# Patient Record
Sex: FEMALE | Race: WHITE | Employment: OTHER | URBAN - METROPOLITAN AREA
[De-identification: names, ages, dates, MRNs, and addresses within clinical notes are randomized per-mention and may not be internally consistent; named-entity substitution may affect disease eponyms.]

---

## 2017-05-23 PROBLEM — R06.02 SOB (SHORTNESS OF BREATH): Status: ACTIVE | Noted: 2017-05-23

## 2017-05-23 PROBLEM — J30.9 RHINITIS, ALLERGIC: Status: ACTIVE | Noted: 2017-05-23

## 2017-05-23 PROBLEM — J32.9 SINUSITIS: Status: ACTIVE | Noted: 2017-05-23

## 2017-05-23 PROBLEM — R05.9 COUGH: Status: ACTIVE | Noted: 2017-05-23

## 2017-05-23 PROBLEM — J40 BRONCHITIS: Status: ACTIVE | Noted: 2017-05-23

## 2017-05-23 PROBLEM — K21.9 GERD (GASTROESOPHAGEAL REFLUX DISEASE): Status: ACTIVE | Noted: 2017-05-23

## 2018-03-13 DIAGNOSIS — J45.909 UNCOMPLICATED ASTHMA, UNSPECIFIED ASTHMA SEVERITY, UNSPECIFIED WHETHER PERSISTENT: Primary | ICD-10-CM

## 2018-03-13 RX ORDER — FLUTICASONE FUROATE AND VILANTEROL 100; 25 UG/1; UG/1
1 POWDER RESPIRATORY (INHALATION) DAILY
Qty: 3 EACH | Refills: 5 | Status: SHIPPED | OUTPATIENT
Start: 2018-03-13 | End: 2019-01-28 | Stop reason: SDUPTHER

## 2018-03-20 ENCOUNTER — HOSPITAL ENCOUNTER (OUTPATIENT)
Age: 83
Discharge: HOME | End: 2018-03-22
Payer: MEDICARE

## 2018-03-20 DIAGNOSIS — I10 ESSENTIAL HYPERTENSION: ICD-10-CM

## 2018-03-20 DIAGNOSIS — E87.5 HYPERPOTASSEMIA: ICD-10-CM

## 2018-03-20 DIAGNOSIS — E11.9 TYPE 2 DIABETES MELLITUS WITHOUT COMPLICATION, WITHOUT LONG-TERM CURRENT USE OF INSULIN (HCC): ICD-10-CM

## 2018-03-20 LAB
ANION GAP SERPL CALCULATED.3IONS-SCNC: 12 MMOL/L (ref 7–16)
BASOPHILS ABSOLUTE: 0.03 E9/L (ref 0–0.2)
BASOPHILS RELATIVE PERCENT: 0.3 % (ref 0–2)
BILIRUBIN URINE: NEGATIVE
BLOOD, URINE: NEGATIVE
BUN BLDV-MCNC: 11 MG/DL (ref 8–23)
CALCIUM SERPL-MCNC: 9.5 MG/DL (ref 8.6–10.2)
CHLORIDE BLD-SCNC: 103 MMOL/L (ref 98–107)
CLARITY: CLEAR
CO2: 23 MMOL/L (ref 22–29)
COLOR: YELLOW
CREAT SERPL-MCNC: 0.7 MG/DL (ref 0.5–1)
EOSINOPHILS ABSOLUTE: 0.21 E9/L (ref 0.05–0.5)
EOSINOPHILS RELATIVE PERCENT: 2.4 % (ref 0–6)
GFR AFRICAN AMERICAN: >60
GFR NON-AFRICAN AMERICAN: >60 ML/MIN/1.73
GLUCOSE BLD-MCNC: 149 MG/DL (ref 74–109)
GLUCOSE URINE: NEGATIVE MG/DL
HCT VFR BLD CALC: 37.7 % (ref 34–48)
HEMOGLOBIN: 11.8 G/DL (ref 11.5–15.5)
IMMATURE GRANULOCYTES #: 0.02 E9/L
IMMATURE GRANULOCYTES %: 0.2 % (ref 0–5)
KETONES, URINE: NEGATIVE MG/DL
LEUKOCYTE ESTERASE, URINE: NEGATIVE
LYMPHOCYTES ABSOLUTE: 1.69 E9/L (ref 1.5–4)
LYMPHOCYTES RELATIVE PERCENT: 19.2 % (ref 20–42)
MCH RBC QN AUTO: 31.6 PG (ref 26–35)
MCHC RBC AUTO-ENTMCNC: 31.3 % (ref 32–34.5)
MCV RBC AUTO: 101.1 FL (ref 80–99.9)
MONOCYTES ABSOLUTE: 0.47 E9/L (ref 0.1–0.95)
MONOCYTES RELATIVE PERCENT: 5.3 % (ref 2–12)
NEUTROPHILS ABSOLUTE: 6.4 E9/L (ref 1.8–7.3)
NEUTROPHILS RELATIVE PERCENT: 72.6 % (ref 43–80)
NITRITE, URINE: NEGATIVE
PDW BLD-RTO: 13.4 FL (ref 11.5–15)
PH UA: 7 (ref 5–9)
PLATELET # BLD: 288 E9/L (ref 130–450)
PMV BLD AUTO: 10.7 FL (ref 7–12)
POTASSIUM SERPL-SCNC: 5.1 MMOL/L (ref 3.5–5)
PROTEIN UA: NEGATIVE MG/DL
RBC # BLD: 3.73 E12/L (ref 3.5–5.5)
SODIUM BLD-SCNC: 138 MMOL/L (ref 132–146)
SPECIFIC GRAVITY UA: 1.01 (ref 1–1.03)
UROBILINOGEN, URINE: 0.2 E.U./DL
WBC # BLD: 8.8 E9/L (ref 4.5–11.5)

## 2018-03-20 PROCEDURE — 80048 BASIC METABOLIC PNL TOTAL CA: CPT

## 2018-03-20 PROCEDURE — 81003 URINALYSIS AUTO W/O SCOPE: CPT

## 2018-03-20 PROCEDURE — 85025 COMPLETE CBC W/AUTO DIFF WBC: CPT

## 2018-07-19 ENCOUNTER — HOSPITAL ENCOUNTER (OUTPATIENT)
Age: 83
Discharge: HOME | End: 2018-07-21
Payer: MEDICARE

## 2018-07-19 DIAGNOSIS — Z13.220 SCREENING FOR CHOLESTEROL LEVEL: ICD-10-CM

## 2018-07-19 DIAGNOSIS — E11.9 TYPE 2 DIABETES MELLITUS WITHOUT COMPLICATION, WITHOUT LONG-TERM CURRENT USE OF INSULIN (HCC): ICD-10-CM

## 2018-07-19 DIAGNOSIS — E53.8 VITAMIN B12 DEFICIENCY: ICD-10-CM

## 2018-07-19 DIAGNOSIS — I10 ESSENTIAL HYPERTENSION: ICD-10-CM

## 2018-07-19 DIAGNOSIS — E55.9 VITAMIN D DEFICIENCY: ICD-10-CM

## 2018-07-19 LAB
ALBUMIN SERPL-MCNC: 3.5 G/DL (ref 3.5–5.2)
ALP BLD-CCNC: 81 U/L (ref 35–104)
ALT SERPL-CCNC: 18 U/L (ref 0–32)
ANION GAP SERPL CALCULATED.3IONS-SCNC: 12 MMOL/L (ref 7–16)
AST SERPL-CCNC: 20 U/L (ref 0–31)
BASOPHILS ABSOLUTE: 0.04 E9/L (ref 0–0.2)
BASOPHILS RELATIVE PERCENT: 0.4 % (ref 0–2)
BILIRUB SERPL-MCNC: 0.5 MG/DL (ref 0–1.2)
BUN BLDV-MCNC: 14 MG/DL (ref 8–23)
CALCIUM SERPL-MCNC: 9.3 MG/DL (ref 8.6–10.2)
CHLORIDE BLD-SCNC: 95 MMOL/L (ref 98–107)
CHOLESTEROL, TOTAL: 133 MG/DL (ref 0–199)
CO2: 23 MMOL/L (ref 22–29)
CREAT SERPL-MCNC: 0.6 MG/DL (ref 0.5–1)
EOSINOPHILS ABSOLUTE: 0.09 E9/L (ref 0.05–0.5)
EOSINOPHILS RELATIVE PERCENT: 0.8 % (ref 0–6)
GFR AFRICAN AMERICAN: >60
GFR NON-AFRICAN AMERICAN: >60 ML/MIN/1.73
GLUCOSE BLD-MCNC: 134 MG/DL (ref 74–109)
HCT VFR BLD CALC: 32.7 % (ref 34–48)
HDLC SERPL-MCNC: 46 MG/DL
HEMOGLOBIN: 10.1 G/DL (ref 11.5–15.5)
IMMATURE GRANULOCYTES #: 0.05 E9/L
IMMATURE GRANULOCYTES %: 0.4 % (ref 0–5)
LDL CHOLESTEROL CALCULATED: 77 MG/DL (ref 0–99)
LYMPHOCYTES ABSOLUTE: 1.25 E9/L (ref 1.5–4)
LYMPHOCYTES RELATIVE PERCENT: 11.1 % (ref 20–42)
MCH RBC QN AUTO: 29.8 PG (ref 26–35)
MCHC RBC AUTO-ENTMCNC: 30.9 % (ref 32–34.5)
MCV RBC AUTO: 96.5 FL (ref 80–99.9)
MONOCYTES ABSOLUTE: 0.63 E9/L (ref 0.1–0.95)
MONOCYTES RELATIVE PERCENT: 5.6 % (ref 2–12)
NEUTROPHILS ABSOLUTE: 9.23 E9/L (ref 1.8–7.3)
NEUTROPHILS RELATIVE PERCENT: 81.7 % (ref 43–80)
PDW BLD-RTO: 13.2 FL (ref 11.5–15)
PLATELET # BLD: 408 E9/L (ref 130–450)
PMV BLD AUTO: 10.7 FL (ref 7–12)
POTASSIUM SERPL-SCNC: 5 MMOL/L (ref 3.5–5)
RBC # BLD: 3.39 E12/L (ref 3.5–5.5)
SODIUM BLD-SCNC: 130 MMOL/L (ref 132–146)
TOTAL PROTEIN: 7.2 G/DL (ref 6.4–8.3)
TRIGL SERPL-MCNC: 48 MG/DL (ref 0–149)
VITAMIN B-12: >2000 PG/ML (ref 211–946)
VITAMIN D 25-HYDROXY: 47 NG/ML (ref 30–100)
VLDLC SERPL CALC-MCNC: 10 MG/DL
WBC # BLD: 11.3 E9/L (ref 4.5–11.5)

## 2018-07-19 PROCEDURE — 80053 COMPREHEN METABOLIC PANEL: CPT

## 2018-07-19 PROCEDURE — 82607 VITAMIN B-12: CPT

## 2018-07-19 PROCEDURE — 85025 COMPLETE CBC W/AUTO DIFF WBC: CPT

## 2018-07-19 PROCEDURE — 82306 VITAMIN D 25 HYDROXY: CPT

## 2018-07-19 PROCEDURE — 83036 HEMOGLOBIN GLYCOSYLATED A1C: CPT

## 2018-07-19 PROCEDURE — 80061 LIPID PANEL: CPT

## 2018-07-20 LAB — HBA1C MFR BLD: 6.8 % (ref 4–5.6)

## 2018-09-11 ENCOUNTER — TELEPHONE (OUTPATIENT)
Dept: PULMONOLOGY | Age: 83
End: 2018-09-11

## 2018-09-26 PROBLEM — R05.9 COUGH: Status: RESOLVED | Noted: 2017-05-23 | Resolved: 2018-09-26

## 2018-09-26 PROBLEM — J32.9 SINUSITIS: Status: RESOLVED | Noted: 2017-05-23 | Resolved: 2018-09-26

## 2018-10-22 ENCOUNTER — HOSPITAL ENCOUNTER (OUTPATIENT)
Age: 83
Discharge: HOME | End: 2018-10-24
Payer: MEDICARE

## 2018-10-22 DIAGNOSIS — E78.2 MIXED HYPERLIPIDEMIA: ICD-10-CM

## 2018-10-22 DIAGNOSIS — E11.9 TYPE 2 DIABETES MELLITUS WITHOUT COMPLICATION, WITHOUT LONG-TERM CURRENT USE OF INSULIN (HCC): ICD-10-CM

## 2018-10-22 LAB
ALBUMIN SERPL-MCNC: 4.3 G/DL (ref 3.5–5.2)
ALP BLD-CCNC: 83 U/L (ref 35–104)
ALT SERPL-CCNC: 19 U/L (ref 0–32)
ANION GAP SERPL CALCULATED.3IONS-SCNC: 14 MMOL/L (ref 7–16)
AST SERPL-CCNC: 22 U/L (ref 0–31)
BILIRUB SERPL-MCNC: 0.4 MG/DL (ref 0–1.2)
BUN BLDV-MCNC: 17 MG/DL (ref 8–23)
CALCIUM SERPL-MCNC: 9.6 MG/DL (ref 8.6–10.2)
CHLORIDE BLD-SCNC: 101 MMOL/L (ref 98–107)
CHOLESTEROL, TOTAL: 166 MG/DL (ref 0–199)
CO2: 22 MMOL/L (ref 22–29)
CREAT SERPL-MCNC: 0.7 MG/DL (ref 0.5–1)
GFR AFRICAN AMERICAN: >60
GFR NON-AFRICAN AMERICAN: >60 ML/MIN/1.73
GLUCOSE BLD-MCNC: 139 MG/DL (ref 74–109)
HBA1C MFR BLD: 6.4 % (ref 4–5.6)
HDLC SERPL-MCNC: 64 MG/DL
LDL CHOLESTEROL CALCULATED: 89 MG/DL (ref 0–99)
POTASSIUM SERPL-SCNC: 4.8 MMOL/L (ref 3.5–5)
SODIUM BLD-SCNC: 137 MMOL/L (ref 132–146)
TOTAL PROTEIN: 7.5 G/DL (ref 6.4–8.3)
TRIGL SERPL-MCNC: 67 MG/DL (ref 0–149)
VLDLC SERPL CALC-MCNC: 13 MG/DL

## 2018-10-22 PROCEDURE — 83036 HEMOGLOBIN GLYCOSYLATED A1C: CPT

## 2018-10-22 PROCEDURE — 80053 COMPREHEN METABOLIC PANEL: CPT

## 2018-10-22 PROCEDURE — 80061 LIPID PANEL: CPT

## 2019-01-08 ENCOUNTER — HOSPITAL ENCOUNTER (OUTPATIENT)
Age: 84
Discharge: HOME | End: 2019-01-08
Payer: MEDICARE

## 2019-01-08 PROCEDURE — 36415 COLL VENOUS BLD VENIPUNCTURE: CPT

## 2019-01-08 PROCEDURE — 86038 ANTINUCLEAR ANTIBODIES: CPT

## 2019-01-08 PROCEDURE — 86235 NUCLEAR ANTIGEN ANTIBODY: CPT

## 2019-01-09 LAB
ANTI-NUCLEAR ANTIBODY (ANA): NEGATIVE
ENA TO SSA (RO) ANTIBODY: NEGATIVE
ENA TO SSB (LA) ANTIBODY: NEGATIVE

## 2019-02-05 ENCOUNTER — HOSPITAL ENCOUNTER (OUTPATIENT)
Age: 84
Discharge: HOME | End: 2019-02-05
Payer: MEDICARE

## 2019-02-05 DIAGNOSIS — E11.9 TYPE 2 DIABETES MELLITUS WITHOUT COMPLICATION, WITHOUT LONG-TERM CURRENT USE OF INSULIN (HCC): ICD-10-CM

## 2019-02-05 LAB
ALBUMIN SERPL-MCNC: 4.2 G/DL (ref 3.5–5.2)
ALP BLD-CCNC: 67 U/L (ref 35–104)
ALT SERPL-CCNC: 21 U/L (ref 0–32)
ANION GAP SERPL CALCULATED.3IONS-SCNC: 10 MMOL/L (ref 7–16)
AST SERPL-CCNC: 21 U/L (ref 0–31)
BILIRUB SERPL-MCNC: 0.5 MG/DL (ref 0–1.2)
BUN BLDV-MCNC: 14 MG/DL (ref 8–23)
CALCIUM SERPL-MCNC: 9.4 MG/DL (ref 8.6–10.2)
CHLORIDE BLD-SCNC: 105 MMOL/L (ref 98–107)
CO2: 24 MMOL/L (ref 22–29)
CREAT SERPL-MCNC: 0.7 MG/DL (ref 0.5–1)
GFR AFRICAN AMERICAN: >60
GFR NON-AFRICAN AMERICAN: >60 ML/MIN/1.73
GLUCOSE BLD-MCNC: 119 MG/DL (ref 74–99)
HBA1C MFR BLD: 5.9 % (ref 4–5.6)
POTASSIUM SERPL-SCNC: 4.1 MMOL/L (ref 3.5–5)
SODIUM BLD-SCNC: 139 MMOL/L (ref 132–146)
TOTAL PROTEIN: 7 G/DL (ref 6.4–8.3)

## 2019-02-05 PROCEDURE — 36415 COLL VENOUS BLD VENIPUNCTURE: CPT

## 2019-02-05 PROCEDURE — 83036 HEMOGLOBIN GLYCOSYLATED A1C: CPT

## 2019-02-05 PROCEDURE — 80053 COMPREHEN METABOLIC PANEL: CPT

## 2019-04-10 ENCOUNTER — OFFICE VISIT (OUTPATIENT)
Dept: VASCULAR SURGERY | Age: 84
End: 2019-04-10
Payer: MEDICARE

## 2019-04-10 VITALS — SYSTOLIC BLOOD PRESSURE: 136 MMHG | DIASTOLIC BLOOD PRESSURE: 76 MMHG | HEART RATE: 72 BPM

## 2019-04-10 DIAGNOSIS — I73.9 PVD (PERIPHERAL VASCULAR DISEASE) WITH CLAUDICATION (HCC): ICD-10-CM

## 2019-04-10 PROCEDURE — 1123F ACP DISCUSS/DSCN MKR DOCD: CPT | Performed by: SURGERY

## 2019-04-10 PROCEDURE — 4040F PNEUMOC VAC/ADMIN/RCVD: CPT | Performed by: SURGERY

## 2019-04-10 PROCEDURE — G8427 DOCREV CUR MEDS BY ELIG CLIN: HCPCS | Performed by: SURGERY

## 2019-04-10 PROCEDURE — G8420 CALC BMI NORM PARAMETERS: HCPCS | Performed by: SURGERY

## 2019-04-10 PROCEDURE — G8598 ASA/ANTIPLAT THER USED: HCPCS | Performed by: SURGERY

## 2019-04-10 PROCEDURE — 99204 OFFICE O/P NEW MOD 45 MIN: CPT | Performed by: SURGERY

## 2019-04-10 PROCEDURE — 1036F TOBACCO NON-USER: CPT | Performed by: SURGERY

## 2019-04-10 PROCEDURE — 1090F PRES/ABSN URINE INCON ASSESS: CPT | Performed by: SURGERY

## 2019-04-10 NOTE — PROGRESS NOTES
Chief Complaint:   Chief Complaint   Patient presents with    Surgical Consult     PVD         HPI:  Patient came to the office, with her daughter, for evaluation of vascular status of the legs, recently underwent podiatry evaluation by her doctor Homar, was found to have diminished pulses, underwent an ankle-brachial index, abnormal, and patient was referred here for further evaluation      Patient denies any focal lateralizing neurological symptoms like loss of speech, vision or loss of function of extremity    Patient can walk one to 2 blocks slowly with the help of a walker for balance problems , and denies any symptoms of rest pain    Allergies   Allergen Reactions    Lisinopril        Current Outpatient Medications   Medication Sig Dispense Refill    Polyethylene Glycol 3350 (MIRALAX PO) Take by mouth      glimepiride (AMARYL) 2 MG tablet TAKE 1 TABLET BY MOUTH  EVERY MORNING BEFORE  BREAKFAST 14 tablet 0    fluticasone-vilanterol (BREO ELLIPTA) 100-25 MCG/INH AEPB inhaler Inhale 1 puff into the lungs daily 3 each 5    pravastatin (PRAVACHOL) 20 MG tablet Take 1 tablet by mouth nightly 90 tablet 1    amLODIPine (NORVASC) 5 MG tablet Take 1 tablet by mouth daily 90 tablet 1    clotrimazole-betamethasone (LOTRISONE) 1-0.05 % cream Apply topically 2 times daily.  45 g 0    Multiple Vitamins-Minerals (OCUVITE ADULT FORMULA PO) Take 1 capsule by mouth daily      Cinnamon 500 MG CAPS Take 1 capsule by mouth 2 times daily      TURMERIC CURCUMIN PO Take 1 tablet by mouth daily      PUMPKIN SEED PO Take 1 applicator by mouth daily      Probiotic Product (PRO-BIOTIC BLEND PO) Take by mouth      magnesium gluconate (MAGONATE) 500 MG tablet Take 1,000 mg by mouth 2 times daily      Cholecalciferol (VITAMIN D3) 2000 units CAPS Take 2,000 Units by mouth daily       Blood Glucose Monitoring Suppl (ONE TOUCH ULTRA SYSTEM KIT) W/DEVICE KIT 1 kit by Does not apply route once for 1 dose Indications: DX: E11.9 1 kit 0    furosemide (LASIX) 20 MG tablet Take 10 mg by mouth daily       ALPHA LIPOIC ACID PO Take 1 capsule by mouth daily.  Cyanocobalamin (VITAMIN B-12 CR) 1000 MCG TBCR Take 2,500 mcg by mouth daily.  Coenzyme Q10 (COQ10) 100 MG CAPS Take 200 mg by mouth daily.  aspirin 81 MG EC tablet Take 81 mg by mouth daily.  albuterol (PROVENTIL) 90 MCG/ACT inhaler Inhale 2 puffs into the lungs 4 times daily.  polyethylene glycol (GLYCOLAX) powder USING MEASURING CAP MIX  17GM IN WATER AND DRINK  DAILY 1530 g 2    hydrocortisone 2.5 % cream Apply topically 2 times daily Apply topically 2 times daily.  Salicylic Acid 3 % SHAM Apply topically      Lancets (BD LANCET ULTRAFINE 11Q) MISC 1 applicator by Does not apply route 2 times daily Indications: DX:E11.9 200 each 3    glucose blood VI test strips (ONE TOUCH TEST STRIPS) strip 1 each by In Vitro route 2 times daily Indications: DX:E11.9 200 each 3    B Complex-C-Folic Acid (HM VITAMIN B COMPLEX/VITAMIN C) TABS Take 1 tablet by mouth daily. No current facility-administered medications for this visit. Past Medical History:   Diagnosis Date    Asthma     Bronchitis 5/23/2017    Cough 5/23/2017    Diabetes mellitus ([address]     GERD (gastroesophageal reflux disease) 5/23/2017    Hypertension     Lung disease     PVD (peripheral vascular disease) with claudication ([facility] [address] 4/10/2019    Restless legs syndrome     Rhinitis, allergic 5/23/2017    Sinusitis 5/23/2017    SOB (shortness of breath) 5/23/2017    Urinary incontinence        Past Surgical History:   Procedure Laterality Date    CHOLECYSTECTOMY      CORONARY ARTERY BYPASS GRAFT      8/28/10    HYSTERECTOMY      frankie and bso 1997    TONSILLECTOMY AND ADENOIDECTOMY         Family History   Problem Relation Age of Onset    Hypertension Father     Heart Disease Brother        Social History     Socioeconomic History    Marital status:       Spouse name: Not on file    Number of children: Not on file    Years of education: Not on file    Highest education level: Not on file   Occupational History    Not on file   Social Needs    Financial resource strain: Not on file    Food insecurity:     Worry: Not on file     Inability: Not on file    Transportation needs:     Medical: Not on file     Non-medical: Not on file   Tobacco Use    Smoking status: Never Smoker    Smokeless tobacco: Never Used   Substance and Sexual Activity    Alcohol use: Yes     Comment: occasional    Drug use: No    Sexual activity: Not Currently     Partners: Male   Lifestyle    Physical activity:     Days per week: Not on file     Minutes per session: Not on file    Stress: Not on file   Relationships    Social connections:     Talks on phone: Not on file     Gets together: Not on file     Attends Restorationist service: Not on file     Active member of club or organization: Not on file     Attends meetings of clubs or organizations: Not on file     Relationship status: Not on file    Intimate partner violence:     Fear of current or ex partner: Not on file     Emotionally abused: Not on file     Physically abused: Not on file     Forced sexual activity: Not on file   Other Topics Concern    Not on file   Social History Narrative    Not on file       Review of Systems:  Skin:  No abnormal pigmentation or rash  Eyes:  No blurring, diplopia or vision loss  Ears/Nose/Throat:  No hearing loss or vertigo  Respiratory:  No cough, pleuritic chest pain, dyspnea, or wheezing. Cardiovascular: No angina, palpitations . Gastrointestinal:  No nausea or vomiting; no abdominal pain or rectal bleeding  Musculoskeletal:  No arthritis or weakness. Neurologic:  No paralysis, paresis, paresthesia, seizures or headaches  Hematologic/Lymphatic/Immunologic:  No anemia, abnormal bleeding/bruising, fever, chills or night sweats. Endocrine:  No heat or cold intolerance.   No polyphagia, polydipsia or extremity. All the questions were answered. Orders Placed This Encounter   Procedures    VL LOWER EXTREMITY ARTERIAL SEGMENTAL PRESSURES W PPG BILATERAL             Indicated follow-up: Return if symptoms worsen or fail to improve.

## 2019-04-24 ENCOUNTER — HOSPITAL ENCOUNTER (OUTPATIENT)
Dept: CARDIOLOGY | Age: 84
Discharge: HOME | End: 2019-04-24
Payer: MEDICARE

## 2019-04-24 DIAGNOSIS — I73.9 PVD (PERIPHERAL VASCULAR DISEASE) WITH CLAUDICATION (HCC): ICD-10-CM

## 2019-04-24 PROCEDURE — 93923 UPR/LXTR ART STDY 3+ LVLS: CPT

## 2019-04-26 ENCOUNTER — TELEPHONE (OUTPATIENT)
Dept: VASCULAR SURGERY | Age: 84
End: 2019-04-26

## 2019-04-28 NOTE — PROCEDURES
[address]                  [address] [facility],  [facility]                                VASCULAR REPORT    PATIENT NAME: Raza Newman                   :        1927  MED REC NO:   [id number]                            ROOM:  ACCOUNT NO:   [de-identified]                           ADMIT DATE: 2019  PROVIDER:     Laura Hernandez MD    DATE OF PROCEDURE:  2019    LOWER EXTREMITY ARTERIAL DOPPLER STUDY    INDICATION:  Pain in both feet, difficulty walking. FINDINGS:  The lower extremity arterial Doppler study revealed that the  patient has falsely elevated ankle-brachial index due to calcification  of the tibial arteries. The patient does, however, have triphasic ankle  Doppler tracings from the right and biphasic ankle Dopplers from the  left with adequate collateral flow to both feet based upon the pulse  volume recordings. Demario Ordonez MD    D: 2019 17:20:07       T: 2019 22:47:48     [id number]  Job#: [id number]     Doc#: [id number]    CC:   MD Frank Vásquez DPM

## 2019-05-02 ENCOUNTER — HOSPITAL ENCOUNTER (OUTPATIENT)
Age: 84
Discharge: HOME | End: 2019-05-02
Payer: MEDICARE

## 2019-05-02 DIAGNOSIS — I10 ESSENTIAL HYPERTENSION: ICD-10-CM

## 2019-05-02 DIAGNOSIS — E11.9 TYPE 2 DIABETES MELLITUS WITHOUT COMPLICATION, WITHOUT LONG-TERM CURRENT USE OF INSULIN (HCC): ICD-10-CM

## 2019-05-02 DIAGNOSIS — E78.2 MIXED HYPERLIPIDEMIA: ICD-10-CM

## 2019-05-02 LAB
BASOPHILS ABSOLUTE: 0.03 E9/L (ref 0–0.2)
BASOPHILS RELATIVE PERCENT: 0.5 % (ref 0–2)
CHOLESTEROL, TOTAL: 159 MG/DL (ref 0–199)
CREATININE URINE: 50 MG/DL (ref 29–226)
EOSINOPHILS ABSOLUTE: 0.14 E9/L (ref 0.05–0.5)
EOSINOPHILS RELATIVE PERCENT: 2.2 % (ref 0–6)
HBA1C MFR BLD: 6.4 % (ref 4–5.6)
HCT VFR BLD CALC: 35.9 % (ref 34–48)
HDLC SERPL-MCNC: 68 MG/DL
HEMOGLOBIN: 12.1 G/DL (ref 11.5–15.5)
IMMATURE GRANULOCYTES #: 0.02 E9/L
IMMATURE GRANULOCYTES %: 0.3 % (ref 0–5)
LDL CHOLESTEROL CALCULATED: 79 MG/DL (ref 0–99)
LYMPHOCYTES ABSOLUTE: 1.45 E9/L (ref 1.5–4)
LYMPHOCYTES RELATIVE PERCENT: 22.9 % (ref 20–42)
MCH RBC QN AUTO: 33 PG (ref 26–35)
MCHC RBC AUTO-ENTMCNC: 33.7 % (ref 32–34.5)
MCV RBC AUTO: 97.8 FL (ref 80–99.9)
MICROALBUMIN UR-MCNC: <12 MG/L
MICROALBUMIN/CREAT UR-RTO: ABNORMAL (ref 0–30)
MONOCYTES ABSOLUTE: 0.4 E9/L (ref 0.1–0.95)
MONOCYTES RELATIVE PERCENT: 6.3 % (ref 2–12)
NEUTROPHILS ABSOLUTE: 4.29 E9/L (ref 1.8–7.3)
NEUTROPHILS RELATIVE PERCENT: 67.8 % (ref 43–80)
PDW BLD-RTO: 13.2 FL (ref 11.5–15)
PLATELET # BLD: 291 E9/L (ref 130–450)
PMV BLD AUTO: 10 FL (ref 7–12)
PROTEIN PROTEIN: 6 MG/DL (ref 0–12)
PROTEIN/CREAT RATIO: 0.1
PROTEIN/CREAT RATIO: 0.1 (ref 0–0.2)
RBC # BLD: 3.67 E12/L (ref 3.5–5.5)
TRIGL SERPL-MCNC: 61 MG/DL (ref 0–149)
VLDLC SERPL CALC-MCNC: 12 MG/DL
WBC # BLD: 6.3 E9/L (ref 4.5–11.5)

## 2019-05-02 PROCEDURE — 82570 ASSAY OF URINE CREATININE: CPT

## 2019-05-02 PROCEDURE — 82044 UR ALBUMIN SEMIQUANTITATIVE: CPT

## 2019-05-02 PROCEDURE — 84156 ASSAY OF PROTEIN URINE: CPT

## 2019-05-02 PROCEDURE — 36415 COLL VENOUS BLD VENIPUNCTURE: CPT

## 2019-05-02 PROCEDURE — 85025 COMPLETE CBC W/AUTO DIFF WBC: CPT

## 2019-05-02 PROCEDURE — 80061 LIPID PANEL: CPT

## 2019-05-02 PROCEDURE — 83036 HEMOGLOBIN GLYCOSYLATED A1C: CPT

## 2019-05-13 ENCOUNTER — HOSPITAL ENCOUNTER (OUTPATIENT)
Age: 84
Discharge: HOME | End: 2019-05-15
Payer: MEDICARE

## 2019-05-13 PROCEDURE — 87075 CULTR BACTERIA EXCEPT BLOOD: CPT

## 2019-05-13 PROCEDURE — 87205 SMEAR GRAM STAIN: CPT

## 2019-05-13 PROCEDURE — 87070 CULTURE OTHR SPECIMN AEROBIC: CPT

## 2019-05-13 PROCEDURE — 87186 SC STD MICRODIL/AGAR DIL: CPT

## 2019-05-14 LAB — GRAM STAIN ORDERABLE: NORMAL

## 2019-05-15 LAB
ANAEROBIC CULTURE: NORMAL
ORGANISM: ABNORMAL
WOUND/ABSCESS: ABNORMAL
WOUND/ABSCESS: ABNORMAL

## 2019-07-05 ENCOUNTER — HOSPITAL ENCOUNTER (EMERGENCY)
Age: 84
Discharge: HOME | End: 2019-07-05
Payer: MEDICARE

## 2019-07-05 ENCOUNTER — APPOINTMENT (OUTPATIENT)
Dept: GENERAL RADIOLOGY | Age: 84
End: 2019-07-05
Payer: MEDICARE

## 2019-07-05 ENCOUNTER — APPOINTMENT (OUTPATIENT)
Dept: CT IMAGING | Age: 84
End: 2019-07-05
Payer: MEDICARE

## 2019-07-05 VITALS
BODY MASS INDEX: 24.8 KG/M2 | TEMPERATURE: 97.9 F | HEART RATE: 62 BPM | DIASTOLIC BLOOD PRESSURE: 90 MMHG | RESPIRATION RATE: 16 BRPM | SYSTOLIC BLOOD PRESSURE: 154 MMHG | WEIGHT: 140 LBS | OXYGEN SATURATION: 100 %

## 2019-07-05 DIAGNOSIS — S01.81XA FACIAL LACERATION, INITIAL ENCOUNTER: ICD-10-CM

## 2019-07-05 DIAGNOSIS — S09.90XA INJURY OF HEAD, INITIAL ENCOUNTER: Primary | ICD-10-CM

## 2019-07-05 DIAGNOSIS — S41.111A SKIN TEAR OF RIGHT UPPER EXTREMITY: ICD-10-CM

## 2019-07-05 DIAGNOSIS — T07.XXXA MULTIPLE CONTUSIONS: ICD-10-CM

## 2019-07-05 PROCEDURE — 71046 X-RAY EXAM CHEST 2 VIEWS: CPT

## 2019-07-05 PROCEDURE — 6360000002 HC RX W HCPCS: Performed by: NURSE PRACTITIONER

## 2019-07-05 PROCEDURE — 90471 IMMUNIZATION ADMIN: CPT | Performed by: NURSE PRACTITIONER

## 2019-07-05 PROCEDURE — 73110 X-RAY EXAM OF WRIST: CPT

## 2019-07-05 PROCEDURE — 90715 TDAP VACCINE 7 YRS/> IM: CPT | Performed by: NURSE PRACTITIONER

## 2019-07-05 PROCEDURE — 73562 X-RAY EXAM OF KNEE 3: CPT

## 2019-07-05 PROCEDURE — 70450 CT HEAD/BRAIN W/O DYE: CPT

## 2019-07-05 PROCEDURE — 6370000000 HC RX 637 (ALT 250 FOR IP): Performed by: NURSE PRACTITIONER

## 2019-07-05 PROCEDURE — 99283 EMERGENCY DEPT VISIT LOW MDM: CPT

## 2019-07-05 PROCEDURE — 72125 CT NECK SPINE W/O DYE: CPT

## 2019-07-05 RX ORDER — DIAPER,BRIEF,INFANT-TODD,DISP
EACH MISCELLANEOUS ONCE
Status: COMPLETED | OUTPATIENT
Start: 2019-07-05 | End: 2019-07-05

## 2019-07-05 RX ORDER — TRAMADOL HYDROCHLORIDE 50 MG/1
50 TABLET ORAL ONCE
Status: COMPLETED | OUTPATIENT
Start: 2019-07-05 | End: 2019-07-05

## 2019-07-05 RX ORDER — ACETAMINOPHEN 500 MG
500 TABLET ORAL 4 TIMES DAILY PRN
Qty: 40 TABLET | Refills: 1 | Status: SHIPPED | OUTPATIENT
Start: 2019-07-05 | End: 2021-05-28

## 2019-07-05 RX ORDER — CYCLOBENZAPRINE HCL 10 MG
10 TABLET ORAL NIGHTLY PRN
Qty: 30 TABLET | Refills: 0 | Status: SHIPPED | OUTPATIENT
Start: 2019-07-05 | End: 2019-07-15

## 2019-07-05 RX ADMIN — BACITRACIN ZINC: 500 OINTMENT TOPICAL at 11:35

## 2019-07-05 RX ADMIN — TRAMADOL HYDROCHLORIDE 50 MG: 50 TABLET, FILM COATED ORAL at 11:22

## 2019-07-05 RX ADMIN — TETANUS TOXOID, REDUCED DIPHTHERIA TOXOID AND ACELLULAR PERTUSSIS VACCINE, ADSORBED 0.5 ML: 5; 2.5; 8; 8; 2.5 SUSPENSION INTRAMUSCULAR at 11:22

## 2019-07-05 ASSESSMENT — PAIN SCALES - GENERAL: PAINLEVEL_OUTOF10: 3

## 2019-07-05 NOTE — ED PROVIDER NOTES
as of 7/5/2019 11:21 AM      START taking these medications    Details   acetaminophen (TYLENOL) 500 MG tablet Take 1 tablet by mouth 4 times daily as needed for Pain, Disp-40 tablet, R-1Print      cyclobenzaprine (FLEXERIL) 10 MG tablet Take 1 tablet by mouth nightly as needed for Muscle spasms, Disp-30 tablet, R-0Print           Electronically signed by VIK Boyle CNP   DD: 7/5/19  **This report was transcribed using voice recognition software. Every effort was made to ensure accuracy; however, inadvertent computerized transcription errors may be present.   Evan OF ED PROVIDER NOTE      VIK Boyle CNP  07/07/19 8060

## 2019-07-17 ENCOUNTER — HOSPITAL ENCOUNTER (OUTPATIENT)
Dept: ULTRASOUND IMAGING | Age: 84
Discharge: HOME | DRG: 093 | End: 2019-07-19
Payer: MEDICARE

## 2019-07-17 DIAGNOSIS — I82.4Y2 DEEP VEIN THROMBOSIS (DVT) OF PROXIMAL VEIN OF LEFT LOWER EXTREMITY, UNSPECIFIED CHRONICITY (HCC): ICD-10-CM

## 2019-07-17 PROCEDURE — 93971 EXTREMITY STUDY: CPT

## 2019-07-18 ENCOUNTER — APPOINTMENT (OUTPATIENT)
Dept: GENERAL RADIOLOGY | Age: 84
DRG: 093 | End: 2019-07-18
Payer: MEDICARE

## 2019-07-18 ENCOUNTER — HOSPITAL ENCOUNTER (INPATIENT)
Age: 84
LOS: 3 days | Discharge: NURSING, REHAB OR LONG-TERM CARE | DRG: 093 | End: 2019-07-22
Attending: EMERGENCY MEDICINE | Admitting: INTERNAL MEDICINE
Payer: MEDICARE

## 2019-07-18 DIAGNOSIS — R07.9 CHEST PAIN, UNSPECIFIED TYPE: Primary | ICD-10-CM

## 2019-07-18 DIAGNOSIS — M25.562 LEFT KNEE PAIN, UNSPECIFIED CHRONICITY: ICD-10-CM

## 2019-07-18 DIAGNOSIS — R53.1 GENERAL WEAKNESS: ICD-10-CM
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
## 2019-07-18 LAB
ANION GAP SERPL CALCULATED.3IONS-SCNC: 12 MMOL/L (ref 7–16)
BASOPHILS ABSOLUTE: 0.02 E9/L (ref 0–0.2)
BASOPHILS RELATIVE PERCENT: 0.2 % (ref 0–2)
BUN BLDV-MCNC: 12 MG/DL (ref 8–23)
CALCIUM SERPL-MCNC: 9.6 MG/DL (ref 8.6–10.2)
CHLORIDE BLD-SCNC: 95 MMOL/L (ref 98–107)
CO2: 23 MMOL/L (ref 22–29)
CREAT SERPL-MCNC: 0.6 MG/DL (ref 0.5–1)
EKG ATRIAL RATE: 72 BPM
EKG P AXIS: 19 DEGREES
EKG P-R INTERVAL: 184 MS
EKG Q-T INTERVAL: 366 MS
EKG QRS DURATION: 94 MS
EKG QTC CALCULATION (BAZETT): 400 MS
EKG R AXIS: -13 DEGREES
EKG T AXIS: 60 DEGREES
EKG VENTRICULAR RATE: 72 BPM
EOSINOPHILS ABSOLUTE: 0.04 E9/L (ref 0.05–0.5)
EOSINOPHILS RELATIVE PERCENT: 0.4 % (ref 0–6)
GFR AFRICAN AMERICAN: >60
GFR NON-AFRICAN AMERICAN: >60 ML/MIN/1.73
GLUCOSE BLD-MCNC: 207 MG/DL (ref 74–99)
HCT VFR BLD CALC: 33.5 % (ref 34–48)
HEMOGLOBIN: 11.6 G/DL (ref 11.5–15.5)
IMMATURE GRANULOCYTES #: 0.03 E9/L
IMMATURE GRANULOCYTES %: 0.3 % (ref 0–5)
LYMPHOCYTES ABSOLUTE: 1.09 E9/L (ref 1.5–4)
LYMPHOCYTES RELATIVE PERCENT: 9.6 % (ref 20–42)
MCH RBC QN AUTO: 33.5 PG (ref 26–35)
MCHC RBC AUTO-ENTMCNC: 34.6 % (ref 32–34.5)
MCV RBC AUTO: 96.8 FL (ref 80–99.9)
METER GLUCOSE: 124 MG/DL (ref 74–99)
METER GLUCOSE: 224 MG/DL (ref 74–99)
MONOCYTES ABSOLUTE: 0.83 E9/L (ref 0.1–0.95)
MONOCYTES RELATIVE PERCENT: 7.3 % (ref 2–12)
NEUTROPHILS ABSOLUTE: 9.34 E9/L (ref 1.8–7.3)
NEUTROPHILS RELATIVE PERCENT: 82.2 % (ref 43–80)
PDW BLD-RTO: 12.7 FL (ref 11.5–15)
PLATELET # BLD: 293 E9/L (ref 130–450)
PMV BLD AUTO: 10.2 FL (ref 7–12)
POTASSIUM SERPL-SCNC: 4.4 MMOL/L (ref 3.5–5)
RBC # BLD: 3.46 E12/L (ref 3.5–5.5)
SODIUM BLD-SCNC: 130 MMOL/L (ref 132–146)
TROPONIN: <0.01 NG/ML (ref 0–0.03)
WBC # BLD: 11.4 E9/L (ref 4.5–11.5)

## 2019-07-18 PROCEDURE — 6360000002 HC RX W HCPCS: Performed by: INTERNAL MEDICINE

## 2019-07-18 PROCEDURE — 80048 BASIC METABOLIC PNL TOTAL CA: CPT

## 2019-07-18 PROCEDURE — 93010 ELECTROCARDIOGRAM REPORT: CPT | Performed by: INTERNAL MEDICINE

## 2019-07-18 PROCEDURE — G0378 HOSPITAL OBSERVATION PER HR: HCPCS

## 2019-07-18 PROCEDURE — 96372 THER/PROPH/DIAG INJ SC/IM: CPT

## 2019-07-18 PROCEDURE — 36415 COLL VENOUS BLD VENIPUNCTURE: CPT

## 2019-07-18 PROCEDURE — 6370000000 HC RX 637 (ALT 250 FOR IP): Performed by: INTERNAL MEDICINE

## 2019-07-18 PROCEDURE — 73560 X-RAY EXAM OF KNEE 1 OR 2: CPT

## 2019-07-18 PROCEDURE — 6370000000 HC RX 637 (ALT 250 FOR IP): Performed by: EMERGENCY MEDICINE

## 2019-07-18 PROCEDURE — 93005 ELECTROCARDIOGRAM TRACING: CPT | Performed by: EMERGENCY MEDICINE

## 2019-07-18 PROCEDURE — 85025 COMPLETE CBC W/AUTO DIFF WBC: CPT

## 2019-07-18 PROCEDURE — 82962 GLUCOSE BLOOD TEST: CPT

## 2019-07-18 PROCEDURE — 73501 X-RAY EXAM HIP UNI 1 VIEW: CPT

## 2019-07-18 PROCEDURE — 99285 EMERGENCY DEPT VISIT HI MDM: CPT

## 2019-07-18 PROCEDURE — 84484 ASSAY OF TROPONIN QUANT: CPT

## 2019-07-18 PROCEDURE — 71046 X-RAY EXAM CHEST 2 VIEWS: CPT

## 2019-07-18 RX ORDER — ACETAMINOPHEN 325 MG/1
650 TABLET ORAL ONCE
Status: COMPLETED | OUTPATIENT
Start: 2019-07-18 | End: 2019-07-18

## 2019-07-18 RX ORDER — POLYETHYLENE GLYCOL 3350 17 G/17G
17 POWDER, FOR SOLUTION ORAL DAILY
Status: DISCONTINUED | OUTPATIENT
Start: 2019-07-18 | End: 2019-07-22 | Stop reason: HOSPADM

## 2019-07-18 RX ORDER — FUROSEMIDE 20 MG/1
10 TABLET ORAL DAILY
Status: DISCONTINUED | OUTPATIENT
Start: 2019-07-19 | End: 2019-07-22 | Stop reason: HOSPADM

## 2019-07-18 RX ORDER — ASPIRIN 81 MG/1
81 TABLET ORAL DAILY
Status: DISCONTINUED | OUTPATIENT
Start: 2019-07-18 | End: 2019-07-22 | Stop reason: HOSPADM

## 2019-07-18 RX ORDER — GLIMEPIRIDE 2 MG/1
2 TABLET ORAL
Status: DISCONTINUED | OUTPATIENT
Start: 2019-07-19 | End: 2019-07-22 | Stop reason: HOSPADM

## 2019-07-18 RX ORDER — PRAVASTATIN SODIUM 20 MG
20 TABLET ORAL NIGHTLY
Status: DISCONTINUED | OUTPATIENT
Start: 2019-07-18 | End: 2019-07-22 | Stop reason: HOSPADM

## 2019-07-18 RX ORDER — ACETAMINOPHEN 325 MG/1
650 TABLET ORAL EVERY 4 HOURS PRN
Status: DISCONTINUED | OUTPATIENT
Start: 2019-07-18 | End: 2019-07-19 | Stop reason: SDUPTHER

## 2019-07-18 RX ORDER — MAGNESIUM GLUCONATE 27 MG(500)
1000 TABLET ORAL 2 TIMES DAILY
Status: DISCONTINUED | OUTPATIENT
Start: 2019-07-18 | End: 2019-07-22 | Stop reason: HOSPADM

## 2019-07-18 RX ORDER — VITS A,C,E/LUTEIN/MINERALS 300MCG-200
1 TABLET ORAL DAILY
Status: DISCONTINUED | OUTPATIENT
Start: 2019-07-18 | End: 2019-07-22 | Stop reason: HOSPADM

## 2019-07-18 RX ORDER — DOBUTAMINE HYDROCHLORIDE 200 MG/100ML
10 INJECTION INTRAVENOUS CONTINUOUS
Status: DISCONTINUED | OUTPATIENT
Start: 2019-07-18 | End: 2019-07-18

## 2019-07-18 RX ORDER — CHOLECALCIFEROL (VITAMIN D3) 50 MCG
2000 TABLET ORAL DAILY
Status: DISCONTINUED | OUTPATIENT
Start: 2019-07-18 | End: 2019-07-22 | Stop reason: HOSPADM

## 2019-07-18 RX ORDER — AMLODIPINE BESYLATE 5 MG/1
5 TABLET ORAL DAILY
Status: DISCONTINUED | OUTPATIENT
Start: 2019-07-19 | End: 2019-07-22 | Stop reason: HOSPADM

## 2019-07-18 RX ORDER — DEXTROSE MONOHYDRATE 25 G/50ML
12.5 INJECTION, SOLUTION INTRAVENOUS PRN
Status: DISCONTINUED | OUTPATIENT
Start: 2019-07-18 | End: 2019-07-22 | Stop reason: HOSPADM

## 2019-07-18 RX ORDER — NICOTINE POLACRILEX 4 MG
15 LOZENGE BUCCAL PRN
Status: DISCONTINUED | OUTPATIENT
Start: 2019-07-18 | End: 2019-07-22 | Stop reason: HOSPADM

## 2019-07-18 RX ORDER — DEXTROSE MONOHYDRATE 50 MG/ML
100 INJECTION, SOLUTION INTRAVENOUS PRN
Status: DISCONTINUED | OUTPATIENT
Start: 2019-07-18 | End: 2019-07-22 | Stop reason: HOSPADM

## 2019-07-18 RX ADMIN — POLYETHYLENE GLYCOL 3350 17 G: 17 POWDER, FOR SOLUTION ORAL at 18:08

## 2019-07-18 RX ADMIN — Medication 2000 UNITS: at 18:09

## 2019-07-18 RX ADMIN — Medication 1000 MG: at 20:38

## 2019-07-18 RX ADMIN — MOMETASONE FUROATE AND FORMOTEROL FUMARATE DIHYDRATE 2 PUFF: 200; 5 AEROSOL RESPIRATORY (INHALATION) at 20:37

## 2019-07-18 RX ADMIN — ASPIRIN 81 MG: 81 TABLET ORAL at 18:09

## 2019-07-18 RX ADMIN — ENOXAPARIN SODIUM 40 MG: 40 INJECTION SUBCUTANEOUS at 18:08

## 2019-07-18 RX ADMIN — ACETAMINOPHEN 650 MG: 325 TABLET, FILM COATED ORAL at 13:57

## 2019-07-18 RX ADMIN — PRAVASTATIN SODIUM 20 MG: 20 TABLET ORAL at 20:38

## 2019-07-18 ASSESSMENT — PAIN SCALES - GENERAL
PAINLEVEL_OUTOF10: 7
PAINLEVEL_OUTOF10: 0
PAINLEVEL_OUTOF10: 7
PAINLEVEL_OUTOF10: 9

## 2019-07-18 ASSESSMENT — ENCOUNTER SYMPTOMS
COLOR CHANGE: 0
EYE PAIN: 0
WHEEZING: 0
SINUS PRESSURE: 0
DIARRHEA: 0
ABDOMINAL PAIN: 0
COUGH: 0
EYE REDNESS: 0
SHORTNESS OF BREATH: 0
NAUSEA: 0
VOMITING: 0
RHINORRHEA: 0
SORE THROAT: 0
STRIDOR: 0

## 2019-07-18 ASSESSMENT — PAIN DESCRIPTION - LOCATION: LOCATION: KNEE

## 2019-07-18 ASSESSMENT — PAIN DESCRIPTION - ORIENTATION: ORIENTATION: LEFT

## 2019-07-18 ASSESSMENT — PAIN DESCRIPTION - PAIN TYPE: TYPE: ACUTE PAIN;CHRONIC PAIN

## 2019-07-18 NOTE — ED NOTES
Central Monitoring@ 2587  [facility] with:  Leanor Aschoff [facility] MAINE Vilma  07/18/19 3163

## 2019-07-18 NOTE — ED PROVIDER NOTES
REVIEWED ---------------------------  Date / Time Roomed:  7/18/2019  1:12 PM  ED Bed Assignment:  17B/17B-17    The nursing notes within the ED encounter and vital signs as below have been reviewed. Patient Vitals for the past 24 hrs:   BP Temp Temp src Pulse Resp SpO2 Weight   07/18/19 1400 (!) 163/89 -- -- 84 (!) 33 99 % --   07/18/19 1224 (!) 154/111 98.8 °F (37.1 °C) Temporal 71 18 100 % 140 lb (63.5 kg)   07/18/19 1153 -- -- -- 69 -- 96 % --       Oxygen Saturation Interpretation: Normal    ------------------------------------------ PROGRESS NOTES ------------------------------------------  Re-evaluation(s):  Time: 6510  Patients symptoms are improving  Repeat physical examination is not changed    Counseling:  I have spoken with the patient and daughters and discussed todays results, in addition to providing specific details for the plan of care and counseling regarding the diagnosis and prognosis. Their questions are answered at this time and they are agreeable with the plan of admission.    --------------------------------- ADDITIONAL PROVIDER NOTES ---------------------------------  Consultations:  Time: 6175. Spoke with Dr. Yoli Paiz, PCP. Discussed case. They will admit the patient. This patient's ED course included: a personal history and physicial examination, re-evaluation prior to disposition, multiple bedside re-evaluations, cardiac monitoring and continuous pulse oximetry    This patient has remained hemodynamically stable during their ED course. Diagnosis:  1. Chest pain, unspecified type    2. Left knee pain, unspecified chronicity    3. General weakness    4. Fall from bed, initial encounter        Disposition:  Patient's disposition: Admit to telemetry  Patient's condition is stable.            Daja Ramesh DO  Resident  07/18/19 3974

## 2019-07-19 ENCOUNTER — APPOINTMENT (OUTPATIENT)
Dept: NUCLEAR MEDICINE | Age: 84
DRG: 093 | End: 2019-07-19
Payer: MEDICARE

## 2019-07-19 ENCOUNTER — APPOINTMENT (OUTPATIENT)
Dept: NON INVASIVE DIAGNOSTICS | Age: 84
DRG: 093 | End: 2019-07-19
Payer: MEDICARE

## 2019-07-19 PROBLEM — R26.81 GAIT INSTABILITY: Status: ACTIVE | Noted: 2019-07-19

## 2019-07-19 LAB
LV EF: 58 %
LVEF MODALITY: NORMAL
METER GLUCOSE: 107 MG/DL (ref 74–99)
METER GLUCOSE: 220 MG/DL (ref 74–99)
METER GLUCOSE: 295 MG/DL (ref 74–99)
TROPONIN: <0.01 NG/ML (ref 0–0.03)

## 2019-07-19 PROCEDURE — 1200000000 HC SEMI PRIVATE

## 2019-07-19 PROCEDURE — 3430000000 HC RX DIAGNOSTIC RADIOPHARMACEUTICAL: Performed by: RADIOLOGY

## 2019-07-19 PROCEDURE — 84484 ASSAY OF TROPONIN QUANT: CPT

## 2019-07-19 PROCEDURE — A9500 TC99M SESTAMIBI: HCPCS | Performed by: RADIOLOGY

## 2019-07-19 PROCEDURE — 97162 PT EVAL MOD COMPLEX 30 MIN: CPT

## 2019-07-19 PROCEDURE — 2580000003 HC RX 258: Performed by: INTERNAL MEDICINE

## 2019-07-19 PROCEDURE — 6370000000 HC RX 637 (ALT 250 FOR IP): Performed by: INTERNAL MEDICINE

## 2019-07-19 PROCEDURE — 78452 HT MUSCLE IMAGE SPECT MULT: CPT

## 2019-07-19 PROCEDURE — 6360000002 HC RX W HCPCS: Performed by: INTERNAL MEDICINE

## 2019-07-19 PROCEDURE — 97535 SELF CARE MNGMENT TRAINING: CPT

## 2019-07-19 PROCEDURE — 97165 OT EVAL LOW COMPLEX 30 MIN: CPT

## 2019-07-19 PROCEDURE — 97530 THERAPEUTIC ACTIVITIES: CPT

## 2019-07-19 PROCEDURE — 93017 CV STRESS TEST TRACING ONLY: CPT

## 2019-07-19 PROCEDURE — 82962 GLUCOSE BLOOD TEST: CPT

## 2019-07-19 PROCEDURE — 36415 COLL VENOUS BLD VENIPUNCTURE: CPT

## 2019-07-19 RX ORDER — ACETAMINOPHEN 325 MG/1
650 TABLET ORAL EVERY 4 HOURS PRN
Status: DISCONTINUED | OUTPATIENT
Start: 2019-07-19 | End: 2019-07-22 | Stop reason: HOSPADM

## 2019-07-19 RX ORDER — CYCLOBENZAPRINE HCL 10 MG
10 TABLET ORAL 3 TIMES DAILY PRN
Status: DISCONTINUED | OUTPATIENT
Start: 2019-07-19 | End: 2019-07-22 | Stop reason: HOSPADM

## 2019-07-19 RX ORDER — SODIUM CHLORIDE 0.9 % (FLUSH) 0.9 %
10 SYRINGE (ML) INJECTION 2 TIMES DAILY
Status: DISCONTINUED | OUTPATIENT
Start: 2019-07-19 | End: 2019-07-22 | Stop reason: HOSPADM

## 2019-07-19 RX ADMIN — ACETAMINOPHEN 650 MG: 325 TABLET, FILM COATED ORAL at 02:17

## 2019-07-19 RX ADMIN — REGADENOSON 0.4 MG: 0.08 INJECTION, SOLUTION INTRAVENOUS at 12:23

## 2019-07-19 RX ADMIN — Medication 12 MILLICURIE: at 10:00

## 2019-07-19 RX ADMIN — FUROSEMIDE 10 MG: 20 TABLET ORAL at 09:10

## 2019-07-19 RX ADMIN — CYCLOBENZAPRINE 10 MG: 10 TABLET, FILM COATED ORAL at 22:31

## 2019-07-19 RX ADMIN — Medication 34 MILLICURIE: at 12:25

## 2019-07-19 RX ADMIN — ACETAMINOPHEN 650 MG: 325 TABLET, FILM COATED ORAL at 16:44

## 2019-07-19 RX ADMIN — AMLODIPINE BESYLATE 5 MG: 5 TABLET ORAL at 09:10

## 2019-07-19 RX ADMIN — MOMETASONE FUROATE AND FORMOTEROL FUMARATE DIHYDRATE 2 PUFF: 200; 5 AEROSOL RESPIRATORY (INHALATION) at 09:10

## 2019-07-19 RX ADMIN — Medication 10 ML: at 22:27

## 2019-07-19 RX ADMIN — ACETAMINOPHEN 650 MG: 325 TABLET, FILM COATED ORAL at 09:10

## 2019-07-19 RX ADMIN — ENOXAPARIN SODIUM 40 MG: 40 INJECTION SUBCUTANEOUS at 09:10

## 2019-07-19 RX ADMIN — Medication 10 ML: at 09:11

## 2019-07-19 ASSESSMENT — PAIN SCALES - GENERAL
PAINLEVEL_OUTOF10: 0
PAINLEVEL_OUTOF10: 9
PAINLEVEL_OUTOF10: 5
PAINLEVEL_OUTOF10: 7
PAINLEVEL_OUTOF10: 0
PAINLEVEL_OUTOF10: 6

## 2019-07-19 ASSESSMENT — PAIN DESCRIPTION - LOCATION
LOCATION: KNEE

## 2019-07-19 ASSESSMENT — PAIN DESCRIPTION - ORIENTATION
ORIENTATION: RIGHT;LEFT
ORIENTATION: LEFT;RIGHT
ORIENTATION: RIGHT;LEFT

## 2019-07-19 ASSESSMENT — PAIN DESCRIPTION - ONSET
ONSET: GRADUAL
ONSET: ON-GOING
ONSET: ON-GOING

## 2019-07-19 ASSESSMENT — PAIN DESCRIPTION - PROGRESSION
CLINICAL_PROGRESSION: NOT CHANGED
CLINICAL_PROGRESSION: GRADUALLY WORSENING
CLINICAL_PROGRESSION: NOT CHANGED

## 2019-07-19 ASSESSMENT — PAIN - FUNCTIONAL ASSESSMENT
PAIN_FUNCTIONAL_ASSESSMENT: PREVENTS OR INTERFERES SOME ACTIVE ACTIVITIES AND ADLS
PAIN_FUNCTIONAL_ASSESSMENT: PREVENTS OR INTERFERES WITH ALL ACTIVE AND SOME PASSIVE ACTIVITIES
PAIN_FUNCTIONAL_ASSESSMENT: PREVENTS OR INTERFERES WITH MANY ACTIVE NOT PASSIVE ACTIVITIES

## 2019-07-19 ASSESSMENT — PAIN DESCRIPTION - DESCRIPTORS
DESCRIPTORS: ACHING;DISCOMFORT;SHOOTING;SORE
DESCRIPTORS: ACHING;CONSTANT;DISCOMFORT
DESCRIPTORS: ACHING;DISCOMFORT;HEAVINESS;SORE

## 2019-07-19 ASSESSMENT — PAIN DESCRIPTION - PAIN TYPE
TYPE: ACUTE PAIN
TYPE: CHRONIC PAIN
TYPE: CHRONIC PAIN

## 2019-07-19 ASSESSMENT — PAIN DESCRIPTION - FREQUENCY
FREQUENCY: CONTINUOUS
FREQUENCY: CONTINUOUS
FREQUENCY: INTERMITTENT

## 2019-07-19 NOTE — CONSULTS
LISINOPRIL. CURRENT MEDICATIONS:  Norvasc, aspirin, Lovenox, Lasix, Amaryl,  magnesium, GlycoLax, Pravachol. PHYSICAL EXAMINATION:  GENERAL:  A well-nourished, well-developed female. She has pain with  movement of her lowers. HEENT:  Head:  Normocephalic, atraumatic. Eyes:  Pupils equal, round,  reactive to light. Pharynx normal.  LUNGS:  Clear to P and A. HEART:  Regular rate and rhythm. S1, S2 normal.  No murmurs or gallops. ABDOMEN:  Bowel sounds normal.  Soft, nontender. No masses. EXTREMITIES:  Without clubbing or cyanosis. There is extensive bruising  in both lowers and swelling of both knees. MENTAL STATUS:  Alert and oriented. Sensation is diminished distally. NEUROMUSCULAR:  Limited strength at both hips and knees due to pain, 4/5  at the ankles. PROBLEM LIST:  1. Peripheral neuropathy. 2.  Soft tissue injury to both knees. 3.  Recent chest pain. 4.  Hypertension. 5.  Type 2 diabetes mellitus. RECOMMENDATIONS:  I was asked about acute rehab at this point, first of  all we do not know what is causing the pain in her lower extremities. I  do not think it is neuropathy. I think it is more joint related and I  do not see an orthopedic consult. She just finished a cardiac workup  and I do not have any results from that. If I need to make a decision  at this point about rehab, I do not think she has got the endurance for  acute rehab, so we are probably looking at skilled nursing facility at  discharge plus she has limited support system, but I think it would be  good to have a clear idea what is causing the pain in her knees before  she is discharged there.         Stephanie Santamaria MD    D: 07/19/2019 14:18:09       T: 07/19/2019 14:28:24     [email or web address]  Job#: [id number]     Doc#: [id number]    CC:

## 2019-07-19 NOTE — PROGRESS NOTES
OCCUPATIONAL THERAPY INITIAL EVALUATION      Date:2019  Patient Name: Evelyn Atwood  MRN: [id number]  : 1927  Room: [address] [place] [address]      Evaluating OT: Elisa Goncalves OTR/L #[id number]    AM-PAC Daily Activity Raw Score:     Recommended Adaptive Equipment: sock aid, reachers, ww To be further assessed      Diagnosis: chest pain /fall       Pertinent Medical History: CAD, DM, neuropathy     Precautions:  Falls,      Home Living: Pt lives alone in a 1 story with 1 step(s) to enter and 1 rail(s); bed/bath on 1st   Bathroom setup: walk in shower with shower chair and raised toilet seat/grab bars  Equipment owned: rollator, shower chair, raised toilet seat  Prior Level of Function: Modified [place]  with ADLs , Modified [place]  with IADLs; using rollator for ambulation. Caregiver 1x/week for assist with homemaking  Driving: yes    Pain Level: 8-9/10 pain in B LE- tops of knees.   nursing is aware  Cognition: A&O: 4/4; Follows 2 step directions   Memory:  good    Sequencing:  fair    Problem solving:  fair    Judgement/safety:  fair      Functional Assessment:   Initial Eval Status  Date: 19 Treatment Status  Date: Short Term Goals  Treatment frequency: PRN    Feeding Independent   Independent    Grooming Stand by Assist   Independent    UB Dressing Stand by Assist   Independent    LB Dressing Minimal Assist   Supervision    Bathing Moderate Assist  Supervision    Toileting Moderate/max Assist   Supervision    Bed Mobility  Supine to sit: Minimal Assist   Sit to supine: Minimal Assist   Supine to sit: Modified [place]   Sit to supine: Modified [place]    Functional Transfers Sit to stand: Maximal Assist   Stand to sit: Moderate Assist   Stand pivot: Maximal Assist   Stand by Assist    Functional Mobility Min A with ww  Slow gait speed, c/o pain BLE with mobility  SBA with rollator/ww  Functional distance with good activity tolerance   Balance Sitting:     Static:  SBA Dynamic:SBA  Standing: min/mod A     Activity Tolerance fair     Visual/  Perceptual Glasses: yes                Hand dominance: R  UE ROM: RUE:  WFL  LUE:  WFL  Strength: RUE: grossly 4/5 LUE: grossly 4/5   Strength: B WFL  Fine Motor Coordination:  B WFL    Hearing: [place]  Sensation: neuropathy  Tone:  WFL  Edema: None noted                            Comments/Treatment: Upon arrival, patient in bed. Daughter present in room. . Therapist educated and facilitated patient on techniques to increase safety and independence with bed mobility, functional transfers. Sitting EOB x 15 minutes to increase dynamic sitting balance and activity tolerance. Pt  education on Adaptive technique and/or equipment to improve independence  and conserve energy during ADLs. Pt improved indep with LE dressing with use of reachers and sock aid. At end of session, patient up in chair with call light and phone within reach, all lines and tubes intact. Pt demonstrating good understanding of education/techniques. Patient would benefit from continued OT  to improve functional transfers, functional independence and quality of life.     Eval Complexity: Low    Assessment of current deficits   Functional mobility [x]  ADLs [x] Strength [x]  Cognition []  Functional transfers  [x] IADLs [] Safety Awareness [x]  Endurance [x]  Fine Motor Coordination [] Balance [x] Vision/perception [] Sensation []   Gross Motor Coordination [] ROM [] Delirium []                  Motor Control []    Plan of Care:   ADL retraining [x]   Equipment needs [x]   Neuromuscular re-education [x] Energy Conservation Techniques [x]  Functional Transfer training [x] Patient and/or Family Education [x]  Functional Mobility training [x]  Environmental Modifications [x]  Cognitive re-training []   Compensatory techniques for ADLs [x]  Splinting Needs []   Positioning to improve overall function [x]   Therapeutic Activity [x]  Therapeutic Exercise  [x]  Visual/Perceptual:

## 2019-07-19 NOTE — H&P
TABLET BY MOUTH  EVERY MORNING BEFORE  BREAKFAST  Polyethylene Glycol 3350 (MIRALAX PO), Take by mouth  hydrocortisone 2.5 % cream, Apply topically 2 times daily Apply topically 2 times daily. Salicylic Acid 3 % SHAM, Apply topically  fluticasone-vilanterol (BREO ELLIPTA) 100-25 MCG/INH AEPB inhaler, Inhale 1 puff into the lungs daily  clotrimazole-betamethasone (LOTRISONE) 1-0.05 % cream, Apply topically 2 times daily. Multiple Vitamins-Minerals (OCUVITE ADULT FORMULA PO), Take 1 capsule by mouth daily  Cinnamon 500 MG CAPS, Take 1 capsule by mouth 2 times daily  TURMERIC CURCUMIN PO, Take 1 tablet by mouth daily  PUMPKIN SEED PO, Take 1 applicator by mouth daily  Probiotic Product (PRO-BIOTIC BLEND PO), Take by mouth  magnesium gluconate (MAGONATE) 500 MG tablet, Take 1,000 mg by mouth 2 times daily  Cholecalciferol (VITAMIN D3) 2000 units CAPS, Take 2,000 Units by mouth daily   Lancets (BD LANCET ULTRAFINE 02K) MISC, 1 applicator by Does not apply route 2 times daily Indications: DX:E11.9  glucose blood VI test strips (ONE TOUCH TEST STRIPS) strip, 1 each by In Vitro route 2 times daily Indications: DX:E11.9  Blood Glucose Monitoring Suppl (ONE TOUCH ULTRA SYSTEM KIT) W/DEVICE KIT, 1 kit by Does not apply route once for 1 dose Indications: DX: E11.9  ALPHA LIPOIC ACID PO, Take 1 capsule by mouth daily. Cyanocobalamin (VITAMIN B-12 CR) 1000 MCG TBCR, Take 2,500 mcg by mouth daily. B Complex-C-Folic Acid (HM VITAMIN B COMPLEX/VITAMIN C) TABS, Take 1 tablet by mouth daily. Coenzyme Q10 (COQ10) 100 MG CAPS, Take 200 mg by mouth daily. aspirin 81 MG EC tablet, Take 81 mg by mouth daily. [DISCONTINUED] glimepiride (AMARYL) 2 MG tablet, TAKE 1 TABLET BY MOUTH  EVERY MORNING BEFORE  BREAKFAST  [DISCONTINUED] albuterol (PROVENTIL) 90 MCG/ACT inhaler, Inhale 2 puffs into the lungs 4 times daily. Allergies:    Lisinopril    Social History:    reports that she has never smoked.  She has never used smokeless tobacco.

## 2019-07-19 NOTE — CARE COORDINATION
Transtion of care at discharge: Due to patient is stress lab CM met with daughters, Terry Guillaume and Bonny Newman, at the bedside. Patient has been living alone in a home. She has had multiple falls recently. Her daughter voices that her mother is not safe at home. She relies on her sons to check on her in the morning and at night. All family live out of town. She has a walker, tripod and a quad cane. She has a raised toilet, rails, and a rollator. She does have a caregiver once a week for household type help. Her pcp is Dr Priti Guerin. Her pharmacy is [facility] Afrer discussion with Jina Dao, son, the family has chosen [facility] at [facility] and a referral to was made to [facility]. PT/OT evals are completed and placed in chart.  Will await acceptance

## 2019-07-20 LAB
APPEARANCE FLUID: NORMAL
CELL COUNT FLUID TYPE: NORMAL
COLOR FLUID: NORMAL
METER GLUCOSE: 146 MG/DL (ref 74–99)
METER GLUCOSE: 170 MG/DL (ref 74–99)
METER GLUCOSE: 382 MG/DL (ref 74–99)
METER GLUCOSE: 99 MG/DL (ref 74–99)
MONOCYTE, FLUID: 42 %
NEUTROPHIL, FLUID: 59 %
NUCLEATED CELLS FLUID: 4090 /UL
RBC FLUID: NORMAL /UL

## 2019-07-20 PROCEDURE — 6360000002 HC RX W HCPCS: Performed by: INTERNAL MEDICINE

## 2019-07-20 PROCEDURE — 2580000003 HC RX 258: Performed by: INTERNAL MEDICINE

## 2019-07-20 PROCEDURE — 0S9D3ZX DRAINAGE OF LEFT KNEE JOINT, PERCUTANEOUS APPROACH, DIAGNOSTIC: ICD-10-PCS | Performed by: INTERNAL MEDICINE

## 2019-07-20 PROCEDURE — 6360000002 HC RX W HCPCS: Performed by: STUDENT IN AN ORGANIZED HEALTH CARE EDUCATION/TRAINING PROGRAM

## 2019-07-20 PROCEDURE — 89051 BODY FLUID CELL COUNT: CPT

## 2019-07-20 PROCEDURE — 1200000000 HC SEMI PRIVATE

## 2019-07-20 PROCEDURE — 89060 EXAM SYNOVIAL FLUID CRYSTALS: CPT

## 2019-07-20 PROCEDURE — 87205 SMEAR GRAM STAIN: CPT

## 2019-07-20 PROCEDURE — 2500000003 HC RX 250 WO HCPCS: Performed by: STUDENT IN AN ORGANIZED HEALTH CARE EDUCATION/TRAINING PROGRAM

## 2019-07-20 PROCEDURE — 6370000000 HC RX 637 (ALT 250 FOR IP): Performed by: INTERNAL MEDICINE

## 2019-07-20 PROCEDURE — 87070 CULTURE OTHR SPECIMN AEROBIC: CPT

## 2019-07-20 PROCEDURE — 0S9F3ZX DRAINAGE OF RIGHT ANKLE JOINT, PERCUTANEOUS APPROACH, DIAGNOSTIC: ICD-10-PCS | Performed by: INTERNAL MEDICINE

## 2019-07-20 PROCEDURE — 82962 GLUCOSE BLOOD TEST: CPT

## 2019-07-20 PROCEDURE — 99222 1ST HOSP IP/OBS MODERATE 55: CPT | Performed by: ORTHOPAEDIC SURGERY

## 2019-07-20 RX ORDER — BUPIVACAINE HYDROCHLORIDE 2.5 MG/ML
10 INJECTION, SOLUTION EPIDURAL; INFILTRATION; INTRACAUDAL ONCE
Status: COMPLETED | OUTPATIENT
Start: 2019-07-20 | End: 2019-07-20

## 2019-07-20 RX ORDER — TRIAMCINOLONE ACETONIDE 40 MG/ML
40 INJECTION, SUSPENSION INTRA-ARTICULAR; INTRAMUSCULAR ONCE
Status: COMPLETED | OUTPATIENT
Start: 2019-07-20 | End: 2019-07-20

## 2019-07-20 RX ORDER — LIDOCAINE HYDROCHLORIDE 10 MG/ML
10 INJECTION, SOLUTION EPIDURAL; INFILTRATION; INTRACAUDAL; PERINEURAL SEE ADMIN INSTRUCTIONS
Status: DISCONTINUED | OUTPATIENT
Start: 2019-07-20 | End: 2019-07-22 | Stop reason: HOSPADM

## 2019-07-20 RX ADMIN — Medication 1000 MG: at 09:41

## 2019-07-20 RX ADMIN — ASPIRIN 81 MG: 81 TABLET ORAL at 09:39

## 2019-07-20 RX ADMIN — TRIAMCINOLONE ACETONIDE 40 MG: 40 INJECTION, SUSPENSION INTRA-ARTICULAR; INTRAMUSCULAR at 15:21

## 2019-07-20 RX ADMIN — PRAVASTATIN SODIUM 20 MG: 20 TABLET ORAL at 21:02

## 2019-07-20 RX ADMIN — BUPIVACAINE HYDROCHLORIDE 25 MG: 2.5 INJECTION, SOLUTION EPIDURAL; INFILTRATION; INTRACAUDAL; PERINEURAL at 15:20

## 2019-07-20 RX ADMIN — INSULIN LISPRO 5 UNITS: 100 INJECTION, SOLUTION INTRAVENOUS; SUBCUTANEOUS at 22:25

## 2019-07-20 RX ADMIN — ACETAMINOPHEN 650 MG: 325 TABLET, FILM COATED ORAL at 12:19

## 2019-07-20 RX ADMIN — GLIMEPIRIDE 2 MG: 2 TABLET ORAL at 08:12

## 2019-07-20 RX ADMIN — CYCLOPENTOLATE HYDROCHLORIDE 1 TABLET: 10 SOLUTION/ DROPS OPHTHALMIC at 09:40

## 2019-07-20 RX ADMIN — POLYETHYLENE GLYCOL 3350 17 G: 17 POWDER, FOR SOLUTION ORAL at 09:39

## 2019-07-20 RX ADMIN — MOMETASONE FUROATE AND FORMOTEROL FUMARATE DIHYDRATE 2 PUFF: 200; 5 AEROSOL RESPIRATORY (INHALATION) at 12:19

## 2019-07-20 RX ADMIN — Medication 2000 UNITS: at 09:39

## 2019-07-20 RX ADMIN — FUROSEMIDE 10 MG: 20 TABLET ORAL at 09:39

## 2019-07-20 RX ADMIN — BUPIVACAINE HYDROCHLORIDE 25 MG: 2.5 INJECTION, SOLUTION EPIDURAL; INFILTRATION; INTRACAUDAL; PERINEURAL at 15:19

## 2019-07-20 RX ADMIN — ENOXAPARIN SODIUM 40 MG: 40 INJECTION SUBCUTANEOUS at 09:39

## 2019-07-20 RX ADMIN — Medication 1000 MG: at 21:02

## 2019-07-20 RX ADMIN — LIDOCAINE HYDROCHLORIDE 10 ML: 10 INJECTION, SOLUTION EPIDURAL; INFILTRATION; INTRACAUDAL; PERINEURAL at 15:20

## 2019-07-20 RX ADMIN — TRIAMCINOLONE ACETONIDE 40 MG: 40 INJECTION, SUSPENSION INTRA-ARTICULAR; INTRAMUSCULAR at 15:19

## 2019-07-20 RX ADMIN — AMLODIPINE BESYLATE 5 MG: 5 TABLET ORAL at 09:39

## 2019-07-20 RX ADMIN — Medication 10 ML: at 09:39

## 2019-07-20 RX ADMIN — MOMETASONE FUROATE AND FORMOTEROL FUMARATE DIHYDRATE 2 PUFF: 200; 5 AEROSOL RESPIRATORY (INHALATION) at 21:01

## 2019-07-20 RX ADMIN — Medication 10 ML: at 20:59

## 2019-07-20 ASSESSMENT — PAIN SCALES - GENERAL
PAINLEVEL_OUTOF10: 2
PAINLEVEL_OUTOF10: 0
PAINLEVEL_OUTOF10: 0

## 2019-07-20 ASSESSMENT — PAIN DESCRIPTION - PAIN TYPE: TYPE: ACUTE PAIN

## 2019-07-20 ASSESSMENT — PAIN DESCRIPTION - LOCATION: LOCATION: LEG

## 2019-07-20 ASSESSMENT — PAIN DESCRIPTION - ORIENTATION: ORIENTATION: RIGHT;LEFT

## 2019-07-20 NOTE — CONSULTS
CARDIOLOGY CONSULTATION    Patient Name:  Scott Bull    :  1927    Reason for Consultation:   Chest discomfort    History of Present Illness:   Scott Bull presents to [address] [place] [address] following a history of sudden onset of retrosternal chest discomfort on the day of admission 2019. She has known coronary artery disease having undergone previous coronary artery bypass surgery on 2010 by Dr. Rivera Inch is fared well since. She did not relate her chest discomfort to exertion but actually occurred at rest.  There is no significant diaphoresis mild shortness of breath. Of note is that she fell approximately 2 weeks ago and injured her knees as well as her chest which seemed to improve after utilizing an albuterol inhaler. Knowing full well that she has coronary artery disease she came to the emergency room for further evaluation. Since admission to sequential troponins have remained normal.  She continues to complain of musculoskeletal pains especially that of her left knee since her fall. Past Medical History:   has a past medical history of Arthritis, Asthma, Bronchitis, CAD (coronary artery disease), Cough, Diabetes mellitus ([address] GERD (gastroesophageal reflux disease), Hyperlipidemia, Hypertension, Lung disease, PVD (peripheral vascular disease) with claudication ([address] Restless legs syndrome, Rhinitis, allergic, Sinusitis, SOB (shortness of breath), and Urinary incontinence. Surgical History:   has a past surgical history that includes Hysterectomy; Cholecystectomy; Tonsillectomy and adenoidectomy; Coronary artery bypass graft; Abdomen surgery; Appendectomy; Colonoscopy; Endoscopy, colon, diagnostic; and Cardiac surgery. Social History:   reports that she has never smoked. She has never used smokeless tobacco. She reports that she drinks alcohol. She reports that she does not use drugs.      Family History:  family history includes Heart Disease in her brother; Hypertension in her father. Medications:  Prior to Admission medications    Medication Sig Start Date End Date Taking? Authorizing Provider   acetaminophen (TYLENOL) 500 MG tablet Take 1 tablet by mouth 4 times daily as needed for Pain 7/5/19 7/18/19 Yes VIK Milligan - CNP   furosemide (LASIX) 20 MG tablet Take 0.5 tablets by mouth daily 5/9/19  Yes Jeannie Stratton MD   pravastatin (PRAVACHOL) 20 MG tablet TAKE 1 TABLET BY MOUTH  NIGHTLY 5/6/19  Yes Jeannie Stratton MD   amLODIPine (NORVASC) 5 MG tablet TAKE 1 TABLET BY MOUTH  DAILY 5/6/19  Yes Jeannie Stratton MD   glimepiride (AMARYL) 2 MG tablet TAKE 1 TABLET BY MOUTH  EVERY MORNING BEFORE  BREAKFAST 5/6/19  Yes Jeannie Stratton MD   Polyethylene Glycol 3350 (MIRALAX PO) Take by mouth   Yes Historical Provider, MD   hydrocortisone 2.5 % cream Apply topically 2 times daily Apply topically 2 times daily. Yes Historical Provider, MD   Salicylic Acid 3 % SHAM Apply topically   Yes Historical Provider, MD   fluticasone-vilanterol (BREO ELLIPTA) 100-25 MCG/INH AEPB inhaler Inhale 1 puff into the lungs daily 1/28/19  Yes Jeannie Stratton MD   clotrimazole-betamethasone (LOTRISONE) 1-0.05 % cream Apply topically 2 times daily.  11/7/18  Yes Jeannie Stratton MD   Multiple Vitamins-Minerals (OCUVITE ADULT FORMULA PO) Take 1 capsule by mouth daily   Yes Historical Provider, MD   Cinnamon 500 MG CAPS Take 1 capsule by mouth 2 times daily   Yes Historical Provider, MD   TURMERIC CURCUMIN PO Take 1 tablet by mouth daily   Yes Historical Provider, MD   PUMPKIN SEED PO Take 1 applicator by mouth daily   Yes Historical Provider, MD   Probiotic Product (PRO-BIOTIC BLEND PO) Take by mouth   Yes Historical Provider, MD   magnesium gluconate (MAGONATE) 500 MG tablet Take 1,000 mg by mouth 2 times daily   Yes Historical Provider, MD   Cholecalciferol (VITAMIN D3) 2000 units CAPS Take 2,000 Units by mouth daily    Yes Historical Provider, MD   Lancets (BD LANCET ULTRAFINE 30G) MISC 1 applicator by Does not apply route 2 times daily Indications: DX:E11.9 5/23/17  Yes Donavon Giordano MD   glucose blood VI test strips (ONE TOUCH TEST STRIPS) strip 1 each by In Vitro route 2 times daily Indications: DX:E11.9 5/23/17  Yes Donavon Giordano MD   Blood Glucose Monitoring Suppl (ONE TOUCH ULTRA SYSTEM KIT) W/DEVICE KIT 1 kit by Does not apply route once for 1 dose Indications: DX: E11.9 5/23/17 1/27/23 Yes Donavon Giordano MD   ALPHA LIPOIC ACID PO Take 1 capsule by mouth daily. Yes Historical Provider, MD   Cyanocobalamin (VITAMIN B-12 CR) 1000 MCG TBCR Take 2,500 mcg by mouth daily. Yes Historical Provider, MD   B Complex-C-Folic Acid (HM VITAMIN B COMPLEX/VITAMIN C) TABS Take 1 tablet by mouth daily. Yes Historical Provider, MD   Coenzyme Q10 (COQ10) 100 MG CAPS Take 200 mg by mouth daily. Yes Historical Provider, MD   aspirin 81 MG EC tablet Take 81 mg by mouth daily. Yes Historical Provider, MD       Allergies:  Lisinopril     Review of Systems:   · Constitutional: there has been no unanticipated weight loss. There's been no significant change in energy level, sleep pattern or activity level. No fever chills or rigors. · Eyes: No visual changes or diplopia. No scleral icterus. · ENT: No Headaches, hearing loss or vertigo. No mouth sores or sore throat. No change in taste or smell. · Cardiovascular: Had chest discomfort, dyspnea on exertion, but denies palpitations, loss of consciousness, no phlebitis, no claudication. · Respiratory: No cough or wheezing, no sputum production. No hemoptysis, pleuritic pain. · Gastrointestinal: No abdominal pain, appetite loss, blood in stools. No change in bowel habits. No hematemesis  · Genitourinary: No dysuria, trouble voiding or hematuria. No nocturia or increased frequency. · Musculoskeletal:  No gait disturbance, + weakness and lower knee joint complaints. · Integumentary: No rash or pruritis.   · Neurological: No headache, diplopia, change chest is recommended if there is clinical concern for sternal fracture. 2. Stable, enlarged cardiac mediastinal silhouette with underlying mild central pulmonary vascular congestion, osteopenia and thoracic aortic vascular calcifications. 3. Nonspecific left basilar airspace opacifications likely reflective of atelectasis, although an early developing infiltrate/pneumonia might have the same appearance. Xr Chest Standard (2 Vw)    Result Date: 2019  Patient MRN: [id number] : 1927 Age:  80 years Gender: Female Order Date: 2019 9:45 AM Exam: XR CHEST (2 VW) Number of Views: 2 Indication:   Fall Comparison: 2018. Findings: There is a stable, large cardiomediastinal silhouette with thoracic aortic vascular calcifications. No evident displaced bony fracture limited fracture evaluation. Osteopenia. No airspace consolidation, pneumothorax or pleural effusion identified. 1. Stable, large cardiomediastinal silhouette with thoracic aortic vascular calcifications. 2. Osteopenia. Xr Wrist Right (min 3 Views)    Result Date: 2019  Patient MRN:  [id number] : 1927 Age: 80 years Gender: Female Order Date:  2019 9:45 AM EXAM: XR WRIST RIGHT (MIN 3 VIEWS) NUMBER OF IMAGES:  3 views INDICATION:  fall COMPARISON: None Findings: 3 views of the right wrist show no fracture or dislocation. There is severe degenerative narrowing of the first carpometacarpal joint space and lesser degenerative narrowing of the first MP joint space. There is diffuse joint space narrowing throughout the carpus. Scattered subchondral cystic changes are seen. There is also calcification within the triangular fibrocartilage. Arterial calcifications are seen. There is soft tissue swelling. No apparent fracture or dislocation involving the right wrist. Degenerative changes and joint space narrowing, as above.      Xr Knee Left (1-2 Views)    Result Date: 2019  Patient MRN: [id number] : 1927 Age:  80 years

## 2019-07-21 LAB
GRAM STAIN ORDERABLE: NORMAL
METER GLUCOSE: 205 MG/DL (ref 74–99)
METER GLUCOSE: 212 MG/DL (ref 74–99)
METER GLUCOSE: 282 MG/DL (ref 74–99)
METER GLUCOSE: 291 MG/DL (ref 74–99)

## 2019-07-21 PROCEDURE — 6360000002 HC RX W HCPCS: Performed by: INTERNAL MEDICINE

## 2019-07-21 PROCEDURE — 2580000003 HC RX 258: Performed by: INTERNAL MEDICINE

## 2019-07-21 PROCEDURE — 82962 GLUCOSE BLOOD TEST: CPT

## 2019-07-21 PROCEDURE — 1200000000 HC SEMI PRIVATE

## 2019-07-21 PROCEDURE — 6370000000 HC RX 637 (ALT 250 FOR IP): Performed by: INTERNAL MEDICINE

## 2019-07-21 RX ADMIN — INSULIN LISPRO 6 UNITS: 100 INJECTION, SOLUTION INTRAVENOUS; SUBCUTANEOUS at 13:09

## 2019-07-21 RX ADMIN — Medication 10 ML: at 21:53

## 2019-07-21 RX ADMIN — Medication 10 ML: at 10:51

## 2019-07-21 RX ADMIN — POLYETHYLENE GLYCOL 3350 17 G: 17 POWDER, FOR SOLUTION ORAL at 10:45

## 2019-07-21 RX ADMIN — ASPIRIN 81 MG: 81 TABLET ORAL at 10:48

## 2019-07-21 RX ADMIN — INSULIN LISPRO 4 UNITS: 100 INJECTION, SOLUTION INTRAVENOUS; SUBCUTANEOUS at 18:58

## 2019-07-21 RX ADMIN — AMLODIPINE BESYLATE 5 MG: 5 TABLET ORAL at 10:48

## 2019-07-21 RX ADMIN — Medication 1000 MG: at 21:54

## 2019-07-21 RX ADMIN — Medication 2000 UNITS: at 10:48

## 2019-07-21 RX ADMIN — ENOXAPARIN SODIUM 40 MG: 40 INJECTION SUBCUTANEOUS at 10:45

## 2019-07-21 RX ADMIN — CYCLOPENTOLATE HYDROCHLORIDE 1 TABLET: 10 SOLUTION/ DROPS OPHTHALMIC at 10:46

## 2019-07-21 RX ADMIN — PRAVASTATIN SODIUM 20 MG: 20 TABLET ORAL at 21:54

## 2019-07-21 RX ADMIN — INSULIN LISPRO 6 UNITS: 100 INJECTION, SOLUTION INTRAVENOUS; SUBCUTANEOUS at 08:47

## 2019-07-21 RX ADMIN — GLIMEPIRIDE 2 MG: 2 TABLET ORAL at 10:47

## 2019-07-21 RX ADMIN — FUROSEMIDE 10 MG: 20 TABLET ORAL at 10:47

## 2019-07-21 RX ADMIN — MOMETASONE FUROATE AND FORMOTEROL FUMARATE DIHYDRATE 2 PUFF: 200; 5 AEROSOL RESPIRATORY (INHALATION) at 10:46

## 2019-07-21 RX ADMIN — INSULIN LISPRO 2 UNITS: 100 INJECTION, SOLUTION INTRAVENOUS; SUBCUTANEOUS at 22:01

## 2019-07-21 RX ADMIN — MOMETASONE FUROATE AND FORMOTEROL FUMARATE DIHYDRATE 2 PUFF: 200; 5 AEROSOL RESPIRATORY (INHALATION) at 21:53

## 2019-07-21 RX ADMIN — Medication 1000 MG: at 10:46

## 2019-07-21 ASSESSMENT — PAIN SCALES - GENERAL
PAINLEVEL_OUTOF10: 0
PAINLEVEL_OUTOF10: 0

## 2019-07-21 NOTE — PROGRESS NOTES
Dr. Paco childs served in regards to patients elevated blood sugar. Awaiting call back or new orders to be placed.

## 2019-07-22 VITALS
WEIGHT: 140 LBS | HEART RATE: 64 BPM | OXYGEN SATURATION: 98 % | RESPIRATION RATE: 18 BRPM | HEIGHT: 63 IN | DIASTOLIC BLOOD PRESSURE: 72 MMHG | TEMPERATURE: 97.1 F | SYSTOLIC BLOOD PRESSURE: 142 MMHG | BODY MASS INDEX: 24.8 KG/M2

## 2019-07-22 LAB
CRYSTALS, FLUID: NORMAL
METER GLUCOSE: 219 MG/DL (ref 74–99)
SOURCE BODY FLUID: NORMAL

## 2019-07-22 PROCEDURE — 6370000000 HC RX 637 (ALT 250 FOR IP): Performed by: INTERNAL MEDICINE

## 2019-07-22 PROCEDURE — 6360000002 HC RX W HCPCS: Performed by: INTERNAL MEDICINE

## 2019-07-22 PROCEDURE — 2580000003 HC RX 258: Performed by: INTERNAL MEDICINE

## 2019-07-22 PROCEDURE — 82962 GLUCOSE BLOOD TEST: CPT

## 2019-07-22 RX ORDER — ALBUTEROL 90 MCG
2 AEROSOL (GRAM) INHALATION 4 TIMES DAILY
Refills: 0 | DISCHARGE
Start: 2019-07-22 | End: 2022-08-28

## 2019-07-22 RX ORDER — GLIMEPIRIDE 2 MG/1
TABLET ORAL
Qty: 14 TABLET | Refills: 0 | DISCHARGE
Start: 2019-07-22 | End: 2021-03-23 | Stop reason: SDUPTHER

## 2019-07-22 RX ADMIN — FUROSEMIDE 10 MG: 20 TABLET ORAL at 09:22

## 2019-07-22 RX ADMIN — INSULIN LISPRO 4 UNITS: 100 INJECTION, SOLUTION INTRAVENOUS; SUBCUTANEOUS at 09:17

## 2019-07-22 RX ADMIN — ASPIRIN 81 MG: 81 TABLET ORAL at 09:22

## 2019-07-22 RX ADMIN — CYCLOPENTOLATE HYDROCHLORIDE 1 TABLET: 10 SOLUTION/ DROPS OPHTHALMIC at 09:21

## 2019-07-22 RX ADMIN — Medication 10 ML: at 09:30

## 2019-07-22 RX ADMIN — POLYETHYLENE GLYCOL 3350 17 G: 17 POWDER, FOR SOLUTION ORAL at 09:30

## 2019-07-22 RX ADMIN — ENOXAPARIN SODIUM 40 MG: 40 INJECTION SUBCUTANEOUS at 09:19

## 2019-07-22 RX ADMIN — Medication 2000 UNITS: at 09:22

## 2019-07-22 RX ADMIN — GLIMEPIRIDE 2 MG: 2 TABLET ORAL at 09:22

## 2019-07-22 RX ADMIN — Medication 1000 MG: at 09:21

## 2019-07-22 RX ADMIN — AMLODIPINE BESYLATE 5 MG: 5 TABLET ORAL at 09:22

## 2019-07-22 RX ADMIN — MOMETASONE FUROATE AND FORMOTEROL FUMARATE DIHYDRATE 2 PUFF: 200; 5 AEROSOL RESPIRATORY (INHALATION) at 09:22

## 2019-07-22 ASSESSMENT — PAIN SCALES - GENERAL: PAINLEVEL_OUTOF10: 0

## 2019-07-22 NOTE — PLAN OF CARE
Problem: Falls - Risk of:  Goal: Will remain free from falls  Description  Will remain free from falls  7/22/2019 0206 by David Conn RN  Outcome: Met This Shift  7/21/2019 1951 by Luke Chong RN  Outcome: Met This Shift

## 2019-07-22 NOTE — DISCHARGE SUMMARY
Physician Discharge Summary     Patient ID:  Aidan Renee  [id number]  25 y.o.  9/20/1927    Admit date: 7/18/2019    Discharge date and time: 7/22/2019 12:00 PM     Admission Diagnoses: Chest pain [R07.9]  Chest pain [R07.9]  Gait instability [R26.81]    Discharge Diagnoses:   Recurrent falls at home  Noncardiac chest pain  Multiple contusions  Hematomas of both knees/aspirated  Severe polyneuropathy of the lower extremities  Comorbidities present and past as listed below  Patient Active Problem List   Diagnosis    Asthma    CAD (coronary artery disease)    Vitamin D deficiency    [facility]) [facility]    HTN (hypertension)    Idiopathic peripheral neuropathy    Rhinitis, allergic    SOB (shortness of breath)    Bronchitis    GERD (gastroesophageal reflux disease)    [facility]    Chest pain    Gait instability       Consults:Orthopedics cardiology    Procedures: Lexiscan negative  Both knees aspirated    Hospital Course:     25-year-old woman with recurrent falls at home  Admitted due to chest pain and inability to ambulate  Seen by cardiology  Jonathan Guzman was negative  Both knees were aspirated due to hematomas  Discharged to subacute rehabilitation in stable condition  Discharge Exam:  See progress note from today    Disposition:   Stable at the time of discharge  Discharge to subacute rehabilitation    Patient Instructions:   Discharge Medication List as of 7/22/2019  9:40 AM      CONTINUE these medications which have CHANGED    Details   albuterol (PROVENTIL) 90 MCG/ACT inhaler Inhale 2 puffs into the lungs 4 times daily, R-0DC to SNF      !! glimepiride (AMARYL) 2 MG tablet TAKE 1 TABLET BY MOUTH  EVERY MORNING BEFORE  BREAKFAST, Disp-14 tablet, R-0DC to SNF       ! ! - Potential duplicate medications found. Please discuss with provider.       CONTINUE these medications which have NOT CHANGED    Details   acetaminophen (TYLENOL) 500 MG tablet

## 2019-07-22 NOTE — DISCHARGE INSTR - COC
disease) I25.10    Vitamin D deficiency E55.9    DM (diabetes mellitus) ([facility] E11.9    HTN (hypertension) I10    Idiopathic peripheral neuropathy G60.9    Rhinitis, allergic J30.9    SOB (shortness of breath) R06.02    Bronchitis J40    GERD (gastroesophageal reflux disease) K21.9    PVD (peripheral vascular disease) with claudication ([facility]) I73.9    Chest pain R07.9    Gait instability R26.81       Isolation/Infection:   Isolation          No Isolation            Nurse Assessment:  Last Vital Signs: BP (!) 142/72   Pulse 64   Temp 97.1 °F (36.2 °C) (Temporal)   Resp 18   Ht 5' 3\" (1.6 m)   Wt 140 lb (63.5 kg)   LMP 12/03/1997   SpO2 98%   BMI 24.80 kg/m²     Last documented pain score (0-10 scale): Pain Level: 0  Last Weight:   Wt Readings from Last 1 Encounters:   07/18/19 140 lb (63.5 kg)     Mental Status:  oriented and alert    IV Access:  - None    Nursing Mobility/ADLs:  Walking   Assisted  Transfer  Assisted  Bathing  Assisted  Dressing  Assisted  Toileting  Assisted  Feeding  Assisted  Med Admin  Assisted  Med Delivery   whole    Wound Care Documentation and Therapy:        Elimination:  Continence:   · Bowel: Yes  · Bladder: Yes  Urinary Catheter: None   Colostomy/Ileostomy/Ileal Conduit: No       Date of Last BM: 7 22 19  No intake or output data in the 24 hours ending 07/22/19 0825  No intake/output data recorded. Safety Concerns:     History of Falls (last 30 days) and At Risk for Falls    Impairments/Disabilities:      Vision and Hearing    Nutrition Therapy:  Current Nutrition Therapy:   - Oral Diet:  Carb Control 4 carbs/meal (1800kcals/day)    Routes of Feeding: Oral  Liquids: Thin Liquids  Daily Fluid Restriction: no  Last Modified Barium Swallow with Video (Video Swallowing Test): not done    Treatments at the Time of Hospital Discharge:   Respiratory Treatments: none  Oxygen Therapy:  is not on home oxygen therapy.   Ventilator:    - No ventilator support    Rehab Therapies:

## 2019-07-22 NOTE — CARE COORDINATION
7/22/2019  Patient to discharge to [facility] via karlene ambulette at 11 am. patient and dtr acacia notified and in agreement

## 2019-07-25 LAB
BODY FLUID CULTURE, STERILE: NORMAL
GRAM STAIN RESULT: NORMAL

## 2019-08-28 ENCOUNTER — HOSPITAL ENCOUNTER (OUTPATIENT)
Dept: CT IMAGING | Age: 84
Discharge: HOME | End: 2019-08-30
Payer: MEDICARE

## 2019-08-28 DIAGNOSIS — R10.9 ABDOMINAL PAIN, UNSPECIFIED ABDOMINAL LOCATION: ICD-10-CM

## 2019-08-28 DIAGNOSIS — K92.1 BLOOD IN STOOL: ICD-10-CM

## 2019-08-28 PROCEDURE — 74176 CT ABD & PELVIS W/O CONTRAST: CPT

## 2021-03-23 ENCOUNTER — APPOINTMENT (OUTPATIENT)
Dept: CT IMAGING | Age: 86
DRG: 176 | End: 2021-03-23
Payer: MEDICARE

## 2021-03-23 ENCOUNTER — HOSPITAL ENCOUNTER (INPATIENT)
Age: 86
LOS: 2 days | Discharge: HOME | DRG: 176 | End: 2021-03-25
Attending: EMERGENCY MEDICINE | Admitting: INTERNAL MEDICINE
Payer: MEDICARE

## 2021-03-23 ENCOUNTER — APPOINTMENT (OUTPATIENT)
Dept: GENERAL RADIOLOGY | Age: 86
DRG: 176 | End: 2021-03-23
Payer: MEDICARE

## 2021-03-23 ENCOUNTER — APPOINTMENT (OUTPATIENT)
Dept: ULTRASOUND IMAGING | Age: 86
DRG: 176 | End: 2021-03-23
Payer: MEDICARE

## 2021-03-23 DIAGNOSIS — I26.93 SINGLE SUBSEGMENTAL PULMONARY EMBOLISM WITHOUT ACUTE COR PULMONALE (HCC): Primary | ICD-10-CM

## 2021-03-23 PROBLEM — I26.99 PULMONARY EMBOLISM ON RIGHT (HCC): Status: ACTIVE | Noted: 2021-03-23

## 2021-03-23 LAB
ALBUMIN SERPL-MCNC: 4.3 G/DL (ref 3.5–5.2)
ALP BLD-CCNC: 84 U/L (ref 35–104)
ALT SERPL-CCNC: 22 U/L (ref 0–32)
ANION GAP SERPL CALCULATED.3IONS-SCNC: 7 MMOL/L (ref 7–16)
AST SERPL-CCNC: 22 U/L (ref 0–31)
BASOPHILS ABSOLUTE: 0.02 E9/L (ref 0–0.2)
BASOPHILS RELATIVE PERCENT: 0.3 % (ref 0–2)
BILIRUB SERPL-MCNC: 0.2 MG/DL (ref 0–1.2)
BUN BLDV-MCNC: 12 MG/DL (ref 8–23)
CALCIUM SERPL-MCNC: 9.6 MG/DL (ref 8.6–10.2)
CHLORIDE BLD-SCNC: 99 MMOL/L (ref 98–107)
CO2: 26 MMOL/L (ref 22–29)
CREAT SERPL-MCNC: 0.7 MG/DL (ref 0.5–1)
EOSINOPHILS ABSOLUTE: 0.2 E9/L (ref 0.05–0.5)
EOSINOPHILS RELATIVE PERCENT: 2.5 % (ref 0–6)
GFR AFRICAN AMERICAN: >60
GFR NON-AFRICAN AMERICAN: >60 ML/MIN/1.73
GLUCOSE BLD-MCNC: 144 MG/DL (ref 74–99)
HCT VFR BLD CALC: 33.4 % (ref 34–48)
HEMOGLOBIN: 11.4 G/DL (ref 11.5–15.5)
IMMATURE GRANULOCYTES #: 0.03 E9/L
IMMATURE GRANULOCYTES %: 0.4 % (ref 0–5)
LYMPHOCYTES ABSOLUTE: 1.49 E9/L (ref 1.5–4)
LYMPHOCYTES RELATIVE PERCENT: 18.8 % (ref 20–42)
MCH RBC QN AUTO: 34.1 PG (ref 26–35)
MCHC RBC AUTO-ENTMCNC: 34.1 % (ref 32–34.5)
MCV RBC AUTO: 100 FL (ref 80–99.9)
METER GLUCOSE: 280 MG/DL (ref 74–99)
MONOCYTES ABSOLUTE: 0.44 E9/L (ref 0.1–0.95)
MONOCYTES RELATIVE PERCENT: 5.6 % (ref 2–12)
NEUTROPHILS ABSOLUTE: 5.74 E9/L (ref 1.8–7.3)
NEUTROPHILS RELATIVE PERCENT: 72.4 % (ref 43–80)
PDW BLD-RTO: 12.9 FL (ref 11.5–15)
PLATELET # BLD: 317 E9/L (ref 130–450)
PMV BLD AUTO: 10.1 FL (ref 7–12)
POTASSIUM SERPL-SCNC: 4.3 MMOL/L (ref 3.5–5)
PRO-BNP: 343 PG/ML (ref 0–450)
RBC # BLD: 3.34 E12/L (ref 3.5–5.5)
SODIUM BLD-SCNC: 132 MMOL/L (ref 132–146)
TOTAL PROTEIN: 7.2 G/DL (ref 6.4–8.3)
TROPONIN: <0.01 NG/ML (ref 0–0.03)
WBC # BLD: 7.9 E9/L (ref 4.5–11.5)

## 2021-03-23 PROCEDURE — 85025 COMPLETE CBC W/AUTO DIFF WBC: CPT

## 2021-03-23 PROCEDURE — 6370000000 HC RX 637 (ALT 250 FOR IP): Performed by: INTERNAL MEDICINE

## 2021-03-23 PROCEDURE — 83880 ASSAY OF NATRIURETIC PEPTIDE: CPT

## 2021-03-23 PROCEDURE — 70450 CT HEAD/BRAIN W/O DYE: CPT

## 2021-03-23 PROCEDURE — 80053 COMPREHEN METABOLIC PANEL: CPT

## 2021-03-23 PROCEDURE — 93005 ELECTROCARDIOGRAM TRACING: CPT | Performed by: EMERGENCY MEDICINE

## 2021-03-23 PROCEDURE — 94640 AIRWAY INHALATION TREATMENT: CPT

## 2021-03-23 PROCEDURE — 6360000002 HC RX W HCPCS: Performed by: INTERNAL MEDICINE

## 2021-03-23 PROCEDURE — 6360000002 HC RX W HCPCS: Performed by: EMERGENCY MEDICINE

## 2021-03-23 PROCEDURE — 2700000000 HC OXYGEN THERAPY PER DAY

## 2021-03-23 PROCEDURE — 2060000000 HC ICU INTERMEDIATE R&B

## 2021-03-23 PROCEDURE — 93971 EXTREMITY STUDY: CPT | Performed by: RADIOLOGY

## 2021-03-23 PROCEDURE — 71275 CT ANGIOGRAPHY CHEST: CPT

## 2021-03-23 PROCEDURE — 84484 ASSAY OF TROPONIN QUANT: CPT

## 2021-03-23 PROCEDURE — 93971 EXTREMITY STUDY: CPT

## 2021-03-23 PROCEDURE — 71045 X-RAY EXAM CHEST 1 VIEW: CPT

## 2021-03-23 PROCEDURE — 82962 GLUCOSE BLOOD TEST: CPT

## 2021-03-23 PROCEDURE — 94664 DEMO&/EVAL PT USE INHALER: CPT

## 2021-03-23 PROCEDURE — 99284 EMERGENCY DEPT VISIT MOD MDM: CPT

## 2021-03-23 PROCEDURE — 6360000004 HC RX CONTRAST MEDICATION: Performed by: RADIOLOGY

## 2021-03-23 RX ORDER — CHOLECALCIFEROL (VITAMIN D3) 50 MCG
2000 TABLET ORAL DAILY
Status: DISCONTINUED | OUTPATIENT
Start: 2021-03-23 | End: 2021-03-25 | Stop reason: HOSPADM

## 2021-03-23 RX ORDER — PREDNISONE 5 MG/1
5 TABLET, DELAYED RELEASE ORAL 2 TIMES DAILY
COMMUNITY
End: 2021-03-23

## 2021-03-23 RX ORDER — SUCRALFATE 1 G/1
1 TABLET ORAL 3 TIMES DAILY
COMMUNITY
End: 2021-06-14

## 2021-03-23 RX ORDER — AMPICILLIN TRIHYDRATE 250 MG
1 CAPSULE ORAL 2 TIMES DAILY
Status: DISCONTINUED | OUTPATIENT
Start: 2021-03-23 | End: 2021-03-23 | Stop reason: CLARIF

## 2021-03-23 RX ORDER — ARFORMOTEROL TARTRATE 15 UG/2ML
15 SOLUTION RESPIRATORY (INHALATION) 2 TIMES DAILY
Status: DISCONTINUED | OUTPATIENT
Start: 2021-03-23 | End: 2021-03-25 | Stop reason: HOSPADM

## 2021-03-23 RX ORDER — PROMETHAZINE HYDROCHLORIDE 12.5 MG/1
12.5 SUPPOSITORY RECTAL EVERY 6 HOURS PRN
COMMUNITY
End: 2021-05-28 | Stop reason: ALTCHOICE

## 2021-03-23 RX ORDER — BUDESONIDE AND FORMOTEROL FUMARATE DIHYDRATE 160; 4.5 UG/1; UG/1
2 AEROSOL RESPIRATORY (INHALATION) 2 TIMES DAILY
Status: DISCONTINUED | OUTPATIENT
Start: 2021-03-23 | End: 2021-03-23 | Stop reason: CLARIF

## 2021-03-23 RX ORDER — UREA 10 %
1 LOTION (ML) TOPICAL NIGHTLY PRN
COMMUNITY
End: 2021-03-23

## 2021-03-23 RX ORDER — FUROSEMIDE 20 MG/1
10 TABLET ORAL DAILY
Status: DISCONTINUED | OUTPATIENT
Start: 2021-03-23 | End: 2021-03-25 | Stop reason: HOSPADM

## 2021-03-23 RX ORDER — ALUMINA, MAGNESIA, AND SIMETHICONE 2400; 2400; 240 MG/30ML; MG/30ML; MG/30ML
30 SUSPENSION ORAL EVERY 6 HOURS PRN
COMMUNITY
End: 2021-05-28 | Stop reason: ALTCHOICE

## 2021-03-23 RX ORDER — LANOLIN ALCOHOL/MO/W.PET/CERES
2500 CREAM (GRAM) TOPICAL DAILY
Status: DISCONTINUED | OUTPATIENT
Start: 2021-03-23 | End: 2021-03-25 | Stop reason: HOSPADM

## 2021-03-23 RX ORDER — WHEAT DEXTRIN 3 G/3.8 G
4 POWDER (GRAM) ORAL DAILY
COMMUNITY
End: 2021-03-23

## 2021-03-23 RX ORDER — GLIMEPIRIDE 1 MG/1
1 TABLET ORAL
Status: DISCONTINUED | OUTPATIENT
Start: 2021-03-24 | End: 2021-03-23 | Stop reason: SDUPTHER

## 2021-03-23 RX ORDER — BISACODYL 10 MG
10 SUPPOSITORY, RECTAL RECTAL DAILY PRN
COMMUNITY

## 2021-03-23 RX ORDER — PRAVASTATIN SODIUM 20 MG
20 TABLET ORAL NIGHTLY
Status: DISCONTINUED | OUTPATIENT
Start: 2021-03-23 | End: 2021-03-25 | Stop reason: HOSPADM

## 2021-03-23 RX ORDER — DEXTROSE MONOHYDRATE 50 MG/ML
100 INJECTION, SOLUTION INTRAVENOUS PRN
Status: DISCONTINUED | OUTPATIENT
Start: 2021-03-23 | End: 2021-03-25 | Stop reason: HOSPADM

## 2021-03-23 RX ORDER — POLYETHYLENE GLYCOL 3350 17 G/17G
17 POWDER, FOR SOLUTION ORAL DAILY
Status: DISCONTINUED | OUTPATIENT
Start: 2021-03-23 | End: 2021-03-25 | Stop reason: HOSPADM

## 2021-03-23 RX ORDER — GLIMEPIRIDE 1 MG/1
1 TABLET ORAL
Status: DISCONTINUED | OUTPATIENT
Start: 2021-03-24 | End: 2021-03-25 | Stop reason: HOSPADM

## 2021-03-23 RX ORDER — PANTOPRAZOLE SODIUM 40 MG/1
40 TABLET, DELAYED RELEASE ORAL DAILY
COMMUNITY
End: 2021-05-28 | Stop reason: ALTCHOICE

## 2021-03-23 RX ORDER — GABAPENTIN 250 MG/5ML
300 SOLUTION ORAL EVERY EVENING
COMMUNITY

## 2021-03-23 RX ORDER — BUDESONIDE 0.5 MG/2ML
0.5 INHALANT ORAL 2 TIMES DAILY
Status: DISCONTINUED | OUTPATIENT
Start: 2021-03-23 | End: 2021-03-25 | Stop reason: HOSPADM

## 2021-03-23 RX ORDER — ASPIRIN 81 MG/1
81 TABLET ORAL DAILY
Status: DISCONTINUED | OUTPATIENT
Start: 2021-03-23 | End: 2021-03-25 | Stop reason: HOSPADM

## 2021-03-23 RX ORDER — TRAMADOL HYDROCHLORIDE 50 MG/1
50 TABLET ORAL EVERY 8 HOURS PRN
Status: DISCONTINUED | OUTPATIENT
Start: 2021-03-23 | End: 2021-03-25 | Stop reason: HOSPADM

## 2021-03-23 RX ORDER — NICOTINE POLACRILEX 4 MG
15 LOZENGE BUCCAL PRN
Status: DISCONTINUED | OUTPATIENT
Start: 2021-03-23 | End: 2021-03-25 | Stop reason: HOSPADM

## 2021-03-23 RX ORDER — ZOLEDRONIC ACID 5 MG/100ML
5 INJECTION, SOLUTION INTRAVENOUS ONCE
COMMUNITY
End: 2021-03-23

## 2021-03-23 RX ORDER — DEXTROSE MONOHYDRATE 25 G/50ML
12.5 INJECTION, SOLUTION INTRAVENOUS PRN
Status: DISCONTINUED | OUTPATIENT
Start: 2021-03-23 | End: 2021-03-25 | Stop reason: HOSPADM

## 2021-03-23 RX ORDER — ALBUTEROL SULFATE 2.5 MG/3ML
2.5 SOLUTION RESPIRATORY (INHALATION) 4 TIMES DAILY
Status: DISCONTINUED | OUTPATIENT
Start: 2021-03-23 | End: 2021-03-25 | Stop reason: HOSPADM

## 2021-03-23 RX ORDER — LIDOCAINE 40 MG/G
CREAM TOPICAL EVERY 8 HOURS PRN
Status: DISCONTINUED | OUTPATIENT
Start: 2021-03-23 | End: 2021-03-25 | Stop reason: HOSPADM

## 2021-03-23 RX ORDER — UBIDECARENONE 100 MG
200 CAPSULE ORAL DAILY
Status: DISCONTINUED | OUTPATIENT
Start: 2021-03-23 | End: 2021-03-23 | Stop reason: CLARIF

## 2021-03-23 RX ORDER — MAGNESIUM GLUCONATE 27 MG(500)
1000 TABLET ORAL 2 TIMES DAILY
Status: DISCONTINUED | OUTPATIENT
Start: 2021-03-23 | End: 2021-03-25 | Stop reason: HOSPADM

## 2021-03-23 RX ORDER — VITAMIN C
1 TAB ORAL DAILY
Status: DISCONTINUED | OUTPATIENT
Start: 2021-03-23 | End: 2021-03-25 | Stop reason: HOSPADM

## 2021-03-23 RX ORDER — GUAIFENESIN 600 MG/1
600 TABLET, EXTENDED RELEASE ORAL 2 TIMES DAILY
COMMUNITY
End: 2021-05-28 | Stop reason: ALTCHOICE

## 2021-03-23 RX ORDER — PHENOL 1.4 %
1 AEROSOL, SPRAY (ML) MUCOUS MEMBRANE 2 TIMES DAILY
COMMUNITY
End: 2021-03-23

## 2021-03-23 RX ORDER — LEFLUNOMIDE 20 MG/1
20 TABLET ORAL DAILY
COMMUNITY
End: 2021-03-23

## 2021-03-23 RX ORDER — DIMENHYDRINATE 50 MG
1000 TABLET ORAL DAILY
COMMUNITY
End: 2021-03-23

## 2021-03-23 RX ORDER — GABAPENTIN 300 MG/1
300 CAPSULE ORAL NIGHTLY
Status: DISCONTINUED | OUTPATIENT
Start: 2021-03-23 | End: 2021-03-25 | Stop reason: HOSPADM

## 2021-03-23 RX ORDER — LIDOCAINE 40 MG/G
CREAM TOPICAL PRN
COMMUNITY
End: 2021-05-28 | Stop reason: ALTCHOICE

## 2021-03-23 RX ORDER — ACETAMINOPHEN 500 MG
500 TABLET ORAL 4 TIMES DAILY PRN
Status: DISCONTINUED | OUTPATIENT
Start: 2021-03-23 | End: 2021-03-25 | Stop reason: HOSPADM

## 2021-03-23 RX ORDER — ALBUTEROL SULFATE 2.5 MG/3ML
2.5 SOLUTION RESPIRATORY (INHALATION) EVERY 6 HOURS PRN
COMMUNITY
End: 2021-03-23

## 2021-03-23 RX ORDER — AMLODIPINE BESYLATE 5 MG/1
5 TABLET ORAL DAILY
Status: DISCONTINUED | OUTPATIENT
Start: 2021-03-23 | End: 2021-03-25 | Stop reason: HOSPADM

## 2021-03-23 RX ADMIN — GABAPENTIN 300 MG: 300 CAPSULE ORAL at 23:44

## 2021-03-23 RX ADMIN — AMLODIPINE BESYLATE 5 MG: 5 TABLET ORAL at 22:11

## 2021-03-23 RX ADMIN — INSULIN LISPRO 3 UNITS: 100 INJECTION, SOLUTION INTRAVENOUS; SUBCUTANEOUS at 22:15

## 2021-03-23 RX ADMIN — ACETAMINOPHEN 500 MG: 500 TABLET ORAL at 22:11

## 2021-03-23 RX ADMIN — BUDESONIDE 500 MCG: 0.5 SUSPENSION RESPIRATORY (INHALATION) at 21:45

## 2021-03-23 RX ADMIN — ALBUTEROL SULFATE 2.5 MG: 2.5 SOLUTION RESPIRATORY (INHALATION) at 21:45

## 2021-03-23 RX ADMIN — ENOXAPARIN SODIUM 60 MG: 60 INJECTION SUBCUTANEOUS at 22:12

## 2021-03-23 RX ADMIN — ARFORMOTEROL TARTRATE 15 MCG: 15 SOLUTION RESPIRATORY (INHALATION) at 21:45

## 2021-03-23 RX ADMIN — PRAVASTATIN SODIUM 20 MG: 20 TABLET ORAL at 23:44

## 2021-03-23 RX ADMIN — IOPAMIDOL 75 ML: 755 INJECTION, SOLUTION INTRAVENOUS at 17:10

## 2021-03-23 NOTE — ED PROVIDER NOTES
Department of Emergency Medicine   ED  Provider Note  Admit Date/RoomTime: 3/23/2021  3:45 PM  ED Room: [address] [place] [address]          History of Present Illness:  3/23/21, Time: 7:02 PM EDT  Chief Complaint   Patient presents with    Hypertension     per EMS NH sent patient in for hypertension, states patient wasn't quite acting like self, patient complains of SOB, denies pain                Marylee Saunders is a 80 y.o. female presenting to the ED for SOB. Patient has been SOB since yesterday, came on gradually, nothing makes it better or worse, no associated pain. She has not had this in the past.  Does have remote history of a DVT. Nursing was concerned that she may have been slightly confused. Family bedside, she is at her baseline mental status per them. Denies fever, chills, cough, sputum, back pain, lethargy, chest pain, or any other symptoms or complaints. Review of Systems:   Pertinent positives and negatives are stated within HPI, all other systems reviewed and are negative.        --------------------------------------------- PAST HISTORY ---------------------------------------------  Past Medical History:  has a past medical history of Arthritis, Asthma, Bronchitis, CAD (coronary artery disease), Cough, Diabetes mellitus ([facility] GERD (gastroesophageal reflux disease), Hyperlipidemia, Hypertension, Lung disease, PVD (peripheral vascular disease) with claudication ([facility] Restless legs syndrome, Rhinitis, allergic, Sinusitis, SOB (shortness of breath), and Urinary incontinence. Past Surgical History:  has a past surgical history that includes Hysterectomy; Cholecystectomy; Tonsillectomy and adenoidectomy; Coronary artery bypass graft; Abdomen surgery; Appendectomy; Colonoscopy; Endoscopy, colon, diagnostic; and Cardiac surgery. Social History:  reports that she has never smoked. She has never used smokeless tobacco. She reports current alcohol use. She reports that she does not use drugs.     Family History: family history includes Heart Disease in her brother; Hypertension in her father. . Unless otherwise noted, family history is non contributory    The patients home medications have been reviewed. Allergies: Lisinopril        ---------------------------------------------------PHYSICAL EXAM--------------------------------------    Constitutional/General: Alert and oriented x3  Head: Normocephalic and atraumatic  Eyes: PERRL, EOMI, sclera non icteric  Mouth: Oropharynx clear, handling secretions, no trismus, no asymmetry of the posterior oropharynx or uvular edema  Neck: Supple, full ROM, no stridor, no meningeal signs  Respiratory: Lungs clear to auscultation bilaterally, no wheezes, rales, or rhonchi. Not in respiratory distress  Cardiovascular:  Regular rate. Regular rhythm. 2+ distal pulses. Equal extremity pulses. Chest: No chest wall tenderness  GI:  Abdomen Soft, Non tender, Non distended. No rebound, guarding, or rigidity. No pulsatile masses. Musculoskeletal: Moves all extremities x 4. Tenderness to palpation to the left calf, no signs of ischemic limb or compartment syndrome  Integument: skin warm and dry. No rashes. Neurologic: GCS 15, no focal deficits, symmetric strength 5/5 in the upper and lower extremities bilaterally  Psychiatric: Normal Affect          -------------------------------------------------- RESULTS -------------------------------------------------  I have personally reviewed all laboratory and imaging results for this patient. Results are listed below.      LABS: (Lab results interpreted by me)  Results for orders placed or performed during the hospital encounter of 03/23/21   CBC Auto Differential   Result Value Ref Range    WBC 7.9 4.5 - 11.5 E9/L    RBC 3.34 (L) 3.50 - 5.50 E12/L    Hemoglobin 11.4 (L) 11.5 - 15.5 g/dL    Hematocrit 33.4 (L) 34.0 - 48.0 %    .0 (H) 80.0 - 99.9 fL    MCH 34.1 26.0 - 35.0 pg    MCHC 34.1 32.0 - 34.5 %    RDW 12.9 11.5 - 15.0 fL Platelets 275 509 - 887 E9/L    MPV 10.1 7.0 - 12.0 fL    Neutrophils % 72.4 43.0 - 80.0 %    Immature Granulocytes % 0.4 0.0 - 5.0 %    Lymphocytes % 18.8 (L) 20.0 - 42.0 %    Monocytes % 5.6 2.0 - 12.0 %    Eosinophils % 2.5 0.0 - 6.0 %    Basophils % 0.3 0.0 - 2.0 %    Neutrophils Absolute 5.74 1.80 - 7.30 E9/L    Immature Granulocytes # 0.03 E9/L    Lymphocytes Absolute 1.49 (L) 1.50 - 4.00 E9/L    Monocytes Absolute 0.44 0.10 - 0.95 E9/L    Eosinophils Absolute 0.20 0.05 - 0.50 E9/L    Basophils Absolute 0.02 0.00 - 0.20 E9/L   Comprehensive Metabolic Panel   Result Value Ref Range    Sodium 132 132 - 146 mmol/L    Potassium 4.3 3.5 - 5.0 mmol/L    Chloride 99 98 - 107 mmol/L    CO2 26 22 - 29 mmol/L    Anion Gap 7 7 - 16 mmol/L    Glucose 144 (H) 74 - 99 mg/dL    BUN 12 8 - 23 mg/dL    CREATININE 0.7 0.5 - 1.0 mg/dL    GFR Non-African American >60 >=60 mL/min/1.73    GFR African American >60     Calcium 9.6 8.6 - 10.2 mg/dL    Total Protein 7.2 6.4 - 8.3 g/dL    Albumin 4.3 3.5 - 5.2 g/dL    Total Bilirubin 0.2 0.0 - 1.2 mg/dL    Alkaline Phosphatase 84 35 - 104 U/L    ALT 22 0 - 32 U/L    AST 22 0 - 31 U/L   Troponin   Result Value Ref Range    Troponin <0.01 0.00 - 0.03 ng/mL   Brain Natriuretic Peptide   Result Value Ref Range    Pro- 0 - 450 pg/mL   ,       RADIOLOGY:  Interpreted by Radiologist unless otherwise specified  XR CHEST PORTABLE   Final Result   No acute pulmonary process. Mild cardiomegaly. US DUP LOWER EXTREMITY LEFT CLINT   Final Result   Within the visualized vessels there is no evidence for deep venous   thrombosis               CT HEAD WO CONTRAST   Final Result   No acute intracranial hemorrhage or edema. CTA PULMONARY W CONTRAST   Final Result   1. Small subsegmental embolus seen in the right lower lobe. 2. No evidence of right heart strain. 3.  No acute cardiopulmonary abnormality.                EKG Interpretation  Interpreted by emergency department recognition software.  Every effort was made to ensure accuracy; however, inadvertent computerized transcription errors may be present       Kallie Seth MD  03/24/21 1541

## 2021-03-24 LAB
BACTERIA: ABNORMAL /HPF
BILIRUBIN URINE: NEGATIVE
BLOOD, URINE: NEGATIVE
CLARITY: CLEAR
COLOR: YELLOW
EKG ATRIAL RATE: 64 BPM
EKG P AXIS: 64 DEGREES
EKG P-R INTERVAL: 222 MS
EKG Q-T INTERVAL: 404 MS
EKG QRS DURATION: 106 MS
EKG QTC CALCULATION (BAZETT): 416 MS
EKG R AXIS: -11 DEGREES
EKG T AXIS: 62 DEGREES
EKG VENTRICULAR RATE: 64 BPM
GLUCOSE URINE: 100 MG/DL
KETONES, URINE: NEGATIVE MG/DL
LEUKOCYTE ESTERASE, URINE: ABNORMAL
METER GLUCOSE: 139 MG/DL (ref 74–99)
METER GLUCOSE: 142 MG/DL (ref 74–99)
METER GLUCOSE: 152 MG/DL (ref 74–99)
METER GLUCOSE: 266 MG/DL (ref 74–99)
NITRITE, URINE: NEGATIVE
PH UA: 8 (ref 5–9)
PROTEIN UA: ABNORMAL MG/DL
RBC UA: ABNORMAL /HPF (ref 0–2)
SPECIFIC GRAVITY UA: 1.01 (ref 1–1.03)
UROBILINOGEN, URINE: 0.2 E.U./DL
WBC UA: ABNORMAL /HPF (ref 0–5)

## 2021-03-24 PROCEDURE — 2700000000 HC OXYGEN THERAPY PER DAY

## 2021-03-24 PROCEDURE — 97166 OT EVAL MOD COMPLEX 45 MIN: CPT

## 2021-03-24 PROCEDURE — 93308 TTE F-UP OR LMTD: CPT

## 2021-03-24 PROCEDURE — 94640 AIRWAY INHALATION TREATMENT: CPT

## 2021-03-24 PROCEDURE — 93010 ELECTROCARDIOGRAM REPORT: CPT | Performed by: INTERNAL MEDICINE

## 2021-03-24 PROCEDURE — 97161 PT EVAL LOW COMPLEX 20 MIN: CPT

## 2021-03-24 PROCEDURE — 2060000000 HC ICU INTERMEDIATE R&B

## 2021-03-24 PROCEDURE — 97535 SELF CARE MNGMENT TRAINING: CPT

## 2021-03-24 PROCEDURE — 99223 1ST HOSP IP/OBS HIGH 75: CPT | Performed by: INTERNAL MEDICINE

## 2021-03-24 PROCEDURE — 97530 THERAPEUTIC ACTIVITIES: CPT

## 2021-03-24 PROCEDURE — 6370000000 HC RX 637 (ALT 250 FOR IP): Performed by: INTERNAL MEDICINE

## 2021-03-24 PROCEDURE — 82962 GLUCOSE BLOOD TEST: CPT

## 2021-03-24 PROCEDURE — 6360000002 HC RX W HCPCS: Performed by: INTERNAL MEDICINE

## 2021-03-24 PROCEDURE — 81001 URINALYSIS AUTO W/SCOPE: CPT

## 2021-03-24 RX ADMIN — ASPIRIN 81 MG: 81 TABLET, COATED ORAL at 09:30

## 2021-03-24 RX ADMIN — TRAMADOL HYDROCHLORIDE 50 MG: 50 TABLET, FILM COATED ORAL at 23:43

## 2021-03-24 RX ADMIN — INSULIN LISPRO 2 UNITS: 100 INJECTION, SOLUTION INTRAVENOUS; SUBCUTANEOUS at 12:29

## 2021-03-24 RX ADMIN — Medication 1000 MG: at 09:30

## 2021-03-24 RX ADMIN — BUDESONIDE 500 MCG: 0.5 SUSPENSION RESPIRATORY (INHALATION) at 19:33

## 2021-03-24 RX ADMIN — ENOXAPARIN SODIUM 60 MG: 60 INJECTION SUBCUTANEOUS at 09:30

## 2021-03-24 RX ADMIN — TRAMADOL HYDROCHLORIDE 50 MG: 50 TABLET, FILM COATED ORAL at 15:48

## 2021-03-24 RX ADMIN — Medication 2000 UNITS: at 09:30

## 2021-03-24 RX ADMIN — ALBUTEROL SULFATE 2.5 MG: 2.5 SOLUTION RESPIRATORY (INHALATION) at 15:55

## 2021-03-24 RX ADMIN — PRAVASTATIN SODIUM 20 MG: 20 TABLET ORAL at 20:08

## 2021-03-24 RX ADMIN — POLYETHYLENE GLYCOL 3350 17 G: 17 POWDER, FOR SOLUTION ORAL at 20:15

## 2021-03-24 RX ADMIN — Medication 1 TABLET: at 09:30

## 2021-03-24 RX ADMIN — Medication 1000 MG: at 20:08

## 2021-03-24 RX ADMIN — ENOXAPARIN SODIUM 60 MG: 60 INJECTION SUBCUTANEOUS at 20:08

## 2021-03-24 RX ADMIN — ARFORMOTEROL TARTRATE 15 MCG: 15 SOLUTION RESPIRATORY (INHALATION) at 19:33

## 2021-03-24 RX ADMIN — ALBUTEROL SULFATE 2.5 MG: 2.5 SOLUTION RESPIRATORY (INHALATION) at 19:33

## 2021-03-24 RX ADMIN — Medication 2500 MCG: at 09:30

## 2021-03-24 RX ADMIN — GABAPENTIN 300 MG: 300 CAPSULE ORAL at 20:08

## 2021-03-24 RX ADMIN — INSULIN LISPRO 3 UNITS: 100 INJECTION, SOLUTION INTRAVENOUS; SUBCUTANEOUS at 20:14

## 2021-03-24 RX ADMIN — AMLODIPINE BESYLATE 5 MG: 5 TABLET ORAL at 10:10

## 2021-03-24 RX ADMIN — GLIMEPIRIDE 1 MG: 1 TABLET ORAL at 09:30

## 2021-03-24 RX ADMIN — FUROSEMIDE 10 MG: 20 TABLET ORAL at 09:30

## 2021-03-24 ASSESSMENT — PAIN - FUNCTIONAL ASSESSMENT: PAIN_FUNCTIONAL_ASSESSMENT: PREVENTS OR INTERFERES SOME ACTIVE ACTIVITIES AND ADLS

## 2021-03-24 ASSESSMENT — PAIN SCALES - GENERAL
PAINLEVEL_OUTOF10: 0
PAINLEVEL_OUTOF10: 6
PAINLEVEL_OUTOF10: 6
PAINLEVEL_OUTOF10: 4

## 2021-03-24 ASSESSMENT — PAIN DESCRIPTION - PROGRESSION: CLINICAL_PROGRESSION: GRADUALLY WORSENING

## 2021-03-24 ASSESSMENT — PAIN DESCRIPTION - ORIENTATION: ORIENTATION: RIGHT;LEFT

## 2021-03-24 ASSESSMENT — PAIN DESCRIPTION - PAIN TYPE: TYPE: CHRONIC PAIN

## 2021-03-24 ASSESSMENT — PAIN DESCRIPTION - LOCATION: LOCATION: LEG

## 2021-03-24 NOTE — CONSULTS
Pulmonary [address] [facility]                             Pulmonary Consult/Progress Note :          Patient: Julia Triplett  MRN: [id number]  : 1927      Date of Admission: .3/23/2021  3:45 PM    Consulting Physician:Dr Olinda Ya         Reason for Consultation:PE   CC : SOB   HPI:     Julia Triplett is a 80y.o. year old who had COVID few months ago and never smoke . Was admitted on 3/24 for generalized weakness shortness of breath with no pleuritic or left   Side  chest pain,She states her Legs hurt but she states is all the time . Ultrasound was negative for DVT  CTA showing pulmonary embolus subsegmental  Lovenox was started  Admitted for further management  She feels fairly comfortable this morning  She has been on 2 L and resting well in bed with no distress    PAST MEDICAL HISTORY:   Past Medical History:   Diagnosis Date    Arthritis     Asthma     Bronchitis 2017    CAD (coronary artery disease)     Cough 2017    Diabetes mellitus (HCC)     GERD (gastroesophageal reflux disease) 2017    Hyperlipidemia     Hypertension     Lung disease     PVD (peripheral vascular disease) with claudication ([address] 4/10/2019    Restless legs syndrome     Rhinitis, allergic 2017    Sinusitis 2017    SOB (shortness of breath) 2017    Urinary incontinence        PAST SURGICAL HISTORY:   Past Surgical History:   Procedure Laterality Date    ABDOMEN SURGERY      APPENDECTOMY      CARDIAC SURGERY      CHOLECYSTECTOMY      COLONOSCOPY      CORONARY ARTERY BYPASS GRAFT      8/28/10    ENDOSCOPY, COLON, DIAGNOSTIC      HYSTERECTOMY      frankie and bso     TONSILLECTOMY AND ADENOIDECTOMY         FAMILY HISTORY:   Family History   Problem Relation Age of Onset    Hypertension Father     Heart Disease Brother        SOCIAL HISTORY:   Social History     Socioeconomic History    Marital status:       Spouse name: Not on file    Number of children: Not on file    Years of education: Not on file    Highest education level: Not on file   Occupational History    Not on file   Social Needs    Financial resource strain: Not on file    Food insecurity     Worry: Not on file     Inability: Not on file    Transportation needs     Medical: Not on file     Non-medical: Not on file   Tobacco Use    Smoking status: Never Smoker    Smokeless tobacco: Never Used   Substance and Sexual Activity    Alcohol use: Yes     Comment: occasional    Drug use: No    Sexual activity: Not Currently     Partners: Male   Lifestyle    Physical activity     Days per week: Not on file     Minutes per session: Not on file    Stress: Not on file   Relationships    Social connections     Talks on phone: Not on file     Gets together: Not on file     Attends Gnosticism service: Not on file     Active member of club or organization: Not on file     Attends meetings of clubs or organizations: Not on file     Relationship status: Not on file    Intimate partner violence     Fear of current or ex partner: Not on file     Emotionally abused: Not on file     Physically abused: Not on file     Forced sexual activity: Not on file   Other Topics Concern    Not on file   Social History Narrative    Not on file     Social History     Tobacco Use   Smoking Status Never Smoker   Smokeless Tobacco Never Used     Social History     Substance and Sexual Activity   Alcohol Use Yes    Comment: occasional     Social History     Substance and Sexual Activity   Drug Use No       OCCUPATIONAL HISTORY:            HOME MEDICATIONS:  Prior to Admission medications    Medication Sig Start Date End Date Taking? Authorizing Provider   lidocaine (LMX) 4 % cream Apply topically as needed for Pain Apply topically as needed.    Yes Historical Provider, MD   sucralfate (CARAFATE) 1 GM tablet Take 1 g by mouth 3 times daily   Yes Historical Provider, MD   bisacodyl (DULCOLAX) 10 MG suppository Place 10 mg rectally daily   Yes Historical Provider, MD   gabapentin (NEURONTIN) 300 MG capsule Take 300 mg by mouth daily. Yes Historical Provider, MD   guaiFENesin (MUCINEX) 600 MG extended release tablet Take 600 mg by mouth 2 times daily   Yes Historical Provider, MD   aluminum & magnesium hydroxide-simethicone (MYLANTA) 400-400-40 MG/5ML SUSP Take 30 mLs by mouth every 6 hours as needed   Yes Historical Provider, MD   pantoprazole (PROTONIX) 40 MG tablet Take 40 mg by mouth daily   Yes Historical Provider, MD   promethazine (PHENERGAN) 12.5 MG suppository Place 12.5 mg rectally every 6 hours as needed for Nausea   Yes Historical Provider, MD   albuterol (PROVENTIL) 90 MCG/ACT inhaler Inhale 2 puffs into the lungs 4 times daily 7/22/19  Yes Cristobal Santoyo MD   acetaminophen (TYLENOL) 500 MG tablet Take 1 tablet by mouth 4 times daily as needed for Pain 7/5/19 3/23/21 Yes VIK Hernandez - CNP   furosemide (LASIX) 20 MG tablet Take 0.5 tablets by mouth daily  Patient taking differently: Take 20 mg by mouth MWF 5/9/19  Yes Cristobal Santoyo MD   pravastatin (PRAVACHOL) 20 MG tablet TAKE 1 TABLET BY MOUTH  NIGHTLY 5/6/19  Yes Cristobal Santoyo MD   amLODIPine (NORVASC) 5 MG tablet TAKE 1 TABLET BY MOUTH  DAILY 5/6/19  Yes Cristobal Santoyo MD   glimepiride (AMARYL) 2 MG tablet TAKE 1 TABLET BY MOUTH  EVERY MORNING BEFORE  BREAKFAST 5/6/19  Yes Cristobal Santoyo MD   Polyethylene Glycol 3350 (MIRALAX PO) Take by mouth   Yes Historical Provider, MD   hydrocortisone 2.5 % cream Apply topically 2 times daily Apply topically 2 times daily. Yes Historical Provider, MD   fluticasone-vilanterol (BREO ELLIPTA) 100-25 MCG/INH AEPB inhaler Inhale 1 puff into the lungs daily 1/28/19  Yes Cristobal Santoyo MD   clotrimazole-betamethasone (LOTRISONE) 1-0.05 % cream Apply topically 2 times daily.  11/7/18  Yes Cristobal Santoyo MD   Multiple Vitamins-Minerals (OCUVITE ADULT FORMULA PO) Take 1 capsule by mouth daily   Yes Historical Provider, MD   Probiotic Product (PRO-BIOTIC BLEND PO) Take by mouth   Yes Historical Provider, MD   magnesium gluconate (MAGONATE) 500 MG tablet Take 1,000 mg by mouth 2 times daily   Yes Historical Provider, MD   Lancets (BD LANCET ULTRAFINE 67N) MISC 1 applicator by Does not apply route 2 times daily Indications: DX:E11.9 5/23/17  Yes Laurel Atwood MD   glucose blood VI test strips (ONE TOUCH TEST STRIPS) strip 1 each by In Vitro route 2 times daily Indications: DX:E11.9 5/23/17  Yes Laurel Atwood MD   Blood Glucose Monitoring Suppl (ONE TOUCH ULTRA SYSTEM KIT) W/DEVICE KIT 1 kit by Does not apply route once for 1 dose Indications: DX: E11.9 5/23/17 1/27/23 Yes Laurel Atwood MD   ALPHA LIPOIC ACID PO Take 1 capsule by mouth daily. Yes Historical Provider, MD   B Complex-C-Folic Acid ( VITAMIN B COMPLEX/VITAMIN C) TABS Take 1 tablet by mouth daily. Yes Historical Provider, MD   Coenzyme Q10 (COQ10) 100 MG CAPS Take 200 mg by mouth daily. Yes Historical Provider, MD   aspirin 81 MG EC tablet Take 81 mg by mouth daily.    Yes Historical Provider, MD       CURRENT MEDICATIONS:  Current Facility-Administered Medications: aspirin EC tablet 81 mg, 81 mg, Oral, Daily  vitamin B-12 (CYANOCOBALAMIN) tablet 2,500 mcg, 2,500 mcg, Oral, Daily  vitamin B and C (TOTAL B-C) 1 tablet, 1 tablet, Oral, Daily  magnesium gluconate (MAGONATE) tablet 1,000 mg, 1,000 mg, Oral, BID  vitamin D (CHOLECALCIFEROL) tablet 2,000 Units, 2,000 Units, Oral, Daily  polyethylene glycol (GLYCOLAX) packet 17 g, 17 g, Oral, Daily  pravastatin (PRAVACHOL) tablet 20 mg, 20 mg, Oral, Nightly  amLODIPine (NORVASC) tablet 5 mg, 5 mg, Oral, Daily  glimepiride (AMARYL) tablet 1 mg, 1 mg, Oral, Daily with breakfast  furosemide (LASIX) tablet 10 mg, 10 mg, Oral, Daily  acetaminophen (TYLENOL) tablet 500 mg, 500 mg, Oral, 4x Daily PRN  albuterol (PROVENTIL) nebulizer solution 2.5 mg, 2.5 mg, Nebulization, 4x Daily  enoxaparin (LOVENOX) injection 60 mg, 60 mg, Subcutaneous, BID  perflutren lipid microspheres (DEFINITY) injection 1.65 mg, 1.5 mL, Intravenous, ONCE PRN  insulin lispro (HUMALOG) injection vial 0-12 Units, 0-12 Units, Subcutaneous, TID WC  insulin lispro (HUMALOG) injection vial 0-6 Units, 0-6 Units, Subcutaneous, Nightly  glucose (GLUTOSE) 40 % oral gel 15 g, 15 g, Oral, PRN  dextrose 50 % IV solution, 12.5 g, Intravenous, PRN  glucagon (rDNA) injection 1 mg, 1 mg, Intramuscular, PRN  dextrose 5 % solution, 100 mL/hr, Intravenous, PRN  budesonide (PULMICORT) nebulizer suspension 500 mcg, 0.5 mg, Nebulization, BID **AND** Arformoterol Tartrate (BROVANA) nebulizer solution 15 mcg, 15 mcg, Nebulization, BID  gabapentin (NEURONTIN) capsule 300 mg, 300 mg, Oral, Nightly  traMADol (ULTRAM) tablet 50 mg, 50 mg, Oral, Q8H PRN  lidocaine (LMX) 4 % cream, , Topical, Q8H PRN    IV MEDICATIONS:   dextrose         ALLERGIES:  Allergies   Allergen Reactions    Lisinopril Other (See Comments)     7/18/19 Pt states that she does not want to take LISINOPRIL       REVIEW OF SYSTEMS:  General ROS:  No weight loss ,+ fatigue     ENT ROS:   No Sore throat ,no lymphoadenopathy,no nasal stuffiness     Hematological and Lymphatic ROS:   No ecchymosis ,no tendency to bleed  Respiratory ROS:   SOB   Cardiovascular ROS:   No CP,No Palpitation   Gastrointestinal ROS:   No Gi bleed,no nausea or vomiting      - Musculoskeletal ROS:      - no joint swelling ,no joint pain   Neurological ROS:     -no weakness or numbness    Dermatological ROS:   No skin rash ,no urticaria     PHYSICAL EXAMINATION:     VITAL SIGNS:  /69   Pulse 60   Temp 97.1 °F (36.2 °C) (Temporal)   Resp 17   Ht 5' 3\" (1.6 m)   Wt 144 lb (65.3 kg)   LMP 12/03/1997   SpO2 99%   BMI 25.51 kg/m²   Wt Readings from Last 3 Encounters:   03/23/21 144 lb (65.3 kg)   07/18/19 140 lb (63.5 kg)   07/05/19 140 lb (63.5 kg)     Temp Readings from Last 3 Encounters:   03/24/21 97.1 °F (36.2 °C) (Temporal)   07/22/19 97.1 °F (36.2 °C) (Temporal)   07/05/19 97.9 °F (36.6 °C)     TMAX:  BP Readings from Last 3 Encounters:   03/24/21 139/69   07/22/19 (!) 142/72   07/05/19 (!) 154/90     Pulse Readings from Last 3 Encounters:   03/24/21 60   07/22/19 64   07/05/19 62           INTAKE/OUTPUTS:  No intake/output data recorded.     Intake/Output Summary (Last 24 hours) at 3/24/2021 1212  Last data filed at 3/24/2021 0930  Gross per 24 hour   Intake 360 ml   Output --   Net 360 ml       General Appearance: alert and oriented to person, place and time, well-developed and   well-nourished, in no acute distress   Eyes: pupils equal, round, and reactive to light, extraocular eye movements intact, conjunctivae normal and sclera anicteric   Neck: neck supple and non tender without mass, no thyromegaly, no thyroid nodules and no cervical adenopathy   Pulmonary/Chestscattred rhonchi   Cardiovascular: normal rate, regular rhythm, normal S1 and S2, no murmurs, rubs, clicks or gallops, distal pulses intact, no carotid bruits, no murmurs, no gallops, no carotid bruits and no JVD   Abdomen: obese, soft, non-tender, non-distended, normal bowel sounds, no masses or organomegaly   Extremities:no edema   Musculoskeletal: normal range of motion, no joint swelling, deformity or tenderness   Neurologic: reflexes normal and symmetric, no cranial nerve deficit noted    LABS/IMAGING:    CBC:  Lab Results   Component Value Date    WBC 7.9 03/23/2021    HGB 11.4 (L) 03/23/2021    HCT 33.4 (L) 03/23/2021    .0 (H) 03/23/2021     03/23/2021    LYMPHOPCT 18.8 (L) 03/23/2021    RBC 3.34 (L) 03/23/2021    MCH 34.1 03/23/2021    MCHC 34.1 03/23/2021    RDW 12.9 03/23/2021    NEUTOPHILPCT 72.4 03/23/2021    MONOPCT 5.6 03/23/2021    BASOPCT 0.3 03/23/2021    NEUTROABS 5.74 03/23/2021    LYMPHSABS 1.49 (L) 03/23/2021    MONOSABS 0.44 03/23/2021    EOSABS 0.20 03/23/2021    BASOSABS 0.02 03/23/2021       Recent Labs     03/23/21  1601   WBC 7.9   HGB 11.4* HCT 33.4*   .0*          BMP:   Recent Labs     03/23/21  1601      K 4.3   CL 99   CO2 26   BUN 12   CREATININE 0.7       MG: No results found for: MG  Ca/Phos:   Lab Results   Component Value Date    CALCIUM 9.6 03/23/2021     Amylase:   Lab Results   Component Value Date    AMYLASE 53 05/29/2015     Lipase:   Lab Results   Component Value Date    LIPASE 63 (H) 10/02/2017     LIVER PROFILE:   Recent Labs     03/23/21  1601   AST 22   ALT 22   BILITOT 0.2   ALKPHOS 84       PT/INR: No results for input(s): PROTIME, INR in the last 72 hours. APTT: No results for input(s): APTT in the last 72 hours.     Cardiac Enzymes:  Lab Results   Component Value Date    KNDKWSD 034 (H) 05/29/2015    CKMB 6.3 (H) 05/29/2015    TROPONINI <0.01 03/23/2021                 PROBLEM LIST:  Patient Active Problem List   Diagnosis    Asthma    CAD (coronary artery disease)    Vitamin D deficiency    DM (diabetes mellitus) ([facility] [address]    HTN (hypertension)    Idiopathic peripheral neuropathy    Rhinitis, allergic    SOB (shortness of breath)    Bronchitis    GERD (gastroesophageal reflux disease)    PVD (peripheral vascular disease) with claudication (HCC)    Chest pain    Gait instability    Pulmonary embolism on right (HCC)               ASSESSMENT:  1.) PE   2.)Asthma   3.)recent COVID   4- Mild Hypoxia       PLAN:  *-PE seems and most likely related to covud     *-start Eliquis 10 mg bid X 5 days giving her age  and then 5 mg bid bid X 3 months only  *-will check o2 beofre discharge to make sure she deos not need it   *-follow as OP   *- can be discharge in am if remain stable   *educated about anticoagulation ,explain risks will try to reach family to also discuss risks and benefits       Thank you very much for allowing me to participate in the care of this pleasant patient , should you have any questions ,please do not hesitate to contact me      Jesús Crowley MD,[facility]  Pulmonary&Critical Care Medicine Director of [address] [place] [address] Director of [address] [place] [address]    Matty Monroy    NOTE: This report was transcribed using voice recognition software. Every effort was made to ensure accuracy; however, inadvertent computerized transcription errors may be present.

## 2021-03-24 NOTE — PROGRESS NOTES
Physical Therapy  Physical Therapy Initial Assessment   Name: Steve Lakhani  : 1927  MRN: [id number]    Referring Provider:  Hector Baig MD    Date of Service: 3/24/2021    Evaluating PT:  Halina Parada, PT, DPT [id number]    Room #:  9804/8688-X  Diagnosis:  PE on right  PMHx/PSHx:  Asthma, DM, HTN, restless legs syndrome, UI, lung disease, GERD, PVD, Arthritis, CAD, HLD  Precautions:  Falls, impulsive  Equipment Needs:  rollator - pt owns    SUBJECTIVE:    Pt lives at [facility] with 0 stairs to enter and 0 rail. Bed is on first floor and bath is on first floor. Pt ambulated with rollator Myles PTA. Pt reports independence for ADLs at facility. OBJECTIVE:   Initial Evaluation  Date: 3/24/2021 Treatment Short Term/ Long Term   Goals   AM-PAC 6 Clicks 44/40     Was pt agreeable to Eval/treatment? yes     Does pt have pain? BLE neuropathy pain (chronic)     Bed Mobility  Rolling: SBA  Supine to sit: SBA  Sit to supine: SBA  Scooting: SBA  Rolling: Independent  Supine to sit: Independent  Sit to supine: Independent  Scooting: Independent   Transfers Sit to stand: SBA  Stand to sit: SBA  Stand pivot: SBA rollator  Sit to stand: Independent  Stand to sit: Independent  Stand pivot: myles rollator   Ambulation    150 feet with rollator SBA/CGA  300 feet with rollator Myles   Stair negotiation: ascended and descended  NT  NA   ROM BUE:  Per OT note  BLE:  WNL     Strength BUE:  Per OT note  BLE:  WNL     Balance Sitting EOB:  Independent  Dynamic Standing:  SBA/CGA rollator  Sitting EOB:  Independent  Dynamic Standing:  Myles rollator     Pt is A & O x 4  Sensation:  Pt denies numbness and tingling to extremities  Edema:  unremarkable    Vitals:  SPo2 monitored throughout session:  2L rest 98%  RA rest 97%  RA ambulation 95%    Patient education  Pt educated on role of PT intervention. Pt educated on safety in room with utilization of call light for assistance with mobility.   Pt educated on importance of maximizing OOB time by transferring to bedside chair for meals and ambulating to bathroom/transferring to bedside commode with assistance from nursing and therapy staff to increase functional activity tolerance and overall functional independence. Patient response to education:   Pt verbalized understanding Pt demonstrated skill Pt requires further education in this area   yes yes yes     ASSESSMENT:    Comments:  RN cleared pt for activity prior to session. Pt received supine in bed and agreeable to PT intervention at this time. Pt performed all functional mobility as noted above. Pt functioning near baseline at this time. Pt ambulated in hallway without difficulty and SPO2 remaining WNL. Pt mildly impulsive and required some cueing for safety. Pt returned to bedside chair at end of session and left with all needs met and call light in reach. Pt requires continued skilled PT intervention for the purposes of maximizing functional mobility and independence by addressing deficits described above. Treatment:  Patient practiced and was instructed in the following treatment:     Therapeutic Activities Completed:  o Functional mobility as noted above:   - Bed mobility: SBA all aspects with bed rail. Min VC and hand over hand guidance to facilitate efficient use of BUE on bed rail to promote more independent completion of task. - Transfer training: STS SBA from EOB and chair. Cues for proper hand placement and sequencing. Pt has tendency to abandon rollator prematurely during basic transfers. Pt educated on proper technique for safety.    - Ambulation: 50 feet in room to/from bathroom with rollator SBA. 150 feet in hallway with rollator SBA/CGA. Pt impulsive and ambulates with rapid sarath at times. Overall no major LOB noted. Concerns for pt's safety awareness. o Skilled repositioning in seated position for comfort.  o Pt education as noted above. Pt's/ family goals   1.  Return to halfway.    Patient and or family understand(s) diagnosis, prognosis, and plan of care. yes    PLAN OF CARE:    Current Treatment Recommendations     [x] Strengthening     [x] ROM   [x] Balance Training   [x] Endurance Training   [x] Transfer Training   [x] Gait Training   [] Stair Training   [x] Positioning   [x] Safety and Education Training   [x] Patient/Caregiver Education   [] HEP  [] Other     PT care will be provided in accordance with the objectives noted above. The above treatment recommendations will be utilized to address deficits described above in order to restore pt's prior level of function and/or achieve modified functional independence with adaptive strategies. Frequency of treatments: 2-5x/week x 1-2 weeks. Time in  0935  Time out  1005    Total Treatment Time  23 minutes     Evaluation Time includes thorough review of current medical information, gathering information on past medical history/social history and prior level of function, completion of standardized testing/informal observation of tasks, assessment of data and education on plan of care and goals.     CPT codes:  [x] Low Complexity PT evaluation 43308  [] Moderate Complexity PT evaluation 12415  [] High Complexity PT evaluation 90271  [] PT Re-evaluation 28659  [] Gait training 39613 0 minutes  [] Manual therapy 56362 0 minutes  [x] Therapeutic activities 95203 23 minutes  [] Therapeutic exercises 75738 0 minutes  [] Neuromuscular reeducation 01213 0 minutes     Jamel Oneill, PT, DPT  [id number]

## 2021-03-24 NOTE — PROGRESS NOTES
OCCUPATIONAL THERAPY INITIAL EVALUATION      Date:3/24/2021  Patient Name: Rowena Del Real  MRN: [id number]  : 1927  Room: [address] [place] [address] [facility] #[id number]    AM-PAC Daily Activity Raw Score:   Recommended Adaptive Equipment: TBD     Diagnosis: Pulmonary embolism on right [facility]) [I26.99]     Referring physician: Susy Greenberg MD    Pertinent Medical History: arthritis, asthma, CAD, DM, HTN, PVD, restless leg syndrome, urinary incontinence    Precautions:  Falls     Home Living: Pt admitted from  [facility] setup: bathroom modifications   Equipment owned: rollator, reacher    Prior Level of Function: independent/mod I with ADLs; ambulated w/ rollator  Driving: no    Pain Level: Pt c/o intermittent B thigh pain this session ; unable to quantify    Cognition: A&O: 4/4; Follows 1-2 step directions  Easily distracted - cues provided to redirect to functional tasks   Memory:  good    Sequencing:  good    Problem solving:  fair    Judgement/safety:  fair (Pt impulsive at times - cues for rollator safety and safety/hand placement w/ functional transfers)     Functional Assessment:   Initial Eval Status  Date: 3/24/21 Treatment Status  Date: Short Term Goals/LTG  Treatment frequency: 1-4x/wk   Feeding Independent   -   Grooming Contact Guard Assist   For standing at sink  Modified [place]    UB Dressing Stand by Assist   Gown  Modified [place]    LB Dressing Moderate Assist   B socks  Supervision    Bathing Minimal Assist  Simulated  Supervision    Toileting Minimal Assist   Including hygiene   Increased time required  Supervision    Bed Mobility  Supine to sit: Stand by Assist   Sit to supine: NT  Rolling: Independent   Supine to sit: Modified [place]   Sit to supine: Modified [place]    Functional Transfers SBA  Various surfaces  Mod I   Functional Mobility CGA w/ rollator  In room and bathrm  Mod I   Balance Sitting: SBA  Standing: SBA w/ rollator (static)  CGA w/ w/w (dynamic)     Activity Tolerance Fair  Good   Visual/  Perceptual Glasses: no                Hand dominance: R   Strength ROM Additional Info:    RUE  4-/5  WFL   good  and wfl FMC/dexterity noted during ADL tasks       LUE 4-/5  WFL   good  and wfl FMC/dexterity noted during ADL tasks       Hearing: WFL  Sensation:  Pt did c/o chronic neuropathy B LE's and hands  Tone: WFL   Edema: none noted                   Comments: Upon arrival patient lying in bed. Pt agreeable to OT session this date. RN clearance. At end of session, patient seated in chair (nursing staff aware) with call light and phone within reach, all lines and tubes intact. Overall patient demonstrated decreased independence and safety during completion of ADL/functional transfer/mobility tasks. Pt would benefit from continued skilled OT to increase safety and independence with completion of ADL/IADL tasks for functional independence and quality of life. Treatment: OT treatment provided this date includes:  Facilitation of bed mobility, unsupported sitting balance (addressing posture, weight shifting and safety to prep for ADL's), functional transfers (various surfaces-EOB, commode w/ education/cues for safety/hand placement), standing tolerance tasks (addressing posture, balance and activity tolerance while incorporating light functional reaching impacting ADLs) and functional ambulation tasks with rollator (to/from bathroom w/ education/cuing on posture, rollator management, attention to task and safety). Therapist facilitated self-care retraining: UB/LB self-care tasks (donned gown, donned/doffed socks), toileting task and standing grooming tasks (at sink - washed face and hands) while educating/cuing pt on modified techniques, posture, attention, safety and energy conservation techniques. Skilled monitoring of HR, O2 sats and pts response to treatment.      mod  Profile and History- med (extensive chart review)  Assessment of Occupational Performance and Identification of Deficits- med  Clinical Decision Making- med    Evaluation Time includes thorough review of current medical information, gathering information on past medical history/social history and prior level of function, completion of standardized testing/informal observation of tasks, assessment of data and education on plan of care and goals. Assessment of current deficits   Functional mobility [x]  ADLs [x] Strength [x]  Cognition []  Functional transfers  [x] IADLs [x] Safety Awareness [x]  Endurance [x]  Fine Motor Coordination [] Balance [x] Vision/perception [] Sensation []   Gross Motor Coordination [] ROM [] Delirium []                  Motor Control []    Plan of Care: 1-3 days/week for 1-2 weeks PRN   Instruction/training on adapted ADL techniques and AE recommendations to increase functional independence within precautions  Training on energy conservation strategies/techniques to improve independence/tolerance for self-care routine  Functional transfer/mobility training/DME recommendations for increased independence, safety, and fall prevention  Patient/Family education to increase follow through with safety techniques and functional independence  Recommendation of environmental modifications for increased safety with functional transfers/mobility and ADLs  Therapeutic exercise to improve motor endurance, ROM, and functional strength for ADLs/functional transfers  Therapeutic activities to facilitate/challenge dynamic balance, stand tolerance, fine motor dexterity/in-hand manipulation for increased independence with ADLs  Positioning to improve functional independence    Rehab Potential: Good for established goals    Patient / Family Goal: Not stated     Patient and/or family were instructed on diagnosis, prognosis/goals and plan of care. Demonstrated fair understanding.     [] Malnutrition indicators have been identified and nursing has been notified to ensure a dietitian consult is ordered.        Mod Evaluation completed +              Time In: 09:26   Time Out: 09:56  Total Treatment Time: 23 minutes    Min Units   OT Eval Low 37879     OT Eval Medium 97166 X 1   OT Eval High 90859     OT Re-Eval [id number]     Therapeutic Ex 18589     Therapeutic Activities 39596 8 1   ADL/Self Care 40984 15 1   Orthotic Management 98196     Neuro Re-Ed [address], OTR/L #[id number]

## 2021-03-24 NOTE — H&P
daily.  B Complex-C-Folic Acid (HM VITAMIN B COMPLEX/VITAMIN C) TABS, Take 1 tablet by mouth daily. Coenzyme Q10 (COQ10) 100 MG CAPS, Take 200 mg by mouth daily. aspirin 81 MG EC tablet, Take 81 mg by mouth daily. Allergies:    Lisinopril    Social History:    reports that she has never smoked. She has never used smokeless tobacco. She reports current alcohol use. She reports that she does not use drugs. Family History:   family history includes Heart Disease in her brother; Hypertension in her father. REVIEW OF SYSTEMS:  As above in the HPI, otherwise negative    PHYSICAL EXAM:    Vitals:  /69   Pulse 60   Temp 97.1 °F (36.2 °C) (Temporal)   Resp 17   Ht 5' 3\" (1.6 m)   Wt 144 lb (65.3 kg)   LMP 12/03/1997   SpO2 99%   BMI 25.51 kg/m²     General:  Awake, alert, oriented X 3. Well developed, well nourished, well groomed. No apparent distress. HEENT:  Normocephalic, atraumatic. Pupils equal, round, reactive to light. No scleral icterus. No conjunctival injection. Edentulous   no nasal discharge. Neck:  Supple  Heart:  RRR, no murmurs, gallops, rubs  Lungs:  CTA bilaterally, bilat symmetrical expansion, no wheeze, rales, or rhonchi  Abdomen:   Bowel sounds present, soft, nontender, no masses, no organomegaly, no peritoneal signs  Extremities:  No clubbing, cyanosis, or edema  Skin:  Warm and dry, no open lesions or rash  Multiple ecchymosis areas noted throughout the lower extremities  Neuro:  Cranial nerves 2-12 intact, no focal deficits  Breast: deferred  Rectal: deferred  Genitalia:  deferred    LABS: Data reviewed in detail      ASSESSMENT:      Active Problems:    Pulmonary embolism on right ([facility] [address]  Recent Covid infection  Patient Active Problem List   Diagnosis    Asthma    CAD (coronary artery disease)    Vitamin D deficiency    DM (diabetes mellitus) ([facility] [address] [facility]    HTN (hypertension)    Idiopathic peripheral neuropathy    Rhinitis, allergic    SOB (shortness of breath)    Bronchitis    GERD (gastroesophageal reflux disease)    PVD (peripheral vascular disease) with claudication (HCC)    Chest pain    Gait instability    Pulmonary embolism on right [facility])           PLAN:  Therapeutic Lovenox dose  Resume home medications  Check echo  Switch to Eliquis likely tomorrow  Pulmonary consult  Reviewed in detail with the patient    Marissa Shimon  1:18 PM  3/24/2021

## 2021-03-24 NOTE — PROGRESS NOTES
[address]- Herbal Suspension    To avoid potential drug interactions with herbal and nutritional supplements, it is the policy of [address]  to suspend all orders for these products at the time of admission.     Per hospital policy the following herbal/nutritional supplements were suspended during the hospital stay:    [employer] [address] [place] [address]      Thank you,    Georgiana Woodard, PharmD 3/23/2021 8:50 PM

## 2021-03-24 NOTE — PROGRESS NOTES
Dr. Rochelle Murillo notified that patient came with a dnr-cca, and that the pt is complaining of bl leg pain stating she takes ultram, gabapentin, and uses lidocaine cream.   New orders placed.

## 2021-03-25 VITALS
HEIGHT: 63 IN | WEIGHT: 144 LBS | BODY MASS INDEX: 25.52 KG/M2 | OXYGEN SATURATION: 96 % | RESPIRATION RATE: 17 BRPM | TEMPERATURE: 97.4 F | SYSTOLIC BLOOD PRESSURE: 145 MMHG | HEART RATE: 60 BPM | DIASTOLIC BLOOD PRESSURE: 66 MMHG

## 2021-03-25 LAB — METER GLUCOSE: 138 MG/DL (ref 74–99)

## 2021-03-25 PROCEDURE — 82962 GLUCOSE BLOOD TEST: CPT

## 2021-03-25 PROCEDURE — 6370000000 HC RX 637 (ALT 250 FOR IP): Performed by: INTERNAL MEDICINE

## 2021-03-25 PROCEDURE — 6360000002 HC RX W HCPCS: Performed by: INTERNAL MEDICINE

## 2021-03-25 PROCEDURE — 94640 AIRWAY INHALATION TREATMENT: CPT

## 2021-03-25 PROCEDURE — 2700000000 HC OXYGEN THERAPY PER DAY

## 2021-03-25 RX ADMIN — GLIMEPIRIDE 1 MG: 1 TABLET ORAL at 08:41

## 2021-03-25 RX ADMIN — AMLODIPINE BESYLATE 5 MG: 5 TABLET ORAL at 08:41

## 2021-03-25 RX ADMIN — BUDESONIDE 500 MCG: 0.5 SUSPENSION RESPIRATORY (INHALATION) at 08:02

## 2021-03-25 RX ADMIN — Medication 1 TABLET: at 08:41

## 2021-03-25 RX ADMIN — Medication 2500 MCG: at 08:40

## 2021-03-25 RX ADMIN — Medication 1000 MG: at 08:41

## 2021-03-25 RX ADMIN — FUROSEMIDE 10 MG: 20 TABLET ORAL at 08:41

## 2021-03-25 RX ADMIN — Medication 2000 UNITS: at 08:41

## 2021-03-25 RX ADMIN — ASPIRIN 81 MG: 81 TABLET, COATED ORAL at 08:41

## 2021-03-25 RX ADMIN — APIXABAN 10 MG: 5 TABLET, FILM COATED ORAL at 08:41

## 2021-03-25 RX ADMIN — ALBUTEROL SULFATE 2.5 MG: 2.5 SOLUTION RESPIRATORY (INHALATION) at 08:02

## 2021-03-25 RX ADMIN — ARFORMOTEROL TARTRATE 15 MCG: 15 SOLUTION RESPIRATORY (INHALATION) at 08:02

## 2021-03-25 ASSESSMENT — PAIN SCALES - GENERAL: PAINLEVEL_OUTOF10: 0

## 2021-03-25 NOTE — DISCHARGE SUMMARY
Physician Discharge Summary     Patient ID:  Brandin Dorsey  [id number]  69 y.o.  9/20/1927    Admit date: 3/23/2021    Discharge date and time: 3/25/2021  2:13 PM     Admission Diagnoses: Pulmonary embolism on right [facility]) [I26.99]    Discharge Diagnoses:   Pulmonary embolism related to recent Covid infection  Patient Active Problem List   Diagnosis    Asthma    CAD (coronary artery disease)    Vitamin D deficiency    DM [facility]diabetes mellitus) ([facility] [address]    HTN (hypertension)    Idiopathic peripheral neuropathy    Rhinitis, allergic    SOB (shortness of breath)    Bronchitis    GERD (gastroesophageal reflux disease)    PVD (peripheral vascular disease) with claudication (HCC)    Chest pain    Gait instability    Pulmonary embolism on right [facility])       Consults: Pulmonary dr Levi Jackson    Procedures: CT angiogram of the chest  Echocardiogram essentially negative    Hospital Course: 51-year-old woman recently suffered from Covid infection  Now admitted through emergency room due to shortness of breath precipitated by a small/sub segmental pulmonary embolism  Therapeutic Lovenox dose administered  Echocardiogram was normal  Oral Eliquis was started  Patient was discharged home in stable condition  Eliquis to be taken 10 mg twice daily  5 days followed by 5 mg twice daily for 3 months    Discharge Exam:  Seen on the floor prior to discharge  Feeling much better overall  Tolerating medications well  Not hypoxic  Vital signs stable  Lungs were clear  Heart regular rate and rhythm  Extremities showed no edema  Data reviewed in detail with the patient    Disposition: Assisted living  Stable at the time of discharge  Patient Instructions:   Discharge Medication List as of 3/25/2021  9:57 AM      START taking these medications    Details   apixaban (ELIQUIS) 5 MG TABS tablet Take 2 tablets by mouth 2 times daily for 5 days Followed by 5 mg twice daily for 3 months, Disp-120 tablet, R-1Normal         CONTINUE these medications which have NOT CHANGED    Details   lidocaine (LMX) 4 % cream Apply topically as needed for Pain Apply topically as needed., Topical, PRN, Historical Med      sucralfate (CARAFATE) 1 GM tablet Take 1 g by mouth 3 times dailyHistorical Med      bisacodyl (DULCOLAX) 10 MG suppository Place 10 mg rectally dailyHistorical Med      gabapentin (NEURONTIN) 300 MG capsule Take 300 mg by mouth daily. Historical Med      guaiFENesin (MUCINEX) 600 MG extended release tablet Take 600 mg by mouth 2 times dailyHistorical Med      aluminum & magnesium hydroxide-simethicone (MYLANTA) 400-400-40 MG/5ML SUSP Take 30 mLs by mouth every 6 hours as neededHistorical Med      pantoprazole (PROTONIX) 40 MG tablet Take 40 mg by mouth dailyHistorical Med      promethazine (PHENERGAN) 12.5 MG suppository Place 12.5 mg rectally every 6 hours as needed for NauseaHistorical Med      albuterol (PROVENTIL) 90 MCG/ACT inhaler Inhale 2 puffs into the lungs 4 times daily, R-0DC to SNF      acetaminophen (TYLENOL) 500 MG tablet Take 1 tablet by mouth 4 times daily as needed for Pain, Disp-40 tablet, R-1Print      furosemide (LASIX) 20 MG tablet Take 0.5 tablets by mouth daily, Disp-45 tablet, R-3Normal      pravastatin (PRAVACHOL) 20 MG tablet TAKE 1 TABLET BY MOUTH  NIGHTLY, Disp-90 tablet, R-1Normal      amLODIPine (NORVASC) 5 MG tablet TAKE 1 TABLET BY MOUTH  DAILY, Disp-90 tablet, R-1Normal      glimepiride (AMARYL) 2 MG tablet TAKE 1 TABLET BY MOUTH  EVERY MORNING BEFORE  BREAKFAST, Disp-90 tablet, R-0Normal      Polyethylene Glycol 3350 (MIRALAX PO) Take by mouthHistorical Med      hydrocortisone 2.5 % cream Apply topically 2 times daily Apply topically 2 times daily. , Topical, 2 TIMES DAILY, Historical Med      fluticasone-vilanterol (BREO ELLIPTA) 100-25 MCG/INH AEPB inhaler Inhale 1 puff into the lungs daily, Disp-3 each, R-5Please dispense 90 day supply for patientNormal      clotrimazole-betamethasone (LOTRISONE) 1-0.05 % cream Apply topically 2 times daily. , Disp-45 g, R-0, Normal      Multiple Vitamins-Minerals (OCUVITE ADULT FORMULA PO) Take 1 capsule by mouth dailyHistorical Med      Probiotic Product (PRO-BIOTIC BLEND PO) Take by mouthHistorical Med      magnesium gluconate (MAGONATE) 500 MG tablet Take 1,000 mg by mouth 2 times dailyHistorical Med      Lancets (BD LANCET ULTRAFINE 30G) MISC 2 TIMES DAILY Starting Tue 5/23/2017, Disp-200 each, R-3, Normal      glucose blood VI test strips (ONE TOUCH TEST STRIPS) strip 2 TIMES DAILY Starting Tue 5/23/2017, Disp-200 each, R-3, Normal      Blood Glucose Monitoring Suppl (ONE TOUCH ULTRA SYSTEM KIT) W/DEVICE KIT ONCE Starting Tue 5/23/2017, Disp-1 kit, R-0, Normal      ALPHA LIPOIC ACID PO Take 1 capsule by mouth daily. Historical Med      B Complex-C-Folic Acid (HM VITAMIN B COMPLEX/VITAMIN C) TABS Take 1 tablet by mouth daily. Historical Med      Coenzyme Q10 (COQ10) 100 MG CAPS Take 200 mg by mouth daily. Historical Med      aspirin 81 MG EC tablet Take 81 mg by mouth daily. Historical Med         STOP taking these medications       albuterol (PROVENTIL) (2.5 MG/3ML) 0.083% nebulizer solution Comments:   Reason for Stopping:         melatonin 1 MG tablet Comments:   Reason for Stopping:         Flaxseed, Linseed, (FLAX SEED OIL) 1000 MG CAPS Comments:   Reason for Stopping:         Wheat Dextrin (BENEFIBER) POWD Comments:   Reason for Stopping:         leflunomide (ARAVA) 20 MG tablet Comments:   Reason for Stopping:         zoledronic acid (RECLAST) 5 MG/100ML SOLN Comments:   Reason for Stopping:         metoprolol tartrate (LOPRESSOR) 25 MG tablet Comments:   Reason for Stopping:         calcium carbonate 600 MG TABS tablet Comments:   Reason for Stopping:         predniSONE (ORIN) 5 MG TBEC Comments:   Reason for Stopping:         PUMPKIN SEED PO Comments:   Reason for Stopping:         Cholecalciferol (VITAMIN D3) 2000 units CAPS Comments:   Reason for Stopping: Cyanocobalamin (VITAMIN B-12 CR) 1000 MCG TBCR Comments:   Reason for Stopping:             Activity: activity as tolerated  Diet: regular diet    Follow-up with pcp     Note that over 40 minutes was spent in preparing discharge papers, discussing discharge with patient, medication review, etc.    Signed:  Randy Cota MD  3/25/2021  5:08 PM

## 2021-03-25 NOTE — PROGRESS NOTES
Patient is 98%on room air while sitting. Patient walked from room around nurses station and oxygen saturation remained 97-98% on room air.

## 2021-03-25 NOTE — PROGRESS NOTES
CLINICAL PHARMACY NOTE: MEDS TO [address] Select Patient?: No  Total # of Prescriptions Filled: 1   The following medications were delivered to the patient:  · eliquis 5  Total # of Interventions Completed: 3  Time Spent (min): 30    Additional Documentation:

## 2021-03-25 NOTE — CARE COORDINATION
Pt discharging back to home-Zeinab CABRERA Ambulatory pulse ox completed RA 97-98% when walking. Attempted to visit pt, pt in restroom independently. Son to transport home. Shauna DRAKEW

## 2021-05-09 ENCOUNTER — APPOINTMENT (OUTPATIENT)
Dept: GENERAL RADIOLOGY | Age: 86
DRG: 603 | End: 2021-05-09
Payer: MEDICARE

## 2021-05-09 ENCOUNTER — HOSPITAL ENCOUNTER (INPATIENT)
Age: 86
LOS: 2 days | Discharge: HOME | DRG: 603 | End: 2021-05-11
Attending: FAMILY MEDICINE | Admitting: INTERNAL MEDICINE
Payer: MEDICARE

## 2021-05-09 DIAGNOSIS — L03.116 CELLULITIS OF LEFT LOWER EXTREMITY: Primary | ICD-10-CM

## 2021-05-09 PROBLEM — L03.90 CELLULITIS: Status: ACTIVE | Noted: 2021-05-09

## 2021-05-09 LAB
ANION GAP SERPL CALCULATED.3IONS-SCNC: 10 MMOL/L (ref 7–16)
BASOPHILS ABSOLUTE: 0.03 E9/L (ref 0–0.2)
BASOPHILS RELATIVE PERCENT: 0.3 % (ref 0–2)
BUN BLDV-MCNC: 16 MG/DL (ref 6–23)
CALCIUM SERPL-MCNC: 9.2 MG/DL (ref 8.6–10.2)
CHLORIDE BLD-SCNC: 96 MMOL/L (ref 98–107)
CO2: 23 MMOL/L (ref 22–29)
CREAT SERPL-MCNC: 0.8 MG/DL (ref 0.5–1)
EOSINOPHILS ABSOLUTE: 0.07 E9/L (ref 0.05–0.5)
EOSINOPHILS RELATIVE PERCENT: 0.7 % (ref 0–6)
GFR AFRICAN AMERICAN: >60
GFR NON-AFRICAN AMERICAN: >60 ML/MIN/1.73
GLUCOSE BLD-MCNC: 148 MG/DL (ref 74–99)
HCT VFR BLD CALC: 30.5 % (ref 34–48)
HEMOGLOBIN: 10.3 G/DL (ref 11.5–15.5)
IMMATURE GRANULOCYTES #: 0.04 E9/L
IMMATURE GRANULOCYTES %: 0.4 % (ref 0–5)
LYMPHOCYTES ABSOLUTE: 1.42 E9/L (ref 1.5–4)
LYMPHOCYTES RELATIVE PERCENT: 14.2 % (ref 20–42)
MCH RBC QN AUTO: 33.3 PG (ref 26–35)
MCHC RBC AUTO-ENTMCNC: 33.8 % (ref 32–34.5)
MCV RBC AUTO: 98.7 FL (ref 80–99.9)
MONOCYTES ABSOLUTE: 0.65 E9/L (ref 0.1–0.95)
MONOCYTES RELATIVE PERCENT: 6.5 % (ref 2–12)
NEUTROPHILS ABSOLUTE: 7.79 E9/L (ref 1.8–7.3)
NEUTROPHILS RELATIVE PERCENT: 77.9 % (ref 43–80)
PDW BLD-RTO: 12.9 FL (ref 11.5–15)
PLATELET # BLD: 302 E9/L (ref 130–450)
PMV BLD AUTO: 9.9 FL (ref 7–12)
POTASSIUM SERPL-SCNC: 4.1 MMOL/L (ref 3.5–5)
RBC # BLD: 3.09 E12/L (ref 3.5–5.5)
SEDIMENTATION RATE, ERYTHROCYTE: 110 MM/HR (ref 0–20)
SODIUM BLD-SCNC: 129 MMOL/L (ref 132–146)
WBC # BLD: 10 E9/L (ref 4.5–11.5)

## 2021-05-09 PROCEDURE — 96374 THER/PROPH/DIAG INJ IV PUSH: CPT

## 2021-05-09 PROCEDURE — 87040 BLOOD CULTURE FOR BACTERIA: CPT

## 2021-05-09 PROCEDURE — 73590 X-RAY EXAM OF LOWER LEG: CPT

## 2021-05-09 PROCEDURE — 1200000000 HC SEMI PRIVATE

## 2021-05-09 PROCEDURE — 2580000003 HC RX 258: Performed by: PHYSICIAN ASSISTANT

## 2021-05-09 PROCEDURE — 73630 X-RAY EXAM OF FOOT: CPT

## 2021-05-09 PROCEDURE — 6360000002 HC RX W HCPCS: Performed by: PHYSICIAN ASSISTANT

## 2021-05-09 PROCEDURE — 85025 COMPLETE CBC W/AUTO DIFF WBC: CPT

## 2021-05-09 PROCEDURE — 85651 RBC SED RATE NONAUTOMATED: CPT

## 2021-05-09 PROCEDURE — 86140 C-REACTIVE PROTEIN: CPT

## 2021-05-09 PROCEDURE — 99284 EMERGENCY DEPT VISIT MOD MDM: CPT

## 2021-05-09 PROCEDURE — 80048 BASIC METABOLIC PNL TOTAL CA: CPT

## 2021-05-09 PROCEDURE — 36415 COLL VENOUS BLD VENIPUNCTURE: CPT

## 2021-05-09 RX ORDER — SODIUM CHLORIDE 0.9 % (FLUSH) 0.9 %
10 SYRINGE (ML) INJECTION PRN
Status: DISCONTINUED | OUTPATIENT
Start: 2021-05-09 | End: 2021-05-11 | Stop reason: HOSPADM

## 2021-05-09 RX ADMIN — CEFAZOLIN SODIUM 1000 MG: 1 INJECTION, POWDER, FOR SOLUTION INTRAMUSCULAR; INTRAVENOUS at 20:11

## 2021-05-09 NOTE — ED PROVIDER NOTES
ED Attending: CC: [id number]  Department of Emergency Medicine   ED  Encounter Note  Admit Date/RoomTime: 2021  6:54 PM  ED Room: [facility] hospital/[facility]10-V    NAME: Edy Chung  : 1927  MRN: [id number]     Chief Complaint:  Leg Pain    History of Present Illness       Edy Chung is a 80 y.o. old female who presents to the emergency department by private vehicle, for gradual onset, worsening red, painful and spreading area on  LLE which initially began in the foot and is now extended proximally just below the knee which began a few day(s) prior to arrival.  Her son brought her to the hospital stating that he saw her yesterday at [address] [place] [address] and only her left foot was swollen and red. She began taking doxycycline for the redness to the foot. She does take Eliquis. Today when his son went to go see her he noticed that her entire leg now is swollen and red and she has been complaining of increased pain. The symptoms were caused by unknown cause. Since onset the symptoms have been rapidly worsening. She has had no prior episodes. Her symptoms are associated with redness, blistering and generalized pain with edema and relieved by nothing. She denies any difficulty breathing, fever, chills or drainage. ROS   Pertinent positives and negatives are stated within HPI, all other systems reviewed and are negative. Past Medical History:  has a past medical history of Arthritis, Asthma, Bronchitis, CAD (coronary artery disease), Cough, Diabetes mellitus ([address] GERD (gastroesophageal reflux disease), Hyperlipidemia, Hypertension, Lung disease, PVD (peripheral vascular disease) with claudication ([address] Restless legs syndrome, Rhinitis, allergic, Sinusitis, SOB (shortness of breath), and Urinary incontinence. Surgical History:  has a past surgical history that includes Hysterectomy; Cholecystectomy; Tonsillectomy and adenoidectomy; Coronary artery bypass graft;  Abdomen surgery; Appendectomy; Colonoscopy; Endoscopy, colon, diagnostic; and Cardiac surgery. Social History:  reports that she has never smoked. She has never used smokeless tobacco. She reports current alcohol use. She reports that she does not use drugs. Family History: family history includes Heart Disease in her brother; Hypertension in her father. Allergies: Lisinopril    Physical Exam   Oxygen Saturation Interpretation: Normal.        ED Triage Vitals [05/09/21 1851]   BP Temp Temp Source Pulse Resp SpO2 Height Weight   (!) 188/72 96.9 °F (36.1 °C) Infrared 62 16 97 % -- --         Constitutional:  Alert, development consistent with age. HEENT:  NC/NT. Airway patent. Eyes:  PERRL, EOMI, no discharge. Ears:  TMs without perforation, injection, or bulging. External canals clear without exudate. Mouth:  Mucous membranes moist without lesions, tongue and gums normal.  Throat:  Pharynx without injection, exudate, or tonsillar hypertrophy. Airway patient. Neck:  Supple. No lymphadenopathy. Respiratory:  Clear to auscultation and breath sounds equal.  CV:  Regular rate and rhythm. GI:  Abdomen Soft, nontender, +BS. Extremity:               . Lymphatics: no lymphadenopathy noted  Neurological:  Orientation age-appropriate unless noted elseware. Motor functions intact.     Lab / Imaging Results   (All laboratory and radiology results have been personally reviewed by myself)  Labs:  Results for orders placed or performed during the hospital encounter of 05/09/21   Culture, Blood 1    Specimen: Blood   Result Value Ref Range    Blood Culture, Routine 24 Hours no growth    Culture, Blood 2    Specimen: Blood   Result Value Ref Range    Culture, Blood 2 24 Hours no growth    CBC Auto Differential   Result Value Ref Range    WBC 10.0 4.5 - 11.5 E9/L    RBC 3.09 (L) 3.50 - 5.50 E12/L    Hemoglobin 10.3 (L) 11.5 - 15.5 g/dL    Hematocrit 30.5 (L) 34.0 - 48.0 %    MCV 98.7 80.0 - 99.9 fL    MCH 33.3 26.0 - 35.0 pg    MCHC 33.8 32.0 - 34.5 %    RDW 12.9 11.5 - 15.0 fL    Platelets 658 464 - 662 E9/L    MPV 9.9 7.0 - 12.0 fL    Neutrophils % 77.9 43.0 - 80.0 %    Immature Granulocytes % 0.4 0.0 - 5.0 %    Lymphocytes % 14.2 (L) 20.0 - 42.0 %    Monocytes % 6.5 2.0 - 12.0 %    Eosinophils % 0.7 0.0 - 6.0 %    Basophils % 0.3 0.0 - 2.0 %    Neutrophils Absolute 7.79 (H) 1.80 - 7.30 E9/L    Immature Granulocytes # 0.04 E9/L    Lymphocytes Absolute 1.42 (L) 1.50 - 4.00 E9/L    Monocytes Absolute 0.65 0.10 - 0.95 E9/L    Eosinophils Absolute 0.07 0.05 - 0.50 E9/L    Basophils Absolute 0.03 0.00 - 0.20 A0/H   Basic Metabolic Panel   Result Value Ref Range    Sodium 129 (L) 132 - 146 mmol/L    Potassium 4.1 3.5 - 5.0 mmol/L    Chloride 96 (L) 98 - 107 mmol/L    CO2 23 22 - 29 mmol/L    Anion Gap 10 7 - 16 mmol/L    Glucose 148 (H) 74 - 99 mg/dL    BUN 16 6 - 23 mg/dL    CREATININE 0.8 0.5 - 1.0 mg/dL    GFR Non-African American >60 >=60 mL/min/1.73    GFR African American >60     Calcium 9.2 8.6 - 10.2 mg/dL   Sedimentation Rate   Result Value Ref Range    Sed Rate 110 (H) 0 - 20 mm/Hr   C-Reactive Protein   Result Value Ref Range    CRP 10.6 (H) 0.0 - 0.4 mg/dL   POCT Glucose   Result Value Ref Range    Meter Glucose 145 (H) 74 - 99 mg/dL   POCT Glucose   Result Value Ref Range    Meter Glucose 245 (H) 74 - 99 mg/dL   POCT Glucose   Result Value Ref Range    Meter Glucose 229 (H) 74 - 99 mg/dL   POCT Glucose   Result Value Ref Range    Meter Glucose 221 (H) 74 - 99 mg/dL   POCT Glucose   Result Value Ref Range    Meter Glucose 281 (H) 74 - 99 mg/dL   POCT Glucose   Result Value Ref Range    Meter Glucose 172 (H) 74 - 99 mg/dL   POCT Glucose   Result Value Ref Range    Meter Glucose 286 (H) 74 - 99 mg/dL       Imaging: All Radiology results interpreted by Radiologist unless otherwise noted. XR TIBIA FIBULA LEFT (2 VIEWS)   Final Result   No fracture or dislocation.          XR FOOT LEFT (MIN 3 VIEWS)   Final Result 1.  Healing, angulated fracture involving distal aspect of 5th metatarsal   bone appears new compared to prior from January 17, 2015. 2.  No findings to suggest acute fracture or dislocation. ED Course / Medical Decision Making     Medications   ceFAZolin (ANCEF) 1,000 mg in sterile water 10 mL IV syringe (1,000 mg Intravenous Given 5/9/21 2011)        Consults:   IP CONSULT TO INTERNAL MEDICINE        Plan of Care/Counseling:  I reviewed today's visit with the patient and son in addition to providing specific details for the plan of care and counseling regarding the diagnosis and prognosis. Questions are answered at this time and are agreeable with the plan. Assessment      1. Cellulitis of left lower extremity      Plan   Inpatient Admission to General Medical Floor. Patient condition is stable    New Medications       Electronically signed by SHASTA Cox   DD: 5/9/21  **This report was transcribed using voice recognition software. Every effort was made to ensure accuracy; however, inadvertent computerized transcription errors may be present.   END OF ED PROVIDER NOTE       Lori Vidal  05/12/21 [id number]

## 2021-05-10 LAB
C-REACTIVE PROTEIN: 10.6 MG/DL (ref 0–0.4)
METER GLUCOSE: 145 MG/DL (ref 74–99)
METER GLUCOSE: 221 MG/DL (ref 74–99)
METER GLUCOSE: 229 MG/DL (ref 74–99)
METER GLUCOSE: 245 MG/DL (ref 74–99)
METER GLUCOSE: 281 MG/DL (ref 74–99)

## 2021-05-10 PROCEDURE — 82962 GLUCOSE BLOOD TEST: CPT

## 2021-05-10 PROCEDURE — 1200000000 HC SEMI PRIVATE

## 2021-05-10 PROCEDURE — 2580000003 HC RX 258: Performed by: INTERNAL MEDICINE

## 2021-05-10 PROCEDURE — 94664 DEMO&/EVAL PT USE INHALER: CPT

## 2021-05-10 PROCEDURE — 6360000002 HC RX W HCPCS: Performed by: INTERNAL MEDICINE

## 2021-05-10 PROCEDURE — 2580000003 HC RX 258: Performed by: PHYSICIAN ASSISTANT

## 2021-05-10 PROCEDURE — 94640 AIRWAY INHALATION TREATMENT: CPT

## 2021-05-10 PROCEDURE — 6370000000 HC RX 637 (ALT 250 FOR IP): Performed by: INTERNAL MEDICINE

## 2021-05-10 RX ORDER — FLUCONAZOLE 100 MG/1
200 TABLET ORAL DAILY
Status: DISCONTINUED | OUTPATIENT
Start: 2021-05-10 | End: 2021-05-11 | Stop reason: HOSPADM

## 2021-05-10 RX ORDER — GABAPENTIN 300 MG/1
300 CAPSULE ORAL DAILY
Status: DISCONTINUED | OUTPATIENT
Start: 2021-05-10 | End: 2021-05-11 | Stop reason: HOSPADM

## 2021-05-10 RX ORDER — PANTOPRAZOLE SODIUM 40 MG/1
40 TABLET, DELAYED RELEASE ORAL DAILY
Status: DISCONTINUED | OUTPATIENT
Start: 2021-05-10 | End: 2021-05-11 | Stop reason: HOSPADM

## 2021-05-10 RX ORDER — ALBUTEROL SULFATE 2.5 MG/3ML
2.5 SOLUTION RESPIRATORY (INHALATION) 4 TIMES DAILY
Status: DISCONTINUED | OUTPATIENT
Start: 2021-05-10 | End: 2021-05-11 | Stop reason: HOSPADM

## 2021-05-10 RX ORDER — AMLODIPINE BESYLATE 5 MG/1
5 TABLET ORAL DAILY
Status: DISCONTINUED | OUTPATIENT
Start: 2021-05-10 | End: 2021-05-11 | Stop reason: HOSPADM

## 2021-05-10 RX ORDER — VIT C/E/ZN/COPPR/LUTEIN/ZEAXAN 60 MG-6 MG
1 CAPSULE ORAL DAILY
Status: DISCONTINUED | OUTPATIENT
Start: 2021-05-10 | End: 2021-05-11 | Stop reason: HOSPADM

## 2021-05-10 RX ORDER — CEFAZOLIN SODIUM 1 G/3ML
1000 INJECTION, POWDER, FOR SOLUTION INTRAMUSCULAR; INTRAVENOUS EVERY 8 HOURS
Status: DISCONTINUED | OUTPATIENT
Start: 2021-05-10 | End: 2021-05-10 | Stop reason: CLARIF

## 2021-05-10 RX ORDER — PRAVASTATIN SODIUM 20 MG
20 TABLET ORAL NIGHTLY
Status: DISCONTINUED | OUTPATIENT
Start: 2021-05-10 | End: 2021-05-11 | Stop reason: HOSPADM

## 2021-05-10 RX ORDER — VITAMIN C
1 TAB ORAL DAILY
Status: DISCONTINUED | OUTPATIENT
Start: 2021-05-10 | End: 2021-05-11 | Stop reason: HOSPADM

## 2021-05-10 RX ORDER — MAGNESIUM GLUCONATE 27 MG(500)
1000 TABLET ORAL 2 TIMES DAILY
Status: DISCONTINUED | OUTPATIENT
Start: 2021-05-10 | End: 2021-05-11 | Stop reason: HOSPADM

## 2021-05-10 RX ORDER — NICOTINE POLACRILEX 4 MG
15 LOZENGE BUCCAL PRN
Status: DISCONTINUED | OUTPATIENT
Start: 2021-05-10 | End: 2021-05-11 | Stop reason: HOSPADM

## 2021-05-10 RX ORDER — ASPIRIN 81 MG/1
81 TABLET ORAL DAILY
Status: DISCONTINUED | OUTPATIENT
Start: 2021-05-10 | End: 2021-05-11 | Stop reason: HOSPADM

## 2021-05-10 RX ORDER — ARFORMOTEROL TARTRATE 15 UG/2ML
15 SOLUTION RESPIRATORY (INHALATION) 2 TIMES DAILY
Status: DISCONTINUED | OUTPATIENT
Start: 2021-05-10 | End: 2021-05-11 | Stop reason: HOSPADM

## 2021-05-10 RX ORDER — BUDESONIDE 0.5 MG/2ML
0.5 INHALANT ORAL 2 TIMES DAILY
Status: DISCONTINUED | OUTPATIENT
Start: 2021-05-10 | End: 2021-05-11 | Stop reason: HOSPADM

## 2021-05-10 RX ORDER — GUAIFENESIN 400 MG/1
400 TABLET ORAL EVERY 8 HOURS
Status: DISCONTINUED | OUTPATIENT
Start: 2021-05-10 | End: 2021-05-11 | Stop reason: HOSPADM

## 2021-05-10 RX ORDER — FUROSEMIDE 20 MG/1
10 TABLET ORAL DAILY
Status: DISCONTINUED | OUTPATIENT
Start: 2021-05-10 | End: 2021-05-11 | Stop reason: HOSPADM

## 2021-05-10 RX ORDER — DEXTROSE MONOHYDRATE 50 MG/ML
100 INJECTION, SOLUTION INTRAVENOUS PRN
Status: DISCONTINUED | OUTPATIENT
Start: 2021-05-10 | End: 2021-05-11 | Stop reason: HOSPADM

## 2021-05-10 RX ORDER — GLIPIZIDE 5 MG/1
5 TABLET ORAL
Status: DISCONTINUED | OUTPATIENT
Start: 2021-05-10 | End: 2021-05-11 | Stop reason: HOSPADM

## 2021-05-10 RX ORDER — BUDESONIDE AND FORMOTEROL FUMARATE DIHYDRATE 160; 4.5 UG/1; UG/1
2 AEROSOL RESPIRATORY (INHALATION) 2 TIMES DAILY
Refills: 5 | Status: DISCONTINUED | OUTPATIENT
Start: 2021-05-10 | End: 2021-05-10 | Stop reason: CLARIF

## 2021-05-10 RX ORDER — ACETAMINOPHEN 500 MG
500 TABLET ORAL 4 TIMES DAILY PRN
Status: DISCONTINUED | OUTPATIENT
Start: 2021-05-10 | End: 2021-05-11 | Stop reason: HOSPADM

## 2021-05-10 RX ORDER — MAGNESIUM HYDROXIDE/ALUMINUM HYDROXICE/SIMETHICONE 120; 1200; 1200 MG/30ML; MG/30ML; MG/30ML
30 SUSPENSION ORAL EVERY 6 HOURS PRN
Status: DISCONTINUED | OUTPATIENT
Start: 2021-05-10 | End: 2021-05-11 | Stop reason: HOSPADM

## 2021-05-10 RX ORDER — DEXTROSE MONOHYDRATE 25 G/50ML
12.5 INJECTION, SOLUTION INTRAVENOUS PRN
Status: DISCONTINUED | OUTPATIENT
Start: 2021-05-10 | End: 2021-05-11 | Stop reason: HOSPADM

## 2021-05-10 RX ORDER — POLYETHYLENE GLYCOL 3350 17 G/17G
17 POWDER, FOR SOLUTION ORAL DAILY
Status: DISCONTINUED | OUTPATIENT
Start: 2021-05-10 | End: 2021-05-11 | Stop reason: HOSPADM

## 2021-05-10 RX ORDER — BISACODYL 10 MG
10 SUPPOSITORY, RECTAL RECTAL DAILY
Status: DISCONTINUED | OUTPATIENT
Start: 2021-05-10 | End: 2021-05-11 | Stop reason: HOSPADM

## 2021-05-10 RX ADMIN — Medication 1000 MG: at 09:25

## 2021-05-10 RX ADMIN — Medication 1000 MG: at 20:03

## 2021-05-10 RX ADMIN — BUDESONIDE 500 MCG: 0.5 SUSPENSION RESPIRATORY (INHALATION) at 08:49

## 2021-05-10 RX ADMIN — SODIUM CHLORIDE, PRESERVATIVE FREE 10 ML: 5 INJECTION INTRAVENOUS at 18:05

## 2021-05-10 RX ADMIN — AMLODIPINE BESYLATE 5 MG: 5 TABLET ORAL at 09:25

## 2021-05-10 RX ADMIN — INSULIN LISPRO 4 UNITS: 100 INJECTION, SOLUTION INTRAVENOUS; SUBCUTANEOUS at 18:00

## 2021-05-10 RX ADMIN — GUAIFENESIN 400 MG: 400 TABLET, FILM COATED ORAL at 09:25

## 2021-05-10 RX ADMIN — GUAIFENESIN 400 MG: 400 TABLET, FILM COATED ORAL at 22:59

## 2021-05-10 RX ADMIN — ASPIRIN 81 MG: 81 TABLET, COATED ORAL at 09:25

## 2021-05-10 RX ADMIN — GUAIFENESIN 400 MG: 400 TABLET, FILM COATED ORAL at 13:20

## 2021-05-10 RX ADMIN — INSULIN LISPRO 3 UNITS: 100 INJECTION, SOLUTION INTRAVENOUS; SUBCUTANEOUS at 20:56

## 2021-05-10 RX ADMIN — CEFAZOLIN 1000 MG: 1 INJECTION, POWDER, FOR SOLUTION INTRAMUSCULAR; INTRAVENOUS at 09:24

## 2021-05-10 RX ADMIN — SODIUM CHLORIDE, PRESERVATIVE FREE 10 ML: 5 INJECTION INTRAVENOUS at 09:24

## 2021-05-10 RX ADMIN — GABAPENTIN 300 MG: 300 CAPSULE ORAL at 09:25

## 2021-05-10 RX ADMIN — CEFAZOLIN 1000 MG: 1 INJECTION, POWDER, FOR SOLUTION INTRAMUSCULAR; INTRAVENOUS at 18:02

## 2021-05-10 RX ADMIN — Medication 1 TABLET: at 09:26

## 2021-05-10 RX ADMIN — ALBUTEROL SULFATE 2.5 MG: 2.5 SOLUTION RESPIRATORY (INHALATION) at 08:49

## 2021-05-10 RX ADMIN — INSULIN LISPRO 4 UNITS: 100 INJECTION, SOLUTION INTRAVENOUS; SUBCUTANEOUS at 10:54

## 2021-05-10 RX ADMIN — INSULIN LISPRO 2 UNITS: 100 INJECTION, SOLUTION INTRAVENOUS; SUBCUTANEOUS at 09:31

## 2021-05-10 RX ADMIN — Medication 1 CAPSULE: at 09:26

## 2021-05-10 RX ADMIN — ARFORMOTEROL TARTRATE 15 MCG: 15 SOLUTION RESPIRATORY (INHALATION) at 08:49

## 2021-05-10 RX ADMIN — ALBUTEROL SULFATE 2.5 MG: 2.5 SOLUTION RESPIRATORY (INHALATION) at 12:30

## 2021-05-10 RX ADMIN — ACETAMINOPHEN 500 MG: 500 TABLET ORAL at 20:04

## 2021-05-10 RX ADMIN — POLYETHYLENE GLYCOL 3350 17 G: 17 POWDER, FOR SOLUTION ORAL at 09:24

## 2021-05-10 RX ADMIN — GLIPIZIDE 5 MG: 5 TABLET ORAL at 09:26

## 2021-05-10 RX ADMIN — PRAVASTATIN SODIUM 20 MG: 20 TABLET ORAL at 20:04

## 2021-05-10 RX ADMIN — FLUCONAZOLE 200 MG: 100 TABLET ORAL at 15:59

## 2021-05-10 RX ADMIN — FUROSEMIDE 10 MG: 20 TABLET ORAL at 09:26

## 2021-05-10 RX ADMIN — ACETAMINOPHEN 500 MG: 500 TABLET ORAL at 13:20

## 2021-05-10 RX ADMIN — PANTOPRAZOLE SODIUM 40 MG: 40 TABLET, DELAYED RELEASE ORAL at 09:27

## 2021-05-10 RX ADMIN — ENOXAPARIN SODIUM 40 MG: 40 INJECTION SUBCUTANEOUS at 09:24

## 2021-05-10 RX ADMIN — SODIUM CHLORIDE, PRESERVATIVE FREE 10 ML: 5 INJECTION INTRAVENOUS at 20:04

## 2021-05-10 ASSESSMENT — PAIN DESCRIPTION - PAIN TYPE
TYPE: ACUTE PAIN

## 2021-05-10 ASSESSMENT — PAIN DESCRIPTION - DESCRIPTORS: DESCRIPTORS: ACHING;DULL

## 2021-05-10 ASSESSMENT — PAIN SCALES - GENERAL
PAINLEVEL_OUTOF10: 5
PAINLEVEL_OUTOF10: 0
PAINLEVEL_OUTOF10: 0

## 2021-05-10 ASSESSMENT — PAIN DESCRIPTION - LOCATION: LOCATION: GENERALIZED

## 2021-05-10 ASSESSMENT — PAIN DESCRIPTION - ORIENTATION
ORIENTATION: LEFT
ORIENTATION: LEFT

## 2021-05-10 ASSESSMENT — PAIN DESCRIPTION - PROGRESSION: CLINICAL_PROGRESSION: GRADUALLY IMPROVING

## 2021-05-10 NOTE — PROGRESS NOTES
Perfect serve message to Dr Ryland Nelson to notify of pt having involuntary spasms of both legs, pt states this does happen, called nurse at [place] [id number] who said same was happening when pt first arrived there. Spasms lasted about 15 minutes with no pain noted. Son Cece Abdi here states he'd not seen this before.

## 2021-05-10 NOTE — ED NOTES
Left leg reddened and edematous from thigh down, small scab noted on underside of great toe. Patient unaware of wound. No drainage noted.  Pulses palp      Song Barker RN  05/09/21 3916

## 2021-05-10 NOTE — H&P
Joe Martinez MD FACP   History and Physical      CHIEF COMPLAINT: Left leg swelling pain and redness      HISTORY OF PRESENT ILLNESS:    80-year-old  woman seen on the floor earlier this morning  Spoke with the ER physician overnight  She is currently resident at a local living facility  And nurse at the facility noticed left leg being swollen and red  Patient reported pain as well over repeated of 3 days  Sent to emergency room  Found to have cellulitis  Admitted for further management  She feels somewhat better this morning  Data reviewed in detail    Past Medical History:    Past Medical History:   Diagnosis Date    Arthritis     Asthma     Bronchitis 5/23/2017    CAD (coronary artery disease)     Cough 5/23/2017    [facility] mellitus ([facility]     GERD (gastroesophageal reflux disease) 5/23/2017    Hyperlipidemia     Hypertension     Lung disease     [facility]    Restless legs syndrome     Rhinitis, allergic 5/23/2017    Sinusitis 5/23/2017    SOB (shortness of breath) 5/23/2017    Urinary incontinence        Past Surgical History:    Past Surgical History:   Procedure Laterality Date    ABDOMEN SURGERY      APPENDECTOMY      CARDIAC SURGERY      CHOLECYSTECTOMY      COLONOSCOPY      CORONARY ARTERY BYPASS GRAFT      8/28/10    ENDOSCOPY, COLON, DIAGNOSTIC      HYSTERECTOMY      frankie and bso 1997    TONSILLECTOMY AND ADENOIDECTOMY         Medications Prior to Admission:    Medications Prior to Admission: aluminum & magnesium hydroxide-simethicone (MYLANTA) 400-400-40 MG/5ML SUSP, Take 30 mLs by mouth every 6 hours as needed  albuterol (PROVENTIL) 90 MCG/ACT inhaler, Inhale 2 puffs into the lungs 4 times daily  fluticasone-vilanterol (BREO ELLIPTA) 100-25 MCG/INH AEPB inhaler, Inhale 1 puff into the lungs daily  aspirin 81 MG EC tablet, Take 81 mg by mouth daily.   lidocaine (LMX) 4 % cream, Apply topically as needed for Pain Apply topically as needed. sucralfate (CARAFATE) 1 GM tablet, Take 1 g by mouth 3 times daily  bisacodyl (DULCOLAX) 10 MG suppository, Place 10 mg rectally daily  gabapentin (NEURONTIN) 300 MG capsule, Take 300 mg by mouth daily. guaiFENesin (MUCINEX) 600 MG extended release tablet, Take 600 mg by mouth 2 times daily  pantoprazole (PROTONIX) 40 MG tablet, Take 40 mg by mouth daily  promethazine (PHENERGAN) 12.5 MG suppository, Place 12.5 mg rectally every 6 hours as needed for Nausea  acetaminophen (TYLENOL) 500 MG tablet, Take 1 tablet by mouth 4 times daily as needed for Pain  furosemide (LASIX) 20 MG tablet, Take 0.5 tablets by mouth daily (Patient taking differently: Take 20 mg by mouth MWF)  pravastatin (PRAVACHOL) 20 MG tablet, TAKE 1 TABLET BY MOUTH  NIGHTLY  amLODIPine (NORVASC) 5 MG tablet, TAKE 1 TABLET BY MOUTH  DAILY  glimepiride (AMARYL) 2 MG tablet, TAKE 1 TABLET BY MOUTH  EVERY MORNING BEFORE  BREAKFAST  Polyethylene Glycol 3350 (MIRALAX PO), Take by mouth  hydrocortisone 2.5 % cream, Apply topically 2 times daily Apply topically 2 times daily. clotrimazole-betamethasone (LOTRISONE) 1-0.05 % cream, Apply topically 2 times daily. Multiple Vitamins-Minerals (OCUVITE ADULT FORMULA PO), Take 1 capsule by mouth daily  Probiotic Product (PRO-BIOTIC BLEND PO), Take by mouth  magnesium gluconate (MAGONATE) 500 MG tablet, Take 1,000 mg by mouth 2 times daily  Lancets (BD LANCET ULTRAFINE 74M) MISC, 1 applicator by Does not apply route 2 times daily Indications: DX:E11.9  glucose blood VI test strips (ONE TOUCH TEST STRIPS) strip, 1 each by In Vitro route 2 times daily Indications: DX:E11.9  Blood Glucose Monitoring Suppl (ONE TOUCH ULTRA SYSTEM KIT) W/DEVICE KIT, 1 kit by Does not apply route once for 1 dose Indications: DX: E11.9  ALPHA LIPOIC ACID PO, Take 1 capsule by mouth daily. B Complex-C-Folic Acid (HM VITAMIN B COMPLEX/VITAMIN C) TABS, Take 1 tablet by mouth daily.   Coenzyme Q10 (COQ10) 100 MG CAPS, Take 200 mg by mouth daily. Allergies:    Lisinopril    Social History:    reports that she has never smoked. She has never used smokeless tobacco. She reports current alcohol use. She reports that she does not use drugs. Family History:   family history includes Heart Disease in her brother; Hypertension in her father. REVIEW OF SYSTEMS:  As above in the HPI, otherwise negative    PHYSICAL EXAM:    Vitals:  BP (!) 161/68   Pulse 72   Temp 99.2 °F (37.3 °C)   Resp 16   Ht 5' 3\" (1.6 m)   Wt 145 lb (65.8 kg)   LMP 12/03/1997   SpO2 93%   BMI 25.69 kg/m²     General:  Awake, alert, oriented X 3. Well developed, well nourished, well groomed. No apparent distress. HEENT:  Normocephalic, atraumatic. Pupils equal, round, reactive to light. No scleral icterus. No conjunctival injection. .  No nasal discharge. Neck:  Supple  Heart:  RRR, no murmurs, gallops, rubs  Lungs:  CTA bilaterally, bilat symmetrical expansion, no wheeze, rales, or rhonchi  Abdomen:   Bowel sounds present, soft, nontender, no masses, no organomegaly, no peritoneal signs  Extremities:  No clubbing, cyanosis, or edema  Left lower extremity below the knee has erythema swelling and tenderness  Bilateral tinea pedis noted  Skin:  Warm and dry, no open lesions   Neuro:  Cranial nerves 2-12 intact, no focal deficits  Breast: deferred  Rectal: deferred  Genitalia:  deferred    LABS:  Data reviewed in detail      ASSESSMENT:      Active Problems:    Cellulitis left lower extremity  Tinea pedis bilateral feet  Comorbidities present and past  Patient Active Problem List   Diagnosis    Asthma    CAD (coronary artery disease)    Vitamin D deficiency    DM [facility] mellitus) ([facility]    HTN (hypertension)    Idiopathic peripheral neuropathy    Rhinitis, allergic    SOB (shortness of breath)    Bronchitis    GERD (gastroesophageal reflux disease)    PVD (peripheral vascular disease) with claudication (HCC)    Chest pain    Gait instability    Pulmonary embolism on right (HCC)    Cellulitis           PLAN:  IV Ancef  Resume home medications  Oral antifungals  DVT prophylaxis with Lovenox  Reviewed in detail with the patient    Nasim Valentine  2:07 PM  5/10/2021

## 2021-05-11 VITALS
RESPIRATION RATE: 18 BRPM | HEIGHT: 63 IN | SYSTOLIC BLOOD PRESSURE: 160 MMHG | DIASTOLIC BLOOD PRESSURE: 96 MMHG | BODY MASS INDEX: 25.69 KG/M2 | OXYGEN SATURATION: 96 % | TEMPERATURE: 98.5 F | WEIGHT: 145 LBS | HEART RATE: 63 BPM

## 2021-05-11 LAB
METER GLUCOSE: 172 MG/DL (ref 74–99)
METER GLUCOSE: 286 MG/DL (ref 74–99)

## 2021-05-11 PROCEDURE — 6360000002 HC RX W HCPCS: Performed by: INTERNAL MEDICINE

## 2021-05-11 PROCEDURE — 6370000000 HC RX 637 (ALT 250 FOR IP): Performed by: INTERNAL MEDICINE

## 2021-05-11 PROCEDURE — 2580000003 HC RX 258: Performed by: PHYSICIAN ASSISTANT

## 2021-05-11 PROCEDURE — 2580000003 HC RX 258: Performed by: INTERNAL MEDICINE

## 2021-05-11 PROCEDURE — 82962 GLUCOSE BLOOD TEST: CPT

## 2021-05-11 PROCEDURE — 94640 AIRWAY INHALATION TREATMENT: CPT

## 2021-05-11 RX ORDER — FLUCONAZOLE 200 MG/1
200 TABLET ORAL DAILY
Qty: 7 TABLET | Refills: 0 | Status: ON HOLD | OUTPATIENT
Start: 2021-05-11 | End: 2021-05-25 | Stop reason: HOSPADM

## 2021-05-11 RX ORDER — CEPHALEXIN 500 MG/1
500 CAPSULE ORAL 2 TIMES DAILY
Qty: 20 CAPSULE | Refills: 0 | Status: ON HOLD | OUTPATIENT
Start: 2021-05-11 | End: 2021-05-25 | Stop reason: HOSPADM

## 2021-05-11 RX ORDER — ROPINIROLE 0.5 MG/1
0.5 TABLET, FILM COATED ORAL NIGHTLY
Qty: 90 TABLET | Refills: 3 | Status: ON HOLD | OUTPATIENT
Start: 2021-05-11 | End: 2021-05-28

## 2021-05-11 RX ADMIN — FLUCONAZOLE 200 MG: 100 TABLET ORAL at 09:12

## 2021-05-11 RX ADMIN — PANTOPRAZOLE SODIUM 40 MG: 40 TABLET, DELAYED RELEASE ORAL at 09:12

## 2021-05-11 RX ADMIN — AMLODIPINE BESYLATE 5 MG: 5 TABLET ORAL at 09:12

## 2021-05-11 RX ADMIN — ALBUTEROL SULFATE 2.5 MG: 2.5 SOLUTION RESPIRATORY (INHALATION) at 09:16

## 2021-05-11 RX ADMIN — Medication 1000 MG: at 09:13

## 2021-05-11 RX ADMIN — GABAPENTIN 300 MG: 300 CAPSULE ORAL at 09:12

## 2021-05-11 RX ADMIN — GUAIFENESIN 400 MG: 400 TABLET, FILM COATED ORAL at 06:45

## 2021-05-11 RX ADMIN — Medication 1 TABLET: at 09:12

## 2021-05-11 RX ADMIN — INSULIN LISPRO 6 UNITS: 100 INJECTION, SOLUTION INTRAVENOUS; SUBCUTANEOUS at 11:36

## 2021-05-11 RX ADMIN — CEFAZOLIN 1000 MG: 1 INJECTION, POWDER, FOR SOLUTION INTRAMUSCULAR; INTRAVENOUS at 01:30

## 2021-05-11 RX ADMIN — ENOXAPARIN SODIUM 40 MG: 40 INJECTION SUBCUTANEOUS at 09:12

## 2021-05-11 RX ADMIN — CEFAZOLIN 1000 MG: 1 INJECTION, POWDER, FOR SOLUTION INTRAMUSCULAR; INTRAVENOUS at 09:13

## 2021-05-11 RX ADMIN — GLIPIZIDE 5 MG: 5 TABLET ORAL at 06:45

## 2021-05-11 RX ADMIN — FUROSEMIDE 10 MG: 20 TABLET ORAL at 09:12

## 2021-05-11 RX ADMIN — Medication 1 CAPSULE: at 09:12

## 2021-05-11 RX ADMIN — BUDESONIDE 500 MCG: 0.5 SUSPENSION RESPIRATORY (INHALATION) at 09:18

## 2021-05-11 RX ADMIN — INSULIN LISPRO 2 UNITS: 100 INJECTION, SOLUTION INTRAVENOUS; SUBCUTANEOUS at 06:39

## 2021-05-11 RX ADMIN — ARFORMOTEROL TARTRATE 15 MCG: 15 SOLUTION RESPIRATORY (INHALATION) at 09:17

## 2021-05-11 RX ADMIN — ASPIRIN 81 MG: 81 TABLET, COATED ORAL at 09:12

## 2021-05-11 RX ADMIN — SODIUM CHLORIDE, PRESERVATIVE FREE 10 ML: 5 INJECTION INTRAVENOUS at 09:13

## 2021-05-11 NOTE — CARE COORDINATION
For medication: hydroxychloroquine (PLAQUENIL) 200 MG tablet    90 Day supply requested? Yes    Comments: MAIL BIN    Pharmacy loaded below: Yes   Pt discharging back to [address], no IV antibiotics needed. Updated Esmer ([facility]) of discharge, [facility] requesting pt to be up making sure she is safe. Spoke with RN, who states that pt was up going to bathroom independently with use of walker, updated Es[facility]. Son to transport back to [place]. [facility] to arrange any hhc if they feel it is needed. Notified Chuy Hernadezs- bioscripts of not IV antibiotics. Rosie CASTRO

## 2021-05-11 NOTE — PROGRESS NOTES
Sonya Ramirez MD FACP   progress notes      CHIEF COMPLAINT: Left leg swelling pain and redness      HISTORY OF PRESENT ILLNESS:    1110/21  80year-old  woman seen on the floor earlier this morning  Spoke with the ER physician overnight  She is currently resident at a local living facility  And nurse at the facility noticed left leg being swollen and red  Patient reported pain as well over repeated of 3 days  Sent to emergency room  Found to have cellulitis  Admitted for further management  She feels somewhat better this morning  Data reviewed in detail  5/11/21  Pt was seen on the floor this am  Son at bed side  Nursing staff at bedside  She is feeling better  Past Medical History:    Past Medical History:   Diagnosis Date    Arthritis     Asthma     Bronchitis 5/23/2017    CAD (coronary artery disease)     Cough 5/23/2017    Diabetes mellitus ([facility]     GERD (gastroesophageal reflux disease) 5/23/2017    Hyperlipidemia     Hypertension     Lung disease     PVD (peripheral vascular disease) with claudication ([facility] 4/10/2019    Restless legs syndrome     Rhinitis, allergic 5/23/2017    Sinusitis 5/23/2017    SOB (shortness of breath) 5/23/2017    Urinary incontinence        Past Surgical History:    Past Surgical History:   Procedure Laterality Date    ABDOMEN SURGERY      APPENDECTOMY      CARDIAC SURGERY      CHOLECYSTECTOMY      COLONOSCOPY      CORONARY ARTERY BYPASS GRAFT      8/28/10    ENDOSCOPY, COLON, DIAGNOSTIC      HYSTERECTOMY      frankie and bso 1997    TONSILLECTOMY AND ADENOIDECTOMY         Medications Prior to Admission:    Medications Prior to Admission: aluminum & magnesium hydroxide-simethicone (MYLANTA) 400-400-40 MG/5ML SUSP, Take 30 mLs by mouth every 6 hours as needed  albuterol (PROVENTIL) 90 MCG/ACT inhaler, Inhale 2 puffs into the lungs 4 times daily  fluticasone-vilanterol (BREO ELLIPTA) 100-25 MCG/INH AEPB inhaler, Inhale 1 puff into the lungs daily  aspirin 81 MG EC tablet, Take 81 mg by mouth daily. lidocaine (LMX) 4 % cream, Apply topically as needed for Pain Apply topically as needed. sucralfate (CARAFATE) 1 GM tablet, Take 1 g by mouth 3 times daily  bisacodyl (DULCOLAX) 10 MG suppository, Place 10 mg rectally daily  gabapentin (NEURONTIN) 300 MG capsule, Take 300 mg by mouth daily. guaiFENesin (MUCINEX) 600 MG extended release tablet, Take 600 mg by mouth 2 times daily  pantoprazole (PROTONIX) 40 MG tablet, Take 40 mg by mouth daily  promethazine (PHENERGAN) 12.5 MG suppository, Place 12.5 mg rectally every 6 hours as needed for Nausea  acetaminophen (TYLENOL) 500 MG tablet, Take 1 tablet by mouth 4 times daily as needed for Pain  furosemide (LASIX) 20 MG tablet, Take 0.5 tablets by mouth daily (Patient taking differently: Take 20 mg by mouth MWF)  pravastatin (PRAVACHOL) 20 MG tablet, TAKE 1 TABLET BY MOUTH  NIGHTLY  amLODIPine (NORVASC) 5 MG tablet, TAKE 1 TABLET BY MOUTH  DAILY  glimepiride (AMARYL) 2 MG tablet, TAKE 1 TABLET BY MOUTH  EVERY MORNING BEFORE  BREAKFAST  Polyethylene Glycol 3350 (MIRALAX PO), Take by mouth  hydrocortisone 2.5 % cream, Apply topically 2 times daily Apply topically 2 times daily. clotrimazole-betamethasone (LOTRISONE) 1-0.05 % cream, Apply topically 2 times daily. Multiple Vitamins-Minerals (OCUVITE ADULT FORMULA PO), Take 1 capsule by mouth daily  Probiotic Product (PRO-BIOTIC BLEND PO), Take by mouth  magnesium gluconate (MAGONATE) 500 MG tablet, Take 1,000 mg by mouth 2 times daily  Lancets (BD LANCET ULTRAFINE 88B) MISC, 1 applicator by Does not apply route 2 times daily Indications: DX:E11.9  glucose blood VI test strips (ONE TOUCH TEST STRIPS) strip, 1 each by In Vitro route 2 times daily Indications: DX:E11.9  Blood Glucose Monitoring Suppl (ONE TOUCH ULTRA SYSTEM KIT) W/DEVICE KIT, 1 kit by Does not apply route once for 1 dose Indications: DX: E11.9  ALPHA LIPOIC ACID PO, Take 1 capsule by mouth daily.   B Complex-C-Folic Acid (HM VITAMIN B COMPLEX/VITAMIN C) TABS, Take 1 tablet by mouth daily. Coenzyme Q10 (COQ10) 100 MG CAPS, Take 200 mg by mouth daily. Allergies:    Lisinopril    Social History:    reports that she has never smoked. She has never used smokeless tobacco. She reports current alcohol use. She reports that she does not use drugs. Family History:   family history includes Heart Disease in her brother; Hypertension in her father. REVIEW OF SYSTEMS:  As above in the HPI, otherwise negative    PHYSICAL EXAM:    Vitals:  BP (!) 160/66   Pulse 58   Temp 98.1 °F (36.7 °C) (Temporal)   Resp 18   Ht 5' 3\" (1.6 m)   Wt 145 lb (65.8 kg)   LMP 12/03/1997   SpO2 95%   BMI 25.69 kg/m²     General:  Awake, alert, oriented X 3. Well developed, well nourished, well groomed. No apparent distress. HEENT:  Normocephalic, atraumatic. Pupils equal, round, reactive to light. No scleral icterus. No conjunctival injection. .  No nasal discharge. Neck:  Supple  Heart:  RRR, no murmurs, gallops, rubs  Lungs:  CTA bilaterally, bilat symmetrical expansion, no wheeze, rales, or rhonchi  Abdomen:   Bowel sounds present, soft, nontender, no masses, no organomegaly, no peritoneal signs  Extremities:  No clubbing, cyanosis, or edema  Left lower extremity below the knee has erythema swelling and tenderness/much improved  Bilateral tinea pedis noted  Skin:  Warm and dry, no open lesions   Neuro:  Cranial nerves 2-12 intact, no focal deficits  Breast: deferred  Rectal: deferred  Genitalia:  deferred    LABS:  Data reviewed in detail      ASSESSMENT:      Active Problems:    Cellulitis left lower extremity  Tinea pedis bilateral feet  RLS  Comorbidities present and past  Patient Active Problem List   Diagnosis    Asthma    CAD (coronary artery disease)    Vitamin D deficiency    DM (di[facility] mellitus) ([facility]    HTN (hypertension)    Idiopathic peripheral neuropathy    Rhinitis, allergic    SOB (shortness of

## 2021-05-11 NOTE — PROGRESS NOTES
Discharge information reviewed with patient at this time, no further questions. All belongings packed up including hearing aides, glasses, dentures and watch.

## 2021-05-11 NOTE — DISCHARGE INSTR - COC
Continuity of Care Form    Patient Name: Olivia Mosqueda   :  1927  MRN:  [id number]    Admit date:  2021  Discharge date:  21    Code Status Order: Prior   Advance Directives:   [facility] Documentation       Date/Time Healthcare Directive Type of Healthcare Directive Copy in [facility] [address] Agent's Name Healthcare Agent's Phone Number    05/10/21 0158  No, patient does not have an advance directive for healthcare treatment -- -- -- -- --            Admitting Physician:  Bo Gallegos MD  PCP: Bo Gallegos MD    Discharging Nurse: Danay LOPEZ Unit/Room#: 8094/9804-K  Discharging Unit Phone Number: [phone]    Emergency Contact:   Extended Emergency Contact Information  Primary Emergency Contact: [facility]  Address: [address] Phone: [phone]  Relation: Child  Secondary Emergency Contact: Arian Melvin  Address: [address] Phone: [phone]  Relation: Child    Past Surgical History:  Past Surgical History:   Procedure Laterality Date    ABDOMEN SURGERY      APPENDECTOMY      CARDIAC SURGERY      CHOLECYSTECTOMY      COLONOSCOPY      CORONARY ARTERY BYPASS GRAFT      8/28/10    ENDOSCOPY, COLON, DIAGNOSTIC      HYSTERECTOMY      frankie and bso 1997    TONSILLECTOMY AND ADENOIDECTOMY         Immunization History:   Immunization History   Administered Date(s) Administered    Influenza, High Dose (Fluzone 65 yrs and older) 2015, 2016, 10/24/2017, 10/03/2018    Pneumococcal Conjugate 13-valent (Hardik Gathers) 2015    Pneumococcal Polysaccharide ([id number]) 2014    Tdap (Boostrix, Adacel) 2019       Active Problems:  Patient Active Problem List   Diagnosis Code    Asthma J45.909    CAD (coronary artery disease) I25.10    Vitamin D deficiency E55.9    [facility] E11.9    HTN (hypertension) I10    Idiopathic peripheral neuropathy G60.9    Rhinitis, allergic J30.9    SOB (shortness of breath) R06.02    Bronchitis J40    GERD (gastroesophageal reflux disease) K21.9    PVD (peripheral vascular disease) with claudication (HCC) I73.9    Chest pain R07.9    Gait instability R26.81    Pulmonary embolism on right (HCC) I26.99    Cellulitis L03.90       Isolation/Infection:   Isolation            No Isolation          Patient Infection Status       None to display            Nurse Assessment:  Last Vital Signs: BP (!) 160/66   Pulse 58   Temp 98.1 °F (36.7 °C) (Temporal)   Resp 18   Ht 5' 3\" (1.6 m)   Wt 145 lb (65.8 kg)   LMP 12/03/1997   SpO2 95%   BMI 25.69 kg/m²     Last documented pain score (0-10 scale): Pain Level: 9  Last Weight:   Wt Readings from Last 1 Encounters:   05/10/21 145 lb (65.8 kg)     Mental Status:  oriented, alert, coherent, logical, thought processes intact and able to concentrate and follow conversation    IV Access:  - None    Nursing Mobility/ADLs:  Walking   Independent  Transfer  Independent  Bathing  Independent  Dressing  Assisted  Toileting  Independent  Feeding  Independent  Med Admin  Assisted  Med Delivery   whole    Wound Care Documentation and Therapy:        Elimination:  Continence:   · Bowel: Yes  · Bladder: Yes and incontinent at times  Urinary Catheter: None   Colostomy/Ileostomy/Ileal Conduit: No       Date of Last BM: 5/11/21    Intake/Output Summary (Last 24 hours) at 5/11/2021 0746  Last data filed at 5/10/2021 1341  Gross per 24 hour   Intake 360 ml   Output --   Net 360 ml     I/O last 3 completed shifts: In: 360 [P.O.:360]  Out: -     Safety Concerns: At Risk for Falls    Impairments/Disabilities:      None    Nutrition Therapy:  Current Nutrition Therapy:   - Oral Diet:  Carb Control 4 carbs/meal (1800kcals/day)    Routes of Feeding: Oral  Liquids:  Thin Liquids  Daily Fluid Restriction: no  Last Modified

## 2021-05-12 NOTE — DISCHARGE SUMMARY
Physician Discharge Summary     Patient ID:  Sabra Lozoya  [id number]  54 y.o.  9/20/1927    Admit date: 5/9/2021    Discharge date and time: 5/11/2021 12:54 PM     Admission Diagnoses: Cellulitis [L03.90]    Discharge Diagnoses:  Cellulitis left lower extremity  Tinea pedis  Patient Active Problem List   Diagnosis    Asthma    CAD (coronary artery disease)    Vitamin D deficiency    [facility]    HTN (hypertension)    Idiopathic peripheral neuropathy    Rhinitis, allergic    SOB (shortness of breath)    Bronchitis    GERD (gastroesophageal reflux disease)    PVD (peripheral vascular disease) with claudication (HCC)    Chest pain    Gait instability    Pulmonary embolism on right (HCC)    Cellulitis       Consults:   Procedures:     Hospital Course:   80-year-old woman currently a resident at a local assisted living facility  Patient with complicated medical history  Admitted through emergency room due to progressive redness and pain of left lower extremity below the knee  Cellulitis was the working diagnosis  Admitted and was given intravenous Ancef and oral Diflucan  Redness and swelling and pain all improved tremendously  Patient is discharged back to assisted living on oral medications    Discharge Exam:  See progress note from today    Disposition: Back to assisted living  Stable at the time of discharge   patient Instructions:   Discharge Medication List as of 5/11/2021 10:43 AM      START taking these medications    Details   fluconazole (DIFLUCAN) 200 MG tablet Take 1 tablet by mouth daily for 7 days, Disp-7 tablet, R-0Normal      cephALEXin (KEFLEX) 500 MG capsule Take 1 capsule by mouth 2 times daily for 10 days, Disp-20 capsule, R-0Normal      rOPINIRole (REQUIP) 0.5 MG tablet Take 1 tablet by mouth nightly, Disp-90 tablet, R-3Normal         CONTINUE these medications which have NOT CHANGED    Details   aluminum & magnesium hydroxide-simethicone ([address]) [phone] MG/5ML SUSP Take 30 mLs by mouth every 6 hours as neededHistorical Med      albuterol (PROVENTIL) 90 MCG/ACT inhaler Inhale 2 puffs into the lungs 4 times daily, R-0DC to SNF      fluticasone-vilanterol (BREO ELLIPTA) 100-25 MCG/INH AEPB inhaler Inhale 1 puff into the lungs daily, Disp-3 each, R-5Please dispense 90 day supply for patientNormal      aspirin 81 MG EC tablet Take 81 mg by mouth daily. Historical Med      lidocaine (LMX) 4 % cream Apply topically as needed for Pain Apply topically as needed., Topical, PRN, Historical Med      sucralfate (CARAFATE) 1 GM tablet Take 1 g by mouth 3 times dailyHistorical Med      bisacodyl (DULCOLAX) 10 MG suppository Place 10 mg rectally dailyHistorical Med      gabapentin (NEURONTIN) 300 MG capsule Take 300 mg by mouth daily. Historical Med      guaiFENesin (MUCINEX) 600 MG extended release tablet Take 600 mg by mouth 2 times dailyHistorical Med      pantoprazole (PROTONIX) 40 MG tablet Take 40 mg by mouth dailyHistorical Med      promethazine (PHENERGAN) 12.5 MG suppository Place 12.5 mg rectally every 6 hours as needed for NauseaHistorical Med      acetaminophen (TYLENOL) 500 MG tablet Take 1 tablet by mouth 4 times daily as needed for Pain, Disp-40 tablet, R-1Print      furosemide (LASIX) 20 MG tablet Take 0.5 tablets by mouth daily, Disp-45 tablet, R-3Normal      pravastatin (PRAVACHOL) 20 MG tablet TAKE 1 TABLET BY MOUTH  NIGHTLY, Disp-90 tablet, R-1Normal      amLODIPine (NORVASC) 5 MG tablet TAKE 1 TABLET BY MOUTH  DAILY, Disp-90 tablet, R-1Normal      glimepiride (AMARYL) 2 MG tablet TAKE 1 TABLET BY MOUTH  EVERY MORNING BEFORE  BREAKFAST, Disp-90 tablet, R-0Normal      Polyethylene Glycol 3350 (MIRALAX PO) Take by mouthHistorical Med      hydrocortisone 2.5 % cream Apply topically 2 times daily Apply topically 2 times daily. , Topical, 2 TIMES DAILY, Historical Med      clotrimazole-betamethasone (LOTRISONE) 1-0.05 % cream Apply topically 2 times daily. , Disp-45 g, R-0, Normal      Multiple Vitamins-Minerals (OCUVITE ADULT FORMULA PO) Take 1 capsule by mouth dailyHistorical Med      Probiotic Product (PRO-BIOTIC BLEND PO) Take by mouthHistorical Med      magnesium gluconate (MAGONATE) 500 MG tablet Take 1,000 mg by mouth 2 times dailyHistorical Med      Lancets (BD LANCET ULTRAFINE 30G) MISC 2 TIMES DAILY Starting Tue 5/23/2017, Disp-200 each, R-3, Normal      glucose blood VI test strips (ONE TOUCH TEST STRIPS) strip 2 TIMES DAILY Starting Tue 5/23/2017, Disp-200 each, R-3, Normal      Blood Glucose Monitoring Suppl (ONE TOUCH ULTRA SYSTEM KIT) W/DEVICE KIT ONCE Starting Tue 5/23/2017, Disp-1 kit, R-0, Normal      ALPHA LIPOIC ACID PO Take 1 capsule by mouth daily. Historical Med      B Complex-C-Folic Acid ( VITAMIN B COMPLEX/VITAMIN C) TABS Take 1 tablet by mouth daily. Historical Med      Coenzyme Q10 (COQ10) 100 MG CAPS Take 200 mg by mouth daily. Historical Med           Activity: As tolerated  Diet: Diabetic  Follow-up with PCP    Note that over 40 minutes was spent in preparing discharge papers, discussing discharge with patient, medication review, etc.    Signed:  Mattie Woodson MD  5/12/2021  11:14 AM

## 2021-05-14 ENCOUNTER — HOSPITAL ENCOUNTER (INPATIENT)
Age: 86
LOS: 11 days | Discharge: NURSING, REHAB OR LONG-TERM CARE | DRG: 579 | End: 2021-05-25
Attending: EMERGENCY MEDICINE | Admitting: INTERNAL MEDICINE
Payer: MEDICARE

## 2021-05-14 ENCOUNTER — APPOINTMENT (OUTPATIENT)
Dept: GENERAL RADIOLOGY | Age: 86
DRG: 579 | End: 2021-05-14
Payer: MEDICARE

## 2021-05-14 DIAGNOSIS — Z16.20 THERAPY FAILURE DUE TO ANTIBIOTIC RESISTANCE: ICD-10-CM

## 2021-05-14 DIAGNOSIS — L02.419 CELLULITIS AND ABSCESS OF LEG: Primary | ICD-10-CM

## 2021-05-14 DIAGNOSIS — L03.119 CELLULITIS AND ABSCESS OF LEG: Primary | ICD-10-CM

## 2021-05-14 PROBLEM — L03.116 CELLULITIS OF LEFT FOOT: Status: ACTIVE | Noted: 2021-05-14

## 2021-05-14 LAB
ALBUMIN SERPL-MCNC: 3.6 G/DL (ref 3.5–5.2)
ALP BLD-CCNC: 101 U/L (ref 35–104)
ALT SERPL-CCNC: 23 U/L (ref 0–32)
ANION GAP SERPL CALCULATED.3IONS-SCNC: 11 MMOL/L (ref 7–16)
AST SERPL-CCNC: 28 U/L (ref 0–31)
BASOPHILS ABSOLUTE: 0.02 E9/L (ref 0–0.2)
BASOPHILS RELATIVE PERCENT: 0.1 % (ref 0–2)
BILIRUB SERPL-MCNC: 0.5 MG/DL (ref 0–1.2)
BUN BLDV-MCNC: 17 MG/DL (ref 6–23)
CALCIUM SERPL-MCNC: 9.2 MG/DL (ref 8.6–10.2)
CHLORIDE BLD-SCNC: 88 MMOL/L (ref 98–107)
CO2: 24 MMOL/L (ref 22–29)
CREAT SERPL-MCNC: 0.8 MG/DL (ref 0.5–1)
EOSINOPHILS ABSOLUTE: 0.03 E9/L (ref 0.05–0.5)
EOSINOPHILS RELATIVE PERCENT: 0.2 % (ref 0–6)
GFR AFRICAN AMERICAN: >60
GFR NON-AFRICAN AMERICAN: >60 ML/MIN/1.73
GLUCOSE BLD-MCNC: 247 MG/DL (ref 74–99)
HCT VFR BLD CALC: 27.7 % (ref 34–48)
HEMOGLOBIN: 9.4 G/DL (ref 11.5–15.5)
IMMATURE GRANULOCYTES #: 0.19 E9/L
IMMATURE GRANULOCYTES %: 1.1 % (ref 0–5)
LACTIC ACID, SEPSIS: 0.9 MMOL/L (ref 0.5–1.9)
LACTIC ACID, SEPSIS: 1.3 MMOL/L (ref 0.5–1.9)
LYMPHOCYTES ABSOLUTE: 0.64 E9/L (ref 1.5–4)
LYMPHOCYTES RELATIVE PERCENT: 3.6 % (ref 20–42)
MCH RBC QN AUTO: 33.2 PG (ref 26–35)
MCHC RBC AUTO-ENTMCNC: 33.9 % (ref 32–34.5)
MCV RBC AUTO: 97.9 FL (ref 80–99.9)
METER GLUCOSE: 242 MG/DL (ref 74–99)
MONOCYTES ABSOLUTE: 1.02 E9/L (ref 0.1–0.95)
MONOCYTES RELATIVE PERCENT: 5.8 % (ref 2–12)
NEUTROPHILS ABSOLUTE: 15.64 E9/L (ref 1.8–7.3)
NEUTROPHILS RELATIVE PERCENT: 89.2 % (ref 43–80)
PDW BLD-RTO: 13 FL (ref 11.5–15)
PLATELET # BLD: 301 E9/L (ref 130–450)
PMV BLD AUTO: 10.3 FL (ref 7–12)
POTASSIUM REFLEX MAGNESIUM: 4.9 MMOL/L (ref 3.5–5)
RBC # BLD: 2.83 E12/L (ref 3.5–5.5)
SODIUM BLD-SCNC: 123 MMOL/L (ref 132–146)
TOTAL PROTEIN: 7 G/DL (ref 6.4–8.3)
WBC # BLD: 17.5 E9/L (ref 4.5–11.5)

## 2021-05-14 PROCEDURE — 85025 COMPLETE CBC W/AUTO DIFF WBC: CPT

## 2021-05-14 PROCEDURE — 83605 ASSAY OF LACTIC ACID: CPT

## 2021-05-14 PROCEDURE — 2580000003 HC RX 258: Performed by: STUDENT IN AN ORGANIZED HEALTH CARE EDUCATION/TRAINING PROGRAM

## 2021-05-14 PROCEDURE — 96365 THER/PROPH/DIAG IV INF INIT: CPT

## 2021-05-14 PROCEDURE — 6370000000 HC RX 637 (ALT 250 FOR IP): Performed by: PHYSICIAN ASSISTANT

## 2021-05-14 PROCEDURE — 82962 GLUCOSE BLOOD TEST: CPT

## 2021-05-14 PROCEDURE — 94640 AIRWAY INHALATION TREATMENT: CPT

## 2021-05-14 PROCEDURE — 6360000002 HC RX W HCPCS: Performed by: PHYSICIAN ASSISTANT

## 2021-05-14 PROCEDURE — 1200000000 HC SEMI PRIVATE

## 2021-05-14 PROCEDURE — 6360000002 HC RX W HCPCS: Performed by: STUDENT IN AN ORGANIZED HEALTH CARE EDUCATION/TRAINING PROGRAM

## 2021-05-14 PROCEDURE — 36415 COLL VENOUS BLD VENIPUNCTURE: CPT

## 2021-05-14 PROCEDURE — 73630 X-RAY EXAM OF FOOT: CPT

## 2021-05-14 PROCEDURE — 99284 EMERGENCY DEPT VISIT MOD MDM: CPT

## 2021-05-14 PROCEDURE — 2580000003 HC RX 258: Performed by: PHYSICIAN ASSISTANT

## 2021-05-14 PROCEDURE — 80053 COMPREHEN METABOLIC PANEL: CPT

## 2021-05-14 PROCEDURE — 87040 BLOOD CULTURE FOR BACTERIA: CPT

## 2021-05-14 PROCEDURE — 96367 TX/PROPH/DG ADDL SEQ IV INF: CPT

## 2021-05-14 RX ORDER — PRAVASTATIN SODIUM 20 MG
20 TABLET ORAL NIGHTLY
Status: DISCONTINUED | OUTPATIENT
Start: 2021-05-14 | End: 2021-05-25 | Stop reason: HOSPADM

## 2021-05-14 RX ORDER — DEXTROSE MONOHYDRATE 50 MG/ML
100 INJECTION, SOLUTION INTRAVENOUS PRN
Status: DISCONTINUED | OUTPATIENT
Start: 2021-05-14 | End: 2021-05-25 | Stop reason: HOSPADM

## 2021-05-14 RX ORDER — ASPIRIN 81 MG/1
81 TABLET ORAL DAILY
Status: DISCONTINUED | OUTPATIENT
Start: 2021-05-14 | End: 2021-05-25 | Stop reason: HOSPADM

## 2021-05-14 RX ORDER — POTASSIUM CHLORIDE 20 MEQ/1
40 TABLET, EXTENDED RELEASE ORAL PRN
Status: DISCONTINUED | OUTPATIENT
Start: 2021-05-14 | End: 2021-05-25 | Stop reason: HOSPADM

## 2021-05-14 RX ORDER — ARFORMOTEROL TARTRATE 15 UG/2ML
15 SOLUTION RESPIRATORY (INHALATION) 2 TIMES DAILY
Status: DISCONTINUED | OUTPATIENT
Start: 2021-05-14 | End: 2021-05-25 | Stop reason: HOSPADM

## 2021-05-14 RX ORDER — GUAIFENESIN 400 MG/1
400 TABLET ORAL 3 TIMES DAILY
Status: DISCONTINUED | OUTPATIENT
Start: 2021-05-14 | End: 2021-05-25 | Stop reason: HOSPADM

## 2021-05-14 RX ORDER — SODIUM CHLORIDE 0.9 % (FLUSH) 0.9 %
10 SYRINGE (ML) INJECTION PRN
Status: DISCONTINUED | OUTPATIENT
Start: 2021-05-14 | End: 2021-05-25 | Stop reason: HOSPADM

## 2021-05-14 RX ORDER — FUROSEMIDE 20 MG/1
20 TABLET ORAL
Status: DISCONTINUED | OUTPATIENT
Start: 2021-05-14 | End: 2021-05-18

## 2021-05-14 RX ORDER — SODIUM CHLORIDE 9 MG/ML
25 INJECTION, SOLUTION INTRAVENOUS PRN
Status: DISCONTINUED | OUTPATIENT
Start: 2021-05-14 | End: 2021-05-25 | Stop reason: HOSPADM

## 2021-05-14 RX ORDER — PANTOPRAZOLE SODIUM 40 MG/1
40 TABLET, DELAYED RELEASE ORAL DAILY
Status: DISCONTINUED | OUTPATIENT
Start: 2021-05-14 | End: 2021-05-25 | Stop reason: HOSPADM

## 2021-05-14 RX ORDER — SODIUM CHLORIDE 9 MG/ML
1000 INJECTION, SOLUTION INTRAVENOUS CONTINUOUS
Status: DISCONTINUED | OUTPATIENT
Start: 2021-05-14 | End: 2021-05-15

## 2021-05-14 RX ORDER — FLUTICASONE FUROATE AND VILANTEROL 100; 25 UG/1; UG/1
1 POWDER RESPIRATORY (INHALATION) DAILY
Status: DISCONTINUED | OUTPATIENT
Start: 2021-05-14 | End: 2021-05-14 | Stop reason: CLARIF

## 2021-05-14 RX ORDER — ACETAMINOPHEN 650 MG/1
650 SUPPOSITORY RECTAL EVERY 6 HOURS PRN
Status: DISCONTINUED | OUTPATIENT
Start: 2021-05-14 | End: 2021-05-25 | Stop reason: HOSPADM

## 2021-05-14 RX ORDER — GABAPENTIN 300 MG/1
300 CAPSULE ORAL DAILY
Status: DISCONTINUED | OUTPATIENT
Start: 2021-05-14 | End: 2021-05-25 | Stop reason: HOSPADM

## 2021-05-14 RX ORDER — BUDESONIDE 0.25 MG/2ML
0.25 INHALANT ORAL 2 TIMES DAILY
Status: DISCONTINUED | OUTPATIENT
Start: 2021-05-14 | End: 2021-05-25 | Stop reason: HOSPADM

## 2021-05-14 RX ORDER — POTASSIUM CHLORIDE 7.45 MG/ML
10 INJECTION INTRAVENOUS PRN
Status: DISCONTINUED | OUTPATIENT
Start: 2021-05-14 | End: 2021-05-25 | Stop reason: HOSPADM

## 2021-05-14 RX ORDER — SODIUM CHLORIDE 0.9 % (FLUSH) 0.9 %
10 SYRINGE (ML) INJECTION EVERY 12 HOURS SCHEDULED
Status: DISCONTINUED | OUTPATIENT
Start: 2021-05-14 | End: 2021-05-25 | Stop reason: HOSPADM

## 2021-05-14 RX ORDER — ALBUTEROL SULFATE 90 UG/1
2 AEROSOL, METERED RESPIRATORY (INHALATION) 4 TIMES DAILY
Status: DISCONTINUED | OUTPATIENT
Start: 2021-05-14 | End: 2021-05-16

## 2021-05-14 RX ORDER — ACETAMINOPHEN 325 MG/1
650 TABLET ORAL EVERY 6 HOURS PRN
Status: DISCONTINUED | OUTPATIENT
Start: 2021-05-14 | End: 2021-05-25 | Stop reason: HOSPADM

## 2021-05-14 RX ORDER — DEXTROSE MONOHYDRATE 25 G/50ML
12.5 INJECTION, SOLUTION INTRAVENOUS PRN
Status: DISCONTINUED | OUTPATIENT
Start: 2021-05-14 | End: 2021-05-25 | Stop reason: HOSPADM

## 2021-05-14 RX ORDER — AMLODIPINE BESYLATE 5 MG/1
5 TABLET ORAL DAILY
Status: DISCONTINUED | OUTPATIENT
Start: 2021-05-14 | End: 2021-05-25 | Stop reason: HOSPADM

## 2021-05-14 RX ORDER — SUCRALFATE 1 G/1
1 TABLET ORAL 3 TIMES DAILY
Status: DISCONTINUED | OUTPATIENT
Start: 2021-05-14 | End: 2021-05-25 | Stop reason: HOSPADM

## 2021-05-14 RX ORDER — NICOTINE POLACRILEX 4 MG
15 LOZENGE BUCCAL PRN
Status: DISCONTINUED | OUTPATIENT
Start: 2021-05-14 | End: 2021-05-25 | Stop reason: HOSPADM

## 2021-05-14 RX ORDER — ROPINIROLE 0.5 MG/1
0.5 TABLET, FILM COATED ORAL NIGHTLY
Status: DISCONTINUED | OUTPATIENT
Start: 2021-05-14 | End: 2021-05-16

## 2021-05-14 RX ADMIN — GABAPENTIN 300 MG: 300 CAPSULE ORAL at 21:58

## 2021-05-14 RX ADMIN — INSULIN LISPRO 1 UNITS: 100 INJECTION, SOLUTION INTRAVENOUS; SUBCUTANEOUS at 21:59

## 2021-05-14 RX ADMIN — SODIUM CHLORIDE 1000 ML: 9 INJECTION, SOLUTION INTRAVENOUS at 22:32

## 2021-05-14 RX ADMIN — GUAIFENESIN 400 MG: 400 TABLET ORAL at 21:55

## 2021-05-14 RX ADMIN — PRAVASTATIN SODIUM 20 MG: 20 TABLET ORAL at 21:55

## 2021-05-14 RX ADMIN — ENOXAPARIN SODIUM 40 MG: 40 INJECTION SUBCUTANEOUS at 22:00

## 2021-05-14 RX ADMIN — Medication 10 ML: at 22:00

## 2021-05-14 RX ADMIN — ROPINIROLE HYDROCHLORIDE 0.5 MG: 0.5 TABLET, FILM COATED ORAL at 21:55

## 2021-05-14 RX ADMIN — ARFORMOTEROL TARTRATE 15 MCG: 15 SOLUTION RESPIRATORY (INHALATION) at 21:23

## 2021-05-14 RX ADMIN — PANTOPRAZOLE SODIUM 40 MG: 40 TABLET, DELAYED RELEASE ORAL at 22:02

## 2021-05-14 RX ADMIN — SODIUM CHLORIDE 1000 ML: 9 INJECTION, SOLUTION INTRAVENOUS at 15:26

## 2021-05-14 RX ADMIN — AMLODIPINE BESYLATE 5 MG: 5 TABLET ORAL at 21:58

## 2021-05-14 RX ADMIN — CEFEPIME 2000 MG: 2 INJECTION, POWDER, FOR SOLUTION INTRAVENOUS at 12:07

## 2021-05-14 RX ADMIN — BUDESONIDE 250 MCG: 0.25 SUSPENSION RESPIRATORY (INHALATION) at 21:23

## 2021-05-14 RX ADMIN — VANCOMYCIN HYDROCHLORIDE 1250 MG: 10 INJECTION, POWDER, LYOPHILIZED, FOR SOLUTION INTRAVENOUS at 12:15

## 2021-05-14 RX ADMIN — ASPIRIN 81 MG: 81 TABLET, COATED ORAL at 21:55

## 2021-05-14 RX ADMIN — SUCRALFATE 1 G: 1 TABLET ORAL at 21:58

## 2021-05-14 ASSESSMENT — PAIN SCALES - GENERAL: PAINLEVEL_OUTOF10: 0

## 2021-05-14 NOTE — LETTER
[place] Department Medicaid  CERTIFICATION OF NECESSITY  FOR NON-EMERGENCY TRANSPORTATION   BY GROUND AMBULANCE      Individual Information   1. Name: Yumiko Flowers 2. [place] Medicaid Billing Number:    3. Address: [facility]     Transportation Provider Information   4. Provider Name: Dana Panfilo    5. [place] Medicaid Provider Number:  National Provider Identifier (NPI):      Certification  7. Criteria:  During transport, this individual requires:  [x] Medical treatment or continuous     supervision by an EMT. [] The administration or regulation of oxygen by another person. [] Supervised protective restraint. 8. Period Beginning Date: 21   9. Length  [x] Not more than 5 day(s)  [] One Year     Additional Information Relevant to Certification   10. Comments or Explanations, If Necessary or Appropriate   CELLULITIS FOOT/WOUND WITH INCISION/DRAINAGE OF ABSCESS      Certifying Practitioner Information   11. Name of Practitioner: DR LUIS ANGEL ALEXANDER   12. [place] Medicaid Provider Number, If Applicable:  [address] Provider Identifier (NPI):      Signature Information   14. Date of Signature: 21 15. Name of Person Signing: Electronically signed by Lazaro Hutson RN on 2021 at 8:56 AM     16. Signature and Professional Designation: DR LUIS ANGEL ALEXANDER/Electronically signed by Lazaro Hutson RN  DISCHARGE PLANNER on 2021 at 8:56 AM       [facility] [address]  Rev. 2015        [address] [place] [address] Admission Date/Time: 21 207 [facility] Account: [de-identified]    MRN: [id number]    Patient:  Yumiko Flowers   Contact Serial #: [id number]            ENCOUNTER          Patient Class: I Private Enc? No Unit RM BD: SEYZ 8S CDU [id number]/[id number]-A   Hospital Service:  INM   ADM DX: Cellulitis of left foot *   ADM Provider: Huma Boland MD   Procedure:     ATT Provider: Huma Boland MD   REF Provider:        PATIENT                 Name: Yumiko Flowers : 1927 (89 yrs) Address: [facility], [facility] [address] Sex: Female   [facility] [address]         Marital Status:    Employer: RETIRED         Christian: Jewish   Primary Care Provider: DO Ashlie Oneil Phone: [phone]   EMERGENCY CONTACT   Contact Name Legal Guardian? Relationship to Patient Home Phone Work Phone   1. Juno Melvin  2. NgoziArian      Child  Child [phone]              GUARANTOR            Guarantor: Odilia Licona     : 1927   Address: [facility] Sex: Female     [facility] [place] [address]     Relation to Patient: Self       Home Phone: [phone]   Guarantor ID: [id number]       Work Phone:     Guarantor Employer: RETIRED         Status: RETIRED      COVERAGE  PRIMARY INSURANCE   Payor: GENERIC MANAGED MED* Plan: GENERIC MANAGED MEDICARE   Payor Address: ,          Group Number:   Insurance Type: INDEMNITY   Subscriber Name: Magda Hernandez : 1927   Subscriber ID: [id number] Nelia Ashley. Rel. to Sub: Self   SECONDARY INSURANCE   Payor:   Plan:     Payor Address:  ,           Group Number:   Insurance Type:     Subscriber Name:   Subscriber :     Subscriber ID:   Pat.  Rel. to Sub:

## 2021-05-14 NOTE — H&P
[address] [facility]  History and Physical      CHIEF COMPLAINT: Leg swelling       Patient of Claude Blazing, DO presents with:  Cellulitis of left foot    History of Present Illness:   Patient is a 70-year-old female with a past medical history of CAD, DM, GERD, HTN, cellulitis, hyperlipidemia, and PVD. Patient presented to the ED with complaints of pain, increased swelling, and increased redness to left lower extremity. Patient was recently discharged with left lower extremity cellulitis. Patient states since she has been discharged the pain the swelling and the redness has increased in her lower extremities very warm to touch. Patient denies any chest pain, shortness of breath, fever, abdominal pain, and headache. States she has experienced chills in the last day. She was discharged on oral antibiotics Keflex 500, and Diflucan for 10-day treatment. Patient's ED work-up consist of WBC 17.5 and elevated glucose. Patient is started on IV antibiotics and IV fluids. REVIEW OF SYSTEMS:  Pertinent negatives are above in HPI. 10 point ROS otherwise negative.       Past Medical History:   Diagnosis Date    Arthritis     Asthma     Bronchitis 5/23/2017    CAD (coronary artery disease)     Cough 5/23/2017    Diabetes mellitus ([address]     GERD (gastroesophageal reflux disease) 5/23/2017    Hyperlipidemia     Hypertension     Lung disease     PVD (peripheral vascular disease) with claudication ([facility] [address] 4/10/2019    Restless legs syndrome     Rhinitis, allergic 5/23/2017    Sinusitis 5/23/2017    SOB (shortness of breath) 5/23/2017    Urinary incontinence          Past Surgical History:   Procedure Laterality Date    ABDOMEN SURGERY      APPENDECTOMY      CARDIAC SURGERY      CHOLECYSTECTOMY      COLONOSCOPY      CORONARY ARTERY BYPASS GRAFT      8/28/10    ENDOSCOPY, COLON, DIAGNOSTIC      HYSTERECTOMY      frankie and bso 1997    TONSILLECTOMY AND ADENOIDECTOMY Medications Prior to Admission:    Not in a hospital admission. Note that the patient's home medications were reviewed and the above list is accurate to the best of my knowledge at the time of the exam.    Allergies:    Lisinopril    Social History:    reports that she has never smoked. She has never used smokeless tobacco. She reports current alcohol use. She reports that she does not use drugs. Family History:   family history includes Heart Disease in her brother; Hypertension in her father. PHYSICAL EXAM:    Vitals:  BP (!) 187/64   Pulse 64   Temp 96.9 °F (36.1 °C) (Temporal)   Resp 18   Ht 5' 3\" (1.6 m)   Wt 145 lb (65.8 kg)   LMP 12/03/1997   SpO2 96%   BMI 25.69 kg/m²       General appearance: NAD, conversant, pleasant  Eyes: Sclerae anicteric, PERRLA  HEENT: AT/NC, MMM  Neck: FROM, supple, no thyromegaly  Lymph: No cervical / supraclavicular lymphadenopathy  Lungs: Clear to auscultation, WOB normal  CV: RRR, no MRGs, no lower extremity edema  Abdomen: Soft, non-tender; no masses or HSM, +BS  Extremities: FROM without synovitis. No clubbing or cyanosis of the hands. Left lower extremity erythema, swelling, tenderness, and warmth. Skin: Wound on left foot  Psych: Calm and cooperative. Normal judgement and insight. Normal mood and affect. Neuro: Alert and interactive, face symmetric, speech fluent. LABS:  All labs reviewed.   Of note:  CBC with Differential:    Lab Results   Component Value Date    WBC 17.5 05/14/2021    RBC 2.83 05/14/2021    HGB 9.4 05/14/2021    HCT 27.7 05/14/2021     05/14/2021    MCV 97.9 05/14/2021    MCH 33.2 05/14/2021    MCHC 33.9 05/14/2021    RDW 13.0 05/14/2021    LYMPHOPCT 3.6 05/14/2021    MONOPCT 5.8 05/14/2021    BASOPCT 0.1 05/14/2021    MONOSABS 1.02 05/14/2021    LYMPHSABS 0.64 05/14/2021    EOSABS 0.03 05/14/2021    BASOSABS 0.02 05/14/2021     CMP:    Lab Results   Component Value Date     05/14/2021    K 4.9 05/14/2021    CL 88 05/14/2021    CO2 24 05/14/2021    BUN 17 05/14/2021    CREATININE 0.8 05/14/2021    GFRAA >60 05/14/2021    LABGLOM >60 05/14/2021    GLUCOSE 247 05/14/2021    PROT 7.0 05/14/2021    LABALBU 3.6 05/14/2021    CALCIUM 9.2 05/14/2021    BILITOT 0.5 05/14/2021    ALKPHOS 101 05/14/2021    AST 28 05/14/2021    ALT 23 05/14/2021       Imaging:  X-ray left foot: No aggressive osseous lesions. Telemetry:  I've personally reviewed the patient's telemetry:  Normal sinus rhythm    ASSESSMENT/PLAN:  Principal Problem:    Cellulitis of left foot  Resolved Problems:    * No resolved hospital problems. *    80-year-old female with a past medical history of HTN, DM, GERD, CAD, and PVD admitted with cellulitis left foot. 1.  Telemetry monitoring/dehydration  2. Monitor lab  3. IV antibiotics  4. ISS glucose control  5. Medication for other comorbidities continue as appropriate dose adjustment as necessary. DVT prophylaxis  PT OT  Discharge planning  Case discussed with attending and agreed upon plan of care. Code status: DNR CCA  Requires inpatient level of care  Ross Kapoor Flory APRN-CNP  3:15 PM  5/14/2021     Admitted for cellulitis of left foot  Continue antibiotics overnight  Transition to p.o. 5/15/2021    I personally reviewed Radiographs, labs and medication list were reviewed by me independently. The case was discussed in detail and plans for care were established. Review of [address] [place] [address], documentation was conducted and revisions were made as appropriate directly by me. I agree with the above documented exam, problem list, and plan of care.      Anibal Rosa MD  5/14/2021

## 2021-05-14 NOTE — H&P (VIEW-ONLY)
[address] [facility]  History and Physical      CHIEF COMPLAINT: Leg swelling       Patient of Teresa De Oliveira DO presents with:  Cellulitis of left foot    History of Present Illness:   Patient is a 27-year-old female with a past medical history of CAD, DM, GERD, HTN, cellulitis, hyperlipidemia, and PVD. Patient presented to the ED with complaints of pain, increased swelling, and increased redness to left lower extremity. Patient was recently discharged with left lower extremity cellulitis. Patient states since she has been discharged the pain the swelling and the redness has increased in her lower extremities very warm to touch. Patient denies any chest pain, shortness of breath, fever, abdominal pain, and headache. States she has experienced chills in the last day. She was discharged on oral antibiotics Keflex 500, and Diflucan for 10-day treatment. Patient's ED work-up consist of WBC 17.5 and elevated glucose. Patient is started on IV antibiotics and IV fluids. REVIEW OF SYSTEMS:  Pertinent negatives are above in HPI. 10 point ROS otherwise negative.       Past Medical History:   Diagnosis Date    Arthritis     Asthma     Bronchitis 5/23/2017    CAD (coronary artery disease)     Cough 5/23/2017    [facility] [address]     GERD (gastroesophageal reflux disease) 5/23/2017    Hyperlipidemia     Hypertension     Lung disease     PVD (peripheral vascular disease) with claudication ([facility] [address] 4/10/2019    Restless legs syndrome     Rhinitis, allergic 5/23/2017    Sinusitis 5/23/2017    SOB (shortness of breath) 5/23/2017    Urinary incontinence          Past Surgical History:   Procedure Laterality Date    ABDOMEN SURGERY      APPENDECTOMY      CARDIAC SURGERY      CHOLECYSTECTOMY      COLONOSCOPY      CORONARY ARTERY BYPASS GRAFT      8/28/10    ENDOSCOPY, COLON, DIAGNOSTIC      HYSTERECTOMY      frankie and bso 1997    TONSILLECTOMY AND ADENOIDECTOMY Medications Prior to Admission:    Not in a hospital admission. Note that the patient's home medications were reviewed and the above list is accurate to the best of my knowledge at the time of the exam.    Allergies:    Lisinopril    Social History:    reports that she has never smoked. She has never used smokeless tobacco. She reports current alcohol use. She reports that she does not use drugs. Family History:   family history includes Heart Disease in her brother; Hypertension in her father. PHYSICAL EXAM:    Vitals:  BP (!) 187/64   Pulse 64   Temp 96.9 °F (36.1 °C) (Temporal)   Resp 18   Ht 5' 3\" (1.6 m)   Wt 145 lb (65.8 kg)   LMP 12/03/1997   SpO2 96%   BMI 25.69 kg/m²       General appearance: NAD, conversant, pleasant  Eyes: Sclerae anicteric, PERRLA  HEENT: AT/NC, MMM  Neck: FROM, supple, no thyromegaly  Lymph: No cervical / supraclavicular lymphadenopathy  Lungs: Clear to auscultation, WOB normal  CV: RRR, no MRGs, no lower extremity edema  Abdomen: Soft, non-tender; no masses or HSM, +BS  Extremities: FROM without synovitis. No clubbing or cyanosis of the hands. Left lower extremity erythema, swelling, tenderness, and warmth. Skin: Wound on left foot  Psych: Calm and cooperative. Normal judgement and insight. Normal mood and affect. Neuro: Alert and interactive, face symmetric, speech fluent. LABS:  All labs reviewed.   Of note:  CBC with Differential:    Lab Results   Component Value Date    WBC 17.5 05/14/2021    RBC 2.83 05/14/2021    HGB 9.4 05/14/2021    HCT 27.7 05/14/2021     05/14/2021    MCV 97.9 05/14/2021    MCH 33.2 05/14/2021    MCHC 33.9 05/14/2021    RDW 13.0 05/14/2021    LYMPHOPCT 3.6 05/14/2021    MONOPCT 5.8 05/14/2021    BASOPCT 0.1 05/14/2021    MONOSABS 1.02 05/14/2021    LYMPHSABS 0.64 05/14/2021    EOSABS 0.03 05/14/2021    BASOSABS 0.02 05/14/2021     CMP:    Lab Results   Component Value Date     05/14/2021    K 4.9 05/14/2021    CL 88 05/14/2021    CO2 24 05/14/2021    BUN 17 05/14/2021    CREATININE 0.8 05/14/2021    GFRAA >60 05/14/2021    LABGLOM >60 05/14/2021    GLUCOSE 247 05/14/2021    PROT 7.0 05/14/2021    LABALBU 3.6 05/14/2021    CALCIUM 9.2 05/14/2021    BILITOT 0.5 05/14/2021    ALKPHOS 101 05/14/2021    AST 28 05/14/2021    ALT 23 05/14/2021       Imaging:  X-ray left foot: No aggressive osseous lesions. Telemetry:  I've personally reviewed the patient's telemetry:  Normal sinus rhythm    ASSESSMENT/PLAN:  Principal Problem:    Cellulitis of left foot  Resolved Problems:    * No resolved hospital problems. *    29-year-old female with a past medical history of HTN, DM, GERD, CAD, and PVD admitted with cellulitis left foot. 1.  Telemetry monitoring/dehydration  2. Monitor lab  3. IV antibiotics  4. ISS glucose control  5. Medication for other comorbidities continue as appropriate dose adjustment as necessary. DVT prophylaxis  PT OT  Discharge planning  Case discussed with attending and agreed upon plan of care. Code status: DNR CCA  Requires inpatient level of care  Mark Ruth APRNCDINA  3:15 PM  5/14/2021     Admitted for cellulitis of left foot  Continue antibiotics overnight  Transition to p.o. 5/15/2021    I personally reviewed Radiographs, labs and medication list were reviewed by me independently. The case was discussed in detail and plans for care were established. Review of [address] [place] [address], documentation was conducted and revisions were made as appropriate directly by me. I agree with the above documented exam, problem list, and plan of care.      Venus Simmons MD  5/14/2021

## 2021-05-14 NOTE — ED PROVIDER NOTES
HPI:  5/14/21,   Time: 11:35 AM EDT       Divine Rowan is a 80 y.o. female presenting to the ED for left foot pain, was recently discharged from the hospital for LLE cellulitis 3 days ago, swelling increasing and pain worsening progressively since. The complaint has been persistent, severe in severity, and worsened by movement of left foot. Pt denies any fevers but has been having chills, no nausea/vomiting, no CP, no increased SOB. States the pain is much worse now and foot looks more red and swollen than when she was in the hospital. Denies any further issues at this time. Per staff patient's mental status is more altered as well. Review of Systems:   A complete review of systems was performed and pertinent positives and negatives are stated within HPI, all other systems reviewed and are negative.          --------------------------------------------- PAST HISTORY ---------------------------------------------  Past Medical History:  has a past medical history of Arthritis, Asthma, Bronchitis, CAD (coronary artery disease), Cough, Diabetes mellitus ([facility] [address] GERD (gastroesophageal reflux disease), Hyperlipidemia, Hypertension, Lung disease, PVD (peripheral vascular disease) with claudication ([facility] [address] Restless legs syndrome, Rhinitis, allergic, Sinusitis, SOB (shortness of breath), and Urinary incontinence. Past Surgical History:  has a past surgical history that includes Hysterectomy; Cholecystectomy; Tonsillectomy and adenoidectomy; Coronary artery bypass graft; Abdomen surgery; Appendectomy; Colonoscopy; Endoscopy, colon, diagnostic; and Cardiac surgery. Social History:  reports that she has never smoked. She has never used smokeless tobacco. She reports current alcohol use. She reports that she does not use drugs. Family History: family history includes Heart Disease in her brother; Hypertension in her father. The patients home medications have been reviewed.     Allergies: 13.0 11.5 - 15.0 fL    Platelets 696 288 - 119 E9/L    MPV 10.3 7.0 - 12.0 fL    Neutrophils % 89.2 (H) 43.0 - 80.0 %    Immature Granulocytes % 1.1 0.0 - 5.0 %    Lymphocytes % 3.6 (L) 20.0 - 42.0 %    Monocytes % 5.8 2.0 - 12.0 %    Eosinophils % 0.2 0.0 - 6.0 %    Basophils % 0.1 0.0 - 2.0 %    Neutrophils Absolute 15.64 (H) 1.80 - 7.30 E9/L    Immature Granulocytes # 0.19 E9/L    Lymphocytes Absolute 0.64 (L) 1.50 - 4.00 E9/L    Monocytes Absolute 1.02 (H) 0.10 - 0.95 E9/L    Eosinophils Absolute 0.03 (L) 0.05 - 0.50 E9/L    Basophils Absolute 0.02 0.00 - 0.20 E9/L   Comprehensive Metabolic Panel w/ Reflex to MG   Result Value Ref Range    Sodium 123 (L) 132 - 146 mmol/L    Potassium reflex Magnesium 4.9 3.5 - 5.0 mmol/L    Chloride 88 (L) 98 - 107 mmol/L    CO2 24 22 - 29 mmol/L    Anion Gap 11 7 - 16 mmol/L    Glucose 247 (H) 74 - 99 mg/dL    BUN 17 6 - 23 mg/dL    CREATININE 0.8 0.5 - 1.0 mg/dL    GFR Non-African American >60 >=60 mL/min/1.73    GFR African American >60     Calcium 9.2 8.6 - 10.2 mg/dL    Total Protein 7.0 6.4 - 8.3 g/dL    Albumin 3.6 3.5 - 5.2 g/dL    Total Bilirubin 0.5 0.0 - 1.2 mg/dL    Alkaline Phosphatase 101 35 - 104 U/L    ALT 23 0 - 32 U/L    AST 28 0 - 31 U/L   Lactate, Sepsis   Result Value Ref Range    Lactic Acid, Sepsis 1.3 0.5 - 1.9 mmol/L       RADIOLOGY:  Interpreted by Radiologist.  XR FOOT LEFT (MIN 3 VIEWS)   Final Result   No aggressive osseous lesions. Please note, plain radiograph is insensitive   for evaluation of osteomyelitis.               ------------------------- NURSING NOTES AND VITALS REVIEWED ---------------------------   The nursing notes within the ED encounter and vital signs as below have been reviewed by myself.   BP (!) 187/64   Pulse 64   Temp 96.9 °F (36.1 °C) (Temporal)   Resp 18   Ht 5' 3\" (1.6 m)   Wt 145 lb (65.8 kg)   LMP 12/03/1997   SpO2 96%   BMI 25.69 kg/m²   Oxygen Saturation Interpretation: Normal    The patients available past medical records and past encounters were reviewed. ------------------------------ ED COURSE/MEDICAL DECISION MAKING----------------------  Medications   0.9 % sodium chloride infusion (has no administration in time range)   cefepime (MAXIPIME) 2000 mg IVPB minibag (0 mg Intravenous Stopped 5/14/21 1318)   vancomycin (VANCOCIN) 1,250 mg in dextrose 5 % 250 mL IVPB (1,250 mg Intravenous New Bag 5/14/21 1215)         ED COURSE:       Medical Decision Making:    Pt is a 79yo F with complex PMHx presenting with LLE pain concerning for LLE cellulitis and left foot abscess. Pt's limb appears very red, warm, tender, has unilateral edema up to mid shin, having chills. Foot now looks to have an abscess. Pt looks hemodynamically stable at this time. Pt was recently discharged from the hospital for the same issue on keflex for which she did not respond to the treatment. Sepsis work up, starting Cefepime and Vancomycin. Pt noted to be hyponatremic at 123, with leukocytosis at 17.5, and Hgb 9.4 (baseline 10-11). Started pt on maintenance IVF. This patient's ED course included: a personal history and physicial examination, re-evaluation prior to disposition, multiple bedside re-evaluations and IV medications    This patient has remained hemodynamically stable during their ED course. Re-Evaluations:             Re-evaluation. Patients symptoms show no change. Continues to have LLE pain. Re-examination  5/14/21   2:30 PM EDT          Vital Signs:   Vitals:    05/14/21 1109   BP: (!) 187/64   Pulse: 64   Resp: 18   Temp: 96.9 °F (36.1 °C)   TempSrc: Temporal   SpO2: 96%   Weight: 145 lb (65.8 kg)   Height: 5' 3\" (1.6 m)     Card/Pulm:  Rhythm: normal rate. Heart Sounds: no murmurs, gallops, or rubs. clear to auscultation, no wheezes or rales and unlabored breathing. Capillary Refill: normal.  Radial Pulse:  present 2+.     Consultations:             Internal Medicine - Dr Jimmy Elmore, Accepted patient and will admit    Counseling: The emergency provider has spoken with the patient and sister and discussed todays results, in addition to providing specific details for the plan of care and counseling regarding the diagnosis and prognosis. Questions are answered at this time and they are agreeable with the plan.       --------------------------------- IMPRESSION AND DISPOSITION ---------------------------------    IMPRESSION  1. Cellulitis and abscess of leg    2. Therapy failure due to antibiotic resistance        DISPOSITION  Disposition: Admit to med/surg floor  Patient condition is fair    NOTE: This report was transcribed using voice recognition software.  Every effort was made to ensure accuracy; however, inadvertent computerized transcription errors may be present      Pato Jarrett MD  Resident  05/14/21 5123

## 2021-05-15 LAB
ANION GAP SERPL CALCULATED.3IONS-SCNC: 8 MMOL/L (ref 7–16)
BASOPHILS ABSOLUTE: 0.02 E9/L (ref 0–0.2)
BASOPHILS RELATIVE PERCENT: 0.2 % (ref 0–2)
BLOOD CULTURE, ROUTINE: NORMAL
BUN BLDV-MCNC: 13 MG/DL (ref 6–23)
C-REACTIVE PROTEIN: 18.7 MG/DL (ref 0–0.4)
CALCIUM SERPL-MCNC: 8.9 MG/DL (ref 8.6–10.2)
CHLORIDE BLD-SCNC: 91 MMOL/L (ref 98–107)
CO2: 24 MMOL/L (ref 22–29)
CREAT SERPL-MCNC: 0.7 MG/DL (ref 0.5–1)
CULTURE, BLOOD 2: NORMAL
EOSINOPHILS ABSOLUTE: 0.03 E9/L (ref 0.05–0.5)
EOSINOPHILS RELATIVE PERCENT: 0.3 % (ref 0–6)
GFR AFRICAN AMERICAN: >60
GFR NON-AFRICAN AMERICAN: >60 ML/MIN/1.73
GLUCOSE BLD-MCNC: 117 MG/DL (ref 74–99)
HCT VFR BLD CALC: 26.2 % (ref 34–48)
HCT VFR BLD CALC: 26.9 % (ref 34–48)
HEMOGLOBIN: 8.8 G/DL (ref 11.5–15.5)
HEMOGLOBIN: 8.9 G/DL (ref 11.5–15.5)
IMMATURE GRANULOCYTES #: 0.06 E9/L
IMMATURE GRANULOCYTES %: 0.5 % (ref 0–5)
LYMPHOCYTES ABSOLUTE: 0.87 E9/L (ref 1.5–4)
LYMPHOCYTES RELATIVE PERCENT: 7.9 % (ref 20–42)
MCH RBC QN AUTO: 33 PG (ref 26–35)
MCHC RBC AUTO-ENTMCNC: 33.6 % (ref 32–34.5)
MCV RBC AUTO: 98.1 FL (ref 80–99.9)
METER GLUCOSE: 132 MG/DL (ref 74–99)
METER GLUCOSE: 199 MG/DL (ref 74–99)
METER GLUCOSE: 209 MG/DL (ref 74–99)
METER GLUCOSE: 225 MG/DL (ref 74–99)
MONOCYTES ABSOLUTE: 0.63 E9/L (ref 0.1–0.95)
MONOCYTES RELATIVE PERCENT: 5.7 % (ref 2–12)
NEUTROPHILS ABSOLUTE: 9.36 E9/L (ref 1.8–7.3)
NEUTROPHILS RELATIVE PERCENT: 85.4 % (ref 43–80)
PDW BLD-RTO: 13.1 FL (ref 11.5–15)
PLATELET # BLD: 305 E9/L (ref 130–450)
PMV BLD AUTO: 10.8 FL (ref 7–12)
POTASSIUM REFLEX MAGNESIUM: 4.2 MMOL/L (ref 3.5–5)
RBC # BLD: 2.67 E12/L (ref 3.5–5.5)
SEDIMENTATION RATE, ERYTHROCYTE: 61 MM/HR (ref 0–20)
SODIUM BLD-SCNC: 123 MMOL/L (ref 132–146)
WBC # BLD: 11 E9/L (ref 4.5–11.5)

## 2021-05-15 PROCEDURE — 36415 COLL VENOUS BLD VENIPUNCTURE: CPT

## 2021-05-15 PROCEDURE — 85014 HEMATOCRIT: CPT

## 2021-05-15 PROCEDURE — 2580000003 HC RX 258: Performed by: INTERNAL MEDICINE

## 2021-05-15 PROCEDURE — 94640 AIRWAY INHALATION TREATMENT: CPT

## 2021-05-15 PROCEDURE — 2580000003 HC RX 258: Performed by: PHYSICIAN ASSISTANT

## 2021-05-15 PROCEDURE — 6360000002 HC RX W HCPCS: Performed by: INTERNAL MEDICINE

## 2021-05-15 PROCEDURE — 6360000002 HC RX W HCPCS: Performed by: PHYSICIAN ASSISTANT

## 2021-05-15 PROCEDURE — 97530 THERAPEUTIC ACTIVITIES: CPT

## 2021-05-15 PROCEDURE — 80048 BASIC METABOLIC PNL TOTAL CA: CPT

## 2021-05-15 PROCEDURE — 97161 PT EVAL LOW COMPLEX 20 MIN: CPT

## 2021-05-15 PROCEDURE — 6370000000 HC RX 637 (ALT 250 FOR IP): Performed by: NURSE PRACTITIONER

## 2021-05-15 PROCEDURE — 97166 OT EVAL MOD COMPLEX 45 MIN: CPT

## 2021-05-15 PROCEDURE — 85018 HEMOGLOBIN: CPT

## 2021-05-15 PROCEDURE — 1200000000 HC SEMI PRIVATE

## 2021-05-15 PROCEDURE — 2580000003 HC RX 258: Performed by: STUDENT IN AN ORGANIZED HEALTH CARE EDUCATION/TRAINING PROGRAM

## 2021-05-15 PROCEDURE — 86140 C-REACTIVE PROTEIN: CPT

## 2021-05-15 PROCEDURE — 85025 COMPLETE CBC W/AUTO DIFF WBC: CPT

## 2021-05-15 PROCEDURE — 6370000000 HC RX 637 (ALT 250 FOR IP): Performed by: PHYSICIAN ASSISTANT

## 2021-05-15 PROCEDURE — 82962 GLUCOSE BLOOD TEST: CPT

## 2021-05-15 PROCEDURE — 85651 RBC SED RATE NONAUTOMATED: CPT

## 2021-05-15 RX ORDER — SODIUM CHLORIDE 9 MG/ML
INJECTION, SOLUTION INTRAVENOUS CONTINUOUS
Status: DISCONTINUED | OUTPATIENT
Start: 2021-05-15 | End: 2021-05-16

## 2021-05-15 RX ORDER — HYDRALAZINE HYDROCHLORIDE 20 MG/ML
10 INJECTION INTRAMUSCULAR; INTRAVENOUS EVERY 6 HOURS PRN
Status: DISCONTINUED | OUTPATIENT
Start: 2021-05-15 | End: 2021-05-25 | Stop reason: HOSPADM

## 2021-05-15 RX ORDER — IPRATROPIUM BROMIDE AND ALBUTEROL SULFATE 2.5; .5 MG/3ML; MG/3ML
1 SOLUTION RESPIRATORY (INHALATION)
Status: DISCONTINUED | OUTPATIENT
Start: 2021-05-16 | End: 2021-05-16

## 2021-05-15 RX ADMIN — CEFEPIME HYDROCHLORIDE 2000 MG: 2 INJECTION, POWDER, FOR SOLUTION INTRAVENOUS at 01:00

## 2021-05-15 RX ADMIN — CEFTAROLINE FOSAMIL 600 MG: 600 POWDER, FOR SOLUTION INTRAVENOUS at 18:41

## 2021-05-15 RX ADMIN — SODIUM CHLORIDE 1000 ML: 9 INJECTION, SOLUTION INTRAVENOUS at 05:51

## 2021-05-15 RX ADMIN — GABAPENTIN 300 MG: 300 CAPSULE ORAL at 09:23

## 2021-05-15 RX ADMIN — BUDESONIDE 250 MCG: 0.25 SUSPENSION RESPIRATORY (INHALATION) at 18:47

## 2021-05-15 RX ADMIN — ARFORMOTEROL TARTRATE 15 MCG: 15 SOLUTION RESPIRATORY (INHALATION) at 18:47

## 2021-05-15 RX ADMIN — ARFORMOTEROL TARTRATE 15 MCG: 15 SOLUTION RESPIRATORY (INHALATION) at 08:08

## 2021-05-15 RX ADMIN — ASPIRIN 81 MG: 81 TABLET, COATED ORAL at 09:23

## 2021-05-15 RX ADMIN — SUCRALFATE 1 G: 1 TABLET ORAL at 09:23

## 2021-05-15 RX ADMIN — BUDESONIDE 250 MCG: 0.25 SUSPENSION RESPIRATORY (INHALATION) at 08:09

## 2021-05-15 RX ADMIN — INSULIN LISPRO 1 UNITS: 100 INJECTION, SOLUTION INTRAVENOUS; SUBCUTANEOUS at 21:34

## 2021-05-15 RX ADMIN — PANTOPRAZOLE SODIUM 40 MG: 40 TABLET, DELAYED RELEASE ORAL at 09:23

## 2021-05-15 RX ADMIN — ENOXAPARIN SODIUM 40 MG: 40 INJECTION SUBCUTANEOUS at 09:23

## 2021-05-15 RX ADMIN — AMLODIPINE BESYLATE 5 MG: 5 TABLET ORAL at 09:28

## 2021-05-15 RX ADMIN — VANCOMYCIN HYDROCHLORIDE 1000 MG: 1 INJECTION, POWDER, LYOPHILIZED, FOR SOLUTION INTRAVENOUS at 12:52

## 2021-05-15 RX ADMIN — Medication 10 ML: at 19:51

## 2021-05-15 RX ADMIN — ROPINIROLE HYDROCHLORIDE 0.5 MG: 0.5 TABLET, FILM COATED ORAL at 19:51

## 2021-05-15 RX ADMIN — GUAIFENESIN 400 MG: 400 TABLET ORAL at 09:23

## 2021-05-15 RX ADMIN — IPRATROPIUM BROMIDE AND ALBUTEROL SULFATE 1 AMPULE: .5; 3 SOLUTION RESPIRATORY (INHALATION) at 23:58

## 2021-05-15 RX ADMIN — SUCRALFATE 1 G: 1 TABLET ORAL at 19:51

## 2021-05-15 RX ADMIN — GUAIFENESIN 400 MG: 400 TABLET ORAL at 16:14

## 2021-05-15 RX ADMIN — SODIUM CHLORIDE: 9 INJECTION, SOLUTION INTRAVENOUS at 12:51

## 2021-05-15 RX ADMIN — ACETAMINOPHEN 650 MG: 325 TABLET ORAL at 09:23

## 2021-05-15 RX ADMIN — GUAIFENESIN 400 MG: 400 TABLET ORAL at 19:51

## 2021-05-15 RX ADMIN — PRAVASTATIN SODIUM 20 MG: 20 TABLET ORAL at 19:53

## 2021-05-15 RX ADMIN — SUCRALFATE 1 G: 1 TABLET ORAL at 16:14

## 2021-05-15 RX ADMIN — ACETAMINOPHEN 650 MG: 325 TABLET ORAL at 16:13

## 2021-05-15 RX ADMIN — INSULIN LISPRO 2 UNITS: 100 INJECTION, SOLUTION INTRAVENOUS; SUBCUTANEOUS at 18:42

## 2021-05-15 ASSESSMENT — PAIN DESCRIPTION - DESCRIPTORS: DESCRIPTORS: ACHING

## 2021-05-15 ASSESSMENT — PAIN DESCRIPTION - ORIENTATION: ORIENTATION: LEFT

## 2021-05-15 ASSESSMENT — PAIN SCALES - GENERAL
PAINLEVEL_OUTOF10: 0
PAINLEVEL_OUTOF10: 2

## 2021-05-15 NOTE — CONSULTS
Department of Internal Medicine  Infectious Diseases   Consult Note      Reason for Consult: Right leg cellulitis , right foot abscess       Requesting Physician:  Dr Dustin High:              This is a 80 yrs old female with hx of DM , chronic nonhealing left foot ulcer ( on the bottom ) presented the ER with left leg / foot pain , swelling, redness for about 1 week . She denied fever and chills .  She reported she followed up with her podiatrist  WBC was 17 K, x ray left foot - no osteomyelitis   Pt was given vancomycin and cefepime       Past Medical History:    Past Medical History:   Diagnosis Date    Arthritis     Asthma     Bronchitis 5/23/2017    CAD (coronary artery disease)     Cough 5/23/2017    Diabetes mellitus ([facility] [address]     GERD (gastroesophageal reflux disease) 5/23/2017    Hyperlipidemia     Hypertension     Lung disease     PVD (peripheral vascular disease) with claudication ([facility] [address] 4/10/2019    Restless legs syndrome     Rhinitis, allergic 5/23/2017    Sinusitis 5/23/2017    SOB (shortness of breath) 5/23/2017    Urinary incontinence          Past Surgical History:      Past Surgical History:   Procedure Laterality Date    ABDOMEN SURGERY      APPENDECTOMY      CARDIAC SURGERY      CHOLECYSTECTOMY      COLONOSCOPY      CORONARY ARTERY BYPASS GRAFT      8/28/10    ENDOSCOPY, COLON, DIAGNOSTIC      HYSTERECTOMY      frankie and bso 1997    TONSILLECTOMY AND ADENOIDECTOMY           Current Medications:      Current Facility-Administered Medications   Medication Dose Route Frequency Provider Last Rate Last Admin    0.9 % sodium chloride infusion   Intravenous Continuous Araceli Rizo MD 75 mL/hr at 05/15/21 1251 New Bag at 05/15/21 1251    hydrALAZINE (APRESOLINE) injection 10 mg  10 mg Intravenous Q6H PRN Araceli Rizo MD        albuterol sulfate  (90 Base) MCG/ACT inhaler 2 puff  2 puff Inhalation 4x Daily SHASTA Nails        amLODIPine (NORVASC) tablet 5 mg  5 mg Oral Daily Ples Patella, PA   5 mg at 05/15/21 5291    aspirin EC tablet 81 mg  81 mg Oral Daily Ples Patella, PA   81 mg at 05/15/21 2901    furosemide (LASIX) tablet 20 mg  20 mg Oral Q MWF Ples Patella, PA        gabapentin (NEURONTIN) capsule 300 mg  300 mg Oral Daily Ples Patella, PA   300 mg at 05/15/21 2217    guaiFENesin tablet 400 mg  400 mg Oral TID Ples Patella, PA   400 mg at 05/15/21 9101    pantoprazole (PROTONIX) tablet 40 mg  40 mg Oral Daily Ples Patella, PA   40 mg at 05/15/21 6430    pravastatin (PRAVACHOL) tablet 20 mg  20 mg Oral Nightly Ples Patella, PA   20 mg at 05/14/21 2155    rOPINIRole (REQUIP) tablet 0.5 mg  0.5 mg Oral Nightly Ples Patella, PA   0.5 mg at 05/14/21 2155    sucralfate (CARAFATE) tablet 1 g  1 g Oral TID Ples Patella, PA   1 g at 05/15/21 0701    cefepime (MAXIPIME) 2000 mg IVPB minibag  2,000 mg Intravenous Q12H Ples Patella, PA   Stopped at 05/15/21 0503    sodium chloride flush 0.9 % injection 10 mL  10 mL Intravenous 2 times per day Ples Patella, PA   10 mL at 05/14/21 2200    sodium chloride flush 0.9 % injection 10 mL  10 mL Intravenous PRN Ples Patella, PA        0.9 % sodium chloride infusion  25 mL Intravenous PRN Ples Patella, PA        potassium chloride (KLOR-CON M) extended release tablet 40 mEq  40 mEq Oral PRN Ples Patella, PA        Or    potassium bicarb-citric acid (EFFER-K) effervescent tablet 40 mEq  40 mEq Oral PRN Ples Patella, PA        Or    potassium chloride 10 mEq/100 mL IVPB (Peripheral Line)  10 mEq Intravenous PRN Ples Patella, PA        enoxaparin (LOVENOX) injection 40 mg  40 mg Subcutaneous Daily Ples Patella, PA   40 mg at 05/15/21 0923    magnesium hydroxide (MILK OF MAGNESIA) 400 MG/5ML suspension 30 mL  30 mL Oral Daily PRN Ples Patella, PA        acetaminophen (TYLENOL) tablet 650 mg  650 mg Oral Q6H PRN Yanira M Patrick, PA   650 mg at

## 2021-05-15 NOTE — PROGRESS NOTES
using [employer] appropriately. Activity limited by LLE pain. Patient would benefit from continued skilled PT to maximize functional mobility independence. Treatment:  Patient practiced and was instructed in the following treatment:     Bed mobility- verbal cues to facilitate independence   Functional transfers-Verbal cues for proper positioning and sequencing to perform transfers safely with maximum independence.  Gait training-Verbal cues for proper positioning and sequencing using assistive device to maximize functional mobility independence. Pt's/ family goals   1. Get better    Patient and or family understand(s) diagnosis, prognosis, and plan of care. yes    PLAN:      PLAN OF CARE:    Current Treatment Recommendations     [x] Strengthening     [x] ROM   [x] Balance Training   [x] Endurance Training   [x] Transfer Training   [x] Gait Training   [x] Stair Training   [x] Positioning   [x] Safety and Education Training   [x] Patient/Caregiver Education   [x] HEP  [] Other       PT care will be provided in accordance with the objectives noted above. Exercises and functional mobility practice will be used as well as appropriate assistive devices or modalities to obtain goals. Patient and family education will also be administered as needed. Frequency of treatments: 2-5x/week x 1-2 weeks. Time in  0950  Time out  1007    Total Treatment Time  8 minutes     Evaluation Time includes thorough review of current medical information, gathering information on past medical history/social history and prior level of function, completion of standardized testing/informal observation of tasks, assessment of data and education on plan of care and goals.     CPT codes:  [x] Low Complexity PT evaluation 21303  [] Moderate Complexity PT evaluation 71221  [] High Complexity PT evaluation 32836  [] PT Re-evaluation 60667  [] Gait training 27900 0 minutes  [] Manual therapy 25465 0 minutes  [x] Therapeutic activities 72385 8 minutes  [] Therapeutic exercises 15304 0 minutes  [] Neuromuscular reeducation 19038 0 minutes       Clarke Roblero PT, DPT   [id number]

## 2021-05-15 NOTE — CONSULTS
Department of Podiatry   Consult Note        Reason for Consult:  Left leg cellulitis and left foot abscess    CHIEF COMPLAINT:  Left leg discoloration and pain    HISTORY OF PRESENT ILLNESS:                Ms. Aidan Islas is a 80 y.o. female who was seen and evaluated this afternoon for left foot pain and erythema. Patient states that she was previously hospitalized about 3-4 weeks ago for a similar problem- cellulitis to the left leg. During which time she was given antibiotics which resolved the issue. She was then sent to a SNF where she was taken care of for about 2 weeks. During that time they did notice small amounts of pus from the left foot which they cleaned. A couple of days after discharge she has been re-admitted for left foot cellulitis and possible abscess to left foot. Patient states that she is having some pressure pain to the left foot despite usually having no sensation. She has a significant past medical history of DM, HTN, SOB. Patient denies any N/V/D/F/C/SOB/CP and has no other pedal complaints at this time. Daughter from [place] is at bedside.      Past Medical History:        Diagnosis Date    Arthritis     Asthma     Bronchitis 5/23/2017    CAD (coronary artery disease)     Cough 5/23/2017    Diabetes mellitus (HCC)     GERD (gastroesophageal reflux disease) 5/23/2017    Hyperlipidemia     Hypertension     Lung disease     PVD (peripheral vascular disease) with claudication ([facility] [address] 4/10/2019    Restless legs syndrome     Rhinitis, allergic 5/23/2017    Sinusitis 5/23/2017    SOB (shortness of breath) 5/23/2017    Urinary incontinence    ·     Past Surgical History:        Procedure Laterality Date    ABDOMEN SURGERY      APPENDECTOMY      CARDIAC SURGERY      CHOLECYSTECTOMY      COLONOSCOPY      CORONARY ARTERY BYPASS GRAFT      8/28/10    ENDOSCOPY, COLON, DIAGNOSTIC      HYSTERECTOMY      frankie and bso 1997    TONSILLECTOMY AND ADENOIDECTOMY     ·     Medications Prior to Admission:    · Medications Prior to Admission: fluconazole (DIFLUCAN) 200 MG tablet, Take 1 tablet by mouth daily for 7 days  · cephALEXin (KEFLEX) 500 MG capsule, Take 1 capsule by mouth 2 times daily for 10 days  · rOPINIRole (REQUIP) 0.5 MG tablet, Take 1 tablet by mouth nightly  · lidocaine (LMX) 4 % cream, Apply topically as needed for Pain Apply topically as needed. · sucralfate (CARAFATE) 1 GM tablet, Take 1 g by mouth 3 times daily  · bisacodyl (DULCOLAX) 10 MG suppository, Place 10 mg rectally daily  · gabapentin (NEURONTIN) 300 MG capsule, Take 300 mg by mouth daily. · guaiFENesin (MUCINEX) 600 MG extended release tablet, Take 600 mg by mouth 2 times daily  · aluminum & magnesium hydroxide-simethicone (MYLANTA) 400-400-40 MG/5ML SUSP, Take 30 mLs by mouth every 6 hours as needed  · pantoprazole (PROTONIX) 40 MG tablet, Take 40 mg by mouth daily  · promethazine (PHENERGAN) 12.5 MG suppository, Place 12.5 mg rectally every 6 hours as needed for Nausea  · albuterol (PROVENTIL) 90 MCG/ACT inhaler, Inhale 2 puffs into the lungs 4 times daily  · acetaminophen (TYLENOL) 500 MG tablet, Take 1 tablet by mouth 4 times daily as needed for Pain  · furosemide (LASIX) 20 MG tablet, Take 0.5 tablets by mouth daily (Patient taking differently: Take 20 mg by mouth MW)  · pravastatin (PRAVACHOL) 20 MG tablet, TAKE 1 TABLET BY MOUTH  NIGHTLY  · amLODIPine (NORVASC) 5 MG tablet, TAKE 1 TABLET BY MOUTH  DAILY  · glimepiride (AMARYL) 2 MG tablet, TAKE 1 TABLET BY MOUTH  EVERY MORNING BEFORE  BREAKFAST  · Polyethylene Glycol 3350 (MIRALAX PO), Take by mouth  · hydrocortisone 2.5 % cream, Apply topically 2 times daily Apply topically 2 times daily. · fluticasone-vilanterol (BREO ELLIPTA) 100-25 MCG/INH AEPB inhaler, Inhale 1 puff into the lungs daily  · clotrimazole-betamethasone (LOTRISONE) 1-0.05 % cream, Apply topically 2 times daily.   · Multiple Vitamins-Minerals (OCUVITE ADULT FORMULA PO), Take 1 capsule by mouth daily  · Probiotic Product (PRO-BIOTIC BLEND PO), Take by mouth  · magnesium gluconate (MAGONATE) 500 MG tablet, Take 1,000 mg by mouth 2 times daily  · Lancets (BD LANCET ULTRAFINE 28Y) MISC, 1 applicator by Does not apply route 2 times daily Indications: DX:E11.9  · glucose blood VI test strips (ONE TOUCH TEST STRIPS) strip, 1 each by In Vitro route 2 times daily Indications: DX:E11.9  · Blood Glucose Monitoring Suppl (ONE TOUCH ULTRA SYSTEM KIT) W/DEVICE KIT, 1 kit by Does not apply route once for 1 dose Indications: DX: E11.9  · ALPHA LIPOIC ACID PO, Take 1 capsule by mouth daily. · B Complex-C-Folic Acid (HM VITAMIN B COMPLEX/VITAMIN C) TABS, Take 1 tablet by mouth daily. · Coenzyme Q10 (COQ10) 100 MG CAPS, Take 200 mg by mouth daily. · aspirin 81 MG EC tablet, Take 81 mg by mouth daily. Allergies:  Lisinopril    Social History:   · TOBACCO:   reports that she has never smoked. She has never used smokeless tobacco.  · ETOH:   reports current alcohol use. DRUGS:   Social History     Substance and Sexual Activity   Drug Use No   ·     Family History:       Problem Relation Age of Onset    Hypertension Father     Heart Disease Brother    ·       REVIEW OF SYSTEMS:    All pertinent positives and negatives as stated in HPI      LOWER EXTREMITY EXAMINATION     VASCULAR:  DP and PT pulses are faint on right foot but nonpalpable on left. PT pulse noted on LLE but no DP pulse noted with doppler. CFT ~ 5 seconds B/L. Warm to warm from the tibial tuberosity to the distal aspect of the digits dorsally on the RLE. LLE warm to abnormal warmth from tibial tuberosity to digits. No hair growth noted to the distal aspects dorsally. NEUROLOGIC:  Protective sensation is diminished b/l    DERM:  Skin is taut with erythema and mild edema noted to the LLE. Ecchymosis noted to the dorsal proximal aspect of the left leg. Diffuse erythema noted to the dorsal aspect of the left foot. No noted open lesions b/l.  Hyperkeratotic lesion noted to sub met head one on the left foot. Does not drain. No noted pus. A pocket of fluctuance was noted about 0.5cm proximal medial to the callus/hyperkeratotic lesion. It is taut and yellow in color as pictured below. No edema, erythema, hyperkeratotic tissue noted to the RLE. Toenails 1-5 b/l are abnormal in thickness and color-Onychomycotic. Webspaces 1-4 b/l are C/D/I. MUSCULOSKELETAL: No pain on palpation to posterior calf b/l. MMT WNL. Mild equinus                 CONSULTS:  IP CONSULT TO INTERNAL MEDICINE  IP CONSULT TO PHARMACY  IP CONSULT TO SOCIAL WORK  IP CONSULT TO INFECTIOUS DISEASES  IP CONSULT TO PODIATRY  IP CONSULT TO VASCULAR SURGERY    MEDICATION:  Scheduled Meds:   ceftaroline fosamil (TEFLARO) IVPB  600 mg Intravenous Q12H    albuterol sulfate HFA  2 puff Inhalation 4x Daily    amLODIPine  5 mg Oral Daily    aspirin  81 mg Oral Daily    furosemide  20 mg Oral Q MWF    gabapentin  300 mg Oral Daily    guaiFENesin  400 mg Oral TID    pantoprazole  40 mg Oral Daily    pravastatin  20 mg Oral Nightly    rOPINIRole  0.5 mg Oral Nightly    sucralfate  1 g Oral TID    sodium chloride flush  10 mL Intravenous 2 times per day    enoxaparin  40 mg Subcutaneous Daily    insulin lispro  0-6 Units Subcutaneous TID WC    insulin lispro  0-3 Units Subcutaneous Nightly    budesonide  0.25 mg Nebulization BID    And    Arformoterol Tartrate  15 mcg Nebulization BID     Continuous Infusions:   sodium chloride 75 mL/hr at 05/15/21 1251    sodium chloride      dextrose       PRN Meds:.hydrALAZINE, sodium chloride flush, sodium chloride, potassium chloride **OR** potassium alternative oral replacement **OR** potassium chloride, magnesium hydroxide, acetaminophen **OR** acetaminophen, glucose, dextrose, glucagon (rDNA), dextrose, trimethobenzamide    RADIOLOGY:  XR FOOT LEFT (MIN 3 VIEWS)   Final Result   No aggressive osseous lesions.   Please note, plain radiograph is insensitive for evaluation of osteomyelitis. MRI FOOT LEFT W WO CONTRAST    (Results Pending)   VL VICTORINA BILATERAL LIMITED 1-2 LEVELS    (Results Pending)       Vitals:    BP (!) 156/88   Pulse 78   Temp 97.9 °F (36.6 °C) (Temporal)   Resp 16   Ht 5' 3\" (1.6 m)   Wt 145 lb (65.8 kg)   LMP 12/03/1997   SpO2 95%   BMI 25.69 kg/m²     LABS:   Recent Labs     05/14/21  1139 05/15/21  0614   WBC 17.5* 11.0   HGB 9.4* 8.8*   HCT 27.7* 26.2*    305     Recent Labs     05/15/21  0614   *   K 4.2   CL 91*   CO2 24   BUN 13   CREATININE 0.7     Recent Labs     05/14/21  1139   PROT 7.0       ASSESSMENTS:   1. LLE cellulitis-POA  2. LLE abscess formation-POA  3. DM with neuropathic changes  4. Equinus    Principal Problem:    Cellulitis of left foot  Active Problems:    DM (diabetes mellitus) ([facility] [address]    HTN (hypertension)         PLAN:    - Patient was examined and evaluated. Reviewed patient's recent lab results and pertinent diagnostic imaging. Reviewed ancillary service notes. - Surgical intervention planned for tomorrow morning with : I&D with removal of all devitalized tissue  - Hyperkeratotic lesion debrided uneventfully using a sharp blade to relieve some of the fluctuance but unfortunately the abscess is more proximal.   - Cultures to be taken in OR  - Vascular: Consulted   - Vascular studies: Pending  - MRI pending  - X-rays: 1. No aggressive osseus lesions. 2.No fractures or dislocations  - Patient was seen and discussed with   - Will continue to follow patient while they are in-house. Thank you for the opportunity to take part in the patient's care. Please do not hesitate to call for any questions or concerns.

## 2021-05-15 NOTE — PROGRESS NOTES
Pharmacy Consultation Note  (Antibiotic Dosing and Monitoring)    Initial consult date: 2021  Consulting physician: Sailaja VEGAS  Drug: Vancomycin  Indication: Cellulitis    Age/  Gender Height Weight IBW Dosing weight  Allergy Information   89 y.o./female 5' 3\" (160 cm) 145 lb (65.8 kg)     Ideal body weight: 52.4 kg (115 lb 8.3 oz)  Adjusted ideal body weight: 57.7 kg (127 lb 5 oz)  65.8 kg  Lisinopril      Temp (24hrs), Av.3 °F (36.3 °C), Min:96.9 °F (36.1 °C), Max:97.6 °F (36.4 °C)          Date  WBC BUN SCr CrCl  (mL/min) Drug/Dose Time   Given Level(s)   (Time) Comments    17.5 17 0.8  40   Vancomycin 1250 mg mg IV  1215                                          No intake or output data in the 24 hours ending 21    Historical Cultures:  Organism   Date Value Ref Range Status   2019 Coagulase Negative Staphylococci (A)  Final     No results for input(s): BC in the last 72 hours. Cultures:  available culture and sensitivity results were reviewed in [facility]    Assessment:  · 80 y.o. female has been initiated Vancomycin.   · Estimated CrCl = 40 mL/min  · Goal trough level = 15-20 mcg/mL    Plan:  · Will initiate vancomycin at a dose of 1000 mg q24h  · Monitor renal function   · Clinical pharmacy to follow      Tona Olson [facility] 2021 8:32 PM

## 2021-05-15 NOTE — PROGRESS NOTES
Pharmacy Consultation Note  (Antibiotic Dosing and Monitoring)    Initial consult date: 2021  Consulting physician/provider: SHASTA Ross  Drug(s): vancomycin   Indication: LLE/foot cellulitis/abscess  Age/Gender IBW DW  Allergy Information   89 y.o./F, 160 cm, 65.8 kg 52.9 kg 58.1 kg  Lisinopril           Date  WBC BUN/CR Drug/Dose Time   Given Level(s)   (Time) Comments    17.5 17/0.8 vancomycin 1250 mg x1 1215     5/15 11 13/0.7 vancomycin 1000 mg q24h;  stopped @ 1520 <1200>       Estimated Creatinine Clearance: 46 mL/min (based on SCr of 0.7 mg/dL). Intake/Output Summary (Last 24 hours) at 5/15/2021 0924  Last data filed at 5/15/2021 0559  Gross per 24 hour   Intake 1814.59 ml   Output --   Net 1814.59 ml       Temp max: Temp (24hrs), Av.5 °F (36.4 °C), Min:96.9 °F (36.1 °C), Max:98.1 °F (36.7 °C)    Assessment:  · Consulted by SHASTA Ross to dose/monitor vancomycin. · Goal trough level:  15-20 mCg/mL; AUC/CHIDI = 400-600 mg/L-hr. · 89 yo/F admitted from [facility] d/t LLE/foot cellulitis and suspected abscess. · Recent admission -2021 for same and treated with fluconazole, cefazolin, cephalexin). · Received vancomycin 1250 mg x1 on  @ 1215 and ordered 1000 mg q24h to start at 1200 on 5/15. Cefepime also started. Plan:  · Continue vancomycin 1000 mg q24h: predicted AUC/CHIDI = 427, Tr = 12.6 mCg/mL. · Will check vancomycin trough level when steady state is attained if vanco continues. · Pharmacist will follow and monitor/adjust dosing as necessary. Rudi Rodríguez PharmD  5/15/2021  9:37 AM    ID stopped vanc and cefepime; started ceftaroline. Clinical Pharmacy sign off.     Ruid Rodríguez PharmD  5/15/2021  7:48 PM

## 2021-05-15 NOTE — PROGRESS NOTES
[place]     UB Dressing Moderate Assist   [place]    LB Dressing Maximal Assist   Donned/doffed briefs  Stand by Assist    Bathing Moderate Assist   Stand by Assist    Toileting Moderate Assist   Stand by Assist    Bed Mobility  Supine to sit: NT   Sit to supine: Min A  Supine to sit: Mod I   Sit to supine: Mod I   Functional Transfers Sit to stand: Min A  Stand to sit: Min A  Toilet Transfer: Min A  Sit to stand: Mod I  Stand to sit: Mod I  Stand pivot: Mod I   Functional Mobility Min A/mod A  Completed in room/bathroom using fww  Mod I using fww  Functional household distance   Balance Sitting:     Static:SBA    Dynamic:CGA  Standing: Min A     Activity Tolerance Fair  Good   Visual/  Perceptual Glasses/corrective lenses: no         Safety       Fair-                  Fair+     Upper Extremities:   Hand Dominance: Right []  Left [] * patient reports she is ambidextrous     ROM and Strength Coordination Short Term Goals=LTG   Right UE Active ROM:  WFL        Strength: 4-/5 Fine/gross Motor Coordination: Impaired, patient with difficulty following commands for testing    Left UE Active ROM:  WFL        Strength: 4-/5 Fine/gross Motor Coordination: Impaired, patient with difficulty following commands for testing        Hearing: WFL  Sensation:  No c/o numbness or tingling  Tone:  WFL  Edema: none observed B UE                            Comments: Upon arrival, patient sitting in chair and agreeable to session with patient's son present. OT evaluation performed with education provided regarding the purpose and benefits of OT session, along with mobility and I/ADL completion. Overall, patient presents with decreased activity tolerance, impaired balance, impaired cognition, and weakness limiting ability to complete ADLs, along with functional mobility/transfers.  Patient would benefit from continued skilled OT to increase safety and functional performance with ADLs/ functional mobility to maximize functional outcomes in order for patient to return to [facility]. At end of session, patient semi-supine in bed with call light and phone within reach, along with all lines and tubes intact. Alarm on. Nursing notified. Treatment: OT treatment provided this date includes:    Bed mobility: Facilitated bed mobility with cues for proper body mechanics and sequencing to prepare for ADL completion. Min A sit>supine with verbal cues for technique.  Functional transfers/mobility: Therapist assessed and modified environment to maximize safety. Instruction/training on safety and improved independence. Min A sit<>stand from various surfaces, including toilet transfer, with verbal/tactile cues for hand placement, positioning, and technique. Min A with occasional mod A for functional mobility from chair<>bathroom and chair>bed using fww. Verbal/tactile cues for attention to task, sequencing, technique, continuation, safety, and positioning. Increased time and effort required.   Skilled monitoring of vitals:  Skilled monitoring of the patient's response throughout treatment. · Eval Complexity: Evaluation Complexity: Mod Complexity  ? History: Expanded review of medical records and additional review of physical, cognitive, or psychosocial history related to current functional performance  ? Exam: 5+ performance deficits  ? Assistance/Modification: mod/max assistance or modifications required to perform tasks. May have comorbidities that affect occupational performance.       Assessment of current deficits   Functional mobility [x]  ADLs [x] Strength [x]  Cognition [x]  Functional transfers  [x] IADLs [x] Safety Awareness [x]  Endurance/Activity tolerance [x]  Fine Motor Coordination [x] Balance [x] Vision/perception [] Sensation []   Gross Motor Coordination [x] ROM [] Delirium []                  Motor Control []    Plan of Care:  OT 1-4x/week for 1-2 weeks PRN   [x] ADL retraining/modified techniques, along with assistive device recommendations to maximize patient safety and performance with self-care while maintaining precautions   [x]Balance retraining/tolerance tasks for facilitation of postural control with dynamic challenges during ADLs and functional activities   [x] Delirium Prevention/treatment to maximize functional outcomes   [x] Cognitive Re-Training/ executive function skills for safe participation in ADLs/IADLs, along with functional activities   [x] Energy conservation techniques/Strategies to improve activity tolerance      [x] Environmental modifications for safe mobility and completion of ADLs    [x] Functional mobility training/DME recommendations for increased performance, safety and fall prevention  while maintaining precautions     [x] Functional transfer/mobility training/DME recommendations for increased performance, safety and fall prevention while maintaining precautions           []Neuromuscular re-education: facilitation of righting/equilibrium reactions, midline orientation, scapular stability/mobility, normalization muscle tone and facilitation active functional movement/Attention   [x] Patient/Family education to increase safety and functional independence   [x] Positioning to improve functional independence, safety, and skin integrity   []Sensory re-education techniques to improve extremity awareness, maintain skin integrity and improve hand function         [x]Splinting/positioning needs to maintain joint/skin integrity and prevent contractures       [x] Therapeutic Activity to improve activity tolerance for functional activities and ADLs    [x]Therapeutic Exercise to improve UE strength and activity tolerance for functional transfers and ADLs   [] Visual/Perceptual retraining to improve body awareness and safety during functional activities and ADLs   []      Rehab Potential:  Good for established goals    Patient / Family Goal: Patient did not verbalize any goals Evaluation Time includes thorough review of current medical information, gathering information on past medical history/social history and prior level of function, completion of standardized testing/informal observation of tasks, assessment of data and education on plan of care and goals. Patient and/or family were instructed on functional diagnosis, prognosis/goals and OT plan of care. Patient demonstrated fair- understanding. Patient's son verbalized good understanding.     Time In: 10:11  Time Out: 10:54  Total Session Time: 43 minutes  Total Treatment Time: 23 minutes    Min Units   OT Eval Low 35166       OT Eval Medium 97166  X 1   OT Eval High [id number]       OT Re-Eval [id number]       Therapeutic Ex 85629       Therapeutic Activities 74409       ADL/Self Care 02402       Orthotic Management 19467       Neuro Re-Ed 88854       Non-Billable Time          Mod OT Evaluation + 23  treatment minutes    PHILIP Alarcon, OTR/L #[id number]

## 2021-05-15 NOTE — PROGRESS NOTES
Informed consent signed by francis Hossein Eduardo with Dr. Michelle Arceo at bedside. Signed consent placed in patient soft chart.

## 2021-05-16 ENCOUNTER — APPOINTMENT (OUTPATIENT)
Dept: CT IMAGING | Age: 86
DRG: 579 | End: 2021-05-16
Payer: MEDICARE

## 2021-05-16 ENCOUNTER — APPOINTMENT (OUTPATIENT)
Dept: MRI IMAGING | Age: 86
DRG: 579 | End: 2021-05-16
Payer: MEDICARE

## 2021-05-16 ENCOUNTER — ANESTHESIA (OUTPATIENT)
Dept: OPERATING ROOM | Age: 86
DRG: 579 | End: 2021-05-16
Payer: MEDICARE

## 2021-05-16 ENCOUNTER — ANESTHESIA EVENT (OUTPATIENT)
Dept: OPERATING ROOM | Age: 86
DRG: 579 | End: 2021-05-16
Payer: MEDICARE

## 2021-05-16 ENCOUNTER — APPOINTMENT (OUTPATIENT)
Dept: GENERAL RADIOLOGY | Age: 86
DRG: 579 | End: 2021-05-16
Payer: MEDICARE

## 2021-05-16 VITALS — OXYGEN SATURATION: 91 % | SYSTOLIC BLOOD PRESSURE: 123 MMHG | DIASTOLIC BLOOD PRESSURE: 57 MMHG

## 2021-05-16 LAB
ALBUMIN SERPL-MCNC: 3.2 G/DL (ref 3.5–5.2)
ALP BLD-CCNC: 85 U/L (ref 35–104)
ALT SERPL-CCNC: 36 U/L (ref 0–32)
ANION GAP SERPL CALCULATED.3IONS-SCNC: 13 MMOL/L (ref 7–16)
ANISOCYTOSIS: ABNORMAL
AST SERPL-CCNC: 39 U/L (ref 0–31)
BASOPHILS ABSOLUTE: 0 E9/L (ref 0–0.2)
BASOPHILS RELATIVE PERCENT: 0.1 % (ref 0–2)
BILIRUB SERPL-MCNC: 0.3 MG/DL (ref 0–1.2)
BUN BLDV-MCNC: 14 MG/DL (ref 6–23)
BURR CELLS: ABNORMAL
CALCIUM SERPL-MCNC: 8.8 MG/DL (ref 8.6–10.2)
CHLORIDE BLD-SCNC: 89 MMOL/L (ref 98–107)
CO2: 23 MMOL/L (ref 22–29)
CREAT SERPL-MCNC: 0.7 MG/DL (ref 0.5–1)
D DIMER: 635 NG/ML DDU
EOSINOPHILS ABSOLUTE: 0 E9/L (ref 0.05–0.5)
EOSINOPHILS RELATIVE PERCENT: 0 % (ref 0–6)
GFR AFRICAN AMERICAN: >60
GFR NON-AFRICAN AMERICAN: >60 ML/MIN/1.73
GLUCOSE BLD-MCNC: 231 MG/DL (ref 74–99)
HCT VFR BLD CALC: 28 % (ref 34–48)
HCT VFR BLD CALC: 28.4 % (ref 34–48)
HCT VFR BLD CALC: 32.1 % (ref 34–48)
HEMOGLOBIN: 10.5 G/DL (ref 11.5–15.5)
HEMOGLOBIN: 9.4 G/DL (ref 11.5–15.5)
HEMOGLOBIN: 9.5 G/DL (ref 11.5–15.5)
LYMPHOCYTES ABSOLUTE: 0.57 E9/L (ref 1.5–4)
LYMPHOCYTES RELATIVE PERCENT: 4.3 % (ref 20–42)
MCH RBC QN AUTO: 32 PG (ref 26–35)
MCHC RBC AUTO-ENTMCNC: 33.6 % (ref 32–34.5)
MCV RBC AUTO: 95.2 FL (ref 80–99.9)
METER GLUCOSE: 238 MG/DL (ref 74–99)
METER GLUCOSE: 240 MG/DL (ref 74–99)
METER GLUCOSE: 302 MG/DL (ref 74–99)
MONOCYTES ABSOLUTE: 0.57 E9/L (ref 0.1–0.95)
MONOCYTES RELATIVE PERCENT: 3.5 % (ref 2–12)
NEUTROPHILS ABSOLUTE: 13.06 E9/L (ref 1.8–7.3)
NEUTROPHILS RELATIVE PERCENT: 92.2 % (ref 43–80)
PDW BLD-RTO: 13.1 FL (ref 11.5–15)
PLATELET # BLD: 386 E9/L (ref 130–450)
PMV BLD AUTO: 10.2 FL (ref 7–12)
POIKILOCYTES: ABNORMAL
POLYCHROMASIA: ABNORMAL
POTASSIUM REFLEX MAGNESIUM: 3.7 MMOL/L (ref 3.5–5)
RBC # BLD: 2.94 E12/L (ref 3.5–5.5)
SODIUM BLD-SCNC: 125 MMOL/L (ref 132–146)
TOTAL PROTEIN: 6.7 G/DL (ref 6.4–8.3)
WBC # BLD: 14.2 E9/L (ref 4.5–11.5)

## 2021-05-16 PROCEDURE — 3700000000 HC ANESTHESIA ATTENDED CARE: Performed by: PODIATRIST

## 2021-05-16 PROCEDURE — 1200000000 HC SEMI PRIVATE

## 2021-05-16 PROCEDURE — 3700000001 HC ADD 15 MINUTES (ANESTHESIA): Performed by: PODIATRIST

## 2021-05-16 PROCEDURE — 2580000003 HC RX 258: Performed by: NURSE ANESTHETIST, CERTIFIED REGISTERED

## 2021-05-16 PROCEDURE — 6360000002 HC RX W HCPCS: Performed by: INTERNAL MEDICINE

## 2021-05-16 PROCEDURE — 2500000003 HC RX 250 WO HCPCS: Performed by: NURSE ANESTHETIST, CERTIFIED REGISTERED

## 2021-05-16 PROCEDURE — 87077 CULTURE AEROBIC IDENTIFY: CPT

## 2021-05-16 PROCEDURE — 6370000000 HC RX 637 (ALT 250 FOR IP): Performed by: NURSE PRACTITIONER

## 2021-05-16 PROCEDURE — 6370000000 HC RX 637 (ALT 250 FOR IP): Performed by: FAMILY MEDICINE

## 2021-05-16 PROCEDURE — 2709999900 HC NON-CHARGEABLE SUPPLY: Performed by: PODIATRIST

## 2021-05-16 PROCEDURE — 85025 COMPLETE CBC W/AUTO DIFF WBC: CPT

## 2021-05-16 PROCEDURE — 82962 GLUCOSE BLOOD TEST: CPT

## 2021-05-16 PROCEDURE — 99223 1ST HOSP IP/OBS HIGH 75: CPT | Performed by: SURGERY

## 2021-05-16 PROCEDURE — 3600000002 HC SURGERY LEVEL 2 BASE: Performed by: PODIATRIST

## 2021-05-16 PROCEDURE — 7100000001 HC PACU RECOVERY - ADDTL 15 MIN: Performed by: PODIATRIST

## 2021-05-16 PROCEDURE — 6360000002 HC RX W HCPCS: Performed by: NURSE PRACTITIONER

## 2021-05-16 PROCEDURE — 36415 COLL VENOUS BLD VENIPUNCTURE: CPT

## 2021-05-16 PROCEDURE — 2500000003 HC RX 250 WO HCPCS: Performed by: PODIATRIST

## 2021-05-16 PROCEDURE — 73630 X-RAY EXAM OF FOOT: CPT

## 2021-05-16 PROCEDURE — 87205 SMEAR GRAM STAIN: CPT

## 2021-05-16 PROCEDURE — 87075 CULTR BACTERIA EXCEPT BLOOD: CPT

## 2021-05-16 PROCEDURE — 87186 SC STD MICRODIL/AGAR DIL: CPT

## 2021-05-16 PROCEDURE — 87070 CULTURE OTHR SPECIMN AEROBIC: CPT

## 2021-05-16 PROCEDURE — 2700000000 HC OXYGEN THERAPY PER DAY

## 2021-05-16 PROCEDURE — 71045 X-RAY EXAM CHEST 1 VIEW: CPT

## 2021-05-16 PROCEDURE — 2580000003 HC RX 258: Performed by: INTERNAL MEDICINE

## 2021-05-16 PROCEDURE — 85014 HEMATOCRIT: CPT

## 2021-05-16 PROCEDURE — 94640 AIRWAY INHALATION TREATMENT: CPT

## 2021-05-16 PROCEDURE — 2580000003 HC RX 258: Performed by: PHYSICIAN ASSISTANT

## 2021-05-16 PROCEDURE — 7100000000 HC PACU RECOVERY - FIRST 15 MIN: Performed by: PODIATRIST

## 2021-05-16 PROCEDURE — 6370000000 HC RX 637 (ALT 250 FOR IP): Performed by: INTERNAL MEDICINE

## 2021-05-16 PROCEDURE — 6370000000 HC RX 637 (ALT 250 FOR IP): Performed by: PHYSICIAN ASSISTANT

## 2021-05-16 PROCEDURE — 6360000002 HC RX W HCPCS: Performed by: NURSE ANESTHETIST, CERTIFIED REGISTERED

## 2021-05-16 PROCEDURE — 80053 COMPREHEN METABOLIC PANEL: CPT

## 2021-05-16 PROCEDURE — 85378 FIBRIN DEGRADE SEMIQUANT: CPT

## 2021-05-16 PROCEDURE — 6360000002 HC RX W HCPCS: Performed by: PHYSICIAN ASSISTANT

## 2021-05-16 PROCEDURE — 85018 HEMOGLOBIN: CPT

## 2021-05-16 PROCEDURE — 70450 CT HEAD/BRAIN W/O DYE: CPT

## 2021-05-16 PROCEDURE — 3600000012 HC SURGERY LEVEL 2 ADDTL 15MIN: Performed by: PODIATRIST

## 2021-05-16 PROCEDURE — 0S9L0ZZ DRAINAGE OF LEFT TARSOMETATARSAL JOINT, OPEN APPROACH: ICD-10-PCS | Performed by: PODIATRIST

## 2021-05-16 RX ORDER — TRAMADOL HYDROCHLORIDE 50 MG/1
50 TABLET ORAL EVERY 6 HOURS PRN
Status: DISCONTINUED | OUTPATIENT
Start: 2021-05-16 | End: 2021-05-25 | Stop reason: HOSPADM

## 2021-05-16 RX ORDER — IPRATROPIUM BROMIDE AND ALBUTEROL SULFATE 2.5; .5 MG/3ML; MG/3ML
1 SOLUTION RESPIRATORY (INHALATION)
Status: DISCONTINUED | OUTPATIENT
Start: 2021-05-16 | End: 2021-05-25 | Stop reason: HOSPADM

## 2021-05-16 RX ORDER — PROPOFOL 10 MG/ML
INJECTION, EMULSION INTRAVENOUS PRN
Status: DISCONTINUED | OUTPATIENT
Start: 2021-05-16 | End: 2021-05-16 | Stop reason: SDUPTHER

## 2021-05-16 RX ORDER — MIDAZOLAM HYDROCHLORIDE 1 MG/ML
INJECTION INTRAMUSCULAR; INTRAVENOUS PRN
Status: DISCONTINUED | OUTPATIENT
Start: 2021-05-16 | End: 2021-05-16 | Stop reason: SDUPTHER

## 2021-05-16 RX ORDER — GUAIFENESIN/DEXTROMETHORPHAN 100-10MG/5
5 SYRUP ORAL EVERY 4 HOURS PRN
Status: DISCONTINUED | OUTPATIENT
Start: 2021-05-16 | End: 2021-05-25 | Stop reason: HOSPADM

## 2021-05-16 RX ORDER — FUROSEMIDE 10 MG/ML
20 INJECTION INTRAMUSCULAR; INTRAVENOUS 2 TIMES DAILY
Status: DISCONTINUED | OUTPATIENT
Start: 2021-05-16 | End: 2021-05-18

## 2021-05-16 RX ORDER — KETAMINE HCL IN NACL, ISO-OSM 100MG/10ML
SYRINGE (ML) INJECTION PRN
Status: DISCONTINUED | OUTPATIENT
Start: 2021-05-16 | End: 2021-05-16 | Stop reason: SDUPTHER

## 2021-05-16 RX ORDER — SODIUM CHLORIDE 9 MG/ML
INJECTION, SOLUTION INTRAVENOUS CONTINUOUS PRN
Status: DISCONTINUED | OUTPATIENT
Start: 2021-05-16 | End: 2021-05-16 | Stop reason: SDUPTHER

## 2021-05-16 RX ORDER — PROPOFOL 10 MG/ML
INJECTION, EMULSION INTRAVENOUS CONTINUOUS PRN
Status: DISCONTINUED | OUTPATIENT
Start: 2021-05-16 | End: 2021-05-16 | Stop reason: SDUPTHER

## 2021-05-16 RX ORDER — FENTANYL CITRATE 50 UG/ML
INJECTION, SOLUTION INTRAMUSCULAR; INTRAVENOUS PRN
Status: DISCONTINUED | OUTPATIENT
Start: 2021-05-16 | End: 2021-05-16 | Stop reason: SDUPTHER

## 2021-05-16 RX ORDER — ONDANSETRON 2 MG/ML
4 INJECTION INTRAMUSCULAR; INTRAVENOUS
Status: DISCONTINUED | OUTPATIENT
Start: 2021-05-16 | End: 2021-05-16 | Stop reason: HOSPADM

## 2021-05-16 RX ORDER — PROMETHAZINE HYDROCHLORIDE 25 MG/ML
6.25 INJECTION, SOLUTION INTRAMUSCULAR; INTRAVENOUS
Status: DISCONTINUED | OUTPATIENT
Start: 2021-05-16 | End: 2021-05-16 | Stop reason: HOSPADM

## 2021-05-16 RX ORDER — PHENYLEPHRINE HCL IN 0.9% NACL 1 MG/10 ML
SYRINGE (ML) INTRAVENOUS PRN
Status: DISCONTINUED | OUTPATIENT
Start: 2021-05-16 | End: 2021-05-16 | Stop reason: SDUPTHER

## 2021-05-16 RX ORDER — FUROSEMIDE 10 MG/ML
40 INJECTION INTRAMUSCULAR; INTRAVENOUS ONCE
Status: COMPLETED | OUTPATIENT
Start: 2021-05-16 | End: 2021-05-16

## 2021-05-16 RX ORDER — ACETAMINOPHEN 650 MG
TABLET, EXTENDED RELEASE ORAL PRN
Status: DISCONTINUED | OUTPATIENT
Start: 2021-05-16 | End: 2021-05-16 | Stop reason: ALTCHOICE

## 2021-05-16 RX ORDER — NALOXONE HYDROCHLORIDE 0.4 MG/ML
INJECTION, SOLUTION INTRAMUSCULAR; INTRAVENOUS; SUBCUTANEOUS PRN
Status: DISCONTINUED | OUTPATIENT
Start: 2021-05-16 | End: 2021-05-16 | Stop reason: SDUPTHER

## 2021-05-16 RX ORDER — BUPIVACAINE HYDROCHLORIDE 5 MG/ML
INJECTION, SOLUTION EPIDURAL; INTRACAUDAL PRN
Status: DISCONTINUED | OUTPATIENT
Start: 2021-05-16 | End: 2021-05-16 | Stop reason: ALTCHOICE

## 2021-05-16 RX ADMIN — FENTANYL CITRATE 50 MCG: 50 INJECTION, SOLUTION INTRAMUSCULAR; INTRAVENOUS at 14:24

## 2021-05-16 RX ADMIN — ARFORMOTEROL TARTRATE 15 MCG: 15 SOLUTION RESPIRATORY (INHALATION) at 08:41

## 2021-05-16 RX ADMIN — NALOXONE HYDROCHLORIDE 0.08 MG: 0.4 INJECTION, SOLUTION INTRAMUSCULAR; INTRAVENOUS; SUBCUTANEOUS at 15:13

## 2021-05-16 RX ADMIN — Medication 10 ML: at 21:04

## 2021-05-16 RX ADMIN — SODIUM CHLORIDE, PRESERVATIVE FREE 10 ML: 5 INJECTION INTRAVENOUS at 18:43

## 2021-05-16 RX ADMIN — FUROSEMIDE 20 MG: 10 INJECTION, SOLUTION INTRAMUSCULAR; INTRAVENOUS at 18:43

## 2021-05-16 RX ADMIN — IPRATROPIUM BROMIDE AND ALBUTEROL SULFATE 1 AMPULE: 2.5; .5 SOLUTION RESPIRATORY (INHALATION) at 12:24

## 2021-05-16 RX ADMIN — SUCRALFATE 1 G: 1 TABLET ORAL at 17:15

## 2021-05-16 RX ADMIN — ALBUTEROL SULFATE 2 PUFF: 108 INHALANT RESPIRATORY (INHALATION) at 09:08

## 2021-05-16 RX ADMIN — PROPOFOL 30 MCG/KG/MIN: 10 INJECTION, EMULSION INTRAVENOUS at 14:30

## 2021-05-16 RX ADMIN — BUDESONIDE 250 MCG: 0.25 SUSPENSION RESPIRATORY (INHALATION) at 08:42

## 2021-05-16 RX ADMIN — Medication 100 MCG: at 15:05

## 2021-05-16 RX ADMIN — GUAIFENESIN 400 MG: 400 TABLET ORAL at 09:10

## 2021-05-16 RX ADMIN — CEFTAROLINE FOSAMIL 600 MG: 600 POWDER, FOR SOLUTION INTRAVENOUS at 03:12

## 2021-05-16 RX ADMIN — GABAPENTIN 300 MG: 300 CAPSULE ORAL at 09:09

## 2021-05-16 RX ADMIN — INSULIN LISPRO 2 UNITS: 100 INJECTION, SOLUTION INTRAVENOUS; SUBCUTANEOUS at 21:04

## 2021-05-16 RX ADMIN — FENTANYL CITRATE 50 MCG: 50 INJECTION, SOLUTION INTRAMUSCULAR; INTRAVENOUS at 14:44

## 2021-05-16 RX ADMIN — INSULIN LISPRO 2 UNITS: 100 INJECTION, SOLUTION INTRAVENOUS; SUBCUTANEOUS at 17:37

## 2021-05-16 RX ADMIN — Medication 10 ML: at 09:08

## 2021-05-16 RX ADMIN — IPRATROPIUM BROMIDE AND ALBUTEROL SULFATE 1 AMPULE: .5; 3 SOLUTION RESPIRATORY (INHALATION) at 08:40

## 2021-05-16 RX ADMIN — ASPIRIN 81 MG: 81 TABLET, COATED ORAL at 09:09

## 2021-05-16 RX ADMIN — FENTANYL CITRATE 50 MCG: 50 INJECTION, SOLUTION INTRAMUSCULAR; INTRAVENOUS at 14:30

## 2021-05-16 RX ADMIN — Medication 20 MG: at 14:35

## 2021-05-16 RX ADMIN — ARFORMOTEROL TARTRATE 15 MCG: 15 SOLUTION RESPIRATORY (INHALATION) at 19:16

## 2021-05-16 RX ADMIN — PRAVASTATIN SODIUM 20 MG: 20 TABLET ORAL at 21:04

## 2021-05-16 RX ADMIN — AMLODIPINE BESYLATE 5 MG: 5 TABLET ORAL at 09:09

## 2021-05-16 RX ADMIN — HYDRALAZINE HYDROCHLORIDE 10 MG: 20 INJECTION, SOLUTION INTRAMUSCULAR; INTRAVENOUS at 09:10

## 2021-05-16 RX ADMIN — GUAIFENESIN 400 MG: 400 TABLET ORAL at 21:04

## 2021-05-16 RX ADMIN — SUCRALFATE 1 G: 1 TABLET ORAL at 21:04

## 2021-05-16 RX ADMIN — SODIUM CHLORIDE: 9 INJECTION, SOLUTION INTRAVENOUS at 14:19

## 2021-05-16 RX ADMIN — IPRATROPIUM BROMIDE AND ALBUTEROL SULFATE 1 AMPULE: 2.5; .5 SOLUTION RESPIRATORY (INHALATION) at 19:16

## 2021-05-16 RX ADMIN — Medication 200 MCG: at 14:52

## 2021-05-16 RX ADMIN — Medication 100 MCG: at 15:00

## 2021-05-16 RX ADMIN — PANTOPRAZOLE SODIUM 40 MG: 40 TABLET, DELAYED RELEASE ORAL at 09:10

## 2021-05-16 RX ADMIN — GUAIFENESIN 400 MG: 400 TABLET ORAL at 17:14

## 2021-05-16 RX ADMIN — BUDESONIDE 250 MCG: 0.25 SUSPENSION RESPIRATORY (INHALATION) at 19:16

## 2021-05-16 RX ADMIN — NALOXONE HYDROCHLORIDE 0.08 MG: 0.4 INJECTION, SOLUTION INTRAMUSCULAR; INTRAVENOUS; SUBCUTANEOUS at 15:05

## 2021-05-16 RX ADMIN — SODIUM CHLORIDE: 9 INJECTION, SOLUTION INTRAVENOUS at 15:11

## 2021-05-16 RX ADMIN — CEFTAROLINE FOSAMIL 600 MG: 600 POWDER, FOR SOLUTION INTRAVENOUS at 17:15

## 2021-05-16 RX ADMIN — GUAIFENESIN SYRUP AND DEXTROMETHORPHAN 5 ML: 100; 10 SYRUP ORAL at 17:15

## 2021-05-16 RX ADMIN — FUROSEMIDE 40 MG: 10 INJECTION, SOLUTION INTRAMUSCULAR; INTRAVENOUS at 01:31

## 2021-05-16 RX ADMIN — MIDAZOLAM 1 MG: 1 INJECTION INTRAMUSCULAR; INTRAVENOUS at 14:35

## 2021-05-16 RX ADMIN — FUROSEMIDE 20 MG: 10 INJECTION, SOLUTION INTRAMUSCULAR; INTRAVENOUS at 13:46

## 2021-05-16 RX ADMIN — PROPOFOL 20 MG: 10 INJECTION, EMULSION INTRAVENOUS at 14:29

## 2021-05-16 RX ADMIN — IPRATROPIUM BROMIDE AND ALBUTEROL SULFATE 1 AMPULE: 2.5; .5 SOLUTION RESPIRATORY (INHALATION) at 16:46

## 2021-05-16 RX ADMIN — ENOXAPARIN SODIUM 40 MG: 40 INJECTION SUBCUTANEOUS at 09:09

## 2021-05-16 ASSESSMENT — PULMONARY FUNCTION TESTS
PIF_VALUE: 1
PIF_VALUE: 0
PIF_VALUE: 1
PIF_VALUE: 0
PIF_VALUE: 1
PIF_VALUE: 0
PIF_VALUE: 0

## 2021-05-16 ASSESSMENT — ENCOUNTER SYMPTOMS: SHORTNESS OF BREATH: 1

## 2021-05-16 NOTE — PROGRESS NOTES
Late entry- At 0530 notified CRNP of increased confusion. Am Labs ordered and patient currently off unit for head CT. Patient verbalized understanding of test and reasoning for it.

## 2021-05-16 NOTE — PROGRESS NOTES
Department of Internal Medicine  Infectious Diseases  Progress  Note    C/C  : Right leg cellulitis , right foot abscess     Pt is back from surgery   Sleeping   Afebrile   Discussed with pt's daughter       Current Facility-Administered Medications   Medication Dose Route Frequency Provider Last Rate Last Admin    furosemide (LASIX) injection 20 mg  20 mg Intravenous BID Irene Vasquez MD   20 mg at 05/16/21 1346    ipratropium-albuterol (DUONEB) nebulizer solution 1 ampule  1 ampule Inhalation Q4H WA Irene Vasquez MD   1 ampule at 05/16/21 1646    guaiFENesin-dextromethorphan (ROBITUSSIN DM) 100-10 MG/5ML syrup 5 mL  5 mL Oral Q4H PRN Carylon Sabmendy Ruth, APRN - CNP        traMADol Cb Suhail) tablet 50 mg  50 mg Oral Q6H PRN Jennifer Valadez MD        hydrALAZINE (APRESOLINE) injection 10 mg  10 mg Intravenous Q6H PRN Irene Vasquez MD   10 mg at 05/16/21 0910    ceftaroline fosamil (TEFLARO) 600 mg in dextrose 5 % 50 mL IVPB  600 mg Intravenous Q12H Jose Bautista MD   Stopped at 05/16/21 0426    amLODIPine (NORVASC) tablet 5 mg  5 mg Oral Daily SHASTA Maharaj   5 mg at 05/16/21 0909    aspirin EC tablet 81 mg  81 mg Oral Daily SHASTA Maharaj   81 mg at 05/16/21 0909    [Held by provider] furosemide (LASIX) tablet 20 mg  20 mg Oral Q MWF SHASTA Bui        gabapentin (NEURONTIN) capsule 300 mg  300 mg Oral Daily SHASTA Maharaj   300 mg at 05/16/21 4798    guaiFENesin tablet 400 mg  400 mg Oral TID SHASTA Maharaj   400 mg at 05/16/21 0910    pantoprazole (PROTONIX) tablet 40 mg  40 mg Oral Daily SHASTA Maharaj   40 mg at 05/16/21 0910    pravastatin (PRAVACHOL) tablet 20 mg  20 mg Oral Nightly SHASTA Maharaj   20 mg at 05/15/21 1953    sucralfate (CARAFATE) tablet 1 g  1 g Oral TID SHASTA Maharaj   1 g at 05/15/21 1951    sodium chloride flush 0.9 % injection 10 mL  10 mL Intravenous 2 times per day SHASTA Maharaj   10 mL at 05/16/21 0908    sodium chloride flush 0.9 % injection 10 mL  10 mL Intravenous PRN Curly Minor, PA        0.9 % sodium chloride infusion  25 mL Intravenous PRN Curly Minor, PA        potassium chloride (KLOR-CON M) extended release tablet 40 mEq  40 mEq Oral PRN Curly Minor, PA        Or    potassium bicarb-citric acid (EFFER-K) effervescent tablet 40 mEq  40 mEq Oral PRN Curly Minor, PA        Or    potassium chloride 10 mEq/100 mL IVPB (Peripheral Line)  10 mEq Intravenous PRN Curly Minor, PA        enoxaparin (LOVENOX) injection 40 mg  40 mg Subcutaneous Daily Curly Minor, PA   40 mg at 05/16/21 0909    magnesium hydroxide (MILK OF MAGNESIA) 400 MG/5ML suspension 30 mL  30 mL Oral Daily PRN Curly Minor, PA        acetaminophen (TYLENOL) tablet 650 mg  650 mg Oral Q6H PRN Curly Minor, PA   650 mg at 05/15/21 1613    Or    acetaminophen (TYLENOL) suppository 650 mg  650 mg Rectal Q6H PRN Curly Minor, PA        glucose (GLUTOSE) 40 % oral gel 15 g  15 g Oral PRN Curly Minor, PA        dextrose 50 % IV solution  12.5 g Intravenous PRN Curly Minor, PA        glucagon (rDNA) injection 1 mg  1 mg Intramuscular PRN Curly Minor, PA        dextrose 5 % solution  100 mL/hr Intravenous PRN Curly Minor, PA        insulin lispro (HUMALOG) injection vial 0-6 Units  0-6 Units Subcutaneous TID WC Curly Minor, PA   2 Units at 05/15/21 1842    insulin lispro (HUMALOG) injection vial 0-3 Units  0-3 Units Subcutaneous Nightly Curly Minor, PA   1 Units at 05/15/21 2134    trimethobenzamide (TIGAN) injection 200 mg  200 mg Intramuscular Q6H PRN Curly Minor, PA        budesonide (PULMICORT) nebulizer suspension 250 mcg  0.25 mg Nebulization BID Curly Minor, PA   250 mcg at 05/16/21 5878    And    Arformoterol Tartrate (BROVANA) nebulizer solution 15 mcg  15 mcg Nebulization BID Curly Minor, PA   15 mcg at 05/16/21 0841           REVIEW OF SYSTEMS:    CONSTITUTIONAL:  Denies fever, chill or rigors  HEENT: denies blurring of vision or double vision, denies hearing problem  RESPIRATORY: denies cough, shortness of breath, sputum expectoration, chest pain. CARDIOVASCULAR:  Denies palpitation  GASTROINTESTINAL:  Denies abdomen pain, diarrhea or constipation. GENITOURINARY:  Denies burning urination or frequency of urination  INTEGUMENT left foot wound   HEMATOLOGIC/LYMPHATIC:  Denies lymph node swelling, gum bleeding or easy bruising. MUSCULOSKELETAL:  Left leg pain, swelling , redness   NEUROLOGICAL:  Denies light headed, dizziness, loss of consciousness    PHYSICAL EXAM:      Vitals:     Vitals:    05/16/21 1630   BP: (!) 126/106   Pulse: 80   Resp: 20   Temp: 97.2 °F (36.2 °C)   SpO2: 96%       General Appearance:    Sleepy . Head:    Normocephalic, atraumatic   Eyes:    No pallor, no icterus,   Ears:    No obvious deformity or drainage.    Nose:   No nasal drainage   Throat:   Mucosa moist, no oral thrush   Neck:   Supple, no lymphadenopathy   Back:     no CVA tenderness   Lungs:     Bilateral wheeze    Heart:    Regular rate and rhythm, no murmur   Abdomen:     Soft, non-tender, bowel sounds present    Extremities:    Left foot / leg -wrapped    Pulses:   Dorsalis pedis palpable    Skin:   No erythema      CBC with Differential:      Lab Results   Component Value Date    WBC 14.2 05/16/2021    RBC 2.94 05/16/2021    HGB 10.5 05/16/2021    HCT 32.1 05/16/2021     05/16/2021    MCV 95.2 05/16/2021    MCH 32.0 05/16/2021    MCHC 33.6 05/16/2021    RDW 13.1 05/16/2021    LYMPHOPCT 4.3 05/16/2021    MONOPCT 3.5 05/16/2021    BASOPCT 0.1 05/16/2021    MONOSABS 0.57 05/16/2021    LYMPHSABS 0.57 05/16/2021    EOSABS 0.00 05/16/2021    BASOSABS 0.00 05/16/2021       CMP     Lab Results   Component Value Date     05/16/2021    K 3.7 05/16/2021    CL 89 05/16/2021    CO2 23 05/16/2021    BUN 14 05/16/2021    CREATININE 0.7 05/16/2021    GFRAA >60 05/16/2021    LABGLOM >60 05/16/2021    GLUCOSE 231 05/16/2021    PROT 6.7 05/16/2021    LABALBU 3.2 05/16/2021    CALCIUM 8.8 05/16/2021    BILITOT 0.3 05/16/2021    ALKPHOS 85 05/16/2021    AST 39 05/16/2021    ALT 36 05/16/2021         Hepatic Function Panel:    Lab Results   Component Value Date    ALKPHOS 85 05/16/2021    ALT 36 05/16/2021    AST 39 05/16/2021    PROT 6.7 05/16/2021    BILITOT 0.3 05/16/2021    LABALBU 3.2 05/16/2021       PT/INR:    Lab Results   Component Value Date    PROTIME 11.1 11/23/2014    INR 1.1 11/23/2014       TSH:    Lab Results   Component Value Date    TSH 0.898 08/30/2017       U/A:    Lab Results   Component Value Date    COLORU Yellow 03/24/2021    PHUR 8.0 03/24/2021    WBCUA 0-1 03/24/2021    RBCUA 0-1 03/24/2021    RBCUA 5-10 09/02/2012    BACTERIA RARE 03/24/2021    CLARITYU Clear 03/24/2021    SPECGRAV 1.015 03/24/2021    LEUKOCYTESUR TRACE 03/24/2021    UROBILINOGEN 0.2 03/24/2021    BILIRUBINUR Negative 03/24/2021    BLOODU Negative 03/24/2021    GLUCOSEU 100 03/24/2021       ABG:  No results found for: UHG9QCV, BEART, R2RPNHGK, PHART, THGBART, RIK1RGE, PO2ART, WAZ9QST    MICROBIOLOGY:    Blood culture - neg to date   Urine Culture -      Radiology :    X ray left foot - No aggressive osseous lesions    IMPRESSION:     1. Diabetic foot infection, left foot / leg cellulitis , abscess s/p I & D ( 5/16)   2. Leukocytosis - improved     RECOMMENDATIONS:      1. Teflaro 600 mg IV q 12 hrs   2.  Await cx

## 2021-05-16 NOTE — CONSULTS
activity: Not Currently     Partners: Male   Other Topics Concern    Not on file   Social History Narrative    Not on file     Social Determinants of Health     Financial Resource Strain:     Difficulty of Paying Living Expenses:    Food Insecurity:     Worried About Running Out of Food in the Last Year:     [address] in the Last Year:    Transportation Needs:     Lack of Transportation (Medical):      Lack of Transportation (Non-Medical):    Physical Activity:     Days of Exercise per Week:     Minutes of Exercise per Session:    Stress:     Feeling of Stress :    Social Connections:     Frequency of Communication with Friends and Family:     Frequency of Social Gatherings with Friends and Family:     Attends [employer] Services:     Active Member of Clubs or Organizations:     Attends Club or Organization Meetings:     Marital Status:    Intimate Partner Violence:     Fear of Current or Ex-Partner:     Emotionally Abused:     Physically Abused:     Sexually Abused:         Family History   Problem Relation Age of Onset    Hypertension Father     Heart Disease Brother        PHYSICAL EXAM:    BP (!) 156/88   Pulse 78   Temp 97.9 °F (36.6 °C) (Temporal)   Resp 16   Ht 5' 3\" (1.6 m)   Wt 145 lb (65.8 kg)   LMP 12/03/1997   SpO2 96%   BMI 25.69 kg/m²   CONSTITUTIONAL:  awake, alert, cooperative, no apparent distress, and appears stated age  NEURO:  Normal  EYES:  lids and lashes normal, sclera clear and conjunctiva normal  ENT:  normocepalic, without obvious abnormality, external ears without lesions  NECK:  supple, symmetrical, trachea midline, no jugular venous distension  LUNGS:  no increased work of breathing  CARDIOVASCULAR:  regular rate and rhythm  ABDOMEN:  soft, non-distended, non-tender  SKIN:  no bruising or bleeding    EXTREMITIES:    R UE Swelling absent Incisions absent       5/5 Strength    L UE Swelling absent Incisions absent       5/5 Strength    R LE Edema absent Incisions absent    Varicose veins absent    Wounds absent    normalcaprefill   5/5 Strength   Neuropathy is absent    L LE Edema present     Incisions absent    Varicose veins absent    Wounds present    normalcaprefill   5/5 Strength   Neuropathy is absent    R brachial 2 L brachial 2   R radial 2 L radial 2   R femoral 1 L femoral 1   R popliteal 1 L popliteal 1   R posterior tibial biphasic L posterior tibial biphasic   R dorsalis pedis biphasic L dorsalis pedis Biphasic        LABS:    Lab Results   Component Value Date    WBC 11.0 05/15/2021    HGB 8.8 (L) 05/15/2021    HCT 26.2 (L) 05/15/2021     05/15/2021    PROTIME 11.1 11/23/2014    INR 1.1 11/23/2014    K 4.2 05/15/2021    BUN 13 05/15/2021    CREATININE 0.7 05/15/2021       RADIOLOGY:  XR FOOT LEFT (MIN 3 VIEWS)    Result Date: 5/14/2021  EXAMINATION: THREE XRAY VIEWS OF THE LEFT FOOT 5/14/2021 1:09 pm COMPARISON: Foot radiograph May 9, 2021 HISTORY: ORDERING SYSTEM PROVIDED HISTORY: Abscess TECHNOLOGIST PROVIDED HISTORY: Reason for exam:->Abscess FINDINGS: There is no evidence of acute fracture. There is normal alignment of the tarsometatarsal joints. No acute joint abnormality. No aggressive osseous lesion. No focal soft tissue abnormality. No aggressive osseous lesions. Please note, plain radiograph is insensitive for evaluation of osteomyelitis.          Assesment/Plan  80 y.o. female with left lower extremity cellulitis and nonhealing diabetic foot ulcer with abscess formation    -Follow-up ABIs  -Do not anticipating further intervention from a vascular standpoint for this patient  -Cleared for podiatry procedure tomorrow    Discussed with Dr. Tayler oMnique MD  5/15/21  8:37 PM EDT

## 2021-05-16 NOTE — PROGRESS NOTES
Call placed to Dr. Cira Perry to relay that patient was unable to stay still for MRI. She continued to move and was unable to be redirected.

## 2021-05-16 NOTE — PROGRESS NOTES
Continues with intermittent confusion, worse when waking. Easily reoriented and remains pleasant with care. Continues on 3 L NC. Respirations easy and unlabored. Purwick in place for diuresing.

## 2021-05-16 NOTE — BRIEF OP NOTE
Brief Postoperative Note      Patient: Adriana Barger  YOB: 1927  MRN: [id number]    Date of Procedure: 5/16/2021    Pre-Op Diagnosis: diabetic foot abscess    Post-Op Diagnosis: Same       Procedure(s):  FOOT DEBRIDEMENT INCISION AND DRAINAGE. WITH PULSE LAVAGE AND PACKING    Surgeon(s):  Baron Chuy DPM    Assistant:  Resident: Genesis Nelson MD    Anesthesia: Monitor Anesthesia Care    Estimated Blood Loss (mL): Minimal    Complications: Other: DECREASED BLEEDING    Specimens:   ID Type Source Tests Collected by Time Destination   1 : Left foot culture superficial Tissue Tissue CULTURE, ANAEROBIC, CULTURE, FUNGUS, GRAM STAIN, CULTURE, SURGICAL, CULTURE WITH SMEAR, ACID FAST BACILLIUS Baron Vera, SWAPNA 5/16/2021 1450    2 : Left foot culture deep Tissue Tissue CULTURE, ANAEROBIC, CULTURE, FUNGUS, GRAM STAIN, CULTURE, SURGICAL, CULTURE WITH SMEAR, ACID FAST JAYDA Vera, SWAPNA 5/16/2021 1453    3 : Post lavage cultures of left foot Tissue Tissue CULTURE, ANAEROBIC, CULTURE, FUNGUS, GRAM STAIN, CULTURE, SURGICAL, CULTURE WITH SMEAR, ACID FAST Bailey Cash, MARTIRM 5/16/2021 1502        Implants:  NONE  Drains:   Urethral Catheter Non-latex (Active)       Findings: PURULENCE AND PUS.  MINIMAL BLEEDING NOTED DURING ENTIRE PROCEDURE    Electronically signed by Genesis Nelson MD on 5/16/2021 at 3:13 PM

## 2021-05-16 NOTE — PROGRESS NOTES
Call placed to Dr. Lino Bolden relaying respiratory assessment and asking if she wanted another dose of lasix ordered. Awaiting orders or callback at this time.

## 2021-05-16 NOTE — ANESTHESIA PRE PROCEDURE
APRN - CNP   furosemide (LASIX) 20 MG tablet Take 0.5 tablets by mouth daily  Patient taking differently: Take 20 mg by mouth MWF 5/9/19   Prasad Warren MD   pravastatin (PRAVACHOL) 20 MG tablet TAKE 1 TABLET BY MOUTH  NIGHTLY 5/6/19   Prasad Warren MD   amLODIPine (NORVASC) 5 MG tablet TAKE 1 TABLET BY MOUTH  DAILY 5/6/19   Prasad Warren MD   glimepiride (AMARYL) 2 MG tablet TAKE 1 TABLET BY MOUTH  EVERY MORNING BEFORE  BREAKFAST 5/6/19   Prasad Warren MD   Polyethylene Glycol 3350 (MIRALAX PO) Take by mouth    Historical Provider, MD   hydrocortisone 2.5 % cream Apply topically 2 times daily Apply topically 2 times daily. Historical Provider, MD   fluticasone-vilanterol (BREO ELLIPTA) 100-25 MCG/INH AEPB inhaler Inhale 1 puff into the lungs daily 1/28/19   Prasad Warren MD   clotrimazole-betamethasone (LOTRISONE) 1-0.05 % cream Apply topically 2 times daily. 11/7/18   Prasad Warren MD   Multiple Vitamins-Minerals (OCUVITE ADULT FORMULA PO) Take 1 capsule by mouth daily    Historical Provider, MD   Probiotic Product (PRO-BIOTIC BLEND PO) Take by mouth    Historical Provider, MD   magnesium gluconate (MAGONATE) 500 MG tablet Take 1,000 mg by mouth 2 times daily    Historical Provider, MD   Lancets (BD LANCET ULTRAFINE 83E) MISC 1 applicator by Does not apply route 2 times daily Indications: DX:E11.9 5/23/17   Prasad Warren MD   glucose blood VI test strips (ONE TOUCH TEST STRIPS) strip 1 each by In Vitro route 2 times daily Indications: DX:E11.9 5/23/17   Prasad Warren MD   Blood Glucose Monitoring Suppl (ONE TOUCH ULTRA SYSTEM KIT) W/DEVICE KIT 1 kit by Does not apply route once for 1 dose Indications: DX: E11.9 5/23/17 1/27/23  Prasad Warren MD   ALPHA LIPOIC ACID PO Take 1 capsule by mouth daily. Historical Provider, MD   B Complex-C-Folic Acid (HM VITAMIN B COMPLEX/VITAMIN C) TABS Take 1 tablet by mouth daily.     Historical Provider, MD   Coenzyme Q10 (COQ10) 100 MG CAPS Take 200 mg by mouth daily.    Historical Provider, MD   aspirin 81 MG EC tablet Take 81 mg by mouth daily.     Historical Provider, MD       Current medications:    Current Facility-Administered Medications   Medication Dose Route Frequency Provider Last Rate Last Admin    furosemide (LASIX) injection 20 mg  20 mg Intravenous BID Jose Grace MD        ipratropium-albuterol (DUONEB) nebulizer solution 1 ampule  1 ampule Inhalation Q4H WA Jose Grace MD        guaiFENesin-dextromethorphan (ROBITUSSIN DM) 100-10 MG/5ML syrup 5 mL  5 mL Oral Q4H PRN Sumabrent Gutierrez Learn, APRN - CNP        hydrALAZINE (APRESOLINE) injection 10 mg  10 mg Intravenous Q6H PRN Jose Grace MD   10 mg at 05/16/21 0910    ceftaroline fosamil (TEFLARO) 600 mg in dextrose 5 % 50 mL IVPB  600 mg Intravenous Q12H Mireya Vee MD   Stopped at 05/16/21 0426    amLODIPine (NORVASC) tablet 5 mg  5 mg Oral Daily José Mater, PA   5 mg at 05/16/21 8954    aspirin EC tablet 81 mg  81 mg Oral Daily José Mater, PA   81 mg at 05/16/21 0909    [Held by provider] furosemide (LASIX) tablet 20 mg  20 mg Oral Q MWF SHASTA Bui        gabapentin (NEURONTIN) capsule 300 mg  300 mg Oral Daily José Mater, PA   300 mg at 05/16/21 7164    guaiFENesin tablet 400 mg  400 mg Oral TID José Mater, PA   400 mg at 05/16/21 0910    pantoprazole (PROTONIX) tablet 40 mg  40 mg Oral Daily José Mater, PA   40 mg at 05/16/21 0910    pravastatin (PRAVACHOL) tablet 20 mg  20 mg Oral Nightly José Mater, PA   20 mg at 05/15/21 1953    rOPINIRole (REQUIP) tablet 0.5 mg  0.5 mg Oral Nightly José Mater, PA   0.5 mg at 05/15/21 1951    sucralfate (CARAFATE) tablet 1 g  1 g Oral TID José Mater, PA   1 g at 05/15/21 1951    sodium chloride flush 0.9 % injection 10 mL  10 mL Intravenous 2 times per day José Mater, PA   10 mL at 05/16/21 0908    sodium chloride flush 0.9 % injection 10 mL  10 mL Intravenous PRN José Mater, PA        0.9 % sodium chloride infusion  25 mL Intravenous PRN SHASTA Molina        potassium chloride (KLOR-CON M) extended release tablet 40 mEq  40 mEq Oral PRN SHASTA Molina        Or    potassium bicarb-citric acid (EFFER-K) effervescent tablet 40 mEq  40 mEq Oral PRN SHASTA Molina        Or    potassium chloride 10 mEq/100 mL IVPB (Peripheral Line)  10 mEq Intravenous PRN SHASTA Molina        enoxaparin (LOVENOX) injection 40 mg  40 mg Subcutaneous Daily SHASTA Molina   40 mg at 05/16/21 0909    magnesium hydroxide (MILK OF MAGNESIA) 400 MG/5ML suspension 30 mL  30 mL Oral Daily PRN SHASTA Molina        acetaminophen (TYLENOL) tablet 650 mg  650 mg Oral Q6H PRN SHASTA Molina   650 mg at 05/15/21 1613    Or    acetaminophen (TYLENOL) suppository 650 mg  650 mg Rectal Q6H PRN SHASTA Molina        glucose (GLUTOSE) 40 % oral gel 15 g  15 g Oral PRN SHASTA Molina        dextrose 50 % IV solution  12.5 g Intravenous PRN SHASTA Molina        glucagon (rDNA) injection 1 mg  1 mg Intramuscular PRN SHASTA Molina        dextrose 5 % solution  100 mL/hr Intravenous PRN SHASTA Molina        insulin lispro (HUMALOG) injection vial 0-6 Units  0-6 Units Subcutaneous TID WC SHASTA Molina   2 Units at 05/15/21 1842    insulin lispro (HUMALOG) injection vial 0-3 Units  0-3 Units Subcutaneous Nightly SHASTA Molina   1 Units at 05/15/21 2134    trimethobenzamide (TIGAN) injection 200 mg  200 mg Intramuscular Q6H PRN SHASTA Molina        budesonide (PULMICORT) nebulizer suspension 250 mcg  0.25 mg Nebulization BID SHASTA Molina   250 mcg at 05/16/21 0376    And    Arformoterol Tartrate (BROVANA) nebulizer solution 15 mcg  15 mcg Nebulization BID SHASTA Molina   15 mcg at 05/16/21 5331       Allergies:     Allergies   Allergen Reactions    Lisinopril Other (See Comments)     7/18/19 Pt states that she does not want to take LISINOPRIL       Problem List:    Patient Active Problem List   Diagnosis Code    Asthma J45.909    CAD (coronary artery disease) I25.10    Vitamin D deficiency E55.9    DM (diabetes mellitus) ([facility] E11.9    HTN (hypertension) I10    Idiopathic peripheral neuropathy G60.9    Rhinitis, allergic J30.9    SOB (shortness of breath) R06.02    Bronchitis J40    GERD (gastroesophageal reflux disease) K21.9    PVD (peripheral vascular disease) with claudication ([facility]) I73.9    Chest pain R07.9    Gait instability R26.81    Pulmonary embolism on right ([facility]) I26.99    Cellulitis L03.90    Cellulitis of left foot L03.116       Past Medical History:        Diagnosis Date    Arthritis     Asthma     Bronchitis 5/23/2017    CAD (coronary artery disease)     Cough 5/23/2017    Diabetes mellitus (HCC)     GERD (gastroesophageal reflux disease) 5/23/2017    Hyperlipidemia     Hypertension     Lung disease     PVD (peripheral vascular disease) with claudication ([facility] 4/10/2019    Restless legs syndrome     Rhinitis, allergic 5/23/2017    Sinusitis 5/23/2017    SOB (shortness of breath) 5/23/2017    Urinary incontinence        Past Surgical History:        Procedure Laterality Date    ABDOMEN SURGERY      APPENDECTOMY      CARDIAC SURGERY      CHOLECYSTECTOMY      COLONOSCOPY      CORONARY ARTERY BYPASS GRAFT      8/28/10    ENDOSCOPY, COLON, DIAGNOSTIC      HYSTERECTOMY      frankie and bso 1997    TONSILLECTOMY AND ADENOIDECTOMY         Social History:    Social History     Tobacco Use    Smoking status: Never Smoker    Smokeless tobacco: Never Used   Substance Use Topics    Alcohol use: Yes     Comment: occasional                                Counseling given: Not Answered      Vital Signs (Current):   Vitals:    05/16/21 0052 05/16/21 0639 05/16/21 0800 05/16/21 0837   BP: (!) 164/56  (!) 171/84    Pulse: 62  79    Resp: 18  16    Temp: 98.1 °F (36.7 °C)  99.4 °F (37.4 °C)    TempSrc: Oral Temporal    SpO2: 92%  96%    Weight:  145 lb (65.8 kg)  159 lb (72.1 kg)   Height:                                                  BP Readings from Last 3 Encounters:   05/16/21 (!) 171/84   05/11/21 (!) 160/96   03/25/21 (!) 145/66       NPO Status: Time of last liquid consumption: 0000                        Time of last solid consumption: 1800                        Date of last liquid consumption: 05/16/21                        Date of last solid food consumption: 05/15/21    BMI:   Wt Readings from Last 3 Encounters:   05/16/21 159 lb (72.1 kg)   05/10/21 145 lb (65.8 kg)   03/23/21 144 lb (65.3 kg)     Body mass index is 28.17 kg/m². CBC:   Lab Results   Component Value Date    WBC 14.2 05/16/2021    RBC 2.94 05/16/2021    HGB 9.4 05/16/2021    HCT 28.0 05/16/2021    MCV 95.2 05/16/2021    RDW 13.1 05/16/2021     05/16/2021       CMP:   Lab Results   Component Value Date     05/16/2021    K 3.7 05/16/2021    CL 89 05/16/2021    CO2 23 05/16/2021    BUN 14 05/16/2021    CREATININE 0.7 05/16/2021    GFRAA >60 05/16/2021    LABGLOM >60 05/16/2021    GLUCOSE 231 05/16/2021    PROT 6.7 05/16/2021    CALCIUM 8.8 05/16/2021    BILITOT 0.3 05/16/2021    ALKPHOS 85 05/16/2021    AST 39 05/16/2021    ALT 36 05/16/2021       POC Tests: No results for input(s): POCGLU, POCNA, POCK, POCCL, POCBUN, POCHEMO, POCHCT in the last 72 hours.     Coags:   Lab Results   Component Value Date    PROTIME 11.1 11/23/2014    INR 1.1 11/23/2014    APTT 27.5 11/23/2014       HCG (If Applicable): No results found for: PREGTESTUR, PREGSERUM, HCG, HCGQUANT     ABGs: No results found for: PHART, PO2ART, YDY0BQB, YNX3SYA, BEART, L1MCZYDT     Type & Screen (If Applicable):  No results found for: LABABO, LABRH    Drug/Infectious Status (If Applicable):  No results found for: HIV, HEPCAB    COVID-19 Screening (If Applicable): No results found for: COVID19        Anesthesia Evaluation    Airway: Mallampati: II        Dental: Pulmonary:   (+) shortness of breath:  asthma:                            Cardiovascular:    (+) hypertension:, CAD:,                ROS comment: Normal EF, normal perfusion scan in 2019     Neuro/Psych:   (+) neuromuscular disease:,             GI/Hepatic/Renal:   (+) GERD:,           Endo/Other:    (+) DiabetesType II DM, poorly controlled, , .                  ROS comment: Foot celulitis Abdominal:           Vascular:   + PVD, aortic or cerebral, PE. Anesthesia Plan      MAC     ASA 3       Induction: intravenous.                           Susannah Deras MD   5/16/2021

## 2021-05-16 NOTE — PROGRESS NOTES
Subjective: The patient is awake and alert. Left foot erythematous pain controlled  Denies any fevers or chills  She is somewhat short of breath this morning    Objective:    BP (!) 171/84   Pulse 79   Temp 99.4 °F (37.4 °C) (Temporal)   Resp 16   Ht 5' 3\" (1.6 m)   Wt 159 lb (72.1 kg)   LMP 12/03/1997   SpO2 96%   BMI 28.17 kg/m²     In: 360 [P.O.:360]  Out: 1200   In: 360   Out: 1200 [Urine:1200]    General appearance: NAD, conversant, pleasant  HEENT: AT/NC, MMM  Neck: FROM, supple  Lungs: expiratory wheezes bilateral upper lobes  CV: RRR, no MRGs  Vasc: Radial pulses 2+  Abdomen: Soft, non-tender; no masses or HSM  Extremities: Left lower extremity with swelling, erythema, plantar aspect of foot with erythema and areas of purulent  Skin: no rash, lesions or ulcers  Psych: Alert and oriented to person, place and time  Neuro: Alert and interactive     Recent Labs     05/14/21  1139 05/15/21  0614 05/15/21  2053 05/16/21  0425 05/16/21  0757   WBC 17.5* 11.0  --   --  14.2*   HGB 9.4* 8.8* 8.9* 9.5* 9.4*   HCT 27.7* 26.2* 26.9* 28.4* 28.0*    305  --   --  386       Recent Labs     05/14/21  1139 05/15/21  0614 05/16/21  0757   * 123* 125*   K 4.9 4.2 3.7   CL 88* 91* 89*   CO2 24 24 23   BUN 17 13 14   CREATININE 0.8 0.7 0.7   CALCIUM 9.2 8.9 8.8       Assessment:    Principal Problem:    Cellulitis of left foot  Active Problems:    DM (diabetes mellitus) (HCC)    HTN (hypertension)  Resolved Problems:    * No resolved hospital problems. *      Plan:    Admitted for  left leg cellulitis/abscess on left foot, patient diagnosed with Covid in January, complicated by pulmonary embolism approximately 1 to 2 months after that.   Continue antibiotics-Teflaro  ID following  Normal saline IV fluids discontinued-IV Lasix ordered twice daily, DuoNebs every 4 scheduled  Monitor daily CBC and BMP  Family indicates decline in hemoglobin-patient with history of \"dark stools\"  Follow H&H, check stool for Hemoccult blood H&H stable since admission  Patient confused last night. Head CT Scan negative, family feels this is from Requip, discontinue  Sodium improved  Podiatry - I&D today  MRI left foot pending: To assess for osteomyelitis       DVT Prophylaxis   PT/OT  Discharge planning           Monty Ruth, VIK - CNP  10:54 AM  5/16/2021     I have made edits to the note above as needed in regards to breathing status, antibiotics, questionable GI bleed    I personally saw, examined and provided care for the patient. Radiographs, labs and medication list were reviewed by me independently. The case was discussed in detail and plans for care were established. Review of [address] [place] [address], documentation was conducted and revisions were made as appropriate directly by me. I agree with the above documented exam, problem list, and plan of care.      Luz Contreras MD  11:20 AM  5/16/2021

## 2021-05-16 NOTE — PROGRESS NOTES
Patient had two family members at bedside. Relayed that visitation policy was one person per patient at a time and that they could rotate out. Family members verbalized understanding.

## 2021-05-16 NOTE — PROGRESS NOTES
Call placed to MRI to inquire about time. Relayed that it was a stat that was ordered yesterday and physician would like it done before surgery. Will continue to monitor.

## 2021-05-16 NOTE — PROGRESS NOTES
Call placed to surgery to check time on procedure at family request. No definite time yet but tentatively later this morning.

## 2021-05-16 NOTE — ANESTHESIA POSTPROCEDURE EVALUATION
Department of Anesthesiology  Postprocedure Note    Patient: Odilia Licona  MRN: [id number]  YOB: 1927  Date of evaluation: 5/16/2021  Time:  3:37 PM     Procedure Summary     Date: 05/16/21 Room / Location: [facility] OR  / [facility]    Anesthesia Start: 1419 Anesthesia Stop: 0715    Procedure: FOOT DEBRIDEMENT INCISION AND DRAINAGE.   (Left Foot) Diagnosis: (diabetic foot)    Surgeons: Rosita Aldrich DPM Responsible Provider: Jesus Strong MD    Anesthesia Type: MAC ASA Status: 3          Anesthesia Type: MAC    Jayjay Phase I: Jayjay Score: 8    Jayjay Phase II:      Last vitals: Reviewed and per EMR flowsheets.        Anesthesia Post Evaluation    Patient location during evaluation: PACU  Patient participation: complete - patient participated  Level of consciousness: awake  Pain score: 0  Airway patency: patent  Nausea & Vomiting: no nausea  Complications: no  Cardiovascular status: hemodynamically stable  Respiratory status: acceptable  Hydration status: stable

## 2021-05-16 NOTE — PROCEDURES
12 pm - Notified RN Rosellen Apgar that we attempted MRI and pt kept \"spasming\" all over and shaking her legs. She is extremely confused and started touching the buttons on the sides of our scanner and was reaching for the red button which turns it off. Images non diagnostic due to patient being too confused to cooperate and hold still. RN to notify podiatry.

## 2021-05-16 NOTE — OP NOTE
Operative Note      Patient: Orly Walton  YOB: 1927  MRN: [id number]    Date of Procedure: 5/16/2021    Pre-Op Diagnosis: diabetic foot    Post-Op Diagnosis: Same       Procedure(s):  FOOT DEBRIDEMENT INCISION AND DRAINAGE. Surgeon(s):  Zoë Beaulieu DPM    Assistant:   Resident: Jennifer Valadez MD    Anesthesia: Monitor Anesthesia Care    Estimated Blood Loss (mL): Minimal    Complications: 39MK purulence drained, minimal bleeding noted throughout case    Specimens:   ID Type Source Tests Collected by Time Destination   1 : Left foot culture superficial Tissue Tissue CULTURE, ANAEROBIC, CULTURE, FUNGUS, GRAM STAIN, CULTURE, SURGICAL, CULTURE WITH SMEAR, ACID FAST BACILLIUS Zoë Beaulieu, SWAPNA 5/16/2021 1450    2 : Left foot culture deep Tissue Tissue CULTURE, ANAEROBIC, CULTURE, FUNGUS, GRAM STAIN, CULTURE, SURGICAL, CULTURE WITH SMEAR, ACID FAST WENDYUS Zoë Beaulieu, MARTIR 5/16/2021 1453    3 : Post lavage cultures of left foot Tissue Tissue CULTURE, ANAEROBIC, CULTURE, FUNGUS, GRAM STAIN, CULTURE, SURGICAL, CULTURE WITH SMEAR, ACID FAST JAYDA Beaulieu, MARTIR 5/16/2021 1502        Implants:  * No implants in log *      Drains:   Urethral Catheter Non-latex (Active)       Findings: 10cc of purlence drainged from foot tracking along flexor hallucis longus tendon. Residual wound appears clean of any residual signs of purulence or tissue breakdown. Minimal bleeding noted. Detailed Description of Procedure:   Patient brought back the operating room procedure was performed on the bed. Patient was induced under MAC anesthesia. Left lower extremity was scrubbed prepped and draped using typical aseptic manner. A local block was performed at the level of the tibial nerve saphenous nerve and a Gaviria block was performed as well using standard technique aseptically.   A superficial blister was deroofed from the plantar first met head mild amounts of purulence noted this was cultured with a swab culture. Incision was made from at the level of the first metatarsal head extending proximally along the course of the flexor hallucis longus tendon extending approximately 6 cm. 10 cc of purulence drained. A second swab culture was performed of this. The abscess was noted to be tracking along the flexor hallucis longus tendon. All residual purulence was milked from the incisional site the remaining tissue appeared healthy and devoid of any residual infection however there was minimal bleeding along the incisional site. Intraoperatively Doppler examination was performed dorsalis pedis pulse was nondopplerable. Vascular is following the case. Incisional site was then flushed with copious amounts normal sterile saline using pulse lavage specifically 3 L. Incision was then swabbed cultured again. Incision dressed with Betadine soaked packing along the course of the flexor hallucis longus tendon dressed with 4 x 4's ABD pads and Kerlix no ACE pad was used no tourniquet used    Patient transferred to PACU with vital signs stable patient will be transferred back to the floor we will monitor the patient closely patient will likely require a second procedure in order to close the incision this will be performed in approximately 48 to 72 hours as long as the residual tissue remains healthy and devoid of any infection. I am concerned that there may be further breakdown of tissue secondary to the patient's vascular status with our incision creating a second insult that the patient may not be able to heal adequately. However we had to proceed to rid the patient of any infection. We will continue to observe the patient closely the patient needs any further amputation will be available to do so however our current plan is to close the wound in 48 to 72 hours granted that there is no residual signs of infection.     Electronically signed by Zoë Beaulieu DPM on 5/16/2021 at 3:13 PM

## 2021-05-16 NOTE — PROGRESS NOTES
Patient's children at the nursing station inquiring about speaking to surgeon. Relayed that Dr. Bridget Freed called while they were in the surgery waiting area and would give them an update via telephone at the number he provided. Also reiterated that patient was allowed one visitor in the room at a time. Son stated that they would be sitting at the end of the hallway to call the physician and that they wished to speak to a hospital  or a charge nurse because \"we are not happy\". Clinical manager called to speak with family.

## 2021-05-17 PROBLEM — G93.41 METABOLIC ENCEPHALOPATHY: Status: ACTIVE | Noted: 2021-05-17

## 2021-05-17 LAB
ANION GAP SERPL CALCULATED.3IONS-SCNC: 14 MMOL/L (ref 7–16)
BUN BLDV-MCNC: 18 MG/DL (ref 6–23)
CALCIUM SERPL-MCNC: 8.5 MG/DL (ref 8.6–10.2)
CHLORIDE BLD-SCNC: 91 MMOL/L (ref 98–107)
CO2: 21 MMOL/L (ref 22–29)
CREAT SERPL-MCNC: 0.8 MG/DL (ref 0.5–1)
GFR AFRICAN AMERICAN: >60
GFR NON-AFRICAN AMERICAN: >60 ML/MIN/1.73
GLUCOSE BLD-MCNC: 237 MG/DL (ref 74–99)
GRAM STAIN ORDERABLE: NORMAL
HCT VFR BLD CALC: 25.1 % (ref 34–48)
HEMOGLOBIN: 8.4 G/DL (ref 11.5–15.5)
MCH RBC QN AUTO: 31.9 PG (ref 26–35)
MCHC RBC AUTO-ENTMCNC: 33.5 % (ref 32–34.5)
MCV RBC AUTO: 95.4 FL (ref 80–99.9)
METER GLUCOSE: 175 MG/DL (ref 74–99)
METER GLUCOSE: 178 MG/DL (ref 74–99)
METER GLUCOSE: 189 MG/DL (ref 74–99)
METER GLUCOSE: 255 MG/DL (ref 74–99)
PDW BLD-RTO: 13.5 FL (ref 11.5–15)
PLATELET # BLD: 397 E9/L (ref 130–450)
PMV BLD AUTO: 10.2 FL (ref 7–12)
POTASSIUM SERPL-SCNC: 3.9 MMOL/L (ref 3.5–5)
RBC # BLD: 2.63 E12/L (ref 3.5–5.5)
SARS-COV-2, NAAT: NOT DETECTED
SODIUM BLD-SCNC: 126 MMOL/L (ref 132–146)
WBC # BLD: 14.1 E9/L (ref 4.5–11.5)

## 2021-05-17 PROCEDURE — 85027 COMPLETE CBC AUTOMATED: CPT

## 2021-05-17 PROCEDURE — 6360000002 HC RX W HCPCS: Performed by: PHYSICIAN ASSISTANT

## 2021-05-17 PROCEDURE — 6360000002 HC RX W HCPCS: Performed by: INTERNAL MEDICINE

## 2021-05-17 PROCEDURE — 82962 GLUCOSE BLOOD TEST: CPT

## 2021-05-17 PROCEDURE — 94640 AIRWAY INHALATION TREATMENT: CPT

## 2021-05-17 PROCEDURE — 6370000000 HC RX 637 (ALT 250 FOR IP): Performed by: PODIATRIST

## 2021-05-17 PROCEDURE — 2580000003 HC RX 258: Performed by: PHYSICIAN ASSISTANT

## 2021-05-17 PROCEDURE — 87635 SARS-COV-2 COVID-19 AMP PRB: CPT

## 2021-05-17 PROCEDURE — 6370000000 HC RX 637 (ALT 250 FOR IP): Performed by: INTERNAL MEDICINE

## 2021-05-17 PROCEDURE — 1200000000 HC SEMI PRIVATE

## 2021-05-17 PROCEDURE — 6370000000 HC RX 637 (ALT 250 FOR IP): Performed by: PHYSICIAN ASSISTANT

## 2021-05-17 PROCEDURE — 36415 COLL VENOUS BLD VENIPUNCTURE: CPT

## 2021-05-17 PROCEDURE — 2700000000 HC OXYGEN THERAPY PER DAY

## 2021-05-17 PROCEDURE — 2580000003 HC RX 258: Performed by: INTERNAL MEDICINE

## 2021-05-17 PROCEDURE — 80048 BASIC METABOLIC PNL TOTAL CA: CPT

## 2021-05-17 RX ORDER — ACETAMINOPHEN 650 MG
TABLET, EXTENDED RELEASE ORAL PRN
Status: DISCONTINUED | OUTPATIENT
Start: 2021-05-17 | End: 2021-05-25 | Stop reason: HOSPADM

## 2021-05-17 RX ORDER — INSULIN GLARGINE 100 [IU]/ML
15 INJECTION, SOLUTION SUBCUTANEOUS NIGHTLY
Status: DISCONTINUED | OUTPATIENT
Start: 2021-05-17 | End: 2021-05-22

## 2021-05-17 RX ADMIN — FUROSEMIDE 20 MG: 10 INJECTION, SOLUTION INTRAMUSCULAR; INTRAVENOUS at 17:01

## 2021-05-17 RX ADMIN — ARFORMOTEROL TARTRATE 15 MCG: 15 SOLUTION RESPIRATORY (INHALATION) at 21:16

## 2021-05-17 RX ADMIN — INSULIN LISPRO 1 UNITS: 100 INJECTION, SOLUTION INTRAVENOUS; SUBCUTANEOUS at 08:31

## 2021-05-17 RX ADMIN — SUCRALFATE 1 G: 1 TABLET ORAL at 08:31

## 2021-05-17 RX ADMIN — IPRATROPIUM BROMIDE AND ALBUTEROL SULFATE 1 AMPULE: 2.5; .5 SOLUTION RESPIRATORY (INHALATION) at 21:15

## 2021-05-17 RX ADMIN — IPRATROPIUM BROMIDE AND ALBUTEROL SULFATE 1 AMPULE: 2.5; .5 SOLUTION RESPIRATORY (INHALATION) at 13:37

## 2021-05-17 RX ADMIN — TRAMADOL HYDROCHLORIDE 50 MG: 50 TABLET, FILM COATED ORAL at 05:24

## 2021-05-17 RX ADMIN — Medication 10 ML: at 21:20

## 2021-05-17 RX ADMIN — FUROSEMIDE 20 MG: 10 INJECTION, SOLUTION INTRAMUSCULAR; INTRAVENOUS at 12:26

## 2021-05-17 RX ADMIN — INSULIN LISPRO 6 UNITS: 100 INJECTION, SOLUTION INTRAVENOUS; SUBCUTANEOUS at 12:26

## 2021-05-17 RX ADMIN — INSULIN LISPRO 1 UNITS: 100 INJECTION, SOLUTION INTRAVENOUS; SUBCUTANEOUS at 21:55

## 2021-05-17 RX ADMIN — INSULIN LISPRO 2 UNITS: 100 INJECTION, SOLUTION INTRAVENOUS; SUBCUTANEOUS at 16:58

## 2021-05-17 RX ADMIN — INSULIN GLARGINE 15 UNITS: 100 INJECTION, SOLUTION SUBCUTANEOUS at 21:55

## 2021-05-17 RX ADMIN — TRAMADOL HYDROCHLORIDE 50 MG: 50 TABLET, FILM COATED ORAL at 18:27

## 2021-05-17 RX ADMIN — ENOXAPARIN SODIUM 40 MG: 40 INJECTION SUBCUTANEOUS at 08:30

## 2021-05-17 RX ADMIN — ACETAMINOPHEN 650 MG: 325 TABLET ORAL at 21:15

## 2021-05-17 RX ADMIN — SUCRALFATE 1 G: 1 TABLET ORAL at 21:15

## 2021-05-17 RX ADMIN — GUAIFENESIN 400 MG: 400 TABLET ORAL at 08:31

## 2021-05-17 RX ADMIN — ARFORMOTEROL TARTRATE 15 MCG: 15 SOLUTION RESPIRATORY (INHALATION) at 09:12

## 2021-05-17 RX ADMIN — BUDESONIDE 250 MCG: 0.25 SUSPENSION RESPIRATORY (INHALATION) at 09:13

## 2021-05-17 RX ADMIN — IPRATROPIUM BROMIDE AND ALBUTEROL SULFATE 1 AMPULE: 2.5; .5 SOLUTION RESPIRATORY (INHALATION) at 17:02

## 2021-05-17 RX ADMIN — CEFTAROLINE FOSAMIL 600 MG: 600 POWDER, FOR SOLUTION INTRAVENOUS at 04:07

## 2021-05-17 RX ADMIN — IPRATROPIUM BROMIDE AND ALBUTEROL SULFATE 1 AMPULE: 2.5; .5 SOLUTION RESPIRATORY (INHALATION) at 09:11

## 2021-05-17 RX ADMIN — PRAVASTATIN SODIUM 20 MG: 20 TABLET ORAL at 21:15

## 2021-05-17 RX ADMIN — GUAIFENESIN 400 MG: 400 TABLET ORAL at 21:15

## 2021-05-17 RX ADMIN — AMLODIPINE BESYLATE 5 MG: 5 TABLET ORAL at 08:30

## 2021-05-17 RX ADMIN — ASPIRIN 81 MG: 81 TABLET, COATED ORAL at 08:30

## 2021-05-17 RX ADMIN — BUDESONIDE 250 MCG: 0.25 SUSPENSION RESPIRATORY (INHALATION) at 21:15

## 2021-05-17 RX ADMIN — GABAPENTIN 300 MG: 300 CAPSULE ORAL at 08:30

## 2021-05-17 RX ADMIN — CEFTAROLINE FOSAMIL 600 MG: 600 POWDER, FOR SOLUTION INTRAVENOUS at 16:58

## 2021-05-17 RX ADMIN — PANTOPRAZOLE SODIUM 40 MG: 40 TABLET, DELAYED RELEASE ORAL at 08:30

## 2021-05-17 ASSESSMENT — PAIN SCALES - GENERAL
PAINLEVEL_OUTOF10: 8
PAINLEVEL_OUTOF10: 3

## 2021-05-17 NOTE — PROGRESS NOTES
Physician Progress Note      Juan Miguel Pereyra  CSN #:                  [id number]  :                       1927  ADMIT DATE:       2021 11:05 AM  DISCH DATE:  RESPONDING  PROVIDER #:        Kelsie Streeter          QUERY TEXT:    Patient admitted with Cellulitis of left foot. Per Op note dated    documentation of I&D. To accurately reflect the procedure performed please further specify the depth   of tissue incised and drained: The medical record reflects the following:  Risk Factors: per documentation cellulitis of the left foot  Clinical Indicators: per Op note Incision was made from at the level of the   first metatarsal head extending proximally along the course of the flexor   hallucis longus tendon extending approximately 6 cm. 10 cc of purulence   drained. A second swab culture was performed of this. The abscess was noted   to be tracking along the flexor hallucis longus tendon      Thank you  Jeremy Holland RN CCDS  [phone]  Options provided:  -- Skin only  -- Subcutaneous tissue  -- Fascia  -- Muscle  -- Joint  -- Bone  -- Other - I will add my own diagnosis  -- Disagree - Not applicable / Not valid  -- Disagree - Clinically unable to determine / Unknown  -- Refer to Clinical Documentation Reviewer    PROVIDER RESPONSE TEXT:    The depth of the drainage was down to and including joint.     Query created by: Blair Cage on 2021 2:05 PM      Electronically signed by:  Kelsie Streeter 2021 2:52 PM

## 2021-05-17 NOTE — PROGRESS NOTES
Podiatry Progress Note  5/17/2021   Bambi Khoury       SUBJECTIVE: Bambi Khoury is a 80 y.o. female s/p left foot minimally invasive incision and drainage, debridement of all nonviable tissue (DOS; 5/16/2021). Patient resting comfortably in bed this evening with daughter at bedside. Has a healthy appetite. Denies any N/V/D/F/SOB. Past Medical History:   Diagnosis Date    Arthritis     Asthma     Bronchitis 5/23/2017    CAD (coronary artery disease)     Cough 5/23/2017    Diabetes mellitus ([address]     GERD (gastroesophageal reflux disease) 5/23/2017    Hyperlipidemia     Hypertension     Lung disease     PVD (peripheral vascular disease) with claudication ([address] 4/10/2019    Restless legs syndrome     Rhinitis, allergic 5/23/2017    Sinusitis 5/23/2017    SOB (shortness of breath) 5/23/2017    Urinary incontinence         Past Surgical History:   Procedure Laterality Date    ABDOMEN SURGERY      APPENDECTOMY      CARDIAC SURGERY      CHOLECYSTECTOMY      COLONOSCOPY      CORONARY ARTERY BYPASS GRAFT      8/28/10    ENDOSCOPY, COLON, DIAGNOSTIC      FOOT DEBRIDEMENT Left 5/16/2021    FOOT DEBRIDEMENT INCISION AND DRAINAGE. performed by Sue Vicente DPM at [facility] 1997    TONSILLECTOMY AND ADENOIDECTOMY           Family History   Problem Relation Age of Onset    Hypertension Father     Heart Disease Brother         Social History     Tobacco Use    Smoking status: Never Smoker    Smokeless tobacco: Never Used   Substance Use Topics    Alcohol use: Yes     Comment: occasional        Prior to Admission medications    Medication Sig Start Date End Date Taking?  Authorizing Provider   fluconazole (DIFLUCAN) 200 MG tablet Take 1 tablet by mouth daily for 7 days 5/11/21 5/18/21  Bailey Jiménez MD   cephALEXin [facility]) 500 MG capsule Take 1 capsule by mouth 2 times daily for 10 days 5/11/21 5/21/21  Bailey Jiménez MD   rOPINIRole (Alfornia Bickers) 0.5 MG tablet Take 1 tablet by mouth nightly 5/11/21   Hannah Knott MD   lidocaine (LMX) 4 % cream Apply topically as needed for Pain Apply topically as needed. Historical Provider, MD   sucralfate (CARAFATE) 1 GM tablet Take 1 g by mouth 3 times daily    Historical Provider, MD   bisacodyl (DULCOLAX) 10 MG suppository Place 10 mg rectally daily    Historical Provider, MD   gabapentin (NEURONTIN) 300 MG capsule Take 300 mg by mouth daily. Historical Provider, MD   guaiFENesin (MUCINEX) 600 MG extended release tablet Take 600 mg by mouth 2 times daily    Historical Provider, MD   aluminum & magnesium hydroxide-simethicone (MYLANTA) 400-400-40 MG/5ML SUSP Take 30 mLs by mouth every 6 hours as needed    Historical Provider, MD   pantoprazole (PROTONIX) 40 MG tablet Take 40 mg by mouth daily    Historical Provider, MD   promethazine (PHENERGAN) 12.5 MG suppository Place 12.5 mg rectally every 6 hours as needed for Nausea    Historical Provider, MD   albuterol (PROVENTIL) 90 MCG/ACT inhaler Inhale 2 puffs into the lungs 4 times daily 7/22/19   Hannah Knott MD   acetaminophen (TYLENOL) 500 MG tablet Take 1 tablet by mouth 4 times daily as needed for Pain 7/5/19 3/23/21  VIK Cheney - CNP   furosemide (LASIX) 20 MG tablet Take 0.5 tablets by mouth daily  Patient taking differently: Take 20 mg by mouth MWF 5/9/19   Hannah Knott MD   pravastatin (PRAVACHOL) 20 MG tablet TAKE 1 TABLET BY MOUTH  NIGHTLY 5/6/19   Hannah Knott MD   amLODIPine (NORVASC) 5 MG tablet TAKE 1 TABLET BY MOUTH  DAILY 5/6/19   Hannah Knott MD   glimepiride (AMARYL) 2 MG tablet TAKE 1 TABLET BY MOUTH  EVERY MORNING BEFORE  BREAKFAST 5/6/19   Hannah Knott MD   Polyethylene Glycol 3350 (MIRALAX PO) Take by mouth    Historical Provider, MD   hydrocortisone 2.5 % cream Apply topically 2 times daily Apply topically 2 times daily.     Historical Provider, MD   fluticasone-vilanterol (BREO ELLIPTA) 100-25 MCG/INH AEPB inhaler Inhale 1 puff into the lungs daily 1/28/19   Fernando Cook MD   clotrimazole-betamethasone (LOTRISONE) 1-0.05 % cream Apply topically 2 times daily. 11/7/18   Fernando Cook MD   Multiple Vitamins-Minerals (OCUVITE ADULT FORMULA PO) Take 1 capsule by mouth daily    Historical Provider, MD   Probiotic Product (PRO-BIOTIC BLEND PO) Take by mouth    Historical Provider, MD   magnesium gluconate (MAGONATE) 500 MG tablet Take 1,000 mg by mouth 2 times daily    Historical Provider, MD   Lancets (BD LANCET ULTRAFINE 01X) MISC 1 applicator by Does not apply route 2 times daily Indications: DX:E11.9 5/23/17   Fernando Cook MD   glucose blood VI test strips (ONE TOUCH TEST STRIPS) strip 1 each by In Vitro route 2 times daily Indications: DX:E11.9 5/23/17   Fernando Cook MD   Blood Glucose Monitoring Suppl (ONE TOUCH ULTRA SYSTEM KIT) W/DEVICE KIT 1 kit by Does not apply route once for 1 dose Indications: DX: E11.9 5/23/17 1/27/23  Fernando Cook MD   ALPHA LIPOIC ACID PO Take 1 capsule by mouth daily. Historical Provider, MD   B Complex-C-Folic Acid (HM VITAMIN B COMPLEX/VITAMIN C) TABS Take 1 tablet by mouth daily. Historical Provider, MD   Coenzyme Q10 (COQ10) 100 MG CAPS Take 200 mg by mouth daily. Historical Provider, MD   aspirin 81 MG EC tablet Take 81 mg by mouth daily. Historical Provider, MD        Lisinopril         OBJECTIVE:        Vitals:    05/17/21 1705   BP: (!) 148/58   Pulse: 64   Resp: 18   Temp: 97.9 °F (36.6 °C)   SpO2:                   EXAM:        Pt is AAOx3, NAD, Betadine soap plain packing to wound left plantar foot. Vascular Exam: Nonpalpable DP and PT pulses bilaterally. CFT sluggish to all digits bilaterally. Skin temperature warm to warm proximal to distal.  Mild to moderate erythema to periwound and dorsal foot. From the proximal metatarsal heads extending proximally to the level of the ankle joint. No increase in warmth to left foot compared to right foot.     Neuro Exam: Protective sensation diminished bilaterally. Gross sensation intact    Dermatologic Exam: Post debridement incision to plantar left first MPJ, fibrogranular wound bed, no appreciable drainage, no malodor, no crepitus, no fluctuance, negative probe to bone, exposed tendon, no undermining, no tunneling. Diffuse dorsal erythema. MSK: Muscle strength 5/5 to all pedal muscle groups bilaterally. Mild tenderness on palpation to left periwound. Mild equinus bilaterally.     Current Facility-Administered Medications   Medication Dose Route Frequency Provider Last Rate Last Admin    insulin lispro (HUMALOG) injection vial 0-12 Units  0-12 Units Subcutaneous TID WC Norma Murrell MD   2 Units at 05/17/21 1658    insulin lispro (HUMALOG) injection vial 0-6 Units  0-6 Units Subcutaneous Nightly Norma Murrell MD        insulin glargine (LANTUS) injection vial 15 Units  15 Units Subcutaneous Nightly Norma Murrell MD        povidone-iodine (BETADINE) 10 % external solution   Topical PRN Arian Arthur DPM        furosemide (LASIX) injection 20 mg  20 mg Intravenous BID Grant Ayala MD   20 mg at 05/17/21 1701    ipratropium-albuterol (DUONEB) nebulizer solution 1 ampule  1 ampule Inhalation Q4H WA Grant Ayala MD   1 ampule at 05/17/21 1702    guaiFENesin-dextromethorphan (ROBITUSSIN DM) 100-10 MG/5ML syrup 5 mL  5 mL Oral Q4H PRN VIK Gonzalez - CNP   5 mL at 05/16/21 1715    traMADol (ULTRAM) tablet 50 mg  50 mg Oral Q6H PRN Mikel Frias MD   50 mg at 05/17/21 1827    hydrALAZINE (APRESOLINE) injection 10 mg  10 mg Intravenous Q6H PRN Grant Ayala MD   10 mg at 05/16/21 0910    ceftaroline fosamil (TEFLARO) 600 mg in dextrose 5 % 50 mL IVPB  600 mg Intravenous Q12H Sujey Gonzalez MD   Stopped at 05/17/21 1809    amLODIPine (NORVASC) tablet 5 mg  5 mg Oral Daily SHASTA Carpenter   5 mg at 05/17/21 0830    aspirin EC tablet 81 mg  81 mg Oral Daily SHASTA Carpenter   81 mg at 05/17/21 0830    [Held by provider] furosemide (LASIX) tablet 20 mg  20 mg Oral Q MWF SHASTA Bui        gabapentin (NEURONTIN) capsule 300 mg  300 mg Oral Daily Ambreen Og, PA   300 mg at 05/17/21 0830    guaiFENesin tablet 400 mg  400 mg Oral TID Ambreen Og, PA   400 mg at 05/17/21 0831    pantoprazole (PROTONIX) tablet 40 mg  40 mg Oral Daily Ambreen Og, PA   40 mg at 05/17/21 0830    pravastatin (PRAVACHOL) tablet 20 mg  20 mg Oral Nightly Ambreen Og, PA   20 mg at 05/16/21 2104    sucralfate (CARAFATE) tablet 1 g  1 g Oral TID Ambreen Song, PA   1 g at 05/17/21 0831    sodium chloride flush 0.9 % injection 10 mL  10 mL Intravenous 2 times per day Ambreen Song, PA   10 mL at 05/16/21 2104    sodium chloride flush 0.9 % injection 10 mL  10 mL Intravenous PRN Ambreen Og, PA   10 mL at 05/16/21 1843    0.9 % sodium chloride infusion  25 mL Intravenous PRN Ambreen SHASTA Foley        potassium chloride (KLOR-CON M) extended release tablet 40 mEq  40 mEq Oral PRN Ambreen SHASTA Foley        Or    potassium bicarb-citric acid (EFFER-K) effervescent tablet 40 mEq  40 mEq Oral PRN Ambreen Og, PA        Or    potassium chloride 10 mEq/100 mL IVPB (Peripheral Line)  10 mEq Intravenous PRN Ambreen Og PA        enoxaparin (LOVENOX) injection 40 mg  40 mg Subcutaneous Daily Ambreen Song, PA   40 mg at 05/17/21 0830    magnesium hydroxide (MILK OF MAGNESIA) 400 MG/5ML suspension 30 mL  30 mL Oral Daily PRN Ambreen SHASTA Foley        acetaminophen (TYLENOL) tablet 650 mg  650 mg Oral Q6H PRN Ambreen Song, PA   650 mg at 05/15/21 1613    Or    acetaminophen (TYLENOL) suppository 650 mg  650 mg Rectal Q6H PRN Mabreen Og, PA        glucose (GLUTOSE) 40 % oral gel 15 g  15 g Oral PRN AmbreenSHASTA Abarca        dextrose 50 % IV solution  12.5 g Intravenous PRN AmbreenSHASTA Abarca        glucagon (rDNA) injection 1 mg  1 mg Intramuscular PRN Ambreen Song, PA  dextrose 5 % solution  100 mL/hr Intravenous PRN SHASTA Anderson        trimethobenzamide Jeneen Code) injection 200 mg  200 mg Intramuscular Q6H PRN SHASTA Anderson        budesonide (PULMICORT) nebulizer suspension 250 mcg  0.25 mg Nebulization BID SHASTA Anderson   250 mcg at 05/17/21 9393    And    Arformoterol Tartrate (BROVANA) nebulizer solution 15 mcg  15 mcg Nebulization BID SHASTA Anderson   15 mcg at 05/17/21 5386        Lab Results   Component Value Date    WBC 14.1 (H) 05/17/2021    HCT 25.1 (L) 05/17/2021    HGB 8.4 (L) 05/17/2021     05/17/2021     (L) 05/17/2021    K 3.9 05/17/2021    CL 91 (L) 05/17/2021    CO2 21 (L) 05/17/2021    BUN 18 05/17/2021    CREATININE 0.8 05/17/2021    GLUCOSE 237 (H) 05/17/2021    CRP 18.7 (H) 05/15/2021         Radiographs:    ASSESEMENT:  -S/p left foot debridement incision and drainage (DOS 5/16/2021)  -Left lower extremity cellulitis, present on admission  -Type 2 diabetes mellitus with left foot ulcer with abscess, present on admission, infected  -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 No plans for arterial intervention. Offering above-the-knee amputation if foot infection does not heal.  -Betadine plain packing removed and changed this evening. No drainage noted to dressings. Mild fibrotic tissue to plantar wound bed. No clinical signs of infection physical exam.  -NPO midnight 5/19/2021  -Plan for repeat debridement and possible delayed primary closure to left foot wound 5/19/2021 at 2 PM or later. Surgeon Dr. Denice FINCHPEDUIN, [facility] anesthesia  -Explained to patient and her daughter at bedside that this is a limb salvage procedure.   Considering her poor vascularity and as

## 2021-05-17 NOTE — PROGRESS NOTES
Subjective:  Feeling better   No CP or SOB  No fever or chills   No uncontrolled pain  No vomiting or diarrhea     Objective:    BP (!) 159/54   Pulse 72   Temp 97.9 °F (36.6 °C) (Temporal)   Resp 16   Ht 5' 3\" (1.6 m)   Wt 158 lb 6.4 oz (71.8 kg)   LMP 12/03/1997   SpO2 92%   BMI 28.06 kg/m²     24HR INTAKE/OUTPUT:      Intake/Output Summary (Last 24 hours) at 5/17/2021 1051  Last data filed at 5/17/2021 0804  Gross per 24 hour   Intake 490 ml   Output 1125 ml   Net -635 ml       General appearance: NAD, conversant  Neck: FROM, supple   Lungs: Clear bilaterally no wheezes, no rhonchi, no crackles  CV: RRR, no MRGs; normal carotid upstroke and amplitude without Bruits  Abdomen: Soft, non-tender; no masses or HSM  Extremities: No edema, no cyanosis, no clubbing  Skin: Wound dressed CDI  Psych: Alert and oriented normal affect  Neuro: Nonfocal  Most Recent Labs  Lab Results   Component Value Date    WBC 14.2 (H) 05/16/2021    HGB 10.5 (L) 05/16/2021    HCT 32.1 (L) 05/16/2021     05/16/2021     (L) 05/16/2021    K 3.7 05/16/2021    CL 89 (L) 05/16/2021    CREATININE 0.7 05/16/2021    BUN 14 05/16/2021    CO2 23 05/16/2021    GLUCOSE 231 (H) 05/16/2021    ALT 36 (H) 05/16/2021    AST 39 (H) 05/16/2021    INR 1.1 11/23/2014    TSH 0.898 08/30/2017    LABA1C 6.4 (H) 05/02/2019    LABMICR <12.0 05/02/2019     No results for input(s): MG in the last 72 hours. Lab Results   Component Value Date    CALCIUM 8.8 05/16/2021        XR FOOT LEFT (MIN 3 VIEWS)   Final Result   Postprocedural changes to the soft tissues of the plantar mid and forefoot. No acute osseous findings. CT HEAD WO CONTRAST   Final Result   No acute intracranial abnormality. Moderate cerebral atrophy and large amount of chronic ischemic changes both   appropriate for age. No significant change from the prior study.          XR CHEST PORTABLE   Final Result   Mild cardiomegaly with vascular congestion and widespread interstitial   airspace disease most consistent with pulmonary edema. Small bilateral   pleural effusions. XR FOOT LEFT (MIN 3 VIEWS)   Final Result   No aggressive osseous lesions. Please note, plain radiograph is insensitive   for evaluation of osteomyelitis. MRI FOOT LEFT W WO CONTRAST    (Results Pending)       Assessment    Principal Problem:    Cellulitis of left foot  Active Problems:    Diabetic infection of left foot (HCC)    HTN (hypertension)    Metabolic encephalopathy    Hyponatremia  Resolved Problems:    * No resolved hospital problems.  *      Plan:  55-year-old female history of hypertension admitted for  left leg cellulitis/abscess on left foot, and AMS    Continue antibiotics-Teflaro  ID Consult appreciated   Normal saline IV fluids discontinued-IV Lasix ordered twice daily, DuoNebs every 4 scheduled  Monitor daily CBC and BMP  Family indicates decline in hemoglobin-patient with history of \"dark stools\"  Follow H&H, check stool for Hemoccult blood H&H stable since admission  Patient intermittently confused CT negative--consistent with multifactorial metabolic encephalopathy  Sodium improved  Podiatry - I&D 5/16  MRI left foot pending:  Incomplete due to patient's movement     DVT Prophylaxis   PT/OT  Discharge planning        Electronically signed by Lester Bond MD on 5/17/2021 at 10:51 AM

## 2021-05-17 NOTE — PROGRESS NOTES
Department of Internal Medicine  Infectious Diseases  Progress  Note    C/C  : Right leg cellulitis , right foot abscess     Discussed with the pt's daughter  Denies fever and chills  Denies foot pain   Afebrile        Current Facility-Administered Medications   Medication Dose Route Frequency Provider Last Rate Last Admin    insulin lispro (HUMALOG) injection vial 0-12 Units  0-12 Units Subcutaneous TID KIRK Su MD   6 Units at 05/17/21 1226    insulin lispro (HUMALOG) injection vial 0-6 Units  0-6 Units Subcutaneous Nightly Roseline Su MD        insulin glargine (LANTUS) injection vial 15 Units  15 Units Subcutaneous Nightly Roseline Su MD        furosemide (LASIX) injection 20 mg  20 mg Intravenous BID Blank Hernandez MD   20 mg at 05/17/21 1226    ipratropium-albuterol (DUONEB) nebulizer solution 1 ampule  1 ampule Inhalation Q4H WA Blank Hernandez MD   1 ampule at 05/17/21 1337    guaiFENesin-dextromethorphan (ROBITUSSIN DM) 100-10 MG/5ML syrup 5 mL  5 mL Oral Q4H PRN VIK Robert - CNP   5 mL at 05/16/21 1715    traMADol (ULTRAM) tablet 50 mg  50 mg Oral Q6H PRN Yeny Parikh MD   50 mg at 05/17/21 0524    hydrALAZINE (APRESOLINE) injection 10 mg  10 mg Intravenous Q6H PRN Blank Hernandez MD   10 mg at 05/16/21 0910    ceftaroline fosamil (TEFLARO) 600 mg in dextrose 5 % 50 mL IVPB  600 mg Intravenous Q12H Latoya Tejada MD   Stopped at 05/17/21 0527    amLODIPine (NORVASC) tablet 5 mg  5 mg Oral Daily SHASTA Wei   5 mg at 05/17/21 0830    aspirin EC tablet 81 mg  81 mg Oral Daily SHASTA Wei   81 mg at 05/17/21 0830    [Held by provider] furosemide (LASIX) tablet 20 mg  20 mg Oral Q MWF SHASTA Bui        gabapentin (NEURONTIN) capsule 300 mg  300 mg Oral Daily SHASTA Wei   300 mg at 05/17/21 0830    guaiFENesin tablet 400 mg  400 mg Oral TID SHASTA Wei   400 mg at 05/17/21 0831    pantoprazole (PROTONIX) tablet 40 mg  40 mg Oral Daily Aden Amis, PA   40 mg at 05/17/21 0830    pravastatin (PRAVACHOL) tablet 20 mg  20 mg Oral Nightly Aden Amis, PA   20 mg at 05/16/21 2104    sucralfate (CARAFATE) tablet 1 g  1 g Oral TID Aden Amis, PA   1 g at 05/17/21 0831    sodium chloride flush 0.9 % injection 10 mL  10 mL Intravenous 2 times per day Aden Amis, PA   10 mL at 05/16/21 2104    sodium chloride flush 0.9 % injection 10 mL  10 mL Intravenous PRN Aden Amis, PA   10 mL at 05/16/21 1843    0.9 % sodium chloride infusion  25 mL Intravenous PRN Aden Amis, PA        potassium chloride (KLOR-CON M) extended release tablet 40 mEq  40 mEq Oral PRN Aden Amis, PA        Or    potassium bicarb-citric acid (EFFER-K) effervescent tablet 40 mEq  40 mEq Oral PRN Aden Amis, PA        Or    potassium chloride 10 mEq/100 mL IVPB (Peripheral Line)  10 mEq Intravenous PRN Aden Amis, PA        enoxaparin (LOVENOX) injection 40 mg  40 mg Subcutaneous Daily Aden Amis, PA   40 mg at 05/17/21 0830    magnesium hydroxide (MILK OF MAGNESIA) 400 MG/5ML suspension 30 mL  30 mL Oral Daily PRN Aden Amis, PA        acetaminophen (TYLENOL) tablet 650 mg  650 mg Oral Q6H PRN Aden Amis, PA   650 mg at 05/15/21 1613    Or    acetaminophen (TYLENOL) suppository 650 mg  650 mg Rectal Q6H PRN Aden Amis, PA        glucose (GLUTOSE) 40 % oral gel 15 g  15 g Oral PRN Aden Amis, PA        dextrose 50 % IV solution  12.5 g Intravenous PRN Aden Amis, PA        glucagon (rDNA) injection 1 mg  1 mg Intramuscular PRN Aden Amis, PA        dextrose 5 % solution  100 mL/hr Intravenous PRN Aden Amis, PA        trimethobenzamide Destinee Devlin) injection 200 mg  200 mg Intramuscular Q6H PRN Aden Amis, PA        budesonide (PULMICORT) nebulizer suspension 250 mcg  0.25 mg Nebulization BID Aden Amis, PA   250 mcg at 05/17/21 0913    And    Arformoterol Tartrate (BROVANA) nebulizer solution 15 mcg  15 mcg Nebulization BID SHASTA Watson   15 mcg at 05/17/21 1711           REVIEW OF SYSTEMS:    CONSTITUTIONAL:  Denies fever, chill or rigors  HEENT: denies blurring of vision or double vision, denies hearing problem  RESPIRATORY: denies cough, shortness of breath, sputum expectoration, chest pain. CARDIOVASCULAR:  Denies palpitation  GASTROINTESTINAL:  Denies abdomen pain, diarrhea or constipation. GENITOURINARY:  Denies burning urination or frequency of urination  INTEGUMENT left foot wound   HEMATOLOGIC/LYMPHATIC:  Denies lymph node swelling, gum bleeding or easy bruising. MUSCULOSKELETAL: Denies foot pain   NEUROLOGICAL:  Denies light headed, dizziness, loss of consciousness    PHYSICAL EXAM:      Vitals:     Vitals:    05/17/21 0804   BP: (!) 159/54   Pulse: 72   Resp: 16   Temp: 97.9 °F (36.6 °C)   SpO2: 92%       General Appearance:    Sleepy . Head:    Normocephalic, atraumatic   Eyes:    No pallor, no icterus,   Ears:    No obvious deformity or drainage.    Nose:   No nasal drainage   Throat:   Mucosa moist, no oral thrush   Neck:   Supple, no lymphadenopathy   Back:     no CVA tenderness   Lungs:     Bilateral wheeze    Heart:    Regular rate and rhythm, no murmur   Abdomen:     Soft, non-tender, bowel sounds present    Extremities:    Left foot / leg -wrapped    Pulses:   Dorsalis pedis palpable    Skin:   No erythema      CBC with Differential:      Lab Results   Component Value Date    WBC 14.1 05/17/2021    RBC 2.63 05/17/2021    HGB 8.4 05/17/2021    HCT 25.1 05/17/2021     05/17/2021    MCV 95.4 05/17/2021    MCH 31.9 05/17/2021    MCHC 33.5 05/17/2021    RDW 13.5 05/17/2021    LYMPHOPCT 4.3 05/16/2021    MONOPCT 3.5 05/16/2021    BASOPCT 0.1 05/16/2021    MONOSABS 0.57 05/16/2021    LYMPHSABS 0.57 05/16/2021    EOSABS 0.00 05/16/2021    BASOSABS 0.00 05/16/2021       CMP     Lab Results   Component Value Date     05/17/2021    K 3.9 05/17/2021    K 3.7 05/16/2021    CL 91 05/17/2021    CO2 21 05/17/2021    BUN 18 05/17/2021    CREATININE 0.8 05/17/2021    GFRAA >60 05/17/2021    LABGLOM >60 05/17/2021    GLUCOSE 237 05/17/2021    PROT 6.7 05/16/2021    LABALBU 3.2 05/16/2021    CALCIUM 8.5 05/17/2021    BILITOT 0.3 05/16/2021    ALKPHOS 85 05/16/2021    AST 39 05/16/2021    ALT 36 05/16/2021         Hepatic Function Panel:    Lab Results   Component Value Date    ALKPHOS 85 05/16/2021    ALT 36 05/16/2021    AST 39 05/16/2021    PROT 6.7 05/16/2021    BILITOT 0.3 05/16/2021    LABALBU 3.2 05/16/2021       PT/INR:    Lab Results   Component Value Date    PROTIME 11.1 11/23/2014    INR 1.1 11/23/2014       TSH:    Lab Results   Component Value Date    TSH 0.898 08/30/2017       U/A:    Lab Results   Component Value Date    COLORU Yellow 03/24/2021    PHUR 8.0 03/24/2021    WBCUA 0-1 03/24/2021    RBCUA 0-1 03/24/2021    RBCUA 5-10 09/02/2012    BACTERIA RARE 03/24/2021    CLARITYU Clear 03/24/2021    SPECGRAV 1.015 03/24/2021    LEUKOCYTESUR TRACE 03/24/2021    UROBILINOGEN 0.2 03/24/2021    BILIRUBINUR Negative 03/24/2021    BLOODU Negative 03/24/2021    GLUCOSEU 100 03/24/2021       ABG:  No results found for: LAT9VEO, BEART, P7SHUDNQ, PHART, THGBART, QBA4FLV, PO2ART, WNC3CMU    MICROBIOLOGY:    Wound cx -  Culture, Surgical [[id number]] (Abnormal) Collected: 05/16/21 1502   Order Status: Completed Specimen: Tissue Updated: 05/17/21 1238    Organism Gram negative rodAbnormal     Culture Surgical --    Rare growth   Identification and sensitivity to follow    Narrative:     Source: TISSU       Site: Tissue&Tissue             Culture, Surgical [[id number]] (Abnormal) Collected: 05/16/21 5963   Order Status: Completed Specimen: Tissue Updated: 05/17/21 1237    Organism Gram negative rodAbnormal     Culture Surgical --    Moderate growth   Identification and sensitivity to follow    Narrative:             Radiology :    X ray left foot - No aggressive osseous lesions    IMPRESSION:     1. Diabetic foot infection, left foot / leg cellulitis , abscess s/p I & D ( 5/16)   2. Leukocytosis - improved     RECOMMENDATIONS:      1. Teflaro 600 mg IV q 12 hrs   2.  Await cx / wound care

## 2021-05-17 NOTE — CARE COORDINATION
5/17/21 Transition of Care: patient is inpatient for cellulitis/wound of foot. She is POD 1 of I/D of foot. She is currently on iv Teflaro bid. She is continued on iv lasix bid. Met with her and her daughter, Marcelle Fuller, at the bedside. The plan is for patient to go to [facility] at the [place] before returning to assisted living at the [facility] where she is active with [employer]. Contact made with Esmer to followup on plan. PT/OT will need updated. Patient does need a covid and does not need a precert to go to [facility] at the [place]. Per Porfirio Carrillo she is accepted and they will take her when she is ready. Her pcp is Dr Jonathon Jin and her pharmacy is [facility]. Per her daughter the final plan is to return to [facility] after a stay at [facility] for therapy.  Rico Stauffer RN CM

## 2021-05-18 ENCOUNTER — APPOINTMENT (OUTPATIENT)
Dept: INTERVENTIONAL RADIOLOGY/VASCULAR | Age: 86
DRG: 579 | End: 2021-05-18
Payer: MEDICARE

## 2021-05-18 PROBLEM — E87.1 HYPONATREMIA: Status: ACTIVE | Noted: 2021-05-18

## 2021-05-18 PROBLEM — L97.529 TYPE 1 DIABETES MELLITUS WITH LEFT DIABETIC FOOT ULCER (HCC): Status: ACTIVE | Noted: 2021-05-18

## 2021-05-18 PROBLEM — E11.9 DM (DIABETES MELLITUS), TYPE 2 (HCC): Status: ACTIVE | Noted: 2021-05-18

## 2021-05-18 PROBLEM — I70.244 ATHEROSCLEROSIS OF NATIVE ARTERY OF LEFT LOWER EXTREMITY WITH ULCERATION OF MIDFOOT (HCC): Status: ACTIVE | Noted: 2021-05-18

## 2021-05-18 PROBLEM — E10.621 TYPE 1 DIABETES MELLITUS WITH LEFT DIABETIC FOOT ULCER (HCC): Status: ACTIVE | Noted: 2021-05-18

## 2021-05-18 LAB
ANION GAP SERPL CALCULATED.3IONS-SCNC: 10 MMOL/L (ref 7–16)
BUN BLDV-MCNC: 13 MG/DL (ref 6–23)
CALCIUM SERPL-MCNC: 8.6 MG/DL (ref 8.6–10.2)
CHLORIDE BLD-SCNC: 92 MMOL/L (ref 98–107)
CO2: 24 MMOL/L (ref 22–29)
CREAT SERPL-MCNC: 0.7 MG/DL (ref 0.5–1)
GFR AFRICAN AMERICAN: >60
GFR NON-AFRICAN AMERICAN: >60 ML/MIN/1.73
GLUCOSE BLD-MCNC: 129 MG/DL (ref 74–99)
HCT VFR BLD CALC: 24.4 % (ref 34–48)
HEMOGLOBIN: 8.2 G/DL (ref 11.5–15.5)
MCH RBC QN AUTO: 32.2 PG (ref 26–35)
MCHC RBC AUTO-ENTMCNC: 33.6 % (ref 32–34.5)
MCV RBC AUTO: 95.7 FL (ref 80–99.9)
METER GLUCOSE: 140 MG/DL (ref 74–99)
METER GLUCOSE: 154 MG/DL (ref 74–99)
METER GLUCOSE: 222 MG/DL (ref 74–99)
METER GLUCOSE: 228 MG/DL (ref 74–99)
OSMOLALITY URINE: 306 MOSM/KG (ref 300–900)
OSMOLALITY: 278 MOSM/KG (ref 285–310)
PDW BLD-RTO: 13.3 FL (ref 11.5–15)
PLATELET # BLD: 395 E9/L (ref 130–450)
PMV BLD AUTO: 9.9 FL (ref 7–12)
POTASSIUM SERPL-SCNC: 3.5 MMOL/L (ref 3.5–5)
RBC # BLD: 2.55 E12/L (ref 3.5–5.5)
SODIUM BLD-SCNC: 126 MMOL/L (ref 132–146)
SODIUM URINE: 39 MMOL/L
WBC # BLD: 8.9 E9/L (ref 4.5–11.5)

## 2021-05-18 PROCEDURE — 6360000002 HC RX W HCPCS: Performed by: PHYSICIAN ASSISTANT

## 2021-05-18 PROCEDURE — 6370000000 HC RX 637 (ALT 250 FOR IP): Performed by: PHYSICIAN ASSISTANT

## 2021-05-18 PROCEDURE — 99233 SBSQ HOSP IP/OBS HIGH 50: CPT | Performed by: SURGERY

## 2021-05-18 PROCEDURE — 2700000000 HC OXYGEN THERAPY PER DAY

## 2021-05-18 PROCEDURE — 82962 GLUCOSE BLOOD TEST: CPT

## 2021-05-18 PROCEDURE — 1200000000 HC SEMI PRIVATE

## 2021-05-18 PROCEDURE — 6360000002 HC RX W HCPCS: Performed by: INTERNAL MEDICINE

## 2021-05-18 PROCEDURE — 6370000000 HC RX 637 (ALT 250 FOR IP): Performed by: INTERNAL MEDICINE

## 2021-05-18 PROCEDURE — 2580000003 HC RX 258: Performed by: INTERNAL MEDICINE

## 2021-05-18 PROCEDURE — 83935 ASSAY OF URINE OSMOLALITY: CPT

## 2021-05-18 PROCEDURE — 85027 COMPLETE CBC AUTOMATED: CPT

## 2021-05-18 PROCEDURE — 83930 ASSAY OF BLOOD OSMOLALITY: CPT

## 2021-05-18 PROCEDURE — 6370000000 HC RX 637 (ALT 250 FOR IP): Performed by: PODIATRIST

## 2021-05-18 PROCEDURE — 2580000003 HC RX 258: Performed by: PHYSICIAN ASSISTANT

## 2021-05-18 PROCEDURE — 6360000002 HC RX W HCPCS

## 2021-05-18 PROCEDURE — 80048 BASIC METABOLIC PNL TOTAL CA: CPT

## 2021-05-18 PROCEDURE — 36415 COLL VENOUS BLD VENIPUNCTURE: CPT

## 2021-05-18 PROCEDURE — 84300 ASSAY OF URINE SODIUM: CPT

## 2021-05-18 PROCEDURE — 93922 UPR/L XTREMITY ART 2 LEVELS: CPT

## 2021-05-18 PROCEDURE — 94640 AIRWAY INHALATION TREATMENT: CPT

## 2021-05-18 RX ORDER — FUROSEMIDE 10 MG/ML
INJECTION INTRAMUSCULAR; INTRAVENOUS
Status: COMPLETED
Start: 2021-05-18 | End: 2021-05-18

## 2021-05-18 RX ORDER — FUROSEMIDE 20 MG/1
20 TABLET ORAL DAILY
Status: DISCONTINUED | OUTPATIENT
Start: 2021-05-18 | End: 2021-05-23

## 2021-05-18 RX ADMIN — ASPIRIN 81 MG: 81 TABLET, COATED ORAL at 08:39

## 2021-05-18 RX ADMIN — PANTOPRAZOLE SODIUM 40 MG: 40 TABLET, DELAYED RELEASE ORAL at 08:40

## 2021-05-18 RX ADMIN — PRAVASTATIN SODIUM 20 MG: 20 TABLET ORAL at 19:43

## 2021-05-18 RX ADMIN — FUROSEMIDE 20 MG: 10 INJECTION, SOLUTION INTRAMUSCULAR; INTRAVENOUS at 08:41

## 2021-05-18 RX ADMIN — CEFTAROLINE FOSAMIL 600 MG: 600 POWDER, FOR SOLUTION INTRAVENOUS at 03:34

## 2021-05-18 RX ADMIN — GABAPENTIN 300 MG: 300 CAPSULE ORAL at 08:39

## 2021-05-18 RX ADMIN — GUAIFENESIN 400 MG: 400 TABLET ORAL at 08:39

## 2021-05-18 RX ADMIN — AMLODIPINE BESYLATE 5 MG: 5 TABLET ORAL at 08:40

## 2021-05-18 RX ADMIN — INSULIN GLARGINE 15 UNITS: 100 INJECTION, SOLUTION SUBCUTANEOUS at 21:43

## 2021-05-18 RX ADMIN — IPRATROPIUM BROMIDE AND ALBUTEROL SULFATE 1 AMPULE: 2.5; .5 SOLUTION RESPIRATORY (INHALATION) at 18:46

## 2021-05-18 RX ADMIN — ACETAMINOPHEN 650 MG: 325 TABLET ORAL at 03:34

## 2021-05-18 RX ADMIN — FUROSEMIDE 20 MG: 10 INJECTION, SOLUTION INTRAMUSCULAR; INTRAVENOUS at 17:59

## 2021-05-18 RX ADMIN — GUAIFENESIN 400 MG: 400 TABLET ORAL at 19:44

## 2021-05-18 RX ADMIN — Medication 10 ML: at 19:41

## 2021-05-18 RX ADMIN — ARFORMOTEROL TARTRATE 15 MCG: 15 SOLUTION RESPIRATORY (INHALATION) at 07:47

## 2021-05-18 RX ADMIN — INSULIN LISPRO 4 UNITS: 100 INJECTION, SOLUTION INTRAVENOUS; SUBCUTANEOUS at 11:25

## 2021-05-18 RX ADMIN — INSULIN LISPRO 2 UNITS: 100 INJECTION, SOLUTION INTRAVENOUS; SUBCUTANEOUS at 21:43

## 2021-05-18 RX ADMIN — ARFORMOTEROL TARTRATE 15 MCG: 15 SOLUTION RESPIRATORY (INHALATION) at 18:46

## 2021-05-18 RX ADMIN — ACETAMINOPHEN 650 MG: 325 TABLET ORAL at 19:39

## 2021-05-18 RX ADMIN — CEFTAROLINE FOSAMIL 600 MG: 600 POWDER, FOR SOLUTION INTRAVENOUS at 17:54

## 2021-05-18 RX ADMIN — TRAMADOL HYDROCHLORIDE 50 MG: 50 TABLET, FILM COATED ORAL at 13:09

## 2021-05-18 RX ADMIN — TRAMADOL HYDROCHLORIDE 50 MG: 50 TABLET, FILM COATED ORAL at 00:25

## 2021-05-18 RX ADMIN — ENOXAPARIN SODIUM 40 MG: 40 INJECTION SUBCUTANEOUS at 08:40

## 2021-05-18 RX ADMIN — SUCRALFATE 1 G: 1 TABLET ORAL at 14:16

## 2021-05-18 RX ADMIN — TRAMADOL HYDROCHLORIDE 50 MG: 50 TABLET, FILM COATED ORAL at 06:08

## 2021-05-18 RX ADMIN — SUCRALFATE 1 G: 1 TABLET ORAL at 19:44

## 2021-05-18 RX ADMIN — SUCRALFATE 1 G: 1 TABLET ORAL at 08:39

## 2021-05-18 RX ADMIN — BUDESONIDE 250 MCG: 0.25 SUSPENSION RESPIRATORY (INHALATION) at 18:46

## 2021-05-18 RX ADMIN — IPRATROPIUM BROMIDE AND ALBUTEROL SULFATE 1 AMPULE: 2.5; .5 SOLUTION RESPIRATORY (INHALATION) at 07:47

## 2021-05-18 RX ADMIN — FUROSEMIDE 20 MG: 20 TABLET ORAL at 19:44

## 2021-05-18 RX ADMIN — GUAIFENESIN 400 MG: 400 TABLET ORAL at 14:16

## 2021-05-18 RX ADMIN — IPRATROPIUM BROMIDE AND ALBUTEROL SULFATE 1 AMPULE: 2.5; .5 SOLUTION RESPIRATORY (INHALATION) at 12:10

## 2021-05-18 RX ADMIN — INSULIN LISPRO 2 UNITS: 100 INJECTION, SOLUTION INTRAVENOUS; SUBCUTANEOUS at 17:54

## 2021-05-18 RX ADMIN — ACETAMINOPHEN 650 MG: 325 TABLET ORAL at 09:55

## 2021-05-18 RX ADMIN — BUDESONIDE 250 MCG: 0.25 SUSPENSION RESPIRATORY (INHALATION) at 07:47

## 2021-05-18 ASSESSMENT — PAIN SCALES - GENERAL
PAINLEVEL_OUTOF10: 9
PAINLEVEL_OUTOF10: 8
PAINLEVEL_OUTOF10: 2
PAINLEVEL_OUTOF10: 6

## 2021-05-18 NOTE — PROGRESS NOTES
Department of Internal Medicine  Infectious Diseases  Progress  Note    C/C  : Right leg cellulitis , right foot abscess     Discussed with the pt's family   Denies fever and chills  Denies foot pain   Afebrile        Current Facility-Administered Medications   Medication Dose Route Frequency Provider Last Rate Last Admin    insulin lispro (HUMALOG) injection vial 0-12 Units  0-12 Units Subcutaneous TID WC Mary Alice Urrutia MD   4 Units at 05/18/21 1125    insulin lispro (HUMALOG) injection vial 0-6 Units  0-6 Units Subcutaneous Nightly Mary Alice Urrutia MD   1 Units at 05/17/21 2155    insulin glargine (LANTUS) injection vial 15 Units  15 Units Subcutaneous Nightly Mary Alice Urrutia MD   15 Units at 05/17/21 2155    povidone-iodine (BETADINE) 10 % external solution   Topical PRN Doug Gonzalez DPM        furosemide (LASIX) injection 20 mg  20 mg Intravenous BID Johnnie Reese MD   20 mg at 05/18/21 0841    ipratropium-albuterol (DUONEB) nebulizer solution 1 ampule  1 ampule Inhalation Q4H WA Johnnie Reese MD   1 ampule at 05/18/21 1210    guaiFENesin-dextromethorphan (ROBITUSSIN DM) 100-10 MG/5ML syrup 5 mL  5 mL Oral Q4H PRN VIK Borrero - CNP   5 mL at 05/16/21 1715    traMADol (ULTRAM) tablet 50 mg  50 mg Oral Q6H PRN Jemal Duval MD   50 mg at 05/18/21 1309    hydrALAZINE (APRESOLINE) injection 10 mg  10 mg Intravenous Q6H PRN Johnnie Reese MD   10 mg at 05/16/21 0910    ceftaroline fosamil (TEFLARO) 600 mg in dextrose 5 % 50 mL IVPB  600 mg Intravenous Q12H Rhett Tejada MD   Stopped at 05/18/21 0430    amLODIPine (NORVASC) tablet 5 mg  5 mg Oral Daily SHASTA Ruth   5 mg at 05/18/21 0840    aspirin EC tablet 81 mg  81 mg Oral Daily SHASTA Ruth   81 mg at 05/18/21 0839    [Held by provider] furosemide (LASIX) tablet 20 mg  20 mg Oral Q MWF SHASTA Ruth        gabapentin (NEURONTIN) capsule 300 mg  300 mg Oral Daily SHASTA Ruth   300 mg at 05/18/21 0839    nebulizer suspension 250 mcg  0.25 mg Nebulization BID José Mater, PA   250 mcg at 05/18/21 8093    And    Arformoterol Tartrate (BROVANA) nebulizer solution 15 mcg  15 mcg Nebulization BID José Mater, PA   15 mcg at 05/18/21 0747           REVIEW OF SYSTEMS:    CONSTITUTIONAL:  Denies fever, chill or rigors  HEENT: denies blurring of vision or double vision, denies hearing problem  RESPIRATORY: denies cough, shortness of breath, sputum expectoration, chest pain. CARDIOVASCULAR:  Denies palpitation  GASTROINTESTINAL:  Denies abdomen pain, diarrhea or constipation. GENITOURINARY:  Denies burning urination or frequency of urination  INTEGUMENT left foot wound   HEMATOLOGIC/LYMPHATIC:  Denies lymph node swelling, gum bleeding or easy bruising. MUSCULOSKELETAL: Denies foot pain   NEUROLOGICAL:  Denies light headed, dizziness, loss of consciousness    PHYSICAL EXAM:      Vitals:     Vitals:    05/18/21 0753   BP: (!) 168/62   Pulse: 62   Resp: 20   Temp: 97.8 °F (36.6 °C)   SpO2: 99%       General Appearance:    Sleepy . Head:    Normocephalic, atraumatic   Eyes:    No pallor, no icterus,   Ears:    No obvious deformity or drainage.    Nose:   No nasal drainage   Throat:   Mucosa moist, no oral thrush   Neck:   Supple, no lymphadenopathy   Back:     no CVA tenderness   Lungs:     Bilateral wheeze    Heart:    Regular rate and rhythm, no murmur   Abdomen:     Soft, non-tender, bowel sounds present    Extremities:    Left foot / leg -wrapped    Pulses:   Dorsalis pedis palpable    Skin:   No erythema      CBC with Differential:      Lab Results   Component Value Date    WBC 8.9 05/18/2021    RBC 2.55 05/18/2021    HGB 8.2 05/18/2021    HCT 24.4 05/18/2021     05/18/2021    MCV 95.7 05/18/2021    MCH 32.2 05/18/2021    MCHC 33.6 05/18/2021    RDW 13.3 05/18/2021    LYMPHOPCT 4.3 05/16/2021    MONOPCT 3.5 05/16/2021    BASOPCT 0.1 05/16/2021    MONOSABS 0.57 05/16/2021    LYMPHSABS 0.57 05/16/2021 EOSABS 0.00 05/16/2021    BASOSABS 0.00 05/16/2021       CMP     Lab Results   Component Value Date     05/18/2021    K 3.5 05/18/2021    K 3.7 05/16/2021    CL 92 05/18/2021    CO2 24 05/18/2021    BUN 13 05/18/2021    CREATININE 0.7 05/18/2021    GFRAA >60 05/18/2021    LABGLOM >60 05/18/2021    GLUCOSE 129 05/18/2021    PROT 6.7 05/16/2021    LABALBU 3.2 05/16/2021    CALCIUM 8.6 05/18/2021    BILITOT 0.3 05/16/2021    ALKPHOS 85 05/16/2021    AST 39 05/16/2021    ALT 36 05/16/2021         Hepatic Function Panel:    Lab Results   Component Value Date    ALKPHOS 85 05/16/2021    ALT 36 05/16/2021    AST 39 05/16/2021    PROT 6.7 05/16/2021    BILITOT 0.3 05/16/2021    LABALBU 3.2 05/16/2021       PT/INR:    Lab Results   Component Value Date    PROTIME 11.1 11/23/2014    INR 1.1 11/23/2014       TSH:    Lab Results   Component Value Date    TSH 0.898 08/30/2017       U/A:    Lab Results   Component Value Date    COLORU Yellow 03/24/2021    PHUR 8.0 03/24/2021    WBCUA 0-1 03/24/2021    RBCUA 0-1 03/24/2021    RBCUA 5-10 09/02/2012    BACTERIA RARE 03/24/2021    CLARITYU Clear 03/24/2021    SPECGRAV 1.015 03/24/2021    LEUKOCYTESUR TRACE 03/24/2021    UROBILINOGEN 0.2 03/24/2021    BILIRUBINUR Negative 03/24/2021    BLOODU Negative 03/24/2021    GLUCOSEU 100 03/24/2021       ABG:  No results found for: CAJ2YAN, BEART, E2YWDUBC, PHART, THGBART, CVO7DKI, PO2ART, SKT4JJU    MICROBIOLOGY:    Wound cx -  Susceptibility    Citrobacter koseri (1)    Antibiotic Interpretation CHIDI Status    ceFAZolin Sensitive <=^4 mcg/mL     cefepime Sensitive <=^0.12 mcg/mL     cefTRIAXone Sensitive <=^0.25 mcg/mL     ertapenem Sensitive <=^0.12 mcg/mL     gentamicin Sensitive <=^1 mcg/mL     levofloxacin Sensitive <=^0.12 mcg/mL     piperacillin-tazobactam Sensitive <=^4 mcg/mL     trimethoprim-sulfamethoxazole Sensitive <=^20 mcg/mL     Lab and Collection    Culture, Surgical - 5/16/2021    Radiology :    X ray left foot - No aggressive osseous lesions    IMPRESSION:     1. Diabetic foot infection, left foot / leg cellulitis , abscess s/p I & D ( 5/16)   2. Leukocytosis - improved     RECOMMENDATIONS:      1. Teflaro 600 mg IV q 12 hrs   2.  Await final cx

## 2021-05-18 NOTE — CONSULTS
Chief Complaint: Patient seen for evaluation of vascular status of the left leg      HPI: This patient, was recently seen by vascular service a few days ago, today however, patient and family including the daughter, Prisca Thomas and son Migdalia Bowie in the room wanted to have a repeat evaluation, from a vascular point, to see if anything can be done to solve with the left foot short of an amputation, that was mentioned recently, if the local wound care and podiatry care with IV antibiotics does not help salvage the foot    [facility], patient lives in the [facility] area at the [facility], does walk with a roller walker and would like to stay active and walking with possible to see when they can be done    The also inform me, the wound has been going on and off for the last 1 year at least, did undergo incision and drainage recently on 16 May and patient is scheduled for a repeat for surgery tomorrow for additional debridements    Patient does have underlying diabetes mellitus, underlying coronary artery disease, post cardiac bypass surgery, follows up with Dr. Anay Gray    Patient denies any focal lateralizing neurological symptoms like loss of speech, vision or loss of function of extremity    Patient can walk a few blocks, and denies any symptoms of rest pain    Allergies   Allergen Reactions    Lisinopril Other (See Comments)     7/18/19 Pt states that she does not want to take LISINOPRIL       Current Facility-Administered Medications   Medication Dose Route Frequency Provider Last Rate Last Admin    insulin lispro (HUMALOG) injection vial 0-12 Units  0-12 Units Subcutaneous TID WC Huma Boland MD   4 Units at 05/18/21 1125    insulin lispro (HUMALOG) injection vial 0-6 Units  0-6 Units Subcutaneous Nightly Huma Boland MD   1 Units at 05/17/21 2155    insulin glargine (LANTUS) injection vial 15 Units  15 Units Subcutaneous Nightly Huma Boland MD   15 Units at 05/17/21 2155    povidone-iodine (BETADINE) 10 % external solution   Topical PRN Pranav Jimenez, DPM        furosemide (LASIX) injection 20 mg  20 mg Intravenous BID Chang Banks MD   20 mg at 05/18/21 0841    ipratropium-albuterol (DUONEB) nebulizer solution 1 ampule  1 ampule Inhalation Q4H WA Chang Banks MD   1 ampule at 05/18/21 1210    guaiFENesin-dextromethorphan (ROBITUSSIN DM) 100-10 MG/5ML syrup 5 mL  5 mL Oral Q4H PRN VIK Weiss - CNP   5 mL at 05/16/21 1715    traMADol (ULTRAM) tablet 50 mg  50 mg Oral Q6H PRN Merwyn Baumgarten, MD   50 mg at 05/18/21 1309    hydrALAZINE (APRESOLINE) injection 10 mg  10 mg Intravenous Q6H PRN Chang Banks MD   10 mg at 05/16/21 0910    ceftaroline fosamil (TEFLARO) 600 mg in dextrose 5 % 50 mL IVPB  600 mg Intravenous Q12H Rhett Tejada MD   Stopped at 05/18/21 0430    amLODIPine (NORVASC) tablet 5 mg  5 mg Oral Daily SHASTA Esposito   5 mg at 05/18/21 0840    aspirin EC tablet 81 mg  81 mg Oral Daily SHASTA Esopsito   81 mg at 05/18/21 0839    [Held by provider] furosemide (LASIX) tablet 20 mg  20 mg Oral Q MWF SHASTA Esposito        gabapentin (NEURONTIN) capsule 300 mg  300 mg Oral Daily SHASTA Esposito   300 mg at 05/18/21 8234    guaiFENesin tablet 400 mg  400 mg Oral TID SHASTA Esposito   400 mg at 05/18/21 0839    pantoprazole (PROTONIX) tablet 40 mg  40 mg Oral Daily SHASTA Esposito   40 mg at 05/18/21 0840    pravastatin (PRAVACHOL) tablet 20 mg  20 mg Oral Nightly SHASTA Esposito   20 mg at 05/17/21 2115    sucralfate (CARAFATE) tablet 1 g  1 g Oral TID SHASTA Esposito   1 g at 05/18/21 0839    sodium chloride flush 0.9 % injection 10 mL  10 mL Intravenous 2 times per day SHASTA Esposito   10 mL at 05/17/21 2120    sodium chloride flush 0.9 % injection 10 mL  10 mL Intravenous PRN SHASTA Esposito   10 mL at 05/16/21 1843    0.9 % sodium chloride infusion  25 mL Intravenous PRN SHASTA Esposito        potassium chloride (KLOR-CON M) extended release tablet 40 mEq  40 mEq Oral PRN SHASTA Bustos        Or    potassium bicarb-citric acid (EFFER-K) effervescent tablet 40 mEq  40 mEq Oral PRN Rosalba [facility], [facility]        Or    potassium chloride 10 mEq/100 mL IVPB (Peripheral Line)  10 mEq Intravenous PRN Rosalba Montano, PA        enoxaparin (LOVENOX) injection 40 mg  40 mg Subcutaneous Daily Rosalba [facility]   40 mg at 05/18/21 0840    magnesium hydroxide (MILK OF MAGNESIA) 400 MG/5ML suspension 30 mL  30 mL Oral Daily PRN Rosalba Montano, PA        acetaminophen (TYLENOL) tablet 650 mg  650 mg Oral Q6H PRN Rosalba [facility]   650 mg at 05/18/21 0955    Or    acetaminophen (TYLENOL) suppository 650 mg  650 mg Rectal Q6H PRN Rosalba Montano, PA        glucose (GLUTOSE) 40 % oral gel 15 g  15 g Oral PRN [facility]al[facility]        dextrose 50 % IV solution  12.5 g Intravenous PRN [facility]        glucagon (rDNA) injection 1 mg  1 mg Intramuscular PRN [facility]al[facility]        dextrose 5 % solution  100 mL/hr Intravenous PRN [facility]al[facility]        trimethobenzamide Hodan Falling) injection 200 mg  200 mg Intramuscular Q6H PRN Rosalba [facility]        budesonide (PULMICORT) nebulizer suspension 250 mcg  0.25 mg Nebulization [facility]ID [facility]   250 mcg at 05/18/21 7223    And    Arformoterol Tartrate (BROVANA) nebulizer solution 15 mcg  15 mcg Nebulization BID [facility]   15 mcg at 05/18/21 4078       Past Medical History:   Diagnosis Date    Arthritis     Asthma     Bronchitis 5/23/2017    CAD (coronary artery disease)     Cough 5/23/2017    Diabetes mellitus ([address]     GERD (gastroesophageal reflux disease) 5/23/2017    Hyperlipidemia     Hypertension     Lung disease     PVD (peripheral vascular disease) with claudication ([address] 4/10/2019    Restless legs syndrome     Rhinitis, allergic 5/23/2017    Sinusitis 5/23/2017    SOB (shortness of breath) 5/23/2017    Urinary incontinence        Past Surgical History:   Procedure Laterality Date    ABDOMEN SURGERY      APPENDECTOMY      CARDIAC SURGERY      CHOLECYSTECTOMY      COLONOSCOPY      CORONARY ARTERY BYPASS GRAFT      8/28/10    ENDOSCOPY, COLON, DIAGNOSTIC      FOOT DEBRIDEMENT Left 5/16/2021    FOOT DEBRIDEMENT INCISION AND DRAINAGE. performed by Sy Mccann DPM at [address] [facility] [place] and o 1997    TONSILLECTOMY AND ADENOIDECTOMY         Family History   Problem Relation Age of Onset    Hypertension Father     Heart Disease Brother        Social History     Socioeconomic History    Marital status:      Spouse name: Not on file    Number of children: Not on file    Years of education: Not on file    Highest education level: Not on file   Occupational History    Not on file   Tobacco Use    Smoking status: Never Smoker    Smokeless tobacco: Never Used   Vaping Use    Vaping Use: Never used   Substance and Sexual Activity    Alcohol use: Yes     Comment: occasional    Drug use: No    Sexual activity: Not Currently     Partners: Male   Other Topics Concern    Not on file   Social History Narrative    Not on file     Social Determinants of Health     Financial Resource Strain:     Difficulty of Paying Living Expenses:    Food Insecurity:     Worried About Running Out of Food in the Last Year:     [address] in the Last Year:    Transportation Needs:     Lack of Transportation (Medical):      Lack of Transportation (Non-Medical):    Physical Activity:     Days of Exercise per Week:     Minutes of Exercise per Session:    Stress:     Feeling of Stress :    Social Connections:     Frequency of Communication with Friends and Family:     Frequency of Social Gatherings with Friends and Family:     Attends Yaz[employer]ism Services:     Active Member of Clubs or Organizations:     Attends Club or Organization Meetings:     Marital Status:    Intimate Partner Violence:     Both feet are warm to touch. The color of both feet is normal.    Patient does have open wound, over the plantar surface of left foot near the base of the first metatarsophalangeal joint, has some nonviable tissue    Over the dorsal aspect, patient does have discoloration of the skin, with ischemic changes, partly from bruising                    Pulses Right  Left    Brachial 3 3    Radial    3=normal   Femoral 2 2  2=diminished   Popliteal    1=barely palpable   Dorsalis pedis 1 0  0=absent   Posterior tibial    4=aneurysmal           Other pertinent information:1. The past medical records were reviewed. 2.    Lab Results   Component Value Date    WBC 8.9 05/18/2021    HGB 8.2 (L) 05/18/2021    HCT 24.4 (L) 05/18/2021    MCV 95.7 05/18/2021     05/18/2021      Lab Results   Component Value Date     (L) 05/18/2021    K 3.5 05/18/2021    CL 92 (L) 05/18/2021    CO2 24 05/18/2021    BUN 13 05/18/2021    CREATININE 0.7 05/18/2021    GLUCOSE 129 (H) 05/18/2021    CALCIUM 8.6 05/18/2021    PROT 6.7 05/16/2021    LABALBU 3.2 (L) 05/16/2021    BILITOT 0.3 05/16/2021    ALKPHOS 85 05/16/2021    AST 39 (H) 05/16/2021    ALT 36 (H) 05/16/2021    LABGLOM >60 05/18/2021    GFRAA >60 05/18/2021     Lab Results   Component Value Date    APTT 27.5 11/23/2014      Lab Results   Component Value Date    INR 1.1 11/23/2014    PROTIME 11.1 11/23/2014        3. XR FOOT LEFT (MIN 3 VIEWS)   Final Result   Postprocedural changes to the soft tissues of the plantar mid and forefoot. No acute osseous findings. CT HEAD WO CONTRAST   Final Result   No acute intracranial abnormality. Moderate cerebral atrophy and large amount of chronic ischemic changes both   appropriate for age. No significant change from the prior study. XR CHEST PORTABLE   Final Result   Mild cardiomegaly with vascular congestion and widespread interstitial   airspace disease most consistent with pulmonary edema.   Small bilateral   pleural effusions. XR FOOT LEFT (MIN 3 VIEWS)   Final Result   No aggressive osseous lesions. Please note, plain radiograph is insensitive   for evaluation of osteomyelitis. MRI FOOT LEFT W WO CONTRAST    (Results Pending)   VL VICTORINA BILATERAL LIMITED 1-2 LEVELS    (Results Pending)     4. History physical, vascular consultation note from recently done under supervision of Dr. Ela Boo, podiatry consultation also reviewed and also the podiatry outpatient notes was reviewed    5. 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 Diabetes mellitus with diabetic neuropathy    3. Coronary artery disease post cardiac bypass surgery, hypertension, hyperlipidemia    Plan:               Discussed in detail with the patient and her family including her daughter Terry Martin and son Elizabeth Tee, telephone #[phone], options, risks benefits and alternatives were explained    Patient, for age, is very alert and oriented, was ambulatory prior to coming to the hospital with the roller walker, the patient and the family wished to pursue all options available if possible to salvage the left foot including possible angiogram angioplasty etc.    Recommend them an ankle-brachial index for documentation of tissue perfusion, based upon that, I will discuss with the patient and family and then with Dr. Ela Boo to see what treatment options are best for the patient, considering her condition      All the questions were answered.       Thank you for letting us participate in the care of your patient    Discussed with ultrasound department today, will obtain ankle-brachial index and then make recommendations    Electronically signed by Candis Araujo MD on 5/18/2021 at 3:42 PM       7:15 PM 5/18/2021    The the ankle-brachial index study was personally reviewed by me    Narrative   The ankle brachial index is falsely elevated due to calcification of   the tibial arteries       Bilaterally, patient does have Doppler evidence of a moderate femoral   popliteal arterial occlusive disease, with the biphasic ankle Doppler   tracings with adequate flow to the feet based upon the pulse volume   recordings over the metatarsals       Good collateral flow noted the toes of the right foot based upon the   pulse volume recordings       On the left side the pulse volume recordings diminished over the toes,   and almost flat over the left third and fourth toes         Message left for patient's son Giovanni Reed, telephone #[phone], regarding the results of the ankle-brachial index, and that I will notify Dr. Abigail Craft to review the study, see the patient again tomorrow and then make additional recommendations    Angela Long MD

## 2021-05-18 NOTE — PROGRESS NOTES
Podiatry Progress Note  5/18/2021   Katelyn Bishop       SUBJECTIVE: Katelyn Bishop is a 80 y.o. female s/p left foot minimally invasive incision and drainage, debridement of all nonviable tissue (DOS; 5/16/2021). Patient is resting comfortably at the time of visit with her L foot elevated on a pillow; daughter at bedside. She admits to some pain after her dressing change yesterday, but denies any pain at this time. Discussed plan for OR tomorrow but no exact time is available yet. No acute complaints at this time. Past Medical History:   Diagnosis Date    Arthritis     Asthma     Bronchitis 5/23/2017    CAD (coronary artery disease)     Cough 5/23/2017    Diabetes mellitus ([facility]     GERD (gastroesophageal reflux disease) 5/23/2017    Hyperlipidemia     Hypertension     Lung disease     PVD (peripheral vascular disease) with claudication ([facility] 4/10/2019    Restless legs syndrome     Rhinitis, allergic 5/23/2017    Sinusitis 5/23/2017    SOB (shortness of breath) 5/23/2017    Urinary incontinence         Past Surgical History:   Procedure Laterality Date    ABDOMEN SURGERY      APPENDECTOMY      CARDIAC SURGERY      CHOLECYSTECTOMY      COLONOSCOPY      CORONARY ARTERY BYPASS GRAFT      8/28/10    ENDOSCOPY, COLON, DIAGNOSTIC      FOOT DEBRIDEMENT Left 5/16/2021    FOOT DEBRIDEMENT INCISION AND DRAINAGE. performed by Denice Galicia DPM at [facility] and [place] 1997    TONSILLECTOMY AND ADENOIDECTOMY           Family History   Problem Relation Age of Onset    Hypertension Father     Heart Disease Brother         Social History     Tobacco Use    Smoking status: Never Smoker    Smokeless tobacco: Never Used   Substance Use Topics    Alcohol use: Yes     Comment: occasional        Prior to Admission medications    Medication Sig Start Date End Date Taking?  Authorizing Provider   fluconazole (DIFLUCAN) 200 MG tablet Take 1 tablet by mouth daily for 7 days 5/11/21 5/18/21  Carolina Hurst MD   cephALEXin (KEFLEX) 500 MG capsule Take 1 capsule by mouth 2 times daily for 10 days 5/11/21 5/21/21  Carolina Hurst MD   rOPINIRole (REQUIP) 0.5 MG tablet Take 1 tablet by mouth nightly 5/11/21   Carolina Hurst MD   lidocaine (LMX) 4 % cream Apply topically as needed for Pain Apply topically as needed. Historical Provider, MD   sucralfate (CARAFATE) 1 GM tablet Take 1 g by mouth 3 times daily    Historical Provider, MD   bisacodyl (DULCOLAX) 10 MG suppository Place 10 mg rectally daily    Historical Provider, MD   gabapentin (NEURONTIN) 300 MG capsule Take 300 mg by mouth daily.     Historical Provider, MD   guaiFENesin (MUCINEX) 600 MG extended release tablet Take 600 mg by mouth 2 times daily    Historical Provider, MD   aluminum & magnesium hydroxide-simethicone (MYLANTA) 400-400-40 MG/5ML SUSP Take 30 mLs by mouth every 6 hours as needed    Historical Provider, MD   pantoprazole (PROTONIX) 40 MG tablet Take 40 mg by mouth daily    Historical Provider, MD   promethazine (PHENERGAN) 12.5 MG suppository Place 12.5 mg rectally every 6 hours as needed for Nausea    Historical Provider, MD   albuterol (PROVENTIL) 90 MCG/ACT inhaler Inhale 2 puffs into the lungs 4 times daily 7/22/19   Carolina Hurst MD   acetaminophen (TYLENOL) 500 MG tablet Take 1 tablet by mouth 4 times daily as needed for Pain 7/5/19 3/23/21  VIK Bonilla - CNP   furosemide (LASIX) 20 MG tablet Take 0.5 tablets by mouth daily  Patient taking differently: Take 20 mg by mouth MWF 5/9/19   Carolina Hurst MD   pravastatin (PRAVACHOL) 20 MG tablet TAKE 1 TABLET BY MOUTH  NIGHTLY 5/6/19   Carolina Hurst MD   amLODIPine (NORVASC) 5 MG tablet TAKE 1 TABLET BY MOUTH  DAILY 5/6/19   Carolina Hurst MD   glimepiride (AMARYL) 2 MG tablet TAKE 1 TABLET BY MOUTH  EVERY MORNING BEFORE  BREAKFAST 5/6/19   Carolina Hurst MD   Polyethylene Glycol 3350 (MIRALAX PO) Take by mouth    Historical Provider, MD hydrocortisone 2.5 % cream Apply topically 2 times daily Apply topically 2 times daily. Historical Provider, MD   fluticasone-vilanterol (BREO ELLIPTA) 100-25 MCG/INH AEPB inhaler Inhale 1 puff into the lungs daily 1/28/19   Tati Calhoun MD   clotrimazole-betamethasone (LOTRISONE) 1-0.05 % cream Apply topically 2 times daily. 11/7/18   Tati Calhoun MD   Multiple Vitamins-Minerals (OCUVITE ADULT FORMULA PO) Take 1 capsule by mouth daily    Historical Provider, MD   Probiotic Product (PRO-BIOTIC BLEND PO) Take by mouth    Historical Provider, MD   magnesium gluconate (MAGONATE) 500 MG tablet Take 1,000 mg by mouth 2 times daily    Historical Provider, MD   Lancets (BD LANCET ULTRAFINE 59K) MISC 1 applicator by Does not apply route 2 times daily Indications: DX:E11.9 5/23/17   Tati Calhoun MD   glucose blood VI test strips (ONE TOUCH TEST STRIPS) strip 1 each by In Vitro route 2 times daily Indications: DX:E11.9 5/23/17   Tati Calhoun MD   Blood Glucose Monitoring Suppl (ONE TOUCH ULTRA SYSTEM KIT) W/DEVICE KIT 1 kit by Does not apply route once for 1 dose Indications: DX: E11.9 5/23/17 1/27/23  Tati Calhoun MD   ALPHA LIPOIC ACID PO Take 1 capsule by mouth daily. Historical Provider, MD   B Complex-C-Folic Acid (HM VITAMIN B COMPLEX/VITAMIN C) TABS Take 1 tablet by mouth daily. Historical Provider, MD   Coenzyme Q10 (COQ10) 100 MG CAPS Take 200 mg by mouth daily. Historical Provider, MD   aspirin 81 MG EC tablet Take 81 mg by mouth daily. Historical Provider, MD        Lisinopril         OBJECTIVE:        Vitals:    05/18/21 0753   BP: (!) 168/62   Pulse: 62   Resp: 20   Temp: 97.8 °F (36.6 °C)   SpO2: 99%       EXAM:        Pt is AAOx3, NAD    LLE focused exam:  Vascular Exam: Nonpalpable DP and PT pulses. CFT sluggish to all digits. Skin temperature warm to cool proximal to distal.  Mild erythema to periwound and dorsal foot, improved from previous clinical photos.    No increase in warmth to periwound area. No edema noted. Neuro Exam: Protective sensation diminished bilaterally. Gross sensation intact    Dermatologic Exam: Post debridement incision to plantar left first MPJ, fibrogranular wound bed, no appreciable drainage, no malodor, no crepitus, no fluctuance, negative probe to bone, exposed tendon, no undermining, no tunneling. Diffuse dorsal erythema. MSK: Muscle strength 5/5 to all pedal muscle groups. Mild tenderness on palpation to left periwound. Mild equinus.     Current Facility-Administered Medications   Medication Dose Route Frequency Provider Last Rate Last Admin    insulin lispro (HUMALOG) injection vial 0-12 Units  0-12 Units Subcutaneous TID WC Sanjay Blackwood MD   4 Units at 05/18/21 1125    insulin lispro (HUMALOG) injection vial 0-6 Units  0-6 Units Subcutaneous Nightly Sanjay Blackwood MD   1 Units at 05/17/21 2155    insulin glargine (LANTUS) injection vial 15 Units  15 Units Subcutaneous Nightly Sanjay Blackwood MD   15 Units at 05/17/21 2155    povidone-iodine (BETADINE) 10 % external solution   Topical PRN Susana Liao, DPM        furosemide (LASIX) injection 20 mg  20 mg Intravenous BID Angely Hernandez MD   20 mg at 05/18/21 0841    ipratropium-albuterol (DUONEB) nebulizer solution 1 ampule  1 ampule Inhalation Q4H WA Angely Hernandez MD   1 ampule at 05/18/21 1210    guaiFENesin-dextromethorphan (ROBITUSSIN DM) 100-10 MG/5ML syrup 5 mL  5 mL Oral Q4H PRN Jalyn Ruth, APRN - CNP   5 mL at 05/16/21 1715    traMADol (ULTRAM) tablet 50 mg  50 mg Oral Q6H PRN Gertrudis Mccall MD   50 mg at 05/18/21 1309    hydrALAZINE (APRESOLINE) injection 10 mg  10 mg Intravenous Q6H PRN Angely Hernandez MD   10 mg at 05/16/21 0910    ceftaroline fosamil (TEFLARO) 600 mg in dextrose 5 % 50 mL IVPB  600 mg Intravenous Q12H Rhett Tejada MD   Stopped at 05/18/21 0430    amLODIPine (NORVASC) tablet 5 mg  5 mg Oral Daily SHASTA Ross   5 mg at 05/18/21 0887     glucagon (rDNA) injection 1 mg  1 mg Intramuscular PRN SHASTA Camejo        dextrose 5 % solution  100 mL/hr Intravenous PRN SHASTA Camejo        trimethobenzamide Cherylyn Copping) injection 200 mg  200 mg Intramuscular Q6H PRN SHASTA Camejo        budesonide (PULMICORT) nebulizer suspension 250 mcg  0.25 mg Nebulization BID SHASTA Camejo   250 mcg at 05/18/21 8059    And    Arformoterol Tartrate (BROVANA) nebulizer solution 15 mcg  15 mcg Nebulization BID SHASTA Camejo   15 mcg at 05/18/21 0747        Lab Results   Component Value Date    WBC 8.9 05/18/2021    HCT 24.4 (L) 05/18/2021    HGB 8.2 (L) 05/18/2021     05/18/2021     (L) 05/18/2021    K 3.5 05/18/2021    CL 92 (L) 05/18/2021    CO2 24 05/18/2021    BUN 13 05/18/2021    CREATININE 0.7 05/18/2021    GLUCOSE 129 (H) 05/18/2021    CRP 18.7 (H) 05/15/2021         Radiographs: No new imaging available    ASSESEMENT:  -S/p left foot debridement incision and drainage (DOS 5/16/2021)  -Left lower extremity cellulitis, present on admission  -Type 2 diabetes mellitus with left foot ulcer with abscess, present on admission, infected  -Type 2 insulin-dependent diabetes mellitus with peripheral neuropathy    PLAN:  - Examined and evaluated  - All labs, imaging, and charts reviewed   - WBC 8.9, trending down.  -Surgical cultures growing Citrobacter  - Antibiotics per ID; ceftaroline ID following  - Vascular studies canceled by vascular service. No plans for arterial intervention. Offering above-the-knee amputation if foot infection does not heal.  - Betadine gauze packing used in L foot wound. Foot dressed with DSD, ACE without compression  -NPO midnight 5/19/2021  -Plan for repeat debridement and possible delayed primary closure to left foot wound 5/19/2021 at 2 PM or later. Surgeon Dr. Angelica FINCHP.M., [facility] anesthesia.   Discussed this plan with patient and daughter, all of their questions were answered and they expressed understanding in all things discussed. They are agreeable with moving forward with repeat debridement.   -We will continue to follow while inpatient. D/W; Angelica Bullard DPM  Foot and Ankle Surgeon   Please contact for any questions: [phone]     Thank you for involving podiatry in this patients care. Please do not hesitate to call with any questions or concerns.      Srinivasan Valentine DPM  2:44 PM

## 2021-05-18 NOTE — PROGRESS NOTES
Subjective:  Feeling better   No CP or SOB  No fever or chills   No uncontrolled pain  No vomiting or diarrhea     Objective:    BP (!) 151/52   Pulse 73   Temp 97.6 °F (36.4 °C) (Oral)   Resp 18   Ht 5' 3\" (1.6 m)   Wt 159 lb (72.1 kg)   LMP 12/03/1997   SpO2 95%   BMI 28.17 kg/m²     24HR INTAKE/OUTPUT:      Intake/Output Summary (Last 24 hours) at 5/18/2021 0744  Last data filed at 5/18/2021 0522  Gross per 24 hour   Intake 600 ml   Output 1320 ml   Net -720 ml       General appearance: NAD, conversant  Neck: FROM, supple   Lungs: Clear bilaterally no wheezes, no rhonchi, no crackles  CV: RRR, no MRGs; normal carotid upstroke and amplitude without Bruits  Abdomen: Soft, non-tender; no masses or HSM  Extremities: No edema, no cyanosis, no clubbing  Skin: Wound dressed CDI  Psych: Alert and oriented normal affect  Neuro: Nonfocal  Most Recent Labs  Lab Results   Component Value Date    WBC 8.9 05/18/2021    HGB 8.2 (L) 05/18/2021    HCT 24.4 (L) 05/18/2021     05/18/2021     (L) 05/17/2021    K 3.9 05/17/2021    CL 91 (L) 05/17/2021    CREATININE 0.8 05/17/2021    BUN 18 05/17/2021    CO2 21 (L) 05/17/2021    GLUCOSE 237 (H) 05/17/2021    ALT 36 (H) 05/16/2021    AST 39 (H) 05/16/2021    INR 1.1 11/23/2014    TSH 0.898 08/30/2017    LABA1C 6.4 (H) 05/02/2019    LABMICR <12.0 05/02/2019     No results for input(s): MG in the last 72 hours. Lab Results   Component Value Date    CALCIUM 8.5 (L) 05/17/2021        XR FOOT LEFT (MIN 3 VIEWS)   Final Result   Postprocedural changes to the soft tissues of the plantar mid and forefoot. No acute osseous findings. CT HEAD WO CONTRAST   Final Result   No acute intracranial abnormality. Moderate cerebral atrophy and large amount of chronic ischemic changes both   appropriate for age. No significant change from the prior study.          XR CHEST PORTABLE   Final Result   Mild cardiomegaly with vascular congestion and widespread interstitial   airspace disease most consistent with pulmonary edema. Small bilateral   pleural effusions. XR FOOT LEFT (MIN 3 VIEWS)   Final Result   No aggressive osseous lesions. Please note, plain radiograph is insensitive   for evaluation of osteomyelitis. MRI FOOT LEFT W WO CONTRAST    (Results Pending)       Assessment    Principal Problem:    Cellulitis of left foot  Active Problems:    Diabetic infection of left foot (HCC)    HTN (hypertension)    Metabolic encephalopathy    Hyponatremia    DM (diabetes mellitus), type 2 (HCC)  Resolved Problems:    * No resolved hospital problems.  *      Plan:  80-year-old female history of hypertension admitted for  left leg cellulitis/abscess on left foot, and AMS    Continue antibiotics-Teflaro  ID Consult appreciated   Normal saline IV fluids discontinued  -IV Lasix ordered twice daily--completed  Resume oral Lasix   DuoNebs every 4 scheduled  Monitor daily CBC and BMP  Family indicates decline in hemoglobin-patient with history of \"dark stools\"  Follow H&H, check stool for Hemoccult blood H&H stable since admission  Patient intermittently confused CT negative--consistent with multifactorial metabolic encephalopathy  Hyponatremia-check urine and plasma osmolality, urine sodium start fluid restriction  Podiatry - I&D 5/16  MRI left foot pending:  Incomplete due to patient's movement     DVT Prophylaxis   PT/OT  Discharge planning        Electronically signed by Shira Quintero MD on 5/18/2021 at 7:44 AM

## 2021-05-18 NOTE — CARE COORDINATION
5/18/21 Update CM Note; Call placed to son, Marco A Muller, and update given. Will continue to keep family updated.  King Tai Milton CM

## 2021-05-18 NOTE — CARE COORDINATION
5/18/21 Update CM Note; Spoke with Nany Mace in MRI and will attempt to complete today.  Rico Stauffer RN CM

## 2021-05-18 NOTE — CARE COORDINATION
5/18/21 Update CM Note: Call placed to patients son, Gino Aguilera, per request. Jaz Deems with him regarding his concern to maximize the treatment plan for his mothers foot wound. He is in agreement with a redo of the vascular consult for additional options to promote further healing of the wound and infection prevention. He was given an update along with further plan documented in the chart by Podiatry. Call has been placed and message left for Dr Blanca Angulo with request for reconsult of vascular. Will await response from Dr Mila Schulte. Also placed a call to the wound clinic and the physicians in the wound clinic are the Vascular Team and Dr Barb Tyler( Podiatry). Will relay this information to the family.  Cristian Allison RN CM

## 2021-05-19 ENCOUNTER — ANESTHESIA (OUTPATIENT)
Dept: OPERATING ROOM | Age: 86
End: 2021-05-19

## 2021-05-19 ENCOUNTER — ANESTHESIA EVENT (OUTPATIENT)
Dept: OPERATING ROOM | Age: 86
End: 2021-05-19

## 2021-05-19 PROBLEM — J96.01 ACUTE HYPOXEMIC RESPIRATORY FAILURE (HCC): Status: ACTIVE | Noted: 2021-05-19

## 2021-05-19 LAB
ABO/RH: NORMAL
ANAEROBIC CULTURE: ABNORMAL
ANION GAP SERPL CALCULATED.3IONS-SCNC: 11 MMOL/L (ref 7–16)
ANION GAP SERPL CALCULATED.3IONS-SCNC: 14 MMOL/L (ref 7–16)
ANTIBODY SCREEN: NORMAL
BUN BLDV-MCNC: 11 MG/DL (ref 6–23)
BUN BLDV-MCNC: 12 MG/DL (ref 6–23)
CALCIUM SERPL-MCNC: 8.5 MG/DL (ref 8.6–10.2)
CALCIUM SERPL-MCNC: 8.8 MG/DL (ref 8.6–10.2)
CHLORIDE BLD-SCNC: 89 MMOL/L (ref 98–107)
CHLORIDE BLD-SCNC: 94 MMOL/L (ref 98–107)
CO2: 22 MMOL/L (ref 22–29)
CO2: 26 MMOL/L (ref 22–29)
CORTISOL TOTAL: 17.18 MCG/DL (ref 2.68–18.4)
CREAT SERPL-MCNC: 0.7 MG/DL (ref 0.5–1)
CREAT SERPL-MCNC: 0.7 MG/DL (ref 0.5–1)
CULTURE SURGICAL: ABNORMAL
CULTURE SURGICAL: ABNORMAL
CULTURE SURGICAL: NORMAL
CULTURE, BLOOD 2: NORMAL
FERRITIN: 171 NG/ML
GFR AFRICAN AMERICAN: >60
GFR AFRICAN AMERICAN: >60
GFR NON-AFRICAN AMERICAN: >60 ML/MIN/1.73
GFR NON-AFRICAN AMERICAN: >60 ML/MIN/1.73
GLUCOSE BLD-MCNC: 121 MG/DL (ref 74–99)
GLUCOSE BLD-MCNC: 130 MG/DL (ref 74–99)
IRON SATURATION: 20 % (ref 15–50)
IRON: 42 MCG/DL (ref 37–145)
METER GLUCOSE: 119 MG/DL (ref 74–99)
METER GLUCOSE: 139 MG/DL (ref 74–99)
METER GLUCOSE: 197 MG/DL (ref 74–99)
ORGANISM: ABNORMAL
POTASSIUM SERPL-SCNC: 3.7 MMOL/L (ref 3.5–5)
POTASSIUM SERPL-SCNC: 4 MMOL/L (ref 3.5–5)
PRO-BNP: 1556 PG/ML (ref 0–450)
REASON FOR REJECTION: NORMAL
REJECTED TEST: NORMAL
SODIUM BLD-SCNC: 125 MMOL/L (ref 132–146)
SODIUM BLD-SCNC: 131 MMOL/L (ref 132–146)
TOTAL IRON BINDING CAPACITY: 208 MCG/DL (ref 250–450)
TSH SERPL DL<=0.05 MIU/L-ACNC: 1.06 UIU/ML (ref 0.27–4.2)

## 2021-05-19 PROCEDURE — 75774 ARTERY X-RAY EACH VESSEL: CPT | Performed by: SURGERY

## 2021-05-19 PROCEDURE — 83550 IRON BINDING TEST: CPT

## 2021-05-19 PROCEDURE — 82728 ASSAY OF FERRITIN: CPT

## 2021-05-19 PROCEDURE — C1760 CLOSURE DEV, VASC: HCPCS

## 2021-05-19 PROCEDURE — 6360000002 HC RX W HCPCS: Performed by: INTERNAL MEDICINE

## 2021-05-19 PROCEDURE — 86850 RBC ANTIBODY SCREEN: CPT

## 2021-05-19 PROCEDURE — 2709999900 HC NON-CHARGEABLE SUPPLY

## 2021-05-19 PROCEDURE — 1200000000 HC SEMI PRIVATE

## 2021-05-19 PROCEDURE — 80048 BASIC METABOLIC PNL TOTAL CA: CPT

## 2021-05-19 PROCEDURE — 6370000000 HC RX 637 (ALT 250 FOR IP): Performed by: PODIATRIST

## 2021-05-19 PROCEDURE — 6370000000 HC RX 637 (ALT 250 FOR IP): Performed by: INTERNAL MEDICINE

## 2021-05-19 PROCEDURE — 86901 BLOOD TYPING SEROLOGIC RH(D): CPT

## 2021-05-19 PROCEDURE — 82962 GLUCOSE BLOOD TEST: CPT

## 2021-05-19 PROCEDURE — 2700000000 HC OXYGEN THERAPY PER DAY

## 2021-05-19 PROCEDURE — 75716 ARTERY X-RAYS ARMS/LEGS: CPT | Performed by: SURGERY

## 2021-05-19 PROCEDURE — 6360000002 HC RX W HCPCS: Performed by: PHYSICIAN ASSISTANT

## 2021-05-19 PROCEDURE — C1894 INTRO/SHEATH, NON-LASER: HCPCS

## 2021-05-19 PROCEDURE — 047N3ZZ DILATION OF LEFT POPLITEAL ARTERY, PERCUTANEOUS APPROACH: ICD-10-PCS | Performed by: SURGERY

## 2021-05-19 PROCEDURE — 37228 HC TIB PER TERRITORY PLASTY: CPT | Performed by: SURGERY

## 2021-05-19 PROCEDURE — 6360000002 HC RX W HCPCS

## 2021-05-19 PROCEDURE — 37228 PR REVSC OPN/PRQ TIB/PERO W/ANGIOPLASTY UNI: CPT | Performed by: SURGERY

## 2021-05-19 PROCEDURE — 86900 BLOOD TYPING SEROLOGIC ABO: CPT

## 2021-05-19 PROCEDURE — C1769 GUIDE WIRE: HCPCS

## 2021-05-19 PROCEDURE — 6370000000 HC RX 637 (ALT 250 FOR IP): Performed by: PHYSICIAN ASSISTANT

## 2021-05-19 PROCEDURE — 37224 HC FEM POP TERRITORY PLASTY: CPT | Performed by: SURGERY

## 2021-05-19 PROCEDURE — 37224 PR REVSC OPN/PRG FEM/POP W/ANGIOPLASTY UNI: CPT | Performed by: SURGERY

## 2021-05-19 PROCEDURE — 2580000003 HC RX 258: Performed by: PODIATRIST

## 2021-05-19 PROCEDURE — 2580000003 HC RX 258: Performed by: INTERNAL MEDICINE

## 2021-05-19 PROCEDURE — 83540 ASSAY OF IRON: CPT

## 2021-05-19 PROCEDURE — 99233 SBSQ HOSP IP/OBS HIGH 50: CPT | Performed by: SURGERY

## 2021-05-19 PROCEDURE — C1725 CATH, TRANSLUMIN NON-LASER: HCPCS

## 2021-05-19 PROCEDURE — 36415 COLL VENOUS BLD VENIPUNCTURE: CPT

## 2021-05-19 PROCEDURE — C1887 CATHETER, GUIDING: HCPCS

## 2021-05-19 PROCEDURE — 047Q3ZZ DILATION OF LEFT ANTERIOR TIBIAL ARTERY, PERCUTANEOUS APPROACH: ICD-10-PCS | Performed by: SURGERY

## 2021-05-19 PROCEDURE — B41G1ZZ FLUOROSCOPY OF LEFT LOWER EXTREMITY ARTERIES USING LOW OSMOLAR CONTRAST: ICD-10-PCS | Performed by: SURGERY

## 2021-05-19 PROCEDURE — 94640 AIRWAY INHALATION TREATMENT: CPT

## 2021-05-19 PROCEDURE — 2500000003 HC RX 250 WO HCPCS

## 2021-05-19 PROCEDURE — 047L3ZZ DILATION OF LEFT FEMORAL ARTERY, PERCUTANEOUS APPROACH: ICD-10-PCS | Performed by: SURGERY

## 2021-05-19 RX ORDER — LIDOCAINE HYDROCHLORIDE 10 MG/ML
10 INJECTION, SOLUTION INFILTRATION; PERINEURAL ONCE
Status: DISCONTINUED | OUTPATIENT
Start: 2021-05-20 | End: 2021-05-25 | Stop reason: HOSPADM

## 2021-05-19 RX ADMIN — Medication 10 ML: at 21:04

## 2021-05-19 RX ADMIN — AMLODIPINE BESYLATE 5 MG: 5 TABLET ORAL at 08:47

## 2021-05-19 RX ADMIN — GUAIFENESIN 400 MG: 400 TABLET ORAL at 21:04

## 2021-05-19 RX ADMIN — IPRATROPIUM BROMIDE AND ALBUTEROL SULFATE 1 AMPULE: 2.5; .5 SOLUTION RESPIRATORY (INHALATION) at 08:38

## 2021-05-19 RX ADMIN — SUCRALFATE 1 G: 1 TABLET ORAL at 08:47

## 2021-05-19 RX ADMIN — SUCRALFATE 1 G: 1 TABLET ORAL at 21:04

## 2021-05-19 RX ADMIN — ERTAPENEM SODIUM 1000 MG: 1 INJECTION, POWDER, LYOPHILIZED, FOR SOLUTION INTRAMUSCULAR; INTRAVENOUS at 17:45

## 2021-05-19 RX ADMIN — ARFORMOTEROL TARTRATE 15 MCG: 15 SOLUTION RESPIRATORY (INHALATION) at 08:39

## 2021-05-19 RX ADMIN — CEFTAROLINE FOSAMIL 600 MG: 600 POWDER, FOR SOLUTION INTRAVENOUS at 03:47

## 2021-05-19 RX ADMIN — GABAPENTIN 300 MG: 300 CAPSULE ORAL at 08:47

## 2021-05-19 RX ADMIN — FUROSEMIDE 20 MG: 20 TABLET ORAL at 08:47

## 2021-05-19 RX ADMIN — PANTOPRAZOLE SODIUM 40 MG: 40 TABLET, DELAYED RELEASE ORAL at 08:46

## 2021-05-19 RX ADMIN — TRAMADOL HYDROCHLORIDE 50 MG: 50 TABLET, FILM COATED ORAL at 03:47

## 2021-05-19 RX ADMIN — GUAIFENESIN 400 MG: 400 TABLET ORAL at 08:47

## 2021-05-19 RX ADMIN — INSULIN GLARGINE 15 UNITS: 100 INJECTION, SOLUTION SUBCUTANEOUS at 21:08

## 2021-05-19 RX ADMIN — TRAMADOL HYDROCHLORIDE 50 MG: 50 TABLET, FILM COATED ORAL at 21:04

## 2021-05-19 RX ADMIN — INSULIN LISPRO 2 UNITS: 100 INJECTION, SOLUTION INTRAVENOUS; SUBCUTANEOUS at 21:09

## 2021-05-19 RX ADMIN — ACETAMINOPHEN 650 MG: 325 TABLET ORAL at 08:47

## 2021-05-19 RX ADMIN — ASPIRIN 81 MG: 81 TABLET, COATED ORAL at 08:47

## 2021-05-19 RX ADMIN — PRAVASTATIN SODIUM 20 MG: 20 TABLET ORAL at 21:06

## 2021-05-19 RX ADMIN — BUDESONIDE 250 MCG: 0.25 SUSPENSION RESPIRATORY (INHALATION) at 08:40

## 2021-05-19 ASSESSMENT — LIFESTYLE VARIABLES: SMOKING_STATUS: 0

## 2021-05-19 ASSESSMENT — ENCOUNTER SYMPTOMS: SHORTNESS OF BREATH: 1

## 2021-05-19 ASSESSMENT — PAIN SCALES - GENERAL: PAINLEVEL_OUTOF10: 3

## 2021-05-19 NOTE — CARE COORDINATION
5/19/21 Update CM Note; Notified Dr Delilah Antunez of family's request to talk with Dr Sam Mendez before signing permits for todays procedures ordered to be done at 10:30am in the cath lab.  Derik Box RN CM

## 2021-05-19 NOTE — PROGRESS NOTES
Patient currently in cath lab for vascular intervention. Attending physician met with patient's daughter, Cha Braxton, and son. To avoid anesthesia twice in one day, will cancel OR repeat I&D scheduled for this afternoon. Due to lack of purulence since initial debridement, will plan for bedside delayed primary closure at bedside tomorrow morning. Family is agreeable with change in treatment plan. All of their questions were answered at this time and they expressed understanding in all things discussed. Patient may resume diet upon arrival to floor if NPO is not needed by another service. Discussed with Dr. Yoana Hollins.     Amrik Marr DPM  12:53 PM

## 2021-05-19 NOTE — PROGRESS NOTES
Met with patient's daughter regarding request for 2nd opinion by Dr. Lexi Hudson. States that she is okay with delaying tomorrow's procedure of bedside delayed primary closure of left foot wound. Dr. Lexi Hudson will examine patient tomorrow to provide 2nd opinion regarding L foot wound.      Ramón Taylor DPM

## 2021-05-19 NOTE — ANESTHESIA PRE PROCEDURE
5/9/19   Fernando Cook MD   pravastatin (PRAVACHOL) 20 MG tablet TAKE 1 TABLET BY MOUTH  NIGHTLY 5/6/19   Fernando Cook MD   amLODIPine (NORVASC) 5 MG tablet TAKE 1 TABLET BY MOUTH  DAILY 5/6/19   Fernando Cook MD   glimepiride (AMARYL) 2 MG tablet TAKE 1 TABLET BY MOUTH  EVERY MORNING BEFORE  BREAKFAST 5/6/19   Fernando Cook MD   Polyethylene Glycol 3350 (MIRALAX PO) Take by mouth    Historical Provider, MD   hydrocortisone 2.5 % cream Apply topically 2 times daily Apply topically 2 times daily. Historical Provider, MD   fluticasone-vilanterol (BREO ELLIPTA) 100-25 MCG/INH AEPB inhaler Inhale 1 puff into the lungs daily 1/28/19   Fernando Cook MD   clotrimazole-betamethasone (LOTRISONE) 1-0.05 % cream Apply topically 2 times daily. 11/7/18   Fernando Cook MD   Multiple Vitamins-Minerals (OCUVITE ADULT FORMULA PO) Take 1 capsule by mouth daily    Historical Provider, MD   Probiotic Product (PRO-BIOTIC BLEND PO) Take by mouth    Historical Provider, MD   magnesium gluconate (MAGONATE) 500 MG tablet Take 1,000 mg by mouth 2 times daily    Historical Provider, MD   Lancets (BD LANCET ULTRAFINE 37H) MISC 1 applicator by Does not apply route 2 times daily Indications: DX:E11.9 5/23/17   Fernando Cook MD   glucose blood VI test strips (ONE TOUCH TEST STRIPS) strip 1 each by In Vitro route 2 times daily Indications: DX:E11.9 5/23/17   Fernando Cook MD   Blood Glucose Monitoring Suppl (ONE TOUCH ULTRA SYSTEM KIT) W/DEVICE KIT 1 kit by Does not apply route once for 1 dose Indications: DX: E11.9 5/23/17 1/27/23  Fernando Cook MD   ALPHA LIPOIC ACID PO Take 1 capsule by mouth daily. Historical Provider, MD   B Complex-C-Folic Acid (HM VITAMIN B COMPLEX/VITAMIN C) TABS Take 1 tablet by mouth daily. Historical Provider, MD   Coenzyme Q10 (COQ10) 100 MG CAPS Take 200 mg by mouth daily. Historical Provider, MD   aspirin 81 MG EC tablet Take 81 mg by mouth daily.     Historical Provider, MD       Current medications:    Current Facility-Administered Medications   Medication Dose Route Frequency Provider Last Rate Last Admin    furosemide (LASIX) tablet 20 mg  20 mg Oral Daily Sallie Cates MD   20 mg at 05/18/21 1944    insulin lispro (HUMALOG) injection vial 0-12 Units  0-12 Units Subcutaneous TID WC Sallie Cates MD   2 Units at 05/18/21 1754    insulin lispro (HUMALOG) injection vial 0-6 Units  0-6 Units Subcutaneous Nightly Sallie Cates MD   2 Units at 05/18/21 2143    insulin glargine (LANTUS) injection vial 15 Units  15 Units Subcutaneous Nightly Sallie Cates MD   15 Units at 05/18/21 2143    povidone-iodine (BETADINE) 10 % external solution   Topical PRN Srinivasan Valentine DPM        ipratropium-albuterol (DUONEB) nebulizer solution 1 ampule  1 ampule Inhalation Q4H JOAN Casper MD   1 ampule at 05/18/21 1846    guaiFENesin-dextromethorphan (ROBITUSSIN DM) 100-10 MG/5ML syrup 5 mL  5 mL Oral Q4H PRN VIK Goodwin - CNP   5 mL at 05/16/21 1715    traMADol (ULTRAM) tablet 50 mg  50 mg Oral Q6H PRN Amado Peterson MD   50 mg at 05/19/21 0347    hydrALAZINE (APRESOLINE) injection 10 mg  10 mg Intravenous Q6H PRN Arcenio Casper MD   10 mg at 05/16/21 0910    ceftaroline fosamil (TEFLARO) 600 mg in dextrose 5 % 50 mL IVPB  600 mg Intravenous Q12H Elida Pettit MD   Stopped at 05/19/21 0440    amLODIPine (NORVASC) tablet 5 mg  5 mg Oral Daily SHASTA Camejo   5 mg at 05/18/21 0840    aspirin EC tablet 81 mg  81 mg Oral Daily SHASTA Camejo   81 mg at 05/18/21 0839    gabapentin (NEURONTIN) capsule 300 mg  300 mg Oral Daily SHASTA Camejo   300 mg at 05/18/21 9663    guaiFENesin tablet 400 mg  400 mg Oral TID SHASTA Camejo   400 mg at 05/18/21 1944    pantoprazole (PROTONIX) tablet 40 mg  40 mg Oral Daily SHASTA Camejo   40 mg at 05/18/21 0840    pravastatin (PRAVACHOL) tablet 20 mg  20 mg Oral Nightly SHASTA Camejo   20 mg at 05/18/21 1943  sucralfate (CARAFATE) tablet 1 g  1 g Oral TID SHASTA Lenz   1 g at 05/18/21 1944    sodium chloride flush 0.9 % injection 10 mL  10 mL Intravenous 2 times per day SHASTA Lenz   10 mL at 05/18/21 1941    sodium chloride flush 0.9 % injection 10 mL  10 mL Intravenous PRN SHASTA Lenz   10 mL at 05/16/21 1843    0.9 % sodium chloride infusion  25 mL Intravenous PRN SHASTA Lenz        potassium chloride (KLOR-CON M) extended release tablet 40 mEq  40 mEq Oral PRN SHASTA Lenz        Or    potassium bicarb-citric acid (EFFER-K) effervescent tablet 40 mEq  40 mEq Oral PRN SHASTA Lenz        Or    potassium chloride 10 mEq/100 mL IVPB (Peripheral Line)  10 mEq Intravenous PRN SHASTA Lenz        enoxaparin (LOVENOX) injection 40 mg  40 mg Subcutaneous Daily SHASTA Lenz   40 mg at 05/18/21 0840    magnesium hydroxide (MILK OF MAGNESIA) 400 MG/5ML suspension 30 mL  30 mL Oral Daily PRN SHASTA Lenz        acetaminophen (TYLENOL) tablet 650 mg  650 mg Oral Q6H PRN SHASTA Lenz   650 mg at 05/18/21 1939    Or    acetaminophen (TYLENOL) suppository 650 mg  650 mg Rectal Q6H PRN SHASTA Lenz        glucose (GLUTOSE) 40 % oral gel 15 g  15 g Oral PRN SHASTA Lenz        dextrose 50 % IV solution  12.5 g Intravenous PRN SHASTA Lenz        glucagon (rDNA) injection 1 mg  1 mg Intramuscular PRN SHASTA Lenz        dextrose 5 % solution  100 mL/hr Intravenous PRN SHASTA Lenz        trimethobenzamide Stormy Gavel) injection 200 mg  200 mg Intramuscular Q6H PRN SHASTA Lenz        budesonide (PULMICORT) nebulizer suspension 250 mcg  0.25 mg Nebulization BID SHASTA Lenz   250 mcg at 05/18/21 1846    And    Arformoterol Tartrate (BROVANA) nebulizer solution 15 mcg  15 mcg Nebulization BID SHASTA Lenz   15 mcg at 05/18/21 1846       Allergies:     Allergies   Allergen Reactions    Lisinopril Other (See Comments)     7/18/19 Pt states that she does not want to take LISINOPRIL       Problem List:    Patient Active Problem List   Diagnosis Code    Asthma J45.909    CAD (coronary artery disease) I25.10    Vitamin D deficiency E55.9    Diabetic infection of left foot ([address] E11.628, L08.9    HTN (hypertension) I10    Idiopathic peripheral neuropathy G60.9    Rhinitis, allergic J30.9    SOB (shortness of breath) R06.02    Bronchitis J40    GERD (gastroesophageal reflux disease) K21.9    PVD (peripheral vascular disease) with claudication ([facility]) I73.9    Chest pain R07.9    Gait instability R26.81    Pulmonary embolism on right ([facility]) I26.99    Cellulitis L03.90    Cellulitis of left foot L03. 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 ([address] 5/18/2021    Bronchitis 5/23/2017    CAD (coronary artery disease)     Cough 5/23/2017    Diabetes mellitus ([address]     GERD (gastroesophageal reflux disease) 5/23/2017    Hyperlipidemia     Hypertension     Lung disease     PVD (peripheral vascular disease) with claudication ([address] 4/10/2019    Restless legs syndrome     Rhinitis, allergic 5/23/2017    Sinusitis 5/23/2017    SOB (shortness of breath) 5/23/2017    Type 1 diabetes mellitus with left diabetic foot ulcer ([address] 5/18/2021    Urinary incontinence        Past Surgical History:        Procedure Laterality Date    ABDOMEN SURGERY      APPENDECTOMY      CARDIAC SURGERY      CHOLECYSTECTOMY      COLONOSCOPY      05/16/2021    ALKPHOS 85 05/16/2021    AST 39 05/16/2021    ALT 36 05/16/2021       POC Tests: No results for input(s): POCGLU, POCNA, POCK, POCCL, POCBUN, POCHEMO, POCHCT in the last 72 hours. Coags:   Lab Results   Component Value Date    PROTIME 11.1 11/23/2014    INR 1.1 11/23/2014    APTT 27.5 11/23/2014       HCG (If Applicable): No results found for: PREGTESTUR, PREGSERUM, HCG, HCGQUANT     ABGs: No results found for: PHART, PO2ART, UCA4LXJ, FDZ1MVP, BEART, H7LQRVFU     Type & Screen (If Applicable):  No results found for: LABABO, LABRH    Drug/Infectious Status (If Applicable):  No results found for: HIV, HEPCAB    COVID-19 Screening (If Applicable):   Lab Results   Component Value Date    COVID19 Not Detected 05/17/2021     ECG 3/23/21  Narrative & Impression    Sinus rhythm with 1st degree AV block  Possible Anterior infarct (cited on or before 27-JAN-2018)  Abnormal ECG  When compared with ECG of 18-JUL-2019 12:46,  Significant changes have occurred  Confirmed by Susan Bursn ([id number]) on 3/24/2021 10:11:24 AM     ECHO 3/24/21  Narrative & Impression  Transthoracic Echocardiography Report (TTE)      Demographics      Patient Name       [facility]      Gender              Female                      [address] [facility] Record     [id number]        Room Number         8131   Number      Account #          [de-identified]       Procedure Date      03/24/2021      Corporate ID                       Ordering Physician  Lauren Pierce      Accession Number   [id number]      Referring Physician      Date of Birth      09/20/1927      Sonographer         [facility]      Age                80 year(s)      Interpreting        Beryle Noss DO                                      Physician                                         Any Other     Procedure     Type of Study      TTE procedure:Echo Limited Study.      Procedure Date  Date: 03/24/2021 Start: 11:07 AM     Study Location: Portable  Technical Quality: wall motion is normal.   Physiologic and/or trace tricuspid regurgitation. RVSP is 33 mmHg. No evidence to suggest pulmonary hypertension. Technically good quality study. Compared to prior echo, no significant changes noted. Suggest clinical correlation. Chest xray 5/16/21  FINDINGS:   Cardiac silhouette is mildly enlarged.       Increasing central vascular congestion.       Widespread interstitial opacities in the lungs bilaterally most consistent   with pulmonary edema.       Small bilateral pleural effusions.       Status post median sternotomy. Anesthesia Evaluation  Patient summary reviewed and Nursing notes reviewed no history of anesthetic complications:   Airway: Mallampati: II  TM distance: >3 FB   Neck ROM: full  Mouth opening: > = 3 FB Dental:    (+) edentulous      Pulmonary:   (+) shortness of breath:  decreased breath sounds,      (-) not a current smoker                          PE comment: Right side diminished, left clear  Cardiovascular:  Exercise tolerance: good (>4 METS),   (+) hypertension:, CAD:, CABG/stent:,       ECG reviewed      Echocardiogram reviewed         Beta Blocker:  Not on Beta Blocker         Neuro/Psych:   (+) neuromuscular disease:,             GI/Hepatic/Renal:   (+) GERD: well controlled,           Endo/Other:    (+) DiabetesType II DM, using insulin, . ROS comment: DEBRIDEMENT WOUND LEFT FOOT, POSSIBLE DELAYED PRIMARY CLOSURE (Left ) Abdominal:           Vascular:   + PVD, aortic or cerebral, . Anesthesia Plan      MAC     ASA 3     (Right AC #20)  Induction: intravenous. Anesthetic plan and risks discussed with patient and child/children. Use of blood products discussed with patient whom consented to blood products. Plan discussed with CRNA and attending.                   Evelyn Cordova RN   5/19/2021

## 2021-05-19 NOTE — PROCEDURES
Louis Ramirez  9/20/1927    Cardiovascular Lab    DATE OF PROCEDURE: 5/19/2021     PHYSICIAN: Elvi Carr M.D.     DESCRIPTION OF PROCEDURE: The patient was identified and the procedure was confirmed. The groins were prepped and draped in the usual sterile fashion. Lidocaine 1% for local anesthesia. The right common femoral artery was percutaneously entered and a 4-Citizen of [place] sheath was inserted. A pigtail catheter was inserted into the abdominal aorta at the distal aorta. Serial images were obtained. The images identified a patent aortic bifurcation as well as common internal and external iliac arteries bilaterally. The right and left common, deep and proximal superficial femoral arteries were patent without stenosis. The catheter was used to access the left common iliac artery and a wire was advanced into the superficial femoral artery. A glide catheter was advanced over the wire into this position. Additional images were made for better visualization. The images identified a high-grade stenosis in 2 areas in the distal superficial femoral as well as the popliteal artery. There were large collaterals that reconstituted the tibioperoneal trunk. There was some flow through the popliteal artery. The anterior tibial artery reconstituted and was the main runoff to the foot. The posterior tibial artery was not visualized. The peroneal artery was diseased but continued to the ankle. I felt that if I could improve flow through the anterior tibial artery that it was maximize her chance of healing. The patient was given heparin and the sheath was exchanged for a 6 Citizen of [place] sheath that was advanced into the left superficial femoral artery. A Glidewire advantage and trailblazer catheter were used to successfully traverse the high-grade stenoses in the distal superficial femoral through the popliteal lesions, and into the stenosis at the origin of the anterior tibial artery.   The catheter was used to perform an injection confirming successful placement of the catheter in the proximal anterior tibial artery past the lesions. A 2.5 mm balloon was then inflated in the anterior tibial artery. A 4 mm balloon was inflated in the distal superficial femoral extending to the popliteal artery. A completion arteriogram showed significant improved flow through the vessels without flow limiting dissection or extravasation. Overall the flow was improved and the runoff was intact. The sheath was removed and an Angio-Seal device was used to close the arteriotomy. A sterile pressure dressing was applied to the puncture site. The patient tolerated the procedure and was transferred to the recovery area in satisfactory condition.

## 2021-05-19 NOTE — PROGRESS NOTES
Subjective:  Feeling better   No CP or SOB  No fever or chills   No uncontrolled pain  No vomiting or diarrhea   Family at bedside all questions answered   Objective:    BP (!) 153/54   Pulse 74   Temp 97.5 °F (36.4 °C) (Oral)   Resp 18   Ht 5' 3\" (1.6 m)   Wt 159 lb 3.2 oz (72.2 kg)   LMP 12/03/1997   SpO2 95%   BMI 28.20 kg/m²     24HR INTAKE/OUTPUT:      Intake/Output Summary (Last 24 hours) at 5/19/2021 0746  Last data filed at 5/19/2021 0513  Gross per 24 hour   Intake 840 ml   Output 2700 ml   Net -1860 ml       General appearance: NAD, conversant  Neck: FROM, supple   Lungs: Clear bilaterally no wheezes, no rhonchi, no crackles  CV: RRR, no MRGs; normal carotid upstroke and amplitude without Bruits  Abdomen: Soft, non-tender; no masses or HSM  Extremities: No edema, no cyanosis, no clubbing  Skin: Wound dressed CDI  Psych: Alert and oriented normal affect  Neuro: Nonfocal  Most Recent Labs  Lab Results   Component Value Date    WBC 8.9 05/18/2021    HGB 8.2 (L) 05/18/2021    HCT 24.4 (L) 05/18/2021     05/18/2021     (L) 05/19/2021    K 4.0 05/19/2021    CL 89 (L) 05/19/2021    CREATININE 0.7 05/19/2021    BUN 12 05/19/2021    CO2 22 05/19/2021    GLUCOSE 121 (H) 05/19/2021    ALT 36 (H) 05/16/2021    AST 39 (H) 05/16/2021    INR 1.1 11/23/2014    TSH 0.898 08/30/2017    LABA1C 6.4 (H) 05/02/2019    LABMICR <12.0 05/02/2019     No results for input(s): MG in the last 72 hours. Lab Results   Component Value Date    CALCIUM 8.5 (L) 05/19/2021        VL VICTORINA BILATERAL LIMITED 1-2 LEVELS   Final Result      XR FOOT LEFT (MIN 3 VIEWS)   Final Result   Postprocedural changes to the soft tissues of the plantar mid and forefoot. No acute osseous findings. CT HEAD WO CONTRAST   Final Result   No acute intracranial abnormality. Moderate cerebral atrophy and large amount of chronic ischemic changes both   appropriate for age. No significant change from the prior study.          XR CHEST PORTABLE   Final Result   Mild cardiomegaly with vascular congestion and widespread interstitial   airspace disease most consistent with pulmonary edema. Small bilateral   pleural effusions. XR FOOT LEFT (MIN 3 VIEWS)   Final Result   No aggressive osseous lesions. Please note, plain radiograph is insensitive   for evaluation of osteomyelitis. 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  *      Plan:  80-year-old female history of hypertension admitted for  left leg cellulitis/abscess on left foot, and AMS SP I&D in OR 5/16 sp angiopasty left superficial femoral artery and left anterior tibial artery 5/19      Diabetic foot infection   continue antibiotics-Teflaro--> ertapenem  ID Consult appreciated   Podiatry consult appreciated status post I&D, plan for bedside I&D tomorrow    Acute hypoxic respiratory failure  88% on room air  Continue supplemental O2 wean as tolerated  Normal saline IV fluids discontinued  -IV Lasix ordered twice daily--completed  Resume oral Lasix   DuoNebs every 4 scheduled  Monitor daily CBC and BMP    Anemia-  Check iron studies B12 folate  Monitor H&H, check stool for Hemoccult blood H&H stable since admission    Metabolic encephalopathy  Patient intermittently confused CT negative--consistent with multifactorial metabolic encephalopathy    Hyponatremia-check urine and plasma osmolality, urine sodium start fluid restriction  Podiatry - I&D 5/16  MRI left foot pending:  Incomplete due to patient's movement     DVT Prophylaxis   PT/OT  Discharge planning        Electronically signed by Milana Parikh MD on 5/19/2021 at 7:46 AM

## 2021-05-19 NOTE — CARE COORDINATION
5/19/21 Update CM Note: Met with patient and son at the bedside this am. She is npo for aortogram this am. It is unclear if I/d will take place today also. Patient is comfortable this am. She remains on Teflaro iv 12. The discharge plan remains to [facility] the [facility] when she is stable.  Jovany Loja RN CM

## 2021-05-19 NOTE — CARE COORDINATION
5/19/21 Update CM Note; Met with patients son and daughter regarding their wish for a second opinion with Dr Gisselle Hilliard for the foot wound on their mothers left foot. Dr Ebony Grimes notified and consult ordered.  Prashant Palma RN CM

## 2021-05-19 NOTE — PROGRESS NOTES
Spoke with RN again about getting order discontinued. Order has been in since the 15th. Tried PS several attempts to podiatry will no success of locating ordering physician. RN will speak to podiatry when them come to floor. Patient has already had surgery on foot.

## 2021-05-19 NOTE — PROGRESS NOTES
Department of Internal Medicine  Infectious Diseases  Progress  Note    C/C  : Right leg cellulitis , right foot abscess     Discussed with the pt's family   Denies fever and chills  Denies foot pain   Afebrile        Current Facility-Administered Medications   Medication Dose Route Frequency Provider Last Rate Last Admin    [START ON 5/20/2021] lidocaine 1 % injection 10 mL  10 mL Intradermal Once [place], DPM        furosemide (LASIX) tablet 20 mg  20 mg Oral Daily Rosa Elena Carrizales MD   20 mg at 05/19/21 0847    insulin lispro (HUMALOG) injection vial 0-12 Units  0-12 Units Subcutaneous TID WC Rosa Elena Carrizales MD   2 Units at 05/18/21 1754    insulin lispro (HUMALOG) injection vial 0-6 Units  0-6 Units Subcutaneous Nightly Rosa Elena Carrizales MD   2 Units at 05/18/21 2143    insulin glargine (LANTUS) injection vial 15 Units  15 Units Subcutaneous Nightly Rosa Elena Carrizales MD   15 Units at 05/18/21 2143    povidone-iodine (BETADINE) 10 % external solution   Topical PRN [place], DPM        ipratropium-albuterol (DUONEB) nebulizer solution 1 ampule  1 ampule Inhalation Q4H WA Demario Stafford MD   1 ampule at 05/19/21 0838    guaiFENesin-dextromethorphan (ROBITUSSIN DM) 100-10 MG/5ML syrup 5 mL  5 mL Oral Q4H PRN Demario Stafford MD   5 mL at 05/16/21 1715    traMADol (ULTRAM) tablet 50 mg  50 mg Oral Q6H PRN Demario Stafford MD   50 mg at 05/19/21 0347    hydrALAZINE (APRESOLINE) injection 10 mg  10 mg Intravenous Q6H PRN Demario Stafford MD   10 mg at 05/16/21 0910    ceftaroline fosamil (TEFLARO) 600 mg in dextrose 5 % 50 mL IVPB  600 mg Intravenous Q12H Demario Stafford MD   Stopped at 05/19/21 0440    amLODIPine (NORVASC) tablet 5 mg  5 mg Oral Daily Demario Stafford MD   5 mg at 05/19/21 0847    aspirin EC tablet 81 mg  81 mg Oral Daily Demario Stafford MD   81 mg at 05/19/21 0847    gabapentin (NEURONTIN) capsule 300 mg  300 mg Oral Daily Demario Stafford MD   300 mg at 05/19/21 0847    guaiFENesin tablet 400 mg 400 mg Oral TID Fam Alberto MD   400 mg at 05/19/21 0847    pantoprazole (PROTONIX) tablet 40 mg  40 mg Oral Daily Fam Alberto MD   40 mg at 05/19/21 0846    pravastatin (PRAVACHOL) tablet 20 mg  20 mg Oral Nightly Fam Alberto MD   20 mg at 05/18/21 1943    sucralfate (CARAFATE) tablet 1 g  1 g Oral TID Fam Alberto MD   1 g at 05/19/21 0847    sodium chloride flush 0.9 % injection 10 mL  10 mL Intravenous 2 times per day Fam Alberto MD   10 mL at 05/18/21 1941    sodium chloride flush 0.9 % injection 10 mL  10 mL Intravenous PRN Fam Alberto MD   10 mL at 05/16/21 1843    0.9 % sodium chloride infusion  25 mL Intravenous PRN Fam Alberto MD        potassium chloride (KLOR-CON M) extended release tablet 40 mEq  40 mEq Oral PRN Fam Alberto MD        Or    potassium bicarb-citric acid (EFFER-K) effervescent tablet 40 mEq  40 mEq Oral PRN Fam Alberto MD        Or    potassium chloride 10 mEq/100 mL IVPB (Peripheral Line)  10 mEq Intravenous PRN Fam Alberto MD        enoxaparin (LOVENOX) injection 40 mg  40 mg Subcutaneous Daily Fam Alberto MD   40 mg at 05/18/21 0840    magnesium hydroxide (MILK OF MAGNESIA) 400 MG/5ML suspension 30 mL  30 mL Oral Daily MARILUN Fam Alberto MD        acetaminophen (TYLENOL) tablet 650 mg  650 mg Oral Q6H PRN Fam Alberto MD   650 mg at 05/19/21 0847    Or    acetaminophen (TYLENOL) suppository 650 mg  650 mg Rectal Q6H PRN Fam Alberto MD        glucose (GLUTOSE) 40 % oral gel 15 g  15 g Oral PRN Fam Alberto MD        dextrose 50 % IV solution  12.5 g Intravenous PRN Fam Alberto MD        glucagon (rDNA) injection 1 mg  1 mg Intramuscular MARILUN Fam Alberto MD        dextrose 5 % solution  100 mL/hr Intravenous MARILUN Fam Alberto MD        trimethobenzamide Jeneen Code) injection 200 mg  200 mg Intramuscular Q6H PRN Fam Alberto MD        budesonide (PULMICORT) nebulizer suspension 250 mcg  0.25 mg Nebulization BID Fam Alberto MD   250 mcg at 05/19/21 0840    And    Arformoterol Tartrate [facility]) nebulizer solution 15 mcg  15 mcg Nebulization BID Jaime Jc MD   15 mcg at 05/19/21 0966           REVIEW OF SYSTEMS:    CONSTITUTIONAL:  Denies fever, chill or rigors  HEENT: denies blurring of vision or double vision, denies hearing problem  RESPIRATORY: denies cough, shortness of breath, sputum expectoration, chest pain. CARDIOVASCULAR:  Denies palpitation  GASTROINTESTINAL:  Denies abdomen pain, diarrhea or constipation. GENITOURINARY:  Denies burning urination or frequency of urination  INTEGUMENT left foot wound   HEMATOLOGIC/LYMPHATIC:  Denies lymph node swelling, gum bleeding or easy bruising. MUSCULOSKELETAL: Denies foot pain   NEUROLOGICAL:  Denies light headed, dizziness, loss of consciousness    PHYSICAL EXAM:      Vitals:     Vitals:    05/19/21 0845   BP: (!) 158/91   Pulse: 74   Resp: 18   Temp: 98.1 °F (36.7 °C)   SpO2: 98%       General Appearance:    Awake and alert    Head:    Normocephalic, atraumatic   Eyes:    No pallor, no icterus,   Ears:    No obvious deformity or drainage.    Nose:   No nasal drainage   Throat:   Mucosa moist, no oral thrush   Neck:   Supple, no lymphadenopathy   Back:     no CVA tenderness   Lungs:     Bilateral wheeze    Heart:    Regular rate and rhythm, no murmur   Abdomen:     Soft, non-tender, bowel sounds present    Extremities:    Left foot plantar wound - clean,non tender   Dorsum - mild erythema, and some areas of black discoloration     Pulses:   Dorsalis pedis palpable    Skin:   Mild  erythema      CBC with Differential:      Lab Results   Component Value Date    WBC 8.9 05/18/2021    RBC 2.55 05/18/2021    HGB 8.2 05/18/2021    HCT 24.4 05/18/2021     05/18/2021    MCV 95.7 05/18/2021    MCH 32.2 05/18/2021    MCHC 33.6 05/18/2021    RDW 13.3 05/18/2021    LYMPHOPCT 4.3 05/16/2021    MONOPCT 3.5 05/16/2021    BASOPCT 0.1 05/16/2021    MONOSABS 0.57 05/16/2021    LYMPHSABS 0.57 05/16/2021    EOSABS 0.00 05/16/2021    BASOSABS 0.00 05/16/2021       CMP     Lab Results   Component Value Date     05/19/2021    K 4.0 05/19/2021    K 3.7 05/16/2021    CL 89 05/19/2021    CO2 22 05/19/2021    BUN 12 05/19/2021    CREATININE 0.7 05/19/2021    GFRAA >60 05/19/2021    LABGLOM >60 05/19/2021    GLUCOSE 121 05/19/2021    PROT 6.7 05/16/2021    LABALBU 3.2 05/16/2021    CALCIUM 8.5 05/19/2021    BILITOT 0.3 05/16/2021    ALKPHOS 85 05/16/2021    AST 39 05/16/2021    ALT 36 05/16/2021         Hepatic Function Panel:    Lab Results   Component Value Date    ALKPHOS 85 05/16/2021    ALT 36 05/16/2021    AST 39 05/16/2021    PROT 6.7 05/16/2021    BILITOT 0.3 05/16/2021    LABALBU 3.2 05/16/2021       PT/INR:    Lab Results   Component Value Date    PROTIME 11.1 11/23/2014    INR 1.1 11/23/2014       TSH:    Lab Results   Component Value Date    TSH 1.060 05/19/2021       U/A:    Lab Results   Component Value Date    COLORU Yellow 03/24/2021    PHUR 8.0 03/24/2021    WBCUA 0-1 03/24/2021    RBCUA 0-1 03/24/2021    RBCUA 5-10 09/02/2012    BACTERIA RARE 03/24/2021    CLARITYU Clear 03/24/2021    SPECGRAV 1.015 03/24/2021    LEUKOCYTESUR TRACE 03/24/2021    UROBILINOGEN 0.2 03/24/2021    BILIRUBINUR Negative 03/24/2021    BLOODU Negative 03/24/2021    GLUCOSEU 100 03/24/2021       ABG:  No results found for: NRI9PMM, BEART, M9XUNAFN, PHART, THGBART, JNC5XYL, PO2ART, WBI9JFI    MICROBIOLOGY:    Wound cx -  Susceptibility    Citrobacter koseri (1)    Antibiotic Interpretation CHIDI Status    ceFAZolin Sensitive <=^4 mcg/mL     cefepime Sensitive <=^0.12 mcg/mL     cefTRIAXone Sensitive <=^0.25 mcg/mL     ertapenem Sensitive <=^0.12 mcg/mL     gentamicin Sensitive <=^1 mcg/mL     levofloxacin Sensitive <=^0.12 mcg/mL     piperacillin-tazobactam Sensitive <=^4 mcg/mL     trimethoprim-sulfamethoxazole Sensitive <=^20 mcg/mL     Lab and Collection    Swab collections are low-yield and rarely indicated.    Generally, the specimen volume when collected by swab is small, reducing the probability of isolating organisms: many   organisms adhere to the fibers of the swab, which reduces the   opportunity of recovering organisms. Abnormal       Organism Anaerobic gram positive cocciAbnormal     Anaerobic Culture --    Light growth          Radiology :    X ray left foot - No aggressive osseous lesions    IMPRESSION:     1. Diabetic foot infection, left foot / leg cellulitis , abscess s/p I & D ( 5/16)   2. Leukocytosis - improved     RECOMMENDATIONS:      1. Stop teflaro   2. Invanz 1 gram IV q 24 hrs   3.  Local wound care

## 2021-05-19 NOTE — PROGRESS NOTES
Vascular Surgery Progress Note    CC: Follow-up peripheral vascular disease, left foot ulceration. HISTORY:  The patient is awake, alert, and oriented. Denies new pain. Son present at bedside. IMPRESSION: Diabetic ulcer of left foot with infection. Peripheral vascular disease. I long discussion with the patient and her son. I reviewed the results of the arterial studies with them. I discussed the indications for the catheter directed arteriogram with the hopes of improving her flow if possible. I reviewed the risks, benefits, and alternatives of the procedure. He understands and consents for the procedure. All questions were answered. Patient Active Problem List   Diagnosis Code    Asthma J45.909    CAD (coronary artery disease) I25.10    Vitamin D deficiency E55.9    Diabetic infection of left foot ([facility] [address] E11.628, L08.9    HTN (hypertension) I10    Idiopathic peripheral neuropathy G60.9    Rhinitis, allergic J30.9    SOB (shortness of breath) R06.02    Bronchitis J40    GERD (gastroesophageal reflux disease) K21.9    PVD (peripheral vascular disease) with claudication ([facility]) I73.9    Chest pain R07.9    Gait instability R26.81    Pulmonary embolism on right ([facility]) I26.99    Cellulitis L03.90    Cellulitis of left foot L03. 307    Metabolic encephalopathy I14.84    Hyponatremia E87.1    DM (diabetes mellitus), type 2 (HCC) E11.9    Type 1 diabetes mellitus with left diabetic foot ulcer (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) I70.244    Cellulitis and abscess of leg L03.119, L02.419       Current Medications:    sodium chloride      dextrose        povidone-iodine, guaiFENesin-dextromethorphan, traMADol, hydrALAZINE, sodium chloride flush, sodium chloride, potassium chloride **OR** potassium alternative oral replacement **OR** potassium chloride, magnesium hydroxide, acetaminophen **OR** acetaminophen, glucose, dextrose, glucagon (rDNA), dextrose, trimethobenzamide    furosemide  20 mg Oral Daily    insulin lispro  0-12 Units Subcutaneous TID WC    insulin lispro  0-6 Units Subcutaneous Nightly    insulin glargine  15 Units Subcutaneous Nightly    ipratropium-albuterol  1 ampule Inhalation Q4H WA    ceftaroline fosamil (TEFLARO) IVPB  600 mg Intravenous Q12H    amLODIPine  5 mg Oral Daily    aspirin  81 mg Oral Daily    gabapentin  300 mg Oral Daily    guaiFENesin  400 mg Oral TID    pantoprazole  40 mg Oral Daily    pravastatin  20 mg Oral Nightly    sucralfate  1 g Oral TID    sodium chloride flush  10 mL Intravenous 2 times per day    enoxaparin  40 mg Subcutaneous Daily    budesonide  0.25 mg Nebulization BID    And    Arformoterol Tartrate  15 mcg Nebulization BID          PHYSICAL EXAM:    Vitals:    05/19/21 0845   BP: (!) 158/91   Pulse: 74   Resp: 18   Temp: 98.1 °F (36.7 °C)   SpO2: 98%     CONSTITUTIONAL:  awake, alert, cooperative, no apparent distress  LUNGS:  no increased work of breathing, good air exchange and   CARDIOVASCULAR:  regular rate and rhythm and   ABDOMEN:  soft, non-distended and non-tender      LABS:    Lab Results   Component Value Date    WBC 8.9 05/18/2021    HGB 8.2 (L) 05/18/2021    HCT 24.4 (L) 05/18/2021     05/18/2021    PROTIME 11.1 11/23/2014    INR 1.1 11/23/2014    APTT 27.5 11/23/2014    K 4.0 05/19/2021    BUN 12 05/19/2021    CREATININE 0.7 05/19/2021       RADIOLOGY:

## 2021-05-19 NOTE — PROCEDURES
5/18 8:24 pm Spoke with floor and RN confirmed pt is still not able to follow instructions to hold still for MRI. Pt was extremely confused when we attempted her last time and was moving all over. Unable to scan pt in this state. RN to see if can be discontinued.

## 2021-05-20 ENCOUNTER — ANESTHESIA EVENT (OUTPATIENT)
Dept: OPERATING ROOM | Age: 86
DRG: 579 | End: 2021-05-20
Payer: MEDICARE

## 2021-05-20 ENCOUNTER — APPOINTMENT (OUTPATIENT)
Dept: GENERAL RADIOLOGY | Age: 86
DRG: 579 | End: 2021-05-20
Payer: MEDICARE

## 2021-05-20 LAB
ANAEROBIC CULTURE: NORMAL
ANION GAP SERPL CALCULATED.3IONS-SCNC: 12 MMOL/L (ref 7–16)
BUN BLDV-MCNC: 12 MG/DL (ref 6–23)
C-REACTIVE PROTEIN: 7.5 MG/DL (ref 0–0.4)
CALCIUM SERPL-MCNC: 8.5 MG/DL (ref 8.6–10.2)
CHLORIDE BLD-SCNC: 96 MMOL/L (ref 98–107)
CO2: 25 MMOL/L (ref 22–29)
CREAT SERPL-MCNC: 0.7 MG/DL (ref 0.5–1)
FOLATE: >20 NG/ML (ref 4.8–24.2)
GFR AFRICAN AMERICAN: >60
GFR NON-AFRICAN AMERICAN: >60 ML/MIN/1.73
GLUCOSE BLD-MCNC: 85 MG/DL (ref 74–99)
HCT VFR BLD CALC: 27.4 % (ref 34–48)
HEMOGLOBIN: 9 G/DL (ref 11.5–15.5)
MCH RBC QN AUTO: 31.8 PG (ref 26–35)
MCHC RBC AUTO-ENTMCNC: 32.8 % (ref 32–34.5)
MCV RBC AUTO: 96.8 FL (ref 80–99.9)
METER GLUCOSE: 169 MG/DL (ref 74–99)
METER GLUCOSE: 174 MG/DL (ref 74–99)
METER GLUCOSE: 197 MG/DL (ref 74–99)
METER GLUCOSE: 226 MG/DL (ref 74–99)
PDW BLD-RTO: 13.2 FL (ref 11.5–15)
PLATELET # BLD: 512 E9/L (ref 130–450)
PMV BLD AUTO: 9.8 FL (ref 7–12)
POTASSIUM SERPL-SCNC: 3.9 MMOL/L (ref 3.5–5)
PRO-BNP: 923 PG/ML (ref 0–450)
RBC # BLD: 2.83 E12/L (ref 3.5–5.5)
SEDIMENTATION RATE, ERYTHROCYTE: 127 MM/HR (ref 0–20)
SODIUM BLD-SCNC: 133 MMOL/L (ref 132–146)
VITAMIN B-12: 826 PG/ML (ref 211–946)
WBC # BLD: 9.2 E9/L (ref 4.5–11.5)

## 2021-05-20 PROCEDURE — 2580000003 HC RX 258: Performed by: PODIATRIST

## 2021-05-20 PROCEDURE — 2700000000 HC OXYGEN THERAPY PER DAY

## 2021-05-20 PROCEDURE — 6360000002 HC RX W HCPCS: Performed by: INTERNAL MEDICINE

## 2021-05-20 PROCEDURE — 85651 RBC SED RATE NONAUTOMATED: CPT

## 2021-05-20 PROCEDURE — 80048 BASIC METABOLIC PNL TOTAL CA: CPT

## 2021-05-20 PROCEDURE — 85027 COMPLETE CBC AUTOMATED: CPT

## 2021-05-20 PROCEDURE — 36415 COLL VENOUS BLD VENIPUNCTURE: CPT

## 2021-05-20 PROCEDURE — 86140 C-REACTIVE PROTEIN: CPT

## 2021-05-20 PROCEDURE — 1200000000 HC SEMI PRIVATE

## 2021-05-20 PROCEDURE — 6370000000 HC RX 637 (ALT 250 FOR IP): Performed by: PODIATRIST

## 2021-05-20 PROCEDURE — 82607 VITAMIN B-12: CPT

## 2021-05-20 PROCEDURE — 2580000003 HC RX 258: Performed by: INTERNAL MEDICINE

## 2021-05-20 PROCEDURE — 82962 GLUCOSE BLOOD TEST: CPT

## 2021-05-20 PROCEDURE — 94640 AIRWAY INHALATION TREATMENT: CPT

## 2021-05-20 PROCEDURE — 82746 ASSAY OF FOLIC ACID SERUM: CPT

## 2021-05-20 PROCEDURE — 83880 ASSAY OF NATRIURETIC PEPTIDE: CPT

## 2021-05-20 PROCEDURE — 6370000000 HC RX 637 (ALT 250 FOR IP): Performed by: INTERNAL MEDICINE

## 2021-05-20 PROCEDURE — 71045 X-RAY EXAM CHEST 1 VIEW: CPT

## 2021-05-20 PROCEDURE — 6360000002 HC RX W HCPCS: Performed by: PODIATRIST

## 2021-05-20 RX ORDER — FUROSEMIDE 10 MG/ML
20 INJECTION INTRAMUSCULAR; INTRAVENOUS ONCE
Status: COMPLETED | OUTPATIENT
Start: 2021-05-20 | End: 2021-05-20

## 2021-05-20 RX ADMIN — ASPIRIN 81 MG: 81 TABLET, COATED ORAL at 17:42

## 2021-05-20 RX ADMIN — BUDESONIDE 250 MCG: 0.25 SUSPENSION RESPIRATORY (INHALATION) at 08:59

## 2021-05-20 RX ADMIN — IPRATROPIUM BROMIDE AND ALBUTEROL SULFATE 1 AMPULE: 2.5; .5 SOLUTION RESPIRATORY (INHALATION) at 20:31

## 2021-05-20 RX ADMIN — Medication 10 ML: at 21:52

## 2021-05-20 RX ADMIN — ARFORMOTEROL TARTRATE 15 MCG: 15 SOLUTION RESPIRATORY (INHALATION) at 08:58

## 2021-05-20 RX ADMIN — INSULIN LISPRO 2 UNITS: 100 INJECTION, SOLUTION INTRAVENOUS; SUBCUTANEOUS at 21:51

## 2021-05-20 RX ADMIN — ACETAMINOPHEN 650 MG: 325 TABLET ORAL at 11:06

## 2021-05-20 RX ADMIN — GUAIFENESIN 400 MG: 400 TABLET ORAL at 15:11

## 2021-05-20 RX ADMIN — PANTOPRAZOLE SODIUM 40 MG: 40 TABLET, DELAYED RELEASE ORAL at 10:57

## 2021-05-20 RX ADMIN — FUROSEMIDE 20 MG: 10 INJECTION, SOLUTION INTRAMUSCULAR; INTRAVENOUS at 14:48

## 2021-05-20 RX ADMIN — GABAPENTIN 300 MG: 300 CAPSULE ORAL at 10:57

## 2021-05-20 RX ADMIN — ENOXAPARIN SODIUM 60 MG: 60 INJECTION SUBCUTANEOUS at 14:50

## 2021-05-20 RX ADMIN — IPRATROPIUM BROMIDE AND ALBUTEROL SULFATE 1 AMPULE: 2.5; .5 SOLUTION RESPIRATORY (INHALATION) at 12:13

## 2021-05-20 RX ADMIN — TRAMADOL HYDROCHLORIDE 50 MG: 50 TABLET, FILM COATED ORAL at 02:45

## 2021-05-20 RX ADMIN — ARFORMOTEROL TARTRATE 15 MCG: 15 SOLUTION RESPIRATORY (INHALATION) at 20:31

## 2021-05-20 RX ADMIN — SUCRALFATE 1 G: 1 TABLET ORAL at 15:08

## 2021-05-20 RX ADMIN — GUAIFENESIN 400 MG: 400 TABLET ORAL at 21:52

## 2021-05-20 RX ADMIN — PRAVASTATIN SODIUM 20 MG: 20 TABLET ORAL at 21:52

## 2021-05-20 RX ADMIN — Medication 10 ML: at 11:01

## 2021-05-20 RX ADMIN — TRAMADOL HYDROCHLORIDE 50 MG: 50 TABLET, FILM COATED ORAL at 09:15

## 2021-05-20 RX ADMIN — FUROSEMIDE 20 MG: 20 TABLET ORAL at 17:42

## 2021-05-20 RX ADMIN — INSULIN LISPRO 2 UNITS: 100 INJECTION, SOLUTION INTRAVENOUS; SUBCUTANEOUS at 17:56

## 2021-05-20 RX ADMIN — AMLODIPINE BESYLATE 5 MG: 5 TABLET ORAL at 10:57

## 2021-05-20 RX ADMIN — IPRATROPIUM BROMIDE AND ALBUTEROL SULFATE 1 AMPULE: 2.5; .5 SOLUTION RESPIRATORY (INHALATION) at 08:57

## 2021-05-20 RX ADMIN — INSULIN GLARGINE 15 UNITS: 100 INJECTION, SOLUTION SUBCUTANEOUS at 21:52

## 2021-05-20 RX ADMIN — GUAIFENESIN SYRUP AND DEXTROMETHORPHAN 5 ML: 100; 10 SYRUP ORAL at 10:59

## 2021-05-20 RX ADMIN — IPRATROPIUM BROMIDE AND ALBUTEROL SULFATE 1 AMPULE: 2.5; .5 SOLUTION RESPIRATORY (INHALATION) at 16:36

## 2021-05-20 RX ADMIN — MAGNESIUM HYDROXIDE 30 ML: 2400 SUSPENSION ORAL at 10:56

## 2021-05-20 RX ADMIN — GUAIFENESIN 400 MG: 400 TABLET ORAL at 10:57

## 2021-05-20 RX ADMIN — INSULIN LISPRO 2 UNITS: 100 INJECTION, SOLUTION INTRAVENOUS; SUBCUTANEOUS at 14:50

## 2021-05-20 RX ADMIN — SUCRALFATE 1 G: 1 TABLET ORAL at 21:52

## 2021-05-20 RX ADMIN — ERTAPENEM SODIUM 1000 MG: 1 INJECTION, POWDER, LYOPHILIZED, FOR SOLUTION INTRAMUSCULAR; INTRAVENOUS at 18:59

## 2021-05-20 RX ADMIN — INSULIN LISPRO 2 UNITS: 100 INJECTION, SOLUTION INTRAVENOUS; SUBCUTANEOUS at 11:02

## 2021-05-20 RX ADMIN — BUDESONIDE 250 MCG: 0.25 SUSPENSION RESPIRATORY (INHALATION) at 20:31

## 2021-05-20 RX ADMIN — SUCRALFATE 1 G: 1 TABLET ORAL at 10:56

## 2021-05-20 RX ADMIN — GABAPENTIN 300 MG: 300 CAPSULE ORAL at 15:08

## 2021-05-20 ASSESSMENT — PAIN DESCRIPTION - DESCRIPTORS: DESCRIPTORS: DISCOMFORT;THROBBING;SORE

## 2021-05-20 ASSESSMENT — LIFESTYLE VARIABLES: SMOKING_STATUS: 0

## 2021-05-20 ASSESSMENT — ENCOUNTER SYMPTOMS: SHORTNESS OF BREATH: 1

## 2021-05-20 ASSESSMENT — PAIN SCALES - GENERAL
PAINLEVEL_OUTOF10: 0
PAINLEVEL_OUTOF10: 0

## 2021-05-20 ASSESSMENT — PAIN DESCRIPTION - PAIN TYPE: TYPE: ACUTE PAIN

## 2021-05-20 NOTE — ANESTHESIA PRE PROCEDURE
Department of Anesthesiology  Preprocedure Note       Name:  Betsy Mitchell   Age:  80 y.o.  :  1927                                          MRN:  [id number]         Date:  2021      Surgeon: Gertrude Hahn):  Hakan Sharif DPM    Procedure: Procedure(s):  LEFT FOOT DEBRIDEMENT  WITH DELAYED PRIMARY CLOSURE    Medications prior to admission:   Prior to Admission medications    Medication Sig Start Date End Date Taking? Authorizing Provider   cephALEXin (KEFLEX) 500 MG capsule Take 1 capsule by mouth 2 times daily for 10 days 21  Sean Simmons MD   rOPINIRole (REQUIP) 0.5 MG tablet Take 1 tablet by mouth nightly 21   Sean Simmons MD   lidocaine (LMX) 4 % cream Apply topically as needed for Pain Apply topically as needed. Historical Provider, MD   sucralfate (CARAFATE) 1 GM tablet Take 1 g by mouth 3 times daily    Historical Provider, MD   bisacodyl (DULCOLAX) 10 MG suppository Place 10 mg rectally daily    Historical Provider, MD   gabapentin (NEURONTIN) 300 MG capsule Take 300 mg by mouth daily.     Historical Provider, MD   guaiFENesin (MUCINEX) 600 MG extended release tablet Take 600 mg by mouth 2 times daily    Historical Provider, MD   aluminum & magnesium hydroxide-simethicone (MYLANTA) 400-400-40 MG/5ML SUSP Take 30 mLs by mouth every 6 hours as needed    Historical Provider, MD   pantoprazole (PROTONIX) 40 MG tablet Take 40 mg by mouth daily    Historical Provider, MD   promethazine (PHENERGAN) 12.5 MG suppository Place 12.5 mg rectally every 6 hours as needed for Nausea    Historical Provider, MD   albuterol (PROVENTIL) 90 MCG/ACT inhaler Inhale 2 puffs into the lungs 4 times daily 19   Sean Simmons MD   acetaminophen (TYLENOL) 500 MG tablet Take 1 tablet by mouth 4 times daily as needed for Pain 7/5/19 3/23/21  VIK Gamez - CNP   furosemide (LASIX) 20 MG tablet Take 0.5 tablets by mouth daily  Patient taking differently: Take 20 mg by mouth MWF 19 Fernando Cook MD   pravastatin (PRAVACHOL) 20 MG tablet TAKE 1 TABLET BY MOUTH  NIGHTLY 5/6/19   Fernando Cook MD   amLODIPine (NORVASC) 5 MG tablet TAKE 1 TABLET BY MOUTH  DAILY 5/6/19   Fernando Cook MD   glimepiride (AMARYL) 2 MG tablet TAKE 1 TABLET BY MOUTH  EVERY MORNING BEFORE  BREAKFAST 5/6/19   Fernando Cook MD   Polyethylene Glycol 3350 (MIRALAX PO) Take by mouth    Historical Provider, MD   hydrocortisone 2.5 % cream Apply topically 2 times daily Apply topically 2 times daily. Historical Provider, MD   fluticasone-vilanterol (BREO ELLIPTA) 100-25 MCG/INH AEPB inhaler Inhale 1 puff into the lungs daily 1/28/19   Fernando Cook MD   clotrimazole-betamethasone (LOTRISONE) 1-0.05 % cream Apply topically 2 times daily. 11/7/18   Fernando Cook MD   Multiple Vitamins-Minerals (OCUVITE ADULT FORMULA PO) Take 1 capsule by mouth daily    Historical Provider, MD   Probiotic Product (PRO-BIOTIC BLEND PO) Take by mouth    Historical Provider, MD   magnesium gluconate (MAGONATE) 500 MG tablet Take 1,000 mg by mouth 2 times daily    Historical Provider, MD   Lancets (BD LANCET ULTRAFINE 08A) MISC 1 applicator by Does not apply route 2 times daily Indications: DX:E11.9 5/23/17   Fernando Cook MD   glucose blood VI test strips (ONE TOUCH TEST STRIPS) strip 1 each by In Vitro route 2 times daily Indications: DX:E11.9 5/23/17   Fernando Cook MD   Blood Glucose Monitoring Suppl (ONE TOUCH ULTRA SYSTEM KIT) W/DEVICE KIT 1 kit by Does not apply route once for 1 dose Indications: DX: E11.9 5/23/17 1/27/23  Fernando Cook MD   ALPHA LIPOIC ACID PO Take 1 capsule by mouth daily. Historical Provider, MD   B Complex-C-Folic Acid (HM VITAMIN B COMPLEX/VITAMIN C) TABS Take 1 tablet by mouth daily. Historical Provider, MD   Coenzyme Q10 (COQ10) 100 MG CAPS Take 200 mg by mouth daily. Historical Provider, MD   aspirin 81 MG EC tablet Take 81 mg by mouth daily.     Historical Provider, MD       Current medications: No current facility-administered medications for this visit. No current outpatient medications on file.      Facility-Administered Medications Ordered in Other Visits   Medication Dose Route Frequency Provider Last Rate Last Admin    HYDROmorphone (DILAUDID) injection 0.2 mg  0.2 mg Intravenous Q4H PRN Stephen Alonso MD        enoxaparin (LOVENOX) injection 60 mg  1 mg/kg Subcutaneous BID Stephen Alonso MD        furosemide (LASIX) injection 20 mg  20 mg Intravenous Once Stephen Alonso MD        lidocaine 1 % injection 10 mL  10 mL Intradermal Once Nicole, DP        ertapenem Dang [place]) 1000 mg IVPB minibag  1,000 mg Intravenous Q24H Shazia Mead MD   Stopped at 05/19/21 1817    furosemide (LASIX) tablet 20 mg  20 mg Oral Daily Ollie Rae MD   20 mg at 05/19/21 0847    insulin lispro (HUMALOG) injection vial 0-12 Units  0-12 Units Subcutaneous TID WC Ollie Rae MD   2 Units at 05/20/21 1102    insulin lispro (HUMALOG) injection vial 0-6 Units  0-6 Units Subcutaneous Nightly Ollie Rae MD   2 Units at 05/19/21 2109    insulin glargine (LANTUS) injection vial 15 Units  15 Units Subcutaneous Nightly Ollie Rae MD   15 Units at 05/19/21 2108    povidone-iodine (BETADINE) 10 % external solution   Topical PRN Nicole, DP        ipratropium-albuterol (DUONEB) nebulizer solution 1 ampule  1 ampule Inhalation Q4H WA Genesis Nelson MD   1 ampule at 05/20/21 1213    guaiFENesin-dextromethorphan (ROBITUSSIN DM) 100-10 MG/5ML syrup 5 mL  5 mL Oral Q4H PRN Genesis Nelson MD   5 mL at 05/20/21 1059    traMADol (ULTRAM) tablet 50 mg  50 mg Oral Q6H PRN Genesis Nelson MD   50 mg at 05/20/21 0915    hydrALAZINE (APRESOLINE) injection 10 mg  10 mg Intravenous Q6H PRN Genesis Nelson MD   10 mg at 05/16/21 0910    amLODIPine (NORVASC) tablet 5 mg  5 mg Oral Daily Genesis Nelson MD   5 mg at 05/20/21 1057    aspirin EC tablet 81 mg  81 mg Oral Daily Genesis Nelson MD   81 mg at 05/19/21 0847    gabapentin (NEURONTIN) capsule 300 mg  300 mg Oral Daily Randee Davis MD   300 mg at 05/20/21 1057    guaiFENesin tablet 400 mg  400 mg Oral TID Randee Davis MD   400 mg at 05/20/21 1057    pantoprazole (PROTONIX) tablet 40 mg  40 mg Oral Daily Randee Davis MD   40 mg at 05/20/21 1057    pravastatin (PRAVACHOL) tablet 20 mg  20 mg Oral Nightly Randee Davis MD   20 mg at 05/19/21 2106    sucralfate (CARAFATE) tablet 1 g  1 g Oral TID Randee Davis MD   1 g at 05/20/21 1056    sodium chloride flush 0.9 % injection 10 mL  10 mL Intravenous 2 times per day Randee Davis MD   10 mL at 05/20/21 1101    sodium chloride flush 0.9 % injection 10 mL  10 mL Intravenous PRN Randee Davis MD   10 mL at 05/16/21 1843    0.9 % sodium chloride infusion  25 mL Intravenous PRN Randee Davis MD        potassium chloride (KLOR-CON M) extended release tablet 40 mEq  40 mEq Oral PRN Randee Davis MD        Or    potassium bicarb-citric acid (EFFER-K) effervescent tablet 40 mEq  40 mEq Oral PRN Randee Davis MD        Or    potassium chloride 10 mEq/100 mL IVPB (Peripheral Line)  10 mEq Intravenous PRN Randee Davis MD        magnesium hydroxide (MILK OF MAGNESIA) 400 MG/5ML suspension 30 mL  30 mL Oral Daily PRN Randee Davis MD   30 mL at 05/20/21 1056    acetaminophen (TYLENOL) tablet 650 mg  650 mg Oral Q6H PRN Randee Davis MD   650 mg at 05/20/21 1106    Or    acetaminophen (TYLENOL) suppository 650 mg  650 mg Rectal Q6H PRN Randee Davis MD        glucose (GLUTOSE) 40 % oral gel 15 g  15 g Oral PRN Randee Davis MD        dextrose 50 % IV solution  12.5 g Intravenous PRN Randee Davis MD        glucagon (rDNA) injection 1 mg  1 mg Intramuscular PRN Randee Davis MD        dextrose 5 % solution  100 mL/hr Intravenous PRN Randee Davis MD        trimethobenzamide Destinee Devlin) injection 200 mg  200 mg Intramuscular Q6H PRN Randee Davis MD        budesonide (PULMICORT) nebulizer suspension 250 mcg  0.25 mg Nebulization BID Randee Davis MD   250 mcg at 05/20/21 0859    And    Arformoterol Tartrate (BROVANA) nebulizer solution 15 mcg  15 mcg Nebulization BID Eduard Doherty MD   15 mcg at 05/20/21 3025       Allergies: Allergies   Allergen Reactions    Lisinopril Other (See Comments)     7/18/19 Pt states that she does not want to take LISINOPRIL       Problem List:    Patient Active Problem List   Diagnosis Code    Asthma J45.909    CAD (coronary artery disease) I25.10    Vitamin D deficiency E55.9    Diabetic infection of left foot ([facility] [address] E11.628, L08.9    HTN (hypertension) I10    Idiopathic peripheral neuropathy G60.9    Rhinitis, allergic J30.9    SOB (shortness of breath) R06.02    Bronchitis J40    GERD (gastroesophageal reflux disease) K21.9    PVD (peripheral vascular disease) with claudication (HCC) I73.9    Chest pain R07.9    Gait instability R26.81    Pulmonary embolism (HCC) I26.99    Cellulitis L03.90    Cellulitis of left foot L03. 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) I70.244    Cellulitis and abscess of leg L03.119, L02.419    Acute hypoxemic respiratory failure (HCC) J96.01       Past Medical History:        Diagnosis Date    Arthritis     Asthma     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 ([facility] [address] 5/18/2021    Bronchitis 5/23/2017    CAD (coronary artery disease)     Cough 5/23/2017    Diabetes mellitus ([address]     GERD (gastroesophageal reflux disease) 5/23/2017    Hyperlipidemia     Hypertension     Lung disease     PVD (peripheral vascular disease) with claudication ([address] 4/10/2019    Restless legs syndrome     Rhinitis, allergic 5/23/2017    Sinusitis 5/23/2017    SOB (shortness of breath) 5/23/2017    Type 1 diabetes mellitus with left diabetic foot ulcer ([facility] [address] 5/18/2021    Urinary incontinence        Past Surgical History:        Procedure Laterality Date    ABDOMEN SURGERY      APPENDECTOMY      CARDIAC SURGERY      CHOLECYSTECTOMY      COLONOSCOPY      CORONARY ARTERY BYPASS GRAFT      8/28/10    ENDOSCOPY, COLON, DIAGNOSTIC      FOOT DEBRIDEMENT Left 5/16/2021    FOOT DEBRIDEMENT INCISION AND DRAINAGE. performed by Manpreet Rose DPM at [facility] and bso 1997    TONSILLECTOMY AND ADENOIDECTOMY         Social History:    Social History     Tobacco Use    Smoking status: Never Smoker    Smokeless tobacco: Never Used   Substance Use Topics    Alcohol use: Yes     Comment: occasional                                Counseling given: Not Answered      Vital Signs (Current): There were no vitals filed for this visit.                                            BP Readings from Last 3 Encounters:   05/20/21 (!) 155/78   05/16/21 (!) 123/57   05/11/21 (!) 160/96       NPO Status:                                                                                 BMI:   Wt Readings from Last 3 Encounters:   05/20/21 143 lb (64.9 kg)   05/10/21 145 lb (65.8 kg)   03/23/21 144 lb (65.3 kg)     There is no height or weight on file to calculate BMI.    CBC:   Lab Results   Component Value Date    WBC 9.2 05/20/2021    RBC 2.83 05/20/2021    HGB 9.0 05/20/2021    HCT 27.4 05/20/2021    MCV 96.8 05/20/2021    RDW 13.2 05/20/2021     05/20/2021       CMP:   Lab Results   Component Value Date     05/20/2021    K 3.9 05/20/2021    K 3.7 05/16/2021    CL 96 05/20/2021    CO2 25 05/20/2021    BUN 12 05/20/2021    CREATININE 0.7 05/20/2021    GFRAA >60 05/20/2021    LABGLOM >60 05/20/2021    GLUCOSE 85 05/20/2021    PROT 6.7 05/16/2021    CALCIUM 8.5 05/20/2021    BILITOT 0.3 05/16/2021    ALKPHOS 85 05/16/2021    AST 39 05/16/2021    ALT 36 05/16/2021       POC Tests: No results for input(s): POCGLU, POCNA, POCK, POCCL, POCBUN, POCHEMO, POCHCT in the last 72 hours. Coags:   Lab Results   Component Value Date    PROTIME 11.1 11/23/2014    INR 1.1 11/23/2014    APTT 27.5 11/23/2014       HCG (If Applicable): No results found for: PREGTESTUR, PREGSERUM, HCG, HCGQUANT     ABGs: No results found for: PHART, PO2ART, TIX0KIK, PLU6AVD, BEART, R5BFDJZH     Type & Screen (If Applicable):  No results found for: LABABO, LABRH    Drug/Infectious Status (If Applicable):  No results found for: HIV, HEPCAB    COVID-19 Screening (If Applicable):   Lab Results   Component Value Date    COVID19 Not Detected 05/17/2021     ECG 3/23/21  Narrative & Impression    Sinus rhythm with 1st degree AV block  Possible Anterior infarct (cited on or before 27-JAN-2018)  Abnormal ECG  When compared with ECG of 18-JUL-2019 12:46,  Significant changes have occurred  Confirmed by Slade Juárez ([id number]) on 3/24/2021 10:11:24 AM     ECHO 3/24/21  Narrative & Impression  Transthoracic Echocardiography Report (TTE)      Demographics      Patient Name       [facility]      Gender              Female                      [facility] Record     [id number]        Room Number         4751   Number      Account #          [de-identified]       Procedure Date      03/24/2021      Corporate ID                       Ordering Physician  Isabel Minre      Accession Number   [id number]      Referring Physician      Date of Birth      09/20/1927      Sonographer         Lesli Preston [facility]      Age                80 year(s)      Interpreting        Carmella Vance DO                                      Physician                                         Any Other     Procedure     Type of Study      TTE procedure:Echo Limited Study.      Procedure Date  Date: 03/24/2021 Start: 11:07 AM     Study Location: Portable  Technical Quality: Adequate visualization     Indications:Pulmonary embolus.     Patient Status: Routine     Height: 63 inches to suggest pulmonary hypertension. Technically good quality study. Compared to prior echo, no significant changes noted. Suggest clinical correlation. Chest xray 5/16/21  FINDINGS:   Cardiac silhouette is mildly enlarged.       Increasing central vascular congestion.       Widespread interstitial opacities in the lungs bilaterally most consistent   with pulmonary edema.       Small bilateral pleural effusions.       Status post median sternotomy. Anesthesia Evaluation  Patient summary reviewed and Nursing notes reviewed no history of anesthetic complications:   Airway: Mallampati: II  TM distance: >3 FB   Neck ROM: full  Mouth opening: > = 3 FB Dental:    (+) edentulous      Pulmonary:   (+) shortness of breath:  decreased breath sounds,      (-) not a current smoker                          PE comment: Right side diminished, left clear  Cardiovascular:  Exercise tolerance: good (>4 METS),   (+) hypertension:, CAD:, CABG/stent:,       ECG reviewed      Echocardiogram reviewed         Beta Blocker:  Not on Beta Blocker         Neuro/Psych:   (+) neuromuscular disease:,             GI/Hepatic/Renal:   (+) GERD: well controlled,           Endo/Other:    (+) DiabetesType II DM, using insulin, . ROS comment: DEBRIDEMENT WOUND LEFT FOOT, POSSIBLE DELAYED PRIMARY CLOSURE (Left ) Abdominal:           Vascular:   + PVD, aortic or cerebral, . Anesthesia Plan      MAC     ASA 3       Induction: intravenous. Anesthetic plan and risks discussed with patient and child/children. Use of blood products discussed with patient whom consented to blood products. Plan discussed with CRNA and attending. Michaela Winkler RN   5/20/2021      Pt seen, examined, chart reviewed, plan discussed.   Concepcion Merritt MD  5/21/2021  3:01 PM

## 2021-05-20 NOTE — CONSULTS
Podiatry  Attending Consult Note      Reason for Consult: Second opinion  Requesting Physician: Dr. Maria Esther Camara: Left foot wound    HISTORY OF PRESENT ILLNESS:      The patient is a 80 y.o. female with significant past medical history of PVD, diabetes who is seen today for evaluation of an open wound plantar aspect. Patient was recently admitted to the hospital due to infection underwent incision and drainage as well as angiogram with angioplasty of the superficial femoral, popliteal as well as anterior tibial artery. Was discussed with family about delayed primary closure of the wound of the left foot. Family requesting second opinion. Family was wondering about keeping the wound open for a while then eventually down the road closing the wound. 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 ([address] 5/18/2021    Bronchitis 5/23/2017    CAD (coronary artery disease)     Cough 5/23/2017    Diabetes mellitus (HCC)     GERD (gastroesophageal reflux disease) 5/23/2017    Hyperlipidemia     Hypertension     Lung disease     PVD (peripheral vascular disease) with claudication ([address] 4/10/2019    Restless legs syndrome     Rhinitis, allergic 5/23/2017    Sinusitis 5/23/2017    SOB (shortness of breath) 5/23/2017    Type 1 diabetes mellitus with left diabetic foot ulcer ([address] 5/18/2021    Urinary incontinence      Past Surgical History:    Past Surgical History:   Procedure Laterality Date    ABDOMEN SURGERY      APPENDECTOMY      CARDIAC SURGERY      CHOLECYSTECTOMY      COLONOSCOPY      CORONARY ARTERY BYPASS GRAFT      8/28/10    ENDOSCOPY, COLON, DIAGNOSTIC      FOOT DEBRIDEMENT Left 5/16/2021    FOOT DEBRIDEMENT INCISION AND DRAINAGE.    performed by Anali Salazar DPM at [address] [place], [place] [id number] david and dl 1997    TONSILLECTOMY AND ADENOIDECTOMY       Current Medications: Running Out of Food in the Last Year:     Ran Out of Food in the Last Year:    Transportation Needs:     Lack of Transportation (Medical):     Lack of Transportation (Non-Medical):    Physical Activity:     Days of Exercise per Week:     Minutes of Exercise per Session:    Stress:     Feeling of Stress :    Social Connections:     Frequency of Communication with Friends and Family:     Frequency of Social Gatherings with Friends and Family:     Attends Gnostic[employer] Services: Active Member of Clubs or Organizations:     Attends Club or Organization Meetings:     Marital Status:    Intimate Partner Violence:     Fear of Current or Ex-Partner:     Emotionally Abused:     Physically Abused:     Sexually Abused:      Family History:       Problem Relation Age of Onset    Hypertension Father     Heart Disease Brother      PHYSICAL EXAM:      Vitals:    BP (!) 155/78   Pulse 76   Temp 97.8 °F (36.6 °C) (Temporal)   Resp 16   Ht 5' 3\" (1.6 m)   Wt 143 lb (64.9 kg)   LMP 12/03/1997   SpO2 96%   BMI 25.33 kg/m²     DERM: Wound plantar aspect of the left foot clean, there is exposed fascia and tendon. There is no purulence or odor. No surrounding erythema. No pain. Dorsal wound with some sloughing of the skin dark discoloration. No active drainage. No surrounding erythema, fluctuance or crepitus can be appreciated.   NEUROLOGIC: Loss of protective sensation  VASCULAR: DP/PT audible with hand-held Doppler  MUSCULOSKELETAL: No left foot pain    Wound Care Documentation:       Labs:  Cultures :   Organism   Date Value Ref Range Status   05/16/2021 Anaerobic gram positive cocci (A)  Final   05/16/2021 Citrobacter koseri (A)  Final     WOUND/ABSCESS   Date Value Ref Range Status   05/13/2019 Heavy growth  Final     Lab Results   Component Value Date    WBC 9.2 05/20/2021    HGB 9.0 (L) 05/20/2021    HCT 27.4 (L) 05/20/2021    MCV 96.8 05/20/2021     (H) 05/20/2021        IMPRESSION/RECOMMENDATIONS:    Left foot

## 2021-05-20 NOTE — PROGRESS NOTES
Tiana Rose MD   81 mg at 05/19/21 0847    gabapentin (NEURONTIN) capsule 300 mg  300 mg Oral Daily Sathya Lind MD   300 mg at 05/20/21 1508    guaiFENesin tablet 400 mg  400 mg Oral TID Sathya Lind MD   400 mg at 05/20/21 1511    pantoprazole (PROTONIX) tablet 40 mg  40 mg Oral Daily Sathya Lind MD   40 mg at 05/20/21 1057    pravastatin (PRAVACHOL) tablet 20 mg  20 mg Oral Nightly Sathya Lind MD   20 mg at 05/19/21 2106    sucralfate (CARAFATE) tablet 1 g  1 g Oral TID Sathya Lind MD   1 g at 05/20/21 1508    sodium chloride flush 0.9 % injection 10 mL  10 mL Intravenous 2 times per day Sathya Lind MD   10 mL at 05/20/21 1101    sodium chloride flush 0.9 % injection 10 mL  10 mL Intravenous PRN Sathya Lind MD   10 mL at 05/16/21 1843    0.9 % sodium chloride infusion  25 mL Intravenous PRN Sathya Lind MD        potassium chloride (KLOR-CON M) extended release tablet 40 mEq  40 mEq Oral PRN Sathya Lind MD        Or    potassium bicarb-citric acid (EFFER-K) effervescent tablet 40 mEq  40 mEq Oral PRN Sathya Lind MD        Or    potassium chloride 10 mEq/100 mL IVPB (Peripheral Line)  10 mEq Intravenous MARILUN Sathya Lind MD        magnesium hydroxide (MILK OF MAGNESIA) 400 MG/5ML suspension 30 mL  30 mL Oral Daily MARILUN Sathya Lind MD   30 mL at 05/20/21 1056    acetaminophen (TYLENOL) tablet 650 mg  650 mg Oral Q6H MARILUN Sathya Lind MD   650 mg at 05/20/21 1106    Or    acetaminophen (TYLENOL) suppository 650 mg  650 mg Rectal Q6H DIANA Lind MD        glucose (GLUTOSE) 40 % oral gel 15 g  15 g Oral MARILUN Sathya Lind MD        dextrose 50 % IV solution  12.5 g Intravenous PRN Sathya Lind MD        glucagon (rDNA) injection 1 mg  1 mg Intramuscular PRJOSELYN Lind MD        dextrose 5 % solution  100 mL/hr Intravenous PRJOSELYN Lind MD        trimethobenzamide Sandoval Radha) injection 200 mg  200 mg Intramuscular Q6H PRN Sathya Lind MD        budesonide (PULMICORT) nebulizer suspension 250 mcg  0.25 mg Nebulization BID Cielo Thayer MD   250 mcg at 05/20/21 0950    And    Arformoterol Tartrate (BROVANA) nebulizer solution 15 mcg  15 mcg Nebulization BID Cielo Thayer MD   15 mcg at 05/20/21 8621           REVIEW OF SYSTEMS:    CONSTITUTIONAL:  Denies fever, chill or rigors  HEENT: denies blurring of vision or double vision, denies hearing problem  RESPIRATORY: denies cough, shortness of breath, sputum expectoration, chest pain. CARDIOVASCULAR:  Denies palpitation  GASTROINTESTINAL:  Denies abdomen pain, diarrhea or constipation. GENITOURINARY:  Denies burning urination or frequency of urination  INTEGUMENT left foot wound   HEMATOLOGIC/LYMPHATIC:  Denies lymph node swelling, gum bleeding or easy bruising. MUSCULOSKELETAL: Denies foot pain   NEUROLOGICAL:  Denies light headed, dizziness, loss of consciousness    PHYSICAL EXAM:      Vitals:     Vitals:    05/20/21 0930   BP: (!) 155/78   Pulse: 76   Resp: 16   Temp: 97.8 °F (36.6 °C)   SpO2: 96%       General Appearance:    Awake and alert    Head:    Normocephalic, atraumatic   Eyes:    No pallor, no icterus,   Ears:    No obvious deformity or drainage.    Nose:   No nasal drainage   Throat:   Mucosa moist, no oral thrush   Neck:   Supple, no lymphadenopathy   Back:     no CVA tenderness   Lungs:     Bilateral wheeze    Heart:    Regular rate and rhythm, no murmur   Abdomen:     Soft, non-tender, bowel sounds present    Extremities:    Left foot plantar wound - clean,non tender   Dorsum - mild erythema, and some areas of black discoloration     Pulses:   Dorsalis pedis palpable    Skin:   Mild  erythema      CBC with Differential:      Lab Results   Component Value Date    WBC 9.2 05/20/2021    RBC 2.83 05/20/2021    HGB 9.0 05/20/2021    HCT 27.4 05/20/2021     05/20/2021    MCV 96.8 05/20/2021    MCH 31.8 05/20/2021    MCHC 32.8 05/20/2021    RDW 13.2 05/20/2021    LYMPHOPCT 4.3 05/16/2021    MONOPCT 3.5 05/16/2021    BASOPCT 0.1 05/16/2021    MONOSABS 0.57 05/16/2021    LYMPHSABS 0.57 05/16/2021    EOSABS 0.00 05/16/2021    BASOSABS 0.00 05/16/2021       CMP     Lab Results   Component Value Date     05/20/2021    K 3.9 05/20/2021    K 3.7 05/16/2021    CL 96 05/20/2021    CO2 25 05/20/2021    BUN 12 05/20/2021    CREATININE 0.7 05/20/2021    GFRAA >60 05/20/2021    LABGLOM >60 05/20/2021    GLUCOSE 85 05/20/2021    PROT 6.7 05/16/2021    LABALBU 3.2 05/16/2021    CALCIUM 8.5 05/20/2021    BILITOT 0.3 05/16/2021    ALKPHOS 85 05/16/2021    AST 39 05/16/2021    ALT 36 05/16/2021         Hepatic Function Panel:    Lab Results   Component Value Date    ALKPHOS 85 05/16/2021    ALT 36 05/16/2021    AST 39 05/16/2021    PROT 6.7 05/16/2021    BILITOT 0.3 05/16/2021    LABALBU 3.2 05/16/2021       PT/INR:    Lab Results   Component Value Date    PROTIME 11.1 11/23/2014    INR 1.1 11/23/2014       TSH:    Lab Results   Component Value Date    TSH 1.060 05/19/2021       U/A:    Lab Results   Component Value Date    COLORU Yellow 03/24/2021    PHUR 8.0 03/24/2021    WBCUA 0-1 03/24/2021    RBCUA 0-1 03/24/2021    RBCUA 5-10 09/02/2012    BACTERIA RARE 03/24/2021    CLARITYU Clear 03/24/2021    SPECGRAV 1.015 03/24/2021    LEUKOCYTESUR TRACE 03/24/2021    UROBILINOGEN 0.2 03/24/2021    BILIRUBINUR Negative 03/24/2021    BLOODU Negative 03/24/2021    GLUCOSEU 100 03/24/2021       ABG:  No results found for: DUG3IMI, BEART, C0PTCGAJ, PHART, THGBART, CBP8STN, PO2ART, YJC9EZD    MICROBIOLOGY:    Wound cx -  Susceptibility    Citrobacter koseri (1)    Antibiotic Interpretation CHIDI Status    ceFAZolin Sensitive <=^4 mcg/mL     cefepime Sensitive <=^0.12 mcg/mL     cefTRIAXone Sensitive <=^0.25 mcg/mL     ertapenem Sensitive <=^0.12 mcg/mL     gentamicin Sensitive <=^1 mcg/mL     levofloxacin Sensitive <=^0.12 mcg/mL     piperacillin-tazobactam Sensitive <=^4 mcg/mL     trimethoprim-sulfamethoxazole Sensitive <=^20 mcg/mL     Lab and Collection    Swab collections are low-yield and rarely indicated. Generally, the specimen volume when collected by swab is   small, reducing the probability of isolating organisms: many   organisms adhere to the fibers of the swab, which reduces the   opportunity of recovering organisms. Abnormal       Organism Anaerobic gram positive cocciAbnormal     Anaerobic Culture --    Light growth          Radiology :    X ray left foot - No aggressive osseous lesions    IMPRESSION:     1. Diabetic foot infection, left foot / leg cellulitis , abscess s/p I & D ( 5/16)   2. Leukocytosis - improved     RECOMMENDATIONS:      1. Invanz 1 gram IV q 24 hrs   2. Midline insertion   3.  Local wound care / to OR today

## 2021-05-20 NOTE — PROGRESS NOTES
Chief Complaint:  Chief Complaint   Patient presents with    Leg Swelling     diagnosed w/ Cellulitis on LLE, oral antibiotics, assisted living states pt now has abscess on bottom of L foot and pt is \"not acting right\"     Cellulitis of left foot     Subjective:    Her pain is reasonably well controlled, except during dressing changes. Tramadol seems to be helping. No fevers or chills. She is still requiring 3 L of oxygen, saturation around 92%. She denies significant shortness of breath though. Objective:    BP (!) 155/78   Pulse 76   Temp 97.8 °F (36.6 °C) (Temporal)   Resp 16   Ht 5' 3\" (1.6 m)   Wt 143 lb (64.9 kg)   LMP 12/03/1997   SpO2 96%   BMI 25.33 kg/m²     Current medications that patient is taking have been reviewed.     General appearance: NAD, conversant  HEENT: AT/NC, MMM  Neck: FROM, supple  Lungs: Mild bilateral rales, WOB normal  CV: RRR, no MRGs  Abdomen: Soft, non-tender; no masses or HSM, +BS  Extremities: No peripheral edema or digital cyanosis  Skin: no visible rash, lesions or ulcers, though I did not take down her dressing for the left foot  Psych: Calm and cooperative  Neuro: Alert and interactive, face symmetric, moving all extremities, speech fluent    Labs:  CBC with Differential:    Lab Results   Component Value Date    WBC 9.2 05/20/2021    RBC 2.83 05/20/2021    HGB 9.0 05/20/2021    HCT 27.4 05/20/2021     05/20/2021    MCV 96.8 05/20/2021    MCH 31.8 05/20/2021    MCHC 32.8 05/20/2021    RDW 13.2 05/20/2021    LYMPHOPCT 4.3 05/16/2021    MONOPCT 3.5 05/16/2021    BASOPCT 0.1 05/16/2021    MONOSABS 0.57 05/16/2021    LYMPHSABS 0.57 05/16/2021    EOSABS 0.00 05/16/2021    BASOSABS 0.00 05/16/2021     CMP:    Lab Results   Component Value Date     05/20/2021    K 3.9 05/20/2021    K 3.7 05/16/2021    CL 96 05/20/2021    CO2 25 05/20/2021    BUN 12 05/20/2021    CREATININE 0.7 05/20/2021    GFRAA >60 05/20/2021    LABGLOM >60 05/20/2021    GLUCOSE 85 05/20/2021    PROT 6.7 05/16/2021    LABALBU 3.2 05/16/2021    CALCIUM 8.5 05/20/2021    BILITOT 0.3 05/16/2021    ALKPHOS 85 05/16/2021    AST 39 05/16/2021    ALT 36 05/16/2021        Imaging:  I've personally reviewed the patient's CXR  Significantly improved pulmonary edema. 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 *       Continue 20 mg p.o. Lasix daily, but will give 1 more dose 20 mg IV Lasix today due to persistent hypoxemia and CHF    She has history of PE in March 2021. Was on Eliquis previously. Needs to be covered with Lovenox between procedures, as she is at increased risk for repeat VTE    Continue ertapenem. All images of the wound and leg have been reviewed.   She clearly has had a dramatic reduction in the erythema and swelling of the foot and leg since admission    Blood pressure well controlled    Blood glucose well controlled    Requires continued inpatient level of care     Yomaira Nieto MD    12:42 PM  5/20/2021

## 2021-05-21 ENCOUNTER — ANESTHESIA (OUTPATIENT)
Dept: OPERATING ROOM | Age: 86
DRG: 579 | End: 2021-05-21
Payer: MEDICARE

## 2021-05-21 VITALS — OXYGEN SATURATION: 97 % | SYSTOLIC BLOOD PRESSURE: 117 MMHG | DIASTOLIC BLOOD PRESSURE: 54 MMHG

## 2021-05-21 LAB
METER GLUCOSE: 119 MG/DL (ref 74–99)
METER GLUCOSE: 153 MG/DL (ref 74–99)
METER GLUCOSE: 228 MG/DL (ref 74–99)

## 2021-05-21 PROCEDURE — 2500000003 HC RX 250 WO HCPCS: Performed by: PODIATRIST

## 2021-05-21 PROCEDURE — 3700000000 HC ANESTHESIA ATTENDED CARE: Performed by: PODIATRIST

## 2021-05-21 PROCEDURE — 3600000002 HC SURGERY LEVEL 2 BASE: Performed by: PODIATRIST

## 2021-05-21 PROCEDURE — 2709999900 HC NON-CHARGEABLE SUPPLY: Performed by: PODIATRIST

## 2021-05-21 PROCEDURE — 6370000000 HC RX 637 (ALT 250 FOR IP): Performed by: INTERNAL MEDICINE

## 2021-05-21 PROCEDURE — 3600000012 HC SURGERY LEVEL 2 ADDTL 15MIN: Performed by: PODIATRIST

## 2021-05-21 PROCEDURE — 7100000000 HC PACU RECOVERY - FIRST 15 MIN: Performed by: PODIATRIST

## 2021-05-21 PROCEDURE — 6370000000 HC RX 637 (ALT 250 FOR IP): Performed by: PODIATRIST

## 2021-05-21 PROCEDURE — 7100000001 HC PACU RECOVERY - ADDTL 15 MIN: Performed by: PODIATRIST

## 2021-05-21 PROCEDURE — 1200000000 HC SEMI PRIVATE

## 2021-05-21 PROCEDURE — 6360000002 HC RX W HCPCS: Performed by: PODIATRIST

## 2021-05-21 PROCEDURE — 2580000003 HC RX 258

## 2021-05-21 PROCEDURE — 2580000003 HC RX 258: Performed by: PODIATRIST

## 2021-05-21 PROCEDURE — 94640 AIRWAY INHALATION TREATMENT: CPT

## 2021-05-21 PROCEDURE — 82962 GLUCOSE BLOOD TEST: CPT

## 2021-05-21 PROCEDURE — 6360000002 HC RX W HCPCS: Performed by: INTERNAL MEDICINE

## 2021-05-21 PROCEDURE — 6360000002 HC RX W HCPCS

## 2021-05-21 PROCEDURE — 3700000001 HC ADD 15 MINUTES (ANESTHESIA): Performed by: PODIATRIST

## 2021-05-21 PROCEDURE — 99231 SBSQ HOSP IP/OBS SF/LOW 25: CPT | Performed by: PHYSICIAN ASSISTANT

## 2021-05-21 PROCEDURE — 2700000000 HC OXYGEN THERAPY PER DAY

## 2021-05-21 RX ORDER — MORPHINE SULFATE 2 MG/ML
2 INJECTION, SOLUTION INTRAMUSCULAR; INTRAVENOUS EVERY 5 MIN PRN
Status: DISCONTINUED | OUTPATIENT
Start: 2021-05-21 | End: 2021-05-21 | Stop reason: HOSPADM

## 2021-05-21 RX ORDER — HYDROCODONE BITARTRATE AND ACETAMINOPHEN 5; 325 MG/1; MG/1
2 TABLET ORAL PRN
Status: DISCONTINUED | OUTPATIENT
Start: 2021-05-21 | End: 2021-05-21 | Stop reason: HOSPADM

## 2021-05-21 RX ORDER — PROPOFOL 10 MG/ML
INJECTION, EMULSION INTRAVENOUS CONTINUOUS PRN
Status: DISCONTINUED | OUTPATIENT
Start: 2021-05-21 | End: 2021-05-21 | Stop reason: SDUPTHER

## 2021-05-21 RX ORDER — PROPOFOL 10 MG/ML
INJECTION, EMULSION INTRAVENOUS PRN
Status: DISCONTINUED | OUTPATIENT
Start: 2021-05-21 | End: 2021-05-21 | Stop reason: SDUPTHER

## 2021-05-21 RX ORDER — HYDROCODONE BITARTRATE AND ACETAMINOPHEN 5; 325 MG/1; MG/1
1 TABLET ORAL PRN
Status: DISCONTINUED | OUTPATIENT
Start: 2021-05-21 | End: 2021-05-21 | Stop reason: HOSPADM

## 2021-05-21 RX ORDER — MEPERIDINE HYDROCHLORIDE 25 MG/ML
12.5 INJECTION INTRAMUSCULAR; INTRAVENOUS; SUBCUTANEOUS EVERY 5 MIN PRN
Status: DISCONTINUED | OUTPATIENT
Start: 2021-05-21 | End: 2021-05-21 | Stop reason: HOSPADM

## 2021-05-21 RX ORDER — MORPHINE SULFATE 2 MG/ML
1 INJECTION, SOLUTION INTRAMUSCULAR; INTRAVENOUS EVERY 5 MIN PRN
Status: DISCONTINUED | OUTPATIENT
Start: 2021-05-21 | End: 2021-05-21 | Stop reason: HOSPADM

## 2021-05-21 RX ORDER — SODIUM CHLORIDE, SODIUM LACTATE, POTASSIUM CHLORIDE, CALCIUM CHLORIDE 600; 310; 30; 20 MG/100ML; MG/100ML; MG/100ML; MG/100ML
INJECTION, SOLUTION INTRAVENOUS CONTINUOUS PRN
Status: DISCONTINUED | OUTPATIENT
Start: 2021-05-21 | End: 2021-05-21 | Stop reason: SDUPTHER

## 2021-05-21 RX ORDER — PROMETHAZINE HYDROCHLORIDE 25 MG/ML
6.25 INJECTION, SOLUTION INTRAMUSCULAR; INTRAVENOUS EVERY 10 MIN PRN
Status: DISCONTINUED | OUTPATIENT
Start: 2021-05-21 | End: 2021-05-21 | Stop reason: HOSPADM

## 2021-05-21 RX ORDER — BUPIVACAINE HYDROCHLORIDE 5 MG/ML
INJECTION, SOLUTION EPIDURAL; INTRACAUDAL PRN
Status: DISCONTINUED | OUTPATIENT
Start: 2021-05-21 | End: 2021-05-21 | Stop reason: ALTCHOICE

## 2021-05-21 RX ADMIN — PROPOFOL 20 MG: 10 INJECTION, EMULSION INTRAVENOUS at 17:21

## 2021-05-21 RX ADMIN — ERTAPENEM SODIUM 1000 MG: 1 INJECTION, POWDER, LYOPHILIZED, FOR SOLUTION INTRAMUSCULAR; INTRAVENOUS at 17:05

## 2021-05-21 RX ADMIN — FUROSEMIDE 20 MG: 20 TABLET ORAL at 09:26

## 2021-05-21 RX ADMIN — PROPOFOL 30 MG: 10 INJECTION, EMULSION INTRAVENOUS at 17:02

## 2021-05-21 RX ADMIN — IPRATROPIUM BROMIDE AND ALBUTEROL SULFATE 1 AMPULE: 2.5; .5 SOLUTION RESPIRATORY (INHALATION) at 09:21

## 2021-05-21 RX ADMIN — GUAIFENESIN 400 MG: 400 TABLET ORAL at 09:26

## 2021-05-21 RX ADMIN — TRAMADOL HYDROCHLORIDE 50 MG: 50 TABLET, FILM COATED ORAL at 21:38

## 2021-05-21 RX ADMIN — BUDESONIDE 250 MCG: 0.25 SUSPENSION RESPIRATORY (INHALATION) at 09:21

## 2021-05-21 RX ADMIN — GUAIFENESIN 400 MG: 400 TABLET ORAL at 21:37

## 2021-05-21 RX ADMIN — GUAIFENESIN 400 MG: 400 TABLET ORAL at 13:46

## 2021-05-21 RX ADMIN — SODIUM CHLORIDE, POTASSIUM CHLORIDE, SODIUM LACTATE AND CALCIUM CHLORIDE: 600; 310; 30; 20 INJECTION, SOLUTION INTRAVENOUS at 16:51

## 2021-05-21 RX ADMIN — AMLODIPINE BESYLATE 5 MG: 5 TABLET ORAL at 09:27

## 2021-05-21 RX ADMIN — ASPIRIN 81 MG: 81 TABLET, COATED ORAL at 09:26

## 2021-05-21 RX ADMIN — SUCRALFATE 1 G: 1 TABLET ORAL at 13:46

## 2021-05-21 RX ADMIN — ENOXAPARIN SODIUM 60 MG: 60 INJECTION SUBCUTANEOUS at 21:43

## 2021-05-21 RX ADMIN — GABAPENTIN 300 MG: 300 CAPSULE ORAL at 21:37

## 2021-05-21 RX ADMIN — Medication 10 ML: at 09:28

## 2021-05-21 RX ADMIN — SUCRALFATE 1 G: 1 TABLET ORAL at 21:37

## 2021-05-21 RX ADMIN — IPRATROPIUM BROMIDE AND ALBUTEROL SULFATE 1 AMPULE: 2.5; .5 SOLUTION RESPIRATORY (INHALATION) at 13:36

## 2021-05-21 RX ADMIN — Medication 10 ML: at 23:30

## 2021-05-21 RX ADMIN — SUCRALFATE 1 G: 1 TABLET ORAL at 09:26

## 2021-05-21 RX ADMIN — PANTOPRAZOLE SODIUM 40 MG: 40 TABLET, DELAYED RELEASE ORAL at 09:26

## 2021-05-21 RX ADMIN — PROPOFOL 25 MCG/KG/MIN: 10 INJECTION, EMULSION INTRAVENOUS at 17:02

## 2021-05-21 RX ADMIN — PRAVASTATIN SODIUM 20 MG: 20 TABLET ORAL at 21:37

## 2021-05-21 RX ADMIN — TRAMADOL HYDROCHLORIDE 50 MG: 50 TABLET, FILM COATED ORAL at 09:26

## 2021-05-21 RX ADMIN — INSULIN GLARGINE 15 UNITS: 100 INJECTION, SOLUTION SUBCUTANEOUS at 21:55

## 2021-05-21 RX ADMIN — INSULIN LISPRO 2 UNITS: 100 INJECTION, SOLUTION INTRAVENOUS; SUBCUTANEOUS at 21:54

## 2021-05-21 RX ADMIN — ARFORMOTEROL TARTRATE 15 MCG: 15 SOLUTION RESPIRATORY (INHALATION) at 09:21

## 2021-05-21 RX ADMIN — GABAPENTIN 300 MG: 300 CAPSULE ORAL at 09:26

## 2021-05-21 ASSESSMENT — PAIN DESCRIPTION - PAIN TYPE: TYPE: ACUTE PAIN

## 2021-05-21 ASSESSMENT — PULMONARY FUNCTION TESTS
PIF_VALUE: 0

## 2021-05-21 ASSESSMENT — PAIN SCALES - GENERAL
PAINLEVEL_OUTOF10: 9
PAINLEVEL_OUTOF10: 5
PAINLEVEL_OUTOF10: 0

## 2021-05-21 ASSESSMENT — PAIN DESCRIPTION - DESCRIPTORS: DESCRIPTORS: DISCOMFORT;ACHING

## 2021-05-21 ASSESSMENT — PAIN DESCRIPTION - ORIENTATION: ORIENTATION: LEFT

## 2021-05-21 NOTE — PROGRESS NOTES
Department of Internal Medicine  Infectious Diseases  Progress  Note    C/C  : Right leg cellulitis , right foot abscess     Discussed with the pt's family   Denies fever and chills  Reports foot pain   Hallucinating     Afebrile        Current Facility-Administered Medications   Medication Dose Route Frequency Provider Last Rate Last Admin    meperidine (DEMEROL) injection 12.5 mg  12.5 mg Intravenous Q5 Min PRN Luly Snow MD        morphine (PF) injection 1 mg  1 mg Intravenous Q5 Min PRN Luly Snow MD        morphine (PF) injection 2 mg  2 mg Intravenous Q5 Min PRN Luly Snow MD        HYDROmorphone (DILAUDID) injection 0.25 mg  0.25 mg Intravenous Q5 Min PRN Luly Snow MD        HYDROmorphone (DILAUDID) injection 0.5 mg  0.5 mg Intravenous Q5 Min PRN Luly Snow MD        HYDROcodone-acetaminophen (NORCO) 5-325 MG per tablet 1 tablet  1 tablet Oral PRN Luly Snow MD        Or    HYDROcodone-acetaminophen (NORCO) 5-325 MG per tablet 2 tablet  2 tablet Oral PRN Luly Snow MD        promethazine (PHENERGAN) injection 6.25 mg  6.25 mg Intravenous Q10 Min PRN Luly Snow MD        HYDROmorphone (DILAUDID) injection 0.2 mg  0.2 mg Intravenous Q4H PRN Anne Jordan MD        enoxaparin (LOVENOX) injection 60 mg  1 mg/kg Subcutaneous BID Anne Jordan MD   60 mg at 05/20/21 1450    lidocaine 1 % injection 10 mL  10 mL Intradermal Once [place], DPM        ertapenem Alisha Greer) 1000 mg IVPB minibag  1,000 mg Intravenous Q24H Hui Stern MD   Stopped at 05/20/21 1932    furosemide (LASIX) tablet 20 mg  20 mg Oral Daily Alley Hicks MD   20 mg at 05/21/21 0926    insulin lispro (HUMALOG) injection vial 0-12 Units  0-12 Units Subcutaneous TID WC Alley Hicks MD   2 Units at 05/20/21 9586    insulin lispro (HUMALOG) injection vial 0-6 Units  0-6 Units Subcutaneous Nightly Alley Hicks MD   2 Units at 05/20/21 8208  insulin glargine (LANTUS) injection vial 15 Units  15 Units Subcutaneous Nightly Yobani Cueto MD   15 Units at 05/20/21 2152    povidone-iodine (BETADINE) 10 % external solution   Topical PRN MARTIR BatistaM        ipratropium-albuterol (DUONEB) nebulizer solution 1 ampule  1 ampule Inhalation Q4H WA Randee Davis MD   1 ampule at 05/21/21 1336    guaiFENesin-dextromethorphan (ROBITUSSIN DM) 100-10 MG/5ML syrup 5 mL  5 mL Oral Q4H PRN Randee Davis MD   5 mL at 05/20/21 1059    traMADol (ULTRAM) tablet 50 mg  50 mg Oral Q6H PRN Randee Davis MD   50 mg at 05/21/21 0926    hydrALAZINE (APRESOLINE) injection 10 mg  10 mg Intravenous Q6H PRN Randee Davis MD   10 mg at 05/16/21 0910    amLODIPine (NORVASC) tablet 5 mg  5 mg Oral Daily Randee Davis MD   5 mg at 05/21/21 5238    aspirin EC tablet 81 mg  81 mg Oral Daily Randee Davis MD   81 mg at 05/21/21 7935    gabapentin (NEURONTIN) capsule 300 mg  300 mg Oral Daily Randee Davis MD   300 mg at 05/21/21 5705    guaiFENesin tablet 400 mg  400 mg Oral TID Randee Davis MD   400 mg at 05/21/21 1346    pantoprazole (PROTONIX) tablet 40 mg  40 mg Oral Daily Randee Davis MD   40 mg at 05/21/21 1264    pravastatin (PRAVACHOL) tablet 20 mg  20 mg Oral Nightly Randee Davis MD   20 mg at 05/20/21 2152    sucralfate (CARAFATE) tablet 1 g  1 g Oral TID Randee Davis MD   1 g at 05/21/21 1346    sodium chloride flush 0.9 % injection 10 mL  10 mL Intravenous 2 times per day Randee Davis MD   10 mL at 05/21/21 0928    sodium chloride flush 0.9 % injection 10 mL  10 mL Intravenous PRN Randee Davis MD   10 mL at 05/16/21 1843    0.9 % sodium chloride infusion  25 mL Intravenous PRN Randee Davis MD        potassium chloride (KLOR-CON M) extended release tablet 40 mEq  40 mEq Oral PRN Randee Davis MD        Or    potassium bicarb-citric acid (EFFER-K) effervescent tablet 40 mEq  40 mEq Oral PRN Randee Davis MD        Or    potassium chloride 10 mEq/100 mL IVPB (Peripheral Line)  10 mEq Intravenous PRN Sathya Lind MD        magnesium hydroxide (MILK OF MAGNESIA) 400 MG/5ML suspension 30 mL  30 mL Oral Daily PRN Sathya Lind MD   30 mL at 05/20/21 1056    acetaminophen (TYLENOL) tablet 650 mg  650 mg Oral Q6H PRN Sathya Lind MD   650 mg at 05/20/21 1106    Or    acetaminophen (TYLENOL) suppository 650 mg  650 mg Rectal Q6H PRN Sathya Lind MD        glucose (GLUTOSE) 40 % oral gel 15 g  15 g Oral PRN Sathya Lind MD        dextrose 50 % IV solution  12.5 g Intravenous PRN Sathya Lind MD        glucagon (rDNA) injection 1 mg  1 mg Intramuscular PRN Sathya Lind MD        dextrose 5 % solution  100 mL/hr Intravenous PRN Sathya Lind MD        trimethobenzamide Sandoval Radha) injection 200 mg  200 mg Intramuscular Q6H PRN Sathya Lind MD        budesonide (PULMICORT) nebulizer suspension 250 mcg  0.25 mg Nebulization BID Sathya Lind MD   250 mcg at 05/21/21 3968    And    Arformoterol Tartrate (BROVANA) nebulizer solution 15 mcg  15 mcg Nebulization BID Sathya Lind MD   15 mcg at 05/21/21 2187           REVIEW OF SYSTEMS:    CONSTITUTIONAL:  Denies fever, chill or rigors  HEENT: denies blurring of vision or double vision, denies hearing problem  RESPIRATORY: denies cough, shortness of breath, sputum expectoration, chest pain. CARDIOVASCULAR:  Denies palpitation  GASTROINTESTINAL:  Denies abdomen pain, diarrhea or constipation. GENITOURINARY:  Denies burning urination or frequency of urination  INTEGUMENT left foot wound   HEMATOLOGIC/LYMPHATIC:  Denies lymph node swelling, gum bleeding or easy bruising.   MUSCULOSKELETAL: Denies foot pain   NEUROLOGICAL:  Hallucination     PHYSICAL EXAM:      Vitals:     BP (!) 131/59   Pulse 67   Temp 98.2 °F (36.8 °C) (Temporal)   Resp 18   Ht 5' 3\" (1.6 m)   Wt 153 lb 6.4 oz (69.6 kg)   LMP 12/03/1997   SpO2 90%   BMI 27.17 kg/m²     General Appearance:    Awake and alert    Head:    Normocephalic, atraumatic   Eyes:    No pallor, no icterus, BACTERIA RARE 03/24/2021    CLARITYU Clear 03/24/2021    SPECGRAV 1.015 03/24/2021    LEUKOCYTESUR TRACE 03/24/2021    UROBILINOGEN 0.2 03/24/2021    BILIRUBINUR Negative 03/24/2021    BLOODU Negative 03/24/2021    GLUCOSEU 100 03/24/2021       ABG:  No results found for: HOR5YOP, BEART, B2NLFEWY, PHART, THGBART, AQT8EZM, PO2ART, KZG6PAL    MICROBIOLOGY:    Wound cx -  Susceptibility    Citrobacter koseri (1)    Antibiotic Interpretation CHIDI Status    ceFAZolin Sensitive <=^4 mcg/mL     cefepime Sensitive <=^0.12 mcg/mL     cefTRIAXone Sensitive <=^0.25 mcg/mL     ertapenem Sensitive <=^0.12 mcg/mL     gentamicin Sensitive <=^1 mcg/mL     levofloxacin Sensitive <=^0.12 mcg/mL     piperacillin-tazobactam Sensitive <=^4 mcg/mL     trimethoprim-sulfamethoxazole Sensitive <=^20 mcg/mL     Lab and Collection    Swab collections are low-yield and rarely indicated. Generally, the specimen volume when collected by swab is   small, reducing the probability of isolating organisms: many   organisms adhere to the fibers of the swab, which reduces the   opportunity of recovering organisms. Abnormal       Organism Anaerobic gram positive cocciAbnormal     Anaerobic Culture --    Light growth          Radiology :    X ray left foot - No aggressive osseous lesions    IMPRESSION:     1. Diabetic foot infection, left foot / leg cellulitis , abscess s/p I & D ( 5/16)   2. Leukocytosis - improved     RECOMMENDATIONS:      1. Invanz 1 gram IV q 24 hrs - change abx if hallucination does not improve   2. Midline insertion   3.  For wound closure surgery today

## 2021-05-21 NOTE — PROGRESS NOTES
Physical Therapy    Facility/Department: [facility] MED SURG ONC  Initial Assessment    NAME: Marylen Jews  : 1927  MRN: [id number]    Date of Service: 2021    Physical therapy orders received/treatment attempted and chart review completed. Patient off the unit in the AM and having a surgical procedure in the PM and not able to be seen. Will attempt at a later time/date as able. Thank you for the opportunity to assist in the care of this patient.     Hossein Martins, PT, DPT  License [id number]

## 2021-05-21 NOTE — PROGRESS NOTES
Vascular Surgery Progress Note    Pt being seen in f/u regarding PVD    Subjective  Pt s/e. Denies sob or cp. Denies right groin pain. For OR with podiatry today.     Current Medications:    sodium chloride      dextrose        HYDROmorphone, povidone-iodine, guaiFENesin-dextromethorphan, traMADol, hydrALAZINE, sodium chloride flush, sodium chloride, potassium chloride **OR** potassium alternative oral replacement **OR** potassium chloride, magnesium hydroxide, acetaminophen **OR** acetaminophen, glucose, dextrose, glucagon (rDNA), dextrose, trimethobenzamide    enoxaparin  1 mg/kg Subcutaneous BID    lidocaine  10 mL Intradermal Once    ertapenem (INVanz) IVPB  1,000 mg Intravenous Q24H    furosemide  20 mg Oral Daily    insulin lispro  0-12 Units Subcutaneous TID WC    insulin lispro  0-6 Units Subcutaneous Nightly    insulin glargine  15 Units Subcutaneous Nightly    ipratropium-albuterol  1 ampule Inhalation Q4H WA    amLODIPine  5 mg Oral Daily    aspirin  81 mg Oral Daily    gabapentin  300 mg Oral Daily    guaiFENesin  400 mg Oral TID    pantoprazole  40 mg Oral Daily    pravastatin  20 mg Oral Nightly    sucralfate  1 g Oral TID    sodium chloride flush  10 mL Intravenous 2 times per day    budesonide  0.25 mg Nebulization BID    And    Arformoterol Tartrate  15 mcg Nebulization BID        PHYSICAL EXAM:    BP (!) 131/59   Pulse 67   Temp 98.2 °F (36.8 °C) (Temporal)   Resp 18   Ht 5' 3\" (1.6 m)   Wt 153 lb 6.4 oz (69.6 kg)   LMP 12/03/1997   SpO2 98%   BMI 27.17 kg/m²     Intake/Output Summary (Last 24 hours) at 5/21/2021 1009  Last data filed at 5/21/2021 0544  Gross per 24 hour   Intake 200 ml   Output 1150 ml   Net -950 ml        Gen Awake and Alert   CVS RRR  Resp Good resp exursion   Abd soft nt nd   Right LE   Groin w small hematoma medial thigh and ecchymosis   Appropriate TTP   DP monophasic  PT biphasic   Left LE   DP biphasic  PT biphasic    LABS:    Lab Results Component Value Date    WBC 9.2 05/20/2021    HGB 9.0 (L) 05/20/2021    HCT 27.4 (L) 05/20/2021     (H) 05/20/2021    PROTIME 11.1 11/23/2014    INR 1.1 11/23/2014    APTT 27.5 11/23/2014    K 3.9 05/20/2021    BUN 12 05/20/2021    CREATININE 0.7 05/20/2021     A/P POD # 2 s/p aortogram with balloon angioplasty of the L distal SFA/pop and AT artery  · Right Groin  has very small hematoma - monitor for worsening pain or swelling  · vascular exam is better  · ok for discharge from vascular pov  · Bun/CR stable  · Hgb stable  · FU as needed for stagnant or worsening wounds    Princess Jay PA-C

## 2021-05-21 NOTE — OP NOTE
appreciated. Curette was utilized to freshen up wound edges as well as undermine the medial lateral aspect in preparation for closure. Skin and subcutaneous tissue was reapproximated utilizing 3-0 and 2-0 nylon in both single interrupted as well as horizontal type fashion. Good closure achieved with no tension noted on the wound edges. Dressing consisting of Xeroform to the plantar aspect as well as the dorsal wound followed by loose dry dressing. No complications encountered. Blood loss noted to be minimal.  Patient tolerated anesthesia and procedure well. Discussed postoperative findings with patient as well as family. Continue to monitor. Concern definitely would be with minimal blood flow healing may be an issue.     Electronically signed by Augusto Schulte DPM on 5/21/2021 at 5:49 PM

## 2021-05-21 NOTE — PROGRESS NOTES
Comprehensive Nutrition Assessment    Type and Reason for Visit:  Initial    Nutrition Recommendations/Plan: Restart diet when medically appropriate    Nutrition Assessment:  Pt adm w/ cellulitis, now s/p angio, debridement pending delayed closure. Noted hx DM, PVD. Will monitor for nutrition progression    Malnutrition Assessment:  Malnutrition Status:  No malnutrition    Context:  Acute Illness     Findings of the 6 clinical characteristics of malnutrition:  Energy Intake:  No significant decrease in energy intake  Weight Loss:  No significant weight loss     Body Fat Loss:  No significant body fat loss     Muscle Mass Loss:  No significant muscle mass loss    Fluid Accumulation:  No significant fluid accumulation     Strength:  Not Performed    Estimated Daily Nutrient Needs:  Energy (kcal):   (MSJ 1068 x 1.2 SF); Weight Used for Energy Requirements:  Current     Protein (g):  80-90 (1.5-1.8 gm/kg IBW); Weight Used for Protein Requirements:  Ideal        Fluid (ml/day):  ; Method Used for Fluid Requirements:  1 ml/kcal      Nutrition Related Findings:  pt alert, abd WDL, +2 edema, -I/Os      Wounds:  Surgical Incision       Current Nutrition Therapies:    Diet NPO, After Midnight Exceptions are: Sips of Water with Meds    Anthropometric Measures:  · Height: 5' 3\" (160 cm)  · Current Body Weight: 153 lb (69.4 kg) (bed scale 5/21)   · Admission Body Weight: 159 lb (72.1 kg) (first measured 5/16)    · Usual Body Weight: 145 lb (65.8 kg) (stated per EMR)     · Ideal Body Weight: 115 lbs; % Ideal Body Weight 133 %   · BMI: 27.1  · BMI Categories: Overweight (BMI 25.0-29. 9)       Nutrition Diagnosis:   · Increased nutrient needs related to increase demand for energy/nutrients as evidenced by wounds      Nutrition Interventions:   Food and/or Nutrient Delivery:  Continue NPO (ADAT)  Nutrition Education/Counseling:  Education not indicated   Coordination of Nutrition Care:  Continue to monitor while inpatient    Goals:  Consume >75% meals/ONS w/ diet progression       Nutrition Monitoring and Evaluation:   Food/Nutrient Intake Outcomes:  Diet Advancement/Tolerance, Food and Nutrient Intake, Supplement Intake  Physical Signs/Symptoms Outcomes:  Biochemical Data, GI Status, Fluid Status or Edema, Nutrition Focused Physical Findings, Skin, Weight     Discharge Planning:     Too soon to determine     Electronically signed by Nancy Palumbo MS, RD, LD on 5/21/21 at 3:56 PM EDT    Contact: [phone]

## 2021-05-21 NOTE — PROGRESS NOTES
Chief Complaint:  Chief Complaint   Patient presents with    Leg Swelling     diagnosed w/ Cellulitis on LLE, oral antibiotics, assisted living states pt now has abscess on bottom of L foot and pt is \"not acting right\"     Cellulitis of left foot     Subjective:    Her pain is reasonably well controlled. Dyspnea improving. Objective:    BP (!) 131/59   Pulse 67   Temp 98.2 °F (36.8 °C) (Temporal)   Resp 18   Ht 5' 3\" (1.6 m)   Wt 153 lb 6.4 oz (69.6 kg)   LMP 12/03/1997   SpO2 90%   BMI 27.17 kg/m²     Current medications that patient is taking have been reviewed. General appearance: NAD, conversant  HEENT: AT/NC, MMM  Neck: FROM, supple  Lungs: CTAB, WOB normal  CV: RRR, no MRGs  Abdomen: Soft, non-tender; no masses or HSM, +BS  Extremities: No peripheral edema or digital cyanosis  Skin: Some denudation of skin over dorsum of foot. Plantar aspect covered with dressing.   Significantly improved erythema/swelling  Psych: Calm and cooperative  Neuro: Alert and interactive, face symmetric, moving all extremities, speech fluent    Labs:  CBC with Differential:    Lab Results   Component Value Date    WBC 9.2 05/20/2021    RBC 2.83 05/20/2021    HGB 9.0 05/20/2021    HCT 27.4 05/20/2021     05/20/2021    MCV 96.8 05/20/2021    MCH 31.8 05/20/2021    MCHC 32.8 05/20/2021    RDW 13.2 05/20/2021    LYMPHOPCT 4.3 05/16/2021    MONOPCT 3.5 05/16/2021    BASOPCT 0.1 05/16/2021    MONOSABS 0.57 05/16/2021    LYMPHSABS 0.57 05/16/2021    EOSABS 0.00 05/16/2021    BASOSABS 0.00 05/16/2021     CMP:    Lab Results   Component Value Date     05/20/2021    K 3.9 05/20/2021    K 3.7 05/16/2021    CL 96 05/20/2021    CO2 25 05/20/2021    BUN 12 05/20/2021    CREATININE 0.7 05/20/2021    GFRAA >60 05/20/2021    LABGLOM >60 05/20/2021    GLUCOSE 85 05/20/2021    PROT 6.7 05/16/2021    LABALBU 3.2 05/16/2021    CALCIUM 8.5 05/20/2021    BILITOT 0.3 05/16/2021    ALKPHOS 85 05/16/2021    AST 39 05/16/2021 ALT 36 05/16/2021          Assessment/Plan:  Principal Problem:    Cellulitis of left foot  Active Problems:    Diabetic infection of left foot (HCC)    HTN (hypertension)    Pulmonary embolism (HCC)    Metabolic encephalopathy    Hyponatremia    DM (diabetes mellitus), type 2 (HCC)    Type 1 diabetes mellitus with left diabetic foot ulcer (HCC)    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 *       Continue Lasix 20 PO daily    Continue Lovenox, unless need to hold for procedures. Podiatry note pending - possible procedure today? Continue ertapenem.     Blood pressure well controlled    Blood glucose well controlled    Requires continued inpatient level of care     Yan Saavedra MD    2:41 PM  5/21/2021

## 2021-05-21 NOTE — BRIEF OP NOTE
Brief Postoperative Note      Patient: Bambi Khoury  YOB: 1927  MRN: [id number]    Date of Procedure: 5/21/2021    Pre-Op Diagnosis: Left foot wound     Post-Op Diagnosis: Same       Procedure(s):  LEFT FOOT DEBRIDEMENT  WITH DELAYED PRIMARY CLOSURE    Surgeon(s):  Kaylan Eddy DPM    Assistant:  Resident: Dennis Guerrero DPM    Anesthesia: Monitor Anesthesia Care    Estimated Blood Loss (mL): Minimal    Complications: None    Specimens:   * No specimens in log *    Implants:  * No implants in log *      Drains:   Urethral Catheter Non-latex (Active)   Catheter Indications Perioperative use for selected surgical procedures 05/19/21 1946   Urine Color Yellow 05/20/21 0351   Urine Appearance Clear 05/20/21 0351   Output (mL) 400 mL 05/21/21 0544       Findings: No additional purulence expressed. Poor bleeding.     Electronically signed by Dennis Guerrero DPM on 5/21/2021 at 5:39 PM

## 2021-05-21 NOTE — ANESTHESIA POSTPROCEDURE EVALUATION
Department of Anesthesiology  Postprocedure Note    Patient: Sabra Lozoya  MRN: [id number]  YOB: 1927  Date of evaluation: 5/21/2021  Time:  6:04 PM     Procedure Summary     Date: 05/21/21 Room / Location: [facility] OR  / [facility]    Anesthesia Start:  Anesthesia Stop:     Procedure: LEFT FOOT DEBRIDEMENT  WITH DELAYED PRIMARY CLOSURE (Left ) Diagnosis: (/)    Surgeons: Diego Abdullahi DPM Responsible Provider:     Anesthesia Type: MAC ASA Status: 3          Anesthesia Type: MAC    Jayjay Phase I: Jayjay Score: 9    Jayjay Phase II:      Last vitals: Reviewed and per EMR flowsheets.        Anesthesia Post Evaluation    Patient location during evaluation: PACU  Patient participation: complete - patient participated  Level of consciousness: awake  Pain score: 3  Airway patency: patent  Nausea & Vomiting: no nausea and no vomiting  Complications: no  Cardiovascular status: blood pressure returned to baseline  Respiratory status: acceptable  Hydration status: euvolemic

## 2021-05-21 NOTE — CARE COORDINATION
Plan is CHS at the [facility] when medically ready, no precert required. Patient is POD#2 aortogram with balloon angioplasty of the L distal SFA/pop and AT artery and she is scheduled for LEFT FOOT DEBRIDEMENT WITH DELAYED PRIMARY CLOSURE today at 4:30 pm with podiatry. Ambulance form and Hens in envelope in soft chart.   Mikel Tolentino RN CM

## 2021-05-22 ENCOUNTER — APPOINTMENT (OUTPATIENT)
Dept: GENERAL RADIOLOGY | Age: 86
DRG: 579 | End: 2021-05-22
Payer: MEDICARE

## 2021-05-22 LAB
ANION GAP SERPL CALCULATED.3IONS-SCNC: 6 MMOL/L (ref 7–16)
BUN BLDV-MCNC: 15 MG/DL (ref 6–23)
CALCIUM SERPL-MCNC: 8.8 MG/DL (ref 8.6–10.2)
CHLORIDE BLD-SCNC: 97 MMOL/L (ref 98–107)
CO2: 28 MMOL/L (ref 22–29)
CREAT SERPL-MCNC: 0.8 MG/DL (ref 0.5–1)
GFR AFRICAN AMERICAN: >60
GFR NON-AFRICAN AMERICAN: >60 ML/MIN/1.73
GLUCOSE BLD-MCNC: 51 MG/DL (ref 74–99)
HCT VFR BLD CALC: 24 % (ref 34–48)
HEMOGLOBIN: 7.9 G/DL (ref 11.5–15.5)
MCH RBC QN AUTO: 32.1 PG (ref 26–35)
MCHC RBC AUTO-ENTMCNC: 32.9 % (ref 32–34.5)
MCV RBC AUTO: 97.6 FL (ref 80–99.9)
METER GLUCOSE: 173 MG/DL (ref 74–99)
METER GLUCOSE: 174 MG/DL (ref 74–99)
METER GLUCOSE: 251 MG/DL (ref 74–99)
METER GLUCOSE: 67 MG/DL (ref 74–99)
METER GLUCOSE: 68 MG/DL (ref 74–99)
METER GLUCOSE: 78 MG/DL (ref 74–99)
PDW BLD-RTO: 13.5 FL (ref 11.5–15)
PLATELET # BLD: 527 E9/L (ref 130–450)
PMV BLD AUTO: 9.8 FL (ref 7–12)
POTASSIUM SERPL-SCNC: 4.6 MMOL/L (ref 3.5–5)
RBC # BLD: 2.46 E12/L (ref 3.5–5.5)
SODIUM BLD-SCNC: 131 MMOL/L (ref 132–146)
WBC # BLD: 12.3 E9/L (ref 4.5–11.5)

## 2021-05-22 PROCEDURE — 2580000003 HC RX 258: Performed by: PODIATRIST

## 2021-05-22 PROCEDURE — 6360000002 HC RX W HCPCS: Performed by: INTERNAL MEDICINE

## 2021-05-22 PROCEDURE — 2580000003 HC RX 258: Performed by: INTERNAL MEDICINE

## 2021-05-22 PROCEDURE — 6370000000 HC RX 637 (ALT 250 FOR IP): Performed by: PODIATRIST

## 2021-05-22 PROCEDURE — 6370000000 HC RX 637 (ALT 250 FOR IP): Performed by: INTERNAL MEDICINE

## 2021-05-22 PROCEDURE — 1200000000 HC SEMI PRIVATE

## 2021-05-22 PROCEDURE — 73564 X-RAY EXAM KNEE 4 OR MORE: CPT

## 2021-05-22 PROCEDURE — 6360000002 HC RX W HCPCS: Performed by: PODIATRIST

## 2021-05-22 PROCEDURE — 80048 BASIC METABOLIC PNL TOTAL CA: CPT

## 2021-05-22 PROCEDURE — 82962 GLUCOSE BLOOD TEST: CPT

## 2021-05-22 PROCEDURE — 36415 COLL VENOUS BLD VENIPUNCTURE: CPT

## 2021-05-22 PROCEDURE — 85027 COMPLETE CBC AUTOMATED: CPT

## 2021-05-22 PROCEDURE — 94640 AIRWAY INHALATION TREATMENT: CPT

## 2021-05-22 RX ADMIN — ENOXAPARIN SODIUM 60 MG: 60 INJECTION SUBCUTANEOUS at 11:59

## 2021-05-22 RX ADMIN — GUAIFENESIN 400 MG: 400 TABLET ORAL at 12:01

## 2021-05-22 RX ADMIN — INSULIN LISPRO 1 UNITS: 100 INJECTION, SOLUTION INTRAVENOUS; SUBCUTANEOUS at 17:22

## 2021-05-22 RX ADMIN — PRAVASTATIN SODIUM 20 MG: 20 TABLET ORAL at 21:14

## 2021-05-22 RX ADMIN — IPRATROPIUM BROMIDE AND ALBUTEROL SULFATE 1 AMPULE: 2.5; .5 SOLUTION RESPIRATORY (INHALATION) at 21:19

## 2021-05-22 RX ADMIN — TRAMADOL HYDROCHLORIDE 50 MG: 50 TABLET, FILM COATED ORAL at 21:14

## 2021-05-22 RX ADMIN — INSULIN LISPRO 1 UNITS: 100 INJECTION, SOLUTION INTRAVENOUS; SUBCUTANEOUS at 13:30

## 2021-05-22 RX ADMIN — Medication 10 ML: at 21:17

## 2021-05-22 RX ADMIN — PANTOPRAZOLE SODIUM 40 MG: 40 TABLET, DELAYED RELEASE ORAL at 12:00

## 2021-05-22 RX ADMIN — SUCRALFATE 1 G: 1 TABLET ORAL at 21:14

## 2021-05-22 RX ADMIN — BUDESONIDE 250 MCG: 0.25 SUSPENSION RESPIRATORY (INHALATION) at 21:19

## 2021-05-22 RX ADMIN — ERTAPENEM SODIUM 1000 MG: 1 INJECTION, POWDER, LYOPHILIZED, FOR SOLUTION INTRAMUSCULAR; INTRAVENOUS at 17:21

## 2021-05-22 RX ADMIN — IPRATROPIUM BROMIDE AND ALBUTEROL SULFATE 1 AMPULE: 2.5; .5 SOLUTION RESPIRATORY (INHALATION) at 12:51

## 2021-05-22 RX ADMIN — ACETAMINOPHEN 650 MG: 325 TABLET ORAL at 12:00

## 2021-05-22 RX ADMIN — ASPIRIN 81 MG: 81 TABLET, COATED ORAL at 12:00

## 2021-05-22 RX ADMIN — INSULIN LISPRO 1 UNITS: 100 INJECTION, SOLUTION INTRAVENOUS; SUBCUTANEOUS at 21:14

## 2021-05-22 RX ADMIN — AMLODIPINE BESYLATE 5 MG: 5 TABLET ORAL at 12:01

## 2021-05-22 RX ADMIN — BUDESONIDE 250 MCG: 0.25 SUSPENSION RESPIRATORY (INHALATION) at 09:05

## 2021-05-22 RX ADMIN — IPRATROPIUM BROMIDE AND ALBUTEROL SULFATE 1 AMPULE: 2.5; .5 SOLUTION RESPIRATORY (INHALATION) at 17:04

## 2021-05-22 RX ADMIN — ENOXAPARIN SODIUM 60 MG: 60 INJECTION SUBCUTANEOUS at 21:13

## 2021-05-22 RX ADMIN — Medication 10 ML: at 12:02

## 2021-05-22 RX ADMIN — TRAMADOL HYDROCHLORIDE 50 MG: 50 TABLET, FILM COATED ORAL at 09:21

## 2021-05-22 RX ADMIN — FUROSEMIDE 20 MG: 20 TABLET ORAL at 12:01

## 2021-05-22 RX ADMIN — GABAPENTIN 300 MG: 300 CAPSULE ORAL at 11:59

## 2021-05-22 RX ADMIN — GUAIFENESIN 400 MG: 400 TABLET ORAL at 21:14

## 2021-05-22 RX ADMIN — IPRATROPIUM BROMIDE AND ALBUTEROL SULFATE 1 AMPULE: 2.5; .5 SOLUTION RESPIRATORY (INHALATION) at 09:05

## 2021-05-22 RX ADMIN — ARFORMOTEROL TARTRATE 15 MCG: 15 SOLUTION RESPIRATORY (INHALATION) at 09:05

## 2021-05-22 RX ADMIN — SUCRALFATE 1 G: 1 TABLET ORAL at 11:59

## 2021-05-22 RX ADMIN — ARFORMOTEROL TARTRATE 15 MCG: 15 SOLUTION RESPIRATORY (INHALATION) at 21:19

## 2021-05-22 ASSESSMENT — PAIN DESCRIPTION - PAIN TYPE: TYPE: ACUTE PAIN

## 2021-05-22 ASSESSMENT — PAIN DESCRIPTION - PROGRESSION: CLINICAL_PROGRESSION: NOT CHANGED

## 2021-05-22 ASSESSMENT — PAIN DESCRIPTION - ORIENTATION: ORIENTATION: LEFT

## 2021-05-22 ASSESSMENT — PAIN DESCRIPTION - FREQUENCY
FREQUENCY: INTERMITTENT
FREQUENCY: INTERMITTENT

## 2021-05-22 ASSESSMENT — PAIN SCALES - GENERAL: PAINLEVEL_OUTOF10: 3

## 2021-05-22 ASSESSMENT — PAIN DESCRIPTION - ONSET: ONSET: ON-GOING

## 2021-05-22 NOTE — PROGRESS NOTES
Daily Progress Note      SUBJECTIVE: Patient seen in follow-up status post delayed primary closure left foot wound. Patient at present awake, alert, feels well. No pain. Tolerating diet. Daughter at bedside    OBJECTIVE: Dressing intact, with removal surgical site plantar left foot sutures intact, skin edges approximated, there is no erythema, edema or pain. No active drainage. Dorsal wound stable with some areas of dark tissue.   No fluctuance or crepitus    Medications    Current Facility-Administered Medications: insulin lispro (HUMALOG) injection vial 0-6 Units, 0-6 Units, Subcutaneous, TID WC  insulin lispro (HUMALOG) injection vial 0-3 Units, 0-3 Units, Subcutaneous, Nightly  HYDROmorphone (DILAUDID) injection 0.2 mg, 0.2 mg, Intravenous, Q4H PRN  enoxaparin (LOVENOX) injection 60 mg, 1 mg/kg, Subcutaneous, BID  lidocaine 1 % injection 10 mL, 10 mL, Intradermal, Once  ertapenem (INVanz) 1000 mg IVPB minibag, 1,000 mg, Intravenous, Q24H  furosemide (LASIX) tablet 20 mg, 20 mg, Oral, Daily  povidone-iodine (BETADINE) 10 % external solution, , Topical, PRN  ipratropium-albuterol (DUONEB) nebulizer solution 1 ampule, 1 ampule, Inhalation, Q4H WA  guaiFENesin-dextromethorphan (ROBITUSSIN DM) 100-10 MG/5ML syrup 5 mL, 5 mL, Oral, Q4H PRN  traMADol (ULTRAM) tablet 50 mg, 50 mg, Oral, Q6H PRN  hydrALAZINE (APRESOLINE) injection 10 mg, 10 mg, Intravenous, Q6H PRN  amLODIPine (NORVASC) tablet 5 mg, 5 mg, Oral, Daily  aspirin EC tablet 81 mg, 81 mg, Oral, Daily  gabapentin (NEURONTIN) capsule 300 mg, 300 mg, Oral, Daily  guaiFENesin tablet 400 mg, 400 mg, Oral, TID  pantoprazole (PROTONIX) tablet 40 mg, 40 mg, Oral, Daily  pravastatin (PRAVACHOL) tablet 20 mg, 20 mg, Oral, Nightly  sucralfate (CARAFATE) tablet 1 g, 1 g, Oral, TID  sodium chloride flush 0.9 % injection 10 mL, 10 mL, Intravenous, 2 times per day  sodium chloride flush 0.9 % injection 10 mL, 10 mL, Intravenous, PRN  0.9 % sodium chloride infusion, 25 mL, Intravenous, PRN  potassium chloride (KLOR-CON M) extended release tablet 40 mEq, 40 mEq, Oral, PRN **OR** potassium bicarb-citric acid (EFFER-K) effervescent tablet 40 mEq, 40 mEq, Oral, PRN **OR** potassium chloride 10 mEq/100 mL IVPB (Peripheral Line), 10 mEq, Intravenous, PRN  magnesium hydroxide (MILK OF MAGNESIA) 400 MG/5ML suspension 30 mL, 30 mL, Oral, Daily PRN  acetaminophen (TYLENOL) tablet 650 mg, 650 mg, Oral, Q6H PRN **OR** acetaminophen (TYLENOL) suppository 650 mg, 650 mg, Rectal, Q6H PRN  glucose (GLUTOSE) 40 % oral gel 15 g, 15 g, Oral, PRN  dextrose 50 % IV solution, 12.5 g, Intravenous, PRN  glucagon (rDNA) injection 1 mg, 1 mg, Intramuscular, PRN  dextrose 5 % solution, 100 mL/hr, Intravenous, PRN  trimethobenzamide (TIGAN) injection 200 mg, 200 mg, Intramuscular, Q6H PRN  budesonide (PULMICORT) nebulizer suspension 250 mcg, 0.25 mg, Nebulization, BID **AND** Arformoterol Tartrate (BROVANA) nebulizer solution 15 mcg, 15 mcg, Nebulization, BID  Physical    VITALS:  BP (!) 164/56   Pulse 69   Temp 97.3 °F (36.3 °C)   Resp 18   Ht 5' 3\" (1.6 m)   Wt 154 lb 11.2 oz (70.2 kg)   LMP 1997   SpO2 90%   BMI 27.40 kg/m²   TEMPERATURE:  Current - Temp: 97.3 °F (36.3 °C);  Max - Temp  Av.5 °F (36.4 °C)  Min: 97 °F (36.1 °C)  Max: 98.1 °F (36.7 °C)  RESPIRATIONS RANGE: Resp  Av.2  Min: 0  Max: 27  PULSE RANGE: Pulse  Av.4  Min: 54  Max: 71  BLOOD PRESSURE RANGE:  Systolic (78AGE), BTU:995 , Min:112 , PPS:530   ; Diastolic (66FKD), OEB:79, Min:49, Max:69    PULSE OXIMETRY RANGE: SpO2  Av %  Min: 90 %  Max: 100 %  24HR INTAKE/OUTPUT:      Intake/Output Summary (Last 24 hours) at 2021 1326  Last data filed at 2021 0956  Gross per 24 hour   Intake 850 ml   Output 1255 ml   Net -405 ml           Data    CBC with Differential:    Lab Results   Component Value Date    WBC 12.3 2021    RBC 2.46 2021    HGB 7.9 2021    HCT 24.0 2021

## 2021-05-22 NOTE — PROGRESS NOTES
Pt complaining of or right knee pain on either side of her knee cap that she did not have before surgery.  Left message with  via perfect serve

## 2021-05-22 NOTE — PROGRESS NOTES
Department of Internal Medicine  Infectious Diseases  Progress  Note    C/C  : Right leg cellulitis , right foot abscess     Awake and alert   Denies fever and chills  Reports foot pain   Afebrile        Current Facility-Administered Medications   Medication Dose Route Frequency Provider Last Rate Last Admin    insulin lispro (HUMALOG) injection vial 0-6 Units  0-6 Units Subcutaneous TID WC Stephen Alonso MD   1 Units at 05/22/21 1330    insulin lispro (HUMALOG) injection vial 0-3 Units  0-3 Units Subcutaneous Nightly Stephen Alonso MD        HYDROmorphone (DILAUDID) injection 0.2 mg  0.2 mg Intravenous Q4H PRN Stephen Alonso MD        enoxaparin (LOVENOX) injection 60 mg  1 mg/kg Subcutaneous BID Stephen Alonso MD   60 mg at 05/22/21 1159    lidocaine 1 % injection 10 mL  10 mL Intradermal Once [place], DPM        ertapenem Dang Jase) 1000 mg IVPB minibag  1,000 mg Intravenous Q24H Rhett Tejada MD 0 mL/hr at 05/20/21 1932 1,000 mg at 05/21/21 1705    furosemide (LASIX) tablet 20 mg  20 mg Oral Daily Ollie Rae MD   20 mg at 05/22/21 1201    povidone-iodine (BETADINE) 10 % external solution   Topical PRN [place], DPM        ipratropium-albuterol (DUONEB) nebulizer solution 1 ampule  1 ampule Inhalation Q4H WA Genesis Nelson MD   1 ampule at 05/22/21 1251    guaiFENesin-dextromethorphan (ROBITUSSIN DM) 100-10 MG/5ML syrup 5 mL  5 mL Oral Q4H PRN Genesis Nelson MD   5 mL at 05/20/21 1059    traMADol (ULTRAM) tablet 50 mg  50 mg Oral Q6H PRN Genesis Nelson MD   50 mg at 05/22/21 6476    hydrALAZINE (APRESOLINE) injection 10 mg  10 mg Intravenous Q6H PRN Genesis Nelson MD   10 mg at 05/16/21 0910    amLODIPine (NORVASC) tablet 5 mg  5 mg Oral Daily Genesis Nelson MD   5 mg at 05/22/21 1201    aspirin EC tablet 81 mg  81 mg Oral Daily Genesis Nelson MD   81 mg at 05/22/21 1200    gabapentin (NEURONTIN) capsule 300 mg  300 mg Oral Daily Genesis Nelson MD   300 mg at 05/22/21 1159    guaiFENesin tablet 400 mg 400 mg Oral TID Eduard Doherty MD   400 mg at 05/22/21 1201    pantoprazole (PROTONIX) tablet 40 mg  40 mg Oral Daily Eduard Doherty MD   40 mg at 05/22/21 1200    pravastatin (PRAVACHOL) tablet 20 mg  20 mg Oral Nightly Eduard Doherty MD   20 mg at 05/21/21 2137    sucralfate (CARAFATE) tablet 1 g  1 g Oral TID Eduard Doherty MD   1 g at 05/22/21 1159    sodium chloride flush 0.9 % injection 10 mL  10 mL Intravenous 2 times per day Eduard Doherty MD   10 mL at 05/22/21 1202    sodium chloride flush 0.9 % injection 10 mL  10 mL Intravenous PRN Eduard Doherty MD   10 mL at 05/16/21 1843    0.9 % sodium chloride infusion  25 mL Intravenous PRN Eduard Doherty MD        potassium chloride (KLOR-CON M) extended release tablet 40 mEq  40 mEq Oral PRN Eduard Doherty MD        Or    potassium bicarb-citric acid (EFFER-K) effervescent tablet 40 mEq  40 mEq Oral PRN Eduard Doherty MD        Or    potassium chloride 10 mEq/100 mL IVPB (Peripheral Line)  10 mEq Intravenous PRN Eduard Doherty MD        magnesium hydroxide (MILK OF MAGNESIA) 400 MG/5ML suspension 30 mL  30 mL Oral Daily PRN Eduard Doherty MD   30 mL at 05/20/21 1056    acetaminophen (TYLENOL) tablet 650 mg  650 mg Oral Q6H PRN Eduard Doherty MD   650 mg at 05/22/21 1200    Or    acetaminophen (TYLENOL) suppository 650 mg  650 mg Rectal Q6H PRN Eduard Doherty MD        glucose (GLUTOSE) 40 % oral gel 15 g  15 g Oral PRN Eduard Doherty MD        dextrose 50 % IV solution  12.5 g Intravenous PRN Eduard Doherty MD        glucagon (rDNA) injection 1 mg  1 mg Intramuscular PRN Eduard Doherty MD        dextrose 5 % solution  100 mL/hr Intravenous PRN Eduard Doherty MD        trimethobenzamide Mliss Meuse) injection 200 mg  200 mg Intramuscular Q6H PRN Eduard Doherty MD        budesonide (PULMICORT) nebulizer suspension 250 mcg  0.25 mg Nebulization BID Eduard Doherty MD   250 mcg at 05/22/21 4236    And    Arformoterol Tartrate (BROVANA) nebulizer solution 15 mcg  15 mcg Nebulization BID Eduard Doherty MD   15 mcg at 05/22/21 0905           REVIEW OF SYSTEMS:    CONSTITUTIONAL:  Denies fever, chill or rigors  HEENT: denies blurring of vision or double vision, denies hearing problem  RESPIRATORY: denies cough, shortness of breath, sputum expectoration, chest pain. CARDIOVASCULAR:  Denies palpitation  GASTROINTESTINAL:  Denies abdomen pain, diarrhea or constipation. GENITOURINARY:  Denies burning urination or frequency of urination  INTEGUMENT left foot wound   HEMATOLOGIC/LYMPHATIC:  Denies lymph node swelling, gum bleeding or easy bruising. MUSCULOSKELETAL: Denies foot pain   NEUROLOGICAL:  Hallucination     PHYSICAL EXAM:      Vitals:     BP (!) 164/56   Pulse 69   Temp 97.3 °F (36.3 °C)   Resp 18   Ht 5' 3\" (1.6 m)   Wt 154 lb 11.2 oz (70.2 kg)   LMP 12/03/1997   SpO2 90%   BMI 27.40 kg/m²     General Appearance:    Awake and alert    Head:    Normocephalic, atraumatic   Eyes:    No pallor, no icterus,   Ears:    No obvious deformity or drainage.    Nose:   No nasal drainage   Throat:   Mucosa moist, no oral thrush   Neck:   Supple, no lymphadenopathy   Back:     no CVA tenderness   Lungs:     Bilateral wheeze    Heart:    Regular rate and rhythm, no murmur   Abdomen:     Soft, non-tender, bowel sounds present    Extremities:    Left foot plantar wound - clean,non tender   Dorsum - mild erythema, skin peeling    Pulses:   Dorsalis pedis palpable    Skin:   Mild  erythema      CBC with Differential:      Lab Results   Component Value Date    WBC 12.3 05/22/2021    RBC 2.46 05/22/2021    HGB 7.9 05/22/2021    HCT 24.0 05/22/2021     05/22/2021    MCV 97.6 05/22/2021    MCH 32.1 05/22/2021    MCHC 32.9 05/22/2021    RDW 13.5 05/22/2021    LYMPHOPCT 4.3 05/16/2021    MONOPCT 3.5 05/16/2021    BASOPCT 0.1 05/16/2021    MONOSABS 0.57 05/16/2021    LYMPHSABS 0.57 05/16/2021    EOSABS 0.00 05/16/2021    BASOSABS 0.00 05/16/2021       CMP     Lab Results   Component Value Date     05/22/2021    K 4.6 05/22/2021    K 3.7 05/16/2021    CL 97 05/22/2021    CO2 28 05/22/2021    BUN 15 05/22/2021    CREATININE 0.8 05/22/2021    GFRAA >60 05/22/2021    LABGLOM >60 05/22/2021    GLUCOSE 51 05/22/2021    PROT 6.7 05/16/2021    LABALBU 3.2 05/16/2021    CALCIUM 8.8 05/22/2021    BILITOT 0.3 05/16/2021    ALKPHOS 85 05/16/2021    AST 39 05/16/2021    ALT 36 05/16/2021         Hepatic Function Panel:    Lab Results   Component Value Date    ALKPHOS 85 05/16/2021    ALT 36 05/16/2021    AST 39 05/16/2021    PROT 6.7 05/16/2021    BILITOT 0.3 05/16/2021    LABALBU 3.2 05/16/2021       PT/INR:    Lab Results   Component Value Date    PROTIME 11.1 11/23/2014    INR 1.1 11/23/2014       TSH:    Lab Results   Component Value Date    TSH 1.060 05/19/2021       U/A:    Lab Results   Component Value Date    COLORU Yellow 03/24/2021    PHUR 8.0 03/24/2021    WBCUA 0-1 03/24/2021    RBCUA 0-1 03/24/2021    RBCUA 5-10 09/02/2012    BACTERIA RARE 03/24/2021    CLARITYU Clear 03/24/2021    SPECGRAV 1.015 03/24/2021    LEUKOCYTESUR TRACE 03/24/2021    UROBILINOGEN 0.2 03/24/2021    BILIRUBINUR Negative 03/24/2021    BLOODU Negative 03/24/2021    GLUCOSEU 100 03/24/2021       ABG:  No results found for: NFW6RBC, BEART, L0YDAWZX, PHART, THGBART, XDD5JPM, PO2ART, YNC4WXB    MICROBIOLOGY:    Wound cx -  Susceptibility    Citrobacter koseri (1)    Antibiotic Interpretation CHIDI Status    ceFAZolin Sensitive <=^4 mcg/mL     cefepime Sensitive <=^0.12 mcg/mL     cefTRIAXone Sensitive <=^0.25 mcg/mL     ertapenem Sensitive <=^0.12 mcg/mL     gentamicin Sensitive <=^1 mcg/mL     levofloxacin Sensitive <=^0.12 mcg/mL     piperacillin-tazobactam Sensitive <=^4 mcg/mL     trimethoprim-sulfamethoxazole Sensitive <=^20 mcg/mL     Lab and Collection    Swab collections are low-yield and rarely indicated.    Generally, the specimen volume when collected by swab is   small, reducing the probability of isolating organisms: many   organisms adhere to the fibers of the swab, which reduces the   opportunity of recovering organisms. Abnormal       Organism Anaerobic gram positive cocciAbnormal     Anaerobic Culture --    Light growth          Radiology :    X ray left foot - No aggressive osseous lesions    IMPRESSION:     1. Diabetic foot infection, left foot / leg cellulitis , abscess s/p I & D ( 5/16), wound closure 05/21  2. Leukocytosis - improved     RECOMMENDATIONS:      1. Invanz 1 gram IV q 24 hrs   2.  Midline insertion

## 2021-05-22 NOTE — PROGRESS NOTES
Chief Complaint:  Chief Complaint   Patient presents with    Leg Swelling     diagnosed w/ Cellulitis on LLE, oral antibiotics, assisted living states pt now has abscess on bottom of L foot and pt is \"not acting right\"     Cellulitis of left foot     Subjective:    Her pain is reasonably well controlled. Denies dyspnea. Good appetite. No fevers    Objective:    BP (!) 164/56   Pulse 69   Temp 97.3 °F (36.3 °C)   Resp 18   Ht 5' 3\" (1.6 m)   Wt 154 lb 11.2 oz (70.2 kg)   LMP 12/03/1997   SpO2 90%   BMI 27.40 kg/m²     Current medications that patient is taking have been reviewed.     General appearance: NAD, conversant  HEENT: AT/NC, MMM  Neck: FROM, supple  Lungs: CTAB, WOB normal  CV: RRR, no MRGs  Abdomen: Soft, non-tender; no masses or HSM, +BS  Extremities: No peripheral edema or digital cyanosis  Skin: Left foot wrapped up with dressing which I did not take down today  Psych: Calm and cooperative  Neuro: Alert and interactive, face symmetric, moving all extremities, speech fluent    Labs:  CBC with Differential:    Lab Results   Component Value Date    WBC 12.3 05/22/2021    RBC 2.46 05/22/2021    HGB 7.9 05/22/2021    HCT 24.0 05/22/2021     05/22/2021    MCV 97.6 05/22/2021    MCH 32.1 05/22/2021    MCHC 32.9 05/22/2021    RDW 13.5 05/22/2021    LYMPHOPCT 4.3 05/16/2021    MONOPCT 3.5 05/16/2021    BASOPCT 0.1 05/16/2021    MONOSABS 0.57 05/16/2021    LYMPHSABS 0.57 05/16/2021    EOSABS 0.00 05/16/2021    BASOSABS 0.00 05/16/2021     CMP:    Lab Results   Component Value Date     05/22/2021    K 4.6 05/22/2021    K 3.7 05/16/2021    CL 97 05/22/2021    CO2 28 05/22/2021    BUN 15 05/22/2021    CREATININE 0.8 05/22/2021    GFRAA >60 05/22/2021    LABGLOM >60 05/22/2021    GLUCOSE 51 05/22/2021    PROT 6.7 05/16/2021    LABALBU 3.2 05/16/2021    CALCIUM 8.8 05/22/2021    BILITOT 0.3 05/16/2021    ALKPHOS 85 05/16/2021    AST 39 05/16/2021    ALT 36 05/16/2021

## 2021-05-23 LAB
ANION GAP SERPL CALCULATED.3IONS-SCNC: 9 MMOL/L (ref 7–16)
BUN BLDV-MCNC: 15 MG/DL (ref 6–23)
CALCIUM SERPL-MCNC: 8.7 MG/DL (ref 8.6–10.2)
CHLORIDE BLD-SCNC: 95 MMOL/L (ref 98–107)
CO2: 23 MMOL/L (ref 22–29)
CREAT SERPL-MCNC: 0.7 MG/DL (ref 0.5–1)
GFR AFRICAN AMERICAN: >60
GFR NON-AFRICAN AMERICAN: >60 ML/MIN/1.73
GLUCOSE BLD-MCNC: 155 MG/DL (ref 74–99)
HCT VFR BLD CALC: 23 % (ref 34–48)
HEMOGLOBIN: 7.5 G/DL (ref 11.5–15.5)
MCH RBC QN AUTO: 31.6 PG (ref 26–35)
MCHC RBC AUTO-ENTMCNC: 32.6 % (ref 32–34.5)
MCV RBC AUTO: 97 FL (ref 80–99.9)
METER GLUCOSE: 176 MG/DL (ref 74–99)
METER GLUCOSE: 199 MG/DL (ref 74–99)
METER GLUCOSE: 205 MG/DL (ref 74–99)
METER GLUCOSE: 234 MG/DL (ref 74–99)
PDW BLD-RTO: 13.6 FL (ref 11.5–15)
PLATELET # BLD: 499 E9/L (ref 130–450)
PMV BLD AUTO: 9.9 FL (ref 7–12)
POTASSIUM SERPL-SCNC: 4.2 MMOL/L (ref 3.5–5)
PRO-BNP: 402 PG/ML (ref 0–450)
RBC # BLD: 2.37 E12/L (ref 3.5–5.5)
SODIUM BLD-SCNC: 127 MMOL/L (ref 132–146)
WBC # BLD: 9.6 E9/L (ref 4.5–11.5)

## 2021-05-23 PROCEDURE — 2580000003 HC RX 258: Performed by: PODIATRIST

## 2021-05-23 PROCEDURE — 1200000000 HC SEMI PRIVATE

## 2021-05-23 PROCEDURE — 6370000000 HC RX 637 (ALT 250 FOR IP): Performed by: PODIATRIST

## 2021-05-23 PROCEDURE — 2580000003 HC RX 258: Performed by: INTERNAL MEDICINE

## 2021-05-23 PROCEDURE — 85027 COMPLETE CBC AUTOMATED: CPT

## 2021-05-23 PROCEDURE — 6370000000 HC RX 637 (ALT 250 FOR IP): Performed by: INTERNAL MEDICINE

## 2021-05-23 PROCEDURE — 94640 AIRWAY INHALATION TREATMENT: CPT

## 2021-05-23 PROCEDURE — 36415 COLL VENOUS BLD VENIPUNCTURE: CPT

## 2021-05-23 PROCEDURE — 80048 BASIC METABOLIC PNL TOTAL CA: CPT

## 2021-05-23 PROCEDURE — 6360000002 HC RX W HCPCS: Performed by: PODIATRIST

## 2021-05-23 PROCEDURE — 6360000002 HC RX W HCPCS: Performed by: INTERNAL MEDICINE

## 2021-05-23 PROCEDURE — 83880 ASSAY OF NATRIURETIC PEPTIDE: CPT

## 2021-05-23 PROCEDURE — 82962 GLUCOSE BLOOD TEST: CPT

## 2021-05-23 RX ADMIN — ARFORMOTEROL TARTRATE 15 MCG: 15 SOLUTION RESPIRATORY (INHALATION) at 20:42

## 2021-05-23 RX ADMIN — ERTAPENEM SODIUM 1000 MG: 1 INJECTION, POWDER, LYOPHILIZED, FOR SOLUTION INTRAMUSCULAR; INTRAVENOUS at 17:47

## 2021-05-23 RX ADMIN — PANTOPRAZOLE SODIUM 40 MG: 40 TABLET, DELAYED RELEASE ORAL at 08:32

## 2021-05-23 RX ADMIN — APIXABAN 5 MG: 5 TABLET, FILM COATED ORAL at 21:11

## 2021-05-23 RX ADMIN — GABAPENTIN 300 MG: 300 CAPSULE ORAL at 08:33

## 2021-05-23 RX ADMIN — INSULIN LISPRO 1 UNITS: 100 INJECTION, SOLUTION INTRAVENOUS; SUBCUTANEOUS at 21:12

## 2021-05-23 RX ADMIN — IPRATROPIUM BROMIDE AND ALBUTEROL SULFATE 1 AMPULE: 2.5; .5 SOLUTION RESPIRATORY (INHALATION) at 17:38

## 2021-05-23 RX ADMIN — ASPIRIN 81 MG: 81 TABLET, COATED ORAL at 08:31

## 2021-05-23 RX ADMIN — PRAVASTATIN SODIUM 20 MG: 20 TABLET ORAL at 21:12

## 2021-05-23 RX ADMIN — TRAMADOL HYDROCHLORIDE 50 MG: 50 TABLET, FILM COATED ORAL at 08:31

## 2021-05-23 RX ADMIN — FUROSEMIDE 20 MG: 20 TABLET ORAL at 08:31

## 2021-05-23 RX ADMIN — INSULIN LISPRO 2 UNITS: 100 INJECTION, SOLUTION INTRAVENOUS; SUBCUTANEOUS at 11:50

## 2021-05-23 RX ADMIN — Medication 10 ML: at 21:12

## 2021-05-23 RX ADMIN — SUCRALFATE 1 G: 1 TABLET ORAL at 13:44

## 2021-05-23 RX ADMIN — SUCRALFATE 1 G: 1 TABLET ORAL at 21:12

## 2021-05-23 RX ADMIN — AMLODIPINE BESYLATE 5 MG: 5 TABLET ORAL at 08:33

## 2021-05-23 RX ADMIN — IPRATROPIUM BROMIDE AND ALBUTEROL SULFATE 1 AMPULE: 2.5; .5 SOLUTION RESPIRATORY (INHALATION) at 11:55

## 2021-05-23 RX ADMIN — GUAIFENESIN 400 MG: 400 TABLET ORAL at 13:44

## 2021-05-23 RX ADMIN — MAGNESIUM HYDROXIDE 30 ML: 2400 SUSPENSION ORAL at 11:40

## 2021-05-23 RX ADMIN — IPRATROPIUM BROMIDE AND ALBUTEROL SULFATE 1 AMPULE: 2.5; .5 SOLUTION RESPIRATORY (INHALATION) at 20:42

## 2021-05-23 RX ADMIN — GUAIFENESIN 400 MG: 400 TABLET ORAL at 08:42

## 2021-05-23 RX ADMIN — INSULIN LISPRO 1 UNITS: 100 INJECTION, SOLUTION INTRAVENOUS; SUBCUTANEOUS at 17:47

## 2021-05-23 RX ADMIN — TRAMADOL HYDROCHLORIDE 50 MG: 50 TABLET, FILM COATED ORAL at 21:11

## 2021-05-23 RX ADMIN — GUAIFENESIN 400 MG: 400 TABLET ORAL at 21:12

## 2021-05-23 RX ADMIN — INSULIN LISPRO 1 UNITS: 100 INJECTION, SOLUTION INTRAVENOUS; SUBCUTANEOUS at 08:33

## 2021-05-23 RX ADMIN — BUDESONIDE 250 MCG: 0.25 SUSPENSION RESPIRATORY (INHALATION) at 20:42

## 2021-05-23 RX ADMIN — SUCRALFATE 1 G: 1 TABLET ORAL at 08:32

## 2021-05-23 RX ADMIN — ENOXAPARIN SODIUM 60 MG: 60 INJECTION SUBCUTANEOUS at 08:33

## 2021-05-23 RX ADMIN — ACETAMINOPHEN 650 MG: 325 TABLET ORAL at 07:16

## 2021-05-23 ASSESSMENT — PAIN SCALES - GENERAL
PAINLEVEL_OUTOF10: 10
PAINLEVEL_OUTOF10: 5
PAINLEVEL_OUTOF10: 3
PAINLEVEL_OUTOF10: 9

## 2021-05-23 ASSESSMENT — PAIN DESCRIPTION - PROGRESSION: CLINICAL_PROGRESSION: NOT CHANGED

## 2021-05-23 NOTE — PROGRESS NOTES
Department of Internal Medicine  Infectious Diseases  Progress  Note    C/C  : Right leg cellulitis , right foot abscess     Awake and alert   Denies fever and chills  Reports feeling ok, no abdominal pain, no diarrhea   Afebrile        Current Facility-Administered Medications   Medication Dose Route Frequency Provider Last Rate Last Admin    insulin lispro (HUMALOG) injection vial 0-6 Units  0-6 Units Subcutaneous TID WC Sathya Lawrence MD   1 Units at 05/23/21 6953    insulin lispro (HUMALOG) injection vial 0-3 Units  0-3 Units Subcutaneous Nightly Sathya Lawrence MD   1 Units at 05/22/21 2114    HYDROmorphone (DILAUDID) injection 0.2 mg  0.2 mg Intravenous Q4H PRN Sathya Lawrence MD        enoxaparin (LOVENOX) injection 60 mg  1 mg/kg Subcutaneous BID Sathya Lawrence MD   60 mg at 05/23/21 5984    lidocaine 1 % injection 10 mL  10 mL Intradermal Once [place], DPM        ertapenem Mallory Laurie) 1000 mg IVPB minibag  1,000 mg Intravenous Q24H Cortney Couch MD   Stopped at 05/22/21 2326    furosemide (LASIX) tablet 20 mg  20 mg Oral Daily Ja Peñaloza MD   20 mg at 05/23/21 0831    povidone-iodine (BETADINE) 10 % external solution   Topical PRN [place], DPM        ipratropium-albuterol (DUONEB) nebulizer solution 1 ampule  1 ampule Inhalation Q4H WA Merlene Puri MD   1 ampule at 05/22/21 2119    guaiFENesin-dextromethorphan (ROBITUSSIN DM) 100-10 MG/5ML syrup 5 mL  5 mL Oral Q4H PRN Merlene Puri MD   5 mL at 05/20/21 1059    traMADol (ULTRAM) tablet 50 mg  50 mg Oral Q6H PRN Merlene Puri MD   50 mg at 05/23/21 0831    hydrALAZINE (APRESOLINE) injection 10 mg  10 mg Intravenous Q6H PRN Merlene Puri MD   10 mg at 05/16/21 0910    amLODIPine (NORVASC) tablet 5 mg  5 mg Oral Daily Merlene Puri MD   5 mg at 05/23/21 5720    aspirin EC tablet 81 mg  81 mg Oral Daily Merlene Puri MD   81 mg at 05/23/21 0831    gabapentin (NEURONTIN) capsule 300 mg  300 mg Oral Daily Merlene Puri MD   300 mg at 05/23/21 7956    guaiFENesin tablet 400 mg  400 mg Oral TID Gertrudis Mccall MD   400 mg at 05/23/21 6680    pantoprazole (PROTONIX) tablet 40 mg  40 mg Oral Daily Gertrudis Mccall MD   40 mg at 05/23/21 3904    pravastatin (PRAVACHOL) tablet 20 mg  20 mg Oral Nightly Gertrudis Mccall MD   20 mg at 05/22/21 2114    sucralfate (CARAFATE) tablet 1 g  1 g Oral TID Gertrudis Mccall MD   1 g at 05/23/21 9805    sodium chloride flush 0.9 % injection 10 mL  10 mL Intravenous 2 times per day Gertrudis Mccall MD   10 mL at 05/22/21 2117    sodium chloride flush 0.9 % injection 10 mL  10 mL Intravenous PRN Gertrudis Mccall MD   10 mL at 05/16/21 1843    0.9 % sodium chloride infusion  25 mL Intravenous PRN Gertrudis Mccall MD        potassium chloride (KLOR-CON M) extended release tablet 40 mEq  40 mEq Oral PRN Gertrudis Mccall MD        Or    potassium bicarb-citric acid (EFFER-K) effervescent tablet 40 mEq  40 mEq Oral PRN Gertrudis Mccall MD        Or    potassium chloride 10 mEq/100 mL IVPB (Peripheral Line)  10 mEq Intravenous PRN Gertrudis Mccall MD        magnesium hydroxide (MILK OF MAGNESIA) 400 MG/5ML suspension 30 mL  30 mL Oral Daily PRN Gertrudis Mccall MD   30 mL at 05/20/21 1056    acetaminophen (TYLENOL) tablet 650 mg  650 mg Oral Q6H PRN Gertrudis Mccall MD   650 mg at 05/23/21 6733    Or    acetaminophen (TYLENOL) suppository 650 mg  650 mg Rectal Q6H PRN Gertrudis Mccall MD        glucose (GLUTOSE) 40 % oral gel 15 g  15 g Oral PRN Gertrudis Mccall MD        dextrose 50 % IV solution  12.5 g Intravenous PRN Gertrudis Mccall MD        glucagon (rDNA) injection 1 mg  1 mg Intramuscular PRN Gertrudis Mccall MD        dextrose 5 % solution  100 mL/hr Intravenous PRN Gertrudis Mccall MD        trimethobenzamide Linward Drain) injection 200 mg  200 mg Intramuscular Q6H PRN Gertrudis Mccall MD        budesonide (PULMICORT) nebulizer suspension 250 mcg  0.25 mg Nebulization BID Gertrudis Mccall MD   250 mcg at 05/22/21 2119    And    Arformoterol Tartrate (BROVANA) nebulizer solution 15 mcg  15 mcg Nebulization BID Merlene Puri MD   15 mcg at 05/22/21 2119           REVIEW OF SYSTEMS:    CONSTITUTIONAL:  Denies fever, chill or rigors  HEENT: denies blurring of vision or double vision, denies hearing problem  RESPIRATORY: denies cough, shortness of breath, sputum expectoration, chest pain. CARDIOVASCULAR:  Denies palpitation  GASTROINTESTINAL:  Denies abdomen pain, diarrhea or constipation. GENITOURINARY:  Denies burning urination or frequency of urination  INTEGUMENT left foot wound   HEMATOLOGIC/LYMPHATIC:  Denies lymph node swelling, gum bleeding or easy bruising. MUSCULOSKELETAL: Denies foot pain   NEUROLOGICAL:  Hallucination     PHYSICAL EXAM:      Vitals:     BP (!) 144/51   Pulse 67   Temp 97.3 °F (36.3 °C)   Resp 18   Ht 5' 3\" (1.6 m)   Wt 158 lb (71.7 kg)   LMP 12/03/1997   SpO2 93%   BMI 27.99 kg/m²     General Appearance:    Awake and alert    Head:    Normocephalic, atraumatic   Eyes:    No pallor, no icterus,   Ears:    No obvious deformity or drainage.    Nose:   No nasal drainage   Throat:   Mucosa moist, no oral thrush   Neck:   Supple, no lymphadenopathy   Back:     no CVA tenderness   Lungs:     Bilateral wheeze    Heart:    Regular rate and rhythm, no murmur   Abdomen:     Soft, non-tender, bowel sounds present    Extremities:    Left foot plantar wound - clean,non tender   Dorsum - mild erythema, skin peeling    Pulses:   Dorsalis pedis palpable    Skin:   Mild  erythema      CBC with Differential:      Lab Results   Component Value Date    WBC 9.6 05/23/2021    RBC 2.37 05/23/2021    HGB 7.5 05/23/2021    HCT 23.0 05/23/2021     05/23/2021    MCV 97.0 05/23/2021    MCH 31.6 05/23/2021    MCHC 32.6 05/23/2021    RDW 13.6 05/23/2021    LYMPHOPCT 4.3 05/16/2021    MONOPCT 3.5 05/16/2021    BASOPCT 0.1 05/16/2021    MONOSABS 0.57 05/16/2021    LYMPHSABS 0.57 05/16/2021    EOSABS 0.00 05/16/2021    BASOSABS 0.00 05/16/2021       CMP     Lab Results   Component Value Date    NA 127 05/23/2021    K 4.2 05/23/2021    K 3.7 05/16/2021    CL 95 05/23/2021    CO2 23 05/23/2021    BUN 15 05/23/2021    CREATININE 0.7 05/23/2021    GFRAA >60 05/23/2021    LABGLOM >60 05/23/2021    GLUCOSE 155 05/23/2021    PROT 6.7 05/16/2021    LABALBU 3.2 05/16/2021    CALCIUM 8.7 05/23/2021    BILITOT 0.3 05/16/2021    ALKPHOS 85 05/16/2021    AST 39 05/16/2021    ALT 36 05/16/2021         Hepatic Function Panel:    Lab Results   Component Value Date    ALKPHOS 85 05/16/2021    ALT 36 05/16/2021    AST 39 05/16/2021    PROT 6.7 05/16/2021    BILITOT 0.3 05/16/2021    LABALBU 3.2 05/16/2021       PT/INR:    Lab Results   Component Value Date    PROTIME 11.1 11/23/2014    INR 1.1 11/23/2014       TSH:    Lab Results   Component Value Date    TSH 1.060 05/19/2021       U/A:    Lab Results   Component Value Date    COLORU Yellow 03/24/2021    PHUR 8.0 03/24/2021    WBCUA 0-1 03/24/2021    RBCUA 0-1 03/24/2021    RBCUA 5-10 09/02/2012    BACTERIA RARE 03/24/2021    CLARITYU Clear 03/24/2021    SPECGRAV 1.015 03/24/2021    LEUKOCYTESUR TRACE 03/24/2021    UROBILINOGEN 0.2 03/24/2021    BILIRUBINUR Negative 03/24/2021    BLOODU Negative 03/24/2021    GLUCOSEU 100 03/24/2021       ABG:  No results found for: SVU7RGE, BEART, A4BANZOU, PHART, THGBART, ADM7KXC, PO2ART, EQY3KRZ    MICROBIOLOGY:    Wound cx -  Susceptibility    Citrobacter koseri (1)    Antibiotic Interpretation CHIDI Status    ceFAZolin Sensitive <=^4 mcg/mL     cefepime Sensitive <=^0.12 mcg/mL     cefTRIAXone Sensitive <=^0.25 mcg/mL     ertapenem Sensitive <=^0.12 mcg/mL     gentamicin Sensitive <=^1 mcg/mL     levofloxacin Sensitive <=^0.12 mcg/mL     piperacillin-tazobactam Sensitive <=^4 mcg/mL     trimethoprim-sulfamethoxazole Sensitive <=^20 mcg/mL     Lab and Collection    Swab collections are low-yield and rarely indicated.    Generally, the specimen volume when collected by swab is   small, reducing the probability of isolating organisms: many organisms adhere to the fibers of the swab, which reduces the   opportunity of recovering organisms. Abnormal       Organism Anaerobic gram positive cocciAbnormal     Anaerobic Culture --    Light growth          Radiology :    X ray left foot - No aggressive osseous lesions    IMPRESSION:     1. Diabetic foot infection, left foot / leg cellulitis , abscess s/p I & D ( 5/16), wound closure 05/21  2. Leukocytosis - improved     RECOMMENDATIONS:      1. Invanz 1 gram IV q 24 hrs   2.  Midline insertion

## 2021-05-23 NOTE — PROGRESS NOTES
Chief Complaint:  Chief Complaint   Patient presents with    Leg Swelling     diagnosed w/ Cellulitis on LLE, oral antibiotics, assisted living states pt now has abscess on bottom of L foot and pt is \"not acting right\"     Cellulitis of left foot     Subjective: When I came in the room today, she was having some trouble swallowing pills. She ended up coughing them up. She states that this happens infrequently. She reports she does not have any difficulty swallowing food or fluids. After she coughed up the pills, she felt much better. She states overall she is actually feeling a lot better. Objective:    BP (!) 144/51   Pulse 67   Temp 97.3 °F (36.3 °C)   Resp 16   Ht 5' 3\" (1.6 m)   Wt 158 lb (71.7 kg)   LMP 12/03/1997   SpO2 91%   BMI 27.99 kg/m²     Current medications that patient is taking have been reviewed.     General appearance: NAD, conversant  HEENT: AT/NC, MMM  Neck: FROM, supple  Lungs: CTAB, WOB normal  CV: RRR, no MRGs  Abdomen: Soft, non-tender; no masses or HSM, +BS  Extremities: No peripheral edema or digital cyanosis  Skin: Left foot wrapped up with dressing which I did not take down today  Psych: Calm and cooperative  Neuro: Alert and interactive, face symmetric, moving all extremities, speech fluent    Labs:  CBC with Differential:    Lab Results   Component Value Date    WBC 9.6 05/23/2021    RBC 2.37 05/23/2021    HGB 7.5 05/23/2021    HCT 23.0 05/23/2021     05/23/2021    MCV 97.0 05/23/2021    MCH 31.6 05/23/2021    MCHC 32.6 05/23/2021    RDW 13.6 05/23/2021    LYMPHOPCT 4.3 05/16/2021    MONOPCT 3.5 05/16/2021    BASOPCT 0.1 05/16/2021    MONOSABS 0.57 05/16/2021    LYMPHSABS 0.57 05/16/2021    EOSABS 0.00 05/16/2021    BASOSABS 0.00 05/16/2021     CMP:    Lab Results   Component Value Date     05/23/2021    K 4.2 05/23/2021    K 3.7 05/16/2021    CL 95 05/23/2021    CO2 23 05/23/2021    BUN 15 05/23/2021    CREATININE 0.7 05/23/2021    GFRAA >60 05/23/2021 LABGLOM >60 05/23/2021    GLUCOSE 155 05/23/2021    PROT 6.7 05/16/2021    LABALBU 3.2 05/16/2021    CALCIUM 8.7 05/23/2021    BILITOT 0.3 05/16/2021    ALKPHOS 85 05/16/2021    AST 39 05/16/2021    ALT 36 05/16/2021          Assessment/Plan:  Principal Problem:    Cellulitis of left foot  Active Problems:    Diabetic infection of left foot (HCC)    HTN (hypertension)    Pulmonary embolism (HCC)    Metabolic encephalopathy    Hyponatremia    DM (diabetes mellitus), type 2 (HCC)    Type 1 diabetes mellitus with left diabetic foot ulcer (HCC)    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 *       Wound closure done    Getting hyponatremic. Hold Lasix and liberalize fluid restriction from 1200 to 1800 cc. Transition back to [facility] (prior notes say 3 months from 3/25/2021, however, she is mostly bedbound with multiple VTE risk factors - course needs to be extended)    Continue ertapenem.     Blood pressure well controlled    Blood glucose well controlled    Requires continued inpatient level of care     Acosta Reyes MD    4:42 PM  5/23/2021

## 2021-05-23 NOTE — PROGRESS NOTES
Podiatry Progress Note  5/23/2021   Mayda Walker       SUBJECTIVE: Mayda Walker is a 80 y.o. female s/p left foot minimally invasive incision and drainage, debridement of all nonviable tissue (DOS; 5/16/2021), with delayed primary closure (DOS: 5/21). Patient is resting comfortably at the time of visit; daughter at bedside. She admits to some pain in L foot but that it is under control. Admits to some acid reflux this morning, but denies any complaints at this time. 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 ([address] 5/18/2021    Bronchitis 5/23/2017    CAD (coronary artery disease)     Cough 5/23/2017    Diabetes mellitus ([address]     GERD (gastroesophageal reflux disease) 5/23/2017    Hyperlipidemia     Hypertension     Lung disease     PVD (peripheral vascular disease) with claudication ([address] 4/10/2019    Restless legs syndrome     Rhinitis, allergic 5/23/2017    Sinusitis 5/23/2017    SOB (shortness of breath) 5/23/2017    Type 1 diabetes mellitus with left diabetic foot ulcer ([address] 5/18/2021    Urinary incontinence         Past Surgical History:   Procedure Laterality Date    ABDOMEN SURGERY      APPENDECTOMY      CARDIAC SURGERY      CHOLECYSTECTOMY      COLONOSCOPY      CORONARY ARTERY BYPASS GRAFT      8/28/10    ENDOSCOPY, COLON, DIAGNOSTIC      FOOT DEBRIDEMENT Left 5/16/2021    FOOT DEBRIDEMENT INCISION AND DRAINAGE.    performed by Angelica Bullard DPM at [facility] and bso 1997    TONSILLECTOMY AND ADENOIDECTOMY           Family History   Problem Relation Age of Onset    Hypertension Father     Heart Disease Brother         Social History     Tobacco Use    Smoking status: Never Smoker    Smokeless tobacco: Never Used   Substance Use Topics    Alcohol use: Yes     Comment: occasional        Prior to Admission medications Medication Sig Start Date End Date Taking? Authorizing Provider   rOPINIRole (REQUIP) 0.5 MG tablet Take 1 tablet by mouth nightly 5/11/21   Regla Owen MD   lidocaine (LMX) 4 % cream Apply topically as needed for Pain Apply topically as needed. Historical Provider, MD   sucralfate (CARAFATE) 1 GM tablet Take 1 g by mouth 3 times daily    Historical Provider, MD   bisacodyl (DULCOLAX) 10 MG suppository Place 10 mg rectally daily    Historical Provider, MD   gabapentin (NEURONTIN) 300 MG capsule Take 300 mg by mouth daily. Historical Provider, MD   guaiFENesin (MUCINEX) 600 MG extended release tablet Take 600 mg by mouth 2 times daily    Historical Provider, MD   aluminum & magnesium hydroxide-simethicone (MYLANTA) 400-400-40 MG/5ML SUSP Take 30 mLs by mouth every 6 hours as needed    Historical Provider, MD   pantoprazole (PROTONIX) 40 MG tablet Take 40 mg by mouth daily    Historical Provider, MD   promethazine (PHENERGAN) 12.5 MG suppository Place 12.5 mg rectally every 6 hours as needed for Nausea    Historical Provider, MD   albuterol (PROVENTIL) 90 MCG/ACT inhaler Inhale 2 puffs into the lungs 4 times daily 7/22/19   Regla Owen MD   acetaminophen (TYLENOL) 500 MG tablet Take 1 tablet by mouth 4 times daily as needed for Pain 7/5/19 3/23/21  VIK Ann CNP   furosemide (LASIX) 20 MG tablet Take 0.5 tablets by mouth daily  Patient taking differently: Take 20 mg by mouth MWF 5/9/19   Regla Owen MD   pravastatin (PRAVACHOL) 20 MG tablet TAKE 1 TABLET BY MOUTH  NIGHTLY 5/6/19   Regla Owen MD   amLODIPine (NORVASC) 5 MG tablet TAKE 1 TABLET BY MOUTH  DAILY 5/6/19   Regla Owen MD   glimepiride (AMARYL) 2 MG tablet TAKE 1 TABLET BY MOUTH  EVERY MORNING BEFORE  BREAKFAST 5/6/19   Regla Owen MD   Polyethylene Glycol 3350 (MIRALAX PO) Take by mouth    Historical Provider, MD   hydrocortisone 2.5 % cream Apply topically 2 times daily Apply topically 2 times daily.     Historical Provider, MD   fluticasone-vilanterol (BREO ELLIPTA) 100-25 MCG/INH AEPB inhaler Inhale 1 puff into the lungs daily 1/28/19   Carolina Hurst MD   clotrimazole-betamethasone (LOTRISONE) 1-0.05 % cream Apply topically 2 times daily. 11/7/18   Carolina Hurst MD   Multiple Vitamins-Minerals (OCUVITE ADULT FORMULA PO) Take 1 capsule by mouth daily    Historical Provider, MD   Probiotic Product (PRO-BIOTIC BLEND PO) Take by mouth    Historical Provider, MD   magnesium gluconate (MAGONATE) 500 MG tablet Take 1,000 mg by mouth 2 times daily    Historical Provider, MD   Lancets (BD LANCET ULTRAFINE 89E) MISC 1 applicator by Does not apply route 2 times daily Indications: DX:E11.9 5/23/17   Carolina Hurst MD   glucose blood VI test strips (ONE TOUCH TEST STRIPS) strip 1 each by In Vitro route 2 times daily Indications: DX:E11.9 5/23/17   Carolina Hurst MD   Blood Glucose Monitoring Suppl (ONE TOUCH ULTRA SYSTEM KIT) W/DEVICE KIT 1 kit by Does not apply route once for 1 dose Indications: DX: E11.9 5/23/17 1/27/23  Carolina Hurst MD   ALPHA LIPOIC ACID PO Take 1 capsule by mouth daily. Historical Provider, MD   B Complex-C-Folic Acid (HM VITAMIN B COMPLEX/VITAMIN C) TABS Take 1 tablet by mouth daily. Historical Provider, MD   Coenzyme Q10 (COQ10) 100 MG CAPS Take 200 mg by mouth daily. Historical Provider, MD   aspirin 81 MG EC tablet Take 81 mg by mouth daily. Historical Provider, MD        Lisinopril         OBJECTIVE:        Vitals:    05/23/21 1155   BP:    Pulse:    Resp: 16   Temp:    SpO2: 91%       EXAM:        Pt is AAOx3, NAD    LLE dressing left c/d/i. No strikethrough drainage present. Digits are warm and patient is able to wiggle digits on command.     Current Facility-Administered Medications   Medication Dose Route Frequency Provider Last Rate Last Admin    insulin lispro (HUMALOG) injection vial 0-6 Units  0-6 Units Subcutaneous TID  Acosta Reyes MD   2 Units at 05/23/21 1150    insulin lispro (HUMALOG) injection vial 0-3 Units  0-3 Units Subcutaneous Nightly Rafa Fernandez MD   1 Units at 05/22/21 2114    HYDROmorphone (DILAUDID) injection 0.2 mg  0.2 mg Intravenous Q4H PRN Rafa Fernandez MD        enoxaparin (LOVENOX) injection 60 mg  1 mg/kg Subcutaneous BID Rafa Fernandez MD   60 mg at 05/23/21 6616    lidocaine 1 % injection 10 mL  10 mL Intradermal Once [place], DPM        ertapenem Brittney Reaper) 1000 mg IVPB minibag  1,000 mg Intravenous Q24H Susie Pruitt MD   Stopped at 05/22/21 2326    povidone-iodine (BETADINE) 10 % external solution   Topical PRN [place], DPM        ipratropium-albuterol (DUONEB) nebulizer solution 1 ampule  1 ampule Inhalation Q4H WA Yeny Parikh MD   1 ampule at 05/23/21 1155    guaiFENesin-dextromethorphan (ROBITUSSIN DM) 100-10 MG/5ML syrup 5 mL  5 mL Oral Q4H PRN Yeny Parikh MD   5 mL at 05/20/21 1059    traMADol (ULTRAM) tablet 50 mg  50 mg Oral Q6H PRN Yeny Parikh MD   50 mg at 05/23/21 0831    hydrALAZINE (APRESOLINE) injection 10 mg  10 mg Intravenous Q6H PRN Yeny Parikh MD   10 mg at 05/16/21 0910    amLODIPine (NORVASC) tablet 5 mg  5 mg Oral Daily Yeny Parikh MD   5 mg at 05/23/21 5592    aspirin EC tablet 81 mg  81 mg Oral Daily Yeyn Parikh MD   81 mg at 05/23/21 0831    gabapentin (NEURONTIN) capsule 300 mg  300 mg Oral Daily Yeny Parikh MD   300 mg at 05/23/21 6086    guaiFENesin tablet 400 mg  400 mg Oral TID Yeny Parikh MD   400 mg at 05/23/21 1344    pantoprazole (PROTONIX) tablet 40 mg  40 mg Oral Daily Yeny Parikh MD   40 mg at 05/23/21 8318    pravastatin (PRAVACHOL) tablet 20 mg  20 mg Oral Nightly Yeny Parikh MD   20 mg at 05/22/21 2114    sucralfate (CARAFATE) tablet 1 g  1 g Oral TID Yeny Parikh MD   1 g at 05/23/21 1344    sodium chloride flush 0.9 % injection 10 mL  10 mL Intravenous 2 times per day Yeny Parikh MD   10 mL at 05/22/21 2117    sodium chloride flush 0.9 % injection 10 mL  10 mL Intravenous PRN Yeny Parikh MD   10 mL at 05/16/21 1843    0.9 % sodium chloride infusion  25 mL Intravenous PRN Regis Rodas MD        potassium chloride (KLOR-CON M) extended release tablet 40 mEq  40 mEq Oral PRN Regis Rodas MD        Or    potassium bicarb-citric acid (EFFER-K) effervescent tablet 40 mEq  40 mEq Oral PRN Regis Rodas MD        Or    potassium chloride 10 mEq/100 mL IVPB (Peripheral Line)  10 mEq Intravenous PRN Regis Rodas MD        magnesium hydroxide (MILK OF MAGNESIA) 400 MG/5ML suspension 30 mL  30 mL Oral Daily PRN Regis Rodas MD   30 mL at 05/23/21 1140    acetaminophen (TYLENOL) tablet 650 mg  650 mg Oral Q6H PRN Regis Rodas MD   650 mg at 05/23/21 5572    Or    acetaminophen (TYLENOL) suppository 650 mg  650 mg Rectal Q6H PRN Regis Rodsa MD        glucose (GLUTOSE) 40 % oral gel 15 g  15 g Oral PRN Regis Rodas MD        dextrose 50 % IV solution  12.5 g Intravenous PRN Regis Rodas MD        glucagon (rDNA) injection 1 mg  1 mg Intramuscular PRN Regis Rodas MD        dextrose 5 % solution  100 mL/hr Intravenous PRN Regis Rodas MD        trimethobenzamide Alonna Prescott) injection 200 mg  200 mg Intramuscular Q6H PRJOSELYN Rodas MD        budesonide (PULMICORT) nebulizer suspension 250 mcg  0.25 mg Nebulization BID Regis Rodas MD   250 mcg at 05/22/21 2119    And    Arformoterol Tartrate (BROVANA) nebulizer solution 15 mcg  15 mcg Nebulization BID Regis Rodas MD   15 mcg at 05/22/21 2119        Lab Results   Component Value Date    WBC 9.6 05/23/2021    HCT 23.0 (L) 05/23/2021    HGB 7.5 (L) 05/23/2021     (H) 05/23/2021     (L) 05/23/2021    K 4.2 05/23/2021    CL 95 (L) 05/23/2021    CO2 23 05/23/2021    BUN 15 05/23/2021    CREATININE 0.7 05/23/2021    GLUCOSE 155 (H) 05/23/2021    CRP 7.5 (H) 05/20/2021         Radiographs: No new imaging available    ASSESEMENT:  -S/p left foot debridement incision and drainage (DOS 5/16/2021) with [facility] (5/21)  -Left lower extremity cellulitis, present on admission  -Type 2 diabetes mellitus with left foot ulcer with abscess, present on admission, infected  -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   - NWB to LLE  - No further plans for intervention from podiatry standpoint. Patient is good for discharge from podiatry standpoint. Patient to f/u with Dr. Gisselle Hilliard in wound care.     Patient discussed with podiatry attending, Dr. Leanne Lay, DPM  3:08 PM

## 2021-05-24 LAB
ANION GAP SERPL CALCULATED.3IONS-SCNC: 10 MMOL/L (ref 7–16)
BUN BLDV-MCNC: 14 MG/DL (ref 6–23)
CALCIUM SERPL-MCNC: 8.8 MG/DL (ref 8.6–10.2)
CHLORIDE BLD-SCNC: 95 MMOL/L (ref 98–107)
CO2: 24 MMOL/L (ref 22–29)
CREAT SERPL-MCNC: 0.7 MG/DL (ref 0.5–1)
GFR AFRICAN AMERICAN: >60
GFR NON-AFRICAN AMERICAN: >60 ML/MIN/1.73
GLUCOSE BLD-MCNC: 133 MG/DL (ref 74–99)
HCT VFR BLD CALC: 22.7 % (ref 34–48)
HEMOGLOBIN: 7.3 G/DL (ref 11.5–15.5)
MCH RBC QN AUTO: 31.7 PG (ref 26–35)
MCHC RBC AUTO-ENTMCNC: 32.2 % (ref 32–34.5)
MCV RBC AUTO: 98.7 FL (ref 80–99.9)
METER GLUCOSE: 152 MG/DL (ref 74–99)
METER GLUCOSE: 167 MG/DL (ref 74–99)
METER GLUCOSE: 210 MG/DL (ref 74–99)
METER GLUCOSE: 227 MG/DL (ref 74–99)
PDW BLD-RTO: 13.6 FL (ref 11.5–15)
PLATELET # BLD: 480 E9/L (ref 130–450)
PMV BLD AUTO: 10 FL (ref 7–12)
POTASSIUM SERPL-SCNC: 4.6 MMOL/L (ref 3.5–5)
RBC # BLD: 2.3 E12/L (ref 3.5–5.5)
SODIUM BLD-SCNC: 129 MMOL/L (ref 132–146)
WBC # BLD: 7.3 E9/L (ref 4.5–11.5)

## 2021-05-24 PROCEDURE — 97530 THERAPEUTIC ACTIVITIES: CPT

## 2021-05-24 PROCEDURE — 2700000000 HC OXYGEN THERAPY PER DAY

## 2021-05-24 PROCEDURE — 82962 GLUCOSE BLOOD TEST: CPT

## 2021-05-24 PROCEDURE — 36410 VNPNXR 3YR/> PHY/QHP DX/THER: CPT

## 2021-05-24 PROCEDURE — 2580000003 HC RX 258: Performed by: PODIATRIST

## 2021-05-24 PROCEDURE — 92526 ORAL FUNCTION THERAPY: CPT | Performed by: SPEECH-LANGUAGE PATHOLOGIST

## 2021-05-24 PROCEDURE — 97168 OT RE-EVAL EST PLAN CARE: CPT

## 2021-05-24 PROCEDURE — 6370000000 HC RX 637 (ALT 250 FOR IP): Performed by: PODIATRIST

## 2021-05-24 PROCEDURE — 36415 COLL VENOUS BLD VENIPUNCTURE: CPT

## 2021-05-24 PROCEDURE — C1751 CATH, INF, PER/CENT/MIDLINE: HCPCS

## 2021-05-24 PROCEDURE — 6370000000 HC RX 637 (ALT 250 FOR IP): Performed by: INTERNAL MEDICINE

## 2021-05-24 PROCEDURE — 85027 COMPLETE CBC AUTOMATED: CPT

## 2021-05-24 PROCEDURE — 97535 SELF CARE MNGMENT TRAINING: CPT

## 2021-05-24 PROCEDURE — 2580000003 HC RX 258: Performed by: INTERNAL MEDICINE

## 2021-05-24 PROCEDURE — 6360000002 HC RX W HCPCS: Performed by: INTERNAL MEDICINE

## 2021-05-24 PROCEDURE — 1200000000 HC SEMI PRIVATE

## 2021-05-24 PROCEDURE — 92610 EVALUATE SWALLOWING FUNCTION: CPT | Performed by: SPEECH-LANGUAGE PATHOLOGIST

## 2021-05-24 PROCEDURE — 94640 AIRWAY INHALATION TREATMENT: CPT

## 2021-05-24 PROCEDURE — 97162 PT EVAL MOD COMPLEX 30 MIN: CPT

## 2021-05-24 PROCEDURE — 6360000002 HC RX W HCPCS: Performed by: PODIATRIST

## 2021-05-24 PROCEDURE — 80048 BASIC METABOLIC PNL TOTAL CA: CPT

## 2021-05-24 RX ORDER — HEPARIN SODIUM (PORCINE) LOCK FLUSH IV SOLN 100 UNIT/ML 100 UNIT/ML
1 SOLUTION INTRAVENOUS PRN
Status: DISCONTINUED | OUTPATIENT
Start: 2021-05-24 | End: 2021-05-25 | Stop reason: HOSPADM

## 2021-05-24 RX ORDER — SODIUM CHLORIDE 0.9 % (FLUSH) 0.9 %
5-40 SYRINGE (ML) INJECTION PRN
Status: DISCONTINUED | OUTPATIENT
Start: 2021-05-24 | End: 2021-05-25 | Stop reason: HOSPADM

## 2021-05-24 RX ORDER — SODIUM CHLORIDE 9 MG/ML
25 INJECTION, SOLUTION INTRAVENOUS PRN
Status: DISCONTINUED | OUTPATIENT
Start: 2021-05-24 | End: 2021-05-25 | Stop reason: HOSPADM

## 2021-05-24 RX ORDER — HEPARIN SODIUM (PORCINE) LOCK FLUSH IV SOLN 100 UNIT/ML 100 UNIT/ML
1 SOLUTION INTRAVENOUS EVERY 12 HOURS SCHEDULED
Status: DISCONTINUED | OUTPATIENT
Start: 2021-05-24 | End: 2021-05-25 | Stop reason: HOSPADM

## 2021-05-24 RX ORDER — DIPHENHYDRAMINE HCL 25 MG
12.5 TABLET ORAL EVERY 6 HOURS PRN
Status: DISCONTINUED | OUTPATIENT
Start: 2021-05-24 | End: 2021-05-25 | Stop reason: HOSPADM

## 2021-05-24 RX ORDER — SODIUM CHLORIDE 0.9 % (FLUSH) 0.9 %
5-40 SYRINGE (ML) INJECTION EVERY 12 HOURS SCHEDULED
Status: DISCONTINUED | OUTPATIENT
Start: 2021-05-24 | End: 2021-05-25 | Stop reason: HOSPADM

## 2021-05-24 RX ADMIN — GABAPENTIN 300 MG: 300 CAPSULE ORAL at 07:59

## 2021-05-24 RX ADMIN — ASPIRIN 81 MG: 81 TABLET, COATED ORAL at 07:59

## 2021-05-24 RX ADMIN — INSULIN LISPRO 1 UNITS: 100 INJECTION, SOLUTION INTRAVENOUS; SUBCUTANEOUS at 08:01

## 2021-05-24 RX ADMIN — Medication 10 ML: at 21:29

## 2021-05-24 RX ADMIN — INSULIN LISPRO 2 UNITS: 100 INJECTION, SOLUTION INTRAVENOUS; SUBCUTANEOUS at 17:40

## 2021-05-24 RX ADMIN — SUCRALFATE 1 G: 1 TABLET ORAL at 13:21

## 2021-05-24 RX ADMIN — INSULIN LISPRO 1 UNITS: 100 INJECTION, SOLUTION INTRAVENOUS; SUBCUTANEOUS at 21:30

## 2021-05-24 RX ADMIN — IPRATROPIUM BROMIDE AND ALBUTEROL SULFATE 1 AMPULE: 2.5; .5 SOLUTION RESPIRATORY (INHALATION) at 07:33

## 2021-05-24 RX ADMIN — APIXABAN 5 MG: 5 TABLET, FILM COATED ORAL at 07:59

## 2021-05-24 RX ADMIN — DIPHENHYDRAMINE HYDROCHLORIDE 12.5 MG: 25 TABLET ORAL at 18:16

## 2021-05-24 RX ADMIN — APIXABAN 5 MG: 5 TABLET, FILM COATED ORAL at 21:29

## 2021-05-24 RX ADMIN — PANTOPRAZOLE SODIUM 40 MG: 40 TABLET, DELAYED RELEASE ORAL at 08:16

## 2021-05-24 RX ADMIN — ACETAMINOPHEN 650 MG: 325 TABLET ORAL at 21:28

## 2021-05-24 RX ADMIN — IPRATROPIUM BROMIDE AND ALBUTEROL SULFATE 1 AMPULE: 2.5; .5 SOLUTION RESPIRATORY (INHALATION) at 11:22

## 2021-05-24 RX ADMIN — PRAVASTATIN SODIUM 20 MG: 20 TABLET ORAL at 21:28

## 2021-05-24 RX ADMIN — TRAMADOL HYDROCHLORIDE 50 MG: 50 TABLET, FILM COATED ORAL at 17:39

## 2021-05-24 RX ADMIN — ARFORMOTEROL TARTRATE 15 MCG: 15 SOLUTION RESPIRATORY (INHALATION) at 07:33

## 2021-05-24 RX ADMIN — GUAIFENESIN 400 MG: 400 TABLET ORAL at 21:28

## 2021-05-24 RX ADMIN — AMLODIPINE BESYLATE 5 MG: 5 TABLET ORAL at 08:00

## 2021-05-24 RX ADMIN — SODIUM CHLORIDE, PRESERVATIVE FREE 10 ML: 5 INJECTION INTRAVENOUS at 17:30

## 2021-05-24 RX ADMIN — BUDESONIDE 250 MCG: 0.25 SUSPENSION RESPIRATORY (INHALATION) at 20:18

## 2021-05-24 RX ADMIN — IPRATROPIUM BROMIDE AND ALBUTEROL SULFATE 1 AMPULE: 2.5; .5 SOLUTION RESPIRATORY (INHALATION) at 20:17

## 2021-05-24 RX ADMIN — ACETAMINOPHEN 650 MG: 325 TABLET ORAL at 07:58

## 2021-05-24 RX ADMIN — GUAIFENESIN 400 MG: 400 TABLET ORAL at 07:59

## 2021-05-24 RX ADMIN — SUCRALFATE 1 G: 1 TABLET ORAL at 21:28

## 2021-05-24 RX ADMIN — TRAMADOL HYDROCHLORIDE 50 MG: 50 TABLET, FILM COATED ORAL at 12:05

## 2021-05-24 RX ADMIN — INSULIN LISPRO 1 UNITS: 100 INJECTION, SOLUTION INTRAVENOUS; SUBCUTANEOUS at 13:21

## 2021-05-24 RX ADMIN — ARFORMOTEROL TARTRATE 15 MCG: 15 SOLUTION RESPIRATORY (INHALATION) at 20:17

## 2021-05-24 RX ADMIN — ERTAPENEM SODIUM 1000 MG: 1 INJECTION, POWDER, LYOPHILIZED, FOR SOLUTION INTRAMUSCULAR; INTRAVENOUS at 17:30

## 2021-05-24 RX ADMIN — Medication 10 ML: at 08:01

## 2021-05-24 RX ADMIN — SUCRALFATE 1 G: 1 TABLET ORAL at 07:59

## 2021-05-24 RX ADMIN — GUAIFENESIN 400 MG: 400 TABLET ORAL at 13:21

## 2021-05-24 RX ADMIN — BUDESONIDE 250 MCG: 0.25 SUSPENSION RESPIRATORY (INHALATION) at 07:33

## 2021-05-24 RX ADMIN — HEPARIN 100 UNITS: 100 SYRINGE at 21:29

## 2021-05-24 ASSESSMENT — PAIN SCALES - GENERAL
PAINLEVEL_OUTOF10: 8
PAINLEVEL_OUTOF10: 8
PAINLEVEL_OUTOF10: 4
PAINLEVEL_OUTOF10: 5

## 2021-05-24 NOTE — CARE COORDINATION
Plan is [facility] at the [facility] when medically ready, no precert required. Patient is POD#5 aortogram with balloon angioplasty of the L distal SFA/pop and AT artery and POD#3 LEFT FOOT DEBRIDEMENT WITH DELAYED PRIMARY CLOSURE per podiatry. Per ID patient will discharge on Invanz 1 gram IV q 24 hrs  X 10 days and midline ordered. Esmer from [facility] updated. Ambulance form and H[facility] in envelope in soft chart.   Haresh Mena RN CM

## 2021-05-24 NOTE — PROGRESS NOTES
SPEECH/LANGUAGE PATHOLOGY  CLINICAL ASSESSMENT OF SWALLOW FUNCTION  and PLAN OF CARE    PATIENT NAME:  Orly Walton  (female)     MRN:  [id number]    :  1927  (80 y.o.)  STATUS:  Inpatient: Room [facility]4/2245-U    TODAY'S DATE:  2021  REFERRING PROVIDER:    Aly Portillo MD   SPECIFIC PROVIDER ORDER: SLP swallowing-dysphagia evaluation and treatment  Date of order:  21   REASON FOR REFERRAL: choking with pills   EVALUATING THERAPIST: TOÑO Mauricio      RESULTS:      DYSPHAGIA DIAGNOSIS:  swallow within functional limits for age/premorbid functioning and esophageal motility deficit suspected     DIET RECOMMENDATIONS:  Regular consistency solids with  thin liquids, MEDICATION ADMINISTRATION and Administer medication whole, ONE AT A TIME, in pudding/applesauce    FEEDING RECOMMENDATIONS:    Assistance level:  No assistance needed     Compensatory strategies recommended: Small bites/sips and Alternate solids and liquids     Discussed recommendations with nursing and/or faxed report to referring provider: No secondary to no diet/liquid change recommended     SPEECH THERAPY  PLAN OF CARE   The dysphagia POC is established based on physician order and dysphagia diagnosis    Dysphagia therapy is not recommended       Conditions Requiring Skilled Therapeutic Intervention for dysphagia:    not applicable    SPECIFIC DYSPHAGIA INTERVENTIONS TO INCLUDE:     Not applicable    Specific instructions for next treatment:  n/    Treatment Goals:    Short Term Goals:  Not applicable no therapy warranted     Long Term Goals:   Not applicable no therapy warranted      OTHER RECOMMENDATIONS:  A GI consult is recommended     Patient/family Goal:    N/a- denies dysphagia other than with pills    Plan of care discussed with Patient   The Patient understand(s) the diagnosis, prognosis and plan of care     Rehabilitation Potential/Prognosis: good                      ADMITTING DIAGNOSIS: Cellulitis of left foot [P04.450] have been reviewed prior to initiation of this evaluation. CPT Code: 54824  dysphagia study / 66675 dysphagia intervention    Speech Pathologist (SLP) completed education with the patient/family regarding type of swallowing impairment. Reviewed current solid/liquid consistency diet recommendations and discussed compensatory strategies to ensure safe PO intake. Reviewed aspiration precautions. Discussed Pt's GI history, also discussed recommendation either GI workup or consult. Encouraged patient and/or family to engage SLP in unstructured Q&A session relative to identified deficit areas; indicated understanding of all information provided via satisfactory verbal response.       ACTIVE PROBLEM LIST:   Patient Active Problem List   Diagnosis    Asthma    CAD (coronary artery disease)    Vitamin D deficiency    Diabetic infection of left foot ([address]    HTN (hypertension)    Idiopathic peripheral neuropathy    Rhinitis, allergic    SOB (shortness of breath)    Bronchitis    GERD (gastroesophageal reflux disease)    PVD (peripheral vascular disease) with claudication (HCC)    Chest pain    Gait instability    Pulmonary embolism (HCC)    Cellulitis    Cellulitis of left foot    Metabolic encephalopathy    Hyponatremia    DM (diabetes mellitus), type 2 ([address]    Type 1 diabetes mellitus with left diabetic foot ulcer ([address]    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

## 2021-05-24 NOTE — PROGRESS NOTES
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 *       Wound closure done    Hyponatremia improving. Continue to hold diuretics. 1800 cc fluid restriction    Continue Eliquis    Continue ertapenem.     Blood pressure well controlled    Blood glucose well controlled    Midline pending    Anemia of chronic illness stable, but I will transfuse if it dips any further    Requires continued inpatient level of care   OK for d/c once midline is done and abx are finalized    Connie Le MD    1:33 PM  5/24/2021

## 2021-05-24 NOTE — PROGRESS NOTES
Occupational Therapy  OCCUPATIONAL THERAPY RE- EVALUATION    JAMIR Angy [address] [address] [address]    Date:2021                                                 Patient Name: Bambi Khoury  MRN: [id number]  : 1927  Room: [address] [place] [address]       Evaluating OT: Mavis Gutierrez OTD, OTR/L #[id number]  Re-Evaluating OT: Natalee Tatum, OTR/L #[id number]     Referring physician: Nivia Schlatter, PA  AM-PAC Daily Activity Raw Score:   Recommended Adaptive Equipment: TBD      Diagnosis: Cellulitis of left foot [L03.116]  Reason for Admission: Presented to hospital for evaluation of leg swelling   Pertinent Medical History/Surgery: arthritis, asthma, bronchitis, CAD, cough, DM, GERD, HTN, HLD, PVD, urinary incontinence, SOB, lung disease, abdomen surgery, cardiac surgery, cholecystectomy,. CABG ()  - Patient with recent hospitalization 3/2021 due to PE     Procedure/Surgery: left foot debridement incision and drainage (DOS 2021) with [facility] ()    Re-evaluation d/t pt requiring surgery on L foot 21 & 21 resulting in strict NWB to LLE. Precautions:  Falls, tele, impaired cognition, alarm, [place], strict NWB LLE     Home Living: [facility]. Bathroom setup: Walk-ion shower with shower chair and grab bars, elevated commode   Equipment owned: rollator, 3 ww, reacher, sock-aid  Prior Level of Function:   Per patient, I/mod I with ADLs ,  assist with IADLs. Patient was using rollator for functional mobility.    Driving: no                             Leisure:not stated     Pain Level: L LE pain- patient stated there was \"discomfort\" but did not rate intensity  Cognition: A&O: 3/4  - Patient demonstrates poor insight/ poor safety awareness  - Patient easily distracted requiring cues for re-direction/attention to task  Communication: Patient demonstrated word finding difficulties, increased processing time required  Follows 1 step directions ~75% of the time              Memory:  fair               Sequencing:  fair -              Problem solving:  fair -              Judgement/safety:  fair -                Functional Assessment:    Re- Eval Status  Date: 5/21/21 Treatment Status  Date: Short Term Goals=LTG  Treatment frequency: PRN 1-4 x/week   Feeding Set-up/Supervision   Modified [place]    Grooming Minimal Assist (seated EOB)    Supervision   UB Dressing Moderate Assist    Supervision   LB Dressing Max A overall   Mod Assist   Donned/doffed socks w/ AE   Min. A w/ AE   Bathing Moderate Assist (simulated)   Min. A   Toileting Dep (simulated)   Mod. A   Bed Mobility  Supine to sit: Mod. A   Sit to supine: Max A   Supine to sit: Min. A  Sit to supine: Min. A   Functional Transfers Sit to stand: Max A (partial stand)  Stand to sit: Max A    Poor understanding of NWB LLE     Sit to stand: Mod. A  Stand to sit: Mod. A  Stand pivot: Mod. A   Functional Mobility NT d/t safety   TBD   Balance Sitting:     Static:SBA    Dynamic:Min. A  Standing: Max A       Activity Tolerance Fair   Good   Visual/  Perceptual Glasses/corrective lenses: no           Safety       Fair-                   Fair+      Upper Extremities:   Hand Dominance: Right []? ?  Left []?? * patient reports she is ambidextrous      ROM and Strength Coordination Short Term Goals=LTG   Right UE Active ROM:  WFL          Strength: 4-/5 Fine/gross Motor Coordination: Impaired, patient with difficulty following commands for testing     Left UE Active ROM:  WFL          Strength: 4-/5 Fine/gross Motor Coordination: Impaired, patient with difficulty following commands for testing           Hearing: WFL  Sensation:  No c/o numbness or tingling  Tone:  WFL  Edema: none observed B UE      Comments: Upon arrival patient lying in bed. Overall pt demonstrated decreased independence and safety during completion of ADL/functional transfers/mobility tasks. Therapeutic Activities 98411       ADL/Self Care [id number]  10  1   Orthotic Management 70981       Neuro Re-Ed 01783       Non-Billable Time          Evaluation Time includes thorough review of current medical information, gathering information on past medical history/social history and prior level of function, completion of standardized testing/informal observation of tasks, assessment of data and education on plan of care and goals.             Jacinto Amezquita, OTR/L #[id number]

## 2021-05-24 NOTE — PROGRESS NOTES
Power midline  Placement 5/24/2021    Product number: [id number]   Lot Number: [id number]      Ultrasound: yes   Left Basilic vein:                Upper Arm Circumference: 28cm    Size: 4.5frsl    Exposed Length: 3cm    Internal Length: 12cm   Cut: 0cm   Vein Measurement: 0.55cm    Mathew Handy RN  5/24/2021  4:40 PM

## 2021-05-24 NOTE — PROGRESS NOTES
pantoprazole (PROTONIX) tablet 40 mg  40 mg Oral Daily Raissa Mchugh MD   40 mg at 05/24/21 0816    pravastatin (PRAVACHOL) tablet 20 mg  20 mg Oral Nightly Raissa Mchugh MD   20 mg at 05/23/21 2112    sucralfate (CARAFATE) tablet 1 g  1 g Oral TID Raissa Mchugh MD   1 g at 05/24/21 0759    sodium chloride flush 0.9 % injection 10 mL  10 mL Intravenous 2 times per day Raissa Mchugh MD   10 mL at 05/24/21 0801    sodium chloride flush 0.9 % injection 10 mL  10 mL Intravenous PRN Raissa Mchugh MD   10 mL at 05/16/21 1843    0.9 % sodium chloride infusion  25 mL Intravenous PRN Raissa Mchugh MD        potassium chloride (KLOR-CON M) extended release tablet 40 mEq  40 mEq Oral PRN Raissa Mchugh MD        Or    potassium bicarb-citric acid (EFFER-K) effervescent tablet 40 mEq  40 mEq Oral PRN Raissa Mchugh MD        Or    potassium chloride 10 mEq/100 mL IVPB (Peripheral Line)  10 mEq Intravenous PRN Raissa Mchugh MD        magnesium hydroxide (MILK OF MAGNESIA) 400 MG/5ML suspension 30 mL  30 mL Oral Daily PRN Raissa Mchugh MD   30 mL at 05/23/21 1140    acetaminophen (TYLENOL) tablet 650 mg  650 mg Oral Q6H PRN Raissa Mchugh MD   650 mg at 05/24/21 0758    Or    acetaminophen (TYLENOL) suppository 650 mg  650 mg Rectal Q6H PRN Raissa Mchugh MD        glucose (GLUTOSE) 40 % oral gel 15 g  15 g Oral PRN Raissa Mchugh MD        dextrose 50 % IV solution  12.5 g Intravenous PRN Raissa Mchugh MD        glucagon (rDNA) injection 1 mg  1 mg Intramuscular PRN Raissa Mchugh MD        dextrose 5 % solution  100 mL/hr Intravenous PRN Raissa Mchugh MD        trimethobenzamide [facility]) injection 200 mg  200 mg Intramuscular Q6H PRN Raissa Mchugh MD        budesonide (PULMICORT) nebulizer suspension 250 mcg  0.25 mg Nebulization BID Raissa Mchugh MD   250 mcg at 05/24/21 0076    And    Arformoterol Tartrate (BROVANA) nebulizer solution 15 mcg  15 mcg Nebulization BID Raissa Mchugh MD   15 mcg at 05/24/21 0180           REVIEW OF SYSTEMS: CONSTITUTIONAL:  Denies fever, chill or rigors  HEENT: denies blurring of vision or double vision, denies hearing problem  RESPIRATORY: denies cough, shortness of breath, sputum expectoration, chest pain. CARDIOVASCULAR:  Denies palpitation  GASTROINTESTINAL:  Denies abdomen pain, diarrhea or constipation. GENITOURINARY:  Denies burning urination or frequency of urination  INTEGUMENT left foot wound   HEMATOLOGIC/LYMPHATIC:  Denies lymph node swelling, gum bleeding or easy bruising. MUSCULOSKELETAL: Denies foot pain   NEUROLOGICAL:  Hallucination     PHYSICAL EXAM:      Vitals:     BP (!) 147/55   Pulse 60   Temp 98.5 °F (36.9 °C)   Resp 18   Ht 5' 3\" (1.6 m)   Wt 155 lb 8 oz (70.5 kg)   LMP 12/03/1997   SpO2 99%   BMI 27.55 kg/m²     General Appearance:    Awake and alert    Head:    Normocephalic, atraumatic   Eyes:    No pallor, no icterus,   Ears:    No obvious deformity or drainage.    Nose:   No nasal drainage   Throat:   Mucosa moist, no oral thrush   Neck:   Supple, no lymphadenopathy   Back:     no CVA tenderness   Lungs:     Bilateral wheeze    Heart:    Regular rate and rhythm, no murmur   Abdomen:     Soft, non-tender, bowel sounds present    Extremities:    Left foot plantar wound -closed, dorsal wound - clean ( pink ) after debridement    Pulses:   Dorsalis pedis palpable    Skin:   Mild  erythema      CBC with Differential:      Lab Results   Component Value Date    WBC 7.3 05/24/2021    RBC 2.30 05/24/2021    HGB 7.3 05/24/2021    HCT 22.7 05/24/2021     05/24/2021    MCV 98.7 05/24/2021    MCH 31.7 05/24/2021    MCHC 32.2 05/24/2021    RDW 13.6 05/24/2021    LYMPHOPCT 4.3 05/16/2021    MONOPCT 3.5 05/16/2021    BASOPCT 0.1 05/16/2021    MONOSABS 0.57 05/16/2021    LYMPHSABS 0.57 05/16/2021    EOSABS 0.00 05/16/2021    BASOSABS 0.00 05/16/2021       CMP     Lab Results   Component Value Date     05/24/2021    K 4.6 05/24/2021    K 3.7 05/16/2021    CL 95 05/24/2021    CO2 24 05/24/2021    BUN 14 05/24/2021    CREATININE 0.7 05/24/2021    GFRAA >60 05/24/2021    LABGLOM >60 05/24/2021    GLUCOSE 133 05/24/2021    PROT 6.7 05/16/2021    LABALBU 3.2 05/16/2021    CALCIUM 8.8 05/24/2021    BILITOT 0.3 05/16/2021    ALKPHOS 85 05/16/2021    AST 39 05/16/2021    ALT 36 05/16/2021         Hepatic Function Panel:    Lab Results   Component Value Date    ALKPHOS 85 05/16/2021    ALT 36 05/16/2021    AST 39 05/16/2021    PROT 6.7 05/16/2021    BILITOT 0.3 05/16/2021    LABALBU 3.2 05/16/2021       PT/INR:    Lab Results   Component Value Date    PROTIME 11.1 11/23/2014    INR 1.1 11/23/2014       TSH:    Lab Results   Component Value Date    TSH 1.060 05/19/2021       U/A:    Lab Results   Component Value Date    COLORU Yellow 03/24/2021    PHUR 8.0 03/24/2021    WBCUA 0-1 03/24/2021    RBCUA 0-1 03/24/2021    RBCUA 5-10 09/02/2012    BACTERIA RARE 03/24/2021    CLARITYU Clear 03/24/2021    SPECGRAV 1.015 03/24/2021    LEUKOCYTESUR TRACE 03/24/2021    UROBILINOGEN 0.2 03/24/2021    BILIRUBINUR Negative 03/24/2021    BLOODU Negative 03/24/2021    GLUCOSEU 100 03/24/2021       ABG:  No results found for: NQN8BHP, BEART, R2HZVQSX, PHART, THGBART, BKB7PIP, PO2ART, SZV4KUU    MICROBIOLOGY:    Wound cx -  Susceptibility    Citrobacter koseri (1)    Antibiotic Interpretation CHIDI Status    ceFAZolin Sensitive <=^4 mcg/mL     cefepime Sensitive <=^0.12 mcg/mL     cefTRIAXone Sensitive <=^0.25 mcg/mL     ertapenem Sensitive <=^0.12 mcg/mL     gentamicin Sensitive <=^1 mcg/mL     levofloxacin Sensitive <=^0.12 mcg/mL     piperacillin-tazobactam Sensitive <=^4 mcg/mL     trimethoprim-sulfamethoxazole Sensitive <=^20 mcg/mL     Lab and Collection    Swab collections are low-yield and rarely indicated.    Generally, the specimen volume when collected by swab is   small, reducing the probability of isolating organisms: many   organisms adhere to the fibers of the swab, which reduces the   opportunity of recovering organisms. Abnormal       Organism Anaerobic gram positive cocciAbnormal     Anaerobic Culture --    Light growth          Radiology :    X ray left foot - No aggressive osseous lesions    IMPRESSION:     1. Diabetic foot infection, left foot / leg cellulitis , abscess s/p I & D ( 5/16), wound closure 05/21  2. Leukocytosis - improved     RECOMMENDATIONS:      1. Invanz 1 gram IV q 24 hrs  X 10 days   2.  Local wound care ( ID follow up in 7-10 days )

## 2021-05-24 NOTE — PROGRESS NOTES
Podiatry Progress Note  5/24/2021   Javier Huynh       SUBJECTIVE: Javier Huynh is a 80 y.o. female s/p left foot minimally invasive incision and drainage, debridement of all nonviable tissue (DOS; 5/16/2021), with delayed primary closure (DOS: 5/21). Patient is resting comfortably at the time of visit; daughter at bedside. She is feeling good this morning. Tereasa Tony to go home. Family states that they want to keep her in the hospital as long as possible because they do not want any recurrence of admitting diagnosis. Denies any acute events overnight. Denies any N/V/D/C/F/SOB      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 ([address] 5/18/2021    Bronchitis 5/23/2017    CAD (coronary artery disease)     Cough 5/23/2017    Diabetes mellitus (HCC)     GERD (gastroesophageal reflux disease) 5/23/2017    Hyperlipidemia     Hypertension     Lung disease     PVD (peripheral vascular disease) with claudication ([address] 4/10/2019    Restless legs syndrome     Rhinitis, allergic 5/23/2017    Sinusitis 5/23/2017    SOB (shortness of breath) 5/23/2017    Type 1 diabetes mellitus with left diabetic foot ulcer ([address] 5/18/2021    Urinary incontinence         Past Surgical History:   Procedure Laterality Date    ABDOMEN SURGERY      APPENDECTOMY      CARDIAC SURGERY      CHOLECYSTECTOMY      COLONOSCOPY      CORONARY ARTERY BYPASS GRAFT      8/28/10    ENDOSCOPY, COLON, DIAGNOSTIC      FOOT DEBRIDEMENT Left 5/16/2021    FOOT DEBRIDEMENT INCISION AND DRAINAGE.    performed by Claribel Vera DPM at [address] [place] [address] Left 5/21/2021    LEFT FOOT DEBRIDEMENT  WITH DELAYED PRIMARY CLOSURE performed by Jose Juan Paige DPM at [address] [place] [address] and [facility] 1997    TONSILLECTOMY AND ADENOIDECTOMY           Family History   Problem Relation Age of Onset    Hypertension Father     Heart Disease Brother Slade Stanford MD   Polyethylene Glycol 3350 (MIRALAX PO) Take by mouth    Historical Provider, MD   hydrocortisone 2.5 % cream Apply topically 2 times daily Apply topically 2 times daily. Historical Provider, MD   fluticasone-vilanterol (BREO ELLIPTA) 100-25 MCG/INH AEPB inhaler Inhale 1 puff into the lungs daily 1/28/19   Slade Stanford MD   clotrimazole-betamethasone (LOTRISONE) 1-0.05 % cream Apply topically 2 times daily. 11/7/18   Slade Stanford MD   Multiple Vitamins-Minerals (OCUVITE ADULT FORMULA PO) Take 1 capsule by mouth daily    Historical Provider, MD   Probiotic Product (PRO-BIOTIC BLEND PO) Take by mouth    Historical Provider, MD   magnesium gluconate (MAGONATE) 500 MG tablet Take 1,000 mg by mouth 2 times daily    Historical Provider, MD   Lancets (BD LANCET ULTRAFINE 69J) MISC 1 applicator by Does not apply route 2 times daily Indications: DX:E11.9 5/23/17   Slade Stanford MD   glucose blood VI test strips (ONE TOUCH TEST STRIPS) strip 1 each by In Vitro route 2 times daily Indications: DX:E11.9 5/23/17   Slade Stanford MD   Blood Glucose Monitoring Suppl (ONE TOUCH ULTRA SYSTEM KIT) W/DEVICE KIT 1 kit by Does not apply route once for 1 dose Indications: DX: E11.9 5/23/17 1/27/23  Slade Stanford MD   ALPHA LIPOIC ACID PO Take 1 capsule by mouth daily. Historical Provider, MD   B Complex-C-Folic Acid (HM VITAMIN B COMPLEX/VITAMIN C) TABS Take 1 tablet by mouth daily. Historical Provider, MD   Coenzyme Q10 (COQ10) 100 MG CAPS Take 200 mg by mouth daily. Historical Provider, MD   aspirin 81 MG EC tablet Take 81 mg by mouth daily. Historical Provider, MD        Lisinopril                     OBJECTIVE:        Vitals:    05/24/21 0815   BP: (!) 147/55   Pulse: 60   Resp: 18   Temp: 98.5 °F (36.9 °C)   SpO2: 99%       EXAM:        Pt is AAOx3, NAD    Largely granular wound bed with healthy bleeding noted to dorsal wound. All sutures are intact plantar foot with no signs of dehiscence. Skin is well coapted. No drainage, dried blood to periwound. No clinical signs of infections to all wounds.     Current Facility-Administered Medications   Medication Dose Route Frequency Provider Last Rate Last Admin    apixaban (ELIQUIS) tablet 5 mg  5 mg Oral BID Acosta Reyes MD   5 mg at 05/24/21 0759    insulin lispro (HUMALOG) injection vial 0-6 Units  0-6 Units Subcutaneous TID WC Acosta Reyes MD   1 Units at 05/24/21 0801    insulin lispro (HUMALOG) injection vial 0-3 Units  0-3 Units Subcutaneous Nightly Acosta Reyes MD   1 Units at 05/23/21 2112    HYDROmorphone (DILAUDID) injection 0.2 mg  0.2 mg Intravenous Q4H PRN Acosta Reyes MD        lidocaine 1 % injection 10 mL  10 mL Intradermal Once Primus SWAPNA Mccallum        ertapenem Frederich Silence) 1000 mg IVPB minibag  1,000 mg Intravenous Q24H Rhett PHILIP Tejada  mL/hr at 05/23/21 1747 1,000 mg at 05/23/21 1747    povidone-iodine (BETADINE) 10 % external solution   Topical PRN Fran Mccallum DPM        ipratropium-albuterol (DUONEB) nebulizer solution 1 ampule  1 ampule Inhalation Q4H WA Fam Alberto MD   1 ampule at 05/24/21 0733    guaiFENesin-dextromethorphan (ROBITUSSIN DM) 100-10 MG/5ML syrup 5 mL  5 mL Oral Q4H PRN Fam Alberto MD   5 mL at 05/20/21 1059    traMADol (ULTRAM) tablet 50 mg  50 mg Oral Q6H PRN Fam Alberto MD   50 mg at 05/23/21 2111    hydrALAZINE (APRESOLINE) injection 10 mg  10 mg Intravenous Q6H PRN Fam Alberto MD   10 mg at 05/16/21 0910    amLODIPine (NORVASC) tablet 5 mg  5 mg Oral Daily Fam Alberto MD   5 mg at 05/24/21 0800    aspirin EC tablet 81 mg  81 mg Oral Daily Fam Alberto MD   81 mg at 05/24/21 0759    gabapentin (NEURONTIN) capsule 300 mg  300 mg Oral Daily Fam Alberto MD   300 mg at 05/24/21 0759    guaiFENesin tablet 400 mg  400 mg Oral TID Fam Alberto MD   400 mg at 05/24/21 0759    pantoprazole (PROTONIX) tablet 40 mg  40 mg Oral Daily Fam Alberto MD   40 mg at 05/23/21 0832    pravastatin (PRAVACHOL) tablet 20 mg  20 mg Oral Nightly Rex Menezes MD   20 mg at 05/23/21 2112    sucralfate (CARAFATE) tablet 1 g  1 g Oral TID Rex Menezes MD   1 g at 05/24/21 0759    sodium chloride flush 0.9 % injection 10 mL  10 mL Intravenous 2 times per day Rex Menezes MD   10 mL at 05/24/21 0801    sodium chloride flush 0.9 % injection 10 mL  10 mL Intravenous PRN Rex Menezes MD   10 mL at 05/16/21 1843    0.9 % sodium chloride infusion  25 mL Intravenous PRN Rex Menezes MD        potassium chloride (KLOR-CON M) extended release tablet 40 mEq  40 mEq Oral PRN Rex Menezes MD        Or    potassium bicarb-citric acid (EFFER-K) effervescent tablet 40 mEq  40 mEq Oral PRN Rex Menezes MD        Or    potassium chloride 10 mEq/100 mL IVPB (Peripheral Line)  10 mEq Intravenous PRN Rex Menezes MD        magnesium hydroxide (MILK OF MAGNESIA) 400 MG/5ML suspension 30 mL  30 mL Oral Daily PRN Rex Menezes MD   30 mL at 05/23/21 1140    acetaminophen (TYLENOL) tablet 650 mg  650 mg Oral Q6H PRN Rex Menezes MD   650 mg at 05/24/21 0703    Or    acetaminophen (TYLENOL) suppository 650 mg  650 mg Rectal Q6H PRN Rex Menezes MD        glucose (GLUTOSE) 40 % oral gel 15 g  15 g Oral PRN Rex Menezes MD        dextrose 50 % IV solution  12.5 g Intravenous PRN Rex Menezes MD        glucagon (rDNA) injection 1 mg  1 mg Intramuscular PRN Rex Menezes MD        dextrose 5 % solution  100 mL/hr Intravenous PRN Rex Menezes MD        trimethobenzamide Anthony Oly) injection 200 mg  200 mg Intramuscular Q6H PRN Rex Menezes MD        budesonide (PULMICORT) nebulizer suspension 250 mcg  0.25 mg Nebulization BID Rex Menezes MD   250 mcg at 05/24/21 9793    And    Arformoterol Tartrate (BROVANA) nebulizer solution 15 mcg  15 mcg Nebulization BID Rex Menezes MD   15 mcg at 05/24/21 0733        Lab Results   Component Value Date    WBC 7.3 05/24/2021    HCT 22.7 (L) 05/24/2021    HGB 7.3 (L) 05/24/2021     (H) 05/24/2021     (L) 05/24/2021    K 4.6 05/24/2021    CL 95 (L) 05/24/2021    CO2 24 05/24/2021    BUN 14 05/24/2021    CREATININE 0.7 05/24/2021    GLUCOSE 133 (H) 05/24/2021    CRP 7.5 (H) 05/20/2021         Radiographs: No new imaging available    ASSESEMENT:  -S/p left foot debridement incision and drainage (DOS 5/16/2021) with [facility] (5/21)  -Left lower extremity cellulitis, present on admission  -Type 2 diabetes mellitus with left foot ulcer with abscess, present on admission, infected  -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 Change this morning with Xeroform DSD. Order is in for heel protectors/offloading boots however none were applied to the feet this morning. Modified order. Please address  - NWB to LLE  - No further plans for intervention from podiatry standpoint. Patient is okay for D/C from podiatry standpoint.   Follow-up with Dr. Lv Gill wound care clinic within 1 week of discharge    Patient discussed with podiatry attending, Dr. Alex Rodriguez PGY1  5/24/2021   10:25 AM

## 2021-05-24 NOTE — PROGRESS NOTES
Physical Therapy Initial Assessment     Name: Divine Rowan  : 1927  MRN: [id number]      Date of Service: 2021    Evaluating PT:  Jayro Fishman PT, DPT [id number]      Room #:  7498/8241-S  Diagnosis:  Cellulitis of left foot [T21.968]  PMHx/PSHx:  Asthma, DM, HTN, RLS, Urinary incontinence, Lung disease, SOB, GERD, PVD, Arthritis, CAD, Hyperlipidemia, Atherosclerosis of LLE/ulceration midfoot, CABG    Procedure/Surgery:  2021 Left foot debridement I&D, 2021 Left foot debridement with delayed primary closure  Precautions:  Falls, Strict NWB LLE, [place]  Equipment Needs:  Has Rollator [employer], Tri-roller walker, [employer]    SUBJECTIVE:    Pt lives alone at [address] [place] [address] with no stairs to enter. Pt ambulated with with Tri-roller walker in the facility, Rollator in the community PTA. Pt reported independent in her room. Staff provides assist with ADLs. OBJECTIVE:   Initial Evaluation  Date: 2021 Treatment Date:  NA Short Term/ Long Term   Goals   AM-PAC 6 Clicks      Was pt agreeable to Eval/treatment? Yes      Does pt have pain? Reported 6/10 pain in R groin area. Bed Mobility  Rolling: Max A  Supine to sit: Max A  Sit to supine: Max A  Scooting: Max A  Rolling: Mod A  Supine to sit: Mod A  Sit to supine: Mod A  Scooting: Mod A   Transfers Sit to stand: Max/Dep for partial stand  Stand to sit: NT  Stand pivot: NT  Sit to stand: Max A  Stand to sit: Max A  Stand pivot: Max A   Ambulation    NT  TBD once able to transfer safely   Stair negotiation: ascended and descended  NT  NA   ROM BUE:  WFL  BLE:  WFL     Strength BUE:  4-/5  BLE:  4-/5  Increase strength 1/3 grad   Balance Sitting EOB:  Min to SBA  Dynamic Standing:  NT  Sitting EOB:  Supervision  Dynamic Standing: Max A     Pt is A & O x 4  Sensation:  Pt reported numbness and tingling to extremities  Edema:   Moderate edema/ecchomosis to R groin area    Vitals:  Blood Pressure at rest - Blood Pressure post session -   Heart Rate at rest - Heart Rate post session -   SPO2 at rest - SPO2 post session -     Therapeutic Exercises:  Sitting posture/balance x 20 min, LAQs 20x, AP 20x, seated scoot x 8    Patient education  Pt educated on purpose of PT assessment, importance of mobility, safety with mobility, strict NWB of LLE, transfers, gait, use of AD for safety    Patient response to education:   Pt verbalized understanding Pt demonstrated skill Pt requires further education in this area   Yes  Partially with verbal cues and assist Yes      ASSESSMENT:    Conditions Requiring Skilled Therapeutic Intervention:     [x]Decreased strength     []Decreased ROM  [x]Decreased functional mobility  [x]Decreased balance   [x]Decreased endurance   []Decreased posture  [x]Decreased sensation  []Decreased coordination   []Decreased vision  [x]Decreased safety awareness   [x]Increased pain       Comments:  Patient cleared by RN and agreeable to treatment. Patient found in mid [place]'s on left turn via TAPS. Daughter present and provided history and encouragement for patient. Patient educated on WB restrictions and voiced being aware. Patient required increased cues and assist with bed mobility. Total assist with BLEs off the bed needed and cues for hand placement for rolling and increased assist with trunk righting. Once seated, patient denied dizziness with positional change. Patient initially required hands on assist for seated balance, but with time able to support self in seated. Patient educated on proper technique for transfers and again educated on strict NWB to L foot. Attempted twice to stand and patient not able to comply with WB restrictions and not able to achieve even a partial stand. Patient able to perform seated scooting toward the [facility], then assisted back to supine. Patient positioned to the [facility] and placed on right turn via TAPs as found with call light and tray table in reach. Prevelon boots delivered to room and donned for heel/skin integrity. Treatment:  Patient practiced and was instructed in the following treatment:    · Bed mobility: Verbal/tactile cues for sequencing BUEs/BLEs for safe technique with rolling/supine<>sit task. · Transfer training: Verbal/tactile cues to facilitate proper hand placement, technique and safety during sit to stand task. Patient not able to maintain NWB to left foot. · Gait training: Unable to attempt  · Therapeutic exercises: As noted above  · Neuromuscular education: NT    Pt's/ family goals   1. To get stronger    Prognosis is good for reaching above PT goals. Patient and or family understand(s) diagnosis, prognosis, and plan of care.   Yes     PHYSICAL THERAPY PLAN OF CARE:    PT POC is established based on physician order and patient diagnosis     Referring provider/PT Order:    05/16/21 1530  PT eval and treat ONE TIME    Complete  Discontinue        Ordered By    5/16/2021  3:19 PM Galen Denny MD       Diagnosis:  Cellulitis of left foot [L03.116]  Specific instructions for next treatment:  Subsequent PT sessions with focus on improved functional bed mobility, strengthening, and transfers    Current Treatment Recommendations:     [x] Strengthening to improve independence with functional mobility   [] ROM to improve independence with functional mobility   [x] Balance Training to improve static/dynamic balance and to reduce fall risk  [x] Endurance Training to improve activity tolerance during functional mobility   [x] Transfer Training to improve safety and independence with all functional transfers   [x] Gait Training to improve gait mechanics, endurance and asses need for appropriate assistive device  [] Stair Training in preparation for safe discharge home and/or into the community   [x] Positioning to prevent skin breakdown and contractures  [x] Safety and Education Training   [x] Patient/Caregiver Education   [] HEP  [] Other     PT long term treatment goals are located in above grid    Frequency of

## 2021-05-25 VITALS
SYSTOLIC BLOOD PRESSURE: 140 MMHG | HEIGHT: 63 IN | TEMPERATURE: 98.6 F | RESPIRATION RATE: 20 BRPM | HEART RATE: 69 BPM | WEIGHT: 155.6 LBS | DIASTOLIC BLOOD PRESSURE: 70 MMHG | OXYGEN SATURATION: 95 % | BODY MASS INDEX: 27.57 KG/M2

## 2021-05-25 LAB
ANION GAP SERPL CALCULATED.3IONS-SCNC: 12 MMOL/L (ref 7–16)
BUN BLDV-MCNC: 18 MG/DL (ref 6–23)
CALCIUM SERPL-MCNC: 9 MG/DL (ref 8.6–10.2)
CHLORIDE BLD-SCNC: 95 MMOL/L (ref 98–107)
CO2: 23 MMOL/L (ref 22–29)
CREAT SERPL-MCNC: 0.7 MG/DL (ref 0.5–1)
GFR AFRICAN AMERICAN: >60
GFR NON-AFRICAN AMERICAN: >60 ML/MIN/1.73
GLUCOSE BLD-MCNC: 165 MG/DL (ref 74–99)
HCT VFR BLD CALC: 24.5 % (ref 34–48)
HEMOGLOBIN: 8 G/DL (ref 11.5–15.5)
MCH RBC QN AUTO: 32.3 PG (ref 26–35)
MCHC RBC AUTO-ENTMCNC: 32.7 % (ref 32–34.5)
MCV RBC AUTO: 98.8 FL (ref 80–99.9)
METER GLUCOSE: 149 MG/DL (ref 74–99)
METER GLUCOSE: 235 MG/DL (ref 74–99)
PDW BLD-RTO: 13.6 FL (ref 11.5–15)
PLATELET # BLD: 504 E9/L (ref 130–450)
PMV BLD AUTO: 10 FL (ref 7–12)
POTASSIUM SERPL-SCNC: 4.3 MMOL/L (ref 3.5–5)
RBC # BLD: 2.48 E12/L (ref 3.5–5.5)
SODIUM BLD-SCNC: 130 MMOL/L (ref 132–146)
WBC # BLD: 7.2 E9/L (ref 4.5–11.5)

## 2021-05-25 PROCEDURE — 82962 GLUCOSE BLOOD TEST: CPT

## 2021-05-25 PROCEDURE — 2580000003 HC RX 258: Performed by: INTERNAL MEDICINE

## 2021-05-25 PROCEDURE — 6370000000 HC RX 637 (ALT 250 FOR IP): Performed by: PODIATRIST

## 2021-05-25 PROCEDURE — 94640 AIRWAY INHALATION TREATMENT: CPT

## 2021-05-25 PROCEDURE — 6360000002 HC RX W HCPCS: Performed by: PODIATRIST

## 2021-05-25 PROCEDURE — 36415 COLL VENOUS BLD VENIPUNCTURE: CPT

## 2021-05-25 PROCEDURE — 2700000000 HC OXYGEN THERAPY PER DAY

## 2021-05-25 PROCEDURE — 80048 BASIC METABOLIC PNL TOTAL CA: CPT

## 2021-05-25 PROCEDURE — 85027 COMPLETE CBC AUTOMATED: CPT

## 2021-05-25 PROCEDURE — 6360000002 HC RX W HCPCS: Performed by: INTERNAL MEDICINE

## 2021-05-25 PROCEDURE — 6370000000 HC RX 637 (ALT 250 FOR IP): Performed by: INTERNAL MEDICINE

## 2021-05-25 PROCEDURE — 2580000003 HC RX 258: Performed by: PODIATRIST

## 2021-05-25 RX ORDER — FUROSEMIDE 20 MG/1
20 TABLET ORAL
Qty: 45 TABLET | Refills: 3 | Status: SHIPPED
Start: 2021-05-26 | End: 2021-05-28

## 2021-05-25 RX ADMIN — GABAPENTIN 300 MG: 300 CAPSULE ORAL at 08:33

## 2021-05-25 RX ADMIN — ARFORMOTEROL TARTRATE 15 MCG: 15 SOLUTION RESPIRATORY (INHALATION) at 08:40

## 2021-05-25 RX ADMIN — TRAMADOL HYDROCHLORIDE 50 MG: 50 TABLET, FILM COATED ORAL at 08:43

## 2021-05-25 RX ADMIN — IPRATROPIUM BROMIDE AND ALBUTEROL SULFATE 1 AMPULE: 2.5; .5 SOLUTION RESPIRATORY (INHALATION) at 12:22

## 2021-05-25 RX ADMIN — PANTOPRAZOLE SODIUM 40 MG: 40 TABLET, DELAYED RELEASE ORAL at 08:33

## 2021-05-25 RX ADMIN — IPRATROPIUM BROMIDE AND ALBUTEROL SULFATE 1 AMPULE: 2.5; .5 SOLUTION RESPIRATORY (INHALATION) at 08:39

## 2021-05-25 RX ADMIN — BUDESONIDE 250 MCG: 0.25 SUSPENSION RESPIRATORY (INHALATION) at 08:41

## 2021-05-25 RX ADMIN — ASPIRIN 81 MG: 81 TABLET, COATED ORAL at 08:33

## 2021-05-25 RX ADMIN — HEPARIN 100 UNITS: 100 SYRINGE at 08:32

## 2021-05-25 RX ADMIN — SODIUM CHLORIDE, PRESERVATIVE FREE 10 ML: 5 INJECTION INTRAVENOUS at 08:34

## 2021-05-25 RX ADMIN — SUCRALFATE 1 G: 1 TABLET ORAL at 08:33

## 2021-05-25 RX ADMIN — APIXABAN 5 MG: 5 TABLET, FILM COATED ORAL at 08:33

## 2021-05-25 RX ADMIN — AMLODIPINE BESYLATE 5 MG: 5 TABLET ORAL at 08:33

## 2021-05-25 RX ADMIN — Medication 10 ML: at 08:32

## 2021-05-25 RX ADMIN — INSULIN LISPRO 1 UNITS: 100 INJECTION, SOLUTION INTRAVENOUS; SUBCUTANEOUS at 08:43

## 2021-05-25 RX ADMIN — GUAIFENESIN 400 MG: 400 TABLET ORAL at 08:33

## 2021-05-25 ASSESSMENT — PAIN SCALES - GENERAL: PAINLEVEL_OUTOF10: 8

## 2021-05-25 NOTE — DISCHARGE INSTR - COC
Continuity of Care Form    Patient Name: Radha Verduzco   :  1927  MRN:  [id number]    Admit date:  2021  Discharge date:  21    Code Status Order: Full Code   Advance Directives:   [facility] Documentation       Date/Time Healthcare Directive Type of Healthcare Directive Copy in [facility] [address] Agent's Name Healthcare Agent's Phone Number    21 9307  Unknown, patient unable to respond due to medical condition  --  --  --  --  --    21 0331  Yes, patient has an advance directive for healthcare treatment  Health care treatment directive  --  --  --  --    05/15/21 0446  Yes, patient has an advance directive for healthcare treatment  --  --  --  --  --            Admitting Physician:  Connie Le MD  PCP: Debra Godoy DO    Discharging Nurse: [facility] Unit/Room#: 4713/8133-J  Discharging Unit Phone Number: [phone]    Emergency Contact:   Extended Emergency Contact Information  Primary Emergency Contact: [facility]  Address: [place] [address] Phone: [phone]  Relation: Child  Secondary Emergency Contact: Arian Melvin  Address: [address] Phone: [phone]  Relation: Child    Past Surgical History:  Past Surgical History:   Procedure Laterality Date    ABDOMEN SURGERY      APPENDECTOMY      CARDIAC SURGERY      CHOLECYSTECTOMY      COLONOSCOPY      CORONARY ARTERY BYPASS GRAFT      8/28/10    ENDOSCOPY, COLON, DIAGNOSTIC      FOOT DEBRIDEMENT Left 2021    FOOT DEBRIDEMENT INCISION AND DRAINAGE.    performed by Carey Ventura DPM at [address] [place] [address] Left 2021    LEFT FOOT DEBRIDEMENT  WITH DELAYED PRIMARY CLOSURE performed by Kirk Erzao DPM at [facility]     TONSILLECTOMY AND ADENOIDECTOMY         Immunization History:   Immunization History Administered Date(s) Administered    Influenza, High Dose (Fluzone 65 yrs and older) 11/17/2015, 09/27/2016, 10/24/2017, 10/03/2018    Pneumococcal Conjugate 13-valent ([id number]) 11/17/2015    Pneumococcal Polysaccharide ([id number]) 12/02/2014    Tdap (Boostrix, Adacel) 07/05/2019       Active Problems:  Patient Active Problem List   Diagnosis Code    Asthma J45.909    CAD (coronary artery disease) I25.10    Vitamin D deficiency E55.9    Diabetic infection of left foot ([facility] E11.628, L08.9    HTN (hypertension) I10    Idiopathic peripheral neuropathy G60.9    Rhinitis, allergic J30.9    SOB (shortness of breath) R06.02    Bronchitis J40    GERD (gastroesophageal reflux disease) K21.9    PVD (peripheral vascular disease) with claudication (HCC) I73.9    Chest pain R07.9    Gait instability R26.81    Pulmonary embolism (HCC) I26.99    Cellulitis L03.90    Cellulitis of left foot L03. 143    Metabolic encephalopathy H98.91    Hyponatremia E87.1    DM (diabetes mellitus), type 2 (HCC) E11.9    Type 1 diabetes mellitus with left diabetic foot ulcer (HCC) E10.621, L97.529    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 (HCC) I70.244    Cellulitis and abscess of leg L03.119, L02.419    Acute hypoxemic respiratory failure (HCC) J96.01       Isolation/Infection:   Isolation            No Isolation          Patient Infection Status       None to display            Nurse Assessment:  Last Vital Signs: BP (!) 140/70   Pulse 69   Temp 98.6 °F (37 °C) (Temporal)   Resp 20   Ht 5' 3\" (1.6 m)   Wt 155 lb 9.6 oz (70.6 kg)   LMP 12/03/1997   SpO2 95%   BMI 27.56 kg/m²     Last documented pain score (0-10 scale): Pain Level: 8  Last Weight:   Wt Readings from Last 1 Encounters:   05/25/21 155 lb 9.6 oz (70.6 kg)     Mental Status:  oriented and alert    IV Access:  -   Power Midline catheter - Left Basilic - Insertion date 05/24/2021    Nursing Mobility/ADLs:  Walking Assisted  Transfer  Assisted  Bathing  Assisted  Dressing  Assisted  Toileting  Assisted  Feeding  Assisted  Med Admin  Assisted  Med Delivery   whole    Wound Care Documentation and Therapy:        Elimination:  Continence: Bowel: Yes and No  Bladder: Yes  Urinary Catheter: None and Removal Date 05/25/2021    Colostomy/Ileostomy/Ileal Conduit: No       Date of Last BM: 05/25/2021    Intake/Output Summary (Last 24 hours) at 5/25/2021 1040  Last data filed at 5/25/2021 5999  Gross per 24 hour   Intake --   Output 1200 ml   Net -1200 ml     I/O last 3 completed shifts: In: 240 [P.O.:240]  Out: 2700 [Urine:2700]    Safety Concerns: At Risk for Falls    Impairments/Disabilities:      Non weight bearing Left lower extremity    Nutrition Therapy:  Current Nutrition Therapy:   - Oral Diet:  Carb Control    Routes of Feeding: Oral  Liquids: Thin Liquids  Daily Fluid Restriction: yes - amount 1800 ml  Last Modified Barium Swallow with Video (Video Swallowing Test): not done    Treatments at the Time of Hospital Discharge:   Respiratory Treatments:  Duoneb ever 4 hrs while awake  Oxygen Therapy:  is on oxygen at 1 L/min per nasal cannula. Ventilator:    - No ventilator support    Rehab Therapies: Physical Therapy and Occupational Therapy  Weight Bearing Status/Restrictions: Non-weight bearing on left leg  Other Medical Equipment (for information only, NOT a DME order):  wheelchair, walker, bath bench, bedside commode, and hospital bed  Other Treatments:  L foot - Xeroform DSD.   bilateral heel protectors/offloading boots     Patient's personal belongings (please select all that are sent with patient):  Dentures upper and lower    RN SIGNATURE:  Electronically signed by Barbi Calderon RN on 5/25/21 at 10:58 AM EDT    CASE MANAGEMENT/SOCIAL WORK SECTION    Inpatient Status Date: ***    Readmission Risk Assessment Score:  Readmission Risk              Risk of Unplanned Readmission:  32           Discharging to Facility/ Agency   Name:   Address:  Phone:  Fax:    Dialysis Facility (if applicable)   Name:  Address:  Dialysis Schedule:  Phone:  Fax:    / signature: {Esignature:[id number]:::0}    PHYSICIAN SECTION    Prognosis: Good    Condition at Discharge: Stable    Rehab Potential (if transferring to Rehab): Good    Recommended Labs or Other Treatments After Discharge: CBC, BMP this Friday the 28th with special attention paid to the sodium level. Hold further Lasix doses if the sodium is decreasing again. Physician Certification: I certify the above information and transfer of Raquel Villaseñor  is necessary for the continuing treatment of the diagnosis listed and that she requires [facility] for less 30 days.      Update Admission H&P: No change in H&P    PHYSICIAN SIGNATURE:  Polo Ahn MD

## 2021-05-25 NOTE — PLAN OF CARE
Problem: Falls - Risk of:  Goal: Will remain free from falls  Description: Will remain free from falls  Outcome: Met This Shift  Goal: Absence of physical injury  Description: Absence of physical injury  Outcome: Met This Shift     Problem: Skin Integrity:  Goal: Will show no infection signs and symptoms  Description: Will show no infection signs and symptoms  Outcome: Met This Shift  Goal: Absence of new skin breakdown  Description: Absence of new skin breakdown  Outcome: Met This Shift     Problem: Musculor/Skeletal Functional Status  Goal: Highest potential functional level  Outcome: Met This Shift  Goal: Absence of falls  Outcome: Met This Shift     Problem: Pain:  Goal: Pain level will decrease  Description: Pain level will decrease  Outcome: Met This Shift  Goal: Control of acute pain  Description: Control of acute pain  Outcome: Met This Shift  Goal: Control of chronic pain  Description: Control of chronic pain  Outcome: Met This Shift

## 2021-05-25 NOTE — CARE COORDINATION
Discharge order noted. Power midline placed yesterday. Transportation set up via [employer] for 12:30 pm to [facility] at the [place]. Charge nurse, RN, liaison, patient and daughter all notified. Voicemail message left for ifeoma Matos regarding discharge and transportation time. Ambulance form and Hens in envelope in soft chart. last negative Covid-19 test was on Monday 517.   Richmond Melchor RN CM

## 2021-05-25 NOTE — PROGRESS NOTES
Department of Internal Medicine  Infectious Diseases  Progress  Note    C/C  : Right leg cellulitis , right foot abscess     Awake and alert   Denies fever and chills  Midline inserted   Afebrile        Current Facility-Administered Medications   Medication Dose Route Frequency Provider Last Rate Last Admin    lidocaine 1 % injection 5 mL  5 mL Intradermal Once Rhett Tejada MD        sodium chloride flush 0.9 % injection 5-40 mL  5-40 mL Intravenous 2 times per day Jaquan Tejada MD   10 mL at 05/25/21 0834    sodium chloride flush 0.9 % injection 5-40 mL  5-40 mL Intravenous PRN Rhett Tejada MD        0.9 % sodium chloride infusion  25 mL Intravenous PRN Rhett Tejada MD        heparin flush 100 UNIT/ML injection 100 Units  1 mL Intravenous 2 times per day Willian Baez MD   100 Units at 05/25/21 0832    heparin flush 100 UNIT/ML injection 100 Units  1 mL Intracatheter PRN Rhett Tejada MD        diphenhydrAMINE (BENADRYL) tablet 12.5 mg  12.5 mg Oral Q6H PRN Yomaira Nieto MD   12.5 mg at 05/24/21 1816    apixaban (ELIQUIS) tablet 5 mg  5 mg Oral BID Yomaira Nieto MD   5 mg at 05/25/21 0833    insulin lispro (HUMALOG) injection vial 0-6 Units  0-6 Units Subcutaneous TID WC Yomaira Nieto MD   1 Units at 05/25/21 0843    insulin lispro (HUMALOG) injection vial 0-3 Units  0-3 Units Subcutaneous Nightly Yomaira Nieto MD   1 Units at 05/24/21 2130    HYDROmorphone (DILAUDID) injection 0.2 mg  0.2 mg Intravenous Q4H PRN Yomaira Nieto MD        lidocaine 1 % injection 10 mL  10 mL Intradermal Once [place], DPM        ertapenem Miguelcecil Gonzalez) 1000 mg IVPB minibag  1,000 mg Intravenous Q24H Willian Baez MD   Stopped at 05/24/21 1817    povidone-iodine (BETADINE) 10 % external solution   Topical PRN [place], DPM        ipratropium-albuterol (DUONEB) nebulizer solution 1 ampule  1 ampule Inhalation Q4H Junior Diaz MD   1 ampule at 05/25/21 0839    guaiFENesin-dextromethorphan (ROBITUSSIN DM) 100-10 MG/5ML syrup 5 mL  5 mL Oral Q4H PRN Fam Alberto MD   5 mL at 05/20/21 1059    traMADol (ULTRAM) tablet 50 mg  50 mg Oral Q6H PRN Fam Alberto MD   50 mg at 05/25/21 0843    hydrALAZINE (APRESOLINE) injection 10 mg  10 mg Intravenous Q6H PRN Fam Alberto MD   10 mg at 05/16/21 0910    amLODIPine (NORVASC) tablet 5 mg  5 mg Oral Daily Fam Alberto MD   5 mg at 05/25/21 2017    aspirin EC tablet 81 mg  81 mg Oral Daily Fam Alberto MD   81 mg at 05/25/21 3908    gabapentin (NEURONTIN) capsule 300 mg  300 mg Oral Daily Fam Alberto MD   300 mg at 05/25/21 5937    guaiFENesin tablet 400 mg  400 mg Oral TID Fam Alberto MD   400 mg at 05/25/21 5997    pantoprazole (PROTONIX) tablet 40 mg  40 mg Oral Daily Fam Alberto MD   40 mg at 05/25/21 5021    pravastatin (PRAVACHOL) tablet 20 mg  20 mg Oral Nightly Fam Alberto MD   20 mg at 05/24/21 2128    sucralfate (CARAFATE) tablet 1 g  1 g Oral TID Fam Alberto MD   1 g at 05/25/21 2284    sodium chloride flush 0.9 % injection 10 mL  10 mL Intravenous 2 times per day Fam Alberto MD   10 mL at 05/25/21 3910    sodium chloride flush 0.9 % injection 10 mL  10 mL Intravenous PRN Fam Alberto MD   10 mL at 05/24/21 1730    0.9 % sodium chloride infusion  25 mL Intravenous PRN Fam Alberto MD        potassium chloride (KLOR-CON M) extended release tablet 40 mEq  40 mEq Oral PRN Fam Alberto MD        Or    potassium bicarb-citric acid (EFFER-K) effervescent tablet 40 mEq  40 mEq Oral PRN Fam Alberto MD        Or    potassium chloride 10 mEq/100 mL IVPB (Peripheral Line)  10 mEq Intravenous PRN Fam Alberto MD        magnesium hydroxide (MILK OF MAGNESIA) 400 MG/5ML suspension 30 mL  30 mL Oral Daily PRN Fam Alberto MD   30 mL at 05/23/21 1140    acetaminophen (TYLENOL) tablet 650 mg  650 mg Oral Q6H PRN Fam Alberto MD   650 mg at 05/24/21 2128    Or    acetaminophen (TYLENOL) suppository 650 mg  650 mg Rectal Q6H PRN Fam Alberto MD        glucose (GLUTOSE) 40 % Pulses:   Dorsalis pedis palpable    Skin:   Mild  erythema      CBC with Differential:      Lab Results   Component Value Date    WBC 7.2 05/25/2021    RBC 2.48 05/25/2021    HGB 8.0 05/25/2021    HCT 24.5 05/25/2021     05/25/2021    MCV 98.8 05/25/2021    MCH 32.3 05/25/2021    MCHC 32.7 05/25/2021    RDW 13.6 05/25/2021    LYMPHOPCT 4.3 05/16/2021    MONOPCT 3.5 05/16/2021    BASOPCT 0.1 05/16/2021    MONOSABS 0.57 05/16/2021    LYMPHSABS 0.57 05/16/2021    EOSABS 0.00 05/16/2021    BASOSABS 0.00 05/16/2021       CMP     Lab Results   Component Value Date     05/25/2021    K 4.3 05/25/2021    K 3.7 05/16/2021    CL 95 05/25/2021    CO2 23 05/25/2021    BUN 18 05/25/2021    CREATININE 0.7 05/25/2021    GFRAA >60 05/25/2021    LABGLOM >60 05/25/2021    GLUCOSE 165 05/25/2021    PROT 6.7 05/16/2021    LABALBU 3.2 05/16/2021    CALCIUM 9.0 05/25/2021    BILITOT 0.3 05/16/2021    ALKPHOS 85 05/16/2021    AST 39 05/16/2021    ALT 36 05/16/2021         Hepatic Function Panel:    Lab Results   Component Value Date    ALKPHOS 85 05/16/2021    ALT 36 05/16/2021    AST 39 05/16/2021    PROT 6.7 05/16/2021    BILITOT 0.3 05/16/2021    LABALBU 3.2 05/16/2021       PT/INR:    Lab Results   Component Value Date    PROTIME 11.1 11/23/2014    INR 1.1 11/23/2014       TSH:    Lab Results   Component Value Date    TSH 1.060 05/19/2021       U/A:    Lab Results   Component Value Date    COLORU Yellow 03/24/2021    PHUR 8.0 03/24/2021    WBCUA 0-1 03/24/2021    RBCUA 0-1 03/24/2021    RBCUA 5-10 09/02/2012    BACTERIA RARE 03/24/2021    CLARITYU Clear 03/24/2021    SPECGRAV 1.015 03/24/2021    LEUKOCYTESUR TRACE 03/24/2021    UROBILINOGEN 0.2 03/24/2021    BILIRUBINUR Negative 03/24/2021    BLOODU Negative 03/24/2021    GLUCOSEU 100 03/24/2021       ABG:  No results found for: KLR2QBQ, BEART, E3FOGBMU, PHART, THGBART, WWG5OLC, PO2ART, UYI0NYA    MICROBIOLOGY:    Wound cx -  Susceptibility    Citrobacter koseri (1)    Antibiotic Interpretation CHIDI Status    ceFAZolin Sensitive <=^4 mcg/mL     cefepime Sensitive <=^0.12 mcg/mL     cefTRIAXone Sensitive <=^0.25 mcg/mL     ertapenem Sensitive <=^0.12 mcg/mL     gentamicin Sensitive <=^1 mcg/mL     levofloxacin Sensitive <=^0.12 mcg/mL     piperacillin-tazobactam Sensitive <=^4 mcg/mL     trimethoprim-sulfamethoxazole Sensitive <=^20 mcg/mL     Lab and Collection    Swab collections are low-yield and rarely indicated. Generally, the specimen volume when collected by swab is   small, reducing the probability of isolating organisms: many   organisms adhere to the fibers of the swab, which reduces the   opportunity of recovering organisms. Abnormal       Organism Anaerobic gram positive cocciAbnormal     Anaerobic Culture --    Light growth          Radiology :    X ray left foot - No aggressive osseous lesions    IMPRESSION:     1. Diabetic foot infection, left foot / leg cellulitis , abscess s/p I & D ( 5/16), wound closure 05/21  2. Leukocytosis - improved     RECOMMENDATIONS:      1. Invanz 1 gram IV q 24 hrs  ~ 10 days   2.  Local wound care ( ID follow up in 7-10 days )

## 2021-05-25 NOTE — DISCHARGE SUMMARY
Physician Discharge Summary     Patient ID:  Edy Chung  [id number]  89 y.o.  9/20/1927    Admit date: 5/14/2021    Discharge date and time:  5/25/2021     Admission Diagnoses:   Chief Complaint   Patient presents with    Leg Swelling     diagnosed w/ Cellulitis on LLE, oral antibiotics, assisted living states pt now has abscess on bottom of L foot and pt is \"not acting right\"      Cellulitis of left foot     Discharge Diagnoses:   Principal Problem:    Cellulitis of left foot  Active Problems:    Diabetic infection of left foot ([address]    HTN (hypertension)    Pulmonary embolism (HCC)    Metabolic encephalopathy    Hyponatremia    DM (diabetes mellitus), type 2 (HCC)    Type 1 diabetes mellitus with left diabetic foot ulcer ([address]    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 *       Consults: ID, podiatry, vascular surgery    Procedures:   LLE angioplasty, see vascular note  I&D left foot wound with delayed primary closure      Hospital Course:   Patient presented with severe infection of the left foot and leg. She was started on broad-spectrum antibiotics. She underwent I&D with podiatry. The wound was initially left open. She was found to have PAD and underwent angioplasty with vascular surgery. She then underwent delayed closure. Her course was complicated by congestive heart failure which resolved with diuretics. She had very mild hyponatremia which I think was due to actually mild overdiuresis as well as overly restrictive fluid restriction of 1200 cc. I held her diuretics for a few days and liberalize her fluid restriction and her sodium is now coming back up.     Please note, she was supposed to have completed a 3-month course of Eliquis for VTE just very recently, but given her current bedbound and postoperative status, she is obviously at very high risk of blood clot so she needs to stay on the Eliquis for now. Discharge Exam:  Vitals:    05/25/21 0615 05/25/21 0730 05/25/21 0815 05/25/21 0839   BP:  (!) 140/70     Pulse:  62 69    Resp:  16 20    Temp:       TempSrc:       SpO2: 97% 95% 97% 95%   Weight:       Height:            General appearance: NAD, conversant  HEENT: AT/NC, MMM  Neck: FROM, supple  Lungs: Clear to auscultation, WOB normal  CV: RRR, no MRGs  Abdomen: Soft, non-tender; no masses or HSM, +BS  Extremities: Left foot wrapped up with dressing, no surrounding edema or erythema  Skin: no rash, lesions or ulcers  Psych: Calm and cooperative  Neuro: Alert and interactive, nonfocal     Condition:  Stable    Disposition: SNF    Patient Instructions:   Current Discharge Medication List      START taking these medications    Details   apixaban (ELIQUIS) 5 MG TABS tablet Take 1 tablet by mouth 2 times daily  Qty: 60 tablet      ertapenem (INVANZ) infusion Infuse 1,000 mg intravenously every 24 hours for 10 days For 10 days  Qty: 19175 mg, Refills: 0    Associated Diagnoses: Cellulitis and abscess of leg         CONTINUE these medications which have CHANGED    Details   furosemide (LASIX) 20 MG tablet Take 1 tablet by mouth three times a week  Qty: 45 tablet, Refills: 3         CONTINUE these medications which have NOT CHANGED    Details   rOPINIRole (REQUIP) 0.5 MG tablet Take 1 tablet by mouth nightly  Qty: 90 tablet, Refills: 3      lidocaine (LMX) 4 % cream Apply topically as needed for Pain Apply topically as needed. sucralfate (CARAFATE) 1 GM tablet Take 1 g by mouth 3 times daily      bisacodyl (DULCOLAX) 10 MG suppository Place 10 mg rectally daily      gabapentin (NEURONTIN) 300 MG capsule Take 300 mg by mouth daily.       guaiFENesin (MUCINEX) 600 MG extended release tablet Take 600 mg by mouth 2 times daily      aluminum & magnesium hydroxide-simethicone (MYLANTA) 400-400-40 MG/5ML SUSP Take 30 mLs by mouth every 6 hours as needed      pantoprazole (PROTONIX) 40 MG tablet Take 40 mg by mouth daily      promethazine (PHENERGAN) 12.5 MG suppository Place 12.5 mg rectally every 6 hours as needed for Nausea      albuterol (PROVENTIL) 90 MCG/ACT inhaler Inhale 2 puffs into the lungs 4 times daily  Refills: 0      acetaminophen (TYLENOL) 500 MG tablet Take 1 tablet by mouth 4 times daily as needed for Pain  Qty: 40 tablet, Refills: 1      pravastatin (PRAVACHOL) 20 MG tablet TAKE 1 TABLET BY MOUTH  NIGHTLY  Qty: 90 tablet, Refills: 1      amLODIPine (NORVASC) 5 MG tablet TAKE 1 TABLET BY MOUTH  DAILY  Qty: 90 tablet, Refills: 1      Polyethylene Glycol 3350 (MIRALAX PO) Take by mouth      hydrocortisone 2.5 % cream Apply topically 2 times daily Apply topically 2 times daily. fluticasone-vilanterol (BREO ELLIPTA) 100-25 MCG/INH AEPB inhaler Inhale 1 puff into the lungs daily  Qty: 3 each, Refills: 5    Comments: Please dispense 90 day supply for patient  Associated Diagnoses: Uncomplicated asthma, unspecified asthma severity, unspecified whether persistent      clotrimazole-betamethasone (LOTRISONE) 1-0.05 % cream Apply topically 2 times daily. Qty: 45 g, Refills: 0      Multiple Vitamins-Minerals (OCUVITE ADULT FORMULA PO) Take 1 capsule by mouth daily      Probiotic Product (PRO-BIOTIC BLEND PO) Take by mouth      magnesium gluconate (MAGONATE) 500 MG tablet Take 1,000 mg by mouth 2 times daily      Lancets (BD LANCET ULTRAFINE 50Z) MISC 1 applicator by Does not apply route 2 times daily Indications: DX:E11.9  Qty: 200 each, Refills: 3      glucose blood VI test strips (ONE TOUCH TEST STRIPS) strip 1 each by In Vitro route 2 times daily Indications: DX:E11.9  Qty: 200 each, Refills: 3      Blood Glucose Monitoring Suppl (ONE TOUCH ULTRA SYSTEM KIT) W/DEVICE KIT 1 kit by Does not apply route once for 1 dose Indications: DX: E11.9  Qty: 1 kit, Refills: 0      ALPHA LIPOIC ACID PO Take 1 capsule by mouth daily. B Complex-C-Folic Acid (HM VITAMIN B COMPLEX/VITAMIN C) TABS Take 1 tablet by mouth daily. Coenzyme Q10 (COQ10) 100 MG CAPS Take 200 mg by mouth daily. aspirin 81 MG EC tablet Take 81 mg by mouth daily. STOP taking these medications       fluconazole (DIFLUCAN) 200 MG tablet Comments:   Reason for Stopping:         cephALEXin (KEFLEX) 500 MG capsule Comments:   Reason for Stopping:         glimepiride (AMARYL) 2 MG tablet Comments:   Reason for Stopping:             Activity: activity as tolerated  Diet: diabetic diet    Follow-up with PCP in 1 week.     Note that over 30 minutes was spent in preparing discharge papers, discussing discharge with patient, medication review, etc.    Signed:  Basim Liu MD    5/25/2021  10:34 AM

## 2021-05-28 ENCOUNTER — APPOINTMENT (OUTPATIENT)
Dept: GENERAL RADIOLOGY | Age: 86
End: 2021-05-28
Payer: MEDICARE

## 2021-05-28 ENCOUNTER — HOSPITAL ENCOUNTER (INPATIENT)
Age: 86
LOS: 5 days | Discharge: NURSING, REHAB OR LONG-TERM CARE | DRG: 300 | End: 2021-06-02
Attending: EMERGENCY MEDICINE | Admitting: INTERNAL MEDICINE
Payer: MEDICARE

## 2021-05-28 ENCOUNTER — APPOINTMENT (OUTPATIENT)
Dept: CT IMAGING | Age: 86
End: 2021-05-28
Payer: MEDICARE

## 2021-05-28 ENCOUNTER — HOSPITAL ENCOUNTER (EMERGENCY)
Age: 86
Discharge: ANOTHER HOSPITAL | End: 2021-05-28
Attending: EMERGENCY MEDICINE
Payer: MEDICARE

## 2021-05-28 ENCOUNTER — APPOINTMENT (OUTPATIENT)
Dept: ULTRASOUND IMAGING | Age: 86
DRG: 300 | End: 2021-05-28
Payer: MEDICARE

## 2021-05-28 VITALS
BODY MASS INDEX: 25.52 KG/M2 | HEART RATE: 60 BPM | SYSTOLIC BLOOD PRESSURE: 155 MMHG | DIASTOLIC BLOOD PRESSURE: 53 MMHG | TEMPERATURE: 98.4 F | OXYGEN SATURATION: 98 % | HEIGHT: 63 IN | WEIGHT: 144 LBS | RESPIRATION RATE: 16 BRPM

## 2021-05-28 DIAGNOSIS — R31.0 GROSS HEMATURIA: ICD-10-CM

## 2021-05-28 DIAGNOSIS — I72.4 FEMORAL ARTERY PSEUDO-ANEURYSM, RIGHT (HCC): Primary | ICD-10-CM

## 2021-05-28 DIAGNOSIS — I72.4 PSEUDOANEURYSM OF RIGHT FEMORAL ARTERY (HCC): ICD-10-CM

## 2021-05-28 DIAGNOSIS — R31.0 GROSS HEMATURIA: Primary | ICD-10-CM

## 2021-05-28 DIAGNOSIS — R10.84 GENERALIZED ABDOMINAL PAIN: ICD-10-CM

## 2021-05-28 DIAGNOSIS — D64.9 ANEMIA, UNSPECIFIED TYPE: ICD-10-CM

## 2021-05-28 DIAGNOSIS — R33.9 URINARY RETENTION: ICD-10-CM

## 2021-05-28 LAB
ABO/RH: NORMAL
ABO/RH: NORMAL
ALBUMIN SERPL-MCNC: 3.2 G/DL (ref 3.5–5.2)
ALP BLD-CCNC: 76 U/L (ref 35–104)
ALT SERPL-CCNC: 17 U/L (ref 0–32)
ANION GAP SERPL CALCULATED.3IONS-SCNC: 8 MMOL/L (ref 7–16)
ANTIBODY SCREEN: NORMAL
ANTIBODY SCREEN: NORMAL
AST SERPL-CCNC: 19 U/L (ref 0–31)
BACTERIA: ABNORMAL /HPF
BASOPHILS ABSOLUTE: 0.02 E9/L (ref 0–0.2)
BASOPHILS RELATIVE PERCENT: 0.2 % (ref 0–2)
BILIRUB SERPL-MCNC: 0.5 MG/DL (ref 0–1.2)
BILIRUBIN URINE: ABNORMAL
BLOOD, URINE: ABNORMAL
BUN BLDV-MCNC: 13 MG/DL (ref 6–23)
CALCIUM SERPL-MCNC: 8.6 MG/DL (ref 8.6–10.2)
CHLORIDE BLD-SCNC: 91 MMOL/L (ref 98–107)
CLARITY: ABNORMAL
CO2: 25 MMOL/L (ref 22–29)
COLOR: ABNORMAL
CREAT SERPL-MCNC: 0.7 MG/DL (ref 0.5–1)
EOSINOPHILS ABSOLUTE: 0.07 E9/L (ref 0.05–0.5)
EOSINOPHILS RELATIVE PERCENT: 0.6 % (ref 0–6)
GFR AFRICAN AMERICAN: >60
GFR NON-AFRICAN AMERICAN: >60 ML/MIN/1.73
GLUCOSE BLD-MCNC: 240 MG/DL (ref 74–99)
GLUCOSE URINE: NEGATIVE MG/DL
HCT VFR BLD CALC: 23.1 % (ref 34–48)
HEMOGLOBIN: 7.5 G/DL (ref 11.5–15.5)
IMMATURE GRANULOCYTES #: 0.05 E9/L
IMMATURE GRANULOCYTES %: 0.5 % (ref 0–5)
KETONES, URINE: ABNORMAL MG/DL
LEUKOCYTE ESTERASE, URINE: ABNORMAL
LIPASE: 61 U/L (ref 13–60)
LYMPHOCYTES ABSOLUTE: 1.06 E9/L (ref 1.5–4)
LYMPHOCYTES RELATIVE PERCENT: 9.6 % (ref 20–42)
MCH RBC QN AUTO: 31.8 PG (ref 26–35)
MCHC RBC AUTO-ENTMCNC: 32.5 % (ref 32–34.5)
MCV RBC AUTO: 97.9 FL (ref 80–99.9)
MONOCYTES ABSOLUTE: 0.51 E9/L (ref 0.1–0.95)
MONOCYTES RELATIVE PERCENT: 4.6 % (ref 2–12)
NEUTROPHILS ABSOLUTE: 9.29 E9/L (ref 1.8–7.3)
NEUTROPHILS RELATIVE PERCENT: 84.5 % (ref 43–80)
NITRITE, URINE: POSITIVE
PDW BLD-RTO: 14.5 FL (ref 11.5–15)
PH UA: 7 (ref 5–9)
PLATELET # BLD: 622 E9/L (ref 130–450)
PMV BLD AUTO: 9.7 FL (ref 7–12)
POTASSIUM REFLEX MAGNESIUM: 4.5 MMOL/L (ref 3.5–5)
PROTEIN UA: >=300 MG/DL
RBC # BLD: 2.36 E12/L (ref 3.5–5.5)
RBC UA: ABNORMAL /HPF (ref 0–2)
SODIUM BLD-SCNC: 124 MMOL/L (ref 132–146)
SPECIFIC GRAVITY UA: 1.01 (ref 1–1.03)
TOTAL PROTEIN: 6.3 G/DL (ref 6.4–8.3)
UROBILINOGEN, URINE: 1 E.U./DL
WBC # BLD: 11 E9/L (ref 4.5–11.5)
WBC UA: ABNORMAL /HPF (ref 0–5)

## 2021-05-28 PROCEDURE — 36415 COLL VENOUS BLD VENIPUNCTURE: CPT

## 2021-05-28 PROCEDURE — 86900 BLOOD TYPING SEROLOGIC ABO: CPT

## 2021-05-28 PROCEDURE — 86901 BLOOD TYPING SEROLOGIC RH(D): CPT

## 2021-05-28 PROCEDURE — 85025 COMPLETE CBC W/AUTO DIFF WBC: CPT

## 2021-05-28 PROCEDURE — 6360000002 HC RX W HCPCS: Performed by: INTERNAL MEDICINE

## 2021-05-28 PROCEDURE — 93926 LOWER EXTREMITY STUDY: CPT | Performed by: RADIOLOGY

## 2021-05-28 PROCEDURE — 71045 X-RAY EXAM CHEST 1 VIEW: CPT

## 2021-05-28 PROCEDURE — 99283 EMERGENCY DEPT VISIT LOW MDM: CPT

## 2021-05-28 PROCEDURE — 2580000003 HC RX 258: Performed by: EMERGENCY MEDICINE

## 2021-05-28 PROCEDURE — 6360000002 HC RX W HCPCS: Performed by: EMERGENCY MEDICINE

## 2021-05-28 PROCEDURE — P9016 RBC LEUKOCYTES REDUCED: HCPCS

## 2021-05-28 PROCEDURE — 86923 COMPATIBILITY TEST ELECTRIC: CPT

## 2021-05-28 PROCEDURE — 96376 TX/PRO/DX INJ SAME DRUG ADON: CPT

## 2021-05-28 PROCEDURE — 96374 THER/PROPH/DIAG INJ IV PUSH: CPT

## 2021-05-28 PROCEDURE — 6370000000 HC RX 637 (ALT 250 FOR IP): Performed by: INTERNAL MEDICINE

## 2021-05-28 PROCEDURE — 2140000000 HC CCU INTERMEDIATE R&B

## 2021-05-28 PROCEDURE — 86850 RBC ANTIBODY SCREEN: CPT

## 2021-05-28 PROCEDURE — 93926 LOWER EXTREMITY STUDY: CPT

## 2021-05-28 PROCEDURE — 81001 URINALYSIS AUTO W/SCOPE: CPT

## 2021-05-28 PROCEDURE — 74177 CT ABD & PELVIS W/CONTRAST: CPT

## 2021-05-28 PROCEDURE — 99284 EMERGENCY DEPT VISIT MOD MDM: CPT

## 2021-05-28 PROCEDURE — 83690 ASSAY OF LIPASE: CPT

## 2021-05-28 PROCEDURE — 2580000003 HC RX 258: Performed by: SURGERY

## 2021-05-28 PROCEDURE — 2580000003 HC RX 258: Performed by: INTERNAL MEDICINE

## 2021-05-28 PROCEDURE — 99223 1ST HOSP IP/OBS HIGH 75: CPT | Performed by: SURGERY

## 2021-05-28 PROCEDURE — 2500000003 HC RX 250 WO HCPCS: Performed by: RADIOLOGY

## 2021-05-28 PROCEDURE — 80053 COMPREHEN METABOLIC PANEL: CPT

## 2021-05-28 PROCEDURE — 6360000004 HC RX CONTRAST MEDICATION: Performed by: RADIOLOGY

## 2021-05-28 RX ORDER — SODIUM CHLORIDE 9 MG/ML
INJECTION, SOLUTION INTRAVENOUS PRN
Status: DISCONTINUED | OUTPATIENT
Start: 2021-05-28 | End: 2021-06-02 | Stop reason: HOSPADM

## 2021-05-28 RX ORDER — ALUMINA, MAGNESIA, AND SIMETHICONE 2400; 2400; 240 MG/30ML; MG/30ML; MG/30ML
30 SUSPENSION ORAL EVERY 6 HOURS PRN
COMMUNITY

## 2021-05-28 RX ORDER — SUCRALFATE 1 G/1
1 TABLET ORAL 3 TIMES DAILY
Status: DISCONTINUED | OUTPATIENT
Start: 2021-05-28 | End: 2021-06-02 | Stop reason: HOSPADM

## 2021-05-28 RX ORDER — ALBUTEROL SULFATE 1.25 MG/3ML
2.5 SOLUTION RESPIRATORY (INHALATION) EVERY 6 HOURS PRN
Status: DISCONTINUED | OUTPATIENT
Start: 2021-05-28 | End: 2021-06-02 | Stop reason: HOSPADM

## 2021-05-28 RX ORDER — ACETAMINOPHEN 500 MG
500 TABLET ORAL EVERY 6 HOURS PRN
Status: DISCONTINUED | OUTPATIENT
Start: 2021-05-28 | End: 2021-06-02 | Stop reason: HOSPADM

## 2021-05-28 RX ORDER — PANTOPRAZOLE SODIUM 20 MG/1
40 TABLET, DELAYED RELEASE ORAL DAILY
COMMUNITY
End: 2021-06-14

## 2021-05-28 RX ORDER — FENTANYL CITRATE 50 UG/ML
25 INJECTION, SOLUTION INTRAMUSCULAR; INTRAVENOUS ONCE
Status: COMPLETED | OUTPATIENT
Start: 2021-05-28 | End: 2021-05-28

## 2021-05-28 RX ORDER — DIPHENHYDRAMINE HCL 25 MG
25 TABLET ORAL EVERY 4 HOURS PRN
COMMUNITY
End: 2021-05-28

## 2021-05-28 RX ORDER — BUDESONIDE AND FORMOTEROL FUMARATE DIHYDRATE 80; 4.5 UG/1; UG/1
2 AEROSOL RESPIRATORY (INHALATION) 2 TIMES DAILY
Status: DISCONTINUED | OUTPATIENT
Start: 2021-05-28 | End: 2021-06-02 | Stop reason: HOSPADM

## 2021-05-28 RX ORDER — TRAMADOL HYDROCHLORIDE 50 MG/1
50 TABLET ORAL 3 TIMES DAILY
Status: DISCONTINUED | OUTPATIENT
Start: 2021-05-28 | End: 2021-06-02 | Stop reason: HOSPADM

## 2021-05-28 RX ORDER — DOCUSATE SODIUM 100 MG/1
100 CAPSULE, LIQUID FILLED ORAL DAILY
COMMUNITY
End: 2021-05-28

## 2021-05-28 RX ORDER — ROPINIROLE 0.5 MG/1
0.5 TABLET, FILM COATED ORAL NIGHTLY
Status: DISCONTINUED | OUTPATIENT
Start: 2021-05-28 | End: 2021-06-02 | Stop reason: HOSPADM

## 2021-05-28 RX ORDER — PRAVASTATIN SODIUM 20 MG
20 TABLET ORAL NIGHTLY
Status: DISCONTINUED | OUTPATIENT
Start: 2021-05-28 | End: 2021-06-02 | Stop reason: HOSPADM

## 2021-05-28 RX ORDER — SODIUM CHLORIDE 0.9 % (FLUSH) 0.9 %
5-40 SYRINGE (ML) INJECTION EVERY 12 HOURS SCHEDULED
Status: DISCONTINUED | OUTPATIENT
Start: 2021-05-28 | End: 2021-06-02 | Stop reason: HOSPADM

## 2021-05-28 RX ORDER — SODIUM CHLORIDE 9 MG/ML
INJECTION, SOLUTION INTRAVENOUS CONTINUOUS
Status: DISCONTINUED | OUTPATIENT
Start: 2021-05-28 | End: 2021-05-29

## 2021-05-28 RX ORDER — AMLODIPINE BESYLATE 5 MG/1
5 TABLET ORAL DAILY
Status: DISCONTINUED | OUTPATIENT
Start: 2021-05-28 | End: 2021-06-02 | Stop reason: HOSPADM

## 2021-05-28 RX ORDER — FUROSEMIDE 20 MG/1
20 TABLET ORAL DAILY
COMMUNITY
End: 2021-09-13

## 2021-05-28 RX ORDER — 0.9 % SODIUM CHLORIDE 0.9 %
500 INTRAVENOUS SOLUTION INTRAVENOUS ONCE
Status: COMPLETED | OUTPATIENT
Start: 2021-05-28 | End: 2021-05-28

## 2021-05-28 RX ORDER — ACETAMINOPHEN 325 MG/1
650 TABLET ORAL EVERY 6 HOURS PRN
Status: DISCONTINUED | OUTPATIENT
Start: 2021-05-28 | End: 2021-06-02 | Stop reason: HOSPADM

## 2021-05-28 RX ORDER — FERROUS SULFATE 325(65) MG
325 TABLET ORAL
COMMUNITY
End: 2021-05-28

## 2021-05-28 RX ORDER — SODIUM CHLORIDE 9 MG/ML
25 INJECTION, SOLUTION INTRAVENOUS PRN
Status: DISCONTINUED | OUTPATIENT
Start: 2021-05-28 | End: 2021-06-02 | Stop reason: HOSPADM

## 2021-05-28 RX ORDER — GABAPENTIN 300 MG/1
300 CAPSULE ORAL EVERY EVENING
Status: DISCONTINUED | OUTPATIENT
Start: 2021-05-28 | End: 2021-06-02 | Stop reason: HOSPADM

## 2021-05-28 RX ORDER — PROMETHAZINE HYDROCHLORIDE 12.5 MG/1
12.5 TABLET ORAL EVERY 6 HOURS PRN
Status: ON HOLD | COMMUNITY
End: 2022-08-28 | Stop reason: HOSPADM

## 2021-05-28 RX ORDER — ONDANSETRON 2 MG/ML
4 INJECTION INTRAMUSCULAR; INTRAVENOUS EVERY 6 HOURS PRN
Status: DISCONTINUED | OUTPATIENT
Start: 2021-05-28 | End: 2021-06-02 | Stop reason: HOSPADM

## 2021-05-28 RX ORDER — ACETAMINOPHEN 500 MG
500 TABLET ORAL EVERY 6 HOURS PRN
COMMUNITY
End: 2021-06-14

## 2021-05-28 RX ORDER — OMEPRAZOLE 20 MG/1
20 CAPSULE, DELAYED RELEASE ORAL DAILY
COMMUNITY
End: 2021-05-28

## 2021-05-28 RX ORDER — ALBUTEROL SULFATE 1.25 MG/3ML
1 SOLUTION RESPIRATORY (INHALATION) EVERY 4 HOURS PRN
COMMUNITY
End: 2021-05-28

## 2021-05-28 RX ORDER — FOLIC ACID 1 MG/1
1 TABLET ORAL DAILY
COMMUNITY
End: 2021-05-28

## 2021-05-28 RX ORDER — SODIUM CHLORIDE 0.9 % (FLUSH) 0.9 %
10 SYRINGE (ML) INJECTION PRN
Status: DISCONTINUED | OUTPATIENT
Start: 2021-05-28 | End: 2021-06-02 | Stop reason: HOSPADM

## 2021-05-28 RX ORDER — ASCORBIC ACID 500 MG
500 TABLET ORAL DAILY
COMMUNITY
End: 2021-05-28

## 2021-05-28 RX ORDER — PANTOPRAZOLE SODIUM 40 MG/1
40 TABLET, DELAYED RELEASE ORAL
Status: DISCONTINUED | OUTPATIENT
Start: 2021-05-29 | End: 2021-06-02 | Stop reason: HOSPADM

## 2021-05-28 RX ORDER — GLIMEPIRIDE 2 MG/1
2 TABLET ORAL
Status: ON HOLD | COMMUNITY
End: 2022-08-28 | Stop reason: HOSPADM

## 2021-05-28 RX ORDER — POLYETHYLENE GLYCOL 3350 17 G/17G
17 POWDER, FOR SOLUTION ORAL DAILY PRN
Status: DISCONTINUED | OUTPATIENT
Start: 2021-05-28 | End: 2021-06-02 | Stop reason: HOSPADM

## 2021-05-28 RX ORDER — PROMETHAZINE HYDROCHLORIDE 25 MG/1
12.5 TABLET ORAL EVERY 6 HOURS PRN
Status: DISCONTINUED | OUTPATIENT
Start: 2021-05-28 | End: 2021-06-02 | Stop reason: HOSPADM

## 2021-05-28 RX ORDER — ACETAMINOPHEN 650 MG/1
650 SUPPOSITORY RECTAL EVERY 6 HOURS PRN
Status: DISCONTINUED | OUTPATIENT
Start: 2021-05-28 | End: 2021-06-02 | Stop reason: HOSPADM

## 2021-05-28 RX ORDER — LIDOCAINE 4 G/G
PATCH TOPICAL DAILY PRN
COMMUNITY

## 2021-05-28 RX ORDER — HEPARIN SODIUM,PORCINE/PF 1 UNIT/ML
1 SYRINGE (ML) INTRAVENOUS 2 TIMES DAILY
COMMUNITY
End: 2021-05-28

## 2021-05-28 RX ORDER — TRAMADOL HYDROCHLORIDE 50 MG/1
50 TABLET ORAL 2 TIMES DAILY
Status: ON HOLD | COMMUNITY
End: 2022-08-28 | Stop reason: HOSPADM

## 2021-05-28 RX ORDER — SODIUM CHLORIDE 9 MG/ML
INJECTION, SOLUTION INTRAVENOUS CONTINUOUS
Status: ACTIVE | OUTPATIENT
Start: 2021-05-28 | End: 2021-05-29

## 2021-05-28 RX ADMIN — PRAVASTATIN SODIUM 20 MG: 20 TABLET ORAL at 21:43

## 2021-05-28 RX ADMIN — SUCRALFATE 1 G: 1 TABLET ORAL at 21:43

## 2021-05-28 RX ADMIN — AMLODIPINE BESYLATE 5 MG: 5 TABLET ORAL at 22:14

## 2021-05-28 RX ADMIN — ROPINIROLE HYDROCHLORIDE 0.5 MG: 0.5 TABLET, FILM COATED ORAL at 21:43

## 2021-05-28 RX ADMIN — Medication 10 ML: at 21:43

## 2021-05-28 RX ADMIN — GABAPENTIN 300 MG: 300 CAPSULE ORAL at 22:15

## 2021-05-28 RX ADMIN — ENOXAPARIN SODIUM 40 MG: 40 INJECTION SUBCUTANEOUS at 22:16

## 2021-05-28 RX ADMIN — FENTANYL CITRATE 25 MCG: 50 INJECTION, SOLUTION INTRAMUSCULAR; INTRAVENOUS at 14:17

## 2021-05-28 RX ADMIN — IOPAMIDOL 75 ML: 755 INJECTION, SOLUTION INTRAVENOUS at 12:16

## 2021-05-28 RX ADMIN — SODIUM CHLORIDE: 9 INJECTION, SOLUTION INTRAVENOUS at 17:30

## 2021-05-28 RX ADMIN — BUDESONIDE AND FORMOTEROL FUMARATE DIHYDRATE 2 PUFF: 80; 4.5 AEROSOL RESPIRATORY (INHALATION) at 22:15

## 2021-05-28 RX ADMIN — SODIUM CHLORIDE: 9 INJECTION, SOLUTION INTRAVENOUS at 21:44

## 2021-05-28 RX ADMIN — SODIUM CHLORIDE 500 ML: 9 INJECTION, SOLUTION INTRAVENOUS at 12:54

## 2021-05-28 RX ADMIN — TRAMADOL HYDROCHLORIDE 50 MG: 50 TABLET, COATED ORAL at 22:15

## 2021-05-28 RX ADMIN — FENTANYL CITRATE 25 MCG: 0.05 INJECTION, SOLUTION INTRAMUSCULAR; INTRAVENOUS at 15:55

## 2021-05-28 ASSESSMENT — PAIN DESCRIPTION - DESCRIPTORS: DESCRIPTORS: ACHING

## 2021-05-28 ASSESSMENT — PAIN DESCRIPTION - PAIN TYPE: TYPE: ACUTE PAIN

## 2021-05-28 ASSESSMENT — ENCOUNTER SYMPTOMS
VOMITING: 0
SORE THROAT: 0
BACK PAIN: 0
SHORTNESS OF BREATH: 0
ABDOMINAL PAIN: 1
NAUSEA: 0
EYE PAIN: 0

## 2021-05-28 ASSESSMENT — PAIN DESCRIPTION - ORIENTATION: ORIENTATION: LEFT

## 2021-05-28 ASSESSMENT — PAIN SCALES - GENERAL
PAINLEVEL_OUTOF10: 6
PAINLEVEL_OUTOF10: 4
PAINLEVEL_OUTOF10: 4

## 2021-05-28 ASSESSMENT — PAIN DESCRIPTION - FREQUENCY: FREQUENCY: CONTINUOUS

## 2021-05-28 ASSESSMENT — PAIN DESCRIPTION - ONSET: ONSET: GRADUAL

## 2021-05-28 ASSESSMENT — PAIN - FUNCTIONAL ASSESSMENT: PAIN_FUNCTIONAL_ASSESSMENT: PREVENTS OR INTERFERES WITH ALL ACTIVE AND SOME PASSIVE ACTIVITIES

## 2021-05-28 ASSESSMENT — PAIN DESCRIPTION - PROGRESSION: CLINICAL_PROGRESSION: NOT CHANGED

## 2021-05-28 ASSESSMENT — PAIN DESCRIPTION - LOCATION: LOCATION: LEG

## 2021-05-28 NOTE — PROGRESS NOTES
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 Anthony Blevins, with Angio-Seal closure    CT scan reveals evidence of right femoral artery pseudo-aneurysm    Discussed with Dr. Lady Scales, at the [facility] emergency room, considering the situation, her age and risk factors and vascular coverage, interventional radiology coverage over the long weekend it is felt it is safe and prudent to transfer the patient to [address] [place] [address] so that patient can be observed more carefully and appropriate intervention done in a timely fashion

## 2021-05-28 NOTE — ED PROVIDER NOTES
Patient is a 80-year-old female presents emergency department with reported anemia at the nursing home. Patient was recently discharged from the hospital had debridement of a wound on her foot. She states that she does not know why she is here per report she did have some hematuria however the patient denies that at this time. Patient denies any fever, chills, nausea, vomiting, diarrhea does admit to some mid to upper abdominal pain and discomfort. States it is gnawing in nature. The history is provided by the patient. No  was used. Hematuria  This is a new problem. The current episode started today. The problem is unchanged. She describes the hematuria as gross hematuria. The hematuria occurs during the initial portion of her urinary stream. Her pain is at a severity of 4/10. The pain is moderate. She describes her urine color as dark red. Associated symptoms include abdominal pain and dysuria. Pertinent negatives include no chills, fever, flank pain, nausea or vomiting. She is not sexually active. Review of Systems   Constitutional: Positive for fatigue. Negative for chills and fever. HENT: Negative for ear pain and sore throat. Eyes: Negative for pain. Respiratory: Negative for shortness of breath. Cardiovascular: Negative for chest pain. Gastrointestinal: Positive for abdominal pain. Negative for nausea and vomiting. Genitourinary: Positive for dysuria and hematuria. Negative for flank pain and pelvic pain. Musculoskeletal: Negative for back pain and neck pain. Skin: Negative for rash. Neurological: Negative for headaches. Physical Exam  Vitals and nursing note reviewed. Constitutional:       General: She is not in acute distress. Appearance: She is well-developed. She is ill-appearing. HENT:      Head: Normocephalic and atraumatic.       Right Ear: External ear normal.      Left Ear: External ear normal.      Nose: Nose normal. Mouth/Throat:      Mouth: Mucous membranes are moist.      Pharynx: Oropharynx is clear. Eyes:      Pupils: Pupils are equal, round, and reactive to light. Cardiovascular:      Rate and Rhythm: Normal rate and regular rhythm. Heart sounds: Normal heart sounds. Pulmonary:      Effort: Pulmonary effort is normal. No respiratory distress. Breath sounds: Normal breath sounds. Abdominal:      General: Abdomen is flat. Palpations: Abdomen is soft. Tenderness: There is abdominal tenderness. There is no guarding or rebound. Musculoskeletal:         General: No swelling or tenderness. Cervical back: Normal range of motion and neck supple. Skin:     General: Skin is warm and dry. Findings: No rash. Neurological:      Mental Status: She is alert and oriented to person, place, and time. Cranial Nerves: No cranial nerve deficit. Procedures           MDM  Number of Diagnoses or Management Options  Diagnosis management comments: Spoke with Dr. Chelsey Sanabria vascular surgery regarding the patient. He stated the patient should have the leg sandbag in order to improve the bleeding and should be transferred to [place] [address] for further evaluation by vascular surgery there. Stated that they would be able to do the interventions needed at [facility] and so she should be transferred there. Patient did not have urinary tension on CT imaging Ziegler was placed and patient had immediate release of gross hematuria into the Ziegler bag. Patient did have relief of abdominal pain with placement of the Ziegler. This will continue to be evaluated in the hospital.  Patient's abdominal pain improved after placement of Ziegler. Patient was transferred to [place] [address] emergency department for evaluation by vascular surgery.           --------------------------------------------- PAST HISTORY ---------------------------------------------  Past Medical 0.0 - 6.0 %    Basophils % 0.2 0.0 - 2.0 %    Neutrophils Absolute 9.29 (H) 1.80 - 7.30 E9/L    Immature Granulocytes # 0.05 E9/L    Lymphocytes Absolute 1.06 (L) 1.50 - 4.00 E9/L    Monocytes Absolute 0.51 0.10 - 0.95 E9/L    Eosinophils Absolute 0.07 0.05 - 0.50 E9/L    Basophils Absolute 0.02 0.00 - 0.20 E9/L   Comprehensive Metabolic Panel w/ Reflex to MG   Result Value Ref Range    Sodium 124 (L) 132 - 146 mmol/L    Potassium reflex Magnesium 4.5 3.5 - 5.0 mmol/L    Chloride 91 (L) 98 - 107 mmol/L    CO2 25 22 - 29 mmol/L    Anion Gap 8 7 - 16 mmol/L    Glucose 240 (H) 74 - 99 mg/dL    BUN 13 6 - 23 mg/dL    CREATININE 0.7 0.5 - 1.0 mg/dL    GFR Non-African American >60 >=60 mL/min/1.73    GFR African American >60     Calcium 8.6 8.6 - 10.2 mg/dL    Total Protein 6.3 (L) 6.4 - 8.3 g/dL    Albumin 3.2 (L) 3.5 - 5.2 g/dL    Total Bilirubin 0.5 0.0 - 1.2 mg/dL    Alkaline Phosphatase 76 35 - 104 U/L    ALT 17 0 - 32 U/L    AST 19 0 - 31 U/L   Urinalysis, reflex to microscopic   Result Value Ref Range    Color, UA BLOODY Straw/Yellow    Clarity, UA TURBID (A) Clear    Glucose, Ur Negative Negative mg/dL    Bilirubin Urine SMALL (A) Negative    Ketones, Urine TRACE (A) Negative mg/dL    Specific Gravity, UA 1.015 1.005 - 1.030    Blood, Urine LARGE (A) Negative    pH, UA 7.0 5.0 - 9.0    Protein, UA >=300 (A) Negative mg/dL    Urobilinogen, Urine 1.0 <2.0 E.U./dL    Nitrite, Urine POSITIVE (A) Negative    Leukocyte Esterase, Urine MODERATE (A) Negative   Lipase   Result Value Ref Range    Lipase 61 (H) 13 - 60 U/L   Microscopic Urinalysis   Result Value Ref Range    WBC, UA 2-5 0 - 5 /HPF    RBC, UA PACKED 0 - 2 /HPF    Bacteria, UA MODERATE (A) None Seen /HPF   TYPE AND SCREEN   Result Value Ref Range    ABO/Rh B POS     Antibody Screen NEG        Radiology  CT ABDOMEN PELVIS W IV CONTRAST Additional Contrast? None   Final Result   1. 2.0 x 1.1 cm pseudoaneurysm in the right anterior thigh, connected to the anterior wall of the right common femoral artery. Hematoma in the right   anterior thigh. Vascular surgery consultation recommended. 2. Right hydroureteronephrosis with abrupt narrowing of the right ureter at   the level of S1-2. No obstructing calculus or mass lesions seen. Findings   may be due to a stricture. Consider retrograde pyelogram.   3. Moderate distention of the urinary bladder. Clinical correlation is   recommended as to the need for catheterization . XR CHEST PORTABLE   Final Result   No acute cardiopulmonary process. ------------------------- NURSING NOTES AND VITALS REVIEWED ---------------------------  Date / Time Roomed:  5/28/2021 10:14 AM  ED Bed Assignment:  24/24    The nursing notes within the ED encounter and vital signs as below have been reviewed. Patient Vitals for the past 24 hrs:   BP Temp Pulse Resp SpO2 Height Weight   05/28/21 1247 (!) 151/54 -- 63 16 98 % -- --   05/28/21 1134 (!) 141/70 -- 65 16 97 % -- --   05/28/21 1028 (!) 140/49 -- -- -- -- -- --   05/28/21 1025 -- 98.6 °F (37 °C) 63 16 97 % 5' 3\" (1.6 m) 144 lb (65.3 kg)       Oxygen Saturation Interpretation: Normal      ------------------------------------------ PROGRESS NOTES ------------------------------------------  Re-evaluation(s):  Time: 1120. Patients symptoms show no change  Repeat physical examination is not changed    Time: 1250. Patients symptoms are improving  Repeat physical examination is not changed    I have spoken with the patient and discussed todays results, in addition to providing specific details for the plan of care and counseling regarding the diagnosis and prognosis. Their questions are answered at this time and they are agreeable with the plan. I have discussed the risks and benefits of transfer and they wish to proceed with the transfer.       --------------------------------- ADDITIONAL PROVIDER NOTES ---------------------------------  Consultations:  Spoke with Dr. Betty Laureano (vascular surgery). Discussed case. They will come to the ED to evaluate this patient. Spoke with Dr. Sandy Klein ([facility]). Discussed case. They will come to the ED to evaluate this patient. Reason for transfer: Vascular Surgery care. This patient's ED course included: a personal history and physicial examination, re-evaluation prior to disposition, multiple bedside re-evaluations, IV medications, cardiac monitoring, continuous pulse oximetry and complex medical decision making and emergency management    This patient has remained hemodynamically stable and been closely monitored during their ED course. Please note that the withdrawal or failure to initiate urgent interventions for this patient would likely result in a life threatening deterioration or permanent disability. Accordingly this patient received 30 minutes of critical care time, excluding separately billable procedures. Clinical Impression  1. Gross hematuria    2. Pseudoaneurysm of right femoral artery ([address]    3. Generalized abdominal pain    4. Urinary retention    5. Anemia, unspecified type          Disposition  Patient's disposition: Transfer to [facility]. Transferred by: Dr. Shai Magaña. Patient's condition is stable.                   Ash Barbosa DO  Resident  05/28/21 [id number]

## 2021-05-28 NOTE — PROGRESS NOTES
Per Dr. Matti Leigh, called  OF THE [facility] @ [phone]. Requesting transfer to [place] [address] for Pseudo Aneurysm. Per Dr. Matti Leigh, pt is going to ED. Access Center connected Dr. Roverto Conway, ED Attending, to call. Dr. Matti Leigh spoke to him @ this time. Advised did already speak to Vascular Surgery, Dr. Yuko Loving. [facility] called @ 3300 to discuss transport. Advised of needs obtained from RN, [address] [place] [address].      Advised   MICU with RN  Monitor  Room Air  Saline only

## 2021-05-28 NOTE — LETTER
[address] [place] [address] Dr Department Medicaid  CERTIFICATION OF NECESSITY  FOR NON-EMERGENCY TRANSPORTATION   BY GROUND AMBULANCE      Individual Information   1. Name: Shruthi Allen 2. [address] [place] [address] Dr Medicaid Billing Number:    3. Address: [place] [address]      Transportation Provider Information   4. Provider Name:    5. [address] [place] [address] Dr Medicaid Provider Number:  National Provider Identifier (NPI):      Certification  7. Criteria:  During transport, this individual requires:  [x] Medical treatment or continuous     supervision by an EMT. [] The administration or regulation of oxygen by another person. [] Supervised protective restraint. 8. Period Beginning Date: 06/01/21   9. Length  [x] Not more than 10 day(s)  [] One Year     Additional Information Relevant to Certification   10. Comments or Explanations, If Necessary or Appropriate     UNABLE TO MAINTAIN ERECT SITTING POSITION IN A CHAIR FOR TIME NEEDED TO TRANSPORT, DUE TO MODERATE MUSCULAR WEAKNESS OR DE-CONDITIONED STATE      Certifying Practitioner Information   11. Name of Practitioner: DR DAWIT [facility]    [address] Medicaid Provider Number, If Applicable:  [address] Provider Identifier (NPI):      Signature Information   14. Date of Signature: 06/01/21 15. Name of Person Signing: Anita Paige    16.  Signature and Professional Designation: Carmella Dockery RN      ODM [id number]  Rev. 7/2015

## 2021-05-28 NOTE — INTERVAL H&P NOTE
Update History & Physical    The patient's History and Physical of May 14, 2021 was reviewed with the patient and I examined the patient. There was no change. The surgical site was confirmed by the patient and me. Plan: The risks, benefits, expected outcome, and alternative to the recommended procedure have been discussed with the patient. Patient understands and wants to proceed with the procedure.      Electronically signed by Hortencia Morataya MD on 5/28/2021 at 5:28 PM

## 2021-05-28 NOTE — ED PROVIDER NOTES
[facility]  Department of Emergency Medicine   ED  Encounter Note  Admit Date/RoomTime: 2021  4:47 PM  ED Room:     NAME: Gonzalo Amato  : 1927  MRN: [id number]     Chief Complaint:  Wound Check (patient arrives as transfer from [address] [place] [address] with bleeding from procedure site.)    History of Present Illness        Gonzalo Amato is a 80 y.o. old female who presents to the emergency department for vascular surgery evaluation. On 2021, she had an angiogram with access at the right femoral artery with Dr. Yosef Eng with [facility].  She was seen at [facility] today for evaluation of abdominal pain and difficulty urinating. They placed a Ziegler catheter and she did have gross hematuria. Catheter is now draining clear. She is on Eliquis for history of DVT. She was also found to have a pseudoaneurysm of the right femoral artery. Dr. Yuko Loving was here in the ED to evaluate her when she arrived. .  ROS   Pertinent positives and negatives are stated within HPI, all other systems reviewed and are negative. 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 Surgical History has a past surgical history that includes Hysterectomy; Cholecystectomy; Tonsillectomy and adenoidectomy; Coronary artery bypass graft; Abdomen surgery; Appendectomy; Colonoscopy; Endoscopy, colon, diagnostic; Cardiac surgery;  Foot Debridement (Left, 2021); and Foot Debridement (Left, 5/21/2021). Social History:  reports that she has never smoked. She has never used smokeless tobacco. She reports current alcohol use. She reports that she does not use drugs. Family History: family history includes Heart Disease in her brother; Hypertension in her father. Allergies: Lisinopril    Physical Exam   Oxygen Saturation Interpretation: Normal.        ED Triage Vitals [05/28/21 1650]   BP Temp Temp src Pulse Resp SpO2 Height Weight   (!) 143/55 98 °F (36.7 °C) -- 66 14 98 % 5' 3\" (1.6 m) 144 lb (65.3 kg)         General Appearance/Constitutional:  Alert, development consistent with age  [de-identified]:  NC/NT. PERRLA. Airway patent. Neck:  Supple. No lymphadenopathy. Respiratory:  No retractions. Lungs Clear to auscultation and breath sounds equal.  CV:  Regular rate and rhythm. GI:  normal appearing, non-distended with no visible hernias. Bowel sounds: normal bowel sounds. Tenderness: No abdominal tenderness, guarding, rebound, rigidity or pulsatile mass. .           Liver: non-tender. Spleen:  non-tender. Ziegler catheter in place with gross hematuria in Ziegler bag. Blood-tinged urine in tube, it is draining well. Back: CVA Tenderness: No.  Integument:  Normal turgor. Warm, dry, without visible rash, unless noted elsewhere. Dressing applied to the right groin. Lymphatics: No edema, cap.refill <3sec. Neurological:  Orientation age-appropriate. Motor functions intact. Lab / Imaging Results   (All laboratory and radiology results have been personally reviewed by myself)  Labs:  Results for orders placed or performed during the hospital encounter of 05/28/21   TYPE AND SCREEN   Result Value Ref Range    ABO/Rh B POS     Antibody Screen NEG      Imaging: All Radiology results interpreted by Radiologist unless otherwise noted.   US DUP LOWER EXTREMITY RIGHT ARTERIES    (Results Pending)       ED Course / Medical Decision Making     Medications   0.9 % sodium chloride infusion (has no administration in time range)   0.9 % sodium chloride infusion (has no administration in time range)   thrombin kit (has no administration in time range)        Consultations:             IP CONSULT TO INTERVENTIONAL RADIOLOGY  IP CONSULT TO INTERNAL MEDICINE    Procedures:   none    MDM: Patient was transferred from [facility] for pseudoaneurysm of her right femoral artery after recent vascular angiogram.  She was evaluated by Dr. Jasis Holt here in the ED, he spoke with Dr. Cachorro Alba interventional radiologist about a thrombin injection. Patient to have the procedure either today or tomorrow. She will also need further work-up regarding her gross hematuria. Patient accepted for admission by Dr. Sherren Batman. Plan of Care/Counseling:  I reviewed today's visit with the patient in addition to providing specific details for the plan of care and counseling regarding the diagnosis and prognosis. Questions are answered at this time and are agreeable with the plan. Assessment      1. Femoral artery pseudo-aneurysm, right ([address]    2. Gross hematuria      This patient's ED course included: a personal history and physicial examination  This patient has remained hemodynamically stable during their ED course. Plan   Admission to Telemetry Unit  Patient condition is stable. New Medications     New Prescriptions    No medications on file     Electronically signed by Nolan Verduzco DO   DD: 5/28/21  **This report was transcribed using voice recognition software. Every effort was made to ensure accuracy; however, inadvertent computerized transcription errors may be present.   END OF PROVIDER NOTE        Nolan Verduzco DO  05/28/21 1683

## 2021-05-28 NOTE — CONSULTS
Chief Complaint: Patient seen for evaluation of right femoral artery pseudoaneurysm      HPI: This patient, went to [facility] emergency room, with a history of hematuria while on anticoagulation for Eliquis exact etiology of Eliquis, not sure except of a very small questionable subsegmental pulmonary embolus in March of this year, potentially could be from a cardiac issues and atrial fibrillation, also had nausea vomiting epigastric discomfort, underwent a CT scan abdomen pelvis with contrast, revealed evidence of a right femoral artery pseudoaneurysm, there was incidentally noted, as the patient had abdominal symptoms in that area    As noted, patient did undergo a right femoral angiogram access for left leg vascular intervention by Dr. Herlinda Macias on 19 May, when I saw her in the emergency room and sent to the hospital, patient had a pressure bandage applied as instructed at the [facility], on examination other than ecchymosis patient had no actual pain and also tells me the left foot is feeling better      Patient also looked at her Ziegler catheter urinary bag tells me that the urine is clearing up    Patient denies any chest pain, shortness of breath, palpitation, or abdominal pain      Patient denies any focal lateralizing neurological symptoms like loss of speech, vision or loss of function of extremity    Patient can walk short distances prior to foot infection at the facility with the help of walker, currently her ambulation is limited because of nonweightbearing of the left foot, and denies any symptoms of rest pain    Allergies   Allergen Reactions    Lisinopril Other (See Comments)     7/18/19 Pt states that she does not want to take LISINOPRIL       Current Facility-Administered Medications   Medication Dose Route Frequency Provider Last Rate Last Admin    0.9 % sodium chloride infusion   Intravenous PRN Norma Munoz MD        0.9 % sodium chloride infusion   Intravenous Continuous Theron Reyes Krishna Dowling MD         Current Outpatient Medications   Medication Sig Dispense Refill    acetaminophen (TYLENOL) 500 MG tablet Take 500 mg by mouth every 6 hours as needed for Pain      albuterol (ACCUNEB) 1.25 MG/3ML nebulizer solution Inhale 1 ampule into the lungs every 4 hours as needed for Shortness of Breath      docusate sodium (COLACE) 100 MG capsule Take 100 mg by mouth daily      diphenhydrAMINE (BENADRYL) 25 MG tablet Take 25 mg by mouth every 4 hours as needed for Itching      folic acid (FOLVITE) 1 MG tablet Take 1 mg by mouth daily      furosemide (LASIX) 20 MG tablet Take 20 mg by mouth three times a week *MON-WED-FRI*      Heparin Lock Flush (HEPARIN FLUSH, 1 UNITS/ML,) 1 UNIT/ML injection Infuse 1 Units intravenously 2 times daily      ferrous sulfate (IRON 325) 325 (65 Fe) MG tablet Take 325 mg by mouth daily (with breakfast)      magnesium hydroxide (MILK OF MAGNESIA) 400 MG/5ML suspension Take 30 mLs by mouth daily as needed for Constipation      omeprazole (PRILOSEC) 20 MG delayed release capsule Take 20 mg by mouth daily      promethazine (PHENERGAN) 12.5 MG tablet Take 12.5 mg by mouth every 6 hours as needed for Nausea      traMADol (ULTRAM) 50 MG tablet Take 50 mg by mouth three times daily.  VITAMIN B COMPLEX-C PO Take 1 tablet by mouth daily      ascorbic acid (VITAMIN C) 500 MG tablet Take 500 mg by mouth daily      apixaban (ELIQUIS) 5 MG TABS tablet Take 1 tablet by mouth 2 times daily 60 tablet     ertapenem (INVANZ) infusion Infuse 1,000 mg intravenously every 24 hours for 10 days For 10 days 75674 mg 0    rOPINIRole (REQUIP) 0.5 MG tablet Take 1 tablet by mouth nightly 90 tablet 3    sucralfate (CARAFATE) 1 GM tablet Take 1 g by mouth 3 times daily      bisacodyl (DULCOLAX) 10 MG suppository Place 10 mg rectally daily as needed for Constipation Indications: IF NO RESULTS FROM MOM       gabapentin (NEURONTIN) 300 MG capsule Take 300 mg by mouth every evening. CLOSURE performed by Ethan Avelar DPM at [address] [address] [place] [place]      frankie and dl 1997    TONSILLECTOMY AND ADENOIDECTOMY         Family History   Problem Relation Age of Onset    Hypertension Father     Heart Disease Brother        Social History     Socioeconomic History    Marital status:      Spouse name: Not on file    Number of children: Not on file    Years of education: Not on file    Highest education level: Not on file   Occupational History    Not on file   Tobacco Use    Smoking status: Never Smoker    Smokeless tobacco: Never Used   Vaping Use    Vaping Use: Never used   Substance and Sexual Activity    Alcohol use: Yes     Comment: occasional    Drug use: No    Sexual activity: Not Currently     Partners: Male   Other Topics Concern    Not on file   Social History Narrative    Not on file     Social Determinants of Health     Financial Resource Strain:     Difficulty of Paying Living Expenses:    Food Insecurity:     Worried About Running Out of Food in the Last Year:     [address] in the Last Year:    Transportation Needs:     Lack of Transportation (Medical):  Lack of Transportation (Non-Medical):    Physical Activity:     Days of Exercise per Week:     Minutes of Exercise per Session:    Stress:     Feeling of Stress :    Social Connections:     Frequency of Communication with Friends and Family:     Frequency of Social Gatherings with Friends and Family:     Attends Taoism Services:     Active Member of Clubs or Organizations:     Attends Club or Organization Meetings:     Marital Status:    Intimate Partner Violence:     Fear of Current or Ex-Partner:     Emotionally Abused:     Physically Abused:     Sexually Abused:        Review of Systems:  Skin:  No abnormal pigmentation or rash. Eyes:  No blurring, diplopia or vision loss. Ears/Nose/Throat:  No hearing loss or vertigo.     Respiratory:  No cough, pleuritic chest pain, dyspnea, or wheezing. Cardiovascular: No angina, palpitations . Gastrointestinal:  No nausea or vomiting; no abdominal pain or rectal bleeding. Musculoskeletal:  No arthritis or weakness. Neurologic:  No paralysis, paresis, seizures or headaches. Hematologic/Lymphatic/Immunologic:  No anemia, abnormal bleeding/bruising. Endocrine:  No heat or cold intolerance. No polyphagia, polydipsia or polyuria. Physical Exam:  BP (!) 143/55   Pulse 66   Temp 98 °F (36.7 °C)   Resp 14   Ht 5' 3\" (1.6 m)   Wt 144 lb (65.3 kg)   LMP 12/03/1997   SpO2 98%   BMI 25.51 kg/m²   General appearance:  Alert, awake, oriented x 3. No distress. Skin:  Warm and dry. Head:  Normocephalic. No masses, lesions or tenderness. Eyes:  Conjunctivae appear normal; PERRL. Ears:  External ears normal.  Nose/Sinuses:  Septum midline, mucosa normal; no drainage. Oropharynx:  Clear, no exudate noted. Neck:  No jugular venous distention, lymphadenopathy or thyromegaly. No evidence of carotid bruit      Lungs:  Clear to ausculation bilaterally. No rhonchi, crackles, wheezes. Heart:  Regular rate and rhythm. No rub or murmur. .    Abdomen:  Soft, non-tender. No masses, organomegaly. Musculoskeletal: No joint effusions, tenderness swelling or warmth. Neuro: Speech is intact. Moving all extremities. No focal motor or sensory deficits. Extremities:  Both feet are warm to touch.  The color of both feet is normal.    Patient does have area of ecchymosis and bruising of the right groin from the recent right femoral access, on 9 May for left leg intervention, with slightly prominent pulse noted on the right compared to the left side    Patient has strong Doppler signals, strong monophasic to almost biphasic Doppler signals at the ankle    The plantar surface wound, on the left side looking better without any cellulitis, some a scab and eschar formation without drainage at this time      Pulses Right  Left Brachial 3 3    Radial    3=normal   Femoral 2 2  2=diminished   Popliteal    1=barely palpable   Dorsalis pedis    0=absent   Posterior tibial 0 0  4=aneurysmal           Other pertinent information:1. The past medical records were reviewed. 2.    Lab Results   Component Value Date    WBC 11.0 05/28/2021    HGB 7.5 (L) 05/28/2021    HCT 23.1 (L) 05/28/2021    MCV 97.9 05/28/2021     (H) 05/28/2021      Lab Results   Component Value Date     (L) 05/28/2021    K 4.5 05/28/2021    CL 91 (L) 05/28/2021    CO2 25 05/28/2021    BUN 13 05/28/2021    CREATININE 0.7 05/28/2021    GLUCOSE 240 (H) 05/28/2021    CALCIUM 8.6 05/28/2021    PROT 6.3 (L) 05/28/2021    LABALBU 3.2 (L) 05/28/2021    BILITOT 0.5 05/28/2021    ALKPHOS 76 05/28/2021    AST 19 05/28/2021    ALT 17 05/28/2021    LABGLOM >60 05/28/2021    GFRAA >60 05/28/2021     Lab Results   Component Value Date    APTT 27.5 11/23/2014      Lab Results   Component Value Date    INR 1.1 11/23/2014    PROTIME 11.1 11/23/2014        3. I have reviewed the ER notes from the [facility] hospital    4. I have reviewed the cardiac cath intervention done by by Dr. Yosef Eng recently 10 days ago    5. 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 The CT scan report from March 21, revealed a questionable small subsegmental PE in the right lung, venous ultrasound was done at that time of the left leg and also done in the past revealed no evidence of deep vein thrombosis    Assessment:      1. Right femoral artery pseudoaneurysm with ecchymosis, asymptomatic, post right femoral artery access for left lower extremity vascular intervention on 19 May    2. Hematuria while on Eliquis with anemia    3.   Multiple medical risk factors, hypertension, hyperlipidemia, diabetes mellitus, coronary artery disease, diabetic foot infection of the left leg, improving          Patient Active Problem List   Diagnosis    Asthma    CAD (coronary artery disease)    Vitamin D deficiency    [facility] left foot [facility]    HTN (hypertension)    Idiopathic peripheral neuropathy    Rhinitis, allergic    SOB (shortness of breath)    Bronchitis    GERD (gastroesophageal reflux disease)    PVD (peripheral vascular disease) with claudication (HCC)    Chest pain    Gait instability    Pulmonary embolism (HCC)    Cellulitis    Cellulitis of left foot    Metabolic encephalopathy    Hyponatremia    DM (diabetes mellitus), type 2 (HCC)    Type 1 diabetes mellitus with left diabetic foot ulcer (HCC)    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 (HCC)    Cellulitis and abscess of leg    Acute hypoxemic respiratory failure ([facility]            Plan:     I had a long detailed discussion the patient, options, risks benefits and alternatives were explained to the patient, patient was told, that the puncture site has a slightly get self-contained not actively bleeding at the present time, nevertheless, patient was recommended to consider ultrasound-guided thrombin injection for treatment of pseudoaneurysm, to attempt at least, if not successful then only she will require vascular surgical intervention    In the interim, we will place a sandbag, and external pressure bandage    Discussed with ER physician Dr. Michaela Salazar, patient to be admitted to a monitored floor, will proceed with a type and screen and prepare one set of blood because of her hemoglobin is 7.4 g    As Eliquis is currently held,, patient was not sure of anticoagulation, could be from a cardiac issues, could potentially be from the small subsegmental questionable PE of the right leg noted on the CT of the chest done in March, will also obtain a venous ultrasound of the legs for baseline monitoring for the future comparison    Also will obtain ankle-brachial index, to eval the vascular status of the

## 2021-05-28 NOTE — LETTER
[address] Medicaid  CERTIFICATION OF NECESSITY  FOR TRANSPORTATION   BY WHEELCHAIR VAN     Individual Information   1. Name: Tiffanie Colin [address] Medicaid Billing Number:    3. Address: [place] [address]      Transportation Provider Information   4. Provider Name:    . [address] [place] [address] Dr Medicaid Provider Number:  National Provider Identifier (NPI):      Certification  7. Criteria:  By signing this document, the practitioner certifies that two statements are true:  A. This individual must be accompanied by a mobility-related assistive device from the point of pick-up to the point of drop-off. B. Transport of this individual by standard passenger vehicle or common carrier is precluded or contraindicated. 8. Period Beginning Date: 06/01/21   9. Length  [x] Not more than 10 day(s)  [] One Year     Additional Information Relevant to Certification   10. Comments or Explanations, If Necessary or Appropriate   WEAKNESS, FALLS RISKS, NWB - LEFT LE      Certifying Practitioner Information   11. Name of Practitioner: DR PASTOR [address] [place] [address]    [address] Medicaid Provider Number, If Applicable:  [address] Provider Identifier (NPI):      Signature Information   14. Date of Signature: 06/01/21 15. Name of Person Signing: Rachel Howell    16.  Signature and Professional Designation: Ainsley Ibarra RN      ODM [id number]  Rev. 7/2015

## 2021-05-28 NOTE — BRIEF OP NOTE
Brief Postoperative Note    Mery Dmuont  YOB: 1927  [id number]    Pre-operative Diagnosis and Procedure: 79 yo F with a pseudoaneurysm on Eliquis. Here for ultrasound guided thrombin injection. Post-operative Diagnosis: Thrombosed pseudoaneurysm on ultrasound imaging. Therefore the procedure was aborted. Anesthesia: Local    Estimated Blood Loss: < 10 cc    Surgeon: Peña Guerra MD    Complications: none    Specimen obtained: none    Findings: Thrombosed pseudoaneurysm on ultrasound imaging. Therefore the procedure was aborted.      Peña Guerra MD   5/28/2021 5:28 PM

## 2021-05-28 NOTE — CONSULTS
Vascular : Pt seen,chart,lab and x rays reviewed. Assessment:1.  Right femoral artery pseudoaneurysm noted incidentally on the CT scan abdomen pelvis that was done for GI bleeding and abdominal pain, per the information given to me by the resident in [facility], patient also has some hematuria, status post right femoral artery access on 19 May for left leg vascular intervention    Patient is strong Doppler signals, bilaterally, almost biphasic, left foot, plantar wound, improving    Plan: Discussed with the patient regarding the CT scan findings of the right femoral artery pseudoaneurysm, informed her, we should consider thrombin injection and only if is not successful on 2 attempts, consider surgical repair    I personally discussed with Dr. PULIDO [facility], interventional radiologist regarding the same    Discussed with Dr. Chuy Verdin in the emergency room    We will have type and screen done    Full consult to follow.     Thank you    Dawson Garza MD

## 2021-05-29 ENCOUNTER — APPOINTMENT (OUTPATIENT)
Dept: ULTRASOUND IMAGING | Age: 86
DRG: 300 | End: 2021-05-29
Payer: MEDICARE

## 2021-05-29 PROBLEM — Z86.711 HISTORY OF PULMONARY EMBOLUS (PE): Status: ACTIVE | Noted: 2021-05-29

## 2021-05-29 PROBLEM — G93.41 METABOLIC ENCEPHALOPATHY: Status: RESOLVED | Noted: 2021-05-17 | Resolved: 2021-05-29

## 2021-05-29 PROBLEM — I72.4 PSEUDOANEURYSM OF RIGHT FEMORAL ARTERY (HCC): Status: ACTIVE | Noted: 2021-05-29

## 2021-05-29 PROBLEM — Z98.62 PERIPHERAL VASCULAR ANGIOPLASTY STATUS: Status: ACTIVE | Noted: 2021-05-29

## 2021-05-29 PROBLEM — L03.116 CELLULITIS OF LEFT FOOT: Status: RESOLVED | Noted: 2021-05-14 | Resolved: 2021-05-29

## 2021-05-29 PROBLEM — R06.02 SOB (SHORTNESS OF BREATH): Status: RESOLVED | Noted: 2017-05-23 | Resolved: 2021-05-29

## 2021-05-29 PROBLEM — I26.99 PULMONARY EMBOLISM (HCC): Status: RESOLVED | Noted: 2021-03-23 | Resolved: 2021-05-29

## 2021-05-29 PROBLEM — R07.9 CHEST PAIN: Status: RESOLVED | Noted: 2019-07-18 | Resolved: 2021-05-29

## 2021-05-29 PROBLEM — L03.90 CELLULITIS: Status: RESOLVED | Noted: 2021-05-09 | Resolved: 2021-05-29

## 2021-05-29 LAB
ANION GAP SERPL CALCULATED.3IONS-SCNC: 9 MMOL/L (ref 7–16)
APTT: 30.3 SEC (ref 24.5–35.1)
BUN BLDV-MCNC: 10 MG/DL (ref 6–23)
CALCIUM SERPL-MCNC: 8.6 MG/DL (ref 8.6–10.2)
CHLORIDE BLD-SCNC: 100 MMOL/L (ref 98–107)
CO2: 24 MMOL/L (ref 22–29)
CORTISOL TOTAL: 14.62 MCG/DL (ref 2.68–18.4)
CREAT SERPL-MCNC: 0.6 MG/DL (ref 0.5–1)
GFR AFRICAN AMERICAN: >60
GFR NON-AFRICAN AMERICAN: >60 ML/MIN/1.73
GLUCOSE BLD-MCNC: 143 MG/DL (ref 74–99)
HCT VFR BLD CALC: 21.7 % (ref 34–48)
HEMOGLOBIN: 6.9 G/DL (ref 11.5–15.5)
INR BLD: 1.3
MCH RBC QN AUTO: 31.5 PG (ref 26–35)
MCHC RBC AUTO-ENTMCNC: 31.8 % (ref 32–34.5)
MCV RBC AUTO: 99.1 FL (ref 80–99.9)
OSMOLALITY: 275 MOSM/KG (ref 285–310)
PDW BLD-RTO: 14.6 FL (ref 11.5–15)
PLATELET # BLD: 581 E9/L (ref 130–450)
PMV BLD AUTO: 9.6 FL (ref 7–12)
POTASSIUM REFLEX MAGNESIUM: 4.1 MMOL/L (ref 3.5–5)
POTASSIUM SERPL-SCNC: 4.1 MMOL/L (ref 3.5–5)
PRO-BNP: 613 PG/ML (ref 0–450)
PROTHROMBIN TIME: 14.9 SEC (ref 9.3–12.4)
RBC # BLD: 2.19 E12/L (ref 3.5–5.5)
SODIUM BLD-SCNC: 133 MMOL/L (ref 132–146)
TSH SERPL DL<=0.05 MIU/L-ACNC: 1.6 UIU/ML (ref 0.27–4.2)
WBC # BLD: 10.4 E9/L (ref 4.5–11.5)

## 2021-05-29 PROCEDURE — 2580000003 HC RX 258: Performed by: INTERNAL MEDICINE

## 2021-05-29 PROCEDURE — 80048 BASIC METABOLIC PNL TOTAL CA: CPT

## 2021-05-29 PROCEDURE — 82533 TOTAL CORTISOL: CPT

## 2021-05-29 PROCEDURE — 85027 COMPLETE CBC AUTOMATED: CPT

## 2021-05-29 PROCEDURE — 83880 ASSAY OF NATRIURETIC PEPTIDE: CPT

## 2021-05-29 PROCEDURE — 2140000000 HC CCU INTERMEDIATE R&B

## 2021-05-29 PROCEDURE — 93971 EXTREMITY STUDY: CPT | Performed by: RADIOLOGY

## 2021-05-29 PROCEDURE — 85730 THROMBOPLASTIN TIME PARTIAL: CPT

## 2021-05-29 PROCEDURE — 84443 ASSAY THYROID STIM HORMONE: CPT

## 2021-05-29 PROCEDURE — 93926 LOWER EXTREMITY STUDY: CPT

## 2021-05-29 PROCEDURE — 85610 PROTHROMBIN TIME: CPT

## 2021-05-29 PROCEDURE — 99232 SBSQ HOSP IP/OBS MODERATE 35: CPT | Performed by: SURGERY

## 2021-05-29 PROCEDURE — 6360000002 HC RX W HCPCS: Performed by: INTERNAL MEDICINE

## 2021-05-29 PROCEDURE — 93926 LOWER EXTREMITY STUDY: CPT | Performed by: RADIOLOGY

## 2021-05-29 PROCEDURE — 83930 ASSAY OF BLOOD OSMOLALITY: CPT

## 2021-05-29 PROCEDURE — 36415 COLL VENOUS BLD VENIPUNCTURE: CPT

## 2021-05-29 PROCEDURE — 6370000000 HC RX 637 (ALT 250 FOR IP): Performed by: INTERNAL MEDICINE

## 2021-05-29 PROCEDURE — 93970 EXTREMITY STUDY: CPT

## 2021-05-29 RX ADMIN — TRAMADOL HYDROCHLORIDE 50 MG: 50 TABLET, COATED ORAL at 17:02

## 2021-05-29 RX ADMIN — ROPINIROLE HYDROCHLORIDE 0.5 MG: 0.5 TABLET, FILM COATED ORAL at 20:26

## 2021-05-29 RX ADMIN — BUDESONIDE AND FORMOTEROL FUMARATE DIHYDRATE 2 PUFF: 80; 4.5 AEROSOL RESPIRATORY (INHALATION) at 10:18

## 2021-05-29 RX ADMIN — ACETAMINOPHEN 650 MG: 325 TABLET ORAL at 20:26

## 2021-05-29 RX ADMIN — Medication 10 ML: at 20:26

## 2021-05-29 RX ADMIN — AMLODIPINE BESYLATE 5 MG: 5 TABLET ORAL at 10:16

## 2021-05-29 RX ADMIN — ERTAPENEM SODIUM 1000 MG: 1 INJECTION, POWDER, LYOPHILIZED, FOR SOLUTION INTRAMUSCULAR; INTRAVENOUS at 10:21

## 2021-05-29 RX ADMIN — BUDESONIDE AND FORMOTEROL FUMARATE DIHYDRATE 2 PUFF: 80; 4.5 AEROSOL RESPIRATORY (INHALATION) at 20:26

## 2021-05-29 RX ADMIN — SUCRALFATE 1 G: 1 TABLET ORAL at 20:26

## 2021-05-29 RX ADMIN — PANTOPRAZOLE SODIUM 40 MG: 40 TABLET, DELAYED RELEASE ORAL at 06:30

## 2021-05-29 RX ADMIN — GABAPENTIN 300 MG: 300 CAPSULE ORAL at 20:26

## 2021-05-29 RX ADMIN — Medication 10 ML: at 10:19

## 2021-05-29 RX ADMIN — SUCRALFATE 1 G: 1 TABLET ORAL at 10:16

## 2021-05-29 RX ADMIN — ENOXAPARIN SODIUM 40 MG: 40 INJECTION SUBCUTANEOUS at 10:17

## 2021-05-29 RX ADMIN — TRAMADOL HYDROCHLORIDE 50 MG: 50 TABLET, COATED ORAL at 20:26

## 2021-05-29 RX ADMIN — PRAVASTATIN SODIUM 20 MG: 20 TABLET ORAL at 20:26

## 2021-05-29 RX ADMIN — SODIUM CHLORIDE: 9 INJECTION, SOLUTION INTRAVENOUS at 14:53

## 2021-05-29 RX ADMIN — TRAMADOL HYDROCHLORIDE 50 MG: 50 TABLET, COATED ORAL at 10:17

## 2021-05-29 ASSESSMENT — PAIN DESCRIPTION - LOCATION
LOCATION: LEG
LOCATION: LEG

## 2021-05-29 ASSESSMENT — PAIN SCALES - GENERAL
PAINLEVEL_OUTOF10: 6
PAINLEVEL_OUTOF10: 4
PAINLEVEL_OUTOF10: 0
PAINLEVEL_OUTOF10: 7
PAINLEVEL_OUTOF10: 7
PAINLEVEL_OUTOF10: 5
PAINLEVEL_OUTOF10: 0

## 2021-05-29 ASSESSMENT — PAIN DESCRIPTION - ORIENTATION
ORIENTATION: LEFT
ORIENTATION: LEFT

## 2021-05-29 ASSESSMENT — PAIN DESCRIPTION - PAIN TYPE
TYPE: ACUTE PAIN
TYPE: ACUTE PAIN

## 2021-05-29 ASSESSMENT — PAIN DESCRIPTION - DESCRIPTORS: DESCRIPTORS: ACHING

## 2021-05-29 ASSESSMENT — PAIN DESCRIPTION - FREQUENCY: FREQUENCY: CONTINUOUS

## 2021-05-29 NOTE — H&P
History & Physical      PCP: Susan Medina DO    Date of Admission: 5/28/2021    Date of Service: Pt seen/examined on 5/28/2021 and is  admitted to Inpatient with expected LOS greater than two midnights due to medical therapy. Chief Complaint:  had concerns including Wound Check (patient arrives as transfer from [facility] with bleeding from procedure site. ). History Of Present Illness:    Ms. Marek Singh, a 80y.o. 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 Patient has been admitted and discharged a few times. She  Was discharged to a local nursing facility  On 19 May she had an angiogram with access to the right femoral artery for assessment of vascular status. She was seen in [facility] following that where a Ziegler's catheter was placed for abdominal discomfort and she had gross hematuria   She was found to have a pseudoaneurysm of the right femoral artery. Patient was seen in the ED by vascular yesterday. She was brought downtown for treatment of her pseudoaneurysm  Dr. Rosalind Colon did see the patient late last evening.   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 She has never used smokeless tobacco.  ETOH:   reports current alcohol use. Family History:    Reviewed in detail and negative for DM, CAD, Cancer, CVA. Positive as follows\"      Problem Relation Age of Onset    Hypertension Father     Heart Disease Brother        REVIEW OF SYSTEMS:   Pertinent positives as noted in the HPI. All other systems reviewed and negative. Negative for fevers or chills negative for nausea vomiting  Negative for new chest pain shortness of breath but she is having some trouble with coughing when she swallows    PHYSICAL EXAM:  BP (!) 145/68   Pulse 68   Temp 97.1 °F (36.2 °C) (Temporal)   Resp 17   Ht 5' 3\" (1.6 m)   Wt 145 lb (65.8 kg)   LMP 12/03/1997   SpO2 94%   BMI 25.69 kg/m²   General appearance: No apparent distress, appears stated age and cooperative. Pale looking  HEENT: Normal cephalic, atraumatic without obvious deformity. Pupils equal, round, and reactive to light. Extra ocular muscles intact. Conjunctivae/corneas clear. Neck: Supple, with full range of motion. No jugular venous distention. Trachea midline. Respiratory: Decreased  Cardiovascular: Regular heart rate rhythm  Abdomen: Nontender  Musculoskeletal: Able to move all   skin: Normal skin color. No rashes or lesions.   Neurologic: Able to move all but bilateral lower extremities and heel protectors and left lower extremity is extensively dressed  Psychiatric: Alert and oriented, thought content appropriate, normal insight          CBC:   Recent Labs     05/28/21  1044 05/29/21  0516   WBC 11.0 10.4   RBC 2.36* 2.19*   HGB 7.5* 6.9*   HCT 23.1* 21.7*   MCV 97.9 99.1   RDW 14.5 14.6   * 581*     BMP:   Recent Labs     05/28/21  1044 05/29/21  0516   * 133   K 4.5 4.1  4.1   CL 91* 100   CO2 25 24   BUN 13 10   CREATININE 0.7 0.6     LFT:  Recent Labs     05/28/21  1044   PROT 6.3*   ALKPHOS 76   ALT 17   AST 19   BILITOT 0.5   LIPASE 61*     CE:  No results for input(s): Mariely Dumont in the last 72 hours. PT/INR:   Recent Labs     05/29/21  0516   INR 1.3   APTT 30.3     BNP: No results for input(s): BNP in the last 72 hours. ESR:   Lab Results   Component Value Date    SEDRATE 127 (H) 05/20/2021     CRP:   Lab Results   Component Value Date    CRP 7.5 (H) 05/20/2021     D Dimer:   Lab Results   Component Value Date    DDIMER 635 05/16/2021      Folate and B12:   Lab Results   Component Value Date    HOITFPKG08 251 05/20/2021   ,   Lab Results   Component Value Date    FOLATE >20.0 05/20/2021     Lactic Acid:   Lab Results   Component Value Date    LACTA 1.2 10/02/2017     Thyroid Studies:   Lab Results   Component Value Date    TSH 1.600 05/29/2021       Oupatient labs:  Lab Results   Component Value Date    CHOL 159 05/02/2019    TRIG 61 05/02/2019    HDL 68 05/02/2019    LDLCALC 79 05/02/2019    TSH 1.600 05/29/2021    INR 1.3 05/29/2021    LABA1C 6.4 (H) 05/02/2019       Urinalysis:    Lab Results   Component Value Date    NITRU POSITIVE 05/28/2021    WBCUA 2-5 05/28/2021    BACTERIA MODERATE 05/28/2021    RBCUA PACKED 05/28/2021    RBCUA 5-10 09/02/2012    BLOODU LARGE 05/28/2021    SPECGRAV 1.015 05/28/2021    GLUCOSEU Negative 05/28/2021       Imaging:  XR KNEE RIGHT (MIN 4 VIEWS)    Result Date: 5/22/2021  EXAMINATION: FOUR XRAY VIEWS OF THE RIGHT KNEE 5/22/2021 9:42 am COMPARISON: None. HISTORY: ORDERING SYSTEM PROVIDED HISTORY: right knee pain and swelling TECHNOLOGIST PROVIDED HISTORY: Reason for exam:->right knee pain and swelling What reading provider will be dictating this exam?->CRC FINDINGS: There is no fracture or dislocation of the right knee. Tricompartmental degenerative changes are noted. There is a small joint effusion and soft tissue swelling noted medially. .     1. There is no fracture dislocation of the right knee 2. Tricompartmental degenerative changes the right knee. 3. Small joint effusion and medial soft tissue swelling.      XR COMPARISON: January 17, 2015 HISTORY: ORDERING SYSTEM PROVIDED HISTORY: trauma TECHNOLOGIST PROVIDED HISTORY: Reason for exam:->trauma FINDINGS: Fracture involving distal 5th metatarsal bone with angulation. There appears to be fracture healing. No evidence of acute fracture. Normal tarsal bone alignment. No radiopaque foreign body. There is plantar calcaneal spurring. 1.  Healing, angulated fracture involving distal aspect of 5th metatarsal bone appears new compared to prior from January 17, 2015. 2.  No findings to suggest acute fracture or dislocation. CT HEAD WO CONTRAST    Result Date: 5/16/2021  EXAMINATION: CT OF THE HEAD WITHOUT CONTRAST  5/16/2021 6:03 am TECHNIQUE: CT of the head was performed without the administration of intravenous contrast. Dose modulation, iterative reconstruction, and/or weight based adjustment of the mA/kV was utilized to reduce the radiation dose to as low as reasonably achievable. COMPARISON: 07/05/2019 HISTORY: ORDERING SYSTEM PROVIDED HISTORY: Confusion. TECHNOLOGIST PROVIDED HISTORY: Reason for exam:->Confusion. Has a \"code stroke\" or \"stroke alert\" been called? ->No What reading provider will be dictating this exam?->CRC FINDINGS: BRAIN/VENTRICLES: There is no acute intracranial hemorrhage, mass effect or midline shift. No abnormal extra-axial fluid collection. The gray-white differentiation is maintained without evidence of an acute infarct. There is prominence of the ventricles and sulci due to global parenchymal volume loss. There are nonspecific areas of hypoattenuation within the periventricular and subcortical white matter, which likely represent chronic microvascular ischemic change. ORBITS: The visualized portion of the orbits demonstrate no acute abnormality. SINUSES: The visualized paranasal sinuses and mastoid air cells demonstrate no acute abnormality. SOFT TISSUES/SKULL: No acute abnormality of the visualized skull or soft tissues.      No acute calcified atherosclerosis is seen in the aorta. No aneurysm. Bones/Soft Tissues: The visualized bones are intact without fracture or focal lesion. Prominent loss of disc heights in the lumbar spine. Miscellaneous: In right anterior thigh, there is 2.0 x 1.1 cm focus of enhancement, which posteriorly connects to the right common femoral artery and is suspicious for pseudoaneurysm (series 2, image 132). There is a small to moderate size surrounding hematoma in the anterior thigh. 1. 2.0 x 1.1 cm pseudoaneurysm in the right anterior thigh, connected to the anterior wall of the right common femoral artery. Hematoma in the right anterior thigh. Vascular surgery consultation recommended. 2. Right hydroureteronephrosis with abrupt narrowing of the right ureter at the level of S1-2. No obstructing calculus or mass lesions seen. Findings may be due to a stricture. Consider retrograde pyelogram. 3. Moderate distention of the urinary bladder. Clinical correlation is recommended as to the need for catheterization . XR CHEST PORTABLE    Result Date: 5/28/2021  EXAMINATION: ONE XRAY VIEW OF THE CHEST 5/28/2021 10:45 am COMPARISON: May 16 HISTORY: ORDERING SYSTEM PROVIDED HISTORY: confirm midline placement TECHNOLOGIST PROVIDED HISTORY: Reason for exam:->confirm midline placement FINDINGS: Resolved findings of a volume overload, no longer seen infiltrates or prominence of perihilar vessels. The heart is normal size. Patient had previous mid sternotomy. There is no conspicuous pleural effusions. No acute cardiopulmonary process. XR CHEST PORTABLE    Result Date: 5/20/2021  EXAMINATION: ONE XRAY VIEW OF THE CHEST 5/20/2021 11:55 am COMPARISON: May 16, 2021 HISTORY: ORDERING SYSTEM PROVIDED HISTORY: recheck chf status TECHNOLOGIST PROVIDED HISTORY: Reason for exam:->recheck chf status What reading provider will be dictating this exam?->CRC FINDINGS: Median sternotomy wires are present.   Chronic interstitial changes bilaterally. Opacities are present at left costophrenic sulcus. No pneumothorax. The heart is normal in size. Improved aeration throughout both lungs. Minimal opacities at left costophrenic sulcus could suggest persistent small pleural effusion. XR CHEST PORTABLE    Result Date: 2021  EXAMINATION: ONE XRAY VIEW OF THE CHEST 5/15/2021 11:30 pm COMPARISON:  HISTORY: ORDERING SYSTEM PROVIDED HISTORY: Hypoxia, Wheezing TECHNOLOGIST PROVIDED HISTORY: Reason for exam:->Hypoxia, Wheezing What reading provider will be dictating this exam?->CRC FINDINGS: Cardiac silhouette is mildly enlarged. Increasing central vascular congestion. Widespread interstitial opacities in the lungs bilaterally most consistent with pulmonary edema. Small bilateral pleural effusions. Status post median sternotomy. Mild cardiomegaly with vascular congestion and widespread interstitial airspace disease most consistent with pulmonary edema. Small bilateral pleural effusions.      VL VICTORINA BILATERAL LIMITED 1-2 LEVELS    Result Date: 2021  The ankle brachial index is falsely elevated due to calcification of the tibial arteries Bilaterally, patient does have Doppler evidence of a moderate femoral popliteal arterial occlusive disease, with the biphasic ankle Doppler tracings with adequate flow to the feet based upon the pulse volume recordings over the metatarsals Good collateral flow noted the toes of the right foot based upon the pulse volume recordings On the left side the pulse volume recordings diminished over the toes, and almost flat over the left third and fourth toes    US DUP LOWER EXTREMITY RIGHT ARTERIES    Result Date: 2021  Patient MRN: [id number] : 1927 Age:  80 years Gender: Female Order Date: 2021 8:47 AM Exam: US DUP LOWER EXTREMITY RIGHT ARTERIES Number of Images: 16 views Indication:   Please evaluate for persistent  thrombosed right femoral artery pseudoaneurysm Rule out pseudoaneurysm Please evaluate for persistent  thrombosed right femoral artery pseudoaneurysm Comparison: 2021 Findings: There is evidence for pseudoaneurysm at the level the right external iliac artery measuring approximately 2 cm. In addition there appears to be a moderate-sized associated hematoma. 1.  Right external iliac pseudoaneurysm, this appears to be recanalized when compared to previous     US DUP LOWER EXTREMITY RIGHT ARTERIES    Result Date: 2021  Patient MRN: [id number] : 1927 Age:  80 years Gender: Female Order Date: 2021 6:32 PM Exam: US DUP LOWER EXTREMITY RIGHT ARTERIES Number of Images: 25 views Indication:   check for pseudoaneurysm check for pseudoaneurysm What reading provider will be dictating this exam?->[facility] Comparison: None. Findings: There is no evidence for a patent pseudo aneurysm . There is evidence for a thrombosed pseudoaneurysm. 1.  Findings consistent with a thrombosed pseudoaneurysm on the right       ASSESSMENT:    Principal Problem:    Pseudoaneurysm of femoral artery (HCC)  Active Problems:    Asthma    CAD (coronary artery disease)    Diabetic infection of left foot (HCC)    Hyponatremia    DM (diabetes mellitus), type 2 ([address]  Resolved Problems:    * No resolved hospital problems. *      PLAN:    1. Pseudoaneurysm has had spontaneous thrombosis  2. She is currently on Invanz for cellulitis left lower extremity and this was continued from her last admission when she was discharged on the   3. Tramadol for pain control  4. Low hemoglobin however she is not actively low with blood pressure so we could recheck and then decide for blood transfusion. 5.  On tramadol for pain control  6. Previous known peripheral artery disease for which she is undergone angioplasty  7. Known history of CHF so we can follow BNP  8.   Monitor on Eliquis      Diet: DIET GENERAL;  Code Status: Full Code  Surrogate decision maker confirmed with patient:   Extended

## 2021-05-29 NOTE — PROGRESS NOTES
Vascular        Chief complaint : Patient seen for evaluation of right femoral artery pseudoaneurysm incidentally noticed on the CT scan abdomen pelvis that was done because of nausea vomiting and abdominal discomfort    Please see the history of present illness from my consultation note dated 20 May as outlined below      This patient, went to [facility] emergency room, with a history of hematuria while on anticoagulation for Eliquis exact etiology of Eliquis, not sure except of a very small questionable subsegmental pulmonary embolus in March of this year, potentially could be from a cardiac issues and atrial fibrillation, also had nausea vomiting epigastric discomfort, underwent a CT scan abdomen pelvis with contrast, revealed evidence of a right femoral artery pseudoaneurysm, there was incidentally noted, as the patient had abdominal symptoms in that area     As noted, patient did undergo a right femoral angiogram access for left leg vascular intervention by Dr. Slade Shahid on 19 May, when I saw her in the emergency room and sent to the hospital, patient had a pressure bandage applied as instructed at the [facility], on examination other than ecchymosis patient had no actual pain and also tells me the left foot is feeling better        Patient also looked at her Ziegler catheter urinary bag tells me that the urine is clearing up     Patient denies any chest pain, shortness of breath, palpitation, or abdominal pain        Patient denies any focal lateralizing neurological symptoms like loss of speech, vision or loss of function of extremity     Patient can walk short distances prior to foot infection at the facility with the help of walker, currently her ambulation is limited because of nonweightbearing of the left foot, and denies any symptoms of rest pain        May 29, 2021  · Patient was seen in the room, along with the patient's family including her daughter and daughter-in-law and son  · Patient resting comfortably, hungry, wants to eat  · Patient denies any right groin pain or any pain in the feet        Subjective: No new C/O, hungry    Objective:    BP (!) 146/67   Pulse 62   Temp 99.9 °F (37.7 °C) (Temporal)   Resp 18   Ht 5' 3\" (1.6 m)   Wt 145 lb (65.8 kg)   LMP 12/03/1997   SpO2 94%   BMI 25.69 kg/m²     General: alert and oriented. Neck:  Carotid bruits: Right No  Left No    Respiratory:  No rales or rhonchi . Cardiovascular:  Normal Sinus Rhythm. No murmur. Abdomen:  Soft, no masses palpable. No tenderness. Extremities:  No lower extremity edema. Both the feet are warm to touch. Both feet are warm to touch. The color of both feet is normal.     Patient does have area of ecchymosis and bruising of the right groin from the recent right femoral access, on 9 May for left leg intervention, with slightly prominent pulse noted on the right compared to the left side    Patient does have a 10 pound sandbag over the right groin pending thrombin injection by the radiology service     Patient has strong Doppler signals, strong monophasic to almost biphasic Doppler signals at the ankle     The plantar surface wound, on the left side looking better without any cellulitis, some a scab and eschar formation without drainage at this time    Pulses Right  Left    Radial       Brachial 3 3  3=normal   Femoral 2 2  2=diminished   Popliteal    1=barely palpable   Dorsalis pedis 0 0  0=absent   Posterior tibial    4=aneurysmal         Neuro: The speech is intact. Moving all extremities. No evidence of any acute focal lateralizing neurological deficit. Other pertinent information:     3. I have reviewed the ER notes from the [facility]     4.  I have reviewed the cardiac cath intervention done by by Dr. Leonor Lr recently 10 days ago     5. 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   The CT scan report from the legs for baseline monitoring for the future comparison     Also will obtain ankle-brachial index, to eval the vascular status of the feet post and perfusion of the foot post vascular intervention done by Dr. Lachelle Reagan on 19 May    All the questions were answered     I have contacted  IR radiologist, Dr. Tammy Verduzco regarding thrombin injection of the right femoral artery, because of recurrence     .    Alec Soliz MD

## 2021-05-29 NOTE — PLAN OF CARE
Problem: Falls - Risk of:  Goal: Will remain free from falls  Description: Will remain free from falls  5/29/2021 1321 by Lupe Lozada RN  Outcome: Ongoing     Problem: Falls - Risk of:  Goal: Absence of physical injury  Description: Absence of physical injury  5/29/2021 1321 by Lupe Lozada RN  Outcome: Ongoing

## 2021-05-30 ENCOUNTER — APPOINTMENT (OUTPATIENT)
Dept: ULTRASOUND IMAGING | Age: 86
DRG: 300 | End: 2021-05-30
Payer: MEDICARE

## 2021-05-30 LAB
ANION GAP SERPL CALCULATED.3IONS-SCNC: 6 MMOL/L (ref 7–16)
BLOOD BANK DISPENSE STATUS: NORMAL
BLOOD BANK PRODUCT CODE: NORMAL
BPU ID: NORMAL
BUN BLDV-MCNC: 9 MG/DL (ref 6–23)
CALCIUM SERPL-MCNC: 8.5 MG/DL (ref 8.6–10.2)
CHLORIDE BLD-SCNC: 103 MMOL/L (ref 98–107)
CO2: 24 MMOL/L (ref 22–29)
CREAT SERPL-MCNC: 0.6 MG/DL (ref 0.5–1)
DESCRIPTION BLOOD BANK: NORMAL
GFR AFRICAN AMERICAN: >60
GFR NON-AFRICAN AMERICAN: >60 ML/MIN/1.73
GLUCOSE BLD-MCNC: 142 MG/DL (ref 74–99)
HCT VFR BLD CALC: 20.9 % (ref 34–48)
HEMOGLOBIN: 6.7 G/DL (ref 11.5–15.5)
MCH RBC QN AUTO: 31.3 PG (ref 26–35)
MCHC RBC AUTO-ENTMCNC: 32.1 % (ref 32–34.5)
MCV RBC AUTO: 97.7 FL (ref 80–99.9)
OSMOLALITY URINE: 381 MOSM/KG (ref 300–900)
PDW BLD-RTO: 14.7 FL (ref 11.5–15)
PLATELET # BLD: 557 E9/L (ref 130–450)
PMV BLD AUTO: 9.3 FL (ref 7–12)
POTASSIUM SERPL-SCNC: 4.2 MMOL/L (ref 3.5–5)
RBC # BLD: 2.14 E12/L (ref 3.5–5.5)
SODIUM BLD-SCNC: 133 MMOL/L (ref 132–146)
SODIUM URINE: 118 MMOL/L
WBC # BLD: 6.9 E9/L (ref 4.5–11.5)

## 2021-05-30 PROCEDURE — 76942 ECHO GUIDE FOR BIOPSY: CPT | Performed by: RADIOLOGY

## 2021-05-30 PROCEDURE — 84300 ASSAY OF URINE SODIUM: CPT

## 2021-05-30 PROCEDURE — 36430 TRANSFUSION BLD/BLD COMPNT: CPT

## 2021-05-30 PROCEDURE — 2580000003 HC RX 258: Performed by: INTERNAL MEDICINE

## 2021-05-30 PROCEDURE — 85027 COMPLETE CBC AUTOMATED: CPT

## 2021-05-30 PROCEDURE — P9016 RBC LEUKOCYTES REDUCED: HCPCS

## 2021-05-30 PROCEDURE — 36415 COLL VENOUS BLD VENIPUNCTURE: CPT

## 2021-05-30 PROCEDURE — 3E053GC INTRODUCTION OF OTHER THERAPEUTIC SUBSTANCE INTO PERIPHERAL ARTERY, PERCUTANEOUS APPROACH: ICD-10-PCS | Performed by: RADIOLOGY

## 2021-05-30 PROCEDURE — 2140000000 HC CCU INTERMEDIATE R&B

## 2021-05-30 PROCEDURE — 6360000002 HC RX W HCPCS: Performed by: INTERNAL MEDICINE

## 2021-05-30 PROCEDURE — 80048 BASIC METABOLIC PNL TOTAL CA: CPT

## 2021-05-30 PROCEDURE — 83935 ASSAY OF URINE OSMOLALITY: CPT

## 2021-05-30 PROCEDURE — 6370000000 HC RX 637 (ALT 250 FOR IP): Performed by: INTERNAL MEDICINE

## 2021-05-30 PROCEDURE — 36002 PSEUDOANEURYSM INJECTION TRT: CPT

## 2021-05-30 PROCEDURE — 99233 SBSQ HOSP IP/OBS HIGH 50: CPT | Performed by: SURGERY

## 2021-05-30 RX ORDER — LIDOCAINE HYDROCHLORIDE 20 MG/ML
20 INJECTION, SOLUTION EPIDURAL; INFILTRATION; INTRACAUDAL; PERINEURAL ONCE
Status: DISCONTINUED | OUTPATIENT
Start: 2021-05-30 | End: 2021-06-02 | Stop reason: HOSPADM

## 2021-05-30 RX ORDER — MORPHINE SULFATE 2 MG/ML
1 INJECTION, SOLUTION INTRAMUSCULAR; INTRAVENOUS ONCE
Status: COMPLETED | OUTPATIENT
Start: 2021-05-30 | End: 2021-05-30

## 2021-05-30 RX ORDER — MORPHINE SULFATE 2 MG/ML
1 INJECTION, SOLUTION INTRAMUSCULAR; INTRAVENOUS EVERY 6 HOURS PRN
Status: DISCONTINUED | OUTPATIENT
Start: 2021-05-30 | End: 2021-05-31

## 2021-05-30 RX ORDER — SODIUM CHLORIDE 9 MG/ML
INJECTION, SOLUTION INTRAVENOUS PRN
Status: DISCONTINUED | OUTPATIENT
Start: 2021-05-30 | End: 2021-06-02 | Stop reason: HOSPADM

## 2021-05-30 RX ADMIN — ROPINIROLE HYDROCHLORIDE 0.5 MG: 0.5 TABLET, FILM COATED ORAL at 20:53

## 2021-05-30 RX ADMIN — Medication 10 ML: at 12:45

## 2021-05-30 RX ADMIN — TRAMADOL HYDROCHLORIDE 50 MG: 50 TABLET, COATED ORAL at 20:53

## 2021-05-30 RX ADMIN — TRAMADOL HYDROCHLORIDE 50 MG: 50 TABLET, COATED ORAL at 09:07

## 2021-05-30 RX ADMIN — AMLODIPINE BESYLATE 5 MG: 5 TABLET ORAL at 09:07

## 2021-05-30 RX ADMIN — Medication 10 ML: at 09:08

## 2021-05-30 RX ADMIN — PRAVASTATIN SODIUM 20 MG: 20 TABLET ORAL at 20:53

## 2021-05-30 RX ADMIN — GABAPENTIN 300 MG: 300 CAPSULE ORAL at 20:53

## 2021-05-30 RX ADMIN — SUCRALFATE 1 G: 1 TABLET ORAL at 14:55

## 2021-05-30 RX ADMIN — BUDESONIDE AND FORMOTEROL FUMARATE DIHYDRATE 2 PUFF: 80; 4.5 AEROSOL RESPIRATORY (INHALATION) at 09:08

## 2021-05-30 RX ADMIN — ENOXAPARIN SODIUM 40 MG: 40 INJECTION SUBCUTANEOUS at 09:08

## 2021-05-30 RX ADMIN — BUDESONIDE AND FORMOTEROL FUMARATE DIHYDRATE 2 PUFF: 80; 4.5 AEROSOL RESPIRATORY (INHALATION) at 20:53

## 2021-05-30 RX ADMIN — Medication 10 ML: at 20:53

## 2021-05-30 RX ADMIN — SUCRALFATE 1 G: 1 TABLET ORAL at 20:53

## 2021-05-30 RX ADMIN — SUCRALFATE 1 G: 1 TABLET ORAL at 09:07

## 2021-05-30 RX ADMIN — ERTAPENEM SODIUM 1000 MG: 1 INJECTION, POWDER, LYOPHILIZED, FOR SOLUTION INTRAMUSCULAR; INTRAVENOUS at 09:08

## 2021-05-30 RX ADMIN — ACETAMINOPHEN 650 MG: 325 TABLET ORAL at 14:55

## 2021-05-30 RX ADMIN — MORPHINE SULFATE 1 MG: 2 INJECTION, SOLUTION INTRAMUSCULAR; INTRAVENOUS at 12:45

## 2021-05-30 RX ADMIN — TRAMADOL HYDROCHLORIDE 50 MG: 50 TABLET, COATED ORAL at 14:55

## 2021-05-30 RX ADMIN — PANTOPRAZOLE SODIUM 40 MG: 40 TABLET, DELAYED RELEASE ORAL at 05:07

## 2021-05-30 ASSESSMENT — PAIN SCALES - GENERAL
PAINLEVEL_OUTOF10: 0
PAINLEVEL_OUTOF10: 7
PAINLEVEL_OUTOF10: 4
PAINLEVEL_OUTOF10: 7
PAINLEVEL_OUTOF10: 0
PAINLEVEL_OUTOF10: 4
PAINLEVEL_OUTOF10: 1

## 2021-05-30 ASSESSMENT — PAIN DESCRIPTION - LOCATION: LOCATION: KNEE;FOOT

## 2021-05-30 ASSESSMENT — PAIN DESCRIPTION - PAIN TYPE: TYPE: ACUTE PAIN

## 2021-05-30 ASSESSMENT — PAIN DESCRIPTION - DESCRIPTORS: DESCRIPTORS: ACHING

## 2021-05-30 ASSESSMENT — PAIN DESCRIPTION - ORIENTATION: ORIENTATION: RIGHT;LEFT

## 2021-05-30 ASSESSMENT — PAIN DESCRIPTION - FREQUENCY: FREQUENCY: CONTINUOUS

## 2021-05-30 NOTE — BRIEF OP NOTE
Brief Postoperative Note    Marylen Jews  YOB: 1927  [id number]    Pre-operative Diagnosis and Procedure: 79 yo F with a pseudoaneurysm arising from the right common femoral artery. Here for ultrasound guided thrombin injection. Post-operative Diagnosis: Same    Anesthesia: Local    Estimated Blood Loss: < 10 cc    Surgeon: Earnestine Giang MD    Complications: none    Specimen obtained: none     Findings: Successful thrombin injection of a pseudoaneurysm arising from the right common femoral artery.     Earnestine Giang MD   5/30/2021 1:20 PM

## 2021-05-30 NOTE — PROGRESS NOTES
Vascular Surgery Progress Note    Pt is being seen in f/u today regarding LLE tissue loss, R femoral pseudoaneurysm  Subjective  Pt s/e. Pain well controlled. Denies sob or cp.     Current Medications:    sodium chloride      sodium chloride        sodium chloride, acetaminophen, albuterol, sodium chloride flush, sodium chloride, promethazine **OR** ondansetron, polyethylene glycol, acetaminophen **OR** acetaminophen    thrombin   Topical Once    lidocaine PF  20 mL Other Once    lidocaine  20 mL Other Once    thrombin   Topical Once    amLODIPine  5 mg Oral Daily    budesonide-formoterol  2 puff Inhalation BID    pantoprazole  40 mg Oral QAM AC    traMADol  50 mg Oral TID    sucralfate  1 g Oral TID    rOPINIRole  0.5 mg Oral Nightly    gabapentin  300 mg Oral QPM    pravastatin  20 mg Oral Nightly    sodium chloride flush  5-40 mL Intravenous 2 times per day    enoxaparin  40 mg Subcutaneous Daily    ertapenem (INVanz) IVPB  1,000 mg Intravenous Q24H      PHYSICAL EXAM:    /65   Pulse 61   Temp 98.6 °F (37 °C) (Temporal)   Resp 18   Ht 5' 3\" (1.6 m)   Wt 150 lb (68 kg)   LMP 12/03/1997   SpO2 96%   BMI 26.57 kg/m²     Intake/Output Summary (Last 24 hours) at 5/30/2021 1354  Last data filed at 5/30/2021 0603  Gross per 24 hour   Intake 1242.92 ml   Output 2150 ml   Net -907.08 ml        Gen awake and alert  CVS S1S2  Resp good resp excursion  Abd soft, nt, nd  R LE + ecchymosis, fullness right groin   DP monophasic, PT weakly biphasic  L LE Wounds with eschar  Granulation   DP and PT weakly biphasic  LABS:    Lab Results   Component Value Date    WBC 6.9 05/30/2021    HGB 6.7 (LL) 05/30/2021    HCT 20.9 (L) 05/30/2021     (H) 05/30/2021    PROTIME 14.9 (H) 05/29/2021    INR 1.3 05/29/2021    APTT 30.3 05/29/2021    K 4.2 05/30/2021    BUN 9 05/30/2021    CREATININE 0.6 05/30/2021     A/P R femoral pseudo  pvd ulceration  · Ir to do thrombin injection in future - Dr. Jefferson Nicole discussed with Dr Fatemeh Camejo  · Wounds appear to be improving  · Aissatou Hassan following  · Monitor anemia    [address] [facility], MD

## 2021-05-30 NOTE — PROGRESS NOTES
Physical Therapy    PT consult to evaluate/treat received and appreciated. Pt chart reviewed and evaluation attempted. Pt is currently with 10 lb sandbag over R groin area. Spoke with pt, states she is not able to get up at this time. Will check back as able. Thank you.         Marciano Calles, PT, DPT   [id number]

## 2021-05-30 NOTE — PLAN OF CARE
Problem: Falls - Risk of:  Goal: Will remain free from falls  Description: Will remain free from falls  Outcome: Met This Shift  Goal: Absence of physical injury  Description: Absence of physical injury  Outcome: Met This Shift     Problem: Skin Integrity:  Goal: Absence of new skin breakdown  Description: Absence of new skin breakdown  Outcome: Met This Shift     Problem: Pain:  Goal: Pain level will decrease  Description: Pain level will decrease  Outcome: Met This Shift  Goal: Control of acute pain  Description: Control of acute pain  Outcome: Met This Shift  Goal: Control of chronic pain  Description: Control of chronic pain  Outcome: Met This Shift

## 2021-05-30 NOTE — PROGRESS NOTES
Comprehensive Nutrition Assessment    Type and Reason for Visit:  Initial, Positive Nutrition Screen    Nutrition Recommendations/Plan: Recommend and start Glucerna BID and Kwan BID to help assist with proper wound healing. Nutrition Assessment:  Pt admit w/ surgical wound check s/p L foot debridement I&D (5/16) wound closure (5/21) and angio (5/19) 2/2 L foot ucler and LLE cellulitis h/o DM and PVD. Pt w/ increased needs for wound healing. Start Ensure HP and Kwan BID. Malnutrition Assessment:  Malnutrition Status: At risk for malnutrition (Comment)    Context:  Acute Illness     Findings of the 6 clinical characteristics of malnutrition:  Energy Intake:  Mild decrease in energy intake (Comment) (since admit)  Weight Loss:  No significant weight loss     Body Fat Loss:  No significant body fat loss     Muscle Mass Loss:  No significant muscle mass loss    Fluid Accumulation:  No significant fluid accumulation     Strength:  Not Performed    Estimated Daily Nutrient Needs:  Energy (kcal):  6493-1565; Weight Used for Energy Requirements:  Current     Protein (g):  80-90; Weight Used for Protein Requirements:  Ideal (1.5-1.8)        Fluid (ml/day):  9812-7692; Method Used for Fluid Requirements:  1 ml/kcal      Nutrition Related Findings:  O&Ax4, abd WDL, trace edema, -I/Os, [place]      Wounds:  Surgical Incision, Diabetic Ulcer, Multiple       Current Nutrition Therapies:    DIET GENERAL; Anthropometric Measures:  · Height: 5' 3\" (160 cm)  · Current Body Weight: 150 lb (68 kg) (5/30 bed scale)   · Admission Body Weight: 145 lb (65.8 kg) (5/28 bed scale)    · Usual Body Weight: 144 lb (65.3 kg) (3/23/21 no method, per pt statement)     · Ideal Body Weight: 115 lbs; % Ideal Body Weight 130.4 %   · BMI: 26.6  · BMI Categories: Overweight (BMI 25.0-29. 9)       Nutrition Diagnosis:   · Increased nutrient needs related to increase demand for energy/nutrients as evidenced by wounds      Nutrition

## 2021-05-30 NOTE — PROGRESS NOTES
Internal Medicine Progress Note      Synopsis: Patient admitted on 5/28/2021   On 19 May she had an angiogram with access to the right femoral artery for assessment of vascular status. She was seen in [facility] following that where a Ziegler's catheter was placed for abdominal discomfort and she had gross hematuria   She was found to have a pseudoaneurysm of the right femoral artery. Patient was seen in the ED by vascular yesterday. She was brought downtown for treatment of her pseudoaneurysm  Dr. Shiv Mims did see the patient late last evening. They found a thrombosed pseudoaneurysm on ultrasound imaging so the procedure was aborted the procedure was supposed to be ultrasound-guided thrombin injection  Subjective    She was reassessed by vascular and also seen by intervention radiology yet again. She has had ultrasounded guided thrombin injection to the right groin pseudoaneurysm  Still with Ziegler's catheter. She also underwent debridement of her left foot today and is feeling better however she is requiring pain control that is not achieved by use of tramadol. Exam:  /65   Pulse 61   Temp 98.6 °F (37 °C) (Temporal)   Resp 18   Ht 5' 3\" (1.6 m)   Wt 150 lb (68 kg)   LMP 12/03/1997   SpO2 96%   BMI 26.57 kg/m²   General appearance: No apparent distress, appears stated age and cooperative. HEENT: Pupils equal, round, and reactive to light. Conjunctivae/corneas clear. Neck: Supple. No jugular venous distention. Trachea midline. Respiratory: Decreased but distant with normal  effort. Clear to auscultation, bilaterally without Rales/Wheezes/Rhonchi. Cardiovascular: Regular rate and rhythm with normal S1/S2 without murmurs, rubs or gallops. Abdomen: Soft, non-tender, non-distended with normal bowel sounds.   Musculoskeletal: Left foot is dressed  Right groin with significant bruising  Skin:  No rashes    Neurologic: awake, alert and following commands     Medications:  Reviewed    Infusion Medications    sodium chloride      sodium chloride       Scheduled Medications    thrombin   Topical Once    lidocaine PF  20 mL Other Once    lidocaine  20 mL Other Once    thrombin   Topical Once    amLODIPine  5 mg Oral Daily    budesonide-formoterol  2 puff Inhalation BID    pantoprazole  40 mg Oral QAM AC    traMADol  50 mg Oral TID    sucralfate  1 g Oral TID    rOPINIRole  0.5 mg Oral Nightly    gabapentin  300 mg Oral QPM    pravastatin  20 mg Oral Nightly    sodium chloride flush  5-40 mL Intravenous 2 times per day    enoxaparin  40 mg Subcutaneous Daily    ertapenem (INVanz) IVPB  1,000 mg Intravenous Q24H     PRN Meds: sodium chloride, acetaminophen, albuterol, sodium chloride flush, sodium chloride, promethazine **OR** ondansetron, polyethylene glycol, acetaminophen **OR** acetaminophen    I/O    Intake/Output Summary (Last 24 hours) at 5/30/2021 1407  Last data filed at 5/30/2021 0603  Gross per 24 hour   Intake 1242.92 ml   Output 2150 ml   Net -907.08 ml       Labs:   Recent Labs     05/28/21  1044 05/29/21  0516 05/30/21  0500   WBC 11.0 10.4 6.9   HGB 7.5* 6.9* 6.7*   HCT 23.1* 21.7* 20.9*   * 581* 557*       Recent Labs     05/28/21  1044 05/29/21  0516 05/30/21  0500   * 133 133   K 4.5 4.1  4.1 4.2   CL 91* 100 103   CO2 25 24 24   BUN 13 10 9   CREATININE 0.7 0.6 0.6   CALCIUM 8.6 8.6 8.5*       Recent Labs     05/28/21  1044   PROT 6.3*   ALKPHOS 76   ALT 17   AST 19   BILITOT 0.5   LIPASE 61*       Recent Labs     05/29/21  0516   INR 1.3       No results for input(s): CKTOTAL, TROPONINI in the last 72 hours.     Chronic labs:  Lab Results   Component Value Date    CHOL 159 05/02/2019    TRIG 61 05/02/2019    HDL 68 05/02/2019    LDLCALC 79 05/02/2019    TSH 1.600 05/29/2021    INR 1.3 05/29/2021    LABA1C 6.4 (H) 05/02/2019       Radiology:  US GUIDED NEEDLE PLACEMENT    Result Date: 5/30/2021  Successful uncomplicated thrombin injection of the pseudoaneurysm arising from the right common femoral artery. If there are any physician concerns regarding this report, a Radiologist can be reached by calling the following number [phone]. US DUP LOWER EXTREMITY RIGHT ARTERIES    Result Date: 5/29/2021  1. Right external iliac pseudoaneurysm, this appears to be recanalized when compared to previous     [facility] LOWER EXTREMITY RIGHT ARTERIES    Result Date: 5/28/2021  1. 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 *       PLAN:  1. Treatment of pseudoaneurysm done by interventional etiology  2. Blood loss due to prior procedures and not active so she was given 1 unit of blood today which she should tolerate well  3. Monitor hematuria. She is no worse and Eliquis is still on board for small subsegmental PE in the right lung  4. Continue to monitor blood sugars and blood pressure  5. Morphine was tolerated well.   I can add additional doses for 24 hours      Diet: DIET GENERAL;  Dietary Nutrition Supplements: Diabetic Oral Supplement  Dietary Nutrition Supplements: Wound Healing Oral Supplement  Code Status: Full Code  PT/OT Eval Status:   Await okay with surgery  DVT Prophylaxis:   In place  Recommended disposition at discharge:   Back to nursing facility    +++++++++++++++++++++++++++++++++++++++++++++++++  Chely Scott MD   [facility].  +++++++++++++++++++++++++++++++++++++++++++++++++  NOTE: This report was transcribed using voice recognition software. Every effort was made to ensure accuracy; however, inadvertent computerized transcription errors may be present.

## 2021-05-30 NOTE — INTERVAL H&P NOTE
Update History & Physical    The patient's History and Physical of May 29, 2021 was reviewed with the patient and I examined the patient. There was no change. The surgical site was confirmed by the patient and me. Plan: The risks, benefits, expected outcome, and alternative to the recommended procedure have been discussed with the patient. Patient understands and wants to proceed with the procedure.      Electronically signed by Hortencia Morataya MD on 5/30/2021 at 1:19 PM

## 2021-05-30 NOTE — CONSULTS
Department of Podiatry Surgery   Consult Note        Reason for Consult:  Post op patient, left foot wounds  Requesting Physician:  Dr. Pierre [place]:  Left foot wounds     HISTORY OF PRESENT ILLNESS:  The patient is a 80year old female with a chief complaint of open foot wounds. The patient was recently seen by Dr. Carmel Rosenthal who performed a left foot debridement with delayed primary closure on 5/21/21. Patient was recently discharged from the hospital after her debridement but was readmitted on 5/29/21 due to reported anemia at the nursing home. Patient denies any N/V/F/C/SOB/CP. Medical History     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 ([address] 5/18/2021    Bronchitis 5/23/2017    CAD (coronary artery disease)     Cough 5/23/2017    Diabetes mellitus (HCC)     GERD (gastroesophageal reflux disease) 5/23/2017    History of pulmonary embolus (PE) 5/29/2021    Hx of blood clots     Hyperlipidemia     Hypertension     Lung disease     Peripheral vascular angioplasty status 5/29/2021    Pseudoaneurysm of right femoral artery ([address] 5/29/2021    PVD (peripheral vascular disease) with claudication (HCC) 4/10/2019    Restless legs syndrome     Rhinitis, allergic 5/23/2017    Sinusitis 5/23/2017    SOB (shortness of breath) 5/23/2017    Type 1 diabetes mellitus with left diabetic foot ulcer ([address] 5/18/2021    Urinary incontinence        Surgical History:  Past Surgical History:   Procedure Laterality Date    ABDOMEN SURGERY      APPENDECTOMY      CARDIAC SURGERY      CHOLECYSTECTOMY      COLONOSCOPY      CORONARY ARTERY BYPASS GRAFT      8/28/10    ENDOSCOPY, COLON, DIAGNOSTIC      FOOT DEBRIDEMENT Left 5/16/2021    FOOT DEBRIDEMENT INCISION AND DRAINAGE.    performed by Edith Canela DPM at [address] [place] [address] Left 5/21/2021    LEFT FOOT DEBRIDEMENT  WITH DELAYED PRIMARY CLOSURE performed by Larry Dumont DPM at [facility] OR    HYSTERECTOMY      frankie and bso 1997    TONSILLECTOMY AND ADENOIDECTOMY         Family History:  Family History   Problem Relation Age of Onset    Hypertension Father     Heart Disease Brother        Allergies: Allergies   Allergen Reactions    Lisinopril Other (See Comments)     7/18/19 Pt states that she does not want to take LISINOPRIL       I reviewed the patient's past medical and surgical history, Medications and Allergies.       Current Facility-Administered Medications:     0.9 % sodium chloride infusion, , Intravenous, PRN, Abdirizak Velez MD    lidocaine 2 % injection 20 mL, 20 mL, Other, Once, Juanita Goodwin MD    thrombin kit, , Topical, Once, Juanita Goodwin MD    acetaminophen (TYLENOL) tablet 500 mg, 500 mg, Oral, Q6H PRN, Steve Curry MD    albuterol (ACCUNEB) nebulizer solution 2.5 mg, 2.5 mg, Nebulization, Q6H PRN, Steve Curry MD    amLODIPine (NORVASC) tablet 5 mg, 5 mg, Oral, Daily, Steve Curry MD, 5 mg at 05/30/21 8020    budesonide-formoterol (SYMBICORT) 80-4.5 MCG/ACT inhaler 2 puff, 2 puff, Inhalation, BID, Steve Curry MD, 2 puff at 05/30/21 0908    pantoprazole (PROTONIX) tablet 40 mg, 40 mg, Oral, QAM AC, Steve uCrry MD, 40 mg at 05/30/21 0507    traMADol (ULTRAM) tablet 50 mg, 50 mg, Oral, TID, Steve Curry MD, 50 mg at 05/30/21 2543    sucralfate (CARAFATE) tablet 1 g, 1 g, Oral, TID, Steve Curry MD, 1 g at 05/30/21 2590    rOPINIRole (REQUIP) tablet 0.5 mg, 0.5 mg, Oral, Nightly, Steve Curry MD, 0.5 mg at 05/29/21 2026    gabapentin (NEURONTIN) capsule 300 mg, 300 mg, Oral, QPM, Steve Curry MD, 300 mg at 05/29/21 2026    pravastatin (PRAVACHOL) tablet 20 mg, 20 mg, Oral, Nightly, Steve Curry MD, 20 mg at 05/29/21 2026    sodium chloride flush 0.9 % injection 5-40 mL, 5-40 mL, Intravenous, 2 times per day, Steve Curry MD, 10 mL at 05/30/21 0908    sodium chloride flush 0.9 % injection 10 mL, 10 mL, Intravenous, PRN, Rufina Mak MD    0.9 % sodium chloride infusion, 25 mL, Intravenous, PRN, Rufina Mak MD    enoxaparin (LOVENOX) injection 40 mg, 40 mg, Subcutaneous, Daily, Rufina Mak MD, 40 mg at 05/30/21 0908    promethazine (PHENERGAN) tablet 12.5 mg, 12.5 mg, Oral, Q6H PRN **OR** ondansetron (ZOFRAN) injection 4 mg, 4 mg, Intravenous, Q6H PRN, Rufina Mak MD    polyethylene glycol (GLYCOLAX) packet 17 g, 17 g, Oral, Daily PRN, Rufina Mak MD    acetaminophen (TYLENOL) tablet 650 mg, 650 mg, Oral, Q6H PRN, 650 mg at 05/29/21 2026 **OR** acetaminophen (TYLENOL) suppository 650 mg, 650 mg, Rectal, Q6H PRN, Rufina Mak MD    ertapenem Debaradaniel Marks) 1000 mg IVPB minibag, 1,000 mg, Intravenous, Q24H, Rufina Mak MD, Last Rate: 100 mL/hr at 05/30/21 0908, 1,000 mg at 05/30/21 0908     REVIEW OF SYSTEMS:    10 point review of systems is negative unless otherwise noted below:  History obtained from chart review and the patient        PHYSICAL EXAM:       Vitals:    05/29/21 1530 05/29/21 2000 05/29/21 2330 05/30/21 0730   BP: (!) 146/67 137/85 (!) 151/67 (!) 168/72   Pulse: 62 65 65 60   Resp: 18 18 18 18   Temp: 99.9 °F (37.7 °C) 98.8 °F (37.1 °C) 96.6 °F (35.9 °C) 97.9 °F (36.6 °C)   TempSrc: Temporal Temporal Temporal Temporal   SpO2: 94% 93% 92% 96%   Weight:       Height:           VASCULAR:  DP and PT pulses are non-palpable but audible on doppler. CFT sluggish. Warm to warm from the tibial tuberosity to the distal aspect of the digits dorsally. NEUROLOGIC:  Gross sensation intact  DERM:  Skin is mainly intact and well hydrated to the bilateral lower extremities. A wound is noted to the dorsal aspect of the left foot. Wound has a mostly granular base with multiple areas of stable eschar. No clinical signs of infection were noted.  A incision site is noted to the plantar surface of the left first metatarsal. Incision site is well right knee. Tricompartmental degenerative changes are noted. There is a small joint effusion and soft tissue swelling noted medially. .     1. There is no fracture dislocation of the right knee 2. Tricompartmental degenerative changes the right knee. 3. Small joint effusion and medial soft tissue swelling. XR TIBIA FIBULA LEFT (2 VIEWS)    Result Date: 5/9/2021  EXAMINATION: XRAY VIEWS OF THE LEFT TIBIA AND FIBULA 5/9/2021 7:55 pm COMPARISON: None. HISTORY: ORDERING SYSTEM PROVIDED HISTORY: Pain TECHNOLOGIST PROVIDED HISTORY: Reason for exam:->Pain FINDINGS: No evidence of fracture or dislocation. No lytic or blastic bone lesion. No abnormal radiopaque foreign body. No fracture or dislocation. XR FOOT LEFT (MIN 3 VIEWS)    Result Date: 5/16/2021  EXAMINATION: THREE XRAY VIEWS OF THE LEFT FOOT 5/16/2021 4:02 pm COMPARISON: Left foot series from May 14, 2021 HISTORY: ORDERING SYSTEM PROVIDED HISTORY: POST OP TECHNOLOGIST PROVIDED HISTORY: Left Foot, 3 Views, Non-Weight Bearing Reason for exam:->POST OP What reading provider will be dictating this exam?->CRC FINDINGS: Patient has undergone foot debridement with incision and drainage. Postprocedural changes are identified over the plantar aspect of the mid to forefoot. Overall osseous alignment is normal.  There is mild left large toe metatarsal phalangeal joint and dorsal midfoot osteoarthritis. Anterior calcaneal enthesophyte. Arteriosclerosis present. Postprocedural changes to the soft tissues of the plantar mid and forefoot. No acute osseous findings. XR FOOT LEFT (MIN 3 VIEWS)    Result Date: 5/14/2021  EXAMINATION: THREE XRAY VIEWS OF THE LEFT FOOT 5/14/2021 1:09 pm COMPARISON: Foot radiograph May 9, 2021 HISTORY: ORDERING SYSTEM PROVIDED HISTORY: Abscess TECHNOLOGIST PROVIDED HISTORY: Reason for exam:->Abscess FINDINGS: There is no evidence of acute fracture. There is normal alignment of the tarsometatarsal joints.   No acute joint abnormality. No aggressive osseous lesion. No focal soft tissue abnormality. No aggressive osseous lesions. Please note, plain radiograph is insensitive for evaluation of osteomyelitis. XR FOOT LEFT (MIN 3 VIEWS)    Result Date: 5/9/2021  EXAMINATION: THREE XRAY VIEWS OF THE LEFT FOOT 5/9/2021 7:55 pm COMPARISON: January 17, 2015 HISTORY: ORDERING SYSTEM PROVIDED HISTORY: trauma TECHNOLOGIST PROVIDED HISTORY: Reason for exam:->trauma FINDINGS: Fracture involving distal 5th metatarsal bone with angulation. There appears to be fracture healing. No evidence of acute fracture. Normal tarsal bone alignment. No radiopaque foreign body. There is plantar calcaneal spurring. 1.  Healing, angulated fracture involving distal aspect of 5th metatarsal bone appears new compared to prior from January 17, 2015. 2.  No findings to suggest acute fracture or dislocation. CT HEAD WO CONTRAST    Result Date: 5/16/2021  EXAMINATION: CT OF THE HEAD WITHOUT CONTRAST  5/16/2021 6:03 am TECHNIQUE: CT of the head was performed without the administration of intravenous contrast. Dose modulation, iterative reconstruction, and/or weight based adjustment of the mA/kV was utilized to reduce the radiation dose to as low as reasonably achievable. COMPARISON: 07/05/2019 HISTORY: ORDERING SYSTEM PROVIDED HISTORY: Confusion. TECHNOLOGIST PROVIDED HISTORY: Reason for exam:->Confusion. Has a \"code stroke\" or \"stroke alert\" been called? ->No What reading provider will be dictating this exam?->CRC FINDINGS: BRAIN/VENTRICLES: There is no acute intracranial hemorrhage, mass effect or midline shift. No abnormal extra-axial fluid collection. The gray-white differentiation is maintained without evidence of an acute infarct. There is prominence of the ventricles and sulci due to global parenchymal volume loss.  There are nonspecific areas of hypoattenuation within the periventricular and subcortical white matter, which likely represent chronic microvascular ischemic change. ORBITS: The visualized portion of the orbits demonstrate no acute abnormality. SINUSES: The visualized paranasal sinuses and mastoid air cells demonstrate no acute abnormality. SOFT TISSUES/SKULL: No acute abnormality of the visualized skull or soft tissues. No acute intracranial abnormality. Moderate cerebral atrophy and large amount of chronic ischemic changes both appropriate for age. No significant change from the prior study. CT ABDOMEN PELVIS W IV CONTRAST Additional Contrast? None    Result Date: 5/28/2021  EXAMINATION: CT OF THE ABDOMEN AND PELVIS WITH CONTRAST 5/28/2021 12:16 pm TECHNIQUE: CT of the abdomen and pelvis was performed with the administration of intravenous contrast. Multiplanar reformatted images are provided for review. Dose modulation, iterative reconstruction, and/or weight based adjustment of the mA/kV was utilized to reduce the radiation dose to as low as reasonably achievable. COMPARISON: CT of the abdomen and pelvis, 08/28/2019 HISTORY: ORDERING SYSTEM PROVIDED HISTORY: abd pain TECHNOLOGIST PROVIDED HISTORY: Additional Contrast?->None Reason for exam:->abd pain Decision Support Exception - unselect if not a suspected or confirmed emergency medical condition->Emergency Medical Condition (MA) FINDINGS: Lower Chest: The heart is top-normal in size. Trace pleural effusions. The lung bases are grossly clear. Organs: Liver: Normal in size and contour. Intrahepatic and extrahepatic biliary ductal dilatation is grossly stable as of 08/28/2019 and may represent a result of cholecystectomy and age Gallbladder: Surgically absent. Pancreas: Unremarkable. Spleen:  Unremarkable. Adrenals: Unremarkable. Kidneys: Mild right-sided hydroureteronephrosis is seen. There is abrupt tapering of the right mid ureter, at the level of S1-2 (series 2, image 89). GI/Bowel: Mild-to-moderate distal colonic diverticulosis without diverticulitis.   The colon is redundant. There is moderate fecal retention in the rectosigmoid. No bowel wall thickening or obstruction. Pelvis: The urinary bladder is moderately distended but otherwise unremarkable. The uterus is surgically absent. Peritoneum/Retroperitoneum: No lymphadenopathy. No free air or free fluid is seen. Prominent calcified atherosclerosis is seen in the aorta. No aneurysm. Bones/Soft Tissues: The visualized bones are intact without fracture or focal lesion. Prominent loss of disc heights in the lumbar spine. Miscellaneous: In right anterior thigh, there is 2.0 x 1.1 cm focus of enhancement, which posteriorly connects to the right common femoral artery and is suspicious for pseudoaneurysm (series 2, image 132). There is a small to moderate size surrounding hematoma in the anterior thigh. 1. 2.0 x 1.1 cm pseudoaneurysm in the right anterior thigh, connected to the anterior wall of the right common femoral artery. Hematoma in the right anterior thigh. Vascular surgery consultation recommended. 2. Right hydroureteronephrosis with abrupt narrowing of the right ureter at the level of S1-2. No obstructing calculus or mass lesions seen. Findings may be due to a stricture. Consider retrograde pyelogram. 3. Moderate distention of the urinary bladder. Clinical correlation is recommended as to the need for catheterization . XR CHEST PORTABLE    Result Date: 5/28/2021  EXAMINATION: ONE XRAY VIEW OF THE CHEST 5/28/2021 10:45 am COMPARISON: May 16 HISTORY: ORDERING SYSTEM PROVIDED HISTORY: confirm midline placement TECHNOLOGIST PROVIDED HISTORY: Reason for exam:->confirm midline placement FINDINGS: Resolved findings of a volume overload, no longer seen infiltrates or prominence of perihilar vessels. The heart is normal size. Patient had previous mid sternotomy. There is no conspicuous pleural effusions. No acute cardiopulmonary process.      XR CHEST PORTABLE    Result Date: 5/20/2021  EXAMINATION: ONE XRAY VIEW OF THE CHEST 5/20/2021 11:55 am COMPARISON: May 16, 2021 HISTORY: ORDERING SYSTEM PROVIDED HISTORY: recheck chf status TECHNOLOGIST PROVIDED HISTORY: Reason for exam:->recheck chf status What reading provider will be dictating this exam?->CRC FINDINGS: Median sternotomy wires are present. Chronic interstitial changes bilaterally. Opacities are present at left costophrenic sulcus. No pneumothorax. The heart is normal in size. Improved aeration throughout both lungs. Minimal opacities at left costophrenic sulcus could suggest persistent small pleural effusion. XR CHEST PORTABLE    Result Date: 5/16/2021  EXAMINATION: ONE XRAY VIEW OF THE CHEST 5/15/2021 11:30 pm COMPARISON: March 23 HISTORY: ORDERING SYSTEM PROVIDED HISTORY: Hypoxia, Wheezing TECHNOLOGIST PROVIDED HISTORY: Reason for exam:->Hypoxia, Wheezing What reading provider will be dictating this exam?->CRC FINDINGS: Cardiac silhouette is mildly enlarged. Increasing central vascular congestion. Widespread interstitial opacities in the lungs bilaterally most consistent with pulmonary edema. Small bilateral pleural effusions. Status post median sternotomy. Mild cardiomegaly with vascular congestion and widespread interstitial airspace disease most consistent with pulmonary edema. Small bilateral pleural effusions.      VL VICTORINA BILATERAL LIMITED 1-2 LEVELS    Result Date: 5/18/2021  The ankle brachial index is falsely elevated due to calcification of the tibial arteries Bilaterally, patient does have Doppler evidence of a moderate femoral popliteal arterial occlusive disease, with the biphasic ankle Doppler tracings with adequate flow to the feet based upon the pulse volume recordings over the metatarsals Good collateral flow noted the toes of the right foot based upon the pulse volume recordings On the left side the pulse volume recordings diminished over the toes, and almost flat over the left third and fourth toes    US DUP LOWER EXTREMITY RIGHT ARTERIES    Result Date: 2021  Patient MRN: [id number] : 1927 Age:  80 years Gender: Female Order Date: 2021 8:47 AM Exam: US DUP LOWER EXTREMITY RIGHT ARTERIES Number of Images: 16 views Indication:   Please evaluate for persistent  thrombosed right femoral artery pseudoaneurysm Rule out pseudoaneurysm Please evaluate for persistent  thrombosed right femoral artery pseudoaneurysm Comparison: 2021 Findings: There is evidence for pseudoaneurysm at the level the right external iliac artery measuring approximately 2 cm. In addition there appears to be a moderate-sized associated hematoma. 1.  Right external iliac pseudoaneurysm, this appears to be recanalized when compared to previous     US DUP LOWER EXTREMITY RIGHT ARTERIES    Result Date: 2021  Patient MRN: [id number] : 1927 Age:  80 years Gender: Female Order Date: 2021 6:32 PM Exam: US DUP LOWER EXTREMITY RIGHT ARTERIES Number of Images: 25 views Indication:   check for pseudoaneurysm check for pseudoaneurysm What reading provider will be dictating this exam?->[facility] Comparison: None. Findings: There is no evidence for a patent pseudo aneurysm . There is evidence for a thrombosed pseudoaneurysm. 1.  Findings consistent with a thrombosed pseudoaneurysm on the right     US DUP LOWER EXTREMITIES BILATERAL VENOUS    Result Date: 2021  Patient MRN:  [id number] : 1927 Age: 80 years Gender: Female Order Date:  2021 3:17 PM EXAM: US DUP LOWER EXTREMITIES BILATERAL VENOUS NUMBER OF IMAGES:  52 INDICATION:  History of PE, rule out DVT leg swelling, rule out DVT History of PE, rule out DVT COMPARISON: None Within the visualized vessels, there is no evidence for deep venous thrombosis There is good compressibility, there is good augmentation, there is good color flow. Within the visualized vessels there is no evidence for deep venous thrombosis       Assessment  1.  S/p Left foot debridement with delayed primary closure on 5/21/21  2. Left foot wounds  3. 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 Gabriel Oneil   - Reviewed patient's recent lab results and pertinent diagnostic imaging.  - Reviewed ancillary service notes. - Both foot wounds debridement of all non-viable tissue  - Continue daily dressing changes: xerofrom and DSD  - Discussed patient with Dr. Song Heck DPM.  - Will continue to follow patient while they are in-house. Donovan Woodward DPM PGY-2  Cell: [phone]    Electronically signed by Chirag Majano DPM on 5/30/2021 at 9:22 AM    Patient evaluated, discussed with resident in detail. Agree with findings, treatment plan as outlined. Any changes are made by me as deemed necessary. Patient's daughter at bedside, discussed in detail.   Once discharged patient to follow-up with me at the wound care center

## 2021-05-31 ENCOUNTER — APPOINTMENT (OUTPATIENT)
Dept: ULTRASOUND IMAGING | Age: 86
DRG: 300 | End: 2021-05-31
Payer: MEDICARE

## 2021-05-31 LAB
ANION GAP SERPL CALCULATED.3IONS-SCNC: 9 MMOL/L (ref 7–16)
BUN BLDV-MCNC: 10 MG/DL (ref 6–23)
CALCIUM SERPL-MCNC: 9 MG/DL (ref 8.6–10.2)
CHLORIDE BLD-SCNC: 101 MMOL/L (ref 98–107)
CO2: 26 MMOL/L (ref 22–29)
CREAT SERPL-MCNC: 0.6 MG/DL (ref 0.5–1)
GFR AFRICAN AMERICAN: >60
GFR NON-AFRICAN AMERICAN: >60 ML/MIN/1.73
GLUCOSE BLD-MCNC: 140 MG/DL (ref 74–99)
HCT VFR BLD CALC: 24.8 % (ref 34–48)
HCT VFR BLD CALC: 26.4 % (ref 34–48)
HEMOGLOBIN: 8.3 G/DL (ref 11.5–15.5)
HEMOGLOBIN: 8.7 G/DL (ref 11.5–15.5)
MCH RBC QN AUTO: 31.9 PG (ref 26–35)
MCHC RBC AUTO-ENTMCNC: 33 % (ref 32–34.5)
MCV RBC AUTO: 96.7 FL (ref 80–99.9)
PDW BLD-RTO: 14.6 FL (ref 11.5–15)
PLATELET # BLD: 516 E9/L (ref 130–450)
PMV BLD AUTO: 9.6 FL (ref 7–12)
POTASSIUM SERPL-SCNC: 4.3 MMOL/L (ref 3.5–5)
RBC # BLD: 2.73 E12/L (ref 3.5–5.5)
SODIUM BLD-SCNC: 136 MMOL/L (ref 132–146)
WBC # BLD: 7.2 E9/L (ref 4.5–11.5)

## 2021-05-31 PROCEDURE — 85018 HEMOGLOBIN: CPT

## 2021-05-31 PROCEDURE — 2580000003 HC RX 258: Performed by: INTERNAL MEDICINE

## 2021-05-31 PROCEDURE — 85014 HEMATOCRIT: CPT

## 2021-05-31 PROCEDURE — 6370000000 HC RX 637 (ALT 250 FOR IP): Performed by: INTERNAL MEDICINE

## 2021-05-31 PROCEDURE — 80048 BASIC METABOLIC PNL TOTAL CA: CPT

## 2021-05-31 PROCEDURE — 36415 COLL VENOUS BLD VENIPUNCTURE: CPT

## 2021-05-31 PROCEDURE — 6360000002 HC RX W HCPCS: Performed by: INTERNAL MEDICINE

## 2021-05-31 PROCEDURE — 93926 LOWER EXTREMITY STUDY: CPT

## 2021-05-31 PROCEDURE — 93926 LOWER EXTREMITY STUDY: CPT | Performed by: RADIOLOGY

## 2021-05-31 PROCEDURE — 85027 COMPLETE CBC AUTOMATED: CPT

## 2021-05-31 PROCEDURE — 2140000000 HC CCU INTERMEDIATE R&B

## 2021-05-31 RX ADMIN — PANTOPRAZOLE SODIUM 40 MG: 40 TABLET, DELAYED RELEASE ORAL at 05:21

## 2021-05-31 RX ADMIN — SUCRALFATE 1 G: 1 TABLET ORAL at 07:54

## 2021-05-31 RX ADMIN — Medication 10 ML: at 07:53

## 2021-05-31 RX ADMIN — TRAMADOL HYDROCHLORIDE 50 MG: 50 TABLET, COATED ORAL at 16:00

## 2021-05-31 RX ADMIN — TRAMADOL HYDROCHLORIDE 50 MG: 50 TABLET, COATED ORAL at 07:54

## 2021-05-31 RX ADMIN — ACETAMINOPHEN 650 MG: 325 TABLET ORAL at 14:28

## 2021-05-31 RX ADMIN — POLYETHYLENE GLYCOL 3350 17 G: 17 POWDER, FOR SOLUTION ORAL at 07:54

## 2021-05-31 RX ADMIN — Medication 10 ML: at 21:32

## 2021-05-31 RX ADMIN — BUDESONIDE AND FORMOTEROL FUMARATE DIHYDRATE 2 PUFF: 80; 4.5 AEROSOL RESPIRATORY (INHALATION) at 21:32

## 2021-05-31 RX ADMIN — ROPINIROLE HYDROCHLORIDE 0.5 MG: 0.5 TABLET, FILM COATED ORAL at 21:31

## 2021-05-31 RX ADMIN — TRAMADOL HYDROCHLORIDE 50 MG: 50 TABLET, COATED ORAL at 21:31

## 2021-05-31 RX ADMIN — PRAVASTATIN SODIUM 20 MG: 20 TABLET ORAL at 21:31

## 2021-05-31 RX ADMIN — ERTAPENEM SODIUM 1000 MG: 1 INJECTION, POWDER, LYOPHILIZED, FOR SOLUTION INTRAMUSCULAR; INTRAVENOUS at 07:52

## 2021-05-31 RX ADMIN — SUCRALFATE 1 G: 1 TABLET ORAL at 21:32

## 2021-05-31 RX ADMIN — APIXABAN 5 MG: 5 TABLET, FILM COATED ORAL at 21:31

## 2021-05-31 RX ADMIN — APIXABAN 5 MG: 5 TABLET, FILM COATED ORAL at 14:46

## 2021-05-31 RX ADMIN — SUCRALFATE 1 G: 1 TABLET ORAL at 14:46

## 2021-05-31 RX ADMIN — AMLODIPINE BESYLATE 5 MG: 5 TABLET ORAL at 07:54

## 2021-05-31 RX ADMIN — BUDESONIDE AND FORMOTEROL FUMARATE DIHYDRATE 2 PUFF: 80; 4.5 AEROSOL RESPIRATORY (INHALATION) at 07:56

## 2021-05-31 RX ADMIN — GABAPENTIN 300 MG: 300 CAPSULE ORAL at 21:31

## 2021-05-31 RX ADMIN — ENOXAPARIN SODIUM 40 MG: 40 INJECTION SUBCUTANEOUS at 07:54

## 2021-05-31 ASSESSMENT — PAIN SCALES - GENERAL
PAINLEVEL_OUTOF10: 5
PAINLEVEL_OUTOF10: 3
PAINLEVEL_OUTOF10: 0
PAINLEVEL_OUTOF10: 9
PAINLEVEL_OUTOF10: 4
PAINLEVEL_OUTOF10: 0

## 2021-05-31 NOTE — PROGRESS NOTES
1756-Attempted to reach out to Dr. Dainel Bryant surgical resident on-call regarding patient's right groin. Patient with no pain, dopplar signs intact, site seems harder than earlier. No bleeding noted.

## 2021-05-31 NOTE — PROGRESS NOTES
Internal Medicine Progress Note      Synopsis: Patient admitted on 5/28/2021   On 19 May she had an angiogram with access to the right femoral artery for assessment of vascular status. She was seen in [facility] following that where a Ziegler's catheter was placed for abdominal discomfort and she had gross hematuria   She was found to have a pseudoaneurysm of the right femoral artery. Patient was seen in the ED by vascular yesterday. She was brought downtown for treatment of her pseudoaneurysm  Dr. Raffi Washburn did see the patient late last evening. They found a thrombosed pseudoaneurysm on ultrasound imaging so the procedure was aborted the procedure was supposed to be ultrasound-guided thrombin injection  Subjective    She was reassessed by vascular and also seen by intervention radiology yet again. She has had ultrasounded guided thrombin injection to the right groin pseudoaneurysm  Still with Ziegler's catheter. She also underwent debridement of her left foot today and is feeling better however she is requiring pain control that is not achieved by use of tramadol. May 31, 2021  She is looking much more bright and cheerful. Hematuria has cleared. Her left foot is wrapped and her daughter is at her bedside. Exam:  BP (!) 149/69   Pulse 62   Temp 96.9 °F (36.1 °C) (Temporal)   Resp 18   Ht 5' 3\" (1.6 m)   Wt 150 lb (68 kg)   LMP 12/03/1997   SpO2 93%   BMI 26.57 kg/m²   General appearance: No apparent distress, appears stated age and cooperative. HEENT: Pupils equal, round, and reactive to light. Conjunctivae/corneas clear. Neck: Supple. No jugular venous distention. Trachea midline. Respiratory: Decreased but distant with normal  effort. Clear to auscultation, bilaterally without Rales/Wheezes/Rhonchi. Cardiovascular: Regular rate and rhythm with normal S1/S2 without murmurs, rubs or gallops. Abdomen: Soft, non-tender, non-distended with normal bowel sounds.   Musculoskeletal: Left foot is dressed  Right groin with significant bruising  Skin:  No rashes    Neurologic: awake, alert and following commands     Medications:  Reviewed    Infusion Medications    sodium chloride      sodium chloride      sodium chloride       Scheduled Medications    thrombin   Topical Once    lidocaine PF  20 mL Other Once    lidocaine  20 mL Other Once    thrombin   Topical Once    amLODIPine  5 mg Oral Daily    budesonide-formoterol  2 puff Inhalation BID    pantoprazole  40 mg Oral QAM AC    traMADol  50 mg Oral TID    sucralfate  1 g Oral TID    rOPINIRole  0.5 mg Oral Nightly    gabapentin  300 mg Oral QPM    pravastatin  20 mg Oral Nightly    sodium chloride flush  5-40 mL Intravenous 2 times per day    enoxaparin  40 mg Subcutaneous Daily    ertapenem (INVanz) IVPB  1,000 mg Intravenous Q24H     PRN Meds: morphine, sodium chloride, sodium chloride, acetaminophen, albuterol, sodium chloride flush, sodium chloride, promethazine **OR** ondansetron, polyethylene glycol, acetaminophen **OR** acetaminophen    I/O    Intake/Output Summary (Last 24 hours) at 5/31/2021 1209  Last data filed at 5/31/2021 0655  Gross per 24 hour   Intake 832.41 ml   Output 2600 ml   Net -1767.59 ml       Labs:   Recent Labs     05/29/21  0516 05/30/21  0500 05/31/21  0030 05/31/21  0530   WBC 10.4 6.9  --  7.2   HGB 6.9* 6.7* 8.3* 8.7*   HCT 21.7* 20.9* 24.8* 26.4*   * 557*  --  516*       Recent Labs     05/29/21  0516 05/30/21  0500 05/31/21  0530    133 136   K 4.1  4.1 4.2 4.3    103 101   CO2 24 24 26   BUN 10 9 10   CREATININE 0.6 0.6 0.6   CALCIUM 8.6 8.5* 9.0       No results for input(s): PROT, ALB, ALKPHOS, ALT, AST, BILITOT, AMYLASE, LIPASE in the last 72 hours. Recent Labs     05/29/21  0516   INR 1.3       No results for input(s): [place]is [place] in the last 72 hours.     Chronic labs:  Lab Results   Component Value Date    CHOL 159 05/02/2019    TRIG 61 05/02/2019    HDL 68 05/02/2019 are stable. Hemoglobin is 8.7. Finishing her Koby Medina  To clarify her anticoagulation with Eliquis had been on hold. We can resume that since her vascular intervention for now seems to be done and over with. DC her Lovenox and moved back to [facility]  Not sure about her needing another pre-CERT but she may be discharged by morning      Diet: DIET GENERAL;  Dietary Nutrition Supplements: Diabetic Oral Supplement  Dietary Nutrition Supplements: Wound Healing Oral Supplement  Code Status: Full Code  PT/OT Eval Status:   Await okay with surgery  DVT Prophylaxis:   In place  Recommended disposition at discharge:   Back to nursing facility    +++++++++++++++++++++++++++++++++++++++++++++++++  Dana Stoner MD   [facility].  +++++++++++++++++++++++++++++++++++++++++++++++++  NOTE: This report was transcribed using voice recognition software. Every effort was made to ensure accuracy; however, inadvertent computerized transcription errors may be present.

## 2021-05-31 NOTE — PROGRESS NOTES
Off the floor  Underwent successful IR thrombin injection per notes from IR    hgb improved after unity of prbc to 8.7 this am    Elida Amaya MD

## 2021-05-31 NOTE — PROGRESS NOTES
Department of Podiatry  Progress Note    SUBJECTIVE:  Ms. Yunior Cavazos was seen and evaluated at bedside this morning s/p left foot debridement with delayed primary closure (DOS: 5/21/21). No acute events overnight. Patient denies any N/V/D/F/C/SOB/CP and has no other pedal complaints at this time. OBJECTIVE:    Scheduled Meds:   apixaban  5 mg Oral BID    thrombin   Topical Once    lidocaine PF  20 mL Other Once    lidocaine  20 mL Other Once    thrombin   Topical Once    amLODIPine  5 mg Oral Daily    budesonide-formoterol  2 puff Inhalation BID    pantoprazole  40 mg Oral QAM AC    traMADol  50 mg Oral TID    sucralfate  1 g Oral TID    rOPINIRole  0.5 mg Oral Nightly    gabapentin  300 mg Oral QPM    pravastatin  20 mg Oral Nightly    sodium chloride flush  5-40 mL Intravenous 2 times per day    ertapenem (INVanz) IVPB  1,000 mg Intravenous Q24H     Continuous Infusions:   sodium chloride      sodium chloride      sodium chloride       PRN Meds:.sodium chloride, sodium chloride, acetaminophen, albuterol, sodium chloride flush, sodium chloride, promethazine **OR** ondansetron, polyethylene glycol, acetaminophen **OR** acetaminophen    Allergies   Allergen Reactions    Lisinopril Other (See Comments)     7/18/19 Pt states that she does not want to take LISINOPRIL       BP (!) 149/69   Pulse 62   Temp 96.9 °F (36.1 °C) (Temporal)   Resp 18   Ht 5' 3\" (1.6 m)   Wt 150 lb (68 kg)   LMP 12/03/1997   SpO2 93%   BMI 26.57 kg/m²       EXAM:    VASCULAR:  DP and PT pulses are palpable b/l. CFT < 5 seconds B/L. Warm to warm from the tibial tuberosity to the distal aspect of the digits dorsally. No hair growth noted to the distal aspects dorsally. NEUROLOGIC:  Protective sensation is intact b/l     DERM:  Wound noted to the dorsal aspect of the left foot measuring about 4.0x8.0x0.1cm. Wound bed is about 85% granular and 15% fibrotic. No noted drainage or pus. No noted tunneling or burrowing.  No SOI.   Incision site noted to the plantar aspect of the left foot. Incision is well coapted with no noted drainage or pus. Hyperkeratotic lesions noted surrounding the wound. Sutures are intact and in intended position. No SOI. MUSCULOSKELETAL:  No posterior calf pain on palpation b/l. Mild equinus. MMT WNL. Hammered lesser digits. Wound Care:   Wound #1: Left dorsal foot    Size ~ 4.0x8.0x0.1cm   Appearance -Resolving    Drainage -None   Odor -None                    Scheduled Meds:   apixaban  5 mg Oral BID    thrombin   Topical Once    lidocaine PF  20 mL Other Once    lidocaine  20 mL Other Once    thrombin   Topical Once    amLODIPine  5 mg Oral Daily    budesonide-formoterol  2 puff Inhalation BID    pantoprazole  40 mg Oral QAM AC    traMADol  50 mg Oral TID    sucralfate  1 g Oral TID    rOPINIRole  0.5 mg Oral Nightly    gabapentin  300 mg Oral QPM    pravastatin  20 mg Oral Nightly    sodium chloride flush  5-40 mL Intravenous 2 times per day    ertapenem (INVanz) IVPB  1,000 mg Intravenous Q24H     Continuous Infusions:   sodium chloride      sodium chloride      sodium chloride       PRN Meds:.sodium chloride, sodium chloride, acetaminophen, albuterol, sodium chloride flush, sodium chloride, promethazine **OR** ondansetron, polyethylene glycol, acetaminophen **OR** acetaminophen    RADIOLOGY:  US DUP LOWER EXTREMITY RIGHT ARTERIES   Final Result   1. Previously identified pseudoaneurysm appears thrombosed            [address]   Final Result   Successful uncomplicated thrombin injection of the pseudoaneurysm   arising from the right common femoral artery. If there are any physician concerns regarding this report, a   Radiologist can be reached by calling the following number [phone].          US DUP LOWER EXTREMITIES BILATERAL VENOUS   Final Result   Within the visualized vessels there is no evidence for deep venous   thrombosis               US DUP LOWER EXTREMITY RIGHT ARTERIES   Final Result   1. Right external iliac pseudoaneurysm, this appears to be   recanalized when compared to previous            US DUP LOWER EXTREMITY RIGHT ARTERIES   Final Result   1. Findings consistent with a thrombosed pseudoaneurysm on the right            IR Interventional Radiology Procedure Request    (Results Pending)   VL VICTORINA BILATERAL LIMITED 1-2 LEVELS    (Results Pending)     BP (!) 149/69   Pulse 62   Temp 96.9 °F (36.1 °C) (Temporal)   Resp 18   Ht 5' 3\" (1.6 m)   Wt 150 lb (68 kg)   LMP 12/03/1997   SpO2 93%   BMI 26.57 kg/m²     LABS:    Recent Labs     05/30/21  0500 05/31/21  0030 05/31/21  0530   WBC 6.9  --  7.2   HGB 6.7* 8.3* 8.7*   HCT 20.9* 24.8* 26.4*   *  --  516*        Recent Labs     05/31/21  0530      K 4.3      CO2 26   BUN 10   CREATININE 0.6        Recent Labs     05/29/21  0516   INR 1.3   APTT 30.3         ASSESSMENT:  1.S/P I&D,debridement, delayed primary closure (DOS: 5/21/21)  2. Left foot wound  3. DM with neuropthic changes   4. Hammered digits   5. 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 Reviewed patient's recent lab results and pertinent diagnostic imaging. Reviewed ancillary service notes. - Dressings changed this morning: Xeroform, DSD  - Prevalon boots as tolerated   - Pain Control: IV and PO  - Antibiotics as per ID: Invanz for 10 days starting on 5/24/21   - Vascular 5/18/21:   1. VICTORINA falsely elevated, calcifications   2. B/L occlusive disease   3. Good collateral flow to right toes but diminished to left toes. - NWB to left LE  - Discussed patient with Dr. Shelly Fuentes   - Will continue to follow patient while they are in-house.

## 2021-06-01 ENCOUNTER — APPOINTMENT (OUTPATIENT)
Dept: INTERVENTIONAL RADIOLOGY/VASCULAR | Age: 86
DRG: 300 | End: 2021-06-01
Payer: MEDICARE

## 2021-06-01 ENCOUNTER — APPOINTMENT (OUTPATIENT)
Dept: ULTRASOUND IMAGING | Age: 86
DRG: 300 | End: 2021-06-01
Payer: MEDICARE

## 2021-06-01 LAB
ANION GAP SERPL CALCULATED.3IONS-SCNC: 7 MMOL/L (ref 7–16)
BUN BLDV-MCNC: 11 MG/DL (ref 6–23)
CALCIUM SERPL-MCNC: 8.9 MG/DL (ref 8.6–10.2)
CHLORIDE BLD-SCNC: 98 MMOL/L (ref 98–107)
CO2: 26 MMOL/L (ref 22–29)
CREAT SERPL-MCNC: 0.6 MG/DL (ref 0.5–1)
GFR AFRICAN AMERICAN: >60
GFR NON-AFRICAN AMERICAN: >60 ML/MIN/1.73
GLUCOSE BLD-MCNC: 149 MG/DL (ref 74–99)
HCT VFR BLD CALC: 26.3 % (ref 34–48)
HEMOGLOBIN: 8.4 G/DL (ref 11.5–15.5)
MCH RBC QN AUTO: 31.9 PG (ref 26–35)
MCHC RBC AUTO-ENTMCNC: 31.9 % (ref 32–34.5)
MCV RBC AUTO: 100 FL (ref 80–99.9)
PDW BLD-RTO: 14.9 FL (ref 11.5–15)
PLATELET # BLD: 435 E9/L (ref 130–450)
PMV BLD AUTO: 9.2 FL (ref 7–12)
POTASSIUM SERPL-SCNC: 4.4 MMOL/L (ref 3.5–5)
RBC # BLD: 2.63 E12/L (ref 3.5–5.5)
SODIUM BLD-SCNC: 131 MMOL/L (ref 132–146)
WBC # BLD: 7.6 E9/L (ref 4.5–11.5)

## 2021-06-01 PROCEDURE — 93926 LOWER EXTREMITY STUDY: CPT | Performed by: RADIOLOGY

## 2021-06-01 PROCEDURE — 6370000000 HC RX 637 (ALT 250 FOR IP): Performed by: INTERNAL MEDICINE

## 2021-06-01 PROCEDURE — 97535 SELF CARE MNGMENT TRAINING: CPT

## 2021-06-01 PROCEDURE — 97166 OT EVAL MOD COMPLEX 45 MIN: CPT

## 2021-06-01 PROCEDURE — 85027 COMPLETE CBC AUTOMATED: CPT

## 2021-06-01 PROCEDURE — 2140000000 HC CCU INTERMEDIATE R&B

## 2021-06-01 PROCEDURE — 36415 COLL VENOUS BLD VENIPUNCTURE: CPT

## 2021-06-01 PROCEDURE — 2580000003 HC RX 258: Performed by: INTERNAL MEDICINE

## 2021-06-01 PROCEDURE — 97162 PT EVAL MOD COMPLEX 30 MIN: CPT

## 2021-06-01 PROCEDURE — 6360000002 HC RX W HCPCS: Performed by: INTERNAL MEDICINE

## 2021-06-01 PROCEDURE — 97530 THERAPEUTIC ACTIVITIES: CPT

## 2021-06-01 PROCEDURE — 93926 LOWER EXTREMITY STUDY: CPT

## 2021-06-01 PROCEDURE — 93922 UPR/L XTREMITY ART 2 LEVELS: CPT

## 2021-06-01 PROCEDURE — 80048 BASIC METABOLIC PNL TOTAL CA: CPT

## 2021-06-01 RX ADMIN — BUDESONIDE AND FORMOTEROL FUMARATE DIHYDRATE 2 PUFF: 80; 4.5 AEROSOL RESPIRATORY (INHALATION) at 20:23

## 2021-06-01 RX ADMIN — ERTAPENEM SODIUM 1000 MG: 1 INJECTION, POWDER, LYOPHILIZED, FOR SOLUTION INTRAMUSCULAR; INTRAVENOUS at 09:57

## 2021-06-01 RX ADMIN — TRAMADOL HYDROCHLORIDE 50 MG: 50 TABLET, COATED ORAL at 14:12

## 2021-06-01 RX ADMIN — Medication 10 ML: at 10:03

## 2021-06-01 RX ADMIN — PANTOPRAZOLE SODIUM 40 MG: 40 TABLET, DELAYED RELEASE ORAL at 06:00

## 2021-06-01 RX ADMIN — SUCRALFATE 1 G: 1 TABLET ORAL at 14:12

## 2021-06-01 RX ADMIN — BUDESONIDE AND FORMOTEROL FUMARATE DIHYDRATE 2 PUFF: 80; 4.5 AEROSOL RESPIRATORY (INHALATION) at 09:54

## 2021-06-01 RX ADMIN — TRAMADOL HYDROCHLORIDE 50 MG: 50 TABLET, COATED ORAL at 20:21

## 2021-06-01 RX ADMIN — PRAVASTATIN SODIUM 20 MG: 20 TABLET ORAL at 20:22

## 2021-06-01 RX ADMIN — TRAMADOL HYDROCHLORIDE 50 MG: 50 TABLET, COATED ORAL at 09:54

## 2021-06-01 RX ADMIN — AMLODIPINE BESYLATE 5 MG: 5 TABLET ORAL at 09:54

## 2021-06-01 RX ADMIN — SUCRALFATE 1 G: 1 TABLET ORAL at 20:22

## 2021-06-01 RX ADMIN — ACETAMINOPHEN 650 MG: 325 TABLET ORAL at 16:51

## 2021-06-01 RX ADMIN — SUCRALFATE 1 G: 1 TABLET ORAL at 09:54

## 2021-06-01 RX ADMIN — GABAPENTIN 300 MG: 300 CAPSULE ORAL at 20:20

## 2021-06-01 RX ADMIN — APIXABAN 5 MG: 5 TABLET, FILM COATED ORAL at 20:22

## 2021-06-01 RX ADMIN — Medication 10 ML: at 20:21

## 2021-06-01 RX ADMIN — ROPINIROLE HYDROCHLORIDE 0.5 MG: 0.5 TABLET, FILM COATED ORAL at 20:22

## 2021-06-01 RX ADMIN — APIXABAN 5 MG: 5 TABLET, FILM COATED ORAL at 10:59

## 2021-06-01 ASSESSMENT — PAIN SCALES - GENERAL
PAINLEVEL_OUTOF10: 7
PAINLEVEL_OUTOF10: 0
PAINLEVEL_OUTOF10: 4
PAINLEVEL_OUTOF10: 8

## 2021-06-01 NOTE — CARE COORDINATION
Transition of care: Met with pt and pt's daughter, Becky Tong, in room. Pt is from [facility] at the [place] and wants to return there at [place]. I called and spoke with Esmer at [facility] and they accept pt back . They will submit for pre cert but will not have to wait for pre cert/auth to return. Therapy dept called ext [phone] for PT eval. No covid test is needed if pt is asymptomatic. Ambulance and ambulette forms filled out and placed with pt's soft chart.  Sw/cm will follow

## 2021-06-01 NOTE — PROGRESS NOTES
Physical Therapy Initial Assessment     Name: Odilia Licona  : 1927  MRN: [id number]      Date of Service: 2021    Evaluating PT:  Darling Bergman, PT, DPT [id number]      Room #:  4989/7015-J  Diagnosis:  Pseudoaneurysm of femoral artery (HCC) [I72.4]  PMHx/PSHx:  Asthma, DM, HTN, RLS, Urinary incontinence, Lung disease, SOB, GERD, PVD, Arthritis, CAD, Hyperlipidemia, Atherosclerosis of LLE/ulceration midfoot, CABG 2021 Left foot debridement I&D, 2021 Left foot debridement with delayed primary closure  Procedure/Surgery:  NA  Precautions:  Falls, Strict NWB LLE, [place]  Equipment Needs:  Has Rollator [employer], Tri-roller walker, [employer]    SUBJECTIVE:    Pt comes from [facility]. Previously pt lives alone at [address] [place] [address] with no stairs to enter. Pt ambulated with with Tri-roller walker in the facility, Rollator in the community PTA. Pt reported independent in her room. Staff provides assist with ADLs. OBJECTIVE:   Initial Evaluation  Date: 2021 Treatment Date:  NA Short Term/ Long Term   Goals   AM-PAC 6 Clicks 96/41     Was pt agreeable to Eval/treatment? Yes      Does pt have pain? No c/o pain. Bed Mobility  Rolling: SBA  Supine to sit: Min A  Sit to supine: Min A  Scooting: Min A  Rolling: Supervision  Supine to sit: SBA  Sit to supine: SBA  Scooting: SBA   Transfers Sit to stand: Max A, unable to maintain WB restrictions  Stand to sit: Max A  Stand pivot: NT  Sit to stand: Mod A  Stand to sit: Mod A  Stand pivot: Mod A   Ambulation    NT  >10 feet with WW with Mod A   Stair negotiation: ascended and descended  NT  NA   ROM BUE:  WFL  BLE:  WFL     Strength BUE:  4-/5  BLE:  4-/5  Increase strength 1/3 grade   Balance Sitting EOB:  SBA  Dynamic Standing: Max A with [employer]  Sitting EOB:  Supervision  Dynamic Standing:   Mod A with [employer]     Pt is A & O x 3  Sensation:  Pt denies numbness and tingling to extremities  Edema:  None noted    Vitals:  Blood Pressure at rest - Blood Pressure post session - Heart Rate at rest - Heart Rate post session -   SPO2 at rest - SPO2 post session -     Therapeutic Exercises:  Seated: LAQs, marching 10x ea; STS x 3 attempts    Patient education  Pt educated on purpose of PT assessment, importance of mobility, safety with mobility, WB restrictions, transfers, gait, use of AD for safety    Patient response to education:   Pt verbalized understanding Pt demonstrated skill Pt requires further education in this area   Yes  Partially  with verbal cues and assist Yes      ASSESSMENT:    Conditions Requiring Skilled Therapeutic Intervention:     [x]Decreased strength     []Decreased ROM  [x]Decreased functional mobility  [x]Decreased balance   [x]Decreased endurance   []Decreased posture  []Decreased sensation  []Decreased coordination   []Decreased vision  [x]Decreased safety awareness   []Increased pain       Comments:  Patient cleared by RN and agreeable to treatment. Patient found in high [place] with daughter present. Patient recently discharged from the hospital to [place] [address] and familiar to PT. Patient assisted to seated EOB and denied dizziness with positional change. Patient performed exercises as noted above prior to standing attempt. Patient educated on WB restrictions and patients foot placed to PT's foot to monitor. Patient attempted several times to stand and not able to maintain NWB to LLE during attempts. On final attempt, patient able to stand partially while maintaining NWB to LLE, but while attempting to grasp walker patient put weight through L foot and immediately returned to seated. Patient assisted back to supine and positioned to the [facility]. Patient reported having a bowel movement and PCA called to room for hygiene needs. Call light and tray table in reach. Treatment:  Patient practiced and was instructed in the following treatment:    · Bed mobility: Verbal/tactile cues for sequencing BUEs/BLEs for safe technique with rolling/supine<>sit task.    · Transfer training: Verbal/tactile cues to facilitate proper hand placement, technique and safety during sit to stand task. Unable to achieve full stand at this time. · Gait training: Unable to attempt due to WB restrictions. · Therapeutic exercises: As noted above  · Neuromuscular education: NT    Pt's/ family goals   1. To be able to stand/walk again. Prognosis is good for reaching above PT goals. Patient and or family understand(s) diagnosis, prognosis, and plan of care. Yes     PHYSICAL THERAPY PLAN OF CARE:    PT POC is established based on physician order and patient diagnosis     Referring provider/PT Order:    05/28/21 2030  PT evaluation and treat Start: 05/28/21 2030, End: 05/28/21 2030, ONE TIME, Standing Count: 1 Occurrences, R      Alley Hicks MD       Diagnosis:  Pseudoaneurysm of femoral artery (HCC) [I72.4]  Specific instructions for next treatment:  Subsequent PT sessions with focus on transfers while maintaining NWB to LLE, gait if able to comply with WB restricitons    Current Treatment Recommendations:     [x] Strengthening to improve independence with functional mobility   [] ROM to improve independence with functional mobility   [x] Balance Training to improve static/dynamic balance and to reduce fall risk  [x] Endurance Training to improve activity tolerance during functional mobility   [x] Transfer Training to improve safety and independence with all functional transfers   [x] Gait Training to improve gait mechanics, endurance and asses need for appropriate assistive device  [] Stair Training in preparation for safe discharge home and/or into the community   [x] Positioning to prevent skin breakdown and contractures  [x] Safety and Education Training   [x] Patient/Caregiver Education   [] HEP  [] Other     PT long term treatment goals are located in above grid    Frequency of treatments: 2-5x/week x 1-2 weeks.     Time in  1420  Time out  1445    Total Treatment Time  15 minutes Evaluation Time includes thorough review of current medical information, gathering information on past medical history/social history and prior level of function, completion of standardized testing/informal observation of tasks, assessment of data and education on plan of care and goals.     CPT codes:  [] Low Complexity PT evaluation 59307  [x] Moderate Complexity PT evaluation 89672  [] High Complexity PT evaluation 58300  [] PT Re-evaluation 33092  [] Gait training 03200 - minutes  [] Manual therapy 96787 - minutes  [x] Therapeutic activities 66327 15 minutes  [] Therapeutic exercises 19340 - minutes  [] Neuromuscular reeducation 28143 - minutes     Radha Perez, PT, DPT  License [id number]

## 2021-06-01 NOTE — DISCHARGE INSTR - COC
Continuity of Care Form    Patient Name: Radha Verduzco   :  1927  MRN:  [id number]    Admit date:  2021  Discharge date:  21    Code Status Order: Full Code   Advance Directives:   [facility] Documentation     Date/Time Healthcare Directive Type of Healthcare Directive Copy in [address] Agent's Name Healthcare Agent's Phone Number    21  Yes, patient has an advance directive for healthcare treatment  Durable power of  for health care;Living will  --  Adult Children  [facility]  [phone]          Admitting Physician:  No admitting provider for patient encounter. PCP: Debra Godoy DO    Discharging Nurse: [facility] Unit/Room#: 0655/2692-A  Discharging Unit Phone Number: [phone]    Emergency Contact:   Extended Emergency Contact Information  Primary Emergency Contact: [facility]  Address: [address]           [facility],  [facility] Phone: [phone]  Relation: Child  Secondary Emergency Contact: Arian Melvin  Address: [address] [place] [address]  [facility] [address] [facility] Phone: [phone]  Relation: Child    Past Surgical History:  Past Surgical History:   Procedure Laterality Date    ABDOMEN SURGERY      APPENDECTOMY      CARDIAC SURGERY      CHOLECYSTECTOMY      COLONOSCOPY      CORONARY ARTERY BYPASS GRAFT      8/28/10    ENDOSCOPY, COLON, DIAGNOSTIC      FOOT DEBRIDEMENT Left 2021    FOOT DEBRIDEMENT INCISION AND DRAINAGE.    performed by Carey Ventura DPM at [address] [place] [address] Left 2021    LEFT FOOT DEBRIDEMENT  WITH DELAYED PRIMARY CLOSURE performed by Kirk Erazo DPM at [address] [place] [address]      frankie and bso 1997    TONSILLECTOMY AND ADENOIDECTOMY         Immunization History:   Immunization History   Administered Date(s) Administered    Influenza, High Dose (Fluzone 65 yrs and older) 2015, 2016, 10/24/2017, 10/03/2018    Pneumococcal Conjugate 13-valent (Bnitngv38) 11/17/2015    Pneumococcal Polysaccharide (Fginicvte02) 12/02/2014    Tdap (Boostrix, Adacel) 07/05/2019       Active Problems:  Patient Active Problem List   Diagnosis Code    Asthma J45.909    CAD (coronary artery disease) I25.10    Vitamin D deficiency E55.9    Diabetic infection of left foot ([facility]) E11.628, L08.9    HTN (hypertension) I10    Idiopathic peripheral neuropathy G60.9    Rhinitis, allergic J30.9    Bronchitis J40    GERD (gastroesophageal reflux disease) K21.9    PVD (peripheral vascular disease) with claudication ([facility]) I73.9    Gait instability R26.81    Hyponatremia E87.1    DM (diabetes mellitus), type 2 ([facility]) E11.9    Type 1 diabetes mellitus with left diabetic foot ulcer ([facility]) D24.769, L97.529    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 ([facility]) I70.244    Acute hypoxemic respiratory failure ([facility]) J96.01    Pseudoaneurysm of right femoral artery ([facility]) I72.4    History of pulmonary embolus (PE) Z86.711    Peripheral vascular angioplasty status Z98.62       Isolation/Infection:   Isolation          No Isolation        Patient Infection Status     None to display          Nurse Assessment:  Last Vital Signs: BP (!) 153/66 Comment: MD notified  Pulse 58   Temp 97.1 °F (36.2 °C) (Temporal)   Resp 18   Ht 5' 3\" (1.6 m)   Wt 150 lb (68 kg)   LMP 12/03/1997   SpO2 94%   BMI 26.57 kg/m²     Last documented pain score (0-10 scale): Pain Level: 0  Last Weight:   Wt Readings from Last 1 Encounters:   05/30/21 150 lb (68 kg)     Mental Status:  oriented and alert    IV Access:  - None    Nursing Mobility/ADLs:  Walking   Dependent  Transfer  Assisted  Bathing  Assisted  Dressing  Assisted  Toileting  Assisted  Feeding  Independent  Med Admin  Independent  Med Delivery   whole    Wound Care Documentation and Therapy:        Elimination:  Continence:   · Bowel: No  · Bladder: or Other Treatments After Discharge: ***    Physician Certification: I certify the above information and transfer of Olivia Mosqueda  is necessary for the continuing treatment of the diagnosis listed and that she requires [place] for less 30 days.      Update Admission H&P: {LATASHA DME Changes in TEDY:[id number]}    PHYSICIAN SIGNATURE:  {Esignature:[id number]}

## 2021-06-01 NOTE — PROGRESS NOTES
Subjective: The patient is awake and alert. She denies any acute complaints  Family at bedside    Objective:    BP (!) 195/80   Pulse 58   Temp 97.1 °F (36.2 °C) (Temporal)   Resp 18   Ht 5' 3\" (1.6 m)   Wt 150 lb (68 kg)   LMP 12/03/1997   SpO2 94%   BMI 26.57 kg/m²     In: 960 [P.O.:960]  Out: 1400   In: 960   Out: 1400 [Urine:1400]    General appearance: NAD, conversant  HEENT: AT/NC, MMM  Neck: FROM, supple  Lungs: Clear to auscultation  CV: RRR, no MRGs  Vasc: Radial pulses 2+  Abdomen: Soft, non-tender; no masses or HSM  Extremities: No peripheral edema or digital cyanosis-right groin with hardened area at site of pseudoaneurysm/thrombosis - however no pain to palpation  Skin: no rash, lesions or ulcers  Psych: Alert and oriented to person, place and time  Neuro: Alert and interactive      Recent Labs     05/30/21  0500 05/31/21  0030 05/31/21  0530 06/01/21  1030   WBC 6.9  --  7.2 7.6   HGB 6.7* 8.3* 8.7* 8.4*   HCT 20.9* 24.8* 26.4* 26.3*   *  --  516* 435       Recent Labs     05/30/21  0500 05/31/21  0530 06/01/21  0530    136 131*   K 4.2 4.3 4.4    101 98   CO2 24 26 26   BUN 9 10 11   CREATININE 0.6 0.6 0.6   CALCIUM 8.5* 9.0 8.9       Assessment:    Active Problems:    Asthma    CAD (coronary artery disease)    Diabetic infection of left foot (HCC)    PVD (peripheral vascular disease) with claudication (HCC)    Hyponatremia    DM (diabetes mellitus), type 2 (HCC)    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  *      Plan:    Patient recently discharged on 5/25/2021 after lengthy inpatient stay for left foot cellulitis/peripheral vascular disease status post angiogram, readmitted for pseudoaneurysm of right groin femoral artery and thrombus

## 2021-06-01 NOTE — PROGRESS NOTES
Occupational Therapy  OCCUPATIONAL THERAPY INITIAL EVALUATION    JAMIR Stokes[address] [place] [address]    Date:2021                                                 Patient Name: Bard Rogers  MRN: [id number]  : 1927  Room: [address] [place] [address]    Evaluating OT: Pritesh Chu OTR/L #[id number]  Referring Provider: Riki Witt MD; Roseline Su MD  Specific Provider Orders: OT eval and treat; 21, 21    Diagnosis: Pseudoaneurysm of femoral artery ([facility] [address] [I72.4]  Reason for admission: wound check, anemia  Surgery/Procedure: last admission-- left foot debridement with delayed primary closure 21  Pertinent Medical History: arthritis, asthma, CAD, DM, GERD, HLD, HTN, PVD, urinary incontinence        Precautions:  Fall Risk, NWB LLE, prevalon boot to LLE    Assessment of current deficits   [x] Functional mobility  [x]ADLs  [x] Strength               []Cognition   [x] Functional transfers   [x] IADLs         [x] Safety Awareness   [x]Endurance   [x] Fine Coordination              [x] Balance      [] Vision/perception   []Sensation    []Gross Motor Coordination  [] ROM  [] Delirium                   [] Motor Control     OT PLAN OF CARE   OT POC based on physician orders, patient diagnosis and results of clinical assessment    Frequency/Duration: 1-3 days/wk for 2 weeks PRN   Specific OT Treatment to include:   * Instruction/training on adapted ADL techniques and AE recommendations to increase functional independence within        precautions  * Training on energy conservation strategies, correct breathing pattern and techniques to improve independence/tolerance for self-care routine  * Functional transfer/mobility training/DME recommendations for increased independence, safety, and fall prevention  * Patient/Family education to increase follow through with safety techniques and functional independence  * Recommendation of environmental modifications for increased safety with functional transfers/mobility and ADLs  * Therapeutic exercise to improve motor endurance, ROM, and functional strength for ADLs/functional transfers  * Therapeutic activities to facilitate/challenge dynamic balance, stand tolerance for increased safety and independence with ADLs  * Therapeutic activities to facilitate gross/fine motor skills for increased independence with ADLs  * Positioning to improve skin integrity, interaction with environment and functional independence    Recommended Adaptive Equipment:  LB AE (pt already owns), ww, TBD for additional equipment    Home Living: Pt admitted from [facility]. Daughter and pt report she was only there for a short amount of time before being readmitted to the hospital.      Prior Level of Function: Assistance with ADLs , Assistance with IADLs; Pt was active with PT/OT at nursing home, but was not there long enough to receive therapy before being readmitted to hospital. Pt reported she had not been sitting EOB or standing d/t limited time spent in nursing home prior to readmission to hospital. Prior to foot infection pt was ambulating with rollator and was independent/mod. I for ADLs and assist with IADLs.      Pain Level: Pt reports 0/10 pain this session  Cognition: A&O: 3/4; Follows 1-2 step directions w/ increased cueing for comprehension/ initiation of task    Memory:  fair   Sequencing:  fair   Problem solving:  fair   Judgement/safety:  fair     Functional Assessment:  AM-PAC Daily Activity Raw Score: 15/24   Initial Eval Status  Date: 6/1/21 Treatment Status  Date: STGs = LTGs  Time frame: 10-14 days   Feeding Independent      Grooming Minimal Assist (seated EOB)  Supervision/set-up    UB Dressing Minimal Assist   Supervision/set-up    LB Dressing Max A (simulated pants)    Moderate Assist to don/doff socks with AE  Minimal Assist    Bathing Moderate Assist (simulated)  Minimal Assist    Toileting Dep (pt incontinent of BM upon arrival, assisted with hygiene and clothing management)  Maximal Assist    Bed Mobility  Supine to sit: Moderate Assist   Sit to supine: Moderate Assist   Supine to sit: Stand by Assist   Sit to supine: Stand by Assist    Functional Transfers Maximal Assist (sit<>stand from EOB)  Minimal Assist    Functional Mobility NT d/t safety  Moderate Assist w/ ww   Balance Sitting:     Static: SBA      Dynamic:Min. A  Standing: Max A w/ ww  Sitting:     Static: Indep    Dynamic:Supervision  Standing: Mod. A   Activity Tolerance fair  Fair+   Visual/  Perceptual Glasses: readers    WFL                Hand Dominance R   AROM (PROM) Strength Additional Info:    RUE  WFL 4/5 fair  and fair FMC/dexterity noted during ADL tasks       LUE WFL 4/5 fair  and fair FMC/dexterity noted during ADL tasks       Hearing: WFL   Sensation:  No c/o numbness or tingling   Tone: WFL   Edema: none noted    Comments: Obtained nursing clearance prior to session. Upon arrival patient lying in bed and agreeable to OT session. Pt demonstrating fair understanding of education/techniques, requiring additional training / education. At end of session, patient lying in bed with call light and phone within reach, all lines and tubes intact. Pt instructed on use of call light for assistance and fall prevention. Line management and environmental modifications made prior to and end of session to ensure patient safety and to increase efficiency of session. Skilled monitoring of HR, O2 saturation, blood pressure and patient's response to activity performed throughout session. Overall pt demonstrated  decreased independence and safety during completion of ADL/functional transfers/mobility tasks. Pt would benefit from continued skilled OT to increase safety and independence with completion of ADL/IADL tasks for functional independence and quality of life. Treatment: Educated pt on NWB LLE.  Therapist facilitated bed mobility (supine<>sit w/ cueing on hand placement and sequencing of task), functional transfers (EOB, sit<>stand w/ cueing on hand placement and maintaining NWB LLE), standing tolerance tasks(addressing posture, able to stand for <5 minutes before fatiguing, pt demo fair- understanding of NWB LLE during standing task) and sitting tolerance task(addressing posture and incorporating light functional reaching impacting ADLs)--skilled cuing on hand placement, posture, body mechanics and safety. Therapist facilitated self-care retraining: UB/LB self-care tasks(don/doff R sock with AE, pt demo fair follow through of technique), toileting task(therapist performed hygiene in standing and changed pad) and seated grooming task(oral care seated EOB, pt requiring assistance for opening toothpaste and initiation of task) while educating pt on modified techniques, posture, safety and energy conservation techniques. Skilled monitoring of HR, O2 sats and pts response to treatment. Family/pt education on OT's role, AE, NWB LLE and completing ADLs as independently as possible to aide in recovery. Rehab Potential: Good for established goals     Patient / Family Goal: not stated       Patient and/or family were instructed on functional diagnosis, prognosis/goals and OT plan of care. Demonstrated fair understanding.      Eval Complexity: Mod  mod  Profile and History- med (extensive chart review)  Assessment of Occupational Performance and Identification of Deficits- med  Clinical Decision Making- med    Time In: 8:25  Time Out: 9:00  Total Treatment Time: 23 minutes    Min Units   OT Eval Low 05215       OT Eval Medium 97166  x 1   OT Eval High 86300       OT Re-Eval [id number]       Therapeutic Ex 19400       Therapeutic Activities 71450  10  1   ADL/Self Care 11075  13  1   Orthotic Management 12797       Neuro Re-Ed 42276       Non-Billable Time          Evaluation Time includes thorough review of current medical information, gathering information on past medical history/social history and prior level of function, completion of standardized testing/informal observation of tasks, assessment of data and education on plan of care and goals.             Ana Laura Whitfield, OTR/L #[id number]

## 2021-06-01 NOTE — PROGRESS NOTES
pseudoaneurysm, this appears to be   recanalized when compared to previous            US DUP LOWER EXTREMITY RIGHT ARTERIES   Final Result   1. Findings consistent with a thrombosed pseudoaneurysm on the right            IR Interventional Radiology Procedure Request    (Results Pending)   VL VICTORINA BILATERAL LIMITED 1-2 LEVELS    (Results Pending)   US DUP LOWER EXTREMITY RIGHT ARTERIES    (Results Pending)     BP (!) 183/77   Pulse 56   Temp 96.8 °F (36 °C) (Temporal)   Resp 16   Ht 5' 3\" (1.6 m)   Wt 150 lb (68 kg)   LMP 12/03/1997   SpO2 94%   BMI 26.57 kg/m²     LABS:    Recent Labs     05/31/21  0530 06/01/21  1030   WBC 7.2 7.6   HGB 8.7* 8.4*   HCT 26.4* 26.3*   * 435        Recent Labs     06/01/21  0530   *   K 4.4   CL 98   CO2 26   BUN 11   CREATININE 0.6        No results for input(s): PROT, INR, APTT in the last 72 hours. ASSESSMENT:  1.S/P I&D,debridement, delayed primary closure (DOS: 5/21/21)  2. Left foot wound  3. DM with neuropthic changes   4. Hammered digits   5. 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 Reviewed patient's recent lab results and pertinent diagnostic imaging. Reviewed ancillary service notes. - Dressings changed this morning: Xeroform, DSD  - Prevalon boots as tolerated   - Pain Control: IV and PO  - Antibiotics as per ID: Invanz for 10 days starting on 5/24/21   - Vascular 5/18/21:   1. VICTORINA falsely elevated, calcifications   2. B/L occlusive disease   3. Good collateral flow to right toes but diminished to left toes.    - NWB to left LE  - Discussed patient with Dr. Marquita Whitmore   - Will continue to follow patient while they are in-house.

## 2021-06-02 VITALS
TEMPERATURE: 98.7 F | HEIGHT: 63 IN | BODY MASS INDEX: 26.58 KG/M2 | HEART RATE: 66 BPM | OXYGEN SATURATION: 95 % | RESPIRATION RATE: 13 BRPM | DIASTOLIC BLOOD PRESSURE: 79 MMHG | SYSTOLIC BLOOD PRESSURE: 181 MMHG | WEIGHT: 150 LBS

## 2021-06-02 LAB
ANION GAP SERPL CALCULATED.3IONS-SCNC: 8 MMOL/L (ref 7–16)
BASOPHILS ABSOLUTE: 0.02 E9/L (ref 0–0.2)
BASOPHILS RELATIVE PERCENT: 0.3 % (ref 0–2)
BUN BLDV-MCNC: 13 MG/DL (ref 6–23)
CALCIUM SERPL-MCNC: 8.7 MG/DL (ref 8.6–10.2)
CHLORIDE BLD-SCNC: 95 MMOL/L (ref 98–107)
CO2: 23 MMOL/L (ref 22–29)
CREAT SERPL-MCNC: 0.6 MG/DL (ref 0.5–1)
EOSINOPHILS ABSOLUTE: 0.37 E9/L (ref 0.05–0.5)
EOSINOPHILS RELATIVE PERCENT: 4.8 % (ref 0–6)
GFR AFRICAN AMERICAN: >60
GFR NON-AFRICAN AMERICAN: >60 ML/MIN/1.73
GLUCOSE BLD-MCNC: 163 MG/DL (ref 74–99)
HCT VFR BLD CALC: 28.6 % (ref 34–48)
HEMOGLOBIN: 9.2 G/DL (ref 11.5–15.5)
IMMATURE GRANULOCYTES #: 0.05 E9/L
IMMATURE GRANULOCYTES %: 0.6 % (ref 0–5)
LYMPHOCYTES ABSOLUTE: 1.19 E9/L (ref 1.5–4)
LYMPHOCYTES RELATIVE PERCENT: 15.4 % (ref 20–42)
MCH RBC QN AUTO: 31.2 PG (ref 26–35)
MCHC RBC AUTO-ENTMCNC: 32.2 % (ref 32–34.5)
MCV RBC AUTO: 96.9 FL (ref 80–99.9)
MONOCYTES ABSOLUTE: 0.47 E9/L (ref 0.1–0.95)
MONOCYTES RELATIVE PERCENT: 6.1 % (ref 2–12)
NEUTROPHILS ABSOLUTE: 5.64 E9/L (ref 1.8–7.3)
NEUTROPHILS RELATIVE PERCENT: 72.8 % (ref 43–80)
PDW BLD-RTO: 14.7 FL (ref 11.5–15)
PLATELET # BLD: 514 E9/L (ref 130–450)
PMV BLD AUTO: 9.3 FL (ref 7–12)
POTASSIUM SERPL-SCNC: 4.4 MMOL/L (ref 3.5–5)
RBC # BLD: 2.95 E12/L (ref 3.5–5.5)
SODIUM BLD-SCNC: 126 MMOL/L (ref 132–146)
WBC # BLD: 7.7 E9/L (ref 4.5–11.5)

## 2021-06-02 PROCEDURE — 6370000000 HC RX 637 (ALT 250 FOR IP): Performed by: INTERNAL MEDICINE

## 2021-06-02 PROCEDURE — 80048 BASIC METABOLIC PNL TOTAL CA: CPT

## 2021-06-02 PROCEDURE — 36415 COLL VENOUS BLD VENIPUNCTURE: CPT

## 2021-06-02 PROCEDURE — 2580000003 HC RX 258: Performed by: INTERNAL MEDICINE

## 2021-06-02 PROCEDURE — 85025 COMPLETE CBC W/AUTO DIFF WBC: CPT

## 2021-06-02 PROCEDURE — 99232 SBSQ HOSP IP/OBS MODERATE 35: CPT | Performed by: SURGERY

## 2021-06-02 PROCEDURE — 6360000002 HC RX W HCPCS: Performed by: INTERNAL MEDICINE

## 2021-06-02 RX ORDER — ASPIRIN 81 MG/1
81 TABLET ORAL DAILY
Qty: 30 TABLET | Refills: 0 | Status: SHIPPED | OUTPATIENT
Start: 2021-06-02 | End: 2022-07-25

## 2021-06-02 RX ADMIN — APIXABAN 5 MG: 5 TABLET, FILM COATED ORAL at 08:22

## 2021-06-02 RX ADMIN — BUDESONIDE AND FORMOTEROL FUMARATE DIHYDRATE 2 PUFF: 80; 4.5 AEROSOL RESPIRATORY (INHALATION) at 08:22

## 2021-06-02 RX ADMIN — TRAMADOL HYDROCHLORIDE 50 MG: 50 TABLET, COATED ORAL at 14:15

## 2021-06-02 RX ADMIN — PANTOPRAZOLE SODIUM 40 MG: 40 TABLET, DELAYED RELEASE ORAL at 08:23

## 2021-06-02 RX ADMIN — ACETAMINOPHEN 650 MG: 325 TABLET ORAL at 00:01

## 2021-06-02 RX ADMIN — SUCRALFATE 1 G: 1 TABLET ORAL at 14:15

## 2021-06-02 RX ADMIN — AMLODIPINE BESYLATE 5 MG: 5 TABLET ORAL at 08:22

## 2021-06-02 RX ADMIN — Medication 10 ML: at 08:23

## 2021-06-02 RX ADMIN — ERTAPENEM SODIUM 1000 MG: 1 INJECTION, POWDER, LYOPHILIZED, FOR SOLUTION INTRAMUSCULAR; INTRAVENOUS at 09:03

## 2021-06-02 RX ADMIN — TRAMADOL HYDROCHLORIDE 50 MG: 50 TABLET, COATED ORAL at 08:23

## 2021-06-02 RX ADMIN — SUCRALFATE 1 G: 1 TABLET ORAL at 08:22

## 2021-06-02 RX ADMIN — ACETAMINOPHEN 650 MG: 325 TABLET ORAL at 13:08

## 2021-06-02 ASSESSMENT — PAIN DESCRIPTION - DESCRIPTORS: DESCRIPTORS: ACHING;DISCOMFORT;JABBING;SHARP

## 2021-06-02 ASSESSMENT — PAIN DESCRIPTION - PAIN TYPE
TYPE: ACUTE PAIN
TYPE: ACUTE PAIN

## 2021-06-02 ASSESSMENT — PAIN DESCRIPTION - LOCATION
LOCATION: FOOT
LOCATION: FOOT

## 2021-06-02 ASSESSMENT — PAIN SCALES - GENERAL
PAINLEVEL_OUTOF10: 7
PAINLEVEL_OUTOF10: 0
PAINLEVEL_OUTOF10: 4
PAINLEVEL_OUTOF10: 4
PAINLEVEL_OUTOF10: 6

## 2021-06-02 NOTE — PROGRESS NOTES
Dr. Flores Shah notified of patient HGB 9.2 on recent lab, the need for a different anticoagulation, and per family request to keep shi on d/c.

## 2021-06-02 NOTE — CARE COORDINATION
Esmer at [facility] notified of discharge order on chart and to check on transportation. [phone]  Pt is ok to return to [facility] today and is set up for a 1630 ambulette  with Crista Joya. Will notify pt and pt's nurse.

## 2021-06-02 NOTE — PATIENT CARE CONFERENCE
[facility] Quality Flow/Interdisciplinary Rounds Progress Note        Quality Flow Rounds held on June 2, 2021    Disciplines Attending:  Bedside Nurse, ,  and Nursing Unit Leadership    Shruthi Allen was admitted on 5/28/2021  4:47 PM    Anticipated Discharge Date:  Expected Discharge Date: 06/04/21    Disposition:    Soctt Score:  Scott Scale Score: 20    Readmission Risk              Risk of Unplanned Readmission:  32           Discussed patient goal for the day, patient clinical progression, and barriers to discharge.   The following Goal(s) of the Day/Commitment(s) have been identified:  to be ready for discharge and pick-up by  [facility]  June 2, 2021

## 2021-06-02 NOTE — PLAN OF CARE
Problem: Falls - Risk of:  Goal: Will remain free from falls  Description: Will remain free from falls  6/2/2021 0517 by Langley Cooks, RN  Outcome: Met This Shift  6/2/2021 0123 by Carlos A Ridley RN  Outcome: Met This Shift     Problem: Skin Integrity:  Goal: Will show no infection signs and symptoms  Description: Will show no infection signs and symptoms  Outcome: Met This Shift     Problem: Pain:  Goal: Pain level will decrease  Description: Pain level will decrease  6/2/2021 0517 by Langley Cooks, RN  Outcome: Met This Shift  6/2/2021 0123 by Carlos A Ridley RN  Outcome: Met This Shift     Problem: Cardiac:  Goal: Hemodynamic stability will improve  Description: Hemodynamic stability will improve  Outcome: Met This Shift

## 2021-06-02 NOTE — PROGRESS NOTES
Vascular Surgery Progress Note    CC: Follow-up PVD, pseudoaneurysm    HISTORY:  The patient is awake, alert, and oriented. Denies groin pain. Good spirits. Daughter present at bedside. IMPRESSION: Successful thrombosis of pseudoaneurysm by thrombin injection. History of left leg angioplasty. I reviewed with the daughter that from my standpoint she is stable for discharge. Arterial studies show improved flow to the left foot. Hopefully this will improve the chance of her foot ulcer healing. Follow-up as needed.       Patient Active Problem List   Diagnosis Code    Asthma J45.909    CAD (coronary artery disease) I25.10    Vitamin D deficiency E55.9    Diabetic infection of left foot ([facility] [address] E11.628, L08.9    HTN (hypertension) I10    Idiopathic peripheral neuropathy G60.9    Rhinitis, allergic J30.9    Bronchitis J40    GERD (gastroesophageal reflux disease) K21.9    PVD (peripheral vascular disease) with claudication ([facility]) I73.9    Gait instability R26.81    Hyponatremia E87.1    DM (diabetes mellitus), type 2 ([facility]) E11.9    Type 1 diabetes mellitus with left diabetic foot ulcer ([facility]) E53.269, L97.529    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 ([facility]) I70.244    Acute hypoxemic respiratory failure ([facility]) J96.01    Pseudoaneurysm of right femoral artery ([facility]) I72.4    History of pulmonary embolus (PE) Z86.711    Peripheral vascular angioplasty status Z98.62       Current Medications:    sodium chloride      sodium chloride      sodium chloride        sodium chloride, sodium chloride, acetaminophen, albuterol, sodium chloride flush, sodium chloride, promethazine **OR** ondansetron, polyethylene glycol, acetaminophen **OR** acetaminophen    apixaban  5 mg Oral BID    thrombin   Topical Once    lidocaine PF  20 mL Other Once    lidocaine  20 mL Other Once    thrombin   Topical Once    amLODIPine  5 mg Oral Daily    budesonide-formoterol  2

## 2021-06-02 NOTE — PROGRESS NOTES
Department of Podiatry  Progress Note    SUBJECTIVE:  Ms. Laurent Harper was seen and evaluated at bedside this morning s/p left foot debridement with delayed primary closure (DOS: 5/21/21). No acute events overnight. Patient denies any N/V/D/F/C/SOB/CP and has no other pedal complaints at this time. Daughter at bedside. OBJECTIVE:    Scheduled Meds:   apixaban  5 mg Oral BID    thrombin   Topical Once    lidocaine PF  20 mL Other Once    lidocaine  20 mL Other Once    thrombin   Topical Once    amLODIPine  5 mg Oral Daily    budesonide-formoterol  2 puff Inhalation BID    pantoprazole  40 mg Oral QAM AC    traMADol  50 mg Oral TID    sucralfate  1 g Oral TID    rOPINIRole  0.5 mg Oral Nightly    gabapentin  300 mg Oral QPM    pravastatin  20 mg Oral Nightly    sodium chloride flush  5-40 mL Intravenous 2 times per day    ertapenem (INVanz) IVPB  1,000 mg Intravenous Q24H     Continuous Infusions:   sodium chloride      sodium chloride      sodium chloride       PRN Meds:.sodium chloride, sodium chloride, acetaminophen, albuterol, sodium chloride flush, sodium chloride, promethazine **OR** ondansetron, polyethylene glycol, acetaminophen **OR** acetaminophen    Allergies   Allergen Reactions    Lisinopril Other (See Comments)     7/18/19 Pt states that she does not want to take LISINOPRIL       BP (!) 181/79   Pulse 66   Temp 98.7 °F (37.1 °C) (Oral)   Resp 13   Ht 5' 3\" (1.6 m)   Wt 150 lb (68 kg)   LMP 12/03/1997   SpO2 95%   BMI 26.57 kg/m²       EXAM:    VASCULAR:  DP and PT pulses are palpable b/l. CFT < 5 seconds B/L. Warm to warm from the tibial tuberosity to the distal aspect of the digits dorsally. No hair growth noted to the distal aspects dorsally. NEUROLOGIC:  Protective sensation is intact b/l     DERM:  Wound noted to the dorsal aspect of the left foot measuring about 4.0x8.0x0.1cm. Wound bed is about 85% granular and about 15% fibrotic. No noted drainage or pus.  No noted tunneling or burrowing. No SOI. Incision site noted to the plantar aspect of the left foot. Incision is well coapted with no noted drainage or pus. Hyperkeratotic lesions noted surrounding the wound. Sutures are intact and in intended position. No SOI. No malodor    MUSCULOSKELETAL:  No posterior calf pain on palpation b/l. Mild equinus. MMT WNL. Hammered lesser digits. Scheduled Meds:   apixaban  5 mg Oral BID    thrombin   Topical Once    lidocaine PF  20 mL Other Once    lidocaine  20 mL Other Once    thrombin   Topical Once    amLODIPine  5 mg Oral Daily    budesonide-formoterol  2 puff Inhalation BID    pantoprazole  40 mg Oral QAM AC    traMADol  50 mg Oral TID    sucralfate  1 g Oral TID    rOPINIRole  0.5 mg Oral Nightly    gabapentin  300 mg Oral QPM    pravastatin  20 mg Oral Nightly    sodium chloride flush  5-40 mL Intravenous 2 times per day    ertapenem (INVanz) IVPB  1,000 mg Intravenous Q24H     Continuous Infusions:   sodium chloride      sodium chloride      sodium chloride       PRN Meds:.sodium chloride, sodium chloride, acetaminophen, albuterol, sodium chloride flush, sodium chloride, promethazine **OR** ondansetron, polyethylene glycol, acetaminophen **OR** acetaminophen    RADIOLOGY:  US DUP LOWER EXTREMITY RIGHT ARTERIES   Final Result   1. Pseudoaneurysm appears to be thrombosed. VL VICTORINA BILATERAL LIMITED 1-2 LEVELS   Final Result      US DUP LOWER EXTREMITY RIGHT ARTERIES   Final Result   1. Previously identified pseudoaneurysm appears thrombosed            [address]   Final Result   Successful uncomplicated thrombin injection of the pseudoaneurysm   arising from the right common femoral artery. If there are any physician concerns regarding this report, a   Radiologist can be reached by calling the following number [phone].          US DUP LOWER EXTREMITIES BILATERAL VENOUS   Final Result   Within the visualized vessels there is no evidence for deep venous   thrombosis               US DUP LOWER EXTREMITY RIGHT ARTERIES   Final Result   1. Right external iliac pseudoaneurysm, this appears to be   recanalized when compared to previous            US DUP LOWER EXTREMITY RIGHT ARTERIES   Final Result   1. Findings consistent with a thrombosed pseudoaneurysm on the right              BP (!) 181/79   Pulse 66   Temp 98.7 °F (37.1 °C) (Oral)   Resp 13   Ht 5' 3\" (1.6 m)   Wt 150 lb (68 kg)   LMP 12/03/1997   SpO2 95%   BMI 26.57 kg/m²     LABS:    Recent Labs     06/01/21  1030 06/02/21  1151   WBC 7.6 7.7   HGB 8.4* 9.2*   HCT 26.3* 28.6*    514*        Recent Labs     06/02/21  0349   *   K 4.4   CL 95*   CO2 23   BUN 13   CREATININE 0.6        No results for input(s): PROT, INR, APTT in the last 72 hours. ASSESSMENT:  1.S/P I&D,debridement, delayed primary closure (DOS: 5/21/21)  2. Left foot wound  3. DM with neuropthic changes   4. Hammered digits   5. 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 Reviewed patient's recent lab results and pertinent diagnostic imaging. Reviewed ancillary service notes. - Dressings changed this morning: Xeroform, DSD  - Prevalon boots as tolerated   - Pain Control: IV and PO  - Antibiotics as per ID: Invanz for 10 days starting on 5/24/21   - Vascular 5/18/21:   1. VICTORINA falsely elevated, calcifications   2. B/L occlusive disease   3. Good collateral flow to right toes but diminished to left toes.   - Vascular team signed off.    - NWB to left LE  - Discussed patient with Dr. Hiram Garcia   - Will continue to follow patient while they are in-house.  Will need to follow up within one week of discharge with Koby Pearson

## 2021-06-02 NOTE — PROGRESS NOTES
Family concerned with shi being removed since she is not ambulatory at this time. Shar Isabel at the [facility], spoke with STEPHANIE (Yelena). She stated it was okay to leave in on discharge and they would assess at the facility.        Jayda Yañez RN

## 2021-06-05 NOTE — DISCHARGE SUMMARY
Physician Discharge Summary     Patient ID:  Kenrick Mccain  [id number]  75 y.o.  9/20/1927    Admit date: 5/28/2021    Discharge date and time: 6/2/2021    Admission Diagnoses:   Patient Active Problem List   Diagnosis    Asthma    CAD (coronary artery disease)    Vitamin D deficiency    Diabetic infection of left foot ([facility]    HTN (hypertension)    Idiopathic peripheral neuropathy    Rhinitis, allergic    Bronchitis    GERD (gastroesophageal reflux disease)    PVD (peripheral vascular disease) with claudication (HCC)    Gait instability    Hyponatremia    DM (diabetes mellitus), type 2 (HCC)    Type 1 diabetes mellitus with left diabetic foot ulcer ([facility]    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 female of [facility] with significant past medical history of cellulitis, DM, PE, HTN, and CAD who presents with pseudoaneurysm. Patient was recently discharged on 5/25/2021 after lengthy inpatient stay for left foot cellulitis/peripheral vascular disease status post angiogram, readmitted for pseudoaneurysm of right groin femoral artery and thrombus requiring thrombin injection by interventional radiology on 5/29/2021. Patient currently on Eliquis with hematuria. Hemoglobins have remained stable, patient remains on Eliquis while cycling H&H's. Patient was discharged back to the facility without any medication adjustments.     Recent Labs     06/02/21  1151   WBC 7.7   HGB 9.2*   HCT 28.6*   *       No results for input(s): NA, K, CL, CO2, BUN, CREATININE, without fracture or focal lesion. Prominent loss of disc heights in the lumbar spine. Miscellaneous: In right anterior thigh, there is 2.0 x 1.1 cm focus of enhancement, which posteriorly connects to the right common femoral artery and is suspicious for pseudoaneurysm (series 2, image 132). There is a small to moderate size surrounding hematoma in the anterior thigh. 1. 2.0 x 1.1 cm pseudoaneurysm in the right anterior thigh, connected to the anterior wall of the right common femoral artery. Hematoma in the right anterior thigh. Vascular surgery consultation recommended. 2. Right hydroureteronephrosis with abrupt narrowing of the right ureter at the level of S1-2. No obstructing calculus or mass lesions seen. Findings may be due to a stricture. Consider retrograde pyelogram. 3. Moderate distention of the urinary bladder. Clinical correlation is recommended as to the need for catheterization . XR CHEST PORTABLE    Result Date: 2021  EXAMINATION: ONE XRAY VIEW OF THE CHEST 2021 10:45 am COMPARISON: May 16 HISTORY: ORDERING SYSTEM PROVIDED HISTORY: confirm midline placement TECHNOLOGIST PROVIDED HISTORY: Reason for exam:->confirm midline placement FINDINGS: Resolved findings of a volume overload, no longer seen infiltrates or prominence of perihilar vessels. The heart is normal size. Patient had previous mid sternotomy. There is no conspicuous pleural effusions. No acute cardiopulmonary process. US DUP LOWER EXTREMITY RIGHT ARTERIES    Result Date: 2021  Patient MRN: [id number] : 1927 Age:  80 years Gender: Female Order Date: 2021 8:47 AM Exam: US DUP LOWER EXTREMITY RIGHT ARTERIES Number of Images: 16 views Indication:   Please evaluate for persistent  thrombosed right femoral artery pseudoaneurysm Rule out pseudoaneurysm Please evaluate for persistent  thrombosed right femoral artery pseudoaneurysm Comparison: 2021 Findings:  There is evidence for pseudoaneurysm at the level the right external iliac artery measuring approximately 2 cm. In addition there appears to be a moderate-sized associated hematoma. 1.  Right external iliac pseudoaneurysm, this appears to be recanalized when compared to previous     US DUP LOWER EXTREMITY RIGHT ARTERIES    Result Date: 2021  Patient MRN: [id number] : 1927 Age:  80 years Gender: Female Order Date: 2021 6:32 PM Exam: US DUP LOWER EXTREMITY RIGHT ARTERIES Number of Images: 25 views Indication:   check for pseudoaneurysm check for pseudoaneurysm What reading provider will be dictating this exam?->[facility] Comparison: None. Findings: There is no evidence for a patent pseudo aneurysm . There is evidence for a thrombosed pseudoaneurysm. 1.  Findings consistent with a thrombosed pseudoaneurysm on the right     US DUP LOWER EXTREMITIES BILATERAL VENOUS    Result Date: 2021  Patient MRN:  [id number] : 1927 Age: 80 years Gender: Female Order Date:  2021 3:17 PM EXAM: US DUP LOWER EXTREMITIES BILATERAL VENOUS NUMBER OF IMAGES:  52 INDICATION:  History of PE, rule out DVT leg swelling, rule out DVT History of PE, rule out DVT COMPARISON: None Within the visualized vessels, there is no evidence for deep venous thrombosis There is good compressibility, there is good augmentation, there is good color flow. Within the visualized vessels there is no evidence for deep venous thrombosis       Discharge Exam:    HEENT: NCAT,  PERRLA, No JVD  Heart:  RRR, no murmurs, gallops, or rubs.   Lungs:  CTA bilaterally, no wheeze, rales or rhonchi  Abd: bowel sounds present, nontender, nondistended, no masses  Extrem:  No clubbing, cyanosis, or edema    Disposition: SNF     Patient Condition at Discharge: Stable    Patient Instructions:      Medication List      CONTINUE taking these medications    acetaminophen 500 MG tablet  Commonly known as: TYLENOL     ALPHA LIPOIC ACID PO     aluminum & magnesium hydroxide-simethicone 022-958-53 MG/5ML Susp  Commonly known as: MYLANTA     amLODIPine 5 MG tablet  Commonly known as: NORVASC  TAKE 1 TABLET BY MOUTH  DAILY     aspirin 81 MG EC tablet  Take 1 tablet by mouth daily     bisacodyl 10 MG suppository  Commonly known as: DULCOLAX     CoQ10 200 MG Caps     diclofenac sodium 1 % Gel  Commonly known as: VOLTAREN     fluticasone-vilanterol 100-25 MCG/INH Aepb inhaler  Commonly known as: Breo Ellipta  Inhale 1 puff into the lungs daily     furosemide 20 MG tablet  Commonly known as: LASIX     gabapentin 300 MG capsule  Commonly known as: NEURONTIN     glimepiride 2 MG tablet  Commonly known as: AMARYL     lidocaine 4 % cream  Commonly known as: LMX     magnesium gluconate 500 MG tablet  Commonly known as: MAGONATE     OCUVITE ADULT FORMULA PO     pantoprazole 20 MG tablet  Commonly known as: PROTONIX     pravastatin 20 MG tablet  Commonly known as: PRAVACHOL  TAKE 1 TABLET BY MOUTH  NIGHTLY     PRO-BIOTIC BLEND PO     promethazine 12.5 MG tablet  Commonly known as: PHENERGAN     PROVENTIL 90 MCG/ACT inhaler  Generic drug: albuterol  Inhale 2 puffs into the lungs 4 times daily     sucralfate 1 GM tablet  Commonly known as: CARAFATE     traMADol 50 MG tablet  Commonly known as: ULTRAM     VITAMIN B COMPLEX-C PO     vitamin D 25 MCG (1000 UT) Caps           Where to Get Your Medications      These medications were sent to [facility] [address]    Phone: [phone]   · aspirin 81 MG EC tablet       Activity: activity as tolerated  Diet: diabetic diet    Pt has been advised to: Follow-up with Rashida Brenner DO in 1 week.   Follow-up with consultants as recommended by them    Note that over 30 minutes was spent in preparing discharge papers, discussing discharge with patient, medication review, etc.    Signed:  Garrick Kenyon MD  6/5/2021  5:47 AM

## 2021-06-14 ENCOUNTER — HOSPITAL ENCOUNTER (OUTPATIENT)
Dept: WOUND CARE | Age: 86
Discharge: HOME | End: 2021-06-14
Payer: MEDICARE

## 2021-06-14 VITALS
HEART RATE: 62 BPM | RESPIRATION RATE: 18 BRPM | WEIGHT: 150 LBS | TEMPERATURE: 97.1 F | HEIGHT: 63 IN | SYSTOLIC BLOOD PRESSURE: 138 MMHG | BODY MASS INDEX: 26.58 KG/M2 | DIASTOLIC BLOOD PRESSURE: 72 MMHG

## 2021-06-14 PROCEDURE — 99213 OFFICE O/P EST LOW 20 MIN: CPT

## 2021-06-14 PROCEDURE — 6370000000 HC RX 637 (ALT 250 FOR IP): Performed by: PODIATRIST

## 2021-06-14 RX ORDER — FERROUS SULFATE 325(65) MG
325 TABLET ORAL 2 TIMES DAILY
Status: ON HOLD | COMMUNITY
Start: 2021-06-07 | End: 2022-08-28 | Stop reason: HOSPADM

## 2021-06-14 RX ORDER — CLOBETASOL PROPIONATE 0.5 MG/G
OINTMENT TOPICAL ONCE
Status: CANCELLED | OUTPATIENT
Start: 2021-06-14 | End: 2021-06-14

## 2021-06-14 RX ORDER — LIDOCAINE 40 MG/G
CREAM TOPICAL ONCE
Status: CANCELLED | OUTPATIENT
Start: 2021-06-14 | End: 2021-06-14

## 2021-06-14 RX ORDER — LIDOCAINE HYDROCHLORIDE 40 MG/ML
SOLUTION TOPICAL ONCE
Status: CANCELLED | OUTPATIENT
Start: 2021-06-14 | End: 2021-06-14

## 2021-06-14 RX ORDER — BACITRACIN ZINC AND POLYMYXIN B SULFATE 500; 1000 [USP'U]/G; [USP'U]/G
OINTMENT TOPICAL ONCE
Status: CANCELLED | OUTPATIENT
Start: 2021-06-14 | End: 2021-06-14

## 2021-06-14 RX ORDER — LIDOCAINE HYDROCHLORIDE 40 MG/ML
SOLUTION TOPICAL ONCE
Status: COMPLETED | OUTPATIENT
Start: 2021-06-14 | End: 2021-06-14

## 2021-06-14 RX ORDER — VITAMIN B COMPLEX
4 TABLET ORAL
Status: ON HOLD | COMMUNITY
End: 2022-07-25 | Stop reason: HOSPADM

## 2021-06-14 RX ORDER — LIDOCAINE HYDROCHLORIDE 20 MG/ML
JELLY TOPICAL ONCE
Status: CANCELLED | OUTPATIENT
Start: 2021-06-14 | End: 2021-06-14

## 2021-06-14 RX ORDER — VITAMIN B COMPLEX
1 CAPSULE ORAL DAILY
COMMUNITY
End: 2022-08-28

## 2021-06-14 RX ORDER — OMEPRAZOLE 20 MG/1
20 CAPSULE, DELAYED RELEASE ORAL DAILY
COMMUNITY
End: 2021-09-13

## 2021-06-14 RX ORDER — LIDOCAINE 50 MG/G
OINTMENT TOPICAL ONCE
Status: CANCELLED | OUTPATIENT
Start: 2021-06-14 | End: 2021-06-14

## 2021-06-14 RX ORDER — FOLIC ACID 1 MG/1
1 TABLET ORAL DAILY
COMMUNITY
Start: 2021-06-07 | End: 2021-09-13

## 2021-06-14 RX ORDER — GENTAMICIN SULFATE 1 MG/G
OINTMENT TOPICAL ONCE
Status: CANCELLED | OUTPATIENT
Start: 2021-06-14 | End: 2021-06-14

## 2021-06-14 RX ORDER — BACITRACIN, NEOMYCIN, POLYMYXIN B 400; 3.5; 5 [USP'U]/G; MG/G; [USP'U]/G
OINTMENT TOPICAL ONCE
Status: CANCELLED | OUTPATIENT
Start: 2021-06-14 | End: 2021-06-14

## 2021-06-14 RX ORDER — CALCIUM CARBONATE 200(500)MG
1 TABLET,CHEWABLE ORAL 4 TIMES DAILY PRN
COMMUNITY
End: 2021-09-13

## 2021-06-14 RX ORDER — GINSENG 100 MG
CAPSULE ORAL ONCE
Status: CANCELLED | OUTPATIENT
Start: 2021-06-14 | End: 2021-06-14

## 2021-06-14 RX ORDER — OXYCODONE AND ACETAMINOPHEN 7.5; 325 MG/1; MG/1
1 TABLET ORAL EVERY 6 HOURS PRN
Status: ON HOLD | COMMUNITY
End: 2021-12-06 | Stop reason: HOSPADM

## 2021-06-14 RX ORDER — BETAMETHASONE DIPROPIONATE 0.05 %
OINTMENT (GRAM) TOPICAL ONCE
Status: CANCELLED | OUTPATIENT
Start: 2021-06-14 | End: 2021-06-14

## 2021-06-14 RX ADMIN — LIDOCAINE HYDROCHLORIDE 5 ML: 40 SOLUTION TOPICAL at 14:14

## 2021-06-14 ASSESSMENT — PAIN SCALES - GENERAL: PAINLEVEL_OUTOF10: 0

## 2021-06-14 NOTE — PROGRESS NOTES
[facility]  History and Physical/Consultation  Podiatry    Referring Physician : Teresa De Oliveira DO  [address] [facility] RECORD NUMBER:  [id number]  AGE: 80 y.o. GENDER: female  : 1927  EPISODE DATE:  2021  Subjective:     Chief Complaint   Patient presents with    Wound Check     Left foot         HISTORY of PRESENT ILLNESS HPI     Odilia Licona is a 80 y.o. female who presents today with her son for wound/ulcer evaluation. History of Wound Context: Patient has wounds on the left foot. Patient with history of diabetes, neuropathy as well as PVD recently underwent vascular intervention as well as had a abscess on the bottom of her left foot that required incision and drainage with a delayed primary closure. She is currently at [facility], local care consisting of Xeroform to the wounds as instructed. Pt is not on abx at time of initial visit. 21 sutures removed, local care as instructed. Continue nonweightbearing to the left foot until next follow-up.       Wound/Ulcer Pain Timing/Severity: none  Quality of pain:   Severity:   / 10   Modifying Factors:   Associated Signs/Symptoms:     Ulcer Identification:  Ulcer Type: arterial  Contributing Factors: diabetes and arterial insufficiency    Diabetic/Pressure/Non Pressure Ulcers onl y:  Ulcer: N/A    If patient has diabetic lower extremity wounds  Stearns Classification of diabetic lower extremity wounds:    Grade Description   []  0 No open wound   []  1 Superficial ulcer involving the full skin thickness   []  2 Deep ulcer involves ligament, tendon, joint capsule, or fascia  No bone involvement or abscess presence   []  3 Deep Ulcer with abcess formation and/or osteomyelitis   []  4 Localized gangrene   []  5 Extensive gangrene of the foot     Wound: N/A        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 ([facility] 5/18/2021    Bronchitis 5/23/2017    CAD (coronary artery disease)     Cough 5/23/2017    Diabetes mellitus (HCC)     GERD (gastroesophageal reflux disease) 5/23/2017    History of pulmonary embolus (PE) 5/29/2021    Hx of blood clots     Hyperlipidemia     Hypertension     Lung disease     Peripheral vascular angioplasty status 5/29/2021    Pseudoaneurysm of right femoral artery ([address] 5/29/2021    PVD (peripheral vascular disease) with claudication ([address] 4/10/2019    Restless legs syndrome     Rhinitis, allergic 5/23/2017    Sinusitis 5/23/2017    SOB (shortness of breath) 5/23/2017    Type 1 diabetes mellitus with left diabetic foot ulcer ([address] 5/18/2021    Urinary incontinence      Past Surgical History:   Procedure Laterality Date    ABDOMEN SURGERY      APPENDECTOMY      CARDIAC SURGERY      CHOLECYSTECTOMY      COLONOSCOPY      CORONARY ARTERY BYPASS GRAFT      8/28/10    ENDOSCOPY, COLON, DIAGNOSTIC      FOOT DEBRIDEMENT Left 5/16/2021    FOOT DEBRIDEMENT INCISION AND DRAINAGE.    performed by Isaías Duffy DPM at [address] [facility] Left 5/21/2021    LEFT FOOT DEBRIDEMENT  WITH DELAYED PRIMARY CLOSURE performed by Hakan Sharif DPM at [address] [facility] 1997    TONSILLECTOMY AND ADENOIDECTOMY       Family History   Problem Relation Age of Onset    Hypertension Father     Heart Disease Brother      Social History     Tobacco Use    Smoking status: Never Smoker    Smokeless tobacco: Never Used   Vaping Use    Vaping Use: Never used   Substance Use Topics    Alcohol use: Yes     Comment: occasional    Drug use: No     Allergies   Allergen Reactions    Lisinopril Other (See Comments)     7/18/19 Pt states that she does not want to take LISINOPRIL     Current Outpatient Medications on File Prior to Encounter   Medication Sig Dispense Refill    ferrous sulfate (IRON 325) 325 (65 Fe) MG tablet Take 325 mg by mouth daily In the morning for anemia      folic acid (FOLVITE) 1 MG tablet Take 1 mg by mouth daily In the morning for anemia      nystatin (MYCOSTATIN) 279182 UNIT/ML suspension Take 500,000 Units by mouth 4 times daily Thrush swish and swallow      omeprazole (PRILOSEC) 20 MG delayed release capsule Take 20 mg by mouth daily In morning for reflux      oxyCODONE-acetaminophen (PERCOCET) 7.5-325 MG per tablet Take 1 tablet by mouth every 6 hours as needed for Pain.  b complex vitamins capsule Take 1 capsule by mouth daily In the morning for supplement      aspirin 81 MG EC tablet Take 1 tablet by mouth daily 30 tablet 0    furosemide (LASIX) 20 MG tablet Take 20 mg by mouth daily *MON-WED-FRI*       promethazine (PHENERGAN) 12.5 MG tablet Take 12.5 mg by mouth every 6 hours as needed for Nausea      traMADol (ULTRAM) 50 MG tablet Take 50 mg by mouth three times daily.  aluminum & magnesium hydroxide-simethicone (MYLANTA) 400-400-40 MG/5ML SUSP Take 30 mLs by mouth every 6 hours as needed      lidocaine (LMX) 4 % cream Apply topically every 8 hours as needed for Pain Apply topically as needed to painful muscles      diclofenac sodium (VOLTAREN) 1 % GEL Apply topically 4 times daily as needed for Pain      glimepiride (AMARYL) 2 MG tablet Take 2 mg by mouth every morning (before breakfast)      gabapentin (NEURONTIN) 300 MG capsule Take 300 mg by mouth every evening.        albuterol (PROVENTIL) 90 MCG/ACT inhaler Inhale 2 puffs into the lungs 4 times daily  0    pravastatin (PRAVACHOL) 20 MG tablet TAKE 1 TABLET BY MOUTH  NIGHTLY 90 tablet 1    amLODIPine (NORVASC) 5 MG tablet TAKE 1 TABLET BY MOUTH  DAILY 90 tablet 1    fluticasone-vilanterol (BREO ELLIPTA) 100-25 MCG/INH AEPB inhaler Inhale 1 puff into the lungs daily 3 each 5    Multiple Vitamins-Minerals (OCUVITE ADULT FORMULA PO) Take 1 capsule by mouth daily      Probiotic Product (PRO-BIOTIC BLEND PO) Take 1 capsule by mouth 2 times daily       magnesium gluconate (MAGONATE) 500 MG tablet Take 500 mg by mouth 2 times daily       Coenzyme Q10 (COQ10) 200 MG CAPS Take 200 mg by mouth daily       calcium carbonate (TUMS) 500 MG chewable tablet Take 1 tablet by mouth 4 times daily as needed (indigestion) Not to exceed 15 tablet in a 24hr period      bisacodyl (DULCOLAX) 10 MG suppository Place 10 mg rectally daily as needed for Constipation Indications: IF NO RESULTS FROM MOM        No current facility-administered medications on file prior to encounter. REVIEW OF SYSTEMS   ROS : All others Negative if blank [], Positive if [x]  General Vascular   [] Fevers [] Claudication   [] Chills [] Rest Pain   Skin Neurologic   [x] Tissue Loss [] Lower extremity neuropathy     Objective:    /72   Pulse 62   Temp 97.1 °F (36.2 °C) (Temporal)   Resp 18   Ht 5' 3\" (1.6 m)   Wt 150 lb (68 kg)   LMP 12/03/1997   BMI 26.57 kg/m²   Wt Readings from Last 3 Encounters:   06/14/21 150 lb (68 kg)   05/30/21 150 lb (68 kg)   05/28/21 144 lb (65.3 kg)       PHYSICAL EXAM   CONSTITUTIONAL:   Awake, alert, cooperative   PSYCHIATRIC :  Oriented to time, place and person      limited insight to disease process  EXTREMITIES:   Bilateral lower extremities demonstrate sutures intact plantar aspect left foot, with removal skin edges approximated, healed. Wounds appreciated medial, lateral as well as dorsal left foot, stable. Depth undetermined, eschar covering. However there is no erythema or active drainage. No pain. No erythema or fluctuance. R dorsalis pedis  L dorsalis pedis    R posterior tibial  L posterior tibial      Assessment:     Left foot wound/ulcers in a patient with PVD, diabetes.        Wound 06/14/21 Foot Left;Plantar #1 (Active)   Wound Image   06/14/21 1406   Wound Length (cm) 6 cm 06/14/21 1406   Wound Width (cm) 0.1 cm 06/14/21 1406   Wound Depth (cm) 0.1 cm 06/14/21 1406   Wound Surface Area (cm^2) 0.6 cm^2 06/14/21 1406   Wound Volume (cm^3) 0.06 cm^3 06/14/21 1406   Wound Assessment Eschar dry 06/14/21 1406   Drainage Amount None 06/14/21 1406   Odor None 06/14/21 1406   Ira-wound Assessment Dry/flaky 06/14/21 1406   Number of days: 0       Wound 06/14/21 Foot Left;Dorsal #2 (Active)   Wound Image   06/14/21 1406   Wound Length (cm) 2 cm 06/14/21 1406   Wound Width (cm) 4.5 cm 06/14/21 1406   Wound Depth (cm) 0.2 cm 06/14/21 1406   Wound Surface Area (cm^2) 9 cm^2 06/14/21 1406   Wound Volume (cm^3) 1.8 cm^3 06/14/21 1406   Wound Assessment Pink/red;Eschar dry 06/14/21 1406   Drainage Amount Scant 06/14/21 1406   Drainage Description Serosanguinous 06/14/21 1406   Odor None 06/14/21 1406   Ira-wound Assessment Dry/flaky;Fragile 06/14/21 1406   Number of days: 0       Wound 06/14/21 Foot Left;Medial #3 (Active)   Wound Image   06/14/21 1406   Wound Length (cm) 1.2 cm 06/14/21 1406   Wound Width (cm) 1.3 cm 06/14/21 1406   Wound Depth (cm) 0.1 cm 06/14/21 1406   Wound Surface Area (cm^2) 1.56 cm^2 06/14/21 1406   Wound Volume (cm^3) 0.16 cm^3 06/14/21 1406   Wound Assessment Eschar dry 06/14/21 1406   Drainage Amount None 06/14/21 1406   Odor None 06/14/21 1406   Ira-wound Assessment Blanchable erythema;Dry/flaky 06/14/21 1406   Number of days: 0              Plan:     Pt is not a smoker   - Discussed relationship of smoking and negative affects on wound healing   - Emphasized importance of tobacco avoidace/cessation     In my professional opinion and based off the information that is available at this time this patient has appropriate indication for HBO Therapy: No    Treatment Note please see attached Discharge Instructions    Written patient dismissal instructions given to patient and signed by patient or POA.          Discharge Instructions       Visit Discharge/Physician Orders    Discharge condition: Stable    Assessment of pain at discharge:minimal    Anesthetic used: 4% lidocaine    Discharge to: [facility] [place]    Left via:Private automobile    Accompanied by: accompanied by son    ECF/HHA:     Dressing Orders: cleanse left foot ulcers with normal saline apply xeroform and dry dressing daily. Treatment Orders:  Eat foods high in protein and vitamin c    Take multivitamin daily  [facility] followup visit __________2 week_________________  (Please note your next appointment above and if you are unable to keep, kindly give a 24 hour notice. Thank you.)    Physician signature:__________________________      If you experience any of the following, please call the [address] during business hours:    * Increase in Pain  * Temperature over 101  * Increase in drainage from your wound  * Drainage with a foul odor  * Bleeding  * Increase in swelling  * Need for compression bandage changes due to slippage, breakthrough drainage. If you need medical attention outside of the business hours of the [address] please contact your PCP or go to the nearest emergency room.         Electronically signed by Maria D Talamantes DPM on 6/14/2021 at 2:50 PM

## 2021-06-25 ENCOUNTER — HOSPITAL ENCOUNTER (EMERGENCY)
Age: 86
Discharge: HOME | End: 2021-06-25
Payer: MEDICARE

## 2021-06-25 ENCOUNTER — APPOINTMENT (OUTPATIENT)
Dept: INTERVENTIONAL RADIOLOGY/VASCULAR | Age: 86
End: 2021-06-25
Payer: MEDICARE

## 2021-06-25 ENCOUNTER — APPOINTMENT (OUTPATIENT)
Dept: GENERAL RADIOLOGY | Age: 86
End: 2021-06-25
Payer: MEDICARE

## 2021-06-25 ENCOUNTER — TELEPHONE (OUTPATIENT)
Dept: VASCULAR SURGERY | Age: 86
End: 2021-06-25

## 2021-06-25 VITALS
WEIGHT: 144 LBS | OXYGEN SATURATION: 96 % | HEIGHT: 63 IN | RESPIRATION RATE: 20 BRPM | TEMPERATURE: 97.8 F | SYSTOLIC BLOOD PRESSURE: 138 MMHG | HEART RATE: 61 BPM | DIASTOLIC BLOOD PRESSURE: 79 MMHG | BODY MASS INDEX: 25.52 KG/M2

## 2021-06-25 DIAGNOSIS — Z48.89 ENCOUNTER FOR POSTOPERATIVE WOUND CHECK: Primary | ICD-10-CM

## 2021-06-25 DIAGNOSIS — M79.672 LEFT FOOT PAIN: ICD-10-CM

## 2021-06-25 PROBLEM — B35.9 DERMATOPHYTOSIS: Status: ACTIVE | Noted: 2021-06-25

## 2021-06-25 PROBLEM — J96.01 ACUTE HYPOXEMIC RESPIRATORY FAILURE (HCC): Status: RESOLVED | Noted: 2021-05-19 | Resolved: 2021-06-25

## 2021-06-25 PROBLEM — I72.4 PSEUDOANEURYSM OF RIGHT FEMORAL ARTERY (HCC): Status: RESOLVED | Noted: 2021-05-29 | Resolved: 2021-06-25

## 2021-06-25 PROBLEM — E87.1 HYPONATREMIA: Status: RESOLVED | Noted: 2021-05-18 | Resolved: 2021-06-25

## 2021-06-25 PROBLEM — L97.521 ULCERATED, FOOT, LEFT, LIMITED TO BREAKDOWN OF SKIN (HCC): Status: ACTIVE | Noted: 2021-06-25

## 2021-06-25 LAB
ALBUMIN SERPL-MCNC: 4.2 G/DL (ref 3.5–5.2)
ALP BLD-CCNC: 70 U/L (ref 35–104)
ALT SERPL-CCNC: 21 U/L (ref 0–32)
ANION GAP SERPL CALCULATED.3IONS-SCNC: 9 MMOL/L (ref 7–16)
AST SERPL-CCNC: 19 U/L (ref 0–31)
BASOPHILS ABSOLUTE: 0.02 E9/L (ref 0–0.2)
BASOPHILS RELATIVE PERCENT: 0.2 % (ref 0–2)
BILIRUB SERPL-MCNC: <0.2 MG/DL (ref 0–1.2)
BUN BLDV-MCNC: 28 MG/DL (ref 6–23)
CALCIUM SERPL-MCNC: 9.3 MG/DL (ref 8.6–10.2)
CHLORIDE BLD-SCNC: 95 MMOL/L (ref 98–107)
CO2: 24 MMOL/L (ref 22–29)
CREAT SERPL-MCNC: 0.9 MG/DL (ref 0.5–1)
EOSINOPHILS ABSOLUTE: 0.07 E9/L (ref 0.05–0.5)
EOSINOPHILS RELATIVE PERCENT: 0.9 % (ref 0–6)
GFR AFRICAN AMERICAN: >60
GFR NON-AFRICAN AMERICAN: 58 ML/MIN/1.73
GLUCOSE BLD-MCNC: 180 MG/DL (ref 74–99)
HCT VFR BLD CALC: 31.4 % (ref 34–48)
HEMOGLOBIN: 10.4 G/DL (ref 11.5–15.5)
IMMATURE GRANULOCYTES #: 0.03 E9/L
IMMATURE GRANULOCYTES %: 0.4 % (ref 0–5)
LACTIC ACID: 1.2 MMOL/L (ref 0.5–2.2)
LYMPHOCYTES ABSOLUTE: 1.2 E9/L (ref 1.5–4)
LYMPHOCYTES RELATIVE PERCENT: 14.6 % (ref 20–42)
MCH RBC QN AUTO: 32.3 PG (ref 26–35)
MCHC RBC AUTO-ENTMCNC: 33.1 % (ref 32–34.5)
MCV RBC AUTO: 97.5 FL (ref 80–99.9)
MONOCYTES ABSOLUTE: 0.46 E9/L (ref 0.1–0.95)
MONOCYTES RELATIVE PERCENT: 5.6 % (ref 2–12)
NEUTROPHILS ABSOLUTE: 6.44 E9/L (ref 1.8–7.3)
NEUTROPHILS RELATIVE PERCENT: 78.3 % (ref 43–80)
PDW BLD-RTO: 14.6 FL (ref 11.5–15)
PLATELET # BLD: 309 E9/L (ref 130–450)
PMV BLD AUTO: 9.5 FL (ref 7–12)
POTASSIUM SERPL-SCNC: 4.9 MMOL/L (ref 3.5–5)
RBC # BLD: 3.22 E12/L (ref 3.5–5.5)
SODIUM BLD-SCNC: 128 MMOL/L (ref 132–146)
TOTAL PROTEIN: 6.8 G/DL (ref 6.4–8.3)
WBC # BLD: 8.2 E9/L (ref 4.5–11.5)

## 2021-06-25 PROCEDURE — 36415 COLL VENOUS BLD VENIPUNCTURE: CPT

## 2021-06-25 PROCEDURE — 93922 UPR/L XTREMITY ART 2 LEVELS: CPT

## 2021-06-25 PROCEDURE — 85025 COMPLETE CBC W/AUTO DIFF WBC: CPT

## 2021-06-25 PROCEDURE — 83605 ASSAY OF LACTIC ACID: CPT

## 2021-06-25 PROCEDURE — 73630 X-RAY EXAM OF FOOT: CPT

## 2021-06-25 PROCEDURE — 99283 EMERGENCY DEPT VISIT LOW MDM: CPT

## 2021-06-25 PROCEDURE — 80053 COMPREHEN METABOLIC PANEL: CPT

## 2021-06-25 PROCEDURE — 2580000003 HC RX 258: Performed by: PHYSICIAN ASSISTANT

## 2021-06-25 PROCEDURE — 99284 EMERGENCY DEPT VISIT MOD MDM: CPT | Performed by: SURGERY

## 2021-06-25 RX ORDER — 0.9 % SODIUM CHLORIDE 0.9 %
500 INTRAVENOUS SOLUTION INTRAVENOUS ONCE
Status: COMPLETED | OUTPATIENT
Start: 2021-06-25 | End: 2021-06-25

## 2021-06-25 RX ADMIN — SODIUM CHLORIDE 500 ML: 9 INJECTION, SOLUTION INTRAVENOUS at 18:18

## 2021-06-25 ASSESSMENT — PAIN SCALES - GENERAL: PAINLEVEL_OUTOF10: 3

## 2021-06-25 ASSESSMENT — PAIN DESCRIPTION - LOCATION: LOCATION: FOOT

## 2021-06-25 ASSESSMENT — PAIN DESCRIPTION - ORIENTATION: ORIENTATION: RIGHT

## 2021-06-25 ASSESSMENT — PAIN DESCRIPTION - PAIN TYPE: TYPE: ACUTE PAIN

## 2021-06-25 NOTE — CONSULTS
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 Carlos Hall on 19 May of this year with improvement in arterial flow    On 20 May, patient was found a right femoral artery pseudoaneurysm for which patient underwent thrombin injection    Today, I was notified by the facility, that the transfer the patient emergency room, nurse Meg Alcala, telephone #[phone] stating that patient had an appointment see me in the wound care on Monday, but felt that she could not wait till Monday and sending the patient to the emergency room    When I came to see patient emergency room, patient's son Shabnam Lomeli was also in the room, patient was resting comfortably, denies any pain in the foot, some discomfort over the the left fifth toe denies history of fever or chills      Patient denies any focal lateralizing neurological symptoms like loss of speech, vision or loss of function of extremity    Patient can walk short distances and denies any symptoms of rest pain    Allergies   Allergen Reactions    Lisinopril Other (See Comments)     7/18/19 Pt states that she does not want to take LISINOPRIL       No current facility-administered medications for this encounter.      Current Outpatient Medications   Medication Sig Dispense Refill    calcium carbonate (TUMS) 500 MG chewable tablet Take 1 tablet by mouth 4 times daily as needed (indigestion) Not to exceed 15 tablet in a 24hr period      ferrous sulfate (IRON 325) 325 (65 Fe) MG tablet Take 325 mg by mouth daily In the morning for anemia      folic acid (FOLVITE) 1 MG tablet Take 1 mg by mouth daily In the morning for anemia      nystatin (MYCOSTATIN) 757020 UNIT/ML suspension Take 500,000 Units by mouth 4 times daily Thrush swish and swallow      omeprazole (PRILOSEC) 20 MG delayed release capsule Take 20 mg by mouth daily In morning for reflux      oxyCODONE-acetaminophen (PERCOCET) 7.5-325 MG per tablet Take 1 tablet by mouth every 6 hours as needed for Pain.  b complex vitamins capsule Take 1 capsule by mouth daily In the morning for supplement      Vitamin D (CHOLECALCIFEROL) 25 MCG (1000 UT) TABS tablet Take 4 capsules by mouth Give 4000 units in the morning for supplement      aspirin 81 MG EC tablet Take 1 tablet by mouth daily 30 tablet 0    furosemide (LASIX) 20 MG tablet Take 20 mg by mouth daily *MON-WED-FRI*       promethazine (PHENERGAN) 12.5 MG tablet Take 12.5 mg by mouth every 6 hours as needed for Nausea      traMADol (ULTRAM) 50 MG tablet Take 50 mg by mouth three times daily.  aluminum & magnesium hydroxide-simethicone (MYLANTA) 400-400-40 MG/5ML SUSP Take 30 mLs by mouth every 6 hours as needed      lidocaine (LMX) 4 % cream Apply topically every 8 hours as needed for Pain Apply topically as needed to painful muscles      diclofenac sodium (VOLTAREN) 1 % GEL Apply topically 4 times daily as needed for Pain      glimepiride (AMARYL) 2 MG tablet Take 2 mg by mouth every morning (before breakfast)      bisacodyl (DULCOLAX) 10 MG suppository Place 10 mg rectally daily as needed for Constipation Indications: IF NO RESULTS FROM MOM       gabapentin (NEURONTIN) 300 MG capsule Take 300 mg by mouth every evening.        albuterol (PROVENTIL) 90 MCG/ACT inhaler Inhale 2 puffs into the lungs 4 times daily  0    pravastatin (PRAVACHOL) 20 MG tablet TAKE 1 TABLET BY MOUTH  NIGHTLY 90 tablet 1    amLODIPine (NORVASC) 5 MG tablet TAKE 1 TABLET BY MOUTH  DAILY 90 tablet 1    Hypertension Father     Heart Disease Brother        Social History     Socioeconomic History    Marital status:      Spouse name: Not on file    Number of children: Not on file    Years of education: Not on file    Highest education level: Not on file   Occupational History    Not on file   Tobacco Use    Smoking status: Never Smoker    Smokeless tobacco: Never Used   Vaping Use    Vaping Use: Never used   Substance and Sexual Activity    Alcohol use: Yes     Comment: occasional    Drug use: No    Sexual activity: Not Currently     Partners: Male   Other Topics Concern    Not on file   Social History Narrative    Not on file     Social Determinants of Health     Financial Resource Strain:     Difficulty of Paying Living Expenses:    Food Insecurity:     Worried About Running Out of Food in the Last Year:     [address] in the Last Year:    Transportation Needs:     Lack of Transportation (Medical):  Lack of Transportation (Non-Medical):    Physical Activity:     Days of Exercise per Week:     Minutes of Exercise per Session:    Stress:     Feeling of Stress :    Social Connections:     Frequency of Communication with Friends and Family:     Frequency of Social Gatherings with Friends and Family:     Attends Sikhism Services:     Active Member of Clubs or Organizations:     Attends Club or Organization Meetings:     Marital Status:    Intimate Partner Violence:     Fear of Current or Ex-Partner:     Emotionally Abused:     Physically Abused:     Sexually Abused:        Review of Systems:  Skin:  No abnormal pigmentation or rash. Eyes:  No blurring, diplopia or vision loss. Ears/Nose/Throat:  No hearing loss or vertigo. Respiratory:  No cough, pleuritic chest pain, dyspnea, or wheezing. Cardiovascular: No angina, palpitations .  Coronary artery disease, hypertension, hyperlipidemia pelvis cardiac bypass surgery, known peripheral vascular disease, follows up with Dr. Sandra Salgado from a cardiac perspective    Gastrointestinal:  No nausea or vomiting; no abdominal pain or rectal bleeding. Musculoskeletal:  No arthritis or weakness. Neurologic:  No paralysis, paresis, seizures or headaches. Hematologic/Lymphatic/Immunologic:  No anemia, abnormal bleeding/bruising. Endocrine:  No heat or cold intolerance. No polyphagia, polydipsia or polyuria. Diabetes mellitus      Physical Exam:  /79   Pulse 61   Temp 97.8 °F (36.6 °C) (Infrared)   Resp 20   Ht 5' 3\" (1.6 m)   Wt 144 lb (65.3 kg)   LMP 12/03/1997   SpO2 96%   BMI 25.51 kg/m²   General appearance:  Alert, awake, oriented x 3. No distress. Skin:  Warm and dry. Head:  Normocephalic. No masses, lesions or tenderness. Eyes:  Conjunctivae appear normal; PERRL. Ears:  External ears normal.  Nose/Sinuses:  Septum midline, mucosa normal; no drainage. Oropharynx:  Clear, no exudate noted. Neck:  No jugular venous distention, lymphadenopathy or thyromegaly. No evidence of carotid bruit      Lungs:  Clear to ausculation bilaterally. No rhonchi, crackles, wheezes. Heart:  Regular rate and rhythm. No rub or murmur. .    Abdomen:  Soft, non-tender. No masses, organomegaly. Musculoskeletal: No joint effusions, tenderness swelling or warmth. Neuro: Speech is intact. Moving all extremities. No focal motor or sensory deficits. Extremities:  Both feet are warm to touch.  The color of both feet is normal.      Both the feet are warm to touch    Patient does have a skin ulcer over the medial aspect left foot with callus and eschar and scab formation without any cellulitis    Patient has slight purplish bruising of the dorsal aspect medially of the left foot but no obvious cellulitis or drainage    Patient had Doppler signals, almost biphasic Doppler signals, better over the dorsalis pedis compared to the posterior tibial    Dermatophytosis noted some dry scaly skin but no obvious acute vascular issues, cellulitis etc.           Document Information    Wound      06/25/2021 13:39   Attached To: Hospital Encounter on 6/25/21   Source 87 Marti Zeng PA-C  Formerly [facility] Emergency Dept         Media Information               Pulses Right  Left    Brachial 3 3    Radial    3=normal   Femoral 2 2  2=diminished   Popliteal    1=barely palpable   Dorsalis pedis    0=absent   Posterior tibial 0 0  4=aneurysmal           Other pertinent information:1. The past medical records were reviewed. 2.    Lab Results   Component Value Date    WBC 8.2 06/25/2021    HGB 10.4 (L) 06/25/2021    HCT 31.4 (L) 06/25/2021    MCV 97.5 06/25/2021     06/25/2021      Lab Results   Component Value Date     (L) 06/25/2021    K 4.9 06/25/2021    CL 95 (L) 06/25/2021    CO2 24 06/25/2021    BUN 28 (H) 06/25/2021    CREATININE 0.9 06/25/2021    GLUCOSE 180 (H) 06/25/2021    CALCIUM 9.3 06/25/2021    PROT 6.8 06/25/2021    LABALBU 4.2 06/25/2021    BILITOT <0.2 06/25/2021    ALKPHOS 70 06/25/2021    AST 19 06/25/2021    ALT 21 06/25/2021    LABGLOM 58 06/25/2021    GFRAA >60 06/25/2021     Lab Results   Component Value Date    APTT 30.3 05/29/2021      Lab Results   Component Value Date    INR 1.3 05/29/2021    INR 1.1 11/23/2014    PROTIME 14.9 (H) 05/29/2021    PROTIME 11.1 11/23/2014        3. XR FOOT LEFT (MIN 3 VIEWS)    (Results Pending)   VL VICTORINA BILATERAL LIMITED 1-2 LEVELS    (Results Pending)         Assessment:    1. Ulcer of the medial aspect of the right foot close to the metatarsophalangeal joint with some ecchymosis, without any obvious cellulitis or obvious acute vascular issues at the present time with almost biphasic arterial Doppler signals status post balloon angioplasty at the left superficial femoral artery, popliteal artery and antecubital artery by Dr. Graham Porras in May 2021    2.  Multiple medical risk factors including coronary artery disease, diabetes mellitus, hypertension,

## 2021-06-25 NOTE — ED PROVIDER NOTES
[facility]  Department of Emergency Medicine   ED  Encounter Note  Admit Date/RoomTime: 2021 12:52 PM  ED Room: [facility]    NAME: Olivia Mosqueda  : 1927  MRN: [id number]     Chief Complaint:  Foot Pain (eval of right foot wound, recent surgery and possible infection)    History of Present Illness         Olivia Mosqueda is a 80 y.o. old female presenting to the emergency department by ambulance from [facility], for post-operative Left wound check with pain which occured a few week(s) prior to arrival.  Since onset the symptoms have been gradually worsening with bruising. Her pain is aggraveated by any movement or touching the outside of the foot and relieved by prescription medications for the condition. She denies any confusion, dizziness, neck pain, chest pain, abdominal pain, back pain, numbness, weakness, blurred vision, nausea or vomiting. Patient was sent to the emergency department today to be evaluated for her foot. She states that she is currently on antibiotics and also does see vascular surgery. She states that the nurse who comes and changes her bandage wanted her to come in due to worsening of wound on her foot. She states that the outside of her foot is painful. She states that she is not in much pain and the pain medication that she takes in the morning and the night has been helping. She states that she did have surgery on this foot about 3 weeks ago. She denies fever, chills, abdominal pain, chest pain, shortness of breath, vomiting, diarrhea. She does admit to being diabetic. ROS   Pertinent positives and negatives are stated within HPI, all other systems reviewed and are negative.     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 ([address] Bronchitis, CAD (coronary artery disease), Cough, Dermatophytosis, Diabetes mellitus ([address] GERD (gastroesophageal reflux disease), History of pulmonary embolus (PE), Hx of blood clots, Hyperlipidemia, Hypertension, Lung disease, Peripheral vascular angioplasty status, Pseudoaneurysm of right femoral artery ([address] PVD (peripheral vascular disease) with claudication (HCC), Restless legs syndrome, Rhinitis, allergic, Sinusitis, SOB (shortness of breath), Type 1 diabetes mellitus with left diabetic foot ulcer ([address] Ulcerated, foot, left, limited to breakdown of skin ([address] and Urinary incontinence. Surgical History:  has a past surgical history that includes Hysterectomy; Cholecystectomy; Tonsillectomy and adenoidectomy; Coronary artery bypass graft; Abdomen surgery; Appendectomy; Colonoscopy; Endoscopy, colon, diagnostic; Cardiac surgery; Foot Debridement (Left, 5/16/2021); and Foot Debridement (Left, 5/21/2021). Social History:  reports that she has never smoked. She has never used smokeless tobacco. She reports current alcohol use. She reports that she does not use drugs. Family History: family history includes Heart Disease in her brother; Hypertension in her father. Allergies: Lisinopril    Physical Exam   Oxygen Saturation Interpretation: Normal.        ED Triage Vitals [06/25/21 1253]   BP Temp Temp Source Pulse Resp SpO2 Height Weight   138/79 97.8 °F (36.6 °C) Infrared 61 20 96 % 5' 3\" (1.6 m) 144 lb (65.3 kg)         Constitutional:  Alert, development consistent with age, acute distress. Neck:  Normal ROM. Supple. Left Foot:                Tenderness:  mild. Swelling: Mild. Deformity: no deformity observed/palpated. ROM: full range of motion. Skin: There is a well-healing scar on the plantar surface of the foot. There is a healing wound on the dorsal aspect of the foot as well as the medial aspect of the great toe metatarsalphalangeal joint. These wounds appear to be going through normal healing process. The dorsal aspect of the foot is with ecchymosis. There is mild ecchymosis on the lateral aspect of the fifth MP joint. The foot is dry, and without warmth or erythema. Patient is neurovascularly and sensory intact. She does have normal capillary refill. There is no evidence of cellulitis. Neurovascular: Motor deficit: none. Sensory deficit:   none. Pulse deficit: none. Capillary refill: normal.  Left Ankle:             Tenderness:  none. Swelling: None. Deformity: no deformity observed/palpated. ROM: full range of motion. Skin:  no wounds, erythema, or swelling. Gait:  normal.  Lymphatics: No lymphangitis or adenopathy noted. Neurological:  Oriented. Motor functions intact. Informed consent. The patient has given verbal consent to have photos taken of their foot and inserted into their ED provider note as part of their permanent medical record. The patient did view the photo  and deemed it appropriate prior to inclusion in their chart.       Lab / Imaging Results   (All laboratory and radiology results have been personally reviewed by myself)  Labs:  Results for orders placed or performed during the hospital encounter of 06/25/21   Comprehensive Metabolic Panel   Result Value Ref Range    Sodium 128 (L) 132 - 146 mmol/L    Potassium 4.9 3.5 - 5.0 mmol/L    Chloride 95 (L) 98 - 107 mmol/L    CO2 24 22 - 29 mmol/L    Anion Gap 9 7 - 16 mmol/L    Glucose 180 (H) 74 - 99 mg/dL    BUN 28 (H) 6 - 23 mg/dL    CREATININE 0.9 0.5 - 1.0 mg/dL    GFR Non-African American 58 >=60 mL/min/1.73    GFR African American >60     Calcium 9.3 8.6 - 10.2 mg/dL    Total Protein 6.8 6.4 - 8.3 g/dL    Albumin 4.2 3.5 - 5.2 g/dL    Total Bilirubin <0.2 0.0 - 1.2 mg/dL    Alkaline Phosphatase 70 35 - 104 U/L    ALT 21 0 - 32 U/L    AST 19 0 - 31 U/L   CBC Auto Differential   Result Value Ref Range    WBC 8.2 4.5 - 11.5 E9/L RBC 3.22 (L) 3.50 - 5.50 E12/L    Hemoglobin 10.4 (L) 11.5 - 15.5 g/dL    Hematocrit 31.4 (L) 34.0 - 48.0 %    MCV 97.5 80.0 - 99.9 fL    MCH 32.3 26.0 - 35.0 pg    MCHC 33.1 32.0 - 34.5 %    RDW 14.6 11.5 - 15.0 fL    Platelets 814 537 - 782 E9/L    MPV 9.5 7.0 - 12.0 fL    Neutrophils % 78.3 43.0 - 80.0 %    Immature Granulocytes % 0.4 0.0 - 5.0 %    Lymphocytes % 14.6 (L) 20.0 - 42.0 %    Monocytes % 5.6 2.0 - 12.0 %    Eosinophils % 0.9 0.0 - 6.0 %    Basophils % 0.2 0.0 - 2.0 %    Neutrophils Absolute 6.44 1.80 - 7.30 E9/L    Immature Granulocytes # 0.03 E9/L    Lymphocytes Absolute 1.20 (L) 1.50 - 4.00 E9/L    Monocytes Absolute 0.46 0.10 - 0.95 E9/L    Eosinophils Absolute 0.07 0.05 - 0.50 E9/L    Basophils Absolute 0.02 0.00 - 0.20 E9/L   Lactic Acid, Plasma   Result Value Ref Range    Lactic Acid 1.2 0.5 - 2.2 mmol/L     Imaging: All Radiology results interpreted by Radiologist unless otherwise noted. XR FOOT LEFT (MIN 3 VIEWS)   Final Result   1. No fracture or joint dislocation is seen. 2. Degenerative changes, as described. VL VICTORINA BILATERAL LIMITED 1-2 LEVELS    (Results Pending)     ED Course / Medical Decision Making     Medications   0.9 % sodium chloride bolus (500 mLs Intravenous New Bag 6/25/21 1818)     ED Course as of Jun 25 1850 Fri Jun 25, 2021 1808 Spoke with Dr Romana Noble who reviewed VICTORINA and pt can be D/C home    [AM]      ED Course User Index  [AM] Frank Infante PA-C     Consult(s):   none. Procedure(s):  She is wound was thoroughly evaluated by myself and vascular surgery and loose wound dressing was applied per vascular surgery. Patient tolerated procedure well. MDM:     Patient is a 44-year-old female who presents to the emergency department for a foot wound. The nurse that was changing her foot bandage wanted her to come into the emergency department because she thought it looked worse than before. On exam the wound appear to be healing well.  There is mild ecchymosis on the dorsal aspect of the foot. There is a 4 cm almost healed scar on the plantar surface. There is no evidence of infection or DVT, neg Homans' sign. Labs were done today in the ED and show no concerns for infection. Imaging was obtained based on moderate suspicion for fracture / bony abnormality or osteomyelitis as per history/physical findings. Xray cam back with no acute pathology. She also had lab work which was found to be unremarkable except for slight bump in her BUN, therefore 500 of normal saline given. VICTORINA in the ED was done. Dr. Cherelle Murrell saw the patient and agreed with the treatment plan. Her foot was wrapped prior to discharge. Plan is subsequently for symptom control, limited use and  immobilization with appropriate outpatient follow-up. Patient was explicitly instructed on specific signs and symptoms on which to return to the emergency room for. Patient was instructed to return to the ER for any new or worsening symptoms. Additional discharge instructions were given verbally. All questions were answered. Patient is comfortable and agreeable with discharge plan. Patient in no acute distress and non-toxic in appearance. Plan of Care/Counseling:  Santana Caceres PA-C reviewed today's visit with the patient in addition to providing specific details for the plan of care and counseling regarding the diagnosis and prognosis. Questions are answered at this time and are agreeable with the plan. Assessment      1. Encounter for postoperative wound check    2. Left foot pain      Plan   Discharged back to nursing home. Patient condition is stable    New Medications     New Prescriptions    No medications on file     Electronically signed by Santana Caceres PA-C   DD: 6/25/21  **This report was transcribed using voice recognition software. Every effort was made to ensure accuracy; however, inadvertent computerized transcription errors may be present.   END OF ED PROVIDER NOTE     Nicola Goddard PA-C  06/25/21 [id number]

## 2021-06-25 NOTE — TELEPHONE ENCOUNTER
Spoke with Espinoza Hernandez at [facility] @ [facility]: Rx miconazole cream 2% apply between toes and from toes to ankles both feet x 1 month, cancel 6/29 [address] [place] [address],[address] appt, scheduled ov with Dr. Mackenzie Avery 7/15 at 9:00 a.m.

## 2021-06-28 ENCOUNTER — TELEPHONE (OUTPATIENT)
Dept: VASCULAR SURGERY | Age: 86
End: 2021-06-28

## 2021-06-28 NOTE — TELEPHONE ENCOUNTER
Rescheduled pt's appt at wound care on 7/8 at 10:00 a.m with Dr. Valarie Valderrama at [facility] notified.

## 2021-07-08 ENCOUNTER — HOSPITAL ENCOUNTER (OUTPATIENT)
Dept: WOUND CARE | Age: 86
Discharge: HOME | End: 2021-07-08
Payer: MEDICARE

## 2021-07-08 VITALS
HEIGHT: 63 IN | HEART RATE: 64 BPM | WEIGHT: 144 LBS | RESPIRATION RATE: 16 BRPM | BODY MASS INDEX: 25.52 KG/M2 | DIASTOLIC BLOOD PRESSURE: 46 MMHG | SYSTOLIC BLOOD PRESSURE: 98 MMHG | TEMPERATURE: 97.9 F

## 2021-07-08 DIAGNOSIS — I70.244 ATHEROSCLEROSIS OF NATIVE ARTERY OF LEFT LOWER EXTREMITY WITH ULCERATION OF MIDFOOT (HCC): Primary | ICD-10-CM

## 2021-07-08 PROCEDURE — 99213 OFFICE O/P EST LOW 20 MIN: CPT

## 2021-07-08 RX ORDER — GENTAMICIN SULFATE 1 MG/G
OINTMENT TOPICAL ONCE
Status: CANCELLED | OUTPATIENT
Start: 2021-07-08 | End: 2021-07-08

## 2021-07-08 RX ORDER — LIDOCAINE HYDROCHLORIDE 40 MG/ML
SOLUTION TOPICAL ONCE
Status: CANCELLED | OUTPATIENT
Start: 2021-07-08 | End: 2021-07-08

## 2021-07-08 RX ORDER — CLOBETASOL PROPIONATE 0.5 MG/G
OINTMENT TOPICAL ONCE
Status: CANCELLED | OUTPATIENT
Start: 2021-07-08 | End: 2021-07-08

## 2021-07-08 RX ORDER — BACITRACIN ZINC AND POLYMYXIN B SULFATE 500; 1000 [USP'U]/G; [USP'U]/G
OINTMENT TOPICAL ONCE
Status: CANCELLED | OUTPATIENT
Start: 2021-07-08 | End: 2021-07-08

## 2021-07-08 RX ORDER — LIDOCAINE HYDROCHLORIDE 20 MG/ML
JELLY TOPICAL ONCE
Status: CANCELLED | OUTPATIENT
Start: 2021-07-08 | End: 2021-07-08

## 2021-07-08 RX ORDER — LIDOCAINE 50 MG/G
OINTMENT TOPICAL ONCE
Status: CANCELLED | OUTPATIENT
Start: 2021-07-08 | End: 2021-07-08

## 2021-07-08 RX ORDER — LIDOCAINE HYDROCHLORIDE 40 MG/ML
SOLUTION TOPICAL ONCE
Status: COMPLETED | OUTPATIENT
Start: 2021-07-08 | End: 2021-07-08

## 2021-07-08 RX ORDER — BACITRACIN, NEOMYCIN, POLYMYXIN B 400; 3.5; 5 [USP'U]/G; MG/G; [USP'U]/G
OINTMENT TOPICAL ONCE
Status: CANCELLED | OUTPATIENT
Start: 2021-07-08 | End: 2021-07-08

## 2021-07-08 RX ORDER — GINSENG 100 MG
CAPSULE ORAL ONCE
Status: CANCELLED | OUTPATIENT
Start: 2021-07-08 | End: 2021-07-08

## 2021-07-08 RX ORDER — LIDOCAINE 40 MG/G
CREAM TOPICAL ONCE
Status: CANCELLED | OUTPATIENT
Start: 2021-07-08 | End: 2021-07-08

## 2021-07-08 RX ORDER — BETAMETHASONE DIPROPIONATE 0.05 %
OINTMENT (GRAM) TOPICAL ONCE
Status: CANCELLED | OUTPATIENT
Start: 2021-07-08 | End: 2021-07-08

## 2021-07-08 RX ADMIN — LIDOCAINE HYDROCHLORIDE: 40 SOLUTION TOPICAL at 10:15

## 2021-07-08 NOTE — PROGRESS NOTES
[facility] Followup Visit Note    Referring Physician : Donte Dillon MD  [address] RECORD NUMBER:  [id number]  AGE: 80 y.o. GENDER: female  : 1927  EPISODE DATE:  2021    Subjective:     Chief Complaint   Patient presents with    Wound Check     left foot ulcer      HISTORY of PRESENT ILLNESS HPI   Charlie Urias is a 80 y.o. female who presents today in regards to follow up evaluation and treatment of wound/ulcer. That patient's past medical, family and social hx were reviewed and changes were made if present. History of Wound Context: Patient has wounds on the left foot. Patient with history of diabetes, neuropathy as well as PVD recently underwent vascular intervention as well as had a abscess on the bottom of her left foot that required incision and drainage with a delayed primary closure. She is currently at [facility], local care consisting of Xeroform to the wounds as instructed.     Pt is not on abx at time of initial visit.     21 sutures removed, local care as instructed. Continue nonweightbearing to the left foot until next follow-up.    21 presents with her daughter today. Plantar wound remains healed. Wounds appreciated medial, lateral as well as dorsal left foot, stable. Depth undetermined, eschar decreased. There is no erythema or active drainage. No pain. No erythema or fluctuance. Patient can start partial weightbearing on the left foot with a slipper sock, I do not want a shoe covering due to concerns of the other wounds.       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 ([facility] [address] 2021    Bronchitis 5/23/2017    CAD (coronary artery disease)     Cough 5/23/2017    Dermatophytosis 6/25/2021    Diabetes mellitus (HCC)     GERD (gastroesophageal reflux disease) 5/23/2017    History of pulmonary embolus (PE) 5/29/2021    Hx of blood clots     Hyperlipidemia     Hypertension     Lung disease     Peripheral vascular angioplasty status 5/29/2021    Pseudoaneurysm of right femoral artery ([address] 5/29/2021    PVD (peripheral vascular disease) with claudication ([address] 4/10/2019    Restless legs syndrome     Rhinitis, allergic 5/23/2017    Sinusitis 5/23/2017    SOB (shortness of breath) 5/23/2017    Type 1 diabetes mellitus with left diabetic foot ulcer ([address] 5/18/2021    Ulcerated, foot, left, limited to breakdown of skin ([address] 6/25/2021    Urinary incontinence      Past Surgical History:   Procedure Laterality Date    ABDOMEN SURGERY      APPENDECTOMY      CARDIAC SURGERY      CHOLECYSTECTOMY      COLONOSCOPY      CORONARY ARTERY BYPASS GRAFT      8/28/10    ENDOSCOPY, COLON, DIAGNOSTIC      FOOT DEBRIDEMENT Left 5/16/2021    FOOT DEBRIDEMENT INCISION AND DRAINAGE.    performed by Ute Garduno DPM at [place] [address] Left 5/21/2021    LEFT FOOT DEBRIDEMENT  WITH DELAYED PRIMARY CLOSURE performed by Song Shi DPM at [address] [place] [address] and dl 1997    TONSILLECTOMY AND ADENOIDECTOMY       Family History   Problem Relation Age of Onset    Hypertension Father     Heart Disease Brother      Social History     Tobacco Use    Smoking status: Never Smoker    Smokeless tobacco: Never Used   Vaping Use    Vaping Use: Never used   Substance Use Topics    Alcohol use: Yes     Comment: occasional    Drug use: No     Allergies   Allergen Reactions    Lisinopril Other (See Comments)     7/18/19 Pt states that she does not want to take LISINOPRIL     Current Outpatient Medications on File Prior to Encounter   Medication Sig Dispense Refill    ferrous sulfate (IRON 325) 325 (65 Fe) MG tablet Take 325 mg by mouth daily In the morning for anemia      folic acid (FOLVITE) 1 MG tablet Take 1 mg by mouth daily In the morning for anemia      nystatin (MYCOSTATIN) 627506 UNIT/ML suspension Take 500,000 Units by mouth 4 times daily Thrush swish and swallow      omeprazole (PRILOSEC) 20 MG delayed release capsule Take 20 mg by mouth daily In morning for reflux      b complex vitamins capsule Take 1 capsule by mouth daily In the morning for supplement      Vitamin D (CHOLECALCIFEROL) 25 MCG (1000 UT) TABS tablet Take 4 capsules by mouth Give 4000 units in the morning for supplement      aspirin 81 MG EC tablet Take 1 tablet by mouth daily 30 tablet 0    furosemide (LASIX) 20 MG tablet Take 20 mg by mouth daily *MON-WED-FRI*       traMADol (ULTRAM) 50 MG tablet Take 50 mg by mouth three times daily.  glimepiride (AMARYL) 2 MG tablet Take 2 mg by mouth every morning (before breakfast)      gabapentin (NEURONTIN) 300 MG capsule Take 300 mg by mouth every evening.        albuterol (PROVENTIL) 90 MCG/ACT inhaler Inhale 2 puffs into the lungs 4 times daily  0    pravastatin (PRAVACHOL) 20 MG tablet TAKE 1 TABLET BY MOUTH  NIGHTLY 90 tablet 1    amLODIPine (NORVASC) 5 MG tablet TAKE 1 TABLET BY MOUTH  DAILY 90 tablet 1    fluticasone-vilanterol (BREO ELLIPTA) 100-25 MCG/INH AEPB inhaler Inhale 1 puff into the lungs daily 3 each 5    Multiple Vitamins-Minerals (OCUVITE ADULT FORMULA PO) Take 1 capsule by mouth daily      Probiotic Product (PRO-BIOTIC BLEND PO) Take 1 capsule by mouth 2 times daily       magnesium gluconate (MAGONATE) 500 MG tablet Take 500 mg by mouth 2 times daily       Coenzyme Q10 (COQ10) 200 MG CAPS Take 200 mg by mouth daily       calcium carbonate (TUMS) 500 MG chewable tablet Take 1 tablet by mouth 4 times daily as needed (indigestion) Not to exceed 15 tablet in a 24hr period      oxyCODONE-acetaminophen (PERCOCET) 7.5-325 MG per tablet Take 1 tablet by mouth every 6 hours as needed for Pain.  promethazine (PHENERGAN) 12.5 MG tablet Take 12.5 mg by mouth every 6 hours as needed for Nausea      aluminum & magnesium hydroxide-simethicone (MYLANTA) 400-400-40 MG/5ML SUSP Take 30 mLs by mouth every 6 hours as needed      lidocaine (LMX) 4 % cream Apply topically every 8 hours as needed for Pain Apply topically as needed to painful muscles      diclofenac sodium (VOLTAREN) 1 % GEL Apply topically 4 times daily as needed for Pain      bisacodyl (DULCOLAX) 10 MG suppository Place 10 mg rectally daily as needed for Constipation Indications: IF NO RESULTS FROM MOM        No current facility-administered medications on file prior to encounter.        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 ([facility] [address] - Primary    Relevant Orders    Initiate Outpatient Wound Care Protocol             Wound 06/14/21 Foot Left;Plantar #1 (Active)   Wound Image   07/08/21 0953   Wound Length (cm) 0 cm 07/08/21 0953   Wound Width (cm) 0 cm 07/08/21 0953   Wound Depth (cm) 0 cm 07/08/21 0953   Wound Surface Area (cm^2) 0 cm^2 07/08/21 0953   Change in Wound Size % (l*w) 100 07/08/21 0953   Wound Volume (cm^3) 0 cm^3 07/08/21 0953   Wound Healing % 100 07/08/21 0953   Post-Procedure Length (cm) 0 cm 07/08/21 1033   Post-Procedure Width (cm) 0 cm 07/08/21 1033   Post-Procedure Depth (cm) 0 cm 07/08/21 1033   Post-Procedure Surface Area (cm^2) 0 cm^2 07/08/21 1033   Post-Procedure Volume (cm^3) 0 cm^3 07/08/21 1033   Wound Assessment Eschar dry 06/14/21 1406   Drainage Amount None 06/14/21 1406   Odor None 06/14/21 1406   Ira-wound Assessment Dry/flaky 06/14/21 1406   Number of days: 23       Wound 06/14/21 Foot Left;Dorsal #2 (Active)   Wound Image   06/14/21 1406   Dressing Status New dressing applied;Clean; Intact;Dry 07/08/21 1111   Wound Cleansed Cleansed with saline 07/08/21 1111   Dressing/Treatment Xeroform;Dry dressing 07/08/21 1111   Offloading for Diabetic Foot Ulcers Offloading boot 07/08/21 1111   Wound Length (cm) 0.5 cm 07/08/21 0953   Wound Width (cm) 0.6 cm 07/08/21 0953   Wound Depth (cm) 0.1 cm 07/08/21 0953   Wound Surface Area (cm^2) 0.3 cm^2 07/08/21 0953   Change in Wound Size % (l*w) 96.67 07/08/21 0953   Wound Volume (cm^3) 0.03 cm^3 07/08/21 0953   Wound Healing % 98 07/08/21 0953   Wound Assessment Granulation tissue;Pink/red 07/08/21 0953   Drainage Amount Scant 07/08/21 0953   Drainage Description Serosanguinous 07/08/21 0953   Odor None 07/08/21 0953   Ira-wound Assessment Dry/flaky;Fragile 07/08/21 0953   Number of days: 23       Wound 06/14/21 Foot Left;Medial #3 (Active)   Wound Image   06/14/21 1406   Dressing Status New dressing applied;Clean;Dry; Intact 07/08/21 1111   Wound Cleansed Cleansed with saline 07/08/21 1111   Dressing/Treatment Xeroform;Dry dressing 07/08/21 1111   Offloading for Diabetic Foot Ulcers Offloading boot 07/08/21 1111   Wound Length (cm) 0.7 cm 07/08/21 0953   Wound Width (cm) 0.8 cm 07/08/21 0953   Wound Depth (cm) 0.1 cm 07/08/21 0953   Wound Surface Area (cm^2) 0.56 cm^2 07/08/21 0953   Change in Wound Size % (l*w) 64.1 07/08/21 0953   Wound Volume (cm^3) 0.056 cm^3 07/08/21 0953   Wound Healing % 65 07/08/21 0953   Wound Assessment Eschar dry;Fibrin 07/08/21 0953   Drainage Amount None 07/08/21 6382   Odor None 07/08/21 0953   Ira-wound Assessment Blanchable erythema;Dry/flaky 07/08/21 0953   Number of days: 23              Plan:   Treatment Note please see attached Discharge Instructions    Written patient dismissal instructions given to patient and signed by patient or POA. Discharge Instructions       Visit Discharge/Physician Orders     Discharge condition: Stable     Assessment of pain at discharge:minimal     Anesthetic used: 4% lidocaine     [facility]     Left via:Private automobile     Accompanied by: accompanied by daughter     ECF/HHA:      Dressing Orders: cleanse left foot ulcers with normal saline apply xeroform and dry dressing daily.     Treatment Orders:  Eat foods high in protein and vitamin c     Take multivitamin daily    Ok for partial weight bearing to left foot, with antislip sock    [address] [facility] followup visit __________2 week_________________  (Please note your next appointment above and if you are unable to keep, kindly give a 24 hour notice.  Thank you.)     Physician signature:__________________________        If you experience any of the following, please call the [facility] during business hours:     * Increase in Pain  * Temperature over 101  * Increase in drainage from your wound  * Drainage with a foul odor  * Bleeding  * Increase in swelling  * Need for compression bandage changes due to slippage, breakthrough drainage.     If you need medical attention outside of the business hours of the [facility] please contact your PCP or go to the nearest emergency room.                 Electronically signed by Richie Bowen DPM on 7/8/2021 at 1:10 PM

## 2021-07-08 NOTE — PLAN OF CARE
Problem: Wound:  Goal: Will show signs of wound healing; wound closure and no evidence of infection  Description: Will show signs of wound healing; wound closure and no evidence of infection  Outcome: Met This Shift     Problem: Arterial:  Goal: Optimize blood flow for wound healing  Description: Optimize blood flow for wound healing  Outcome: Ongoing

## 2021-07-09 PROBLEM — L97.529 TYPE 1 DIABETES MELLITUS WITH LEFT DIABETIC FOOT ULCER (HCC): Status: RESOLVED | Noted: 2021-05-18 | Resolved: 2021-07-09

## 2021-07-09 PROBLEM — E11.3293 NON-PROLIFERATIVE DIABETIC RETINOPATHY, MILD, BOTH EYES (HCC): Status: ACTIVE | Noted: 2021-07-09

## 2021-07-09 PROBLEM — E10.621 TYPE 1 DIABETES MELLITUS WITH LEFT DIABETIC FOOT ULCER (HCC): Status: RESOLVED | Noted: 2021-05-18 | Resolved: 2021-07-09

## 2021-07-15 ENCOUNTER — TELEPHONE (OUTPATIENT)
Dept: VASCULAR SURGERY | Age: 86
End: 2021-07-15

## 2021-07-15 ENCOUNTER — OFFICE VISIT (OUTPATIENT)
Dept: VASCULAR SURGERY | Age: 86
End: 2021-07-15
Payer: MEDICARE

## 2021-07-15 VITALS — BODY MASS INDEX: 25.69 KG/M2 | HEIGHT: 63 IN | WEIGHT: 145 LBS

## 2021-07-15 DIAGNOSIS — I70.244 ATHEROSCLEROSIS OF NATIVE ARTERY OF LEFT LOWER EXTREMITY WITH ULCERATION OF MIDFOOT (HCC): ICD-10-CM

## 2021-07-15 DIAGNOSIS — Z98.62 PERIPHERAL VASCULAR ANGIOPLASTY STATUS: ICD-10-CM

## 2021-07-15 DIAGNOSIS — L97.521 ULCERATED, FOOT, LEFT, LIMITED TO BREAKDOWN OF SKIN (HCC): Primary | ICD-10-CM

## 2021-07-15 DIAGNOSIS — M79.89 LEG SWELLING: ICD-10-CM

## 2021-07-15 DIAGNOSIS — I73.9 PVD (PERIPHERAL VASCULAR DISEASE) WITH CLAUDICATION (HCC): ICD-10-CM

## 2021-07-15 PROBLEM — J40 BRONCHITIS: Status: RESOLVED | Noted: 2017-05-23 | Resolved: 2021-07-15

## 2021-07-15 PROCEDURE — 1090F PRES/ABSN URINE INCON ASSESS: CPT | Performed by: SURGERY

## 2021-07-15 PROCEDURE — 1123F ACP DISCUSS/DSCN MKR DOCD: CPT | Performed by: SURGERY

## 2021-07-15 PROCEDURE — G8417 CALC BMI ABV UP PARAM F/U: HCPCS | Performed by: SURGERY

## 2021-07-15 PROCEDURE — 4040F PNEUMOC VAC/ADMIN/RCVD: CPT | Performed by: SURGERY

## 2021-07-15 PROCEDURE — 1036F TOBACCO NON-USER: CPT | Performed by: SURGERY

## 2021-07-15 PROCEDURE — G8427 DOCREV CUR MEDS BY ELIG CLIN: HCPCS | Performed by: SURGERY

## 2021-07-15 PROCEDURE — 99214 OFFICE O/P EST MOD 30 MIN: CPT | Performed by: SURGERY

## 2021-07-15 RX ORDER — THIAMINE MONONITRATE (VIT B1) 100 MG
100 TABLET ORAL DAILY
COMMUNITY
End: 2021-09-13

## 2021-07-15 NOTE — TELEPHONE ENCOUNTER
Scheduled (B) LE venous u/s at [facility]) 7/26 at 11:30 a.m, [facility] nurse notified.

## 2021-07-15 NOTE — PROGRESS NOTES
Chief Complaint:   Chief Complaint   Patient presents with    Follow-up     left leg         HPI: Patient came to the office with her daughter Sierra Daniel, from [place], in a wheelchair, for evaluation of vascular status of the left leg, in the past, patient developed ulcers of the plantar surface left foot, underwent incision drainage and debridement by the podiatry service, also had some skin excoriation of the dorsum of the foot, currently following at the wound care center [facility] with Dr. Bassem Gaviria    Patient has undergone left femoral angiogram, angioplasty of the superficial femoral popliteal artery and antecubital artery by Dr. Britni Martinez in May of this year, subsequently developed small right femoral artery pseudoaneurysm, underwent thrombin injection with good results      Patient and family mention that patient does have increasing swelling of both legs for the last few weeks at least, patient sits in the chair most of the time in a dependent position, denies any previous history of deep vein thrombosis    Patient is a small skin ulcer over the medial aspect of left first metatarsophalangeal joint, tells me that the pain is doing much better, still has some excoriation of the skin of the dorsum of the foot, stable to improved      Patient denies any focal lateralizing neurological symptoms like loss of speech, vision or loss of function of extremity    Patient can walks short distances slowly, and denies any symptoms of rest pain    Allergies   Allergen Reactions    Lisinopril Other (See Comments)     7/18/19 Pt states that she does not want to take LISINOPRIL       Current Outpatient Medications   Medication Sig Dispense Refill    vitamin B-1 (THIAMINE) 100 MG tablet Take 100 mg by mouth daily      calcium carbonate (TUMS) 500 MG chewable tablet Take 1 tablet by mouth 4 times daily as needed (indigestion) Not to exceed 15 tablet in a 24hr period      ferrous sulfate (IRON 325) 325 (65 Fe) MG tablet Take 325 mg by mouth daily In the morning for anemia      folic acid (FOLVITE) 1 MG tablet Take 1 mg by mouth daily In the morning for anemia      nystatin (MYCOSTATIN) 259388 UNIT/ML suspension Take 500,000 Units by mouth 4 times daily Thrush swish and swallow      omeprazole (PRILOSEC) 20 MG delayed release capsule Take 20 mg by mouth daily In morning for reflux      oxyCODONE-acetaminophen (PERCOCET) 7.5-325 MG per tablet Take 1 tablet by mouth every 6 hours as needed for Pain.  b complex vitamins capsule Take 1 capsule by mouth daily In the morning for supplement      Vitamin D (CHOLECALCIFEROL) 25 MCG (1000 UT) TABS tablet Take 4 capsules by mouth Give 4000 units in the morning for supplement      aspirin 81 MG EC tablet Take 1 tablet by mouth daily 30 tablet 0    furosemide (LASIX) 20 MG tablet Take 20 mg by mouth daily *MON-WED-FRI*       promethazine (PHENERGAN) 12.5 MG tablet Take 12.5 mg by mouth every 6 hours as needed for Nausea      traMADol (ULTRAM) 50 MG tablet Take 50 mg by mouth three times daily.  aluminum & magnesium hydroxide-simethicone (MYLANTA) 400-400-40 MG/5ML SUSP Take 30 mLs by mouth every 6 hours as needed      lidocaine (LMX) 4 % cream Apply topically every 8 hours as needed for Pain Apply topically as needed to painful muscles      diclofenac sodium (VOLTAREN) 1 % GEL Apply topically 4 times daily as needed for Pain      glimepiride (AMARYL) 2 MG tablet Take 2 mg by mouth every morning (before breakfast)      bisacodyl (DULCOLAX) 10 MG suppository Place 10 mg rectally daily as needed for Constipation Indications: IF NO RESULTS FROM MOM       gabapentin (NEURONTIN) 300 MG capsule Take 300 mg by mouth every evening.        albuterol (PROVENTIL) 90 MCG/ACT inhaler Inhale 2 puffs into the lungs 4 times daily  0    pravastatin (PRAVACHOL) 20 MG tablet TAKE 1 TABLET BY MOUTH  NIGHTLY 90 tablet 1    amLODIPine (NORVASC) 5 MG tablet TAKE 1 TABLET BY MOUTH DEBRIDEMENT  WITH DELAYED PRIMARY CLOSURE performed by Jose Lundberg DPM at [address] 1997    TONSILLECTOMY AND ADENOIDECTOMY         Family History   Problem Relation Age of Onset    Hypertension Father     Heart Disease Brother        Social History     Socioeconomic History    Marital status:      Spouse name: Not on file    Number of children: Not on file    Years of education: Not on file    Highest education level: Not on file   Occupational History    Not on file   Tobacco Use    Smoking status: Never Smoker    Smokeless tobacco: Never Used   Vaping Use    Vaping Use: Never used   Substance and Sexual Activity    Alcohol use: Yes     Comment: occasional    Drug use: No    Sexual activity: Not Currently     Partners: Male   Other Topics Concern    Not on file   Social History Narrative    Not on file     Social Determinants of Health     Financial Resource Strain:     Difficulty of Paying Living Expenses:    Food Insecurity:     Worried About Running Out of Food in the Last Year:     [address] in the Last Year:    Transportation Needs:     Lack of Transportation (Medical):  Lack of Transportation (Non-Medical):    Physical Activity:     Days of Exercise per Week:     Minutes of Exercise per Session:    Stress:     Feeling of Stress :    Social Connections:     Frequency of Communication with Friends and Family:     Frequency of Social Gatherings with Friends and Family:     Attends [employer] Services:     Active Member of Clubs or Organizations:     Attends Club or Organization Meetings:     Marital Status:    Intimate Partner Violence:     Fear of Current or Ex-Partner:     Emotionally Abused:     Physically Abused:     Sexually Abused:        Review of Systems:    Eyes:  No blurring, diplopia or vision loss. Respiratory:  No cough, pleuritic chest pain, dyspnea, or wheezing.   History of pulmonary embolus, options, risks benefits explained, patient was recommended keep the legs elevated decrease swelling and also to discuss with her PCP and cardiologist to rule out any medical reasons for the swelling of the legs    In the interim, recommend bilateral venous ultrasound study of the legs rule out DVT    Patient was recommended, to continue follow-up with her podiatrist wound care center, will see her every 6 months and as needed and to call me if any increasing symptoms       All the questions were answered. Orders Placed This Encounter   Procedures    US DUP LOWER EXTREMITIES BILATERAL VENOUS             Indicated follow-up: Return in about 6 months (around 1/15/2022), or if symptoms worsen or fail to improve. Cephalic

## 2021-07-22 ENCOUNTER — HOSPITAL ENCOUNTER (OUTPATIENT)
Dept: WOUND CARE | Age: 86
Discharge: HOME | End: 2021-07-22
Payer: MEDICARE

## 2021-07-22 VITALS
HEART RATE: 56 BPM | HEIGHT: 63 IN | TEMPERATURE: 97.6 F | DIASTOLIC BLOOD PRESSURE: 70 MMHG | RESPIRATION RATE: 16 BRPM | BODY MASS INDEX: 25.69 KG/M2 | WEIGHT: 145 LBS | SYSTOLIC BLOOD PRESSURE: 120 MMHG

## 2021-07-22 DIAGNOSIS — I70.244 ATHEROSCLEROSIS OF NATIVE ARTERY OF LEFT LOWER EXTREMITY WITH ULCERATION OF MIDFOOT (HCC): Primary | ICD-10-CM

## 2021-07-22 PROCEDURE — 99213 OFFICE O/P EST LOW 20 MIN: CPT | Performed by: PODIATRIST

## 2021-07-22 RX ORDER — VANCOMYCIN HYDROCHLORIDE 250 MG/1
250 CAPSULE ORAL 4 TIMES DAILY
COMMUNITY
End: 2021-08-05

## 2021-07-22 RX ORDER — GENTAMICIN SULFATE 1 MG/G
OINTMENT TOPICAL ONCE
Status: CANCELLED | OUTPATIENT
Start: 2021-07-22 | End: 2021-07-22

## 2021-07-22 RX ORDER — CLOBETASOL PROPIONATE 0.5 MG/G
OINTMENT TOPICAL ONCE
Status: CANCELLED | OUTPATIENT
Start: 2021-07-22 | End: 2021-07-22

## 2021-07-22 RX ORDER — LIDOCAINE HYDROCHLORIDE 20 MG/ML
JELLY TOPICAL ONCE
Status: CANCELLED | OUTPATIENT
Start: 2021-07-22 | End: 2021-07-22

## 2021-07-22 RX ORDER — BACITRACIN, NEOMYCIN, POLYMYXIN B 400; 3.5; 5 [USP'U]/G; MG/G; [USP'U]/G
OINTMENT TOPICAL ONCE
Status: CANCELLED | OUTPATIENT
Start: 2021-07-22 | End: 2021-07-22

## 2021-07-22 RX ORDER — LIDOCAINE 50 MG/G
OINTMENT TOPICAL ONCE
Status: CANCELLED | OUTPATIENT
Start: 2021-07-22 | End: 2021-07-22

## 2021-07-22 RX ORDER — BETAMETHASONE DIPROPIONATE 0.05 %
OINTMENT (GRAM) TOPICAL ONCE
Status: CANCELLED | OUTPATIENT
Start: 2021-07-22 | End: 2021-07-22

## 2021-07-22 RX ORDER — LIDOCAINE 40 MG/G
CREAM TOPICAL ONCE
Status: CANCELLED | OUTPATIENT
Start: 2021-07-22 | End: 2021-07-22

## 2021-07-22 RX ORDER — LIDOCAINE HYDROCHLORIDE 40 MG/ML
SOLUTION TOPICAL ONCE
Status: CANCELLED | OUTPATIENT
Start: 2021-07-22 | End: 2021-07-22

## 2021-07-22 RX ORDER — MELOXICAM 7.5 MG/1
7.5 TABLET ORAL DAILY
COMMUNITY
End: 2021-09-13

## 2021-07-22 RX ORDER — BACITRACIN ZINC AND POLYMYXIN B SULFATE 500; 1000 [USP'U]/G; [USP'U]/G
OINTMENT TOPICAL ONCE
Status: CANCELLED | OUTPATIENT
Start: 2021-07-22 | End: 2021-07-22

## 2021-07-22 RX ORDER — LIDOCAINE HYDROCHLORIDE 40 MG/ML
SOLUTION TOPICAL ONCE
Status: COMPLETED | OUTPATIENT
Start: 2021-07-22 | End: 2021-07-22

## 2021-07-22 RX ORDER — GINSENG 100 MG
CAPSULE ORAL ONCE
Status: CANCELLED | OUTPATIENT
Start: 2021-07-22 | End: 2021-07-22

## 2021-07-22 RX ADMIN — LIDOCAINE HYDROCHLORIDE: 40 SOLUTION TOPICAL at 10:28

## 2021-07-26 ENCOUNTER — HOSPITAL ENCOUNTER (OUTPATIENT)
Dept: ULTRASOUND IMAGING | Age: 86
Discharge: HOME | End: 2021-07-28
Payer: COMMERCIAL

## 2021-07-26 DIAGNOSIS — M79.89 LEG SWELLING: ICD-10-CM

## 2021-07-26 PROCEDURE — 93970 EXTREMITY STUDY: CPT

## 2021-07-26 PROCEDURE — 93970 EXTREMITY STUDY: CPT | Performed by: RADIOLOGY

## 2021-07-30 ENCOUNTER — TELEPHONE (OUTPATIENT)
Dept: VASCULAR SURGERY | Age: 86
End: 2021-07-30

## 2021-07-30 NOTE — TELEPHONE ENCOUNTER
Called to inform patient that venous ultrasound of left leg is normal, no DVT, keep next year appointment.

## 2021-07-30 NOTE — TELEPHONE ENCOUNTER
----- Message from Shaye Mathis MD sent at 7/28/2021 11:04 AM EDT -----  Please notify the patient or  the family, that the venous ultrasound of the left leg, normal, no DVT, call as needed keep the next appointment

## 2021-08-05 ENCOUNTER — HOSPITAL ENCOUNTER (OUTPATIENT)
Dept: WOUND CARE | Age: 86
Discharge: HOME | End: 2021-08-05
Payer: MEDICARE

## 2021-08-05 VITALS
HEIGHT: 63 IN | HEART RATE: 86 BPM | RESPIRATION RATE: 18 BRPM | TEMPERATURE: 97.5 F | SYSTOLIC BLOOD PRESSURE: 132 MMHG | DIASTOLIC BLOOD PRESSURE: 68 MMHG | BODY MASS INDEX: 25.69 KG/M2 | WEIGHT: 145 LBS

## 2021-08-05 DIAGNOSIS — I70.244 ATHEROSCLEROSIS OF NATIVE ARTERY OF LEFT LOWER EXTREMITY WITH ULCERATION OF MIDFOOT (HCC): Primary | ICD-10-CM

## 2021-08-05 PROCEDURE — 11042 DBRDMT SUBQ TIS 1ST 20SQCM/<: CPT | Performed by: NURSE PRACTITIONER

## 2021-08-05 RX ORDER — LIDOCAINE HYDROCHLORIDE 20 MG/ML
JELLY TOPICAL ONCE
Status: CANCELLED | OUTPATIENT
Start: 2021-08-05 | End: 2021-08-05

## 2021-08-05 RX ORDER — LIDOCAINE 40 MG/G
CREAM TOPICAL ONCE
Status: CANCELLED | OUTPATIENT
Start: 2021-08-05 | End: 2021-08-05

## 2021-08-05 RX ORDER — BACITRACIN ZINC AND POLYMYXIN B SULFATE 500; 1000 [USP'U]/G; [USP'U]/G
OINTMENT TOPICAL ONCE
Status: CANCELLED | OUTPATIENT
Start: 2021-08-05 | End: 2021-08-05

## 2021-08-05 RX ORDER — BACITRACIN, NEOMYCIN, POLYMYXIN B 400; 3.5; 5 [USP'U]/G; MG/G; [USP'U]/G
OINTMENT TOPICAL ONCE
Status: CANCELLED | OUTPATIENT
Start: 2021-08-05 | End: 2021-08-05

## 2021-08-05 RX ORDER — LIDOCAINE HYDROCHLORIDE 40 MG/ML
SOLUTION TOPICAL ONCE
Status: COMPLETED | OUTPATIENT
Start: 2021-08-05 | End: 2021-08-05

## 2021-08-05 RX ORDER — LIDOCAINE 50 MG/G
OINTMENT TOPICAL ONCE
Status: CANCELLED | OUTPATIENT
Start: 2021-08-05 | End: 2021-08-05

## 2021-08-05 RX ORDER — GINSENG 100 MG
CAPSULE ORAL ONCE
Status: CANCELLED | OUTPATIENT
Start: 2021-08-05 | End: 2021-08-05

## 2021-08-05 RX ORDER — GENTAMICIN SULFATE 1 MG/G
OINTMENT TOPICAL ONCE
Status: CANCELLED | OUTPATIENT
Start: 2021-08-05 | End: 2021-08-05

## 2021-08-05 RX ORDER — BETAMETHASONE DIPROPIONATE 0.05 %
OINTMENT (GRAM) TOPICAL ONCE
Status: CANCELLED | OUTPATIENT
Start: 2021-08-05 | End: 2021-08-05

## 2021-08-05 RX ORDER — LIDOCAINE HYDROCHLORIDE 40 MG/ML
SOLUTION TOPICAL ONCE
Status: CANCELLED | OUTPATIENT
Start: 2021-08-05 | End: 2021-08-05

## 2021-08-05 RX ORDER — CLOBETASOL PROPIONATE 0.5 MG/G
OINTMENT TOPICAL ONCE
Status: CANCELLED | OUTPATIENT
Start: 2021-08-05 | End: 2021-08-05

## 2021-08-05 RX ADMIN — LIDOCAINE HYDROCHLORIDE: 40 SOLUTION TOPICAL at 10:05

## 2021-08-05 NOTE — PROGRESS NOTES
[facility] Followup Visit Note    Referring Physician : Bertin Schroeder MD  [address] RECORD NUMBER:  [id number]  AGE: 80 y.o. GENDER: female  : 1927  EPISODE DATE:  2021    Subjective:     Chief Complaint   Patient presents with    Wound Check     left foot       HISTORY of PRESENT ILLNESS HPI   Amy Branch is a 80 y.o. female who presents today in regards to follow up evaluation and treatment of wound/ulcer. That patient's past medical, family and social hx were reviewed and changes were made if present. History of Wound Context: Patient has wounds on the left foot. Patient with history of diabetes, neuropathy as well as PVD recently underwent vascular intervention as well as had a abscess on the bottom of her left foot that required incision and drainage with a delayed primary closure. She is currently at [facility], local care consisting of Xeroform to the wounds as instructed.     Pt is not on abx at time of initial visit.     21 sutures removed, local care as instructed. Continue nonweightbearing to the left foot until next follow-up.    21 presents with her daughter today. Plantar wound remains healed. Wounds appreciated medial, lateral as well as dorsal left foot, stable. Depth undetermined, eschar decreased. There is no erythema or active drainage. No pain. No erythema or fluctuance. Patient can start partial weightbearing on the left foot with a slipper sock, I do not want a shoe covering due to concerns of the other wounds. 21 presents w/ her son, saw Wellington Barcenas last week, ordered US LE, left foot looks good, dorsal healed, medial small area of eschar, adhered to underlying surface, no erythema, fluctuance or increase in temperature. No pain. Plantar foot looks good. Minimal edema. Negative Homans. Okay to ambulate with a slipper sock only until the medial left foot wound resolves. 21 presents with her nursing facility.   Overall doing well, however they relate she has an area on her right lower leg compatible with contusion as well as nursing also noticed her right great toenail was loose. Patient relates she may have bumped the leg as well as the toe. Right medial foot wound with devitalized nonviable tissue through subcutaneous tissue. With debridement good bleeding noted. Right lower leg discoloration/contusion with a small skin tear. No surrounding erythema or increase in temperature. No fluctuance. Right great toe approximately 25% is loose with some underlying hematoma. Drainage is serous only. Nailbed is clean at present. There is some dried drainage distally however underlying normal skin. Leave the right lower extremity open to air, monitor to toe nail, she may lose it. Even though there is no fluctuance as far as her right lower leg concern would be developing hematoma down the road which can result in deeper type wound. Monitor this as well.           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 ([address] 5/18/2021    Bronchitis 5/23/2017    CAD (coronary artery disease)     Cough 5/23/2017    Dermatophytosis 6/25/2021    Diabetes mellitus ([address]     GERD (gastroesophageal reflux disease) 5/23/2017    History of pulmonary embolus (PE) 5/29/2021    Hx of blood clots     Hyperlipidemia     Hypertension     Leg swelling 7/15/2021    Lung disease     Peripheral vascular angioplasty status 5/29/2021    Pseudoaneurysm of right femoral artery ([address] 5/29/2021    PVD (peripheral vascular disease) with claudication ([address] 4/10/2019    Restless legs syndrome     Rhinitis, allergic 5/23/2017    Sinusitis 5/23/2017    SOB (shortness of breath) 5/23/2017    Type 1 diabetes mellitus with left diabetic foot ulcer ([address] 5/18/2021    Ulcerated, foot, left, limited to breakdown of skin ([address] 6/25/2021    Urinary incontinence      Past Surgical History:   Procedure Laterality Date    ABDOMEN SURGERY      APPENDECTOMY      CARDIAC SURGERY      CHOLECYSTECTOMY      COLONOSCOPY      CORONARY ARTERY BYPASS GRAFT      8/28/10    ENDOSCOPY, COLON, DIAGNOSTIC      FOOT DEBRIDEMENT Left 5/16/2021    FOOT DEBRIDEMENT INCISION AND DRAINAGE. performed by Eli Abraham DPM at [facility] Left 5/21/2021    LEFT FOOT DEBRIDEMENT  WITH DELAYED PRIMARY CLOSURE performed by Sridhar Richard DPM at [facility] and bso 1997    TONSILLECTOMY AND ADENOIDECTOMY       Family History   Problem Relation Age of Onset    Hypertension Father     Heart Disease Brother      Social History     Tobacco Use    Smoking status: Never Smoker    Smokeless tobacco: Never Used   Vaping Use    Vaping Use: Never used   Substance Use Topics    Alcohol use: Yes     Comment: occasional    Drug use: No     Allergies   Allergen Reactions    Lisinopril Other (See Comments)     7/18/19 Pt states that she does not want to take LISINOPRIL     Current Outpatient Medications on File Prior to Encounter   Medication Sig Dispense Refill    meloxicam (MOBIC) 7.5 MG tablet Take 7.5 mg by mouth daily For 2 weeks.  End date 7-29-21      miconazole (MICOTIN) 2 % cream Apply 1 Act topically nightly Topical to rash of toes, feet and ankles end date 7-25-21      vitamin B-1 (THIAMINE) 100 MG tablet Take 100 mg by mouth daily      ferrous sulfate (IRON 325) 325 (65 Fe) MG tablet Take 325 mg by mouth daily In the morning for anemia      folic acid (FOLVITE) 1 MG tablet Take 1 mg by mouth daily In the morning for anemia      nystatin (MYCOSTATIN) 325538 UNIT/ML suspension Take 500,000 Units by mouth 4 times daily Thrush swish and swallow      omeprazole (PRILOSEC) 20 MG delayed release capsule Take 20 mg by mouth daily In morning for reflux      b complex vitamins capsule Take 1 capsule by mouth daily In the morning for supplement      Vitamin D (CHOLECALCIFEROL) 25 MCG (1000 UT) TABS tablet Take 4 capsules by mouth Give 4000 units in the morning for supplement      aspirin 81 MG EC tablet Take 1 tablet by mouth daily 30 tablet 0    furosemide (LASIX) 20 MG tablet Take 20 mg by mouth daily *MON-WED-FRI*       promethazine (PHENERGAN) 12.5 MG tablet Take 12.5 mg by mouth every 6 hours as needed for Nausea      glimepiride (AMARYL) 2 MG tablet Take 2 mg by mouth every morning (before breakfast)      gabapentin (NEURONTIN) 300 MG capsule Take 300 mg by mouth every evening.  albuterol (PROVENTIL) 90 MCG/ACT inhaler Inhale 2 puffs into the lungs 4 times daily  0    pravastatin (PRAVACHOL) 20 MG tablet TAKE 1 TABLET BY MOUTH  NIGHTLY 90 tablet 1    amLODIPine (NORVASC) 5 MG tablet TAKE 1 TABLET BY MOUTH  DAILY 90 tablet 1    fluticasone-vilanterol (BREO ELLIPTA) 100-25 MCG/INH AEPB inhaler Inhale 1 puff into the lungs daily 3 each 5    Multiple Vitamins-Minerals (OCUVITE ADULT FORMULA PO) Take 1 capsule by mouth daily      Probiotic Product (PRO-BIOTIC BLEND PO) Take 1 capsule by mouth 2 times daily       magnesium gluconate (MAGONATE) 500 MG tablet Take 500 mg by mouth 2 times daily       Coenzyme Q10 (COQ10) 200 MG CAPS Take 200 mg by mouth daily       calcium carbonate (TUMS) 500 MG chewable tablet Take 1 tablet by mouth 4 times daily as needed (indigestion) Not to exceed 15 tablet in a 24hr period      oxyCODONE-acetaminophen (PERCOCET) 7.5-325 MG per tablet Take 1 tablet by mouth every 6 hours as needed for Pain.  traMADol (ULTRAM) 50 MG tablet Take 50 mg by mouth three times daily.       aluminum & magnesium hydroxide-simethicone (MYLANTA) 400-400-40 MG/5ML SUSP Take 30 mLs by mouth every 6 hours as needed      lidocaine (LMX) 4 % cream Apply topically every 8 hours as needed for Pain Apply topically as needed to painful muscles      diclofenac sodium (VOLTAREN) 1 % GEL Apply topically 4 times daily as needed for Pain      bisacodyl (DULCOLAX) 10 MG suppository Place 10 mg rectally daily as needed for Constipation Indications: IF NO RESULTS FROM MOM        No current facility-administered medications on file prior to encounter. REVIEW OF SYSTEMS See HPI    Objective:    /68   Pulse 86   Temp 97.5 °F (36.4 °C) (Temporal)   Resp 18   Ht 5' 3\" (1.6 m)   Wt 145 lb (65.8 kg)   LMP 12/03/1997   BMI 25.69 kg/m²   Wt Readings from Last 3 Encounters:   08/05/21 145 lb (65.8 kg)   07/22/21 145 lb (65.8 kg)   07/15/21 145 lb (65.8 kg)     PHYSICAL EXAM  CONSTITUTIONAL:   Awake, alert, cooperative   EYES:  lids and lashes normal   ENT: external ears and nose without lesions   NECK:  supple, symmetrical, trachea midline   SKIN:  Open wound     Assessment:     Left foot ulcer    Pre Debridement Measurements:  Are located in the [place]  Documentation Flow Sheet  Post Debridement Measurements:  Wound/Ulcer Descriptions are Pre Debridement except measurements:     Wound 06/14/21 Foot Left;Dorsal #2 (Active)   Wound Image   07/22/21 1037   Dressing Status New dressing applied;Clean; Intact;Dry 07/08/21 1111   Wound Cleansed Cleansed with saline 07/08/21 1111   Dressing/Treatment Xeroform;Dry dressing 07/08/21 1111   Offloading for Diabetic Foot Ulcers Offloading boot 07/08/21 1111   Wound Length (cm) 0 cm 07/22/21 1037   Wound Width (cm) 0 cm 07/22/21 1037   Wound Depth (cm) 0 cm 07/22/21 1037   Wound Surface Area (cm^2) 0 cm^2 07/22/21 1037   Change in Wound Size % (l*w) 100 07/22/21 1037   Wound Volume (cm^3) 0 cm^3 07/22/21 1037   Wound Healing % 100 07/22/21 1037   Wound Assessment Granulation tissue;Pink/red 07/08/21 0953   Drainage Amount Scant 07/08/21 0953   Drainage Description Serosanguinous 07/08/21 0953   Odor None 07/08/21 0953   Ira-wound Assessment Dry/flaky;Fragile 07/08/21 0953   Number of days: 51       Wound 06/14/21 Foot Left;Medial #3 (Active)   Wound Image   07/22/21 1037   Dressing Status New dressing applied;Clean;Dry; Intact 08/05/21 1054   Wound Cleansed Cleansed with saline 08/05/21 1054   Dressing/Treatment Xeroform;Dry dressing 08/05/21 1054   Offloading for Diabetic Foot Ulcers Offloading boot 08/05/21 1054   Wound Length (cm) 0.5 cm 08/05/21 0959   Wound Width (cm) 1 cm 08/05/21 0959   Wound Depth (cm) 0.2 cm 08/05/21 0959   Wound Surface Area (cm^2) 0.5 cm^2 08/05/21 0959   Change in Wound Size % (l*w) 67.95 08/05/21 0959   Wound Volume (cm^3) 0.1 cm^3 08/05/21 0959   Wound Healing % 38 08/05/21 0959   Post-Procedure Length (cm) 0.5 cm 08/05/21 1019   Post-Procedure Width (cm) 1 cm 08/05/21 1019   Post-Procedure Depth (cm) 0.3 cm 08/05/21 1019   Post-Procedure Surface Area (cm^2) 0.5 cm^2 08/05/21 1019   Post-Procedure Volume (cm^3) 0.15 cm^3 08/05/21 1019   Wound Assessment Fibrin 08/05/21 0959   Drainage Amount Small 08/05/21 0959   Drainage Description Yellow 08/05/21 0959   Odor None 08/05/21 0959   Ira-wound Assessment Maceration 08/05/21 0959   Number of days: 51          Procedure Note  Indications:  Based on my examination of this patient's wound(s)/ulcer(s) today, debridement is required to promote healing and evaluate the wound base. Performed by: Matilde Saavedra DPM    Consent obtained:  Yes    Time out taken:  Yes    Pain Control: Anesthetic  Anesthetic: 4% Lidocaine Liquid Topical     Debridement:Excisional Debridement    Using curette the wound(s)/ulcer(s) was/were sharply debrided down through and including the removal of subcutaneous tissue.         Devitalized Tissue Debrided:  fibrin, biofilm, slough and necrotic/eschar to stimulate bleeding to promote healing, post debridement good bleeding base and wound edges noted    Wound/Ulcer #: 3    Percent of Wound/Ulcer Debrided: 100%    Total Surface Area Debrided:  0.5 sq cm     Estimated Blood Loss:  Minimal  Hemostasis Achieved:  by pressure    Procedural Pain:  0  / 10   Post Procedural Pain:  0 / 10     Response to treatment:  Well tolerated by patient. Plan:   Treatment Note please see attached Discharge Instructions    Written patient dismissal instructions given to patient and signed by patient or POA. Discharge Instructions        Visit Discharge/Physician Orders     Discharge condition: Stable     Assessment of pain at discharge:minimal     Anesthetic used: 4% lidocaine     Discharge to: [facility]     Left via:Private automobile     Accompanied by: accompanied by daughter     ECF/HHA: [facility]     Dressing Orders: cleanse left foot ulcer with normal saline apply xeroform and dry dressing daily.     Treatment Orders:  Eat foods high in protein and vitamin c     Take multivitamin daily     Ok for partial weight bearing to left foot, with antislip sock- no shoe     [facility] [facility] followup visit _________2week_________________  (Please note your next appointment above and if you are unable to keep, kindly give a 24 hour notice.  Thank you.)     Physician signature:__________________________        If you experience any of the following, please call the [facility] during business hours:     * Increase in Pain  * Temperature over 101  * Increase in drainage from your wound  * Drainage with a foul odor  * Bleeding  * Increase in swelling  * Need for compression bandage changes due to slippage, breakthrough drainage.     If you need medical attention outside of the business hours of the [facility] please contact your PCP or go to the nearest emergency room.                                                     Electronically signed by Maliha Howe DPM on 8/5/2021 at 11:03 AM

## 2021-08-16 ENCOUNTER — HOSPITAL ENCOUNTER (OUTPATIENT)
Dept: WOUND CARE | Age: 86
Discharge: HOME | End: 2021-08-16
Payer: MEDICARE

## 2021-08-16 VITALS
WEIGHT: 145 LBS | DIASTOLIC BLOOD PRESSURE: 78 MMHG | RESPIRATION RATE: 20 BRPM | HEART RATE: 72 BPM | BODY MASS INDEX: 25.69 KG/M2 | TEMPERATURE: 97.1 F | HEIGHT: 63 IN | SYSTOLIC BLOOD PRESSURE: 132 MMHG

## 2021-08-16 DIAGNOSIS — I70.244 ATHEROSCLEROSIS OF NATIVE ARTERY OF LEFT LOWER EXTREMITY WITH ULCERATION OF MIDFOOT (HCC): Primary | ICD-10-CM

## 2021-08-16 PROCEDURE — 11042 DBRDMT SUBQ TIS 1ST 20SQCM/<: CPT

## 2021-08-16 RX ORDER — GENTAMICIN SULFATE 1 MG/G
OINTMENT TOPICAL ONCE
Status: CANCELLED | OUTPATIENT
Start: 2021-08-16 | End: 2021-08-16

## 2021-08-16 RX ORDER — CLOBETASOL PROPIONATE 0.5 MG/G
OINTMENT TOPICAL ONCE
Status: CANCELLED | OUTPATIENT
Start: 2021-08-16 | End: 2021-08-16

## 2021-08-16 RX ORDER — LIDOCAINE HYDROCHLORIDE 40 MG/ML
SOLUTION TOPICAL ONCE
Status: CANCELLED | OUTPATIENT
Start: 2021-08-16 | End: 2021-08-16

## 2021-08-16 RX ORDER — LIDOCAINE HYDROCHLORIDE 40 MG/ML
SOLUTION TOPICAL ONCE
Status: DISCONTINUED | OUTPATIENT
Start: 2021-08-16 | End: 2021-08-17 | Stop reason: HOSPADM

## 2021-08-16 RX ORDER — BACITRACIN, NEOMYCIN, POLYMYXIN B 400; 3.5; 5 [USP'U]/G; MG/G; [USP'U]/G
OINTMENT TOPICAL ONCE
Status: CANCELLED | OUTPATIENT
Start: 2021-08-16 | End: 2021-08-16

## 2021-08-16 RX ORDER — LIDOCAINE HYDROCHLORIDE 20 MG/ML
JELLY TOPICAL ONCE
Status: CANCELLED | OUTPATIENT
Start: 2021-08-16 | End: 2021-08-16

## 2021-08-16 RX ORDER — LIDOCAINE 40 MG/G
CREAM TOPICAL ONCE
Status: CANCELLED | OUTPATIENT
Start: 2021-08-16 | End: 2021-08-16

## 2021-08-16 RX ORDER — GINSENG 100 MG
CAPSULE ORAL ONCE
Status: CANCELLED | OUTPATIENT
Start: 2021-08-16 | End: 2021-08-16

## 2021-08-16 RX ORDER — LIDOCAINE 50 MG/G
OINTMENT TOPICAL ONCE
Status: CANCELLED | OUTPATIENT
Start: 2021-08-16 | End: 2021-08-16

## 2021-08-16 RX ORDER — BETAMETHASONE DIPROPIONATE 0.05 %
OINTMENT (GRAM) TOPICAL ONCE
Status: CANCELLED | OUTPATIENT
Start: 2021-08-16 | End: 2021-08-16

## 2021-08-16 RX ORDER — BACITRACIN ZINC AND POLYMYXIN B SULFATE 500; 1000 [USP'U]/G; [USP'U]/G
OINTMENT TOPICAL ONCE
Status: CANCELLED | OUTPATIENT
Start: 2021-08-16 | End: 2021-08-16

## 2021-08-16 ASSESSMENT — PAIN SCALES - GENERAL: PAINLEVEL_OUTOF10: 0

## 2021-08-16 NOTE — PROGRESS NOTES
Wound Healing Center Followup Visit Note    Referring Physician : Beto Tinajero MD  [address] RECORD NUMBER:  [id number]  AGE: 80 y.o. GENDER: female  : 1927  EPISODE DATE:  2021    Subjective:     Chief Complaint   Patient presents with    Wound Check     left leg      HISTORY of PRESENT ILLNESS HPI   Srinivasa Cole is a 80 y.o. female who presents today in regards to follow up evaluation and treatment of wound/ulcer. That patient's past medical, family and social hx were reviewed and changes were made if present. History of Wound Context: Patient has wounds on the left foot. Patient with history of diabetes, neuropathy as well as PVD recently underwent vascular intervention as well as had a abscess on the bottom of her left foot that required incision and drainage with a delayed primary closure. She is currently at [facility], local care consisting of Xeroform to the wounds as instructed.     Pt is not on abx at time of initial visit.     21 sutures removed, local care as instructed. Continue nonweightbearing to the left foot until next follow-up.    21 presents with her daughter today. Plantar wound remains healed. Wounds appreciated medial, lateral as well as dorsal left foot, stable. Depth undetermined, eschar decreased. There is no erythema or active drainage. No pain. No erythema or fluctuance. Patient can start partial weightbearing on the left foot with a slipper sock, I do not want a shoe covering due to concerns of the other wounds. 21 presents w/ her son, dianna Hernandez last week, ordered US LE, left foot looks good, dorsal healed, medial small area of eschar, adhered to underlying surface, no erythema, fluctuance or increase in temperature. No pain. Plantar foot looks good. Minimal edema. Negative Homans. Okay to ambulate with a slipper sock only until the medial left foot wound resolves. 21 presents with her nursing facility.   Overall doing well, however they relate she has an area on her right lower leg compatible with contusion as well as nursing also noticed her right great toenail was loose. Patient relates she may have bumped the leg as well as the toe. Right medial foot wound with devitalized nonviable tissue through subcutaneous tissue. With debridement good bleeding noted. Right lower leg discoloration/contusion with a small skin tear. No surrounding erythema or increase in temperature. No fluctuance. Right great toe approximately 25% is loose with some underlying hematoma. Drainage is serous only. Nailbed is clean at present. There is some dried drainage distally however underlying normal skin. Leave the right lower extremity open to air, monitor to toe nail, she may lose it. Even though there is no fluctuance as far as her right lower leg concern would be developing hematoma down the road which can result in deeper type wound. Monitor this as well.    8-16-21 Presents with her daughter in f/u, doing well. Right medial foot wound covered with devitalized nonviable tissue through subcutaneous tissue. No purulence or odor, no erythema or pain.           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 ([address] 5/18/2021    Bronchitis 5/23/2017    CAD (coronary artery disease)     Cough 5/23/2017    Dermatophytosis 6/25/2021    Diabetes mellitus ([address]     GERD (gastroesophageal reflux disease) 5/23/2017    History of pulmonary embolus (PE) 5/29/2021    Hx of blood clots     Hyperlipidemia     Hypertension     Leg swelling 7/15/2021    Lung disease     Peripheral vascular angioplasty status 5/29/2021    Pseudoaneurysm of right femoral artery ([address] 5/29/2021    PVD (peripheral vascular disease) with claudication ([address] 4/10/2019    Restless legs syndrome     Rhinitis, allergic 5/23/2017    Sinusitis 5/23/2017  SOB (shortness of breath) 5/23/2017    Type 1 diabetes mellitus with left diabetic foot ulcer [facility] 5/18/2021    Ulcerated, foot, left, limited to breakdown of skin ([facility] 6/25/2021    Urinary incontinence      Past Surgical History:   Procedure Laterality Date    ABDOMEN SURGERY      APPENDECTOMY      CARDIAC SURGERY      CHOLECYSTECTOMY      COLONOSCOPY      CORONARY ARTERY BYPASS GRAFT      8/28/10    ENDOSCOPY, COLON, DIAGNOSTIC      FOOT DEBRIDEMENT Left 5/16/2021    FOOT DEBRIDEMENT INCISION AND DRAINAGE. performed by Sheri Rosado DPM at [facility] Left 5/21/2021    LEFT FOOT DEBRIDEMENT  WITH DELAYED PRIMARY CLOSURE performed by Pravin Dumas DPM at [id number] [place] [address]      frankie and bso 1997    TONSILLECTOMY AND ADENOIDECTOMY       Family History   Problem Relation Age of Onset    Hypertension Father     Heart Disease Brother      Social History     Tobacco Use    Smoking status: Never Smoker    Smokeless tobacco: Never Used   Vaping Use    Vaping Use: Never used   Substance Use Topics    Alcohol use: Yes     Comment: occasional    Drug use: No     Allergies   Allergen Reactions    Lisinopril Other (See Comments)     7/18/19 Pt states that she does not want to take LISINOPRIL     Current Outpatient Medications on File Prior to Encounter   Medication Sig Dispense Refill    meloxicam (MOBIC) 7.5 MG tablet Take 7.5 mg by mouth daily For 2 weeks.  End date 7-29-21      miconazole (MICOTIN) 2 % cream Apply 1 Act topically nightly Topical to rash of toes, feet and ankles end date 7-25-21      vitamin B-1 (THIAMINE) 100 MG tablet Take 100 mg by mouth daily      calcium carbonate (TUMS) 500 MG chewable tablet Take 1 tablet by mouth 4 times daily as needed (indigestion) Not to exceed 15 tablet in a 24hr period      ferrous sulfate (IRON 325) 325 (65 Fe) MG tablet Take 325 mg by mouth daily In the morning for anemia      folic acid (FOLVITE) 1 MG tablet Take 1 mg lungs daily 3 each 5    Multiple Vitamins-Minerals (OCUVITE ADULT FORMULA PO) Take 1 capsule by mouth daily      Probiotic Product (PRO-BIOTIC BLEND PO) Take 1 capsule by mouth 2 times daily       magnesium gluconate (MAGONATE) 500 MG tablet Take 500 mg by mouth 2 times daily       Coenzyme Q10 (COQ10) 200 MG CAPS Take 200 mg by mouth daily        No current facility-administered medications on file prior to encounter. REVIEW OF SYSTEMS See HPI    Objective:    /78   Pulse 72   Temp 97.1 °F (36.2 °C) (Temporal)   Resp 20   Ht 5' 3\" (1.6 m)   Wt 145 lb (65.8 kg)   LMP 12/03/1997   BMI 25.69 kg/m²   Wt Readings from Last 3 Encounters:   08/16/21 145 lb (65.8 kg)   08/05/21 145 lb (65.8 kg)   07/22/21 145 lb (65.8 kg)     PHYSICAL EXAM  CONSTITUTIONAL:   Awake, alert, cooperative   EYES:  lids and lashes normal   ENT: external ears and nose without lesions   NECK:  supple, symmetrical, trachea midline   SKIN:  Open wound     Assessment:     Left foot ulcer    Pre Debridement Measurements:  Are located in the [place]  Documentation Flow Sheet  Post Debridement Measurements:  Wound/Ulcer Descriptions are Pre Debridement except measurements:     Wound 06/14/21 Foot Left;Dorsal #2 (Active)   Wound Image   07/22/21 1037   Dressing Status New dressing applied;Clean; Intact;Dry 07/08/21 1111   Wound Cleansed Cleansed with saline 07/08/21 1111   Dressing/Treatment Xeroform;Dry dressing 07/08/21 1111   Offloading for Diabetic Foot Ulcers Offloading boot 07/08/21 1111   Wound Length (cm) 0 cm 07/22/21 1037   Wound Width (cm) 0 cm 07/22/21 1037   Wound Depth (cm) 0 cm 07/22/21 1037   Wound Surface Area (cm^2) 0 cm^2 07/22/21 1037   Change in Wound Size % (l*w) 100 07/22/21 1037   Wound Volume (cm^3) 0 cm^3 07/22/21 1037   Wound Healing % 100 07/22/21 1037   Wound Assessment Granulation tissue;Pink/red 07/08/21 0953   Drainage Amount Scant 07/08/21 0953   Drainage Description Serosanguinous 07/08/21 5478 edges noted    Wound/Ulcer #: 3    Percent of Wound/Ulcer Debrided: 100%    Total Surface Area Debrided:  0.15 sq cm     Estimated Blood Loss:  Minimal  Hemostasis Achieved:  by pressure    Procedural Pain:  0  / 10   Post Procedural Pain:  0 / 10     Response to treatment:  Well tolerated by patient. Plan:   Treatment Note please see attached Discharge Instructions    Written patient dismissal instructions given to patient and signed by patient or POA. Discharge Instructions       Visit Discharge/Physician Orders    Discharge condition: Stable    Assessment of pain at discharge:minimal    Anesthetic Used:   4 % LIDOCAINE    Discharge to: [address] [place] [address] 83    Left via:Private automobile    Accompanied by: accompanied by child    ECF/HHA: ECF    Dressing Orders:    Left foot - cleanse with normal saline, apply Xeroform, cover with dry dressing, change daily    Treatment Orders:    No compression needed to lower legs. No shoes to either feet. [facility] followup visit ___________2 weeks with Dr Vega__________________  (Please note your next appointment above and if you are unable to keep, kindly give a 24 hour notice. Thank you.)    Physician signature:__________________________      If you experience any of the following, please call the [address] [place] [address] during business hours:    * Increase in Pain  * Temperature over 101  * Increase in drainage from your wound  * Drainage with a foul odor  * Bleeding  * Increase in swelling  * Need for compression bandage changes due to slippage, breakthrough drainage. If you need medical attention outside of the business hours of the [address] [place] [address] please contact your PCP or go to the nearest emergency room.         Electronically signed by Jessica Cordova DPM on 8/16/2021 at 3:20 PM

## 2021-08-19 ENCOUNTER — HOSPITAL ENCOUNTER (OUTPATIENT)
Dept: WOUND CARE | Age: 86
Discharge: HOME | End: 2021-08-19
Payer: MEDICARE

## 2021-08-30 ENCOUNTER — HOSPITAL ENCOUNTER (OUTPATIENT)
Dept: WOUND CARE | Age: 86
Discharge: HOME | End: 2021-08-30
Payer: MEDICARE

## 2021-08-30 VITALS
RESPIRATION RATE: 20 BRPM | WEIGHT: 145 LBS | HEIGHT: 63 IN | TEMPERATURE: 97.1 F | SYSTOLIC BLOOD PRESSURE: 120 MMHG | BODY MASS INDEX: 25.69 KG/M2 | HEART RATE: 70 BPM | DIASTOLIC BLOOD PRESSURE: 78 MMHG

## 2021-08-30 DIAGNOSIS — I70.244 ATHEROSCLEROSIS OF NATIVE ARTERY OF LEFT LOWER EXTREMITY WITH ULCERATION OF MIDFOOT (HCC): Primary | ICD-10-CM

## 2021-08-30 PROCEDURE — 6370000000 HC RX 637 (ALT 250 FOR IP): Performed by: PODIATRIST

## 2021-08-30 PROCEDURE — 11042 DBRDMT SUBQ TIS 1ST 20SQCM/<: CPT

## 2021-08-30 RX ORDER — LIDOCAINE HYDROCHLORIDE 40 MG/ML
SOLUTION TOPICAL ONCE
Status: CANCELLED | OUTPATIENT
Start: 2021-08-30 | End: 2021-08-30

## 2021-08-30 RX ORDER — LIDOCAINE 40 MG/G
CREAM TOPICAL ONCE
Status: CANCELLED | OUTPATIENT
Start: 2021-08-30 | End: 2021-08-30

## 2021-08-30 RX ORDER — LIDOCAINE 50 MG/G
OINTMENT TOPICAL ONCE
Status: CANCELLED | OUTPATIENT
Start: 2021-08-30 | End: 2021-08-30

## 2021-08-30 RX ORDER — CLOBETASOL PROPIONATE 0.5 MG/G
OINTMENT TOPICAL ONCE
Status: CANCELLED | OUTPATIENT
Start: 2021-08-30 | End: 2021-08-30

## 2021-08-30 RX ORDER — LIDOCAINE HYDROCHLORIDE 20 MG/ML
JELLY TOPICAL ONCE
Status: CANCELLED | OUTPATIENT
Start: 2021-08-30 | End: 2021-08-30

## 2021-08-30 RX ORDER — BETAMETHASONE DIPROPIONATE 0.05 %
OINTMENT (GRAM) TOPICAL ONCE
Status: CANCELLED | OUTPATIENT
Start: 2021-08-30 | End: 2021-08-30

## 2021-08-30 RX ORDER — GINSENG 100 MG
CAPSULE ORAL ONCE
Status: CANCELLED | OUTPATIENT
Start: 2021-08-30 | End: 2021-08-30

## 2021-08-30 RX ORDER — BACITRACIN, NEOMYCIN, POLYMYXIN B 400; 3.5; 5 [USP'U]/G; MG/G; [USP'U]/G
OINTMENT TOPICAL ONCE
Status: CANCELLED | OUTPATIENT
Start: 2021-08-30 | End: 2021-08-30

## 2021-08-30 RX ORDER — GENTAMICIN SULFATE 1 MG/G
OINTMENT TOPICAL ONCE
Status: CANCELLED | OUTPATIENT
Start: 2021-08-30 | End: 2021-08-30

## 2021-08-30 RX ORDER — LIDOCAINE HYDROCHLORIDE 40 MG/ML
SOLUTION TOPICAL ONCE
Status: COMPLETED | OUTPATIENT
Start: 2021-08-30 | End: 2021-08-30

## 2021-08-30 RX ORDER — BACITRACIN ZINC AND POLYMYXIN B SULFATE 500; 1000 [USP'U]/G; [USP'U]/G
OINTMENT TOPICAL ONCE
Status: CANCELLED | OUTPATIENT
Start: 2021-08-30 | End: 2021-08-30

## 2021-08-30 RX ADMIN — LIDOCAINE HYDROCHLORIDE 10 ML: 40 SOLUTION TOPICAL at 15:07

## 2021-08-30 ASSESSMENT — PAIN SCALES - GENERAL: PAINLEVEL_OUTOF10: 0

## 2021-08-30 NOTE — PROGRESS NOTES
[facility] Followup Visit Note    Referring Physician : Tammie May MD  [address] RECORD NUMBER:  [id number]  AGE: 80 y.o. GENDER: female  : 1927  EPISODE DATE:  2021    Subjective:     Chief Complaint   Patient presents with    Wound Check      HISTORY of PRESENT ILLNESS HPI   Jeyson Daly is a 80 y.o. female who presents today in regards to follow up evaluation and treatment of wound/ulcer. That patient's past medical, family and social hx were reviewed and changes were made if present. History of Wound Context: Patient has wounds on the left foot. Patient with history of diabetes, neuropathy as well as PVD recently underwent vascular intervention as well as had a abscess on the bottom of her left foot that required incision and drainage with a delayed primary closure. She is currently at [facility], local care consisting of Xeroform to the wounds as instructed.     Pt is not on abx at time of initial visit.     21 sutures removed, local care as instructed. Continue nonweightbearing to the left foot until next follow-up.    21 presents with her daughter today. Plantar wound remains healed. Wounds appreciated medial, lateral as well as dorsal left foot, stable. Depth undetermined, eschar decreased. There is no erythema or active drainage. No pain. No erythema or fluctuance. Patient can start partial weightbearing on the left foot with a slipper sock, I do not want a shoe covering due to concerns of the other wounds. 21 presents w/ her son, dianna Samaniego last week, ordered US LE, left foot looks good, dorsal healed, medial small area of eschar, adhered to underlying surface, no erythema, fluctuance or increase in temperature. No pain. Plantar foot looks good. Minimal edema. Negative Homans. Okay to ambulate with a slipper sock only until the medial left foot wound resolves. 21 presents with her nursing facility.   Overall doing well, however they relate she has an area on her right lower leg compatible with contusion as well as nursing also noticed her right great toenail was loose. Patient relates she may have bumped the leg as well as the toe. Right medial foot wound with devitalized nonviable tissue through subcutaneous tissue. With debridement good bleeding noted. Right lower leg discoloration/contusion with a small skin tear. No surrounding erythema or increase in temperature. No fluctuance. Right great toe approximately 25% is loose with some underlying hematoma. Drainage is serous only. Nailbed is clean at present. There is some dried drainage distally however underlying normal skin. Leave the right lower extremity open to air, monitor to toe nail, she may lose it. Even though there is no fluctuance as far as her right lower leg concern would be developing hematoma down the road which can result in deeper type wound. Monitor this as well.    8-16-21 Presents with her daughter in f/u, doing well. Right medial foot wound covered with devitalized nonviable tissue through subcutaneous tissue. No purulence or odor, no erythema or pain. 8-30-21 Presents with her son in f/u, doing well. Right medial foot wound covered with devitalized nonviable tissue through subcutaneous tissue. No purulence or odor, no erythema or pain. Change dressing to Iodoflex.             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 ([address] 5/18/2021    Bronchitis 5/23/2017    CAD (coronary artery disease)     Cough 5/23/2017    Dermatophytosis 6/25/2021    Diabetes mellitus ([address]     GERD (gastroesophageal reflux disease) 5/23/2017    History of pulmonary embolus (PE) 5/29/2021    Hx of blood clots     Hyperlipidemia     Hypertension     Leg swelling 7/15/2021    Lung disease     Peripheral vascular angioplasty status 5/29/2021    Pseudoaneurysm of right femoral artery ([facility] 5/29/2021    PVD (peripheral vascular disease) with claudication ([facility] 4/10/2019    Restless legs syndrome     Rhinitis, allergic 5/23/2017    Sinusitis 5/23/2017    SOB (shortness of breath) 5/23/2017    Type 1 diabetes mellitus with left diabetic foot ulcer ([address] 5/18/2021    Ulcerated, foot, left, limited to breakdown of skin ([address] 6/25/2021    Urinary incontinence      Past Surgical History:   Procedure Laterality Date    ABDOMEN SURGERY      APPENDECTOMY      CARDIAC SURGERY      CHOLECYSTECTOMY      COLONOSCOPY      CORONARY ARTERY BYPASS GRAFT      8/28/10    ENDOSCOPY, COLON, DIAGNOSTIC      FOOT DEBRIDEMENT Left 5/16/2021    FOOT DEBRIDEMENT INCISION AND DRAINAGE. performed by Ishan Benjamin DPM at [facility] Left 5/21/2021    LEFT FOOT DEBRIDEMENT  WITH DELAYED PRIMARY CLOSURE performed by Jessica Ariza DPM at [address] [facility] 1997    TONSILLECTOMY AND ADENOIDECTOMY       Family History   Problem Relation Age of Onset    Hypertension Father     Heart Disease Brother      Social History     Tobacco Use    Smoking status: Never Smoker    Smokeless tobacco: Never Used   Vaping Use    Vaping Use: Never used   Substance Use Topics    Alcohol use: Yes     Comment: occasional    Drug use: No     Allergies   Allergen Reactions    Lisinopril Other (See Comments)     7/18/19 Pt states that she does not want to take LISINOPRIL     Current Outpatient Medications on File Prior to Encounter   Medication Sig Dispense Refill    meloxicam (MOBIC) 7.5 MG tablet Take 7.5 mg by mouth daily For 2 weeks.  End date 7-29-21      miconazole (MICOTIN) 2 % cream Apply 1 Act topically nightly Topical to rash of toes, feet and ankles end date 7-25-21      vitamin B-1 (THIAMINE) 100 MG tablet Take 100 mg by mouth daily      calcium carbonate (TUMS) 500 MG chewable tablet Take 1 tablet by mouth 4 times daily as needed (indigestion) Not to exceed 15 tablet in a 24hr period      ferrous sulfate (IRON 325) 325 (65 Fe) MG tablet Take 325 mg by mouth daily In the morning for anemia      folic acid (FOLVITE) 1 MG tablet Take 1 mg by mouth daily In the morning for anemia      nystatin (MYCOSTATIN) 891046 UNIT/ML suspension Take 500,000 Units by mouth 4 times daily Thrush swish and swallow      omeprazole (PRILOSEC) 20 MG delayed release capsule Take 20 mg by mouth daily In morning for reflux      oxyCODONE-acetaminophen (PERCOCET) 7.5-325 MG per tablet Take 1 tablet by mouth every 6 hours as needed for Pain.  b complex vitamins capsule Take 1 capsule by mouth daily In the morning for supplement      Vitamin D (CHOLECALCIFEROL) 25 MCG (1000 UT) TABS tablet Take 4 capsules by mouth Give 4000 units in the morning for supplement      aspirin 81 MG EC tablet Take 1 tablet by mouth daily 30 tablet 0    furosemide (LASIX) 20 MG tablet Take 20 mg by mouth daily *MON-WED-FRI*       traMADol (ULTRAM) 50 MG tablet Take 50 mg by mouth three times daily.  aluminum & magnesium hydroxide-simethicone (MYLANTA) 400-400-40 MG/5ML SUSP Take 30 mLs by mouth every 6 hours as needed      lidocaine (LMX) 4 % cream Apply topically every 8 hours as needed for Pain Apply topically as needed to painful muscles      diclofenac sodium (VOLTAREN) 1 % GEL Apply topically 4 times daily as needed for Pain      glimepiride (AMARYL) 2 MG tablet Take 2 mg by mouth every morning (before breakfast)      bisacodyl (DULCOLAX) 10 MG suppository Place 10 mg rectally daily as needed for Constipation Indications: IF NO RESULTS FROM MOM       gabapentin (NEURONTIN) 300 MG capsule Take 300 mg by mouth every evening.        albuterol (PROVENTIL) 90 MCG/ACT inhaler Inhale 2 puffs into the lungs 4 times daily  0    pravastatin (PRAVACHOL) 20 MG tablet TAKE 1 TABLET BY MOUTH  NIGHTLY 90 tablet 1    amLODIPine (NORVASC) 5 MG tablet TAKE 1 TABLET BY MOUTH  DAILY 90 tablet 1    fluticasone-vilanterol (BREO ELLIPTA) 100-25 MCG/INH AEPB inhaler Inhale 1 puff into the lungs daily 3 each 5    Multiple Vitamins-Minerals (OCUVITE ADULT FORMULA PO) Take 1 capsule by mouth daily      Probiotic Product (PRO-BIOTIC BLEND PO) Take 1 capsule by mouth 2 times daily       magnesium gluconate (MAGONATE) 500 MG tablet Take 500 mg by mouth 2 times daily       Coenzyme Q10 (COQ10) 200 MG CAPS Take 200 mg by mouth daily       promethazine (PHENERGAN) 12.5 MG tablet Take 12.5 mg by mouth every 6 hours as needed for Nausea       No current facility-administered medications on file prior to encounter. REVIEW OF SYSTEMS See HPI    Objective:    /78   Pulse 70   Temp 97.1 °F (36.2 °C) (Temporal)   Resp 20   Ht 5' 3\" (1.6 m)   Wt 145 lb (65.8 kg)   LMP 12/03/1997   BMI 25.69 kg/m²   Wt Readings from Last 3 Encounters:   08/30/21 145 lb (65.8 kg)   08/16/21 145 lb (65.8 kg)   08/05/21 145 lb (65.8 kg)     PHYSICAL EXAM  CONSTITUTIONAL:   Awake, alert, cooperative   EYES:  lids and lashes normal   ENT: external ears and nose without lesions   NECK:  supple, symmetrical, trachea midline   SKIN:  Open wound     Assessment:     Left foot ulcer    Pre Debridement Measurements:  Are located in the [place]  Documentation Flow Sheet  Post Debridement Measurements:  Wound/Ulcer Descriptions are Pre Debridement except measurements:     Wound 06/14/21 Foot Left;Dorsal #2 (Active)   Wound Image   07/22/21 1037   Dressing Status New dressing applied;Clean; Intact;Dry 07/08/21 1111   Wound Cleansed Cleansed with saline 07/08/21 1111   Dressing/Treatment Xeroform;Dry dressing 07/08/21 1111   Offloading for Diabetic Foot Ulcers Offloading boot 07/08/21 1111   Wound Length (cm) 0 cm 07/22/21 1037   Wound Width (cm) 0 cm 07/22/21 1037   Wound Depth (cm) 0 cm 07/22/21 1037   Wound Surface Area (cm^2) 0 cm^2 07/22/21 1037   Change in Wound Size % (l*w) 100 07/22/21 1037   Wound Volume (cm^3) 0 cm^3 07/22/21 1037   Wound Healing % 100 07/22/21 1037   Wound Assessment Granulation tissue;Pink/red 08/30/21 1500   Drainage Amount Scant 08/30/21 1500   Drainage Description Serosanguinous 08/30/21 1500   Odor None 08/30/21 1500   Ira-wound Assessment Dry/flaky;Fragile 08/30/21 1500   Number of days: 77       Wound 06/14/21 Foot Left;Medial #3 (Active)   Wound Image   07/22/21 1037   Wound Etiology Diabetic [place] 2 08/16/21 1429   Dressing Status New dressing applied;Clean;Dry; Intact 08/30/21 1528   Wound Cleansed Cleansed with saline 08/30/21 1528   Dressing/Treatment Dry dressing 08/30/21 1528   Offloading for Diabetic Foot Ulcers No offloading required 08/30/21 1528   Wound Length (cm) 0.4 cm 08/30/21 1500   Wound Width (cm) 0.7 cm 08/30/21 1500   Wound Depth (cm) 0.3 cm 08/30/21 1500   Wound Surface Area (cm^2) 0.28 cm^2 08/30/21 1500   Change in Wound Size % (l*w) 82.05 08/30/21 1500   Wound Volume (cm^3) 0.084 cm^3 08/30/21 1500   Wound Healing % 48 08/30/21 1500   Post-Procedure Length (cm) 0.4 cm 08/30/21 1515   Post-Procedure Width (cm) 0.7 cm 08/30/21 1515   Post-Procedure Depth (cm) 0.3 cm 08/30/21 1515   Post-Procedure Surface Area (cm^2) 0.28 cm^2 08/30/21 1515   Post-Procedure Volume (cm^3) 0.084 cm^3 08/30/21 1515   Wound Assessment Fibrin;Pale granulation tissue 08/30/21 1500   Drainage Amount Moderate 08/30/21 1500   Drainage Description Serous 08/30/21 1500   Odor None 08/30/21 1500   Ira-wound Assessment Maceration 08/30/21 1500   Number of days: 77          Procedure Note  Indications:  Based on my examination of this patient's wound(s)/ulcer(s) today, debridement is required to promote healing and evaluate the wound base.     Performed by: Astrid Woodard DPM    Consent obtained:  Yes    Time out taken:  Yes    Pain Control: Anesthetic  Anesthetic: 4% Lidocaine Liquid Topical     Debridement:Excisional Debridement    Using curette the wound(s)/ulcer(s) was/were sharply debrided down through room.                 Electronically signed by Kiko Walker DPM on 8/30/2021 at 3:42 PM

## 2021-09-13 ENCOUNTER — HOSPITAL ENCOUNTER (OUTPATIENT)
Dept: WOUND CARE | Age: 86
Discharge: HOME | End: 2021-09-13
Payer: MEDICARE

## 2021-09-13 VITALS
WEIGHT: 138 LBS | TEMPERATURE: 98 F | BODY MASS INDEX: 24.45 KG/M2 | DIASTOLIC BLOOD PRESSURE: 66 MMHG | RESPIRATION RATE: 16 BRPM | HEIGHT: 63 IN | HEART RATE: 71 BPM | SYSTOLIC BLOOD PRESSURE: 118 MMHG

## 2021-09-13 DIAGNOSIS — I70.244 ATHEROSCLEROSIS OF NATIVE ARTERY OF LEFT LOWER EXTREMITY WITH ULCERATION OF MIDFOOT (HCC): Primary | ICD-10-CM

## 2021-09-13 PROCEDURE — 6370000000 HC RX 637 (ALT 250 FOR IP): Performed by: PODIATRIST

## 2021-09-13 PROCEDURE — 11042 DBRDMT SUBQ TIS 1ST 20SQCM/<: CPT

## 2021-09-13 PROCEDURE — 99213 OFFICE O/P EST LOW 20 MIN: CPT

## 2021-09-13 RX ORDER — POLYETHYLENE GLYCOL 3350 17 G/17G
17 POWDER, FOR SOLUTION ORAL DAILY PRN
COMMUNITY

## 2021-09-13 RX ORDER — LIDOCAINE 50 MG/G
OINTMENT TOPICAL ONCE
Status: CANCELLED | OUTPATIENT
Start: 2021-09-13 | End: 2021-09-13

## 2021-09-13 RX ORDER — SENNA PLUS 8.6 MG/1
1 TABLET ORAL DAILY PRN
COMMUNITY

## 2021-09-13 RX ORDER — POLYVINYL ALCOHOL 14 MG/ML
1 SOLUTION/ DROPS OPHTHALMIC 2 TIMES DAILY PRN
COMMUNITY

## 2021-09-13 RX ORDER — FAMOTIDINE 10 MG
10 TABLET ORAL DAILY
COMMUNITY
End: 2022-07-25

## 2021-09-13 RX ORDER — DOXYCYCLINE HYCLATE 100 MG
100 TABLET ORAL 2 TIMES DAILY
COMMUNITY
Start: 2021-09-10 | End: 2021-09-24

## 2021-09-13 RX ORDER — GENTAMICIN SULFATE 1 MG/G
OINTMENT TOPICAL ONCE
Status: CANCELLED | OUTPATIENT
Start: 2021-09-13 | End: 2021-09-13

## 2021-09-13 RX ORDER — LIDOCAINE HYDROCHLORIDE 40 MG/ML
SOLUTION TOPICAL ONCE
Status: CANCELLED | OUTPATIENT
Start: 2021-09-13 | End: 2021-09-13

## 2021-09-13 RX ORDER — GINSENG 100 MG
CAPSULE ORAL ONCE
Status: CANCELLED | OUTPATIENT
Start: 2021-09-13 | End: 2021-09-13

## 2021-09-13 RX ORDER — PANTOPRAZOLE SODIUM 40 MG/1
40 GRANULE, DELAYED RELEASE ORAL
Status: ON HOLD | COMMUNITY
End: 2022-07-25 | Stop reason: HOSPADM

## 2021-09-13 RX ORDER — LIDOCAINE 40 MG/G
CREAM TOPICAL ONCE
Status: CANCELLED | OUTPATIENT
Start: 2021-09-13 | End: 2021-09-13

## 2021-09-13 RX ORDER — BACITRACIN ZINC AND POLYMYXIN B SULFATE 500; 1000 [USP'U]/G; [USP'U]/G
OINTMENT TOPICAL ONCE
Status: CANCELLED | OUTPATIENT
Start: 2021-09-13 | End: 2021-09-13

## 2021-09-13 RX ORDER — HYDRALAZINE HYDROCHLORIDE 25 MG/1
25 TABLET, FILM COATED ORAL 2 TIMES DAILY
Status: ON HOLD | COMMUNITY
End: 2022-07-25 | Stop reason: HOSPADM

## 2021-09-13 RX ORDER — BETAMETHASONE DIPROPIONATE 0.05 %
OINTMENT (GRAM) TOPICAL ONCE
Status: CANCELLED | OUTPATIENT
Start: 2021-09-13 | End: 2021-09-13

## 2021-09-13 RX ORDER — LIDOCAINE HYDROCHLORIDE 20 MG/ML
JELLY TOPICAL ONCE
Status: CANCELLED | OUTPATIENT
Start: 2021-09-13 | End: 2021-09-13

## 2021-09-13 RX ORDER — SUCRALFATE 1 G/1
1 TABLET ORAL 3 TIMES DAILY
Status: ON HOLD | COMMUNITY
End: 2022-07-25 | Stop reason: HOSPADM

## 2021-09-13 RX ORDER — CLOTRIMAZOLE AND BETAMETHASONE DIPROPIONATE 10; .64 MG/G; MG/G
CREAM TOPICAL 2 TIMES DAILY
Status: ON HOLD | COMMUNITY
End: 2022-08-28 | Stop reason: HOSPADM

## 2021-09-13 RX ORDER — GUAIFENESIN 100 MG/5ML
200 SYRUP ORAL 4 TIMES DAILY PRN
Status: ON HOLD | COMMUNITY
End: 2022-08-28 | Stop reason: HOSPADM

## 2021-09-13 RX ORDER — BACITRACIN, NEOMYCIN, POLYMYXIN B 400; 3.5; 5 [USP'U]/G; MG/G; [USP'U]/G
OINTMENT TOPICAL ONCE
Status: CANCELLED | OUTPATIENT
Start: 2021-09-13 | End: 2021-09-13

## 2021-09-13 RX ORDER — LIDOCAINE HYDROCHLORIDE 40 MG/ML
SOLUTION TOPICAL ONCE
Status: COMPLETED | OUTPATIENT
Start: 2021-09-13 | End: 2021-09-13

## 2021-09-13 RX ORDER — CLOBETASOL PROPIONATE 0.5 MG/G
OINTMENT TOPICAL ONCE
Status: CANCELLED | OUTPATIENT
Start: 2021-09-13 | End: 2021-09-13

## 2021-09-13 RX ADMIN — LIDOCAINE HYDROCHLORIDE 5 ML: 40 SOLUTION TOPICAL at 13:37

## 2021-09-13 ASSESSMENT — PAIN SCALES - GENERAL: PAINLEVEL_OUTOF10: 0

## 2021-09-13 NOTE — PROGRESS NOTES
[facility] Followup Visit Note    Referring Physician : Larry Quintanilla DO  [address] RECORD NUMBER:  [id number]  AGE: 80 y.o. GENDER: female  : 1927  EPISODE DATE:  2021    Subjective:     Chief Complaint   Patient presents with    Wound Check     left foot      HISTORY of PRESENT ILLNESS HPI   Екатерина Vanegas is a 80 y.o. female who presents today in regards to follow up evaluation and treatment of wound/ulcer. That patient's past medical, family and social hx were reviewed and changes were made if present. History of Wound Context: Patient has wounds on the left foot. Patient with history of diabetes, neuropathy as well as PVD recently underwent vascular intervention as well as had a abscess on the bottom of her left foot that required incision and drainage with a delayed primary closure. She is currently at [facility], local care consisting of Xeroform to the wounds as instructed.     Pt is not on abx at time of initial visit.     21 sutures removed, local care as instructed. Continue nonweightbearing to the left foot until next follow-up.    21 presents with her daughter today. Plantar wound remains healed. Wounds appreciated medial, lateral as well as dorsal left foot, stable. Depth undetermined, eschar decreased. There is no erythema or active drainage. No pain. No erythema or fluctuance. Patient can start partial weightbearing on the left foot with a slipper sock, I do not want a shoe covering due to concerns of the other wounds. 21 presents w/ her son, saw Chirag Cordoba last week, ordered US LE, left foot looks good, dorsal healed, medial small area of eschar, adhered to underlying surface, no erythema, fluctuance or increase in temperature. No pain. Plantar foot looks good. Minimal edema. Negative Homans. Okay to ambulate with a slipper sock only until the medial left foot wound resolves. 21 presents with her nursing facility. Overall doing well, however they relate she has an area on her right lower leg compatible with contusion as well as nursing also noticed her right great toenail was loose. Patient relates she may have bumped the leg as well as the toe. Right medial foot wound with devitalized nonviable tissue through subcutaneous tissue. With debridement good bleeding noted. Right lower leg discoloration/contusion with a small skin tear. No surrounding erythema or increase in temperature. No fluctuance. Right great toe approximately 25% is loose with some underlying hematoma. Drainage is serous only. Nailbed is clean at present. There is some dried drainage distally however underlying normal skin. Leave the right lower extremity open to air, monitor to toe nail, she may lose it. Even though there is no fluctuance as far as her right lower leg concern would be developing hematoma down the road which can result in deeper type wound. Monitor this as well.    8-16-21 Presents with her daughter in f/u, doing well. Right medial foot wound covered with devitalized nonviable tissue through subcutaneous tissue. No purulence or odor, no erythema or pain. 8-30-21 Presents with her son in f/u, doing well. Right medial foot wound covered with devitalized nonviable tissue through subcutaneous tissue. No purulence or odor, no erythema or pain. Change dressing to Iodoflex. 9-13-21 presents with her son in follow-up. No complaints as far as the wound however patient relates some swelling and pain, she points to left lower leg. Right medial foot wound  with 50% devitalized nonviable tissue through subcutaneous tissue. No purulence or odor, no erythema or pain. DP/PT audible bilateral with hand-held Doppler. Left lower leg without erythema however positive edema. Positive pain posterior calf. Order was written for venous ultrasound of the left lower leg to evaluate for DVT.           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 ([address] 5/18/2021    Bronchitis 5/23/2017    CAD (coronary artery disease)     Cough 5/23/2017    Dermatophytosis 6/25/2021    Diabetes mellitus (HCC)     GERD (gastroesophageal reflux disease) 5/23/2017    History of pulmonary embolus (PE) 5/29/2021    Hx of blood clots     Hyperlipidemia     Hypertension     Leg swelling 7/15/2021    Lung disease     Peripheral vascular angioplasty status 5/29/2021    Pseudoaneurysm of right femoral artery ([address] 5/29/2021    PVD (peripheral vascular disease) with claudication ([address] 4/10/2019    Restless legs syndrome     Rhinitis, allergic 5/23/2017    Sinusitis 5/23/2017    SOB (shortness of breath) 5/23/2017    Type 1 diabetes mellitus with left diabetic foot ulcer ([address] 5/18/2021    Ulcerated, foot, left, limited to breakdown of skin ([address] 6/25/2021    Urinary incontinence      Past Surgical History:   Procedure Laterality Date    ABDOMEN SURGERY      APPENDECTOMY      CARDIAC SURGERY      CHOLECYSTECTOMY      COLONOSCOPY      CORONARY ARTERY BYPASS GRAFT      8/28/10    ENDOSCOPY, COLON, DIAGNOSTIC      FOOT DEBRIDEMENT Left 5/16/2021    FOOT DEBRIDEMENT INCISION AND DRAINAGE.    performed by Alexis Gomes DPM at [facility] Left 5/21/2021    LEFT FOOT DEBRIDEMENT  WITH DELAYED PRIMARY CLOSURE performed by Mahnaz Patel DPM at [facility] 1997    TONSILLECTOMY AND ADENOIDECTOMY       Family History   Problem Relation Age of Onset    Hypertension Father     Heart Disease Brother      Social History     Tobacco Use    Smoking status: Never Smoker    Smokeless tobacco: Never Used   Vaping Use    Vaping Use: Never used   Substance Use Topics    Alcohol use: Yes     Comment: occasional    Drug use: No     Allergies   Allergen Reactions    Lisinopril Other (See Comments)     7/18/19 Pt states that she does not want to take LISINOPRIL     Current Outpatient Medications on File Prior to Encounter   Medication Sig Dispense Refill    sucralfate (CARAFATE) 1 GM tablet Take 1 g by mouth three times daily      doxycycline hyclate (VIBRA-TABS) 100 MG tablet Take 100 mg by mouth 2 times daily      hydrALAZINE (APRESOLINE) 25 MG tablet Take 25 mg by mouth 2 times daily      metFORMIN (GLUCOPHAGE) 500 MG tablet Take 500 mg by mouth daily      polyethylene glycol (GLYCOLAX) 17 g packet Take 17 g by mouth daily as needed for Constipation      pantoprazole sodium (PROTONIX) 40 MG PACK packet Take 40 mg by mouth daily (with breakfast)      famotidine (PEPCID) 10 MG tablet Take 10 mg by mouth 2 times daily      guaiFENesin (ROBITUSSIN) 100 MG/5ML syrup Take 200 mg by mouth 4 times daily as needed for Cough      senna (SENOKOT) 8.6 MG tablet Take 1 tablet by mouth 2 times daily      SODIUM CHLORIDE PO Take 1 mg by mouth daily      ferrous sulfate (IRON 325) 325 (65 Fe) MG tablet Take 325 mg by mouth daily In the morning for anemia      oxyCODONE-acetaminophen (PERCOCET) 7.5-325 MG per tablet Take 1 tablet by mouth every 6 hours as needed for Pain.  b complex vitamins capsule Take 1 capsule by mouth daily In the morning for supplement      Vitamin D (CHOLECALCIFEROL) 25 MCG (1000 UT) TABS tablet Take 4 capsules by mouth Give 4000 units in the morning for supplement      aspirin 81 MG EC tablet Take 1 tablet by mouth daily 30 tablet 0    promethazine (PHENERGAN) 12.5 MG tablet Take 12.5 mg by mouth every 6 hours as needed for Nausea      traMADol (ULTRAM) 50 MG tablet Take 50 mg by mouth 2 times daily.        aluminum & magnesium hydroxide-simethicone (MYLANTA) 400-400-40 MG/5ML SUSP Take 30 mLs by mouth every 6 hours as needed      diclofenac sodium (VOLTAREN) 1 % GEL Apply topically 4 times daily as needed for Pain      glimepiride (AMARYL) 2 MG tablet Take 2 mg by mouth every morning (before breakfast)      bisacodyl (DULCOLAX) 10 MG suppository Place 10 mg rectally daily as needed for Constipation Indications: IF NO RESULTS FROM MOM       gabapentin (NEURONTIN) 300 MG capsule Take 300 mg by mouth every evening.  albuterol (PROVENTIL) 90 MCG/ACT inhaler Inhale 2 puffs into the lungs 4 times daily  0    pravastatin (PRAVACHOL) 20 MG tablet TAKE 1 TABLET BY MOUTH  NIGHTLY 90 tablet 1    amLODIPine (NORVASC) 5 MG tablet TAKE 1 TABLET BY MOUTH  DAILY (Patient taking differently: Take 5 mg by mouth 2 times daily ) 90 tablet 1    Multiple Vitamins-Minerals (OCUVITE ADULT FORMULA PO) Take 1 capsule by mouth daily      Probiotic Product (PRO-BIOTIC BLEND PO) Take 1 capsule by mouth 2 times daily       magnesium gluconate (MAGONATE) 500 MG tablet Take 200 mg by mouth 2 times daily       Coenzyme Q10 (COQ10) 200 MG CAPS Take 200 mg by mouth daily       lidocaine (LMX) 4 % cream Apply topically every 8 hours as needed for Pain Apply topically as needed to painful muscles      fluticasone-vilanterol (BREO ELLIPTA) 100-25 MCG/INH AEPB inhaler Inhale 1 puff into the lungs daily 3 each 5     No current facility-administered medications on file prior to encounter.        REVIEW OF SYSTEMS See HPI    Objective:    /66   Pulse 71   Temp 98 °F (36.7 °C) (Temporal)   Resp 16   Ht 5' 3\" (1.6 m)   Wt 138 lb (62.6 kg)   LMP 12/03/1997   BMI 24.45 kg/m²   Wt Readings from Last 3 Encounters:   09/13/21 138 lb (62.6 kg)   08/30/21 145 lb (65.8 kg)   08/16/21 145 lb (65.8 kg)     PHYSICAL EXAM  CONSTITUTIONAL:   Awake, alert, cooperative   EYES:  lids and lashes normal   ENT: external ears and nose without lesions   NECK:  supple, symmetrical, trachea midline   SKIN:  Open wound     Assessment:     Left foot ulcer    Pre Debridement Measurements:  Are located in the [place]  Documentation Flow Sheet  Post Debridement Measurements:  Wound/Ulcer Descriptions are Pre Debridement except measurements:     Wound 06/14/21 Foot Left;Medial #3 (Active)   Wound Image   09/13/21 1325   Wound Etiology Diabetic [place] 2 08/16/21 1429   Dressing Status New dressing applied;Clean;Dry; Intact 08/30/21 1528   Wound Cleansed Cleansed with saline 08/30/21 1528   Dressing/Treatment Dry dressing 08/30/21 1528   Offloading for Diabetic Foot Ulcers No offloading required 08/30/21 1528   Wound Length (cm) 0.2 cm 09/13/21 1325   Wound Width (cm) 0.5 cm 09/13/21 1325   Wound Depth (cm) 0.4 cm 09/13/21 1325   Wound Surface Area (cm^2) 0.1 cm^2 09/13/21 1325   Change in Wound Size % (l*w) 93.59 09/13/21 1325   Wound Volume (cm^3) 0.04 cm^3 09/13/21 1325   Wound Healing % 75 09/13/21 1325   Post-Procedure Length (cm) 0.2 cm 09/13/21 1354   Post-Procedure Width (cm) 0.5 cm 09/13/21 1354   Post-Procedure Depth (cm) 0.5 cm 09/13/21 1354   Post-Procedure Surface Area (cm^2) 0.1 cm^2 09/13/21 1354   Post-Procedure Volume (cm^3) 0.05 cm^3 09/13/21 1354   Undermining Starts ___ O'Clock 10 09/13/21 1325   Undermining Ends___ O'Clock 8 09/13/21 1325   Undermining Maxium Distance (cm) [email or web address] 09/13/21 1325   Wound Assessment Fibrin;Pale granulation tissue 09/13/21 1325   Drainage Amount Scant 09/13/21 1325   Drainage Description Yellow 09/13/21 1325   Odor None 09/13/21 1325   Ira-wound Assessment Hyperkeratosis (callous) 09/13/21 1325   Number of days: 90          Procedure Note  Indications:  Based on my examination of this patient's wound(s)/ulcer(s) today, debridement is required to promote healing and evaluate the wound base. Performed by: Gaurav Beltran DPM    Consent obtained:  Yes    Time out taken:  Yes    Pain Control: Anesthetic  Anesthetic: 4% Lidocaine Liquid Topical     Debridement:Excisional Debridement    Using curette the wound(s)/ulcer(s) was/were sharply debrided down through and including the removal of subcutaneous tissue.         Devitalized Tissue Debrided:  fibrin, biofilm, slough and necrotic/eschar to stimulate bleeding to promote healing, post debridement good bleeding base and wound edges noted    Wound/Ulcer #: 3    Percent of Wound/Ulcer Debrided: 100%    Total Surface Area Debrided:  0.1 sq cm     Estimated Blood Loss:  Minimal  Hemostasis Achieved:  by pressure    Procedural Pain:  0  / 10   Post Procedural Pain:  0 / 10     Response to treatment:  Well tolerated by patient. Plan:   Treatment Note please see attached Discharge Instructions    Written patient dismissal instructions given to patient and signed by patient or POA. Discharge Instructions        Visit Discharge/Physician Orders     Discharge condition: Stable     Assessment of pain at discharge:minimal     Anesthetic Used:   4 % LIDOCAINE     Discharge to: [address] [place] [address]     Left via:Private automobile     Accompanied by: accompanied by child     ECF/HHA: ECF     Dressing Orders:please obtain ultrasound of left lower extremity     Left foot - cleanse with normal saline, apply iodoflex, cover with dry dressing, change daily     Treatment Orders:     No compression needed to lower legs.      No shoes to either feet.      [facility] followup visit ___________2 weeks with Dr Vega__________________  (Please note your next appointment above and if you are unable to keep, kindly give a 24 hour notice.  Thank you.)     Physician signature:__________________________        If you experience any of the following, please call the [address] [place] [address] during business hours:     * Increase in Pain  * Temperature over 101  * Increase in drainage from your wound  * Drainage with a foul odor  * Bleeding  * Increase in swelling  * Need for compression bandage changes due to slippage, breakthrough drainage.     If you need medical attention outside of the business hours of the [address] [place] [address] please contact your PCP or go to the nearest emergency room.                                Electronically signed by Geri Casper DPM on 9/13/2021 at 2:05 PM

## 2021-09-20 ENCOUNTER — OFFICE VISIT (OUTPATIENT)
Dept: PODIATRY | Age: 86
End: 2021-09-20
Payer: MEDICARE

## 2021-09-20 DIAGNOSIS — B35.1 TINEA UNGUIUM: Primary | ICD-10-CM

## 2021-09-20 DIAGNOSIS — E11.621 TYPE 2 DIABETES MELLITUS WITH FOOT ULCER, UNSPECIFIED WHETHER LONG TERM INSULIN USE (HCC): ICD-10-CM

## 2021-09-20 DIAGNOSIS — L97.509 TYPE 2 DIABETES MELLITUS WITH FOOT ULCER, UNSPECIFIED WHETHER LONG TERM INSULIN USE (HCC): ICD-10-CM

## 2021-09-20 DIAGNOSIS — L84 CORNS AND CALLOSITIES: ICD-10-CM

## 2021-09-20 DIAGNOSIS — G60.8 HEREDITARY SENSORY NEUROPATHY: ICD-10-CM

## 2021-09-20 PROCEDURE — 1036F TOBACCO NON-USER: CPT | Performed by: PODIATRIST

## 2021-09-20 PROCEDURE — 4040F PNEUMOC VAC/ADMIN/RCVD: CPT | Performed by: PODIATRIST

## 2021-09-20 PROCEDURE — 99203 OFFICE O/P NEW LOW 30 MIN: CPT | Performed by: PODIATRIST

## 2021-09-20 PROCEDURE — 1090F PRES/ABSN URINE INCON ASSESS: CPT | Performed by: PODIATRIST

## 2021-09-20 PROCEDURE — G8427 DOCREV CUR MEDS BY ELIG CLIN: HCPCS | Performed by: PODIATRIST

## 2021-09-20 PROCEDURE — 11721 DEBRIDE NAIL 6 OR MORE: CPT | Performed by: PODIATRIST

## 2021-09-20 PROCEDURE — 1123F ACP DISCUSS/DSCN MKR DOCD: CPT | Performed by: PODIATRIST

## 2021-09-20 PROCEDURE — G8420 CALC BMI NORM PARAMETERS: HCPCS | Performed by: PODIATRIST

## 2021-09-20 NOTE — PROGRESS NOTES
Francheska Petit is here today for a diabetic foot exam and nail care. her PCP is Cecilio Mulligan DO last OV was . Sees wound care for wounds on left foot. Francheska Petit : 1927 Sex: female  Age: 80 y.o. Patient was referred by Cecilio Mulligan DO    CC:   Diabetic foot exam and painful elongated toenails 1-5 right and left    HPI:   This pleasant 70-year-old female patient referred me today diabetic exam.  Has have a history of diabetic wounds which she has followed at [facility] wound care with Dr. Olegario Holcomb. Denies any pain left the right foot today. Does have difficulty managing her toenails due to length. Does present today with her son. Denies calf pain. Denies nausea vomiting fever chills shortness of breath. Diabetic foot exam and painful elongated toenails 1-5 right and left. No additional pedal complaints at this time. ROS:  Const: Denies constitutional symptoms  Musculo: Denies symptoms other than stated above  Skin: Denies symptoms other than stated above      Current Outpatient Medications:     ciclopirox (PENLAC) 8 % solution, Apply once daily all toenails. , Disp: 6 mL, Rfl: 1    sucralfate (CARAFATE) 1 GM tablet, Take 1 g by mouth three times daily, Disp: , Rfl:     doxycycline hyclate (VIBRA-TABS) 100 MG tablet, Take 100 mg by mouth 2 times daily, Disp: , Rfl:     hydrALAZINE (APRESOLINE) 25 MG tablet, Take 25 mg by mouth 2 times daily, Disp: , Rfl:     metFORMIN (GLUCOPHAGE) 500 MG tablet, Take 500 mg by mouth daily, Disp: , Rfl:     polyethylene glycol (GLYCOLAX) 17 g packet, Take 17 g by mouth daily as needed for Constipation, Disp: , Rfl:     pantoprazole sodium (PROTONIX) 40 MG PACK packet, Take 40 mg by mouth daily (with breakfast), Disp: , Rfl:     famotidine (PEPCID) 10 MG tablet, Take 10 mg by mouth 2 times daily, Disp: , Rfl:     guaiFENesin (ROBITUSSIN) 100 MG/5ML syrup, Take 200 mg by mouth 4 times daily as needed for Cough, Disp: , Rfl:     senna (SENOKOT) 8.6 MG tablet, Take 1 tablet by mouth 2 times daily, Disp: , Rfl:     SODIUM CHLORIDE PO, Take 1 mg by mouth daily, Disp: , Rfl:     Alpha-Lipoic Acid 600 MG TABS, Take 600 mg by mouth daily, Disp: , Rfl:     polyvinyl alcohol (LIQUIFILM TEARS) 1.4 % ophthalmic solution, 1 drop 4 times daily, Disp: , Rfl:     clotrimazole-betamethasone (LOTRISONE) 1-0.05 % cream, Apply topically 2 times daily Apply topically 2 times daily. , Disp: , Rfl:     Ginger, Zingiber officinalis, (GINGER ROOT PO), Take 750 mg by mouth Daily in am, Disp: , Rfl:     ferrous sulfate (IRON 325) 325 (65 Fe) MG tablet, Take 325 mg by mouth daily In the morning for anemia, Disp: , Rfl:     oxyCODONE-acetaminophen (PERCOCET) 7.5-325 MG per tablet, Take 1 tablet by mouth every 6 hours as needed for Pain., Disp: , Rfl:     b complex vitamins capsule, Take 1 capsule by mouth daily In the morning for supplement, Disp: , Rfl:     Vitamin D (CHOLECALCIFEROL) 25 MCG (1000 UT) TABS tablet, Take 4 capsules by mouth Give 4000 units in the morning for supplement, Disp: , Rfl:     aspirin 81 MG EC tablet, Take 1 tablet by mouth daily, Disp: 30 tablet, Rfl: 0    promethazine (PHENERGAN) 12.5 MG tablet, Take 12.5 mg by mouth every 6 hours as needed for Nausea, Disp: , Rfl:     traMADol (ULTRAM) 50 MG tablet, Take 50 mg by mouth 2 times daily.  , Disp: , Rfl:     aluminum & magnesium hydroxide-simethicone (MYLANTA) 400-400-40 MG/5ML SUSP, Take 30 mLs by mouth every 6 hours as needed, Disp: , Rfl:     lidocaine (LMX) 4 % cream, Apply topically every 8 hours as needed for Pain Apply topically as needed to painful muscles, Disp: , Rfl:     diclofenac sodium (VOLTAREN) 1 % GEL, Apply topically 4 times daily as needed for Pain, Disp: , Rfl:     glimepiride (AMARYL) 2 MG tablet, Take 2 mg by mouth every morning (before breakfast), Disp: , Rfl:     bisacodyl (DULCOLAX) 10 MG suppository, Place 10 mg rectally daily as needed for Constipation Indications: IF NO RESULTS FROM MOM , Disp: , Rfl:     gabapentin (NEURONTIN) 300 MG capsule, Take 300 mg by mouth every evening. , Disp: , Rfl:     albuterol (PROVENTIL) 90 MCG/ACT inhaler, Inhale 2 puffs into the lungs 4 times daily, Disp: , Rfl: 0    pravastatin (PRAVACHOL) 20 MG tablet, TAKE 1 TABLET BY MOUTH  NIGHTLY, Disp: 90 tablet, Rfl: 1    amLODIPine (NORVASC) 5 MG tablet, TAKE 1 TABLET BY MOUTH  DAILY (Patient taking differently: Take 5 mg by mouth 2 times daily ), Disp: 90 tablet, Rfl: 1    fluticasone-vilanterol (BREO ELLIPTA) 100-25 MCG/INH AEPB inhaler, Inhale 1 puff into the lungs daily, Disp: 3 each, Rfl: 5    Multiple Vitamins-Minerals (OCUVITE ADULT FORMULA PO), Take 1 capsule by mouth daily, Disp: , Rfl:     Probiotic Product (PRO-BIOTIC BLEND PO), Take 1 capsule by mouth 2 times daily , Disp: , Rfl:     magnesium gluconate (MAGONATE) 500 MG tablet, Take 200 mg by mouth 2 times daily , Disp: , Rfl:     Coenzyme Q10 (COQ10) 200 MG CAPS, Take 200 mg by mouth daily , Disp: , Rfl:   Allergies   Allergen Reactions    Lisinopril Other (See Comments)     7/18/19 Pt states that she does not want to take LISINOPRIL       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 [address] 5/18/2021    Bronchitis 5/23/2017    CAD (coronary artery disease)     Cough 5/23/2017    Dermatophytosis 6/25/2021    Diabetes mellitus (HCC)     GERD (gastroesophageal reflux disease) 5/23/2017    History of pulmonary embolus (PE) 5/29/2021    Hx of blood clots     Hyperlipidemia     Hypertension     Leg swelling 7/15/2021    Lung disease     Peripheral vascular angioplasty status 5/29/2021    Pseudoaneurysm of right femoral artery ([facility] 5/29/2021    PVD (peripheral vascular disease) with claudication ([facility] 4/10/2019    Restless legs syndrome     Rhinitis, allergic 5/23/2017    Sinusitis 5/23/2017    SOB (shortness of breath) 5/23/2017    Type 1 diabetes mellitus with left diabetic foot ulcer ([address] 5/18/2021    Ulcerated, foot, left, limited to breakdown of skin ([address] 6/25/2021    Urinary incontinence        There were no vitals filed for this visit. Work History/Social History: Foot and ankle history:     Focused Lower Extremity Physical Exam:      Neurovascular examination:    Dorsalis Pedis palpable bilateral.  Posterior tibialis non-palpable bilateral.    Capillary Refill Time:  Immediate return  Hair growth:  Symmetrical and bilateral   Skin:  Not atrophic  Edema: Mild edema bilateral feet. Mild edema bilateral ankles. Neurologic:  Light touch diminished bilateral.  Warm to coolness bilateral distal toes  Decreased epicritic sensation     Musculoskeletal/ Orthopedic examination:    Equinis: present bilateral  Dorsiflexion, plantarflexion, inversion, eversion bilateral 5 out of 5 muscle strength  Wiggling toes  Negative Homans    Dermatology examination:    Toenails 1 through 5 bilateral thickened, elongated, dystrophic, mycotic with subungual debris. Web spaces 1 through 4 bilateral clean dry and intact. Superficial wound medial MPJ left great toe measure approximate 0.4 cm x 0.1 cm x 0.1 cm. Wound is 100% granular. No significant surrounding hyperkeratotic tissue. No drainage. Wound does not track or probe to bone. Assessment and Plan: Carlee Duckworth was seen today for callouses and nail problem. Diagnoses and all orders for this visit:    Tinea unguium    Corns and callosities    Hereditary sensory neuropathy    Type 2 diabetes mellitus with foot ulcer, unspecified whether long term insulin use ([address]    Other orders  -     ciclopirox (PENLAC) 8 % solution; Apply once daily all toenails. Diabetic foot and ankle examination performed today  Son present through entire visit.   Formal debridement toenails 1 through 5 right and left with manual debridement for thickness and overall length. Penlac 8% once daily all toenails. Does have a superficial wound medial left foot which she does follow-up with [facility] wound care with Dr. Gilmer Jimenez. Does have appointment this week with him. Worsening symptoms go to emergency room. Appreciate his input going forward. Did apply a Band-Aid today. Continue offloading shoes. Avoid barefoot. Follow-up 2 months. Return in about 2 months (around 11/20/2021). Seen By:  Josafat Lozoya DPM      Document was created using voice recognition software. Note was reviewed however may contain grammatical errors.

## 2021-09-27 ENCOUNTER — HOSPITAL ENCOUNTER (OUTPATIENT)
Dept: WOUND CARE | Age: 86
Discharge: HOME | End: 2021-09-27
Payer: MEDICARE

## 2021-09-27 VITALS
HEIGHT: 63 IN | BODY MASS INDEX: 24.45 KG/M2 | HEART RATE: 58 BPM | DIASTOLIC BLOOD PRESSURE: 54 MMHG | WEIGHT: 138 LBS | RESPIRATION RATE: 18 BRPM | SYSTOLIC BLOOD PRESSURE: 134 MMHG | TEMPERATURE: 97.7 F

## 2021-09-27 DIAGNOSIS — I70.244 ATHEROSCLEROSIS OF NATIVE ARTERY OF LEFT LOWER EXTREMITY WITH ULCERATION OF MIDFOOT (HCC): Primary | ICD-10-CM

## 2021-09-27 PROCEDURE — 11042 DBRDMT SUBQ TIS 1ST 20SQCM/<: CPT

## 2021-09-27 RX ORDER — LIDOCAINE HYDROCHLORIDE 40 MG/ML
SOLUTION TOPICAL ONCE
Status: CANCELLED | OUTPATIENT
Start: 2021-09-27 | End: 2021-09-27

## 2021-09-27 RX ORDER — LIDOCAINE 40 MG/G
CREAM TOPICAL ONCE
Status: CANCELLED | OUTPATIENT
Start: 2021-09-27 | End: 2021-09-27

## 2021-09-27 RX ORDER — LIDOCAINE 50 MG/G
OINTMENT TOPICAL ONCE
Status: CANCELLED | OUTPATIENT
Start: 2021-09-27 | End: 2021-09-27

## 2021-09-27 RX ORDER — GINSENG 100 MG
CAPSULE ORAL ONCE
Status: CANCELLED | OUTPATIENT
Start: 2021-09-27 | End: 2021-09-27

## 2021-09-27 RX ORDER — CLOBETASOL PROPIONATE 0.5 MG/G
OINTMENT TOPICAL ONCE
Status: CANCELLED | OUTPATIENT
Start: 2021-09-27 | End: 2021-09-27

## 2021-09-27 RX ORDER — GENTAMICIN SULFATE 1 MG/G
OINTMENT TOPICAL ONCE
Status: CANCELLED | OUTPATIENT
Start: 2021-09-27 | End: 2021-09-27

## 2021-09-27 RX ORDER — BACITRACIN, NEOMYCIN, POLYMYXIN B 400; 3.5; 5 [USP'U]/G; MG/G; [USP'U]/G
OINTMENT TOPICAL ONCE
Status: CANCELLED | OUTPATIENT
Start: 2021-09-27 | End: 2021-09-27

## 2021-09-27 RX ORDER — LIDOCAINE HYDROCHLORIDE 40 MG/ML
SOLUTION TOPICAL ONCE
Status: COMPLETED | OUTPATIENT
Start: 2021-09-27 | End: 2021-09-27

## 2021-09-27 RX ORDER — LIDOCAINE HYDROCHLORIDE 20 MG/ML
JELLY TOPICAL ONCE
Status: CANCELLED | OUTPATIENT
Start: 2021-09-27 | End: 2021-09-27

## 2021-09-27 RX ORDER — BACITRACIN ZINC AND POLYMYXIN B SULFATE 500; 1000 [USP'U]/G; [USP'U]/G
OINTMENT TOPICAL ONCE
Status: CANCELLED | OUTPATIENT
Start: 2021-09-27 | End: 2021-09-27

## 2021-09-27 RX ORDER — BETAMETHASONE DIPROPIONATE 0.05 %
OINTMENT (GRAM) TOPICAL ONCE
Status: CANCELLED | OUTPATIENT
Start: 2021-09-27 | End: 2021-09-27

## 2021-09-27 RX ADMIN — LIDOCAINE HYDROCHLORIDE: 40 SOLUTION TOPICAL at 13:15

## 2021-09-27 NOTE — PROGRESS NOTES
[facility]aling Center Followup Visit Note    Referring Physician : Belle Lawrence DO  [address] RECORD NUMBER:  [id number]  AGE: 80 y.o. GENDER: female  : 1927  EPISODE DATE:  2021    Subjective:     Chief Complaint   Patient presents with    Wound Check     left foot       HISTORY of PRESENT ILLNESS HPI   Alyssa Styles is a 80 y.o. female who presents today in regards to follow up evaluation and treatment of wound/ulcer. That patient's past medical, family and social hx were reviewed and changes were made if present. History of Wound Context: Patient has wounds on the left foot. Patient with history of diabetes, neuropathy as well as PVD recently underwent vascular intervention as well as had a abscess on the bottom of her left foot that required incision and drainage with a delayed primary closure. She is currently at [facility], local care consisting of Xeroform to the wounds as instructed.     Pt is not on abx at time of initial visit.     21 sutures removed, local care as instructed. Continue nonweightbearing to the left foot until next follow-up.    21 presents with her daughter today. Plantar wound remains healed. Wounds appreciated medial, lateral as well as dorsal left foot, stable. Depth undetermined, eschar decreased. There is no erythema or active drainage. No pain. No erythema or fluctuance. Patient can start partial weightbearing on the left foot with a slipper sock, I do not want a shoe covering due to concerns of the other wounds. 21 presents w/ her son, saw Evelyn Razo last week, ordered US LE, left foot looks good, dorsal healed, medial small area of eschar, adhered to underlying surface, no erythema, fluctuance or increase in temperature. No pain. Plantar foot looks good. Minimal edema. Negative Homans. Okay to ambulate with a slipper sock only until the medial left foot wound resolves. 21 presents with her nursing facility. Overall doing well, however they relate she has an area on her right lower leg compatible with contusion as well as nursing also noticed her right great toenail was loose. Patient relates she may have bumped the leg as well as the toe. Right medial foot wound with devitalized nonviable tissue through subcutaneous tissue. With debridement good bleeding noted. Right lower leg discoloration/contusion with a small skin tear. No surrounding erythema or increase in temperature. No fluctuance. Right great toe approximately 25% is loose with some underlying hematoma. Drainage is serous only. Nailbed is clean at present. There is some dried drainage distally however underlying normal skin. Leave the right lower extremity open to air, monitor to toe nail, she may lose it. Even though there is no fluctuance as far as her right lower leg concern would be developing hematoma down the road which can result in deeper type wound. Monitor this as well.    8-16-21 Presents with her daughter in f/u, doing well. Right medial foot wound covered with devitalized nonviable tissue through subcutaneous tissue. No purulence or odor, no erythema or pain. 8-30-21 Presents with her son in f/u, doing well. Right medial foot wound covered with devitalized nonviable tissue through subcutaneous tissue. No purulence or odor, no erythema or pain. Change dressing to Iodoflex. 9-13-21 presents with her son in follow-up. No complaints as far as the wound however patient relates some swelling and pain, she points to left lower leg. Right medial foot wound  with 50% devitalized nonviable tissue through subcutaneous tissue. No purulence or odor, no erythema or pain. DP/PT audible bilateral with hand-held Doppler. Left lower leg without erythema however positive edema. Positive pain posterior calf. Order was written for venous ultrasound of the left lower leg to evaluate for DVT. 9-27-21 presents with her son in follow-up.   No complaints as far as the wound however patient relates some swelling and pain, she points to left lower leg. Right medial foot wound  with 50% devitalized nonviable tissue through subcutaneous tissue. No purulence or odor, no erythema or pain. 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 ([address] 5/18/2021    Bronchitis 5/23/2017    CAD (coronary artery disease)     Cough 5/23/2017    Dermatophytosis 6/25/2021    Diabetes mellitus ([address]     GERD (gastroesophageal reflux disease) 5/23/2017    History of pulmonary embolus (PE) 5/29/2021    Hx of blood clots     Hyperlipidemia     Hypertension     Leg swelling 7/15/2021    Lung disease     Peripheral vascular angioplasty status 5/29/2021    Pseudoaneurysm of right femoral artery ([address] 5/29/2021    PVD (peripheral vascular disease) with claudication ([address] 4/10/2019    Restless legs syndrome     Rhinitis, allergic 5/23/2017    Sinusitis 5/23/2017    SOB (shortness of breath) 5/23/2017    Type 1 diabetes mellitus with left diabetic foot ulcer ([address] 5/18/2021    Ulcerated, foot, left, limited to breakdown of skin ([address] 6/25/2021    Urinary incontinence      Past Surgical History:   Procedure Laterality Date    ABDOMEN SURGERY      APPENDECTOMY      CARDIAC SURGERY      CHOLECYSTECTOMY      COLONOSCOPY      CORONARY ARTERY BYPASS GRAFT      8/28/10    ENDOSCOPY, COLON, DIAGNOSTIC      FOOT DEBRIDEMENT Left 5/16/2021    FOOT DEBRIDEMENT INCISION AND DRAINAGE.    performed by Irena Nava DPM at [facility] Left 5/21/2021    LEFT FOOT DEBRIDEMENT  WITH DELAYED PRIMARY CLOSURE performed by Tasneem Armenta DPM at [facility] and o 1997    TONSILLECTOMY AND ADENOIDECTOMY       Family History   Problem Relation Age of Onset    Hypertension Father  Heart Disease Brother      Social History     Tobacco Use    Smoking status: Never Smoker    Smokeless tobacco: Never Used   Vaping Use    Vaping Use: Never used   Substance Use Topics    Alcohol use: Yes     Comment: occasional    Drug use: No     Allergies   Allergen Reactions    Lisinopril Other (See Comments)     7/18/19 Pt states that she does not want to take LISINOPRIL     Current Outpatient Medications on File Prior to Encounter   Medication Sig Dispense Refill    ciclopirox (PENLAC) 8 % solution Apply once daily all toenails. 6 mL 1    sucralfate (CARAFATE) 1 GM tablet Take 1 g by mouth three times daily      hydrALAZINE (APRESOLINE) 25 MG tablet Take 25 mg by mouth 2 times daily      metFORMIN (GLUCOPHAGE) 500 MG tablet Take 500 mg by mouth daily      polyethylene glycol (GLYCOLAX) 17 g packet Take 17 g by mouth daily as needed for Constipation      pantoprazole sodium (PROTONIX) 40 MG PACK packet Take 40 mg by mouth daily (with breakfast)      famotidine (PEPCID) 10 MG tablet Take 10 mg by mouth 2 times daily      guaiFENesin (ROBITUSSIN) 100 MG/5ML syrup Take 200 mg by mouth 4 times daily as needed for Cough      senna (SENOKOT) 8.6 MG tablet Take 1 tablet by mouth 2 times daily      SODIUM CHLORIDE PO Take 1 mg by mouth daily      Alpha-Lipoic Acid 600 MG TABS Take 600 mg by mouth daily      polyvinyl alcohol (LIQUIFILM TEARS) 1.4 % ophthalmic solution 1 drop 4 times daily      clotrimazole-betamethasone (LOTRISONE) 1-0.05 % cream Apply topically 2 times daily Apply topically 2 times daily.  Ginger, Zingiber officinalis, (GINGER ROOT PO) Take 750 mg by mouth Daily in am      ferrous sulfate (IRON 325) 325 (65 Fe) MG tablet Take 325 mg by mouth daily In the morning for anemia      oxyCODONE-acetaminophen (PERCOCET) 7.5-325 MG per tablet Take 1 tablet by mouth every 6 hours as needed for Pain.       b complex vitamins capsule Take 1 capsule by mouth daily In the morning for Pulse 58   Temp 97.7 °F (36.5 °C) (Temporal)   Resp 18   Ht 5' 3\" (1.6 m)   Wt 138 lb (62.6 kg)   LMP 12/03/1997   BMI 24.45 kg/m²   Wt Readings from Last 3 Encounters:   09/27/21 138 lb (62.6 kg)   09/13/21 138 lb (62.6 kg)   08/30/21 145 lb (65.8 kg)     PHYSICAL EXAM  CONSTITUTIONAL:   Awake, alert, cooperative   EYES:  lids and lashes normal   ENT: external ears and nose without lesions   NECK:  supple, symmetrical, trachea midline   SKIN:  Open wound     Assessment:     Left foot ulcer    Pre Debridement Measurements:  Are located in the [place]  Documentation Flow Sheet  Post Debridement Measurements:  Wound/Ulcer Descriptions are Pre Debridement except measurements:     Wound 06/14/21 Foot Left;Medial #3 (Active)   Wound Image   09/13/21 1325   Wound Etiology Diabetic Ste[place] 2 08/16/21 1429   Dressing Status New dressing applied;Clean;Dry; Intact 09/27/21 1347   Wound Cleansed Cleansed with saline 09/27/21 1347   Dressing/Treatment Dry dressing 09/27/21 1347   Offloading for Diabetic Foot Ulcers Other (comment) 09/27/21 1347   Wound Length (cm) 0.5 cm 09/27/21 1314   Wound Width (cm) 0.1 cm 09/27/21 1314   Wound Depth (cm) 0.4 cm 09/27/21 1314   Wound Surface Area (cm^2) 0.05 cm^2 09/27/21 1314   Change in Wound Size % (l*w) 96.79 09/27/21 1314   Wound Volume (cm^3) 0.02 cm^3 09/27/21 1314   Wound Healing % 88 09/27/21 1314   Post-Procedure Length (cm) 0.5 cm 09/27/21 1336   Post-Procedure Width (cm) 0.1 cm 09/27/21 1336   Post-Procedure Depth (cm) 0.2 cm 09/27/21 1336   Post-Procedure Surface Area (cm^2) 0.05 cm^2 09/27/21 1336   Post-Procedure Volume (cm^3) 0.01 cm^3 09/27/21 1336   Undermining Starts ___ O'Clock 10 09/13/21 1325   Undermining Ends___ O'Clock 8 09/13/21 1325   Undermining Maxium Distance (cm) [email or web address] 09/13/21 1325   Wound Assessment Fibrin;Pale granulation tissue 09/27/21 1314   Drainage Amount Moderate 09/27/21 1314   Drainage Description Yellow 09/27/21 1314   Odor None 09/27/21 1314   Ira-wound Assessment Intact 09/27/21 1314   Number of days: 105          Procedure Note  Indications:  Based on my examination of this patient's wound(s)/ulcer(s) today, debridement is required to promote healing and evaluate the wound base. Performed by: Sindy Dye DPM    Consent obtained:  Yes    Time out taken:  Yes    Pain Control: Anesthetic  Anesthetic: 4% Lidocaine Liquid Topical     Debridement:Excisional Debridement    Using curette the wound(s)/ulcer(s) was/were sharply debrided down through and including the removal of subcutaneous tissue. Devitalized Tissue Debrided:  fibrin, biofilm, slough and necrotic/eschar to stimulate bleeding to promote healing, post debridement good bleeding base and wound edges noted    Wound/Ulcer #: 3    Percent of Wound/Ulcer Debrided: 100%    Total Surface Area Debrided:  0.4 sq cm     Estimated Blood Loss:  Minimal  Hemostasis Achieved:  by pressure    Procedural Pain:  0  / 10   Post Procedural Pain:  0 / 10     Response to treatment:  Well tolerated by patient. Plan:   Treatment Note please see attached Discharge Instructions    Written patient dismissal instructions given to patient and signed by patient or POA.          Discharge Instructions         Visit Discharge/Physician Orders     Discharge condition: Stable     Assessment of pain at discharge:minimal     Anesthetic Used:   4 % LIDOCAINE     Discharge to: [address] [place] [address] 83     Left via:Private automobile     Accompanied by: accompanied by child     ECF/HHA: ECF     Dressing Orders:please obtain ultrasound of left lower extremity     Left foot - cleanse with normal saline, apply iodoflex, cover with dry dressing, change daily     Treatment Orders:     No compression needed to lower legs.      No shoes to either feet.      [facility] followup visit ___________2 weeks with Dr Vega__________________  (Please note your next appointment above and if you are unable to keep, kindly give a 24 hour notice.  Thank you.)     Physician signature:__________________________        If you experience any of the following, please call the [facility] during business hours:     * Increase in Pain  * Temperature over 101  * Increase in drainage from your wound  * Drainage with a foul odor  * Bleeding  * Increase in swelling  * Need for compression bandage changes due to slippage, breakthrough drainage.     If you need medical attention outside of the business hours of the [facility] [address] please contact your PCP or go to the nearest emergency room.                                                     Electronically signed by Benjamin Jordan DPM on 9/27/2021 at 2:06 PM

## 2021-10-11 ENCOUNTER — HOSPITAL ENCOUNTER (OUTPATIENT)
Dept: WOUND CARE | Age: 86
Discharge: HOME | End: 2021-10-11
Payer: MEDICARE

## 2021-10-11 VITALS
SYSTOLIC BLOOD PRESSURE: 152 MMHG | DIASTOLIC BLOOD PRESSURE: 68 MMHG | RESPIRATION RATE: 18 BRPM | BODY MASS INDEX: 24.45 KG/M2 | HEART RATE: 91 BPM | WEIGHT: 138 LBS | TEMPERATURE: 97.1 F

## 2021-10-11 DIAGNOSIS — I70.244 ATHEROSCLEROSIS OF NATIVE ARTERY OF LEFT LOWER EXTREMITY WITH ULCERATION OF MIDFOOT (HCC): Primary | ICD-10-CM

## 2021-10-11 PROCEDURE — 6370000000 HC RX 637 (ALT 250 FOR IP): Performed by: PODIATRIST

## 2021-10-11 PROCEDURE — 11042 DBRDMT SUBQ TIS 1ST 20SQCM/<: CPT

## 2021-10-11 RX ORDER — LIDOCAINE HYDROCHLORIDE 20 MG/ML
JELLY TOPICAL ONCE
Status: CANCELLED | OUTPATIENT
Start: 2021-10-11 | End: 2021-10-11

## 2021-10-11 RX ORDER — LIDOCAINE HYDROCHLORIDE 40 MG/ML
SOLUTION TOPICAL ONCE
Status: COMPLETED | OUTPATIENT
Start: 2021-10-11 | End: 2021-10-11

## 2021-10-11 RX ORDER — BACITRACIN ZINC AND POLYMYXIN B SULFATE 500; 1000 [USP'U]/G; [USP'U]/G
OINTMENT TOPICAL ONCE
Status: CANCELLED | OUTPATIENT
Start: 2021-10-11 | End: 2021-10-11

## 2021-10-11 RX ORDER — LIDOCAINE 40 MG/G
CREAM TOPICAL ONCE
Status: CANCELLED | OUTPATIENT
Start: 2021-10-11 | End: 2021-10-11

## 2021-10-11 RX ORDER — BACITRACIN, NEOMYCIN, POLYMYXIN B 400; 3.5; 5 [USP'U]/G; MG/G; [USP'U]/G
OINTMENT TOPICAL ONCE
Status: CANCELLED | OUTPATIENT
Start: 2021-10-11 | End: 2021-10-11

## 2021-10-11 RX ORDER — BETAMETHASONE DIPROPIONATE 0.05 %
OINTMENT (GRAM) TOPICAL ONCE
Status: CANCELLED | OUTPATIENT
Start: 2021-10-11 | End: 2021-10-11

## 2021-10-11 RX ORDER — LIDOCAINE 50 MG/G
OINTMENT TOPICAL ONCE
Status: CANCELLED | OUTPATIENT
Start: 2021-10-11 | End: 2021-10-11

## 2021-10-11 RX ORDER — LIDOCAINE HYDROCHLORIDE 40 MG/ML
SOLUTION TOPICAL ONCE
Status: CANCELLED | OUTPATIENT
Start: 2021-10-11 | End: 2021-10-11

## 2021-10-11 RX ORDER — GINSENG 100 MG
CAPSULE ORAL ONCE
Status: CANCELLED | OUTPATIENT
Start: 2021-10-11 | End: 2021-10-11

## 2021-10-11 RX ORDER — GENTAMICIN SULFATE 1 MG/G
OINTMENT TOPICAL ONCE
Status: CANCELLED | OUTPATIENT
Start: 2021-10-11 | End: 2021-10-11

## 2021-10-11 RX ORDER — CLOBETASOL PROPIONATE 0.5 MG/G
OINTMENT TOPICAL ONCE
Status: CANCELLED | OUTPATIENT
Start: 2021-10-11 | End: 2021-10-11

## 2021-10-11 RX ADMIN — LIDOCAINE HYDROCHLORIDE 10 ML: 40 SOLUTION TOPICAL at 13:30

## 2021-10-11 NOTE — PROGRESS NOTES
Wound Healing Center Followup Visit Note    Referring Physician : Ambreen Spears DO  [address] RECORD NUMBER:  [id number]  AGE: 80 y.o. GENDER: female  : 1927  EPISODE DATE:  10/11/2021    Subjective:     Chief Complaint   Patient presents with    Wound Check     Left foot      HISTORY of PRESENT ILLNESS HPI   Grecia Mena is a 80 y.o. female who presents today in regards to follow up evaluation and treatment of wound/ulcer. That patient's past medical, family and social hx were reviewed and changes were made if present. History of Wound Context: Patient has wounds on the left foot. Patient with history of diabetes, neuropathy as well as PVD recently underwent vascular intervention as well as had a abscess on the bottom of her left foot that required incision and drainage with a delayed primary closure. She is currently at [facility], local care consisting of Xeroform to the wounds as instructed.     Pt is not on abx at time of initial visit.     21 sutures removed, local care as instructed. Continue nonweightbearing to the left foot until next follow-up.    21 presents with her daughter today. Plantar wound remains healed. Wounds appreciated medial, lateral as well as dorsal left foot, stable. Depth undetermined, eschar decreased. There is no erythema or active drainage. No pain. No erythema or fluctuance. Patient can start partial weightbearing on the left foot with a slipper sock, I do not want a shoe covering due to concerns of the other wounds. 21 presents w/ her son, dianna Xiong last week, ordered US LE, left foot looks good, dorsal healed, medial small area of eschar, adhered to underlying surface, no erythema, fluctuance or increase in temperature. No pain. Plantar foot looks good. Minimal edema. Negative Homans. Okay to ambulate with a slipper sock only until the medial left foot wound resolves. 21 presents with her nursing facility. Overall doing well, however they relate she has an area on her right lower leg compatible with contusion as well as nursing also noticed her right great toenail was loose. Patient relates she may have bumped the leg as well as the toe. Right medial foot wound with devitalized nonviable tissue through subcutaneous tissue. With debridement good bleeding noted. Right lower leg discoloration/contusion with a small skin tear. No surrounding erythema or increase in temperature. No fluctuance. Right great toe approximately 25% is loose with some underlying hematoma. Drainage is serous only. Nailbed is clean at present. There is some dried drainage distally however underlying normal skin. Leave the right lower extremity open to air, monitor to toe nail, she may lose it. Even though there is no fluctuance as far as her right lower leg concern would be developing hematoma down the road which can result in deeper type wound. Monitor this as well.    8-16-21 Presents with her daughter in f/u, doing well. Right medial foot wound covered with devitalized nonviable tissue through subcutaneous tissue. No purulence or odor, no erythema or pain. 8-30-21 Presents with her son in f/u, doing well. Right medial foot wound covered with devitalized nonviable tissue through subcutaneous tissue. No purulence or odor, no erythema or pain. Change dressing to Iodoflex. 9-13-21 presents with her son in follow-up. No complaints as far as the wound however patient relates some swelling and pain, she points to left lower leg. Right medial foot wound  with 50% devitalized nonviable tissue through subcutaneous tissue. No purulence or odor, no erythema or pain. DP/PT audible bilateral with hand-held Doppler. Left lower leg without erythema however positive edema. Positive pain posterior calf. Order was written for venous ultrasound of the left lower leg to evaluate for DVT. 9-27-21 presents with her son in follow-up.   No complaints as far as the wound however patient relates some swelling and pain, she points to left lower leg. Right medial foot wound  with 50% devitalized nonviable tissue through subcutaneous tissue. No purulence or odor, no erythema or pain. Venous US from facility neg, discussed results    10-11-21  presents with her son in follow-up. Right medial foot wound covered with devitalized nonviable tissue through subcutaneous tissue. No purulence or odor, no erythema or pain. 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 ([address] 5/18/2021    Bronchitis 5/23/2017    CAD (coronary artery disease)     Cough 5/23/2017    Dermatophytosis 6/25/2021    Diabetes mellitus ([address]     GERD (gastroesophageal reflux disease) 5/23/2017    History of pulmonary embolus (PE) 5/29/2021    Hx of blood clots     Hyperlipidemia     Hypertension     Leg swelling 7/15/2021    Lung disease     Peripheral vascular angioplasty status 5/29/2021    Pseudoaneurysm of right femoral artery ([address] 5/29/2021    PVD (peripheral vascular disease) with claudication ([address] 4/10/2019    Restless legs syndrome     Rhinitis, allergic 5/23/2017    Sinusitis 5/23/2017    SOB (shortness of breath) 5/23/2017    Type 1 diabetes mellitus with left diabetic foot ulcer ([address] 5/18/2021    Ulcerated, foot, left, limited to breakdown of skin ([address] 6/25/2021    Urinary incontinence      Past Surgical History:   Procedure Laterality Date    ABDOMEN SURGERY      APPENDECTOMY      CARDIAC SURGERY      CHOLECYSTECTOMY      COLONOSCOPY      CORONARY ARTERY BYPASS GRAFT      8/28/10    ENDOSCOPY, COLON, DIAGNOSTIC      FOOT DEBRIDEMENT Left 5/16/2021    FOOT DEBRIDEMENT INCISION AND DRAINAGE.    performed by Don Khoury DPM at [address] [place] [address] Left 5/21/2021    LEFT FOOT DEBRIDEMENT  WITH DELAYED PRIMARY CLOSURE oxyCODONE-acetaminophen (PERCOCET) 7.5-325 MG per tablet Take 1 tablet by mouth every 6 hours as needed for Pain.  b complex vitamins capsule Take 1 capsule by mouth daily In the morning for supplement      Vitamin D (CHOLECALCIFEROL) 25 MCG (1000 UT) TABS tablet Take 4 capsules by mouth Give 4000 units in the morning for supplement      aspirin 81 MG EC tablet Take 1 tablet by mouth daily 30 tablet 0    traMADol (ULTRAM) 50 MG tablet Take 50 mg by mouth 2 times daily.  aluminum & magnesium hydroxide-simethicone (MYLANTA) 400-400-40 MG/5ML SUSP Take 30 mLs by mouth every 6 hours as needed      lidocaine (LMX) 4 % cream Apply topically every 8 hours as needed for Pain Apply topically as needed to painful muscles      diclofenac sodium (VOLTAREN) 1 % GEL Apply topically 4 times daily as needed for Pain      glimepiride (AMARYL) 2 MG tablet Take 2 mg by mouth every morning (before breakfast)      bisacodyl (DULCOLAX) 10 MG suppository Place 10 mg rectally daily as needed for Constipation Indications: IF NO RESULTS FROM MOM       gabapentin (NEURONTIN) 300 MG capsule Take 300 mg by mouth every evening.        albuterol (PROVENTIL) 90 MCG/ACT inhaler Inhale 2 puffs into the lungs 4 times daily  0    pravastatin (PRAVACHOL) 20 MG tablet TAKE 1 TABLET BY MOUTH  NIGHTLY 90 tablet 1    amLODIPine (NORVASC) 5 MG tablet TAKE 1 TABLET BY MOUTH  DAILY (Patient taking differently: Take 5 mg by mouth 2 times daily ) 90 tablet 1    fluticasone-vilanterol (BREO ELLIPTA) 100-25 MCG/INH AEPB inhaler Inhale 1 puff into the lungs daily 3 each 5    Multiple Vitamins-Minerals (OCUVITE ADULT FORMULA PO) Take 1 capsule by mouth daily      Probiotic Product (PRO-BIOTIC BLEND PO) Take 1 capsule by mouth 2 times daily       magnesium gluconate (MAGONATE) 500 MG tablet Take 200 mg by mouth 2 times daily       Coenzyme Q10 (COQ10) 200 MG CAPS Take 200 mg by mouth daily       promethazine (PHENERGAN) 12.5 MG tablet Take 10/11/21 1325   Wound Assessment Fibrin 10/11/21 1325   Drainage Amount Moderate 10/11/21 1325   Drainage Description Yellow 10/11/21 1325   Odor None 10/11/21 1325   Ira-wound Assessment Intact 10/11/21 1325   Number of days: 118          Procedure Note  Indications:  Based on my examination of this patient's wound(s)/ulcer(s) today, debridement is required to promote healing and evaluate the wound base. Performed by: Roma Dove DPM    Consent obtained:  Yes    Time out taken:  Yes    Pain Control: Anesthetic  Anesthetic: 4% Lidocaine Liquid Topical     Debridement:Excisional Debridement    Using curette the wound(s)/ulcer(s) was/were sharply debrided down through and including the removal of subcutaneous tissue. Devitalized Tissue Debrided:  fibrin, biofilm, slough and necrotic/eschar to stimulate bleeding to promote healing, post debridement good bleeding base and wound edges noted    Wound/Ulcer #: 3    Percent of Wound/Ulcer Debrided: 100%    Total Surface Area Debrided:  0.02 sq cm     Estimated Blood Loss:  Minimal  Hemostasis Achieved:  by pressure    Procedural Pain:  0  / 10   Post Procedural Pain:  0 / 10     Response to treatment:  Well tolerated by patient. Plan:   Treatment Note please see attached Discharge Instructions    Written patient dismissal instructions given to patient and signed by patient or POA.          Discharge Instructions           Visit Discharge/Physician Orders     Discharge condition: Stable     Assessment of pain at discharge:minimal     Anesthetic Used:   4 % LIDOCAINE     Discharge to: [address] [place] [address] 83     Left via:Private automobile     Accompanied by: accompanied by child     ECF/HHA: ECF     Dressing Orders:please obtain ultrasound of left lower extremity     Left foot - cleanse with normal saline, apply iodoflex, cover with dry dressing, change daily     Treatment Orders:     No compression needed to lower legs.      No shoes to either feet.      [facility] followup visit ___________2 weeks with Dr Vega__________________  (Please note your next appointment above and if you are unable to keep, kindly give a 24 hour notice.  Thank you.)     Physician signature:__________________________        If you experience any of the following, please call the [facility] during business hours:     * Increase in Pain  * Temperature over 101  * Increase in drainage from your wound  * Drainage with a foul odor  * Bleeding  * Increase in swelling  * Need for compression bandage changes due to slippage, breakthrough drainage.     If you need medical attention outside of the business hours of the [facility] please contact your PCP or go to the nearest emergency room.                                                                          Electronically signed by Marissa Howe DPM on 10/11/2021 at 1:47 PM

## 2021-10-25 ENCOUNTER — HOSPITAL ENCOUNTER (OUTPATIENT)
Dept: WOUND CARE | Age: 86
Discharge: HOME | End: 2021-10-25
Payer: MEDICARE

## 2021-10-25 VITALS
BODY MASS INDEX: 24.45 KG/M2 | SYSTOLIC BLOOD PRESSURE: 118 MMHG | TEMPERATURE: 96 F | WEIGHT: 138 LBS | HEART RATE: 52 BPM | RESPIRATION RATE: 18 BRPM | DIASTOLIC BLOOD PRESSURE: 58 MMHG

## 2021-10-25 DIAGNOSIS — I70.244 ATHEROSCLEROSIS OF NATIVE ARTERY OF LEFT LOWER EXTREMITY WITH ULCERATION OF MIDFOOT (HCC): Primary | ICD-10-CM

## 2021-10-25 PROCEDURE — 99212 OFFICE O/P EST SF 10 MIN: CPT

## 2021-10-25 RX ORDER — LIDOCAINE HYDROCHLORIDE 40 MG/ML
SOLUTION TOPICAL ONCE
Status: CANCELLED | OUTPATIENT
Start: 2021-10-25 | End: 2021-10-25

## 2021-10-25 RX ORDER — BACITRACIN, NEOMYCIN, POLYMYXIN B 400; 3.5; 5 [USP'U]/G; MG/G; [USP'U]/G
OINTMENT TOPICAL ONCE
Status: CANCELLED | OUTPATIENT
Start: 2021-10-25 | End: 2021-10-25

## 2021-10-25 RX ORDER — BACITRACIN ZINC AND POLYMYXIN B SULFATE 500; 1000 [USP'U]/G; [USP'U]/G
OINTMENT TOPICAL ONCE
Status: CANCELLED | OUTPATIENT
Start: 2021-10-25 | End: 2021-10-25

## 2021-10-25 RX ORDER — GINSENG 100 MG
CAPSULE ORAL ONCE
Status: CANCELLED | OUTPATIENT
Start: 2021-10-25 | End: 2021-10-25

## 2021-10-25 RX ORDER — LIDOCAINE HYDROCHLORIDE 20 MG/ML
JELLY TOPICAL ONCE
Status: CANCELLED | OUTPATIENT
Start: 2021-10-25 | End: 2021-10-25

## 2021-10-25 RX ORDER — LIDOCAINE 40 MG/G
CREAM TOPICAL ONCE
Status: CANCELLED | OUTPATIENT
Start: 2021-10-25 | End: 2021-10-25

## 2021-10-25 RX ORDER — LIDOCAINE 50 MG/G
OINTMENT TOPICAL ONCE
Status: CANCELLED | OUTPATIENT
Start: 2021-10-25 | End: 2021-10-25

## 2021-10-25 RX ORDER — GENTAMICIN SULFATE 1 MG/G
OINTMENT TOPICAL ONCE
Status: CANCELLED | OUTPATIENT
Start: 2021-10-25 | End: 2021-10-25

## 2021-10-25 RX ORDER — CLOBETASOL PROPIONATE 0.5 MG/G
OINTMENT TOPICAL ONCE
Status: CANCELLED | OUTPATIENT
Start: 2021-10-25 | End: 2021-10-25

## 2021-10-25 RX ORDER — BETAMETHASONE DIPROPIONATE 0.05 %
OINTMENT (GRAM) TOPICAL ONCE
Status: CANCELLED | OUTPATIENT
Start: 2021-10-25 | End: 2021-10-25

## 2021-10-25 NOTE — PLAN OF CARE
Problem: Wound:  Goal: Will show signs of wound healing; wound closure and no evidence of infection  Description: Will show signs of wound healing; wound closure and no evidence of infection  Outcome: Completed     Problem: Arterial:  Goal: Optimize blood flow for wound healing  Description: Optimize blood flow for wound healing  Outcome: Completed

## 2021-10-25 NOTE — PROGRESS NOTES
[facility] Followup Visit Note    Referring Physician : Jaylen Oro DO  [address] RECORD NUMBER:  [id number]  AGE: 80 y.o. GENDER: female  : 1927  EPISODE DATE:  10/25/2021    Subjective:     Chief Complaint   Patient presents with    Wound Check      HISTORY of PRESENT ILLNESS HPI   Zainab Morales is a 80 y.o. female who presents today in regards to follow up evaluation and treatment of wound/ulcer. That patient's past medical, family and social hx were reviewed and changes were made if present. History of Wound Context: Patient has wounds on the left foot. Patient with history of diabetes, neuropathy as well as PVD recently underwent vascular intervention as well as had a abscess on the bottom of her left foot that required incision and drainage with a delayed primary closure. She is currently at [facility], local care consisting of Xeroform to the wounds as instructed.     Pt is not on abx at time of initial visit.     21 sutures removed, local care as instructed. Continue nonweightbearing to the left foot until next follow-up.    21 presents with her daughter today. Plantar wound remains healed. Wounds appreciated medial, lateral as well as dorsal left foot, stable. Depth undetermined, eschar decreased. There is no erythema or active drainage. No pain. No erythema or fluctuance. Patient can start partial weightbearing on the left foot with a slipper sock, I do not want a shoe covering due to concerns of the other wounds. 21 presents w/ her son, dianna Dejesus last week, ordered US LE, left foot looks good, dorsal healed, medial small area of eschar, adhered to underlying surface, no erythema, fluctuance or increase in temperature. No pain. Plantar foot looks good. Minimal edema. Negative Homans. Okay to ambulate with a slipper sock only until the medial left foot wound resolves. 21 presents with her nursing facility.   Overall doing far as the wound however patient relates some swelling and pain, she points to left lower leg. Right medial foot wound  with 50% devitalized nonviable tissue through subcutaneous tissue. No purulence or odor, no erythema or pain. Venous US from facility neg, discussed results    10-11-21  presents with her son in follow-up. Right medial foot wound covered with devitalized nonviable tissue through subcutaneous tissue. No purulence or odor, no erythema or pain. 10-25-21 presents w/son, rt medial wound healed,  Soft shoe/slipper only. Follow as needed. 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 ([address] 5/18/2021    Bronchitis 5/23/2017    CAD (coronary artery disease)     Cough 5/23/2017    Dermatophytosis 6/25/2021    Diabetes mellitus ([address]     GERD (gastroesophageal reflux disease) 5/23/2017    History of pulmonary embolus (PE) 5/29/2021    Hx of blood clots     Hyperlipidemia     Hypertension     Leg swelling 7/15/2021    Lung disease     Peripheral vascular angioplasty status 5/29/2021    Pseudoaneurysm of right femoral artery ([address] 5/29/2021    PVD (peripheral vascular disease) with claudication ([address] 4/10/2019    Restless legs syndrome     Rhinitis, allergic 5/23/2017    Sinusitis 5/23/2017    SOB (shortness of breath) 5/23/2017    Type 1 diabetes mellitus with left diabetic foot ulcer ([address] 5/18/2021    Ulcerated, foot, left, limited to breakdown of skin ([address] 6/25/2021    Urinary incontinence      Past Surgical History:   Procedure Laterality Date    ABDOMEN SURGERY      APPENDECTOMY      CARDIAC SURGERY      CHOLECYSTECTOMY      COLONOSCOPY      CORONARY ARTERY BYPASS GRAFT      8/28/10    ENDOSCOPY, COLON, DIAGNOSTIC      FOOT DEBRIDEMENT Left 5/16/2021    FOOT DEBRIDEMENT INCISION AND DRAINAGE.    performed by Norman Gurrola DPM at [address] [place] [address] DEBRIDEMENT Left 5/21/2021    LEFT FOOT DEBRIDEMENT  WITH DELAYED PRIMARY CLOSURE performed by Chai Street DPM at [address] [facility]"" and bso 1997    TONSILLECTOMY AND ADENOIDECTOMY       Family History   Problem Relation Age of Onset    Hypertension Father     Heart Disease Brother      Social History     Tobacco Use    Smoking status: Never Smoker    Smokeless tobacco: Never Used   Vaping Use    Vaping Use: Never used   Substance Use Topics    Alcohol use: Yes     Comment: occasional    Drug use: No     Allergies   Allergen Reactions    Lisinopril Other (See Comments)     7/18/19 Pt states that she does not want to take LISINOPRIL     Current Outpatient Medications on File Prior to Encounter   Medication Sig Dispense Refill    ciclopirox (PENLAC) 8 % solution Apply once daily all toenails. 6 mL 1    sucralfate (CARAFATE) 1 GM tablet Take 1 g by mouth three times daily      hydrALAZINE (APRESOLINE) 25 MG tablet Take 25 mg by mouth 2 times daily      metFORMIN (GLUCOPHAGE) 500 MG tablet Take 500 mg by mouth daily      polyethylene glycol (GLYCOLAX) 17 g packet Take 17 g by mouth daily as needed for Constipation      pantoprazole sodium (PROTONIX) 40 MG PACK packet Take 40 mg by mouth daily (with breakfast)      famotidine (PEPCID) 10 MG tablet Take 10 mg by mouth 2 times daily      guaiFENesin (ROBITUSSIN) 100 MG/5ML syrup Take 200 mg by mouth 4 times daily as needed for Cough      senna (SENOKOT) 8.6 MG tablet Take 1 tablet by mouth 2 times daily      SODIUM CHLORIDE PO Take 1 mg by mouth daily      Alpha-Lipoic Acid 600 MG TABS Take 600 mg by mouth daily      polyvinyl alcohol (LIQUIFILM TEARS) 1.4 % ophthalmic solution 1 drop 4 times daily      clotrimazole-betamethasone (LOTRISONE) 1-0.05 % cream Apply topically 2 times daily Apply topically 2 times daily.       Ginger, Zingiber officinalis, (GINGER ROOT PO) Take 750 mg by mouth Daily in am      ferrous sulfate (IRON 325) 325 (65 Fe) MG tablet Take 325 mg by mouth daily In the morning for anemia      oxyCODONE-acetaminophen (PERCOCET) 7.5-325 MG per tablet Take 1 tablet by mouth every 6 hours as needed for Pain.  b complex vitamins capsule Take 1 capsule by mouth daily In the morning for supplement      Vitamin D (CHOLECALCIFEROL) 25 MCG (1000 UT) TABS tablet Take 4 capsules by mouth Give 4000 units in the morning for supplement      aspirin 81 MG EC tablet Take 1 tablet by mouth daily 30 tablet 0    promethazine (PHENERGAN) 12.5 MG tablet Take 12.5 mg by mouth every 6 hours as needed for Nausea      traMADol (ULTRAM) 50 MG tablet Take 50 mg by mouth 2 times daily.  aluminum & magnesium hydroxide-simethicone (MYLANTA) 400-400-40 MG/5ML SUSP Take 30 mLs by mouth every 6 hours as needed      lidocaine (LMX) 4 % cream Apply topically every 8 hours as needed for Pain Apply topically as needed to painful muscles      diclofenac sodium (VOLTAREN) 1 % GEL Apply topically 4 times daily as needed for Pain      glimepiride (AMARYL) 2 MG tablet Take 2 mg by mouth every morning (before breakfast)      bisacodyl (DULCOLAX) 10 MG suppository Place 10 mg rectally daily as needed for Constipation Indications: IF NO RESULTS FROM MOM       gabapentin (NEURONTIN) 300 MG capsule Take 300 mg by mouth every evening.        albuterol (PROVENTIL) 90 MCG/ACT inhaler Inhale 2 puffs into the lungs 4 times daily  0    pravastatin (PRAVACHOL) 20 MG tablet TAKE 1 TABLET BY MOUTH  NIGHTLY 90 tablet 1    amLODIPine (NORVASC) 5 MG tablet TAKE 1 TABLET BY MOUTH  DAILY (Patient taking differently: Take 5 mg by mouth 2 times daily ) 90 tablet 1    fluticasone-vilanterol (BREO ELLIPTA) 100-25 MCG/INH AEPB inhaler Inhale 1 puff into the lungs daily 3 each 5    Multiple Vitamins-Minerals (OCUVITE ADULT FORMULA PO) Take 1 capsule by mouth daily      Probiotic Product (PRO-BIOTIC BLEND PO) Take 1 capsule by mouth 2 times daily       magnesium gluconate (MAGONATE) 500 MG tablet Take 200 mg by mouth 2 times daily       Coenzyme Q10 (COQ10) 200 MG CAPS Take 200 mg by mouth daily        No current facility-administered medications on file prior to encounter. REVIEW OF SYSTEMS See HPI    Objective:    BP (!) 118/58   Pulse 52   Temp 96 °F (35.6 °C) (Temporal)   Resp 18   Wt 138 lb (62.6 kg)   LMP 12/03/1997   BMI 24.45 kg/m²   Wt Readings from Last 3 Encounters:   10/25/21 138 lb (62.6 kg)   10/11/21 138 lb (62.6 kg)   09/27/21 138 lb (62.6 kg)     PHYSICAL EXAM  CONSTITUTIONAL:   Awake, alert, cooperative   EYES:  lids and lashes normal   ENT: external ears and nose without lesions   NECK:  supple, symmetrical, trachea midline   SKIN:  Open wound     Assessment:     Left foot ulcer, healed. Wound 06/14/21 Foot Left;Medial #3 (Active)   Wound Image   10/25/21 1322   Wound Etiology Diabetic Stearns 2 08/16/21 1429   Dressing Status New dressing applied;Clean;Dry; Intact 09/27/21 1347   Wound Cleansed Cleansed with saline 09/27/21 1347   Dressing/Treatment Dry dressing 09/27/21 1347   Offloading for Diabetic Foot Ulcers Other (comment) 09/27/21 1347   Wound Length (cm) 0 cm 10/25/21 1322   Wound Width (cm) 0 cm 10/25/21 1322   Wound Depth (cm) 0 cm 10/25/21 1322   Wound Surface Area (cm^2) 0 cm^2 10/25/21 1322   Change in Wound Size % (l*w) 100 10/25/21 1322   Wound Volume (cm^3) 0 cm^3 10/25/21 1322   Wound Healing % 100 10/25/21 1322   Post-Procedure Length (cm) 0.2 cm 10/11/21 1344   Post-Procedure Width (cm) 0.2 cm 10/11/21 1344   Post-Procedure Depth (cm) 0.3 cm 10/11/21 1344   Post-Procedure Surface Area (cm^2) 0.04 cm^2 10/11/21 1344   Post-Procedure Volume (cm^3) 0.012 cm^3 10/11/21 1344   Undermining Starts ___ O'Clock 12 10/11/21 1325   Undermining Ends___ O'Clock 2 10/11/21 1325   Undermining Maxium Distance (cm) 0.3 @ 7 10/11/21 1325   Wound Assessment Fibrin 10/11/21 1325   Drainage Amount Moderate 10/11/21 1325   Drainage Description Yellow 10/11/21 1325   Odor None 10/11/21 1325   Ira-wound Assessment Intact 10/11/21 1325   Number of days: 132              Plan:   Treatment Note please see attached Discharge Instructions    Written patient dismissal instructions given to patient and signed by patient or POA. Discharge Instructions        Visit Discharge/Physician Orders     Discharge condition: Stable     Assessment of pain at discharge:minimal     Anesthetic Used:   4 % LIDOCAINE     Discharge to: [address] [place] [address] 83     Left via:Private automobile     Accompanied by: accompanied by child     ECF/HHA: ECF     Dressing Orders:     Left foot - healed     Treatment Orders:     No compression needed to lower legs.           [address] [place] [address],[address] followup visit ____as needed________________  (Please note your next appointment above and if you are unable to keep, kindly give a 24 hour notice.  Thank you.)     Physician signature:__________________________        If you experience any of the following, please call the [address] [place] [address] during business hours:     * Increase in Pain  * Temperature over 101  * Increase in drainage from your wound  * Drainage with a foul odor  * Bleeding  * Increase in swelling  * Need for compression bandage changes due to slippage, breakthrough drainage.     If you need medical attention outside of the business hours of the [address] [place] [address] please contact your PCP or go to the nearest emergency room.                                                                                               Electronically signed by Damaso Londono DPM on 10/25/2021 at 1:44 PM

## 2021-11-22 ENCOUNTER — PROCEDURE VISIT (OUTPATIENT)
Dept: PODIATRY | Age: 86
End: 2021-11-22
Payer: MEDICARE

## 2021-11-22 VITALS — WEIGHT: 138 LBS | HEIGHT: 63 IN | BODY MASS INDEX: 24.45 KG/M2

## 2021-11-22 DIAGNOSIS — B35.1 TINEA UNGUIUM: Primary | ICD-10-CM

## 2021-11-22 DIAGNOSIS — G60.8 HEREDITARY SENSORY NEUROPATHY: ICD-10-CM

## 2021-11-22 DIAGNOSIS — L84 CORNS AND CALLOSITIES: ICD-10-CM

## 2021-11-22 DIAGNOSIS — E11.621 TYPE 2 DIABETES MELLITUS WITH FOOT ULCER, UNSPECIFIED WHETHER LONG TERM INSULIN USE (HCC): ICD-10-CM

## 2021-11-22 DIAGNOSIS — L97.509 TYPE 2 DIABETES MELLITUS WITH FOOT ULCER, UNSPECIFIED WHETHER LONG TERM INSULIN USE (HCC): ICD-10-CM

## 2021-11-22 PROCEDURE — 11721 DEBRIDE NAIL 6 OR MORE: CPT | Performed by: PODIATRIST

## 2021-11-22 NOTE — PROGRESS NOTES
solution, 1 drop 4 times daily, Disp: , Rfl:     clotrimazole-betamethasone (LOTRISONE) 1-0.05 % cream, Apply topically 2 times daily Apply topically 2 times daily. , Disp: , Rfl:     Ginger, Zingiber officinalis, (GINGER ROOT PO), Take 750 mg by mouth Daily in am, Disp: , Rfl:     ferrous sulfate (IRON 325) 325 (65 Fe) MG tablet, Take 325 mg by mouth daily In the morning for anemia, Disp: , Rfl:     oxyCODONE-acetaminophen (PERCOCET) 7.5-325 MG per tablet, Take 1 tablet by mouth every 6 hours as needed for Pain., Disp: , Rfl:     b complex vitamins capsule, Take 1 capsule by mouth daily In the morning for supplement, Disp: , Rfl:     Vitamin D (CHOLECALCIFEROL) 25 MCG (1000 UT) TABS tablet, Take 4 capsules by mouth Give 4000 units in the morning for supplement, Disp: , Rfl:     aspirin 81 MG EC tablet, Take 1 tablet by mouth daily, Disp: 30 tablet, Rfl: 0    promethazine (PHENERGAN) 12.5 MG tablet, Take 12.5 mg by mouth every 6 hours as needed for Nausea, Disp: , Rfl:     traMADol (ULTRAM) 50 MG tablet, Take 50 mg by mouth 2 times daily.  , Disp: , Rfl:     aluminum & magnesium hydroxide-simethicone (MYLANTA) 400-400-40 MG/5ML SUSP, Take 30 mLs by mouth every 6 hours as needed, Disp: , Rfl:     lidocaine (LMX) 4 % cream, Apply topically every 8 hours as needed for Pain Apply topically as needed to painful muscles, Disp: , Rfl:     diclofenac sodium (VOLTAREN) 1 % GEL, Apply topically 4 times daily as needed for Pain, Disp: , Rfl:     glimepiride (AMARYL) 2 MG tablet, Take 2 mg by mouth every morning (before breakfast), Disp: , Rfl:     bisacodyl (DULCOLAX) 10 MG suppository, Place 10 mg rectally daily as needed for Constipation Indications: IF NO RESULTS FROM MOM , Disp: , Rfl:     gabapentin (NEURONTIN) 300 MG capsule, Take 300 mg by mouth every evening. , Disp: , Rfl:     albuterol (PROVENTIL) 90 MCG/ACT inhaler, Inhale 2 puffs into the lungs 4 times daily, Disp: , Rfl: 0    pravastatin (PRAVACHOL) 20 MG tablet, TAKE 1 TABLET BY MOUTH  NIGHTLY, Disp: 90 tablet, Rfl: 1    amLODIPine (NORVASC) 5 MG tablet, TAKE 1 TABLET BY MOUTH  DAILY (Patient taking differently: Take 5 mg by mouth 2 times daily ), Disp: 90 tablet, Rfl: 1    fluticasone-vilanterol (BREO ELLIPTA) 100-25 MCG/INH AEPB inhaler, Inhale 1 puff into the lungs daily, Disp: 3 each, Rfl: 5    Multiple Vitamins-Minerals (OCUVITE ADULT FORMULA PO), Take 1 capsule by mouth daily, Disp: , Rfl:     Probiotic Product (PRO-BIOTIC BLEND PO), Take 1 capsule by mouth 2 times daily , Disp: , Rfl:     magnesium gluconate (MAGONATE) 500 MG tablet, Take 200 mg by mouth 2 times daily , Disp: , Rfl:     Coenzyme Q10 (COQ10) 200 MG CAPS, Take 200 mg by mouth daily , Disp: , Rfl:   Allergies   Allergen Reactions    Lisinopril Other (See Comments)     7/18/19 Pt states that she does not want to take LISINOPRIL       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 ([address] 5/18/2021    Bronchitis 5/23/2017    CAD (coronary artery disease)     Cough 5/23/2017    Dermatophytosis 6/25/2021    Diabetes mellitus (HCC)     GERD (gastroesophageal reflux disease) 5/23/2017    History of pulmonary embolus (PE) 5/29/2021    Hx of blood clots     Hyperlipidemia     Hypertension     Leg swelling 7/15/2021    Lung disease     Peripheral vascular angioplasty status 5/29/2021    Pseudoaneurysm of right femoral artery ([address] 5/29/2021    PVD (peripheral vascular disease) with claudication (HCC) 4/10/2019    Restless legs syndrome     Rhinitis, allergic 5/23/2017    Sinusitis 5/23/2017    SOB (shortness of breath) 5/23/2017    Type 1 diabetes mellitus with left diabetic foot ulcer ([address] 5/18/2021    Ulcerated, foot, left, limited to breakdown of skin ([address] 6/25/2021    Urinary incontinence        There were no vitals filed for this visit.        Work History/Social History: Foot and ankle history:     Focused Lower Extremity Physical Exam:      Neurovascular examination:    Dorsalis Pedis palpable bilateral.  Posterior tibialis non-palpable bilateral.    Capillary Refill Time:  Immediate return  Hair growth:  Symmetrical and bilateral   Skin:  Not atrophic  Edema: Mild edema bilateral feet. Mild edema bilateral ankles. Neurologic:  Light touch diminished bilateral.  Warm to coolness bilateral distal toes  Decreased epicritic sensation     Musculoskeletal/ Orthopedic examination:    Equinis: present bilateral  Dorsiflexion, plantarflexion, inversion, eversion bilateral 5 out of 5 muscle strength  Wiggling toes  Negative [facility]    Dermatology examination:    Toenails 1 through 5 bilateral thickened, elongated, dystrophic, mycotic with subungual debris. Web spaces 1 through 4 bilateral clean dry and intact. No open wounds foot or ankle. Assessment and Plan: Sreedhar Turner was seen today for callouses and nail problem. Diagnoses and all orders for this visit:    Tinea unguium    Corns and callosities    Hereditary sensory neuropathy    Type 2 diabetes mellitus with foot ulcer, unspecified whether long term insulin use (HCC)        Follow-up diabetic exam  Formal debridement toenails 1 through 5 right and left with manual debridement for thickness and overall length. No open wounds today. Progressing well foot ankle standpoint. Son is present throughout visit. Follow-up 2 months. Return in about 2 months (around 1/22/2022). Seen By:  Zo Low DPM      Document was created using voice recognition software. Note was reviewed however may contain grammatical errors.

## 2021-12-03 ENCOUNTER — HOSPITAL ENCOUNTER (INPATIENT)
Age: 86
LOS: 3 days | Discharge: NURSING, REHAB OR LONG-TERM CARE | DRG: 544 | End: 2021-12-06
Attending: EMERGENCY MEDICINE
Payer: MEDICARE

## 2021-12-03 ENCOUNTER — APPOINTMENT (OUTPATIENT)
Dept: CT IMAGING | Age: 86
DRG: 544 | End: 2021-12-03
Payer: MEDICARE

## 2021-12-03 DIAGNOSIS — R26.2 DIFFICULTY IN WALKING: ICD-10-CM

## 2021-12-03 DIAGNOSIS — S22.060A COMPRESSION FRACTURE OF T8 VERTEBRA, INITIAL ENCOUNTER (HCC): ICD-10-CM

## 2021-12-03 DIAGNOSIS — S32.050A COMPRESSION FRACTURE OF L5 LUMBAR VERTEBRA, CLOSED, INITIAL ENCOUNTER (HCC): Primary | ICD-10-CM

## 2021-12-03 DIAGNOSIS — M48.00 SPINAL STENOSIS, UNSPECIFIED SPINAL REGION: Chronic | ICD-10-CM

## 2021-12-03 PROBLEM — D64.9 ANEMIA: Status: ACTIVE | Noted: 2021-12-03

## 2021-12-03 PROBLEM — S22.000A COMPRESSION FRACTURE OF THORACIC VERTEBRA (HCC): Status: ACTIVE | Noted: 2021-12-03

## 2021-12-03 PROBLEM — E78.5 HLD (HYPERLIPIDEMIA): Status: ACTIVE | Noted: 2021-12-03

## 2021-12-03 LAB
ANION GAP SERPL CALCULATED.3IONS-SCNC: 10 MMOL/L (ref 7–16)
BACTERIA: ABNORMAL /HPF
BASOPHILS ABSOLUTE: 0.02 E9/L (ref 0–0.2)
BASOPHILS RELATIVE PERCENT: 0.2 % (ref 0–2)
BILIRUBIN URINE: NEGATIVE
BLOOD, URINE: NEGATIVE
BUN BLDV-MCNC: 18 MG/DL (ref 6–23)
CALCIUM SERPL-MCNC: 9.5 MG/DL (ref 8.6–10.2)
CHLORIDE BLD-SCNC: 100 MMOL/L (ref 98–107)
CLARITY: CLEAR
CO2: 24 MMOL/L (ref 22–29)
COLOR: YELLOW
CREAT SERPL-MCNC: 0.9 MG/DL (ref 0.5–1)
EOSINOPHILS ABSOLUTE: 0.14 E9/L (ref 0.05–0.5)
EOSINOPHILS RELATIVE PERCENT: 1.4 % (ref 0–6)
GFR AFRICAN AMERICAN: >60
GFR NON-AFRICAN AMERICAN: 58 ML/MIN/1.73
GLUCOSE BLD-MCNC: 183 MG/DL (ref 74–99)
GLUCOSE URINE: NEGATIVE MG/DL
HCT VFR BLD CALC: 29 % (ref 34–48)
HEMOGLOBIN: 9.7 G/DL (ref 11.5–15.5)
IMMATURE GRANULOCYTES #: 0.04 E9/L
IMMATURE GRANULOCYTES %: 0.4 % (ref 0–5)
KETONES, URINE: NEGATIVE MG/DL
LEUKOCYTE ESTERASE, URINE: ABNORMAL
LYMPHOCYTES ABSOLUTE: 1.37 E9/L (ref 1.5–4)
LYMPHOCYTES RELATIVE PERCENT: 13.6 % (ref 20–42)
MCH RBC QN AUTO: 33.6 PG (ref 26–35)
MCHC RBC AUTO-ENTMCNC: 33.4 % (ref 32–34.5)
MCV RBC AUTO: 100.3 FL (ref 80–99.9)
MONOCYTES ABSOLUTE: 0.67 E9/L (ref 0.1–0.95)
MONOCYTES RELATIVE PERCENT: 6.7 % (ref 2–12)
NEUTROPHILS ABSOLUTE: 7.8 E9/L (ref 1.8–7.3)
NEUTROPHILS RELATIVE PERCENT: 77.7 % (ref 43–80)
NITRITE, URINE: NEGATIVE
PDW BLD-RTO: 13.5 FL (ref 11.5–15)
PH UA: 6 (ref 5–9)
PLATELET # BLD: 320 E9/L (ref 130–450)
PMV BLD AUTO: 9.9 FL (ref 7–12)
POTASSIUM REFLEX MAGNESIUM: 4.3 MMOL/L (ref 3.5–5)
PROTEIN UA: ABNORMAL MG/DL
RBC # BLD: 2.89 E12/L (ref 3.5–5.5)
RBC UA: ABNORMAL /HPF (ref 0–2)
RENAL EPITHELIAL, UA: ABNORMAL /HPF
SODIUM BLD-SCNC: 134 MMOL/L (ref 132–146)
SPECIFIC GRAVITY UA: 1.01 (ref 1–1.03)
UROBILINOGEN, URINE: 0.2 E.U./DL
WBC # BLD: 10 E9/L (ref 4.5–11.5)
WBC UA: ABNORMAL /HPF (ref 0–5)

## 2021-12-03 PROCEDURE — 72131 CT LUMBAR SPINE W/O DYE: CPT

## 2021-12-03 PROCEDURE — 96375 TX/PRO/DX INJ NEW DRUG ADDON: CPT

## 2021-12-03 PROCEDURE — 99284 EMERGENCY DEPT VISIT MOD MDM: CPT

## 2021-12-03 PROCEDURE — 6360000002 HC RX W HCPCS: Performed by: EMERGENCY MEDICINE

## 2021-12-03 PROCEDURE — 72128 CT CHEST SPINE W/O DYE: CPT

## 2021-12-03 PROCEDURE — 85025 COMPLETE CBC W/AUTO DIFF WBC: CPT

## 2021-12-03 PROCEDURE — 2580000003 HC RX 258: Performed by: NURSE PRACTITIONER

## 2021-12-03 PROCEDURE — 96374 THER/PROPH/DIAG INJ IV PUSH: CPT

## 2021-12-03 PROCEDURE — 96376 TX/PRO/DX INJ SAME DRUG ADON: CPT

## 2021-12-03 PROCEDURE — 6370000000 HC RX 637 (ALT 250 FOR IP): Performed by: INTERNAL MEDICINE

## 2021-12-03 PROCEDURE — 81001 URINALYSIS AUTO W/SCOPE: CPT

## 2021-12-03 PROCEDURE — 80048 BASIC METABOLIC PNL TOTAL CA: CPT

## 2021-12-03 PROCEDURE — 6370000000 HC RX 637 (ALT 250 FOR IP): Performed by: NURSE PRACTITIONER

## 2021-12-03 PROCEDURE — 1200000000 HC SEMI PRIVATE

## 2021-12-03 RX ORDER — ACETAMINOPHEN 325 MG/1
650 TABLET ORAL EVERY 4 HOURS PRN
Status: DISCONTINUED | OUTPATIENT
Start: 2021-12-03 | End: 2021-12-06 | Stop reason: HOSPADM

## 2021-12-03 RX ORDER — ORPHENADRINE CITRATE 30 MG/ML
60 INJECTION INTRAMUSCULAR; INTRAVENOUS ONCE
Status: COMPLETED | OUTPATIENT
Start: 2021-12-03 | End: 2021-12-03

## 2021-12-03 RX ORDER — DEXTROSE MONOHYDRATE 25 G/50ML
12.5 INJECTION, SOLUTION INTRAVENOUS PRN
Status: DISCONTINUED | OUTPATIENT
Start: 2021-12-03 | End: 2021-12-06 | Stop reason: HOSPADM

## 2021-12-03 RX ORDER — SUCRALFATE 1 G/1
1 TABLET ORAL 3 TIMES DAILY
Status: DISCONTINUED | OUTPATIENT
Start: 2021-12-04 | End: 2021-12-06 | Stop reason: HOSPADM

## 2021-12-03 RX ORDER — PANTOPRAZOLE SODIUM 40 MG/1
40 TABLET, DELAYED RELEASE ORAL
Status: DISCONTINUED | OUTPATIENT
Start: 2021-12-04 | End: 2021-12-06 | Stop reason: HOSPADM

## 2021-12-03 RX ORDER — MORPHINE SULFATE 2 MG/ML
4 INJECTION, SOLUTION INTRAMUSCULAR; INTRAVENOUS ONCE
Status: COMPLETED | OUTPATIENT
Start: 2021-12-03 | End: 2021-12-03

## 2021-12-03 RX ORDER — CHOLECALCIFEROL (VITAMIN D3) 10 MCG
1 TABLET ORAL DAILY
Status: DISCONTINUED | OUTPATIENT
Start: 2021-12-04 | End: 2021-12-06 | Stop reason: HOSPADM

## 2021-12-03 RX ORDER — SODIUM CHLORIDE 0.9 % (FLUSH) 0.9 %
5-40 SYRINGE (ML) INJECTION EVERY 12 HOURS SCHEDULED
Status: DISCONTINUED | OUTPATIENT
Start: 2021-12-03 | End: 2021-12-06 | Stop reason: HOSPADM

## 2021-12-03 RX ORDER — FERROUS SULFATE 325(65) MG
325 TABLET ORAL DAILY
Status: DISCONTINUED | OUTPATIENT
Start: 2021-12-04 | End: 2021-12-06 | Stop reason: HOSPADM

## 2021-12-03 RX ORDER — MORPHINE SULFATE 2 MG/ML
2 INJECTION, SOLUTION INTRAMUSCULAR; INTRAVENOUS EVERY 4 HOURS PRN
Status: DISCONTINUED | OUTPATIENT
Start: 2021-12-03 | End: 2021-12-06 | Stop reason: HOSPADM

## 2021-12-03 RX ORDER — CHOLECALCIFEROL (VITAMIN D3) 50 MCG
4000 TABLET ORAL EVERY MORNING
Status: DISCONTINUED | OUTPATIENT
Start: 2021-12-04 | End: 2021-12-06 | Stop reason: HOSPADM

## 2021-12-03 RX ORDER — OXYCODONE HYDROCHLORIDE 5 MG/1
5 TABLET ORAL EVERY 4 HOURS PRN
Status: DISCONTINUED | OUTPATIENT
Start: 2021-12-03 | End: 2021-12-06 | Stop reason: HOSPADM

## 2021-12-03 RX ORDER — ARFORMOTEROL TARTRATE 15 UG/2ML
15 SOLUTION RESPIRATORY (INHALATION) 2 TIMES DAILY
Status: DISCONTINUED | OUTPATIENT
Start: 2021-12-04 | End: 2021-12-06 | Stop reason: HOSPADM

## 2021-12-03 RX ORDER — ALBUTEROL SULFATE 2.5 MG/3ML
2.5 SOLUTION RESPIRATORY (INHALATION) 4 TIMES DAILY
Status: DISCONTINUED | OUTPATIENT
Start: 2021-12-04 | End: 2021-12-06 | Stop reason: HOSPADM

## 2021-12-03 RX ORDER — GABAPENTIN 300 MG/1
300 CAPSULE ORAL EVERY EVENING
Status: DISCONTINUED | OUTPATIENT
Start: 2021-12-03 | End: 2021-12-06 | Stop reason: HOSPADM

## 2021-12-03 RX ORDER — PRAVASTATIN SODIUM 20 MG
20 TABLET ORAL NIGHTLY
Status: DISCONTINUED | OUTPATIENT
Start: 2021-12-03 | End: 2021-12-06 | Stop reason: HOSPADM

## 2021-12-03 RX ORDER — DEXTROSE MONOHYDRATE 50 MG/ML
100 INJECTION, SOLUTION INTRAVENOUS PRN
Status: DISCONTINUED | OUTPATIENT
Start: 2021-12-03 | End: 2021-12-06 | Stop reason: HOSPADM

## 2021-12-03 RX ORDER — AMPICILLIN TRIHYDRATE 250 MG
200 CAPSULE ORAL DAILY
Status: DISCONTINUED | OUTPATIENT
Start: 2021-12-04 | End: 2021-12-03 | Stop reason: CLARIF

## 2021-12-03 RX ORDER — NICOTINE POLACRILEX 4 MG
15 LOZENGE BUCCAL PRN
Status: DISCONTINUED | OUTPATIENT
Start: 2021-12-03 | End: 2021-12-06 | Stop reason: HOSPADM

## 2021-12-03 RX ORDER — FLUTICASONE FUROATE AND VILANTEROL 100; 25 UG/1; UG/1
1 POWDER RESPIRATORY (INHALATION) DAILY
Status: DISCONTINUED | OUTPATIENT
Start: 2021-12-04 | End: 2021-12-03 | Stop reason: CLARIF

## 2021-12-03 RX ORDER — BISACODYL 10 MG
10 SUPPOSITORY, RECTAL RECTAL DAILY PRN
Status: DISCONTINUED | OUTPATIENT
Start: 2021-12-03 | End: 2021-12-06 | Stop reason: HOSPADM

## 2021-12-03 RX ORDER — SENNA PLUS 8.6 MG/1
1 TABLET ORAL 2 TIMES DAILY
Status: DISCONTINUED | OUTPATIENT
Start: 2021-12-03 | End: 2021-12-06 | Stop reason: HOSPADM

## 2021-12-03 RX ORDER — VIT C/E/ZN/COPPR/LUTEIN/ZEAXAN 60 MG-6 MG
1 CAPSULE ORAL DAILY
Status: DISCONTINUED | OUTPATIENT
Start: 2021-12-04 | End: 2021-12-06 | Stop reason: HOSPADM

## 2021-12-03 RX ORDER — BUDESONIDE 0.25 MG/2ML
0.25 INHALANT ORAL 2 TIMES DAILY
Status: DISCONTINUED | OUTPATIENT
Start: 2021-12-04 | End: 2021-12-06 | Stop reason: HOSPADM

## 2021-12-03 RX ORDER — SODIUM CHLORIDE 0.9 % (FLUSH) 0.9 %
5-40 SYRINGE (ML) INJECTION PRN
Status: DISCONTINUED | OUTPATIENT
Start: 2021-12-03 | End: 2021-12-06 | Stop reason: HOSPADM

## 2021-12-03 RX ORDER — POLYETHYLENE GLYCOL 3350 17 G/17G
17 POWDER, FOR SOLUTION ORAL DAILY PRN
Status: DISCONTINUED | OUTPATIENT
Start: 2021-12-03 | End: 2021-12-06 | Stop reason: HOSPADM

## 2021-12-03 RX ORDER — SODIUM CHLORIDE 9 MG/ML
25 INJECTION, SOLUTION INTRAVENOUS PRN
Status: DISCONTINUED | OUTPATIENT
Start: 2021-12-03 | End: 2021-12-06 | Stop reason: HOSPADM

## 2021-12-03 RX ORDER — AMLODIPINE BESYLATE 5 MG/1
5 TABLET ORAL 2 TIMES DAILY
Status: DISCONTINUED | OUTPATIENT
Start: 2021-12-03 | End: 2021-12-06 | Stop reason: HOSPADM

## 2021-12-03 RX ORDER — HYDRALAZINE HYDROCHLORIDE 25 MG/1
25 TABLET, FILM COATED ORAL 2 TIMES DAILY
Status: DISCONTINUED | OUTPATIENT
Start: 2021-12-03 | End: 2021-12-06 | Stop reason: HOSPADM

## 2021-12-03 RX ORDER — PROCHLORPERAZINE EDISYLATE 5 MG/ML
5 INJECTION INTRAMUSCULAR; INTRAVENOUS EVERY 6 HOURS PRN
Status: DISCONTINUED | OUTPATIENT
Start: 2021-12-03 | End: 2021-12-06 | Stop reason: HOSPADM

## 2021-12-03 RX ORDER — ASPIRIN 81 MG/1
81 TABLET ORAL DAILY
Status: DISCONTINUED | OUTPATIENT
Start: 2021-12-04 | End: 2021-12-04

## 2021-12-03 RX ORDER — ALBUTEROL 90 MCG
2 AEROSOL (GRAM) INHALATION EVERY 6 HOURS
Status: DISCONTINUED | OUTPATIENT
Start: 2021-12-03 | End: 2021-12-03 | Stop reason: CLARIF

## 2021-12-03 RX ADMIN — ORPHENADRINE CITRATE 60 MG: 30 INJECTION INTRAMUSCULAR; INTRAVENOUS at 18:03

## 2021-12-03 RX ADMIN — OXYCODONE 5 MG: 5 TABLET ORAL at 23:38

## 2021-12-03 RX ADMIN — Medication 10 ML: at 23:46

## 2021-12-03 RX ADMIN — MORPHINE SULFATE 4 MG: 2 INJECTION, SOLUTION INTRAMUSCULAR; INTRAVENOUS at 18:03

## 2021-12-03 RX ADMIN — MORPHINE SULFATE 4 MG: 2 INJECTION, SOLUTION INTRAMUSCULAR; INTRAVENOUS at 21:58

## 2021-12-03 RX ADMIN — AMLODIPINE BESYLATE 5 MG: 5 TABLET ORAL at 23:46

## 2021-12-03 RX ADMIN — GABAPENTIN 300 MG: 300 CAPSULE ORAL at 23:46

## 2021-12-03 RX ADMIN — HYDRALAZINE HYDROCHLORIDE 25 MG: 25 TABLET, FILM COATED ORAL at 23:46

## 2021-12-03 RX ADMIN — SENNOSIDES 8.6 MG: 8.6 TABLET, COATED ORAL at 23:45

## 2021-12-03 ASSESSMENT — PAIN SCALES - GENERAL
PAINLEVEL_OUTOF10: 8
PAINLEVEL_OUTOF10: 4
PAINLEVEL_OUTOF10: 4
PAINLEVEL_OUTOF10: 10
PAINLEVEL_OUTOF10: 8

## 2021-12-03 ASSESSMENT — PAIN DESCRIPTION - DESCRIPTORS: DESCRIPTORS: SPASM

## 2021-12-03 ASSESSMENT — PAIN DESCRIPTION - PAIN TYPE: TYPE: ACUTE PAIN

## 2021-12-03 ASSESSMENT — PAIN DESCRIPTION - LOCATION: LOCATION: BACK

## 2021-12-03 NOTE — ED NOTES
Bed: 31  Expected date: 12/3/21  Expected time: 5:14 PM  Means of arrival:   Comments:  Angelo Tolentino RN  12/03/21 3232

## 2021-12-03 NOTE — ED NOTES
Pt arrives from NH for lower back spasms that she has had for 4 weeks.   Pt has been seen multiple times and has been      CARLA Hansen  12/03/21 1800

## 2021-12-03 NOTE — LETTER
[place] Department Medicaid  CERTIFICATION OF NECESSITY  FOR NON-EMERGENCY TRANSPORTATION   BY GROUND AMBULANCE      Individual Information   1. Name: Catherine Milton 2. [place] Medicaid Billing Number:    3. Address: [address] [place] [address] Place  [address] [place] [address]      Transportation Provider Information   4. Provider Name:    5. [place] Medicaid Provider Number Consolidated [facility] Provider Identifier (NPI):      Certification  7. Criteria:  During transport, this individual requires:  [x] Medical treatment or continuous     supervision by an EMT. [] The administration or regulation of oxygen by another person. [] Supervised protective restraint. 8. Period Beginning Date: 12/6/21   9. Length  [x] Not more than 5 day(s)  [] One Year     Additional Information Relevant to Certification   10. Comments or Explanations, If Necessary or Appropriate     Compression fractures of L5 lumbar vertebra      Certifying Practitioner Information   11. Name of Practitioner: Dr. Rob Peralta MD   12. [place] Medicaid Provider Number, If Applicable:  [address] Provider Identifier (NPI):      Signature Information   14. Date of Signature: 12/6/21 15. Name of Person Signing: Vin Mao-[facility]ij[facility]     16. Signature and Professional Designation: Rita Nolasco, Discharge Planner       [facility] [address]  Rev. 7/2015    [address] [place] [address] Encounter Date/Time: 12/3/2021 [place] [address] Account: [de-identified]    MRN: [id number]    Patient: Catherine Milton    Contact Serial #: [id number]      ENCOUNTER          Patient Class: I Private Enc?   No Unit RM BD: SEYZ 5S NS 5212/5212-A   Hospital Service: Med/Surg   Encounter DX: Compression fracture of *   ADM Provider:     Procedure:     ATT Provider: Rob Peralta MD   REF Provider:        Admission DX: Compression fracture of L5 lumbar vertebra, closed, initial encounter ([facility] Difficulty in walking, Compression fracture of T8 vertebra, initial encounter ([facility] and DX codes: S32.050A, R26.2, S22.060A      PATIENT                 Name: Stanislaw Yu : 1927 (89 yrs)   Address: [address] [place] [address], room* Sex: Female   City: [place] [address]         Marital Status:    Employer: RETIRED         Scientology: Spiritism   Primary Care Provider: Rin Henning DO         Primary Phone: [phone]   EMERGENCY CONTACT   Contact Name Legal Guardian? Relationship to Patient Home Phone Work Phone   1. Juno Melvin  2. Arian Melvin      Child  Child [phone]              GUARANTOR            Guarantor: Stanislaw Yu     : 1927   Address: [address] [place] [address]; Room * Sex: [place]     [place] [address]     Relation to Patient: Self       Home Phone: [phone]   Guarantor ID: [id number]       Work Phone:     Guarantor Employer: RETIRED         Status: RETIRED      COVERAGE        PRIMARY INSURANCE   Payor: MEDICARE Plan: RAILROAD MEDICARE   Payor Address: [facility] [address],  [facility], [facility] [address]       Group Number:   Insurance Type: INDEMNITY   Subscriber Name: Paula Grijalva : 1927   Subscriber ID: [id number] Mechelle Board. Rel. to Sub: Self   SECONDARY INSURANCE   Payor: [employer] Plan: [address] 61*   Payor Address:  [facility] [id number], [facility] [address]          Group Number:   Insurance Type: INDEMNITY   Subscriber Name: Paula Grijalva : 1927   Subscriber ID: [id number] Pat.  Rel. to Sub: SELF

## 2021-12-04 LAB
ALBUMIN SERPL-MCNC: 3.7 G/DL (ref 3.5–5.2)
ALP BLD-CCNC: 87 U/L (ref 35–104)
ALT SERPL-CCNC: 13 U/L (ref 0–32)
ANION GAP SERPL CALCULATED.3IONS-SCNC: 8 MMOL/L (ref 7–16)
AST SERPL-CCNC: 16 U/L (ref 0–31)
BASOPHILS ABSOLUTE: 0.02 E9/L (ref 0–0.2)
BASOPHILS RELATIVE PERCENT: 0.3 % (ref 0–2)
BILIRUB SERPL-MCNC: 0.2 MG/DL (ref 0–1.2)
BUN BLDV-MCNC: 15 MG/DL (ref 6–23)
CALCIUM SERPL-MCNC: 9 MG/DL (ref 8.6–10.2)
CHLORIDE BLD-SCNC: 99 MMOL/L (ref 98–107)
CO2: 26 MMOL/L (ref 22–29)
CREAT SERPL-MCNC: 0.8 MG/DL (ref 0.5–1)
EOSINOPHILS ABSOLUTE: 0.2 E9/L (ref 0.05–0.5)
EOSINOPHILS RELATIVE PERCENT: 3 % (ref 0–6)
GFR AFRICAN AMERICAN: >60
GFR NON-AFRICAN AMERICAN: >60 ML/MIN/1.73
GLUCOSE BLD-MCNC: 109 MG/DL (ref 74–99)
HBA1C MFR BLD: 6 % (ref 4–5.6)
HCT VFR BLD CALC: 28.1 % (ref 34–48)
HEMOGLOBIN: 9.4 G/DL (ref 11.5–15.5)
IMMATURE GRANULOCYTES #: 0.02 E9/L
IMMATURE GRANULOCYTES %: 0.3 % (ref 0–5)
LYMPHOCYTES ABSOLUTE: 1.54 E9/L (ref 1.5–4)
LYMPHOCYTES RELATIVE PERCENT: 23.4 % (ref 20–42)
MCH RBC QN AUTO: 34.3 PG (ref 26–35)
MCHC RBC AUTO-ENTMCNC: 33.5 % (ref 32–34.5)
MCV RBC AUTO: 102.6 FL (ref 80–99.9)
METER GLUCOSE: 111 MG/DL (ref 74–99)
METER GLUCOSE: 129 MG/DL (ref 74–99)
METER GLUCOSE: 257 MG/DL (ref 74–99)
METER GLUCOSE: 262 MG/DL (ref 74–99)
METER GLUCOSE: 289 MG/DL (ref 74–99)
MONOCYTES ABSOLUTE: 0.67 E9/L (ref 0.1–0.95)
MONOCYTES RELATIVE PERCENT: 10.2 % (ref 2–12)
NEUTROPHILS ABSOLUTE: 4.13 E9/L (ref 1.8–7.3)
NEUTROPHILS RELATIVE PERCENT: 62.8 % (ref 43–80)
PDW BLD-RTO: 13.7 FL (ref 11.5–15)
PLATELET # BLD: 297 E9/L (ref 130–450)
PMV BLD AUTO: 10 FL (ref 7–12)
POTASSIUM REFLEX MAGNESIUM: 4.1 MMOL/L (ref 3.5–5)
RBC # BLD: 2.74 E12/L (ref 3.5–5.5)
SODIUM BLD-SCNC: 133 MMOL/L (ref 132–146)
TOTAL PROTEIN: 5.9 G/DL (ref 6.4–8.3)
WBC # BLD: 6.6 E9/L (ref 4.5–11.5)

## 2021-12-04 PROCEDURE — 6370000000 HC RX 637 (ALT 250 FOR IP): Performed by: INTERNAL MEDICINE

## 2021-12-04 PROCEDURE — 6370000000 HC RX 637 (ALT 250 FOR IP): Performed by: NURSE PRACTITIONER

## 2021-12-04 PROCEDURE — 94640 AIRWAY INHALATION TREATMENT: CPT

## 2021-12-04 PROCEDURE — 83036 HEMOGLOBIN GLYCOSYLATED A1C: CPT

## 2021-12-04 PROCEDURE — 36415 COLL VENOUS BLD VENIPUNCTURE: CPT

## 2021-12-04 PROCEDURE — 6370000000 HC RX 637 (ALT 250 FOR IP): Performed by: NEUROLOGICAL SURGERY

## 2021-12-04 PROCEDURE — 85025 COMPLETE CBC W/AUTO DIFF WBC: CPT

## 2021-12-04 PROCEDURE — 80053 COMPREHEN METABOLIC PANEL: CPT

## 2021-12-04 PROCEDURE — 6360000002 HC RX W HCPCS: Performed by: INTERNAL MEDICINE

## 2021-12-04 PROCEDURE — 2580000003 HC RX 258: Performed by: NURSE PRACTITIONER

## 2021-12-04 PROCEDURE — 1200000000 HC SEMI PRIVATE

## 2021-12-04 PROCEDURE — 82962 GLUCOSE BLOOD TEST: CPT

## 2021-12-04 PROCEDURE — 6360000002 HC RX W HCPCS: Performed by: NURSE PRACTITIONER

## 2021-12-04 RX ORDER — ONDANSETRON 2 MG/ML
4 INJECTION INTRAMUSCULAR; INTRAVENOUS EVERY 8 HOURS PRN
Status: DISCONTINUED | OUTPATIENT
Start: 2021-12-04 | End: 2021-12-06 | Stop reason: HOSPADM

## 2021-12-04 RX ADMIN — OXYCODONE 5 MG: 5 TABLET ORAL at 10:20

## 2021-12-04 RX ADMIN — Medication 10 ML: at 10:31

## 2021-12-04 RX ADMIN — GABAPENTIN 300 MG: 300 CAPSULE ORAL at 17:56

## 2021-12-04 RX ADMIN — ARFORMOTEROL TARTRATE 15 MCG: 15 SOLUTION RESPIRATORY (INHALATION) at 19:50

## 2021-12-04 RX ADMIN — BISACODYL 10 MG: 10 SUPPOSITORY RECTAL at 11:40

## 2021-12-04 RX ADMIN — AMLODIPINE BESYLATE 5 MG: 5 TABLET ORAL at 20:49

## 2021-12-04 RX ADMIN — SUCRALFATE 1 G: 1 TABLET ORAL at 14:14

## 2021-12-04 RX ADMIN — Medication 10 ML: at 21:48

## 2021-12-04 RX ADMIN — OXYCODONE 5 MG: 5 TABLET ORAL at 19:08

## 2021-12-04 RX ADMIN — BUDESONIDE 250 MCG: 0.25 SUSPENSION RESPIRATORY (INHALATION) at 19:50

## 2021-12-04 RX ADMIN — BUDESONIDE 250 MCG: 0.25 SUSPENSION RESPIRATORY (INHALATION) at 03:10

## 2021-12-04 RX ADMIN — HYDRALAZINE HYDROCHLORIDE 25 MG: 25 TABLET, FILM COATED ORAL at 10:12

## 2021-12-04 RX ADMIN — MAGNESIUM HYDROXIDE 30 ML: 400 SUSPENSION ORAL at 10:20

## 2021-12-04 RX ADMIN — ARFORMOTEROL TARTRATE 15 MCG: 15 SOLUTION RESPIRATORY (INHALATION) at 03:09

## 2021-12-04 RX ADMIN — Medication 1 CAPSULE: at 10:12

## 2021-12-04 RX ADMIN — SENNOSIDES 8.6 MG: 8.6 TABLET, COATED ORAL at 10:12

## 2021-12-04 RX ADMIN — ALBUTEROL SULFATE 2.5 MG: 2.5 SOLUTION RESPIRATORY (INHALATION) at 19:50

## 2021-12-04 RX ADMIN — ASPIRIN 81 MG: 81 TABLET, COATED ORAL at 10:20

## 2021-12-04 RX ADMIN — ALBUTEROL SULFATE 2.5 MG: 2.5 SOLUTION RESPIRATORY (INHALATION) at 17:22

## 2021-12-04 RX ADMIN — SUCRALFATE 1 G: 1 TABLET ORAL at 10:11

## 2021-12-04 RX ADMIN — INSULIN LISPRO 3 UNITS: 100 INJECTION, SOLUTION INTRAVENOUS; SUBCUTANEOUS at 20:49

## 2021-12-04 RX ADMIN — ALBUTEROL SULFATE 2.5 MG: 2.5 SOLUTION RESPIRATORY (INHALATION) at 14:42

## 2021-12-04 RX ADMIN — SUCRALFATE 1 G: 1 TABLET ORAL at 20:48

## 2021-12-04 RX ADMIN — Medication 4000 UNITS: at 10:12

## 2021-12-04 RX ADMIN — SENNOSIDES 8.6 MG: 8.6 TABLET, COATED ORAL at 20:48

## 2021-12-04 RX ADMIN — PRAVASTATIN SODIUM 20 MG: 20 TABLET ORAL at 20:49

## 2021-12-04 RX ADMIN — FERROUS SULFATE TAB 325 MG (65 MG ELEMENTAL FE) 325 MG: 325 (65 FE) TAB at 10:12

## 2021-12-04 RX ADMIN — INSULIN LISPRO 6 UNITS: 100 INJECTION, SOLUTION INTRAVENOUS; SUBCUTANEOUS at 12:44

## 2021-12-04 RX ADMIN — HYDRALAZINE HYDROCHLORIDE 25 MG: 25 TABLET, FILM COATED ORAL at 20:49

## 2021-12-04 RX ADMIN — NEPHROCAP 1 MG: 1 CAP ORAL at 10:12

## 2021-12-04 RX ADMIN — PRAVASTATIN SODIUM 20 MG: 20 TABLET ORAL at 00:07

## 2021-12-04 RX ADMIN — ALBUTEROL SULFATE 2.5 MG: 2.5 SOLUTION RESPIRATORY (INHALATION) at 03:09

## 2021-12-04 RX ADMIN — ONDANSETRON 4 MG: 2 INJECTION INTRAMUSCULAR; INTRAVENOUS at 14:14

## 2021-12-04 RX ADMIN — AMLODIPINE BESYLATE 5 MG: 5 TABLET ORAL at 10:12

## 2021-12-04 ASSESSMENT — PAIN DESCRIPTION - FREQUENCY
FREQUENCY: CONTINUOUS
FREQUENCY: CONTINUOUS

## 2021-12-04 ASSESSMENT — PAIN DESCRIPTION - LOCATION
LOCATION: BACK
LOCATION: BACK

## 2021-12-04 ASSESSMENT — PAIN - FUNCTIONAL ASSESSMENT
PAIN_FUNCTIONAL_ASSESSMENT: PREVENTS OR INTERFERES WITH MANY ACTIVE NOT PASSIVE ACTIVITIES
PAIN_FUNCTIONAL_ASSESSMENT: PREVENTS OR INTERFERES WITH MANY ACTIVE NOT PASSIVE ACTIVITIES

## 2021-12-04 ASSESSMENT — PAIN DESCRIPTION - DESCRIPTORS
DESCRIPTORS: ACHING;DISCOMFORT;CONSTANT
DESCRIPTORS: ACHING;DISCOMFORT;CONSTANT

## 2021-12-04 ASSESSMENT — PAIN DESCRIPTION - ONSET
ONSET: ON-GOING
ONSET: ON-GOING

## 2021-12-04 ASSESSMENT — PAIN DESCRIPTION - PROGRESSION
CLINICAL_PROGRESSION: NOT CHANGED

## 2021-12-04 ASSESSMENT — PAIN SCALES - GENERAL
PAINLEVEL_OUTOF10: 0
PAINLEVEL_OUTOF10: 4
PAINLEVEL_OUTOF10: 8
PAINLEVEL_OUTOF10: 8

## 2021-12-04 ASSESSMENT — PAIN DESCRIPTION - ORIENTATION
ORIENTATION: MID;LOWER
ORIENTATION: LOWER;MID

## 2021-12-04 ASSESSMENT — PAIN DESCRIPTION - PAIN TYPE
TYPE: ACUTE PAIN
TYPE: ACUTE PAIN

## 2021-12-04 NOTE — PROGRESS NOTES
Call placed to [facility] and notified of off the shelf TLSO needed. Ticket to ride, order, facesheet, height, weight, and ICD 10 faxed over.   Electronically signed by Meryle Mosher, RN on 12/4/2021 at 1:28 PM

## 2021-12-04 NOTE — PROGRESS NOTES
Patient seen and examined, films reviewed, patient basically presents with a several month history of progressively worsening low back pain to the point of brought her to the emergency room for evaluation, imaging of the thoracic and lumbar spine documented multiple thoracic compression fractures and an L5 compression fracture no canal compromise questionable of the age of the fractures    She has no focal neurologic deficits, she is lucid and appropriate answers questions appropriately good grasp of the medical issues no confusion at this point    Long discussion with family members on the phone and at the bedside, presently she is able to make her own decisions    Diagnosis is back pain    Imaging consistent with multiple compression fractures age-indeterminate    Have recommended MRI imaging of the thoracic and lumbar spine to determine if these fractures are new or old    I am also going to get her an off-the-shelf TLSO brace    No neurosurgical intervention planned at this point

## 2021-12-04 NOTE — PROGRESS NOTES
OCCUPATIONAL THERAPY TREATMENT NOTE    [facility] [address] TREATMENT NOTE      Date:2021  Patient Name: Ting Herrera  MRN: [id number]  : 1927  Room: [address] [place] [address]-A     OT orders received & appreciated, chart reviewed. Awaiting NS consult with Dr. Farooq Minor. Will follow in accordance with NS orders/POC/recommdations. Thank you,  Juan Castillo.  JESUS Ruth/L   License #  [id number]

## 2021-12-04 NOTE — PROGRESS NOTES
Patient's family is in the room and upset that patient is currently NPO. This RN attempted to explain that this is a doctors order and the RN cannot give patient food without a doctors order. Message sent to Dr. Selina Chowdhury asking if patient can eat.   Electronically signed by Jamel Leblanc RN on 12/4/2021 at 9:28 AM

## 2021-12-04 NOTE — H&P
[id number]  228[facility]  History and Physical      CHIEF COMPLAINT:    Chief Complaint   Patient presents with    Back Pain     back spasms x4 weeks, no relief. given Norco @1430 and has lido. patch on from NH        Patient of Ambreen Spears DO presents with:  Compression fracture of L5 lumbar vertebra, closed, initial encounter ([facility] [address]    History of Present Illness:   Patient reports that about 2 weeks ago she started to notice increasing mid to low back pain which is nonradiating, no leg weakness or numbness, severe, dull, no other associated symptoms. No trauma that she can recall. She reports a very very distant history of having had a DEXA scan, probably more than 10 years ago, and her son vaguely recalls that she may have osteoporosis. REVIEW OF SYSTEMS:  Pertinent negatives are above in HPI. 10 point ROS otherwise negative.       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 ([address] 5/18/2021    Bronchitis 5/23/2017    CAD (coronary artery disease)     Cough 5/23/2017    Dermatophytosis 6/25/2021    Diabetes mellitus (HCC)     GERD (gastroesophageal reflux disease) 5/23/2017    History of pulmonary embolus (PE) 5/29/2021    Hx of blood clots     Hyperlipidemia     Hypertension     Leg swelling 7/15/2021    Lung disease     Peripheral vascular angioplasty status 5/29/2021    Pseudoaneurysm of right femoral artery ([address] 5/29/2021    PVD (peripheral vascular disease) with claudication ([address] 4/10/2019    Restless legs syndrome     Rhinitis, allergic 5/23/2017    Sinusitis 5/23/2017    SOB (shortness of breath) 5/23/2017    Type 1 diabetes mellitus with left diabetic foot ulcer ([address] 5/18/2021    Ulcerated, foot, left, limited to breakdown of skin ([address] 6/25/2021    Urinary incontinence          Past Surgical History:   Procedure Laterality Date    ABDOMEN SURGERY  APPENDECTOMY      CARDIAC SURGERY      CHOLECYSTECTOMY      COLONOSCOPY      CORONARY ARTERY BYPASS GRAFT      8/28/10    ENDOSCOPY, COLON, DIAGNOSTIC      FOOT DEBRIDEMENT Left 5/16/2021    FOOT DEBRIDEMENT INCISION AND DRAINAGE. performed by Connie Almonte DPM at [facility] Left 5/21/2021    LEFT FOOT DEBRIDEMENT  WITH DELAYED PRIMARY CLOSURE performed by Oilver Contreras DPM at [facility] and o 1997    TONSILLECTOMY AND ADENOIDECTOMY         Medications Prior to Admission:    Medications Prior to Admission: famotidine (PEPCID) 10 MG tablet, Take 10 mg by mouth 2 times daily  ciclopirox (PENLAC) 8 % solution, Apply once daily all toenails. sucralfate (CARAFATE) 1 GM tablet, Take 1 g by mouth three times daily  hydrALAZINE (APRESOLINE) 25 MG tablet, Take 25 mg by mouth 2 times daily  metFORMIN (GLUCOPHAGE) 500 MG tablet, Take 500 mg by mouth daily  polyethylene glycol (GLYCOLAX) 17 g packet, Take 17 g by mouth daily as needed for Constipation  pantoprazole sodium (PROTONIX) 40 MG PACK packet, Take 40 mg by mouth daily (with breakfast)  guaiFENesin (ROBITUSSIN) 100 MG/5ML syrup, Take 200 mg by mouth 4 times daily as needed for Cough  senna (SENOKOT) 8.6 MG tablet, Take 1 tablet by mouth 2 times daily  SODIUM CHLORIDE PO, Take 1 mg by mouth daily  Alpha-Lipoic Acid 600 MG TABS, Take 600 mg by mouth daily  polyvinyl alcohol (LIQUIFILM TEARS) 1.4 % ophthalmic solution, 1 drop 4 times daily  clotrimazole-betamethasone (LOTRISONE) 1-0.05 % cream, Apply topically 2 times daily Apply topically 2 times daily. Ginger, Zingiber officinalis, (GINGER ROOT PO), Take 750 mg by mouth Daily in am  ferrous sulfate (IRON 325) 325 (65 Fe) MG tablet, Take 325 mg by mouth daily In the morning for anemia  oxyCODONE-acetaminophen (PERCOCET) 7.5-325 MG per tablet, Take 1 tablet by mouth every 6 hours as needed for Pain.   b complex vitamins capsule, Take 1 capsule by mouth daily In the morning for supplement  Vitamin D (CHOLECALCIFEROL) 25 MCG (1000 UT) TABS tablet, Take 4 capsules by mouth Give 4000 units in the morning for supplement  aspirin 81 MG EC tablet, Take 1 tablet by mouth daily  promethazine (PHENERGAN) 12.5 MG tablet, Take 12.5 mg by mouth every 6 hours as needed for Nausea  traMADol (ULTRAM) 50 MG tablet, Take 50 mg by mouth 2 times daily. aluminum & magnesium hydroxide-simethicone (MYLANTA) 400-400-40 MG/5ML SUSP, Take 30 mLs by mouth every 6 hours as needed  lidocaine (LMX) 4 % cream, Apply topically every 8 hours as needed for Pain Apply topically as needed to painful muscles  diclofenac sodium (VOLTAREN) 1 % GEL, Apply topically 4 times daily as needed for Pain  glimepiride (AMARYL) 2 MG tablet, Take 2 mg by mouth every morning (before breakfast)  bisacodyl (DULCOLAX) 10 MG suppository, Place 10 mg rectally daily as needed for Constipation Indications: IF NO RESULTS FROM MOM   gabapentin (NEURONTIN) 300 MG capsule, Take 300 mg by mouth every evening. albuterol (PROVENTIL) 90 MCG/ACT inhaler, Inhale 2 puffs into the lungs 4 times daily  pravastatin (PRAVACHOL) 20 MG tablet, TAKE 1 TABLET BY MOUTH  NIGHTLY  amLODIPine (NORVASC) 5 MG tablet, TAKE 1 TABLET BY MOUTH  DAILY (Patient taking differently: Take 5 mg by mouth 2 times daily )  fluticasone-vilanterol (BREO ELLIPTA) 100-25 MCG/INH AEPB inhaler, Inhale 1 puff into the lungs daily  Multiple Vitamins-Minerals (OCUVITE ADULT FORMULA PO), Take 1 capsule by mouth daily  Probiotic Product (PRO-BIOTIC BLEND PO), Take 1 capsule by mouth 2 times daily   magnesium gluconate (MAGONATE) 500 MG tablet, Take 200 mg by mouth 2 times daily   Coenzyme Q10 (COQ10) 200 MG CAPS, Take 200 mg by mouth daily     Note that the patient's home medications were reviewed and the above list is accurate to the best of my knowledge at the time of the exam.    Allergies:    Lisinopril    Social History:    reports that she has never smoked.  She has never used smokeless tobacco. She reports current alcohol use. She reports that she does not use drugs. Family History:   family history includes Heart Disease in her brother; Hypertension in her father. PHYSICAL EXAM:    Vitals:  BP (!) 160/67   Pulse 57   Temp 97.7 °F (36.5 °C) (Temporal)   Resp 16   LMP 12/03/1997   SpO2 98%       General appearance: NAD, conversant  Eyes: Sclerae anicteric, PERRLA  HEENT: AT/NC, MMM  Neck: FROM, supple, no thyromegaly  Lymph: No cervical / supraclavicular lymphadenopathy  Lungs: Clear to auscultation, WOB normal  CV: RRR, no MRGs, no lower extremity edema  Abdomen: Soft, non-tender; no masses or HSM, +BS  Extremities: FROM without synovitis. No clubbing or cyanosis of the hands. Skin: no rash, induration, lesions, or ulcers  Psych: Calm and cooperative. Normal judgement and insight. Normal mood and affect. Neuro: Alert and interactive, face symmetric, speech fluent. Hip flexors and foot plantar/dorsiflexors 5/5 b/l, sensation intact in b/l LE's    LABS:  All labs reviewed.   Of note:  CBC with Differential:    Lab Results   Component Value Date    WBC 6.6 12/04/2021    RBC 2.74 12/04/2021    HGB 9.4 12/04/2021    HCT 28.1 12/04/2021     12/04/2021    .6 12/04/2021    MCH 34.3 12/04/2021    MCHC 33.5 12/04/2021    RDW 13.7 12/04/2021    LYMPHOPCT 23.4 12/04/2021    MONOPCT 10.2 12/04/2021    BASOPCT 0.3 12/04/2021    MONOSABS 0.67 12/04/2021    LYMPHSABS 1.54 12/04/2021    EOSABS 0.20 12/04/2021    BASOSABS 0.02 12/04/2021     CMP:    Lab Results   Component Value Date     12/04/2021    K 4.1 12/04/2021    CL 99 12/04/2021    CO2 26 12/04/2021    BUN 15 12/04/2021    CREATININE 0.8 12/04/2021    GFRAA >60 12/04/2021    LABGLOM >60 12/04/2021    GLUCOSE 109 12/04/2021    PROT 5.9 12/04/2021    LABALBU 3.7 12/04/2021    CALCIUM 9.0 12/04/2021    BILITOT 0.2 12/04/2021    ALKPHOS 87 12/04/2021    AST 16 12/04/2021    ALT 13 12/04/2021

## 2021-12-04 NOTE — PLAN OF CARE
Problem: Pain:  Goal: Pain level will decrease  Description: Pain level will decrease  12/4/2021 1341 by Bambi Verdugo RN  Outcome: Met This Shift  12/4/2021 0138 by Savannah Collet, RN  Outcome: Ongoing  Goal: Control of acute pain  Description: Control of acute pain  12/4/2021 1341 by Bambi Verdugo RN  Outcome: Met This Shift  12/4/2021 0138 by Savannah Collet, RN  Outcome: Ongoing  Goal: Control of chronic pain  Description: Control of chronic pain  Outcome: Met This Shift     Problem: Falls - Risk of:  Goal: Will remain free from falls  Description: Will remain free from falls  12/4/2021 1341 by Bambi Verdugo RN  Outcome: Met This Shift  12/4/2021 0138 by Savannah Collet, RN  Outcome: Ongoing  Goal: Absence of physical injury  Description: Absence of physical injury  Outcome: Met This Shift     Problem: Skin Integrity:  Goal: Will show no infection signs and symptoms  Description: Will show no infection signs and symptoms  12/4/2021 1341 by Bambi Verdugo RN  Outcome: Met This Shift  12/4/2021 0138 by Savannah Collet, RN  Outcome: Ongoing  Goal: Absence of new skin breakdown  Description: Absence of new skin breakdown  Outcome: Met This Shift

## 2021-12-05 ENCOUNTER — APPOINTMENT (OUTPATIENT)
Dept: MRI IMAGING | Age: 86
DRG: 544 | End: 2021-12-05
Payer: MEDICARE

## 2021-12-05 PROBLEM — M48.00 SPINAL STENOSIS: Chronic | Status: ACTIVE | Noted: 2021-12-05

## 2021-12-05 LAB
ANION GAP SERPL CALCULATED.3IONS-SCNC: 11 MMOL/L (ref 7–16)
BASOPHILS ABSOLUTE: 0.03 E9/L (ref 0–0.2)
BASOPHILS RELATIVE PERCENT: 0.3 % (ref 0–2)
BUN BLDV-MCNC: 22 MG/DL (ref 6–23)
CALCIUM SERPL-MCNC: 8.7 MG/DL (ref 8.6–10.2)
CHLORIDE BLD-SCNC: 106 MMOL/L (ref 98–107)
CO2: 22 MMOL/L (ref 22–29)
CREAT SERPL-MCNC: 1 MG/DL (ref 0.5–1)
EOSINOPHILS ABSOLUTE: 0.2 E9/L (ref 0.05–0.5)
EOSINOPHILS RELATIVE PERCENT: 2 % (ref 0–6)
GFR AFRICAN AMERICAN: >60
GFR NON-AFRICAN AMERICAN: 52 ML/MIN/1.73
GLUCOSE BLD-MCNC: 130 MG/DL (ref 74–99)
HCT VFR BLD CALC: 29.1 % (ref 34–48)
HEMOGLOBIN: 9.6 G/DL (ref 11.5–15.5)
IMMATURE GRANULOCYTES #: 0.04 E9/L
IMMATURE GRANULOCYTES %: 0.4 % (ref 0–5)
LYMPHOCYTES ABSOLUTE: 1.17 E9/L (ref 1.5–4)
LYMPHOCYTES RELATIVE PERCENT: 11.5 % (ref 20–42)
MCH RBC QN AUTO: 34.7 PG (ref 26–35)
MCHC RBC AUTO-ENTMCNC: 33 % (ref 32–34.5)
MCV RBC AUTO: 105.1 FL (ref 80–99.9)
METER GLUCOSE: 157 MG/DL (ref 74–99)
METER GLUCOSE: 160 MG/DL (ref 74–99)
METER GLUCOSE: 198 MG/DL (ref 74–99)
METER GLUCOSE: 208 MG/DL (ref 74–99)
MONOCYTES ABSOLUTE: 0.78 E9/L (ref 0.1–0.95)
MONOCYTES RELATIVE PERCENT: 7.7 % (ref 2–12)
NEUTROPHILS ABSOLUTE: 7.97 E9/L (ref 1.8–7.3)
NEUTROPHILS RELATIVE PERCENT: 78.1 % (ref 43–80)
PDW BLD-RTO: 13.7 FL (ref 11.5–15)
PLATELET # BLD: 284 E9/L (ref 130–450)
PMV BLD AUTO: 10 FL (ref 7–12)
POTASSIUM SERPL-SCNC: 4.7 MMOL/L (ref 3.5–5)
RBC # BLD: 2.77 E12/L (ref 3.5–5.5)
SODIUM BLD-SCNC: 139 MMOL/L (ref 132–146)
WBC # BLD: 10.2 E9/L (ref 4.5–11.5)

## 2021-12-05 PROCEDURE — 82962 GLUCOSE BLOOD TEST: CPT

## 2021-12-05 PROCEDURE — 94640 AIRWAY INHALATION TREATMENT: CPT

## 2021-12-05 PROCEDURE — 6360000002 HC RX W HCPCS: Performed by: INTERNAL MEDICINE

## 2021-12-05 PROCEDURE — 2580000003 HC RX 258: Performed by: NURSE PRACTITIONER

## 2021-12-05 PROCEDURE — 72148 MRI LUMBAR SPINE W/O DYE: CPT

## 2021-12-05 PROCEDURE — 6360000002 HC RX W HCPCS: Performed by: NURSE PRACTITIONER

## 2021-12-05 PROCEDURE — 80048 BASIC METABOLIC PNL TOTAL CA: CPT

## 2021-12-05 PROCEDURE — 97165 OT EVAL LOW COMPLEX 30 MIN: CPT

## 2021-12-05 PROCEDURE — 97161 PT EVAL LOW COMPLEX 20 MIN: CPT

## 2021-12-05 PROCEDURE — 6370000000 HC RX 637 (ALT 250 FOR IP): Performed by: INTERNAL MEDICINE

## 2021-12-05 PROCEDURE — 85025 COMPLETE CBC W/AUTO DIFF WBC: CPT

## 2021-12-05 PROCEDURE — 72146 MRI CHEST SPINE W/O DYE: CPT

## 2021-12-05 PROCEDURE — 97535 SELF CARE MNGMENT TRAINING: CPT

## 2021-12-05 PROCEDURE — 6370000000 HC RX 637 (ALT 250 FOR IP): Performed by: NURSE PRACTITIONER

## 2021-12-05 PROCEDURE — 97530 THERAPEUTIC ACTIVITIES: CPT

## 2021-12-05 PROCEDURE — 1200000000 HC SEMI PRIVATE

## 2021-12-05 PROCEDURE — 36415 COLL VENOUS BLD VENIPUNCTURE: CPT

## 2021-12-05 RX ORDER — HEPARIN SODIUM 10000 [USP'U]/ML
5000 INJECTION, SOLUTION INTRAVENOUS; SUBCUTANEOUS EVERY 8 HOURS SCHEDULED
Status: DISCONTINUED | OUTPATIENT
Start: 2021-12-05 | End: 2021-12-06 | Stop reason: HOSPADM

## 2021-12-05 RX ADMIN — OXYCODONE 5 MG: 5 TABLET ORAL at 15:50

## 2021-12-05 RX ADMIN — BUDESONIDE 250 MCG: 0.25 SUSPENSION RESPIRATORY (INHALATION) at 20:54

## 2021-12-05 RX ADMIN — HEPARIN SODIUM 5000 UNITS: 10000 INJECTION INTRAVENOUS; SUBCUTANEOUS at 21:09

## 2021-12-05 RX ADMIN — Medication 10 ML: at 10:08

## 2021-12-05 RX ADMIN — ALBUTEROL SULFATE 2.5 MG: 2.5 SOLUTION RESPIRATORY (INHALATION) at 13:41

## 2021-12-05 RX ADMIN — Medication 4000 UNITS: at 10:05

## 2021-12-05 RX ADMIN — ALBUTEROL SULFATE 2.5 MG: 2.5 SOLUTION RESPIRATORY (INHALATION) at 16:38

## 2021-12-05 RX ADMIN — ARFORMOTEROL TARTRATE 15 MCG: 15 SOLUTION RESPIRATORY (INHALATION) at 10:40

## 2021-12-05 RX ADMIN — HYDRALAZINE HYDROCHLORIDE 25 MG: 25 TABLET, FILM COATED ORAL at 10:05

## 2021-12-05 RX ADMIN — GABAPENTIN 300 MG: 300 CAPSULE ORAL at 17:18

## 2021-12-05 RX ADMIN — PRAVASTATIN SODIUM 20 MG: 20 TABLET ORAL at 21:10

## 2021-12-05 RX ADMIN — INSULIN LISPRO 2 UNITS: 100 INJECTION, SOLUTION INTRAVENOUS; SUBCUTANEOUS at 10:08

## 2021-12-05 RX ADMIN — OXYCODONE 5 MG: 5 TABLET ORAL at 04:40

## 2021-12-05 RX ADMIN — INSULIN LISPRO 1 UNITS: 100 INJECTION, SOLUTION INTRAVENOUS; SUBCUTANEOUS at 21:09

## 2021-12-05 RX ADMIN — FERROUS SULFATE TAB 325 MG (65 MG ELEMENTAL FE) 325 MG: 325 (65 FE) TAB at 10:05

## 2021-12-05 RX ADMIN — Medication 10 ML: at 21:16

## 2021-12-05 RX ADMIN — HYDRALAZINE HYDROCHLORIDE 25 MG: 25 TABLET, FILM COATED ORAL at 21:11

## 2021-12-05 RX ADMIN — SENNOSIDES 8.6 MG: 8.6 TABLET, COATED ORAL at 21:10

## 2021-12-05 RX ADMIN — SUCRALFATE 1 G: 1 TABLET ORAL at 10:05

## 2021-12-05 RX ADMIN — ACETAMINOPHEN 650 MG: 325 TABLET ORAL at 10:05

## 2021-12-05 RX ADMIN — POLYETHYLENE GLYCOL 3350 17 G: 17 POWDER, FOR SOLUTION ORAL at 10:05

## 2021-12-05 RX ADMIN — SENNOSIDES 8.6 MG: 8.6 TABLET, COATED ORAL at 10:04

## 2021-12-05 RX ADMIN — PANTOPRAZOLE SODIUM 40 MG: 40 TABLET, DELAYED RELEASE ORAL at 06:13

## 2021-12-05 RX ADMIN — ARFORMOTEROL TARTRATE 15 MCG: 15 SOLUTION RESPIRATORY (INHALATION) at 20:54

## 2021-12-05 RX ADMIN — BUDESONIDE 250 MCG: 0.25 SUSPENSION RESPIRATORY (INHALATION) at 10:40

## 2021-12-05 RX ADMIN — INSULIN LISPRO 2 UNITS: 100 INJECTION, SOLUTION INTRAVENOUS; SUBCUTANEOUS at 17:19

## 2021-12-05 RX ADMIN — ALBUTEROL SULFATE 2.5 MG: 2.5 SOLUTION RESPIRATORY (INHALATION) at 20:53

## 2021-12-05 RX ADMIN — SUCRALFATE 1 G: 1 TABLET ORAL at 14:19

## 2021-12-05 RX ADMIN — AMLODIPINE BESYLATE 5 MG: 5 TABLET ORAL at 10:05

## 2021-12-05 RX ADMIN — Medication 1 CAPSULE: at 10:05

## 2021-12-05 RX ADMIN — NEPHROCAP 1 MG: 1 CAP ORAL at 10:05

## 2021-12-05 RX ADMIN — ALBUTEROL SULFATE 2.5 MG: 2.5 SOLUTION RESPIRATORY (INHALATION) at 10:40

## 2021-12-05 RX ADMIN — INSULIN LISPRO 4 UNITS: 100 INJECTION, SOLUTION INTRAVENOUS; SUBCUTANEOUS at 12:28

## 2021-12-05 RX ADMIN — SUCRALFATE 1 G: 1 TABLET ORAL at 21:10

## 2021-12-05 RX ADMIN — OXYCODONE 5 MG: 5 TABLET ORAL at 21:10

## 2021-12-05 RX ADMIN — AMLODIPINE BESYLATE 5 MG: 5 TABLET ORAL at 21:10

## 2021-12-05 ASSESSMENT — PAIN DESCRIPTION - FREQUENCY
FREQUENCY: CONTINUOUS
FREQUENCY: CONTINUOUS

## 2021-12-05 ASSESSMENT — PAIN SCALES - GENERAL
PAINLEVEL_OUTOF10: 6
PAINLEVEL_OUTOF10: 0
PAINLEVEL_OUTOF10: 2
PAINLEVEL_OUTOF10: 5
PAINLEVEL_OUTOF10: 3
PAINLEVEL_OUTOF10: 10

## 2021-12-05 ASSESSMENT — PAIN DESCRIPTION - LOCATION
LOCATION: BACK
LOCATION: BACK

## 2021-12-05 ASSESSMENT — PAIN DESCRIPTION - ORIENTATION
ORIENTATION: LOWER;MID
ORIENTATION: LOWER;MID

## 2021-12-05 ASSESSMENT — PAIN DESCRIPTION - DESCRIPTORS
DESCRIPTORS: ACHING;DISCOMFORT;CONSTANT
DESCRIPTORS: ACHING;DISCOMFORT;CONSTANT

## 2021-12-05 ASSESSMENT — PAIN DESCRIPTION - PROGRESSION
CLINICAL_PROGRESSION: NOT CHANGED

## 2021-12-05 ASSESSMENT — PAIN DESCRIPTION - PAIN TYPE
TYPE: ACUTE PAIN
TYPE: ACUTE PAIN

## 2021-12-05 ASSESSMENT — PAIN DESCRIPTION - ONSET
ONSET: ON-GOING
ONSET: ON-GOING

## 2021-12-05 NOTE — PROGRESS NOTES
OCCUPATIONAL THERAPY INITIAL EVALUATION    [facility] Angy Santo "[employer] [address] [address]      [id number]                                                               Patient Name: Nivia Stark  MRN: [id number]  : 1927  Room: [address] [place] [address]    Evaluating OT: Marisa Juarez, OTR/L [id number]    Referring Provider: VIK Webster CNP   Specific Provider Orders/Date: OT eval and treat (12/3/21)       Diagnosis: Compression fracture of L5 lumbar vertebra, closed               Compression fracture of T8 vertebra                      Difficulty in walking     Reason for admission: back spasms s/p 4 weeks; mid and low back pain worsening with positional changes    Surgery/Procedures: N/A     Pertinent Medical History: Arthritis, Asthma, CAD, DM, PE, HLD, HTN, PVD, restless leg syndrome, L foot ulcer, [place]     *Precautions:  Fall Risk, spinal, TLSO, [place]    Assessment of current deficits   [x] Functional mobility  [x]ADLs  [x] Strength               []Cognition   [x] Functional transfers   [x] IADLs         [x] Safety Awareness   [x]Endurance   [] Fine Coordination        [x] ROM     [] Vision/perception   []Sensation    []Gross Motor Coordination [x] Balance   [] Delirium                  []Motor Control     [] Communication    OT PLAN OF CARE   OT POC based on physician orders, patient diagnosis and results of clinical assessment.        Frequency/Duration: 1-3 days/wk for 1-2 weeks PRN    Specific OT Treatment to include:   ADL retraining/adapted techniques and AE recommendations to increase functional independence within precautions                    Energy conservation techniques to improve tolerance for selfcare routine   Functional transfer/mobility training/DME recommendations for increased independence, safety and fall prevention         Patient/family education to increase safety and functional independence within precautions Environmental modifications for safe mobility and completion of ADLs                           Therapeutic activity to improve functional performance during ADLs/IADLs                                         Therapeutic exercise to improve tolerance and functional strength for ADLs   Balance retraining exercises/tasks for facilitation of postural control with dynamic challenges during ADLs . Positioning to improve functional independence  Neuromuscular re-education: facilitation of righting/equilibrium reactions, normalization muscle tone/facilitation active functional movement           Recommended Adaptive Equipment: TBA: pt reports she may need a new reacher, recommend long handled sponge for showering                Home Living: Pt lives in a nursing facility s/p 2 years. Pt reports she has been active with therapy. Bathroom setup: walk in shower with seat & rail; commode rails  Equipment owned: rollator, ADL AE    Prior Level of Function: Darin with dressing tasks using AE; S/A with showering; Assist with IADLs. Rollator for ambulation. Driving: no  Occupation: N/A    Pain Level: pt c/o 0/10  pain  this session; pt reports feeling supported by brace    Cognition: A&O: 3-4/4    Follows 1-2 step commands appropriately.    Memory: good   Comprehension good   Problem solving: fair   Judgement/safety: fair               Communication skills: WFL; [place]           Vision: h/o macular degeneration; grossly WFL               Glasses:no                                                   Hearing: [place]-hearing aids    UE Assessment:  Hand Dominance: Right [x]  Left []     ROM Strength   RUE  WFL 4-/5   LUE WFL 4-/5     Sensation: No c/o numbness or tingling in extremities; reports LE numbness resolving  Tone: WNL   Edema: [facility]     Functional Assessment:  AM-PAC Daily Activity Raw Score: 15/24   Initial Eval Status  Date: 12/4/21 Treatment Status  Date: STGs = LTGs  Time frame: 7-14 days   Feeding Darin  seated in chair                           Grooming Min A                        SBA   while standing sink level requiring [employer] for balance and demonstrating G tolerance      UB dressing/bathing Max A                        Min A       LB dressing/bathing Dep    Max A   using AE after instruction on spinal precautions                        Min A   using AE as needed for safe reach/ energy conservation       Toileting Max A                        Min A     Bed Mobility  Supine to sit:   NT    Sit to supine:   NT                        Min A  in prep of ADL tasks & transfers   Functional Transfers Sit to stand:   Min A    Stand to sit:   Min A                        SBA  sit<>stand/functional bathroom transfers using AD/DME as needed for balance and safety   Functional Mobility Min A WW                       SBA   functional/bathroom mobility using AD as needed & demonstrating G safety     Balance Sitting:     Static:  S    Dynamic:Min A  Standing: Min A [employer]  S dynamic sitting balance; SBA dynamic standing balance  during ADL tasks & transfers   Endurance/Activity Tolerance   F tolerance with light to moderate activity. G   tolerance with moderate activity/self care routine   Visual/  Perceptual   WFL                       Treatment: OT treatment provided this date includes:  Balance retraining: Performed sitting/standing balance ex's with instruction to facilitate righting reactions with postural changes during ADLS. ADL retraining: Instruction on adapted techniques/AE(reacher, sock aid) to increase independence and safe reach during dressing/bathing activities. Pt demonstrated fair follow through. Functional transfer training:  Instruction on postural cues, hand placement/sequencing, & walker safety  to assist with balance and fall prevention during self care routine/bathroom transfers.      Energy conservation: Education on breathing techniques, pacing, work simplification strategies & recommended bathroom DME for safety and energy conservation during self care tasks and activities of daily living. Comments: OK from RN to see patient. Upon arrival, patient sitting up in chair finishing breakfast.  Pt demo fair tolerance with fair understanding of education/techniques. At end of session, patient left sitting in chair to increase activity tolerance; pt with no complaints. Call light within reach, all lines and tubes intact. Pt instructed on use of call light for assistance and fall prevention. .    Patient presents with decreased ROM/strength,activity tolerance, dynamic balance, functional mobility limiting completion of ADLs and safety. Pt can benefit from continued skilled OT to increase safety, functional independence & quality of life. Rehab Potential: Good for established goals    Patient / Family Goal: to return to PLOF    Patient and/or family were instructed/educated on diagnosis, prognosis/goals and plan of care. Pt demonstrated F understanding. Evaluation Complexity: Low      [] Malnutrition indicators have been identified and nursing has been notified to ensure a dietitian consult is ordered. Time In:`1005             Time Out: 1035         Total Treatment time: 15   Min Units   OT Eval Low 52602 X    OT Eval Medium 71003     OT Eval High 59937     OT Re-Eval [id number]     Therapeutic Ex [id number]     Therapeutic Activities 08789     ADL/Self Care 98075 15 1   Orthotic Management 76875     Neuro Re-Ed 12273     Non-Billable Time        Evaluation time includes thorough review of current medical information, gathering information on past medical history/social history and prior level of function, completion of standardized testing/informal observation of tasks, assessment of data and development of POC/Goals.      Uriel Rowan, OTR/L [id number]

## 2021-12-05 NOTE — PROGRESS NOTES
MRI results reviewed with the patient, official reports are pending but to my eye there is acute to subacute fractures of T8 and T9 on the thoracic MRI, and L5 on the lumbar MRI.   (All 3 of these vertebral bodies had increased signal on the stir images)    Patient has off-the-shelf soft TLSO brace, we discussed risk and benefits of kyphoplasty, for now we will have PT OT see her see how she does with the brace and analgesics (ie initial conservative treatment) per her wish    Will follow

## 2021-12-05 NOTE — PLAN OF CARE
Problem: Falls - Risk of:  Goal: Will remain free from falls  Description: Will remain free from falls  12/5/2021 1115 by Patricia Givens RN  Outcome: Met This Shift  12/5/2021 1113 by Patricia Givens RN  Outcome: Met This Shift  12/4/2021 2350 by Uday Riojas RN  Outcome: Met This Shift  Goal: Absence of physical injury  Description: Absence of physical injury  12/5/2021 1115 by Patricia Givens RN  Outcome: Met This Shift  12/5/2021 1113 by Patricia Givens RN  Outcome: Met This Shift  12/4/2021 2350 by Uday Riojas RN  Outcome: Met This Shift     Problem: Skin Integrity:  Goal: Will show no infection signs and symptoms  Description: Will show no infection signs and symptoms  12/5/2021 1115 by Patricia Givens RN  Outcome: Met This Shift  12/5/2021 1113 by Patricia Givens RN  Outcome: Met This Shift  12/4/2021 2350 by Uday Riojas RN  Outcome: Met This Shift  Goal: Absence of new skin breakdown  Description: Absence of new skin breakdown  12/5/2021 1115 by Patricia Givens RN  Outcome: Met This Shift  12/5/2021 1113 by Patricia Givens RN  Outcome: Met This Shift  12/4/2021 2350 by Uday Riojas RN  Outcome: Met This Shift     Problem: Pain:  Goal: Pain level will decrease  Description: Pain level will decrease  12/5/2021 1115 by Patricia Givens RN  Outcome: Ongoing  12/5/2021 1113 by Patricia Givens RN  Outcome: Met This Shift  12/4/2021 2350 by Uday Riojas RN  Outcome: Met This Shift  Goal: Control of acute pain  Description: Control of acute pain  12/5/2021 1115 by Patricia Givens RN  Outcome: Ongoing  12/5/2021 1113 by Patricia Givens RN  Outcome: Met This Shift  12/4/2021 2350 by Uday Riojas RN  Outcome: Met This Shift  Goal: Control of chronic pain  Description: Control of chronic pain  12/5/2021 1115 by Patrciia Givens RN  Outcome: Ongoing  12/5/2021 1113 by Patricia Givens RN  Outcome: Met This Shift  12/4/2021 2350 by Uday Riojas RN  Outcome: Met This Shift

## 2021-12-05 NOTE — PROGRESS NOTES
12/04/2021    AST 16 12/04/2021    ALT 13 12/04/2021        Imaging:  I've personally reviewed the patient's MRI T and L-spine  There are compression fractures at T8, T9, L5, and central spinal stenosis at L3, 4, 5, S1    Assessment/Plan:  Principal Problem:    Compression fracture of L5 lumbar vertebra, closed, initial encounter ([facility] [address]  Active Problems:    CAD (coronary artery disease)    Vitamin D deficiency    HTN (hypertension)    DM (diabetes mellitus), type 2 ([facility] [address]    History of pulmonary embolus (PE)    HLD (hyperlipidemia)    Compression fracture of thoracic vertebra (HCC)    Anemia    Spinal stenosis  Resolved Problems:    * No resolved hospital problems. *       MRI results back    Initial conservative treatment per neurosurgery recommendations    Mental status is normal, okay to continue low-dose opioids. Continue brace as long as tolerated    Bowel regimen    Glucose well controlled    Seems she is supposed to be on Eliquis but it has fallen off her medication reconciliation. Anyways we will hold for now, as the patient's RN states that the patient and family are starting to consider doing kyphoplasty.   We will do heparin subcu for now    Requires continued inpatient level of care     Gely Ace MD    4:54 PM  12/5/2021

## 2021-12-05 NOTE — PLAN OF CARE
Problem: Pain:  Goal: Pain level will decrease  Description: Pain level will decrease  12/5/2021 1113 by Filipe Mitchell RN  Outcome: Met This Shift  12/4/2021 2350 by Neo Morris RN  Outcome: Met This Shift  Goal: Control of acute pain  Description: Control of acute pain  12/5/2021 1113 by Filipe Mitchell RN  Outcome: Met This Shift  12/4/2021 2350 by Neo Morris RN  Outcome: Met This Shift  Goal: Control of chronic pain  Description: Control of chronic pain  12/5/2021 1113 by Filipe Mitchell RN  Outcome: Met This Shift  12/4/2021 2350 by Neo Morris RN  Outcome: Met This Shift     Problem: Falls - Risk of:  Goal: Will remain free from falls  Description: Will remain free from falls  12/5/2021 1113 by Filipe Mitchell RN  Outcome: Met This Shift  12/4/2021 2350 by Neo Morris RN  Outcome: Met This Shift  Goal: Absence of physical injury  Description: Absence of physical injury  12/5/2021 1113 by Filipe Mitchell RN  Outcome: Met This Shift  12/4/2021 2350 by Neo Morris RN  Outcome: Met This Shift     Problem: Skin Integrity:  Goal: Will show no infection signs and symptoms  Description: Will show no infection signs and symptoms  12/5/2021 1113 by Filipe Mitchell RN  Outcome: Met This Shift  12/4/2021 2350 by Neo Morris RN  Outcome: Met This Shift  Goal: Absence of new skin breakdown  Description: Absence of new skin breakdown  12/5/2021 1113 by Filipe Mitchell RN  Outcome: Met This Shift  12/4/2021 2350 by Neo Morris RN  Outcome: Met This Shift

## 2021-12-05 NOTE — PROGRESS NOTES
Physical Therapy  Physical Therapy Initial Assessment     Name: Lizeth Pena  : 1927  MRN: [id number]      Date of Service: 2021    Evaluating PT:  Khai Quintanilla PT, DPT [id number]    Room #:  5456/0151-B  Diagnosis:  Compression fracture of L5 lumbar vertebra, closed, initial encounter ([facility] [S32.050A]  Difficulty in walking [R26.2]  Compression fracture of T8 vertebra, initial encounter ([facility] [S22.060A]  PMHx/PSHx:  Arthritis, Asthma, CAD, DM, HLD, HTN, PVD  Procedure/Surgery:  None  Precautions:  Falls, spinal, TLSO, [place]  Equipment Needs:  TBD    SUBJECTIVE:    Pt was admitted from SNF. Pt ambulated with rollator and was mostly independent with ADLs PTA. OBJECTIVE:   Initial Evaluation  Date: 21 Treatment Short Term/ Long Term   Goals   AM-PAC 6 Clicks 33/14     Was pt agreeable to Eval/treatment? yes     Does pt have pain?  2/10 back pain at rest     Bed Mobility  Rolling: NT  Supine to sit: NT  Sit to supine: NT  Scooting: NT  SBA   Transfers Sit to stand: Aydee  Stand to sit: Aydee  Stand pivot: [place]dee with [address]  SBA with [address]   Ambulation   60 feet with [place]dee with [address]  >150 feet with SBA with [address]   Stair negotiation: ascended and descended NA     ROM BUE:  Defer to OT note  BLE:  WFL     Strength BUE:  Defer to OT note  BLE:  4/5  Increase by 1/3 MMT grade   Balance Sitting EOB:  NT  Dynamic Standing:  [place]dee with [address]  Sitting EOB:  Independent  Dynamic Standing:  SBA with [address]     Pt is A & O x 4  Sensation:  No reported paresthesias  Edema:  None    Therapeutic Exercises:  NA    Patient education  Pt educated on safety    Patient response to education:   Pt verbalized understanding Pt demonstrated skill Pt requires further education in this area   x x x     ASSESSMENT:    Conditions Requiring Skilled Therapeutic Intervention:    []Decreased strength     []Decreased ROM  [x]Decreased functional mobility  [x]Decreased balance   [x]Decreased endurance   [x]Decreased posture  []Decreased sensation  []Decreased coordination   []Decreased vision  []Decreased safety awareness   [x]Increased pain       Comments:  Pt was sitting in chair upon arrival, agreeable to initial evaluation. Reviewed spinal precautions and importance of TLSO brace. TLSO was already donned by nursing. Pt stood with flexed posture that continued into ambulation. Pt's gait was slow with slight unsteadiness. L knee pain reported during ambulation. Fatigue limited further activity. Pt was left in chair with all needs met and call light in reach. Treatment:  Patient practiced and was instructed in the following treatment:     Transfer training - pt was given verbal and tactile cues to facilitate proper hand placement, technique and safety during sit to stand, stand to sit and stand pivot transfers as well as provided with physical assistance.  Gait training- pt was given verbal and tactile cues to facilitate safety and balance during ambulation as well as provided with physical assistance. Pt's/ family goals   1. Return home    Prognosis is good for reaching above PT goals. Patient and or family understand(s) diagnosis, prognosis, and plan of care.   yes    PHYSICAL THERAPY PLAN OF CARE:    PT POC is established based on physician order and patient diagnosis     Referring provider/PT Order:  VIK Webster - CNP /12/03/21 2330 PT eval and treat  Diagnosis:  Compression fracture of L5 lumbar vertebra, closed, initial encounter ([facility] [S32.050A]  Difficulty in walking [R26.2]  Compression fracture of T8 vertebra, initial encounter ([facility] [S22.060A]  Specific instructions for next treatment:  Progress activity    Current Treatment Recommendations:     [x] Strengthening to improve independence with functional mobility   [] ROM to improve independence with functional mobility   [x] Balance Training to improve static/dynamic balance and to reduce fall risk  [x] Endurance Training to improve activity tolerance during functional mobility   [x] Transfer Training to improve safety and independence with all functional transfers   [x] Gait Training to improve gait mechanics, endurance and asses need for appropriate assistive device  [] Stair Training in preparation for safe discharge home and/or into the community   [] Positioning to prevent skin breakdown and contractures  [x] Safety and Education Training   [x] Patient/Caregiver Education   [] HEP  [] Other     PT long term treatment goals are located in above grid    Frequency of treatments: 2-5x/week x 1-2 weeks. Time in  0955  Time out  1025    Total Treatment Time  15 minutes     Evaluation Time includes thorough review of current medical information, gathering information on past medical history/social history and prior level of function, completion of standardized testing/informal observation of tasks, assessment of data and education on plan of care and goals.     CPT codes:  [x] Low Complexity PT evaluation 15487  [] Moderate Complexity PT evaluation 47452  [] High Complexity PT evaluation 65987  [] PT Re-evaluation 64052  [] Gait training 53055 - minutes  [] Manual therapy 92930 - minutes  [x] Therapeutic activities 01626 15 minutes  [] Therapeutic exercises 45412 - minutes  [] Neuromuscular reeducation 66798 - minutes     Lottie Pae, PT, DPT  [id number]

## 2021-12-06 VITALS
WEIGHT: 139.99 LBS | HEART RATE: 66 BPM | TEMPERATURE: 98 F | SYSTOLIC BLOOD PRESSURE: 152 MMHG | RESPIRATION RATE: 16 BRPM | DIASTOLIC BLOOD PRESSURE: 70 MMHG | OXYGEN SATURATION: 98 % | BODY MASS INDEX: 24.8 KG/M2

## 2021-12-06 LAB
METER GLUCOSE: 154 MG/DL (ref 74–99)
METER GLUCOSE: 183 MG/DL (ref 74–99)
SARS-COV-2, NAAT: NOT DETECTED

## 2021-12-06 PROCEDURE — L0464 TLSO 4MOD SACRO-SCAP PRE: HCPCS

## 2021-12-06 PROCEDURE — 6370000000 HC RX 637 (ALT 250 FOR IP): Performed by: NURSE PRACTITIONER

## 2021-12-06 PROCEDURE — 6370000000 HC RX 637 (ALT 250 FOR IP): Performed by: NEUROLOGICAL SURGERY

## 2021-12-06 PROCEDURE — 82962 GLUCOSE BLOOD TEST: CPT

## 2021-12-06 PROCEDURE — 6360000002 HC RX W HCPCS: Performed by: INTERNAL MEDICINE

## 2021-12-06 PROCEDURE — 97530 THERAPEUTIC ACTIVITIES: CPT

## 2021-12-06 PROCEDURE — 87635 SARS-COV-2 COVID-19 AMP PRB: CPT

## 2021-12-06 PROCEDURE — 6370000000 HC RX 637 (ALT 250 FOR IP): Performed by: INTERNAL MEDICINE

## 2021-12-06 PROCEDURE — 2580000003 HC RX 258: Performed by: NURSE PRACTITIONER

## 2021-12-06 RX ORDER — OXYCODONE HYDROCHLORIDE 5 MG/1
5 TABLET ORAL EVERY 4 HOURS PRN
Qty: 20 TABLET | Refills: 0 | Status: SHIPPED | OUTPATIENT
Start: 2021-12-06 | End: 2021-12-11

## 2021-12-06 RX ADMIN — Medication 10 ML: at 09:06

## 2021-12-06 RX ADMIN — Medication 1 CAPSULE: at 09:06

## 2021-12-06 RX ADMIN — SENNOSIDES 8.6 MG: 8.6 TABLET, COATED ORAL at 09:06

## 2021-12-06 RX ADMIN — SUCRALFATE 1 G: 1 TABLET ORAL at 14:29

## 2021-12-06 RX ADMIN — MAGNESIUM HYDROXIDE 30 ML: 400 SUSPENSION ORAL at 09:07

## 2021-12-06 RX ADMIN — FERROUS SULFATE TAB 325 MG (65 MG ELEMENTAL FE) 325 MG: 325 (65 FE) TAB at 09:06

## 2021-12-06 RX ADMIN — SUCRALFATE 1 G: 1 TABLET ORAL at 09:06

## 2021-12-06 RX ADMIN — HEPARIN SODIUM 5000 UNITS: 10000 INJECTION INTRAVENOUS; SUBCUTANEOUS at 06:09

## 2021-12-06 RX ADMIN — PANTOPRAZOLE SODIUM 40 MG: 40 TABLET, DELAYED RELEASE ORAL at 06:09

## 2021-12-06 RX ADMIN — INSULIN LISPRO 2 UNITS: 100 INJECTION, SOLUTION INTRAVENOUS; SUBCUTANEOUS at 09:07

## 2021-12-06 RX ADMIN — INSULIN LISPRO 2 UNITS: 100 INJECTION, SOLUTION INTRAVENOUS; SUBCUTANEOUS at 12:13

## 2021-12-06 RX ADMIN — NEPHROCAP 1 MG: 1 CAP ORAL at 09:06

## 2021-12-06 RX ADMIN — HYDRALAZINE HYDROCHLORIDE 25 MG: 25 TABLET, FILM COATED ORAL at 09:06

## 2021-12-06 RX ADMIN — Medication 4000 UNITS: at 09:06

## 2021-12-06 RX ADMIN — HEPARIN SODIUM 5000 UNITS: 10000 INJECTION INTRAVENOUS; SUBCUTANEOUS at 14:29

## 2021-12-06 RX ADMIN — OXYCODONE 5 MG: 5 TABLET ORAL at 06:09

## 2021-12-06 RX ADMIN — AMLODIPINE BESYLATE 5 MG: 5 TABLET ORAL at 09:06

## 2021-12-06 RX ADMIN — POLYETHYLENE GLYCOL 3350 17 G: 17 POWDER, FOR SOLUTION ORAL at 09:07

## 2021-12-06 ASSESSMENT — PAIN SCALES - GENERAL: PAINLEVEL_OUTOF10: 4

## 2021-12-06 NOTE — PROGRESS NOTES
Physical Therapy  Rx    Name: Nicole Stanford  : 1927  MRN: [id number]      Date of Service: 2021    Evaluating PT:  Jhoana Sanchez PT, DPT [id number]    Room #:  4143/2423-D  Diagnosis:  Compression fracture of L5 lumbar vertebra, closed, [facility] encounter [facility] [S32.050A]  Difficulty in walking [R26.2]  Compression fracture of T8 vertebra, initial encounter ([facility] [S22.060A]  PMHx/PSHx:  Arthritis, Asthma, CAD, DM, HLD, HTN, PVD  Procedure/Surgery:  None  Precautions:  Falls, spinal, TLSO, [place]  Equipment Needs:  TBD    SUBJECTIVE:    Pt was admitted from SNF. Pt ambulated with rollator and was mostly independent with ADLs PTA. OBJECTIVE:   Initial Evaluation  Date: 21 Treatment  21 Short Term/ Long Term   Goals   AM-PAC 6 Clicks 64/49 55/90    Was pt agreeable to Eval/treatment? yes yes    Does pt have pain? 2/10 back pain at rest     Bed Mobility  Rolling: NT  Supine to sit: NT  Sit to supine: NT  Scooting: NT NT up in chair pre and post gait. SBA   Transfers Sit to stand: Aydee  Stand to sit: Aydee  Stand pivot: Aydee with [employer] Sit to stand: Aydee  Stand to sit: Aydee  Stand pivot: Aydee with [employer] SBA with [employer]   Ambulation   60 feet with Aydee with [employer] 70 feet with Aydee with [employer] seated rest then 70 feet Min 2nd rep cues required for safety.   >150 feet with SBA with [employer]   Stair negotiation: ascended and descended NA     ROM BUE:  Defer to OT note  BLE:  WFL     Strength BUE:  Defer to OT note  BLE:  4/5  Increase by 1/3 MMT grade   Balance Sitting EOB:  NT  Dynamic Standing:  Aydee with [employer]  Sitting EOB:  Independent  Dynamic Standing:  SBA with [employer]     Pt is A & O x 4  Sensation:  No reported paresthesias  Edema:  None    Therapeutic Exercises:  NA    Patient education  Pt educated on safety    Patient response to education:   Pt verbalized understanding Pt demonstrated skill Pt requires further education in this area   x x x     ASSESSMENT:    Conditions Requiring Skilled Therapeutic Intervention:    []Decreased strength     []Decreased ROM  [x]Decreased functional mobility  [x]Decreased balance   [x]Decreased endurance   [x]Decreased posture  []Decreased sensation  []Decreased coordination   []Decreased vision  []Decreased safety awareness   [x]Increased pain       Comments:  Pt was sitting in chair upon arrival, agreeable to session. TLSO adjusted worn loosely. Reviewed spinal precautions and importance of TLSO brace. Pt stood with flexed posture that continued into ambulation with rollator. Pt's gait was slow with slight unsteadiness. Fatigue limited further activity. Pt was left in chair with all needs met and call light in reach. Treatment:  Patient practiced and was instructed in the following treatment:     Transfer training - pt was given verbal and tactile cues to facilitate proper hand placement, technique and safety during sit to stand, stand to sit and stand pivot transfers as well as provided with physical assistance.  Gait training- pt was given verbal and tactile cues to facilitate safety and balance during ambulation as well as provided with physical assistance. Pt's/ family goals   1. Return home    Prognosis is good for reaching above PT goals. Patient and or family understand(s) diagnosis, prognosis, and plan of care.   yes    PHYSICAL THERAPY PLAN OF CARE:    PT POC is established based on physician order and patient diagnosis     Referring provider/PT Order:  April VIK Arias - CNP /12/03/21 2330 PT eval and treat  Diagnosis:  Compression fracture of L5 lumbar vertebra, closed, initial encounter ([facility] [S32.050A]  Difficulty in walking [R26.2]  Compression fracture of T8 vertebra, initial encounter ([facility] [S22.060A]  Specific instructions for next treatment:  Progress activity    Current Treatment Recommendations:     [x] Strengthening to improve independence with functional mobility   [] ROM to improve independence with functional mobility   [x] Balance Training to improve static/dynamic balance and to reduce fall risk  [x] Endurance Training to improve activity tolerance during functional mobility   [x] Transfer Training to improve safety and independence with all functional transfers   [x] Gait Training to improve gait mechanics, endurance and asses need for appropriate assistive device  [] Stair Training in preparation for safe discharge home and/or into the community   [] Positioning to prevent skin breakdown and contractures  [x] Safety and Education Training   [x] Patient/Caregiver Education   [] HEP  [] Other     PT long term treatment goals are located in above grid    Frequency of treatments: 2-5x/week x 1-2 weeks. Time in  1300  Time out  1325    Total Treatment Time  25 minutes     Evaluation Time includes thorough review of current medical information, gathering information on past medical history/social history and prior level of function, completion of standardized testing/informal observation of tasks, assessment of data and education on plan of care and goals.     CPT codes:  [] Low Complexity PT evaluation 46879  [] Moderate Complexity PT evaluation 55249  [] High Complexity PT evaluation 30689  [] PT Re-evaluation 57921  [] Gait training 81757 - minutes  [] Manual therapy 54684 - minutes  [x] Therapeutic activities 61532 25 minutes  [] Therapeutic exercises 03299 - minutes  [] Neuromuscular reeducation 59249 - minutes     Torin Morales, [address] [facility]

## 2021-12-06 NOTE — PLAN OF CARE
Problem: Pain:  Goal: Pain level will decrease  Description: Pain level will decrease  12/6/2021 1142 by Jignesh Mccarty RN  Outcome: Met This Shift  12/5/2021 2306 by Chaya Prather RN  Outcome: Ongoing     Problem: Pain:  Goal: Control of acute pain  Description: Control of acute pain  12/6/2021 1142 by Jignesh Mccarty RN  Outcome: Met This Shift  12/5/2021 2306 by Chaya Prather RN  Outcome: Ongoing     Problem: Falls - Risk of:  Goal: Will remain free from falls  Description: Will remain free from falls  12/6/2021 1142 by iJgnesh Mccarty RN  Outcome: Met This Shift  12/5/2021 2306 by Chaya Prather RN  Outcome: Met This Shift     Problem: Falls - Risk of:  Goal: Absence of physical injury  Description: Absence of physical injury  12/6/2021 1142 by Jignesh Mccarty RN  Outcome: Met This Shift  12/5/2021 2306 by Chaya Prather RN  Outcome: Met This Shift     Problem: Skin Integrity:  Goal: Will show no infection signs and symptoms  Description: Will show no infection signs and symptoms  12/6/2021 1142 by Jignesh Mccarty RN  Outcome: Met This Shift  12/5/2021 2306 by Chaya Prather RN  Outcome: Met This Shift     Problem: Skin Integrity:  Goal: Absence of new skin breakdown  Description: Absence of new skin breakdown  12/6/2021 1142 by Jignesh Mccarty RN  Outcome: Met This Shift  12/5/2021 2306 by Chaya Prather RN  Outcome: Met This Shift

## 2021-12-06 NOTE — DISCHARGE INSTR - COC
Continuity of Care Form    Patient Name: Ace Segura   :  1927  MRN:  [id number]    Admit date:  12/3/2021  Discharge date:  2021    Code Status Order: Full Code   Advance Directives:      Admitting Physician:  No admitting provider for patient encounter. PCP: Desmond Douglass DO    Discharging Nurse: Carey Huff [facility] Unit/Room#: 269[address] [facility] Unit Phone Number: [phone]    Emergency Contact:   Extended Emergency Contact Information  Primary Emergency Contact: [facility]  Address: [address]           [facility],  [facility]  Home Phone: [phone]  Relation: Child  Secondary Emergency Contact: Arian Melvin  Address: [address] [facility],  [address] Phone: [phone]  Relation: Child    Past Surgical History:  Past Surgical History:   Procedure Laterality Date    ABDOMEN SURGERY      APPENDECTOMY      CARDIAC SURGERY      CHOLECYSTECTOMY      COLONOSCOPY      CORONARY ARTERY BYPASS GRAFT      8/28/10    ENDOSCOPY, COLON, DIAGNOSTIC      FOOT DEBRIDEMENT Left 2021    FOOT DEBRIDEMENT INCISION AND DRAINAGE.    performed by Gertrudis Meraz DPM at [address] [facility] Left 2021    LEFT FOOT DEBRIDEMENT  WITH DELAYED PRIMARY CLOSURE performed by Damaso Londono DPM at [address] [place] [address] and [place] [address]    TONSILLECTOMY AND ADENOIDECTOMY         Immunization History:   Immunization History   Administered Date(s) Administered    COVID-19, Pfizer, PF, 30mcg/0.3mL 2021, 10/07/2021    Influenza, High Dose (Fluzone 65 yrs and older) 2015, 2016, 10/24/2017, 10/03/2018    Pneumococcal Conjugate 13-valent (Zwrkukv44) 2015    Pneumococcal Polysaccharide (Tokdvodwc85) 2014    Tdap (Boostrix, Adacel) 2019       Active Problems:  Patient Active Problem List   Diagnosis Code    Asthma J45.909    CAD (coronary artery disease) I25.10    Vitamin D deficiency E55.9    HTN (hypertension) I10    Idiopathic peripheral neuropathy G60.9    Rhinitis, allergic J30.9    GERD (gastroesophageal reflux disease) K21.9    PVD (peripheral vascular disease) with claudication ([facility]) I73.9    Gait instability R26.81    DM (diabetes mellitus), type 2 ([facility]) E11.9    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 ([facility]) I70.244    History of pulmonary embolus (PE) Z86.711    Peripheral vascular angioplasty status Z98.62    Ulcerated, foot, left, limited to breakdown of skin ([facility]) L97.521    Dermatophytosis B35.9    Non-proliferative diabetic retinopathy, mild, both eyes ([facility]) S69.0504    Leg swelling M79.89    Compression fracture of L5 lumbar vertebra, closed, initial encounter ([facility] S32.050A    HLD (hyperlipidemia) E78.5    Compression fracture of thoracic vertebra ([facility]) S22.000A    Anemia D64.9    Spinal stenosis M48.00       Isolation/Infection:   Isolation            No Isolation          Patient Infection Status       None to display            Nurse Assessment:  Last Vital Signs: BP (!) 144/65   Pulse 57   Temp 97.4 °F (36.3 °C) (Temporal)   Resp 16   Wt 139 lb 15.9 oz (63.5 kg)   LMP 12/03/1997   SpO2 94%   BMI 24.80 kg/m²     Last documented pain score (0-10 scale): Pain Level: 4  Last Weight:   Wt Readings from Last 1 Encounters:   12/06/21 139 lb 15.9 oz (63.5 kg)     Mental Status:  oriented, alert, and thought processes intact    IV Access:  - None    Nursing Mobility/ADLs:  Walking   Assisted  Transfer  Assisted  Bathing  Assisted  Dressing  Assisted  Toileting  Assisted  Feeding  Independent  Med Admin  Assisted  Med Delivery   whole    Wound Care Documentation and Therapy:  Wound 06/14/21 Foot Left;Medial #3 (Active)   Number of days: 174        Elimination:  Continence:    Bowel: Yes  Bladder: Yes, No, and at times incontinent  Urinary Catheter: None   Colostomy/Ileostomy/Ileal Conduit: No       Date of Last BM: 12/4/2021    Intake/Output Summary (Last 24 hours) at 12/6/2021 1235  Last data filed at 12/6/2021 0935  Gross per 24 hour   Intake 660 ml   Output 350 ml   Net 310 ml     I/O last 3 completed shifts: In: 65 [P.O.:660]  Out: 350 [Urine:350]    Safety Concerns:     History of Falls (last 30 days) and At Risk for Falls    Impairments/Disabilities:      None    Nutrition Therapy:  Current Nutrition Therapy:   - Oral Diet:  Carb Control 5 carbs/meal (2000kcals/day)    Routes of Feeding: Oral  Liquids: No Restrictions  Daily Fluid Restriction: no  Last Modified Barium Swallow with Video (Video Swallowing Test): not done    Treatments at the Time of Hospital Discharge:   Respiratory Treatments: Albuterol 4 x day, Brovana/Pulmicort BID  Oxygen Therapy:  is not on home oxygen therapy.   Ventilator:    - No ventilator support    Rehab Therapies: Physical Therapy, Occupational Therapy, and Orthotics/Prosthetics  Weight Bearing Status/Restrictions: No weight bearing restirctions  Other Medical Equipment (for information only, NOT a DME order):  walker, hospital bed, and braces-TLSO brace   Other Treatments: ***    Patient's personal belongings (please select all that are sent with patient):  Hearing Aides left, Dentures upper and lower, Watch     RN SIGNATURE:  Electronically signed by Song Jones RN on 12/6/21 at 12:45 PM EST    CASE MANAGEMENT/SOCIAL WORK SECTION    Inpatient Status Date: ***    Readmission Risk Assessment Score:  Readmission Risk              Risk of Unplanned Readmission:  30           Discharging to Facility/ Agency   Name: [facility]  Address:  Phone:  Fax:    Dialysis Facility (if applicable)   Name:  Address:  Dialysis Schedule:  Phone:  Fax:    / signature: Electronically signed by Faviola Zelaya on 12/6/21 at 12:59 PM EST    PHYSICIAN SECTION    Prognosis: Good    Condition at Discharge: Stable    Rehab Potential (if transferring to Rehab): Good    Recommended Labs or Other Treatments After Discharge: ***    Physician Certification: I certify the above information and transfer of Nicole Stanford  is necessary for the continuing treatment of the diagnosis listed and that she requires Michael Whalenuel for less 30 days.      Update Admission H&P: No change in H&P    PHYSICIAN SIGNATURE:  {Esignature:[id number]}

## 2021-12-06 NOTE — PLAN OF CARE
Problem: Pain:  Goal: Pain level will decrease  Description: Pain level will decrease  12/5/2021 2306 by Jaclyn Mesa RN  Outcome: Ongoing  12/5/2021 1115 by Sarai Begum RN  Outcome: Ongoing  12/5/2021 1113 by Sarai Begum RN  Outcome: Met This Shift  Goal: Control of acute pain  Description: Control of acute pain  12/5/2021 2306 by Jaclyn Mesa RN  Outcome: Ongoing  12/5/2021 1115 by Sarai Begum RN  Outcome: Ongoing  12/5/2021 1113 by Sarai Begum RN  Outcome: Met This Shift  Goal: Control of chronic pain  Description: Control of chronic pain  12/5/2021 2306 by Jaclyn Mesa RN  Outcome: Ongoing  12/5/2021 1115 by Sarai Begum RN  Outcome: Ongoing  12/5/2021 1113 by Sarai Begum RN  Outcome: Met This Shift     Problem: Falls - Risk of:  Goal: Will remain free from falls  Description: Will remain free from falls  12/5/2021 2306 by Jaclyn Mesa RN  Outcome: Met This Shift  12/5/2021 1115 by Sarai Begum RN  Outcome: Met This Shift  12/5/2021 1113 by Sarai Begum RN  Outcome: Met This Shift  Goal: Absence of physical injury  Description: Absence of physical injury  12/5/2021 2306 by Jaclyn Mesa RN  Outcome: Met This Shift  12/5/2021 1115 by Sarai Begum RN  Outcome: Met This Shift  12/5/2021 1113 by Sarai Begum RN  Outcome: Met This Shift     Problem: Skin Integrity:  Goal: Will show no infection signs and symptoms  Description: Will show no infection signs and symptoms  12/5/2021 2306 by Jaclyn Mesa RN  Outcome: Met This Shift  12/5/2021 1115 by Sarai Begum RN  Outcome: Met This Shift  12/5/2021 1113 by Sarai Begum RN  Outcome: Met This Shift  Goal: Absence of new skin breakdown  Description: Absence of new skin breakdown  12/5/2021 2306 by Jaclyn Mesa RN  Outcome: Met This Shift  12/5/2021 1115 by Sarai Begum RN  Outcome: Met This Shift  12/5/2021 1113 by Sarai Begum RN  Outcome: Met This Shift     Problem: Musculor/Skeletal Functional Status  Goal: Highest potential functional level  Outcome: Met This Shift  Goal: Absence of falls  Outcome: Met This Shift

## 2021-12-06 NOTE — PROGRESS NOTES
Nurse to nurse report given to [facility] at the [place], papers faxed placed in soft chart.

## 2021-12-06 NOTE — CARE COORDINATION
12/06/21 Transition of Care: Patient is here due to c/o severe back pain. She resides at [facility] and has been having worsening back pain. Received a call from her son, Peri Paulino, who is concerned about his mother and the plan of care. Peri Paulino is POA and states he would like to have a referral to [facility] at the [facility] for some rehab prior to having a Kyphoplasty. She is an Eliquis patient and will require holding of the drug for 5-7 days prior to the surgery. She will discharge on a back brace and will be evaluated as an outpatient for readiness for surgery. Her pcp is Dr Anamaria Saunders and her pharmacy is [facility]. Referral sent to [facility] and [place] to review the chart for acceptance. Will follow and await return call from [place]. Will also keep the family informed and updated. Electronically signed by Marlyn Rangel RN CM on 12/6/2021 at 11:03 AM     The Plan for Transition of Care is related to the following treatment goals: rehab in preparation for surgery    The Patient and/or patient representative Peri Paulino was provided with a choice of provider and agrees   with the discharge plan. [x] Yes [] No    Freedom of choice list was provided with basic dialogue that supports the patient's individualized plan of care/goals, treatment preferences and shares the quality data associated with the providers.  [x] Yes [] No

## 2021-12-06 NOTE — PROGRESS NOTES
Neurosurg progress note  VITALS:  BP (!) 163/70   Pulse 60   Temp 97.4 °F (36.3 °C) (Temporal)   Resp 16   Wt 139 lb 15.9 oz (63.5 kg)   LMP 12/03/1997   SpO2 96%   BMI 24.80 kg/m²   24HR INTAKE/OUTPUT:    Intake/Output Summary (Last 24 hours) at 12/6/2021 0948  Last data filed at 12/6/2021 0935  Gross per 24 hour   Intake 900 ml   Output 350 ml   Net 550 ml     CT THORACIC SPINE WO CONTRAST    Result Date: 12/3/2021  EXAMINATION: CT OF THE THORACIC SPINE WITHOUT CONTRAST  12/3/2021 6:48 pm: TECHNIQUE: CT of the thoracic spine was performed without the administration of intravenous contrast. Multiplanar reformatted images are provided for review. Dose modulation, iterative reconstruction, and/or weight based adjustment of the mA/kV was utilized to reduce the radiation dose to as low as reasonably achievable. COMPARISON: CTA chest dated 03/23/2021 HISTORY: ORDERING SYSTEM PROVIDED HISTORY: back pain TECHNOLOGIST PROVIDED HISTORY: Reason for exam:->back pain What reading provider will be dictating this exam?->CRC FINDINGS: BONES/ALIGNMENT: There is normal alignment of the spine. There is generalized osteopenia. Moderate wedge compression fracture of T8 is noted with loss of height of about 35% anteriorly. This is progressed compared to sagittal images from CTA chest dated 03/23/2021 and could represent acute or subacute fracture. There are mild compression deformities of T6, T7 and T9 the similar compared to previous. The other thoracic vertebral body heights are maintained. No osseous destructive lesion is seen. DEGENERATIVE CHANGES: There is spurring at multiple levels with disc space narrowing, most severe at T6-7. Vacuum phenomenon is seen at multiple levels in the lower thoracic spine. The thoracic spinal canal is difficult to evaluate on and unenhanced CT scan. SOFT TISSUES: No paraspinal mass is seen.   There are extensive vascular calcifications including moderate coronary artery calcifications which is a marker for coronary atherosclerotic disease. 1. Moderate compression fracture of T8 that could be acute or subacute. 2. Mild old compression deformities of T6, T7 and T9. Osteopenia. 3. Degenerative change. 4. Moderate coronary artery calcifications. CT Lumbar Spine WO Contrast    Result Date: 12/3/2021  EXAMINATION: CT OF THE LUMBAR SPINE WITHOUT CONTRAST  12/3/2021 TECHNIQUE: CT of the lumbar spine was performed without the administration of intravenous contrast. Multiplanar reformatted images are provided for review. Adjustment of mA and/or kV according to patient size was utilized. Dose modulation, iterative reconstruction, and/or weight based adjustment of the mA/kV was utilized to reduce the radiation dose to as low as reasonably achievable. COMPARISON: CT abdomen and pelvis dated 05/28/2021 HISTORY: ORDERING SYSTEM PROVIDED HISTORY: low back pain TECHNOLOGIST PROVIDED HISTORY: Reason for exam:->low back pain Decision Support Exception - unselect if not a suspected or confirmed emergency medical condition->Emergency Medical Condition (MA) What reading provider will be dictating this exam?->CRC FINDINGS: BONES/ALIGNMENT: There is normal alignment of the spine. There is generalized osteopenia. Mild compression deformity of the superior endplate of L5 is noted with slight subchondral sclerosis. There is about 25% loss of height. This is new compared to sagittal re-formatted images from May 08/20/2021 and could represent an acute or subacute fracture. The other lumbar vertebral body heights are maintained. No osseous destructive lesion is seen. DEGENERATIVE CHANGES: There is spurring and vacuum phenomenon at multiple levels with disc space narrowing, most severe at L5-S1. There is disc bulge and posterior facet degenerative change at multiple levels causing central canal stenosis, mild at L2-3, moderate at L3-4 and severe at L4-5.   There also appears to be central disc with osteophyte causing moderate central canal stenosis at L5-S1. No significant neural foraminal narrowing is seen. SOFT TISSUES/RETROPERITONEUM: No paraspinal mass is seen. 1. Mild compression deformity of the superior endplate of L5 with about 25% loss of height. This could represent an acute or subacute fracture. 2. Degenerative change with multilevel central canal stenosis, severe at L4-5, moderate at L3-4 and L5-S1 and mild at L2-3.      CBC:   Lab Results   Component Value Date    WBC 10.2 12/05/2021    RBC 2.77 12/05/2021    HGB 9.6 12/05/2021    HCT 29.1 12/05/2021    .1 12/05/2021    MCH 34.7 12/05/2021    MCHC 33.0 12/05/2021    RDW 13.7 12/05/2021     12/05/2021    MPV 10.0 12/05/2021     BMP:    Lab Results   Component Value Date     12/05/2021    K 4.7 12/05/2021    K 4.1 12/04/2021     12/05/2021    CO2 22 12/05/2021    BUN 22 12/05/2021    LABALBU 3.7 12/04/2021    CREATININE 1.0 12/05/2021    CALCIUM 8.7 12/05/2021    GFRAA >60 12/05/2021    LABGLOM 52 12/05/2021    GLUCOSE 130 12/05/2021      [Held by provider] apixaban  5 mg Oral BID    heparin (porcine)  5,000 Units SubCUTAneous 3 times per day    sodium chloride flush  5-40 mL IntraVENous 2 times per day    amLODIPine  5 mg Oral BID    b complex-C-folic acid  1 mg Oral Daily    ciclopirox   Topical Nightly    ferrous sulfate  325 mg Oral Daily    gabapentin  300 mg Oral QPM    hydrALAZINE  25 mg Oral BID    ocuvite-lutein  1 capsule Oral Daily    pantoprazole  40 mg Oral QAM AC    pravastatin  20 mg Oral Nightly    senna  1 tablet Oral BID    sucralfate  1 g Oral TID    vitamin D  4,000 Units Oral QAM    insulin lispro  0-12 Units SubCUTAneous TID WC    insulin lispro  0-6 Units SubCUTAneous Nightly    budesonide  0.25 mg Nebulization BID    Arformoterol Tartrate  15 mcg Nebulization BID    albuterol  2.5 mg Nebulization 4x daily     Ambulating independently with wheeled walker, no back pain  Assessment:  Patient Active Problem List   Diagnosis    Asthma    CAD (coronary artery disease)    Vitamin D deficiency    HTN (hypertension)    Idiopathic peripheral neuropathy    Rhinitis, allergic    GERD (gastroesophageal reflux disease)    PVD (peripheral vascular disease) with claudication (HCC)    Gait instability    DM (diabetes mellitus), type 2 ([address]    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

## 2021-12-06 NOTE — CARE COORDINATION
12/6, Discharge acknowledged. Transportation has been set up for a 4:00pm  today [facility] to go to [facility] [place]. Patient does not need precert. Patient accepted at facility. Patient will need a negative COVID test prior to discharge. COVID test given to RN. Those informed of transport time:  Charge nurse, bedside nurse, Ghada Armenta at facility and [facility] to patient. HENS, ambulance (PAS/Letter faxed) and envelope on soft chart. SW to follow for further discharge planning needs.       Esau Brambila, [facility] Case Management  [phone]

## 2021-12-06 NOTE — PROGRESS NOTES
Spoke to Dr. Bertis Castleman via phone and patient also spoke to Dr. Bertis Castleman via phone. Plan is to try TLSO brace for 2 weeks and if it is unsuccessful then kyphoplasty.    Electronically signed by Carmella Cuevas RN on 12/5/2021 at 7:16 PM

## 2021-12-10 NOTE — DISCHARGE SUMMARY
Physician Discharge Summary     Patient ID:  Stanislaw Yu  [id number]  80 y.o.  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)    History of pulmonary embolus (PE)    Peripheral vascular angioplasty status    Ulcerated, foot, left, limited to breakdown of skin ([facility]    Dermatophytosis    Non-proliferative diabetic retinopathy, mild, both eyes ([facility]    Leg swelling    Compression fracture of L5 lumbar vertebra, closed, initial encounter ([facility]    HLD (hyperlipidemia)    Compression fracture of thoracic vertebra (HCC)    Anemia    Spinal stenosis       Discharge Diagnoses: Compression fracture    Consults: Neurosurgery    Procedures: None    Hospital Course: The patient is a 80 y.o. female of Rin Henning DO Patient reports that about 2 weeks ago she started to notice increasing mid to low back pain which is nonradiating, no leg weakness or numbness, severe, dull, no other associated symptoms. No trauma that she can recall. She reports a very very distant history of having had a DEXA scan, probably more than 10 years ago, and her son vaguely recalls that she may have osteoporosis.     MRI results back     Initial conservative treatment per neurosurgery recommendations     Mental status is normal, okay to continue low-dose opioids.     Continue brace as long as tolerated     Bowel regimen     Glucose well controlled     Seems she is supposed to be on Eliquis but it has fallen off her medication reconciliation. Anyways we will hold for now, as the patient's RN states that the patient and family are starting to consider doing kyphoplasty. We will do heparin subcu for now     Patient elected TLSO brace and no surgical intervention at this time. Patient discharged home in stable conditions with medications listed below. Patient instructed to follow up with all consultants and PCP within 3 weeks of discharge. No results for input(s): WBC, HGB, HCT, PLT in the last 72 hours. No results for input(s): NA, K, CL, CO2, BUN, CREATININE, CALCIUM in the last 72 hours. Invalid input(s): GLU    CT THORACIC SPINE WO CONTRAST    Result Date: 12/3/2021  EXAMINATION: CT OF THE THORACIC SPINE WITHOUT CONTRAST  12/3/2021 6:48 pm: TECHNIQUE: CT of the thoracic spine was performed without the administration of intravenous contrast. Multiplanar reformatted images are provided for review. Dose modulation, iterative reconstruction, and/or weight based adjustment of the mA/kV was utilized to reduce the radiation dose to as low as reasonably achievable. COMPARISON: CTA chest dated 03/23/2021 HISTORY: ORDERING SYSTEM PROVIDED HISTORY: back pain TECHNOLOGIST PROVIDED HISTORY: Reason for exam:->back pain What reading provider will be dictating this exam?->CRC FINDINGS: BONES/ALIGNMENT: There is normal alignment of the spine. There is generalized osteopenia. Moderate wedge compression fracture of T8 is noted with loss of height of about 35% anteriorly. This is progressed compared to sagittal images from CTA chest dated 03/23/2021 and could represent acute or subacute fracture. There are mild compression deformities of T6, T7 and T9 the similar compared to previous. The other thoracic vertebral body heights are maintained. No osseous destructive lesion is seen. DEGENERATIVE CHANGES: There is spurring at multiple levels with disc space narrowing, most severe at T6-7.   Vacuum phenomenon is seen at multiple levels in the lower thoracic spine. The thoracic spinal canal is difficult to evaluate on and unenhanced CT scan. SOFT TISSUES: No paraspinal mass is seen. There are extensive vascular calcifications including moderate coronary artery calcifications which is a marker for coronary atherosclerotic disease. 1. Moderate compression fracture of T8 that could be acute or subacute. 2. Mild old compression deformities of T6, T7 and T9. Osteopenia. 3. Degenerative change. 4. Moderate coronary artery calcifications. CT Lumbar Spine WO Contrast    Result Date: 12/3/2021  EXAMINATION: CT OF THE LUMBAR SPINE WITHOUT CONTRAST  12/3/2021 TECHNIQUE: CT of the lumbar spine was performed without the administration of intravenous contrast. Multiplanar reformatted images are provided for review. Adjustment of mA and/or kV according to patient size was utilized. Dose modulation, iterative reconstruction, and/or weight based adjustment of the mA/kV was utilized to reduce the radiation dose to as low as reasonably achievable. COMPARISON: CT abdomen and pelvis dated 05/28/2021 HISTORY: ORDERING SYSTEM PROVIDED HISTORY: low back pain TECHNOLOGIST PROVIDED HISTORY: Reason for exam:->low back pain Decision Support Exception - unselect if not a suspected or confirmed emergency medical condition->Emergency Medical Condition (MA) What reading provider will be dictating this exam?->CRC FINDINGS: BONES/ALIGNMENT: There is normal alignment of the spine. There is generalized osteopenia. Mild compression deformity of the superior endplate of L5 is noted with slight subchondral sclerosis. There is about 25% loss of height. This is new compared to sagittal re-formatted images from May 08/20/2021 and could represent an acute or subacute fracture. The other lumbar vertebral body heights are maintained. No osseous destructive lesion is seen.  DEGENERATIVE CHANGES: There is spurring and vacuum phenomenon at multiple levels with disc space narrowing, most severe at L5-S1. There is disc bulge and posterior facet degenerative change at multiple levels causing central canal stenosis, mild at L2-3, moderate at L3-4 and severe at L4-5. There also appears to be central disc with osteophyte causing moderate central canal stenosis at L5-S1. No significant neural foraminal narrowing is seen. SOFT TISSUES/RETROPERITONEUM: No paraspinal mass is seen. 1. Mild compression deformity of the superior endplate of L5 with about 25% loss of height. This could represent an acute or subacute fracture. 2. Degenerative change with multilevel central canal stenosis, severe at L4-5, moderate at L3-4 and L5-S1 and mild at L2-3. Discharge Exam:    HEENT: NCAT,  PERRLA, No JVD  Heart:  RRR, no murmurs, gallops, or rubs. Lungs:  CTA bilaterally, no wheeze, rales or rhonchi  Abd: bowel sounds present, nontender, nondistended, no masses  Extrem:  No clubbing, cyanosis, or edema    Disposition: CANDIDA    Patient Condition at Discharge: Stable    Patient Instructions:      Medication List      START taking these medications    apixaban 5 MG Tabs tablet  Commonly known as: ELIQUIS  Take 1 tablet by mouth 2 times daily     oxyCODONE 5 MG immediate release tablet  Commonly known as: ROXICODONE  Take 1 tablet by mouth every 4 hours as needed for Pain for up to 5 days. CHANGE how you take these medications    amLODIPine 5 MG tablet  Commonly known as: NORVASC  TAKE 1 TABLET BY MOUTH  DAILY  What changed: when to take this        CONTINUE taking these medications    Alpha-Lipoic Acid 600 MG Tabs     aluminum & magnesium hydroxide-simethicone 400-400-40 MG/5ML Susp  Commonly known as: MYLANTA     aspirin 81 MG EC tablet  Take 1 tablet by mouth daily     b complex vitamins capsule     bisacodyl 10 MG suppository  Commonly known as: DULCOLAX     ciclopirox 8 % solution  Commonly known as: PENLAC  Apply once daily all toenails.      clotrimazole-betamethasone 1-0.05 % cream  Commonly known as: LOTRISONE     CoQ10 200 MG Caps     diclofenac sodium 1 % Gel  Commonly known as: VOLTAREN     famotidine 10 MG tablet  Commonly known as: PEPCID     ferrous sulfate 325 (65 Fe) MG tablet  Commonly known as: IRON 325     fluticasone-vilanterol 100-25 MCG/INH Aepb inhaler  Commonly known as: Breo Ellipta  Inhale 1 puff into the lungs daily     gabapentin 300 MG capsule  Commonly known as: NEURONTIN     JUAN MIGUEL ROOT PO     glimepiride 2 MG tablet  Commonly known as: AMARYL     guaiFENesin 100 MG/5ML syrup  Commonly known as: ROBITUSSIN     hydrALAZINE 25 MG tablet  Commonly known as: APRESOLINE     lidocaine 4 % cream  Commonly known as: LMX     magnesium gluconate 500 MG tablet  Commonly known as: MAGONATE     metFORMIN 500 MG tablet  Commonly known as: GLUCOPHAGE     OCUVITE ADULT FORMULA PO     pantoprazole sodium 40 MG Pack packet  Commonly known as: PROTONIX     polyethylene glycol 17 g packet  Commonly known as: GLYCOLAX     polyvinyl alcohol 1.4 % ophthalmic solution  Commonly known as: LIQUIFILM TEARS     pravastatin 20 MG tablet  Commonly known as: PRAVACHOL  TAKE 1 TABLET BY MOUTH  NIGHTLY     PRO-BIOTIC BLEND PO     promethazine 12.5 MG tablet  Commonly known as: PHENERGAN     PROVENTIL 90 MCG/ACT inhaler  Generic drug: albuterol  Inhale 2 puffs into the lungs 4 times daily     senna 8.6 MG tablet  Commonly known as: SENOKOT     SODIUM CHLORIDE PO     sucralfate 1 GM tablet  Commonly known as: CARAFATE     traMADol 50 MG tablet  Commonly known as: ULTRAM     Vitamin D 25 MCG (1000 UT) Tabs tablet  Commonly known as: CHOLECALCIFEROL        STOP taking these medications    oxyCODONE-acetaminophen 7.5-325 MG per tablet  Commonly known as: PERCOCET           Where to Get Your Medications      These medications were sent to [facility] [address]    Phone: [phone]   · apixaban 5 MG Tabs tablet     You can get these medications from any pharmacy    Bring a paper prescription for each of these medications  · oxyCODONE 5 MG immediate release tablet       Activity: activity as tolerated  Diet: cardiac diet    Pt has been advised to: Follow-up with Jose Alejandro Velez DO in 1 week.   Follow-up with consultants as recommended by them    Note that over 30 minutes was spent in preparing discharge papers, discussing discharge with patient, medication review, etc.    Signed:  Maria Luisa Collazo MD  12/10/2021  3:35 PM

## 2021-12-16 NOTE — CONSULTS
Neurosurgery Consult Note      CHIEF COMPLAINT :Back pain           .                                Patient reports that about 2 weeks ago she started to notice increasing mid to low back pain which is nonradiating, no leg weakness or numbness, severe, dull, no other associated symptoms. No trauma that she can recall. She reports a very very distant history of having had a DEXA scan, probably more than 10 years ago, and her son vaguely recalls that she may have osteoporosis.        HPI:  Gokul Montes is a 80 y.o. female patient being seen for complaints of . Patient reports that about 2 weeks ago she started to notice increasing mid to low back pain which is nonradiating, no leg weakness or numbness, severe, dull, no other associated symptoms. No trauma that she can recall.  She reports a very very distant history of having had a DEXA scan, probably more than 10 years ago, and her son vaguely recalls that she may have osteoporosis.        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 ([address] 5/18/2021    Bronchitis 5/23/2017    Bruising tendency ([address]     CAD (coronary artery disease)     Cough 5/23/2017    Dermatophytosis 6/25/2021    Diabetes mellitus ([address]     GERD (gastroesophageal reflux disease) 5/23/2017    History of pulmonary embolus (PE) 5/29/2021    Hx of blood clots     Hyperlipidemia     Hypertension     Leg swelling 7/15/2021    Lung disease     Peripheral vascular angioplasty status 5/29/2021    Pseudoaneurysm of right femoral artery ([address] 5/29/2021    PVD (peripheral vascular disease) with claudication ([address] 4/10/2019    Restless legs syndrome     Rhinitis, allergic 5/23/2017    Sinusitis 5/23/2017    SOB (shortness of breath) 5/23/2017    Type 1 diabetes mellitus with left diabetic foot ulcer ([address] 5/18/2021    Ulcerated, foot, left, limited to breakdown of skin ([address] 6/25/2021    Urinary incontinence      Past Surgical History:   Procedure Laterality Date    ABDOMEN SURGERY      APPENDECTOMY      CARDIAC SURGERY      CHOLECYSTECTOMY      COLONOSCOPY      CORONARY ARTERY BYPASS GRAFT      8/28/10    ENDOSCOPY, COLON, DIAGNOSTIC      FOOT DEBRIDEMENT Left 5/16/2021    FOOT DEBRIDEMENT INCISION AND DRAINAGE. performed by Shell Wadsworth DPM at [address] [facility] [address] Left 5/21/2021    LEFT FOOT DEBRIDEMENT  WITH DELAYED PRIMARY CLOSURE performed by Gorge Colbert DPM at [facility] and o 1997    TONSILLECTOMY AND ADENOIDECTOMY       Lisinopril  Prior to Admission medications    Medication Sig Start Date End Date Taking? Authorizing Provider   apixaban (ELIQUIS) 5 MG TABS tablet Take 1 tablet by mouth 2 times daily  Patient not taking: Reported on 12/14/2021 12/6/21  Yes Marigene Kehr, MD   famotidine (PEPCID) 10 MG tablet Take 10 mg by mouth 2 times daily   Yes Historical Provider, MD   ciclopirox (PENLAC) 8 % solution Apply once daily all toenails.  9/20/21   Santa Thornton DPM   sucralfate (CARAFATE) 1 GM tablet Take 1 g by mouth three times daily    Historical Provider, MD   hydrALAZINE (APRESOLINE) 25 MG tablet Take 25 mg by mouth 2 times daily    Historical Provider, MD   metFORMIN (GLUCOPHAGE) 500 MG tablet Take 500 mg by mouth daily    Historical Provider, MD   polyethylene glycol (GLYCOLAX) 17 g packet Take 17 g by mouth daily as needed for Constipation    Historical Provider, MD   pantoprazole sodium (PROTONIX) 40 MG PACK packet Take 40 mg by mouth daily (with breakfast)    Historical Provider, MD   guaiFENesin (ROBITUSSIN) 100 MG/5ML syrup Take 200 mg by mouth 4 times daily as needed for Cough  Patient not taking: Reported on 12/14/2021    Historical Provider, MD   senna (SENOKOT) 8.6 MG tablet Take 1 tablet by mouth 2 times daily    Historical Provider, MD   SODIUM CHLORIDE PO Take 1 mg by mouth daily  Patient not taking: Reported on 12/14/2021    Historical Provider, MD   Alpha-Lipoic Acid 600 MG TABS Take 600 mg by mouth daily    Historical Provider, MD   polyvinyl alcohol (LIQUIFILM TEARS) 1.4 % ophthalmic solution 1 drop 4 times daily  Patient not taking: Reported on 12/14/2021    Historical Provider, MD   clotrimazole-betamethasone (LOTRISONE) 1-0.05 % cream Apply topically 2 times daily Apply topically 2 times daily. Historical Provider, MD   Gingaashish Zingiber officinalis, (GINGER ROOT PO) Take 750 mg by mouth Daily in am    Historical Provider, MD   ferrous sulfate (IRON 325) 325 (65 Fe) MG tablet Take 325 mg by mouth daily In the morning for anemia 6/7/21   Historical Provider, MD   b complex vitamins capsule Take 1 capsule by mouth daily In the morning for supplement    Historical Provider, MD   Vitamin D (CHOLECALCIFEROL) 25 MCG (1000 UT) TABS tablet Take 4 capsules by mouth Give 4000 units in the morning for supplement    Historical Provider, MD   aspirin 81 MG EC tablet Take 1 tablet by mouth daily 6/2/21   Marigene Kehr, MD   promethazine (PHENERGAN) 12.5 MG tablet Take 12.5 mg by mouth every 6 hours as needed for Nausea  Patient not taking: Reported on 12/14/2021    Historical Provider, MD   traMADol (ULTRAM) 50 MG tablet Take 50 mg by mouth 2 times daily.      Historical Provider, MD   aluminum & magnesium hydroxide-simethicone (MYLANTA) 400-400-40 MG/5ML SUSP Take 30 mLs by mouth every 6 hours as needed    Historical Provider, MD   lidocaine (LMX) 4 % cream Apply topically every 8 hours as needed for Pain Apply topically as needed to painful muscles  Patient not taking: Reported on 12/14/2021    Historical Provider, MD   diclofenac sodium (VOLTAREN) 1 % GEL Apply topically 4 times daily as needed for Pain    Historical Provider, MD   glimepiride (AMARYL) 2 MG tablet Take 2 mg by mouth every morning (before breakfast)  Patient not taking: Reported on 12/14/2021    Historical Provider, MD   bisacodyl (DULCOLAX) 10 MG suppository Place 10 mg rectally daily as needed for Constipation Indications: IF NO RESULTS FROM MOM     Historical Provider, MD   gabapentin (NEURONTIN) 300 MG capsule Take 300 mg by mouth every evening. Historical Provider, MD   albuterol (PROVENTIL) 90 MCG/ACT inhaler Inhale 2 puffs into the lungs 4 times daily 19   Martha Adame MD   pravastatin (PRAVACHOL) 20 MG tablet TAKE 1 TABLET BY MOUTH  NIGHTLY 19   Martha Adame MD   amLODIPine (NORVASC) 5 MG tablet TAKE 1 TABLET BY MOUTH  DAILY  Patient not taking: Reported on 2021   Martha Adame MD   fluticasone-vilanterol (BREO ELLIPTA) 100-25 MCG/INH AEPB inhaler Inhale 1 puff into the lungs daily 19   Martha Adame MD   Multiple Vitamins-Minerals (OCUVITE ADULT FORMULA PO) Take 1 capsule by mouth daily    Historical Provider, MD   Probiotic Product (PRO-BIOTIC BLEND PO) Take 1 capsule by mouth 2 times daily   Patient not taking: Reported on 2021    Historical Provider, MD   magnesium gluconate (MAGONATE) 500 MG tablet Take 200 mg by mouth 2 times daily     Historical Provider, MD   Coenzyme Q10 (COQ10) 200 MG CAPS Take 200 mg by mouth daily     Historical Provider, MD     Outpatient Medications Marked as Taking for the 12/3/21 encounter [facility] Encounter)   Medication Sig Dispense Refill    [] oxyCODONE (ROXICODONE) 5 MG immediate release tablet Take 1 tablet by mouth every 4 hours as needed for Pain for up to 5 days. 20 tablet 0    apixaban (ELIQUIS) 5 MG TABS tablet Take 1 tablet by mouth 2 times daily (Patient not taking: Reported on 2021) 60 tablet 0    famotidine (PEPCID) 10 MG tablet Take 10 mg by mouth 2 times daily       Social History     Socioeconomic History    Marital status:       Spouse name: Not on file    Number of children: Not on file    Years of education: Not on file    Highest education level: Not on file   Occupational History    Not on file   Tobacco Use    Smoking status: Never Smoker    Smokeless tobacco: Never Used   Vaping Use    Vaping Use: Never used   Substance and Sexual Activity    Alcohol use: Yes     Comment: occasional    Drug use: No    Sexual activity: Not Currently     Partners: Male   Other Topics Concern    Not on file   Social History Narrative    Not on file     Social Determinants of Health     Financial Resource Strain:     Difficulty of Paying Living Expenses: Not on file   Food Insecurity:     Worried About Running Out of Food in the Last Year: Not on file    [place] of Food in the Last Year: Not on file   Transportation Needs:     Lack of Transportation (Medical): Not on file    Lack of Transportation (Non-Medical): Not on file   Physical Activity:     Days of Exercise per Week: Not on file    Minutes of Exercise per Session: Not on file   Stress:     Feeling of Stress : Not on file   Social Connections:     Frequency of Communication with Friends and Family: Not on file    Frequency of Social Gatherings with Friends and Family: Not on file    Attends Restorationism Services: Not on file    Active Member of [address] [place] [address] or Organizations: Not on file    Attends Club or Organization Meetings: Not on file    Marital Status: Not on file   Intimate Partner Violence:     Fear of Current or Ex-Partner: Not on file    Emotionally Abused: Not on file    Physically Abused: Not on file    Sexually Abused: Not on file   Housing Stability:     Unable to Pay for Housing in the Last Year: Not on file    Number of [place] in the Last Year: Not on file    Unstable Housing in the Last Year: Not on file     Family History   Problem Relation Age of Onset    Hypertension Father     Heart Disease Brother        ROS: Complete 10 point ROS was obtained with positives in HPI, otherwise:  Pt denies fevers, denies chills. Pt denies chest pain, denies SOB, denies nausea, denies vomiting, denies headache.       EXAMINATION:  BP (!) 152/70   Pulse 66   Temp 98 °F (36.7 °C) (Temporal)   Resp 16   Wt 139 lb 15.9 oz (63.5 kg)   LMP 12/03/1997   SpO2 98%   BMI 24.80 kg/m²     Cranial Nerves: Pupils are equal round reactive extraocular is full facial expressions are symmetric tongue midline gag present  Motor: Full throughout 5 out of 5  Sensory: Intact to light touch and pinprick  Cerebellar: Finger-to-nose testing normal  Gait: Gait slow and unsteady  DTRs: +1 equal bilaterally    IMAGING STUDIES:   1. Acute T8 inferior endplate fracture and T9 superior endplate fracture. 2. No other acute fractures are identified. 3. Small bilateral pleural effusions     1. Acute L5 superior endplate fracture with 88% loss of vertebral body height. 2. Severe central spinal canal narrowing at L4-L5 with moderate left and mild   right foraminal narrowing at this level. 3. Moderate-severe central spinal canal narrowing at L3-L4 with moderate left   and mild right foraminal narrowing at this level. 4. Moderate central spinal canal narrowing at L5-S1 with moderate-severe left   and moderate right foraminal narrowing at this level           IMPRESSION:multiple compression fractures    RECOMMENDATIONS:off the shelf TLSO brace    Thank you again for this consultation.       Amando Nash MD  12/16/2021

## 2022-02-23 ENCOUNTER — TELEPHONE (OUTPATIENT)
Dept: VASCULAR SURGERY | Age: 87
End: 2022-02-23

## 2022-02-24 ENCOUNTER — OFFICE VISIT (OUTPATIENT)
Dept: VASCULAR SURGERY | Age: 87
End: 2022-02-24
Payer: MEDICARE

## 2022-02-24 ENCOUNTER — TELEPHONE (OUTPATIENT)
Dept: VASCULAR SURGERY | Age: 87
End: 2022-02-24

## 2022-02-24 VITALS — WEIGHT: 133 LBS | BODY MASS INDEX: 23.57 KG/M2 | HEIGHT: 63 IN

## 2022-02-24 DIAGNOSIS — Z98.62 PERIPHERAL VASCULAR ANGIOPLASTY STATUS: ICD-10-CM

## 2022-02-24 DIAGNOSIS — I73.9 PVD (PERIPHERAL VASCULAR DISEASE) WITH CLAUDICATION (HCC): Primary | ICD-10-CM

## 2022-02-24 PROBLEM — B35.9 DERMATOPHYTOSIS: Status: RESOLVED | Noted: 2021-06-25 | Resolved: 2022-02-24

## 2022-02-24 PROBLEM — L97.521 ULCERATED, FOOT, LEFT, LIMITED TO BREAKDOWN OF SKIN (HCC): Status: RESOLVED | Noted: 2021-06-25 | Resolved: 2022-02-24

## 2022-02-24 PROBLEM — I70.244 ATHEROSCLEROSIS OF NATIVE ARTERY OF LEFT LOWER EXTREMITY WITH ULCERATION OF MIDFOOT (HCC): Status: RESOLVED | Noted: 2021-05-18 | Resolved: 2022-02-24

## 2022-02-24 PROCEDURE — 99214 OFFICE O/P EST MOD 30 MIN: CPT | Performed by: SURGERY

## 2022-02-24 PROCEDURE — 1036F TOBACCO NON-USER: CPT | Performed by: SURGERY

## 2022-02-24 PROCEDURE — 4040F PNEUMOC VAC/ADMIN/RCVD: CPT | Performed by: SURGERY

## 2022-02-24 PROCEDURE — G8484 FLU IMMUNIZE NO ADMIN: HCPCS | Performed by: SURGERY

## 2022-02-24 PROCEDURE — G8420 CALC BMI NORM PARAMETERS: HCPCS | Performed by: SURGERY

## 2022-02-24 PROCEDURE — 1123F ACP DISCUSS/DSCN MKR DOCD: CPT | Performed by: SURGERY

## 2022-02-24 PROCEDURE — 1090F PRES/ABSN URINE INCON ASSESS: CPT | Performed by: SURGERY

## 2022-02-24 PROCEDURE — G8427 DOCREV CUR MEDS BY ELIG CLIN: HCPCS | Performed by: SURGERY

## 2022-02-24 NOTE — PROGRESS NOTES
polyvinyl alcohol (LIQUIFILM TEARS) 1.4 % ophthalmic solution 1 drop 4 times daily       clotrimazole-betamethasone (LOTRISONE) 1-0.05 % cream Apply topically 2 times daily Apply topically 2 times daily.  Ginger, Zingiber officinalis, (GINGER ROOT PO) Take 750 mg by mouth Daily in am      ferrous sulfate (IRON 325) 325 (65 Fe) MG tablet Take 325 mg by mouth daily In the morning for anemia      b complex vitamins capsule Take 1 capsule by mouth daily In the morning for supplement      Vitamin D (CHOLECALCIFEROL) 25 MCG (1000 UT) TABS tablet Take 4 capsules by mouth Give 4000 units in the morning for supplement      aspirin 81 MG EC tablet Take 1 tablet by mouth daily 30 tablet 0    promethazine (PHENERGAN) 12.5 MG tablet Take 12.5 mg by mouth every 6 hours as needed for Nausea       traMADol (ULTRAM) 50 MG tablet Take 50 mg by mouth 2 times daily.  aluminum & magnesium hydroxide-simethicone (MYLANTA) 400-400-40 MG/5ML SUSP Take 30 mLs by mouth every 6 hours as needed      lidocaine (LMX) 4 % cream Apply topically every 8 hours as needed for Pain Apply topically as needed to painful muscles      diclofenac sodium (VOLTAREN) 1 % GEL Apply topically 4 times daily as needed for Pain      glimepiride (AMARYL) 2 MG tablet Take 2 mg by mouth every morning (before breakfast)       bisacodyl (DULCOLAX) 10 MG suppository Place 10 mg rectally daily as needed for Constipation Indications: IF NO RESULTS FROM MOM       gabapentin (NEURONTIN) 300 MG capsule Take 300 mg by mouth every evening.        albuterol (PROVENTIL) 90 MCG/ACT inhaler Inhale 2 puffs into the lungs 4 times daily  0    pravastatin (PRAVACHOL) 20 MG tablet TAKE 1 TABLET BY MOUTH  NIGHTLY 90 tablet 1    amLODIPine (NORVASC) 5 MG tablet TAKE 1 TABLET BY MOUTH  DAILY (Patient taking differently: Take 10 mg by mouth daily ) 90 tablet 1    fluticasone-vilanterol (BREO ELLIPTA) 100-25 MCG/INH AEPB inhaler Inhale 1 puff into the lungs daily 3 each 5    Multiple Vitamins-Minerals (OCUVITE ADULT FORMULA PO) Take 1 capsule by mouth daily      Probiotic Product (PRO-BIOTIC BLEND PO) Take 1 capsule by mouth 2 times daily       magnesium gluconate (MAGONATE) 500 MG tablet Take 200 mg by mouth 2 times daily       Coenzyme Q10 (COQ10) 200 MG CAPS Take 200 mg by mouth daily        No current facility-administered medications for this visit. 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 ([address] 5/18/2021    Bronchitis 5/23/2017    Bruising tendency ([address]     CAD (coronary artery disease)     Cough 5/23/2017    COVID     Dermatophytosis 6/25/2021    Diabetes mellitus (HCC)     GERD (gastroesophageal reflux disease) 5/23/2017    History of pulmonary embolus (PE) 5/29/2021    Hx of blood clots     Hyperlipidemia     Hypertension     Leg swelling 7/15/2021    Lung disease     Peripheral vascular angioplasty status 5/29/2021    Pseudoaneurysm of right femoral artery ([address] 5/29/2021    PVD (peripheral vascular disease) with claudication ([address] 4/10/2019    Restless legs syndrome     Rhinitis, allergic 5/23/2017    Sinusitis 5/23/2017    SOB (shortness of breath) 5/23/2017    Type 1 diabetes mellitus with left diabetic foot ulcer ([address] 5/18/2021    Ulcerated, foot, left, limited to breakdown of skin ([address] 6/25/2021    Urinary incontinence        Past Surgical History:   Procedure Laterality Date    ABDOMEN SURGERY      APPENDECTOMY      CARDIAC SURGERY      CHOLECYSTECTOMY      COLONOSCOPY      CORONARY ARTERY BYPASS GRAFT      8/28/10    ENDOSCOPY, COLON, DIAGNOSTIC      FOOT DEBRIDEMENT Left 5/16/2021    FOOT DEBRIDEMENT INCISION AND DRAINAGE.    performed by Tiarra Zelaya DPM at [address] [place] [address] Left 5/21/2021    LEFT FOOT DEBRIDEMENT  WITH DELAYED PRIMARY CLOSURE performed by Felice Rothman DPM at [facility]  Number of Places Lived in the Last Year: Not on file    Unstable Housing in the Last Year: Not on file       Review of Systems:    Eyes:  No blurring, diplopia or vision loss. Respiratory:  No cough, pleuritic chest pain, dyspnea, or wheezing. Chronic obstructive lung disease, history of small pulmonary embolus in the past when she had COVID infection  Cardiovascular: No angina, palpitations . Hypertension, hyperlipidemia  Musculoskeletal:  No arthritis or weakness. History of compressed vertebra  Neurologic:  No paralysis, paresis, paresthesia, seizures or headache. Endocrinology:           Physical Exam:  General appearance:  Alert, awake, oriented x 3. No distress. Eyes:  Conjunctivae appear normal; PERRL  Neck:  No jugular venous distention, lymphadenopathy or thyromegaly. No evidence of carotid bruit  Lungs:  Clear to ausculation bilaterally. No rhonchi, crackles, wheezes  Heart:  Regular rate and rhythm. No rub or murmur  Abdomen:  Soft, non-tender. No masses, organomegaly. Musculoskeletal : No joint effusions, tenderness swelling    Neuro: Speech is intact. Moving all extremities. No focal motor or sensory deficits      Extremities:  Both feet are warm to touch. The color of both feet is normal.    Mild left ankle edema longstanding, without any tenderness of the calf noted    Pulses Right  Left    Brachial 3 3    Radial    3=normal   Femoral 2 2  2=diminished   Popliteal    1=barely palpable   Dorsalis pedis    0=absent   Posterior tibial 0 0  4=aneurysmal             Other pertinent information:1. The past medical records were reviewed. Assessment:    1. PVD (peripheral vascular disease) with claudication ([address]    2.  Peripheral vascular angioplasty status              Plan:       Discussed the patient and the patient's son Junior Nolasco, telephone #[phone], options, risks benefits and alternatives explained, patient was reassured, was recommended lower external artery Doppler study for

## 2022-02-24 NOTE — TELEPHONE ENCOUNTER
Notified patient's son, Macie Martinez, of lower extremity arterial doppler study at [facility] on 4-26-22 at 1:00 pm. [place] at 12:30 pm.   Also, notified Krupa Hinton at [facility] of the same.

## 2022-02-28 ENCOUNTER — PROCEDURE VISIT (OUTPATIENT)
Dept: PODIATRY | Age: 87
End: 2022-02-28
Payer: MEDICARE

## 2022-02-28 DIAGNOSIS — L84 CORNS AND CALLOSITIES: ICD-10-CM

## 2022-02-28 DIAGNOSIS — Z79.01 ENCOUNTER FOR CURRENT LONG-TERM USE OF ANTICOAGULANTS: ICD-10-CM

## 2022-02-28 DIAGNOSIS — E11.621 TYPE 2 DIABETES MELLITUS WITH FOOT ULCER, UNSPECIFIED WHETHER LONG TERM INSULIN USE (HCC): ICD-10-CM

## 2022-02-28 DIAGNOSIS — G60.8 HEREDITARY SENSORY NEUROPATHY: ICD-10-CM

## 2022-02-28 DIAGNOSIS — L97.509 TYPE 2 DIABETES MELLITUS WITH FOOT ULCER, UNSPECIFIED WHETHER LONG TERM INSULIN USE (HCC): ICD-10-CM

## 2022-02-28 DIAGNOSIS — B35.1 TINEA UNGUIUM: Primary | ICD-10-CM

## 2022-02-28 PROCEDURE — 11056 PARNG/CUTG B9 HYPRKR LES 2-4: CPT | Performed by: PODIATRIST

## 2022-02-28 PROCEDURE — 11721 DEBRIDE NAIL 6 OR MORE: CPT | Performed by: PODIATRIST

## 2022-02-28 NOTE — PROGRESS NOTES
Quirino Turk is here today for nail care. her PCP is Hiren Cavazos DO last OV was 02/24/2022. CC:   Diabetic foot exam and painful elongated toenails 1-5 right and left    HPI:   Follow-up diabetic exam  Elongated toenails 1-5 right left. Presents with her son. Continues Eliquis long-term anticoagulant therapy. No open skin lesions or abrasions. No foot pain. No additional pedal complaints. ROS:  Const: Denies constitutional symptoms  Musculo: Denies symptoms other than stated above  Skin: Denies symptoms other than stated above      Current Outpatient Medications:     oxyCODONE (ROXICODONE) 5 MG immediate release tablet, Take 5 mg by mouth every 4 hours as needed for Pain., Disp: , Rfl:     acetaminophen (TYLENOL) 500 MG tablet, Take 500 mg by mouth every 6 hours as needed for Pain, Disp: , Rfl:     apixaban (ELIQUIS) 5 MG TABS tablet, Take 1 tablet by mouth 2 times daily, Disp: 60 tablet, Rfl: 0    ciclopirox (PENLAC) 8 % solution, Apply once daily all toenails. , Disp: 6 mL, Rfl: 1    sucralfate (CARAFATE) 1 GM tablet, Take 1 g by mouth three times daily, Disp: , Rfl:     hydrALAZINE (APRESOLINE) 25 MG tablet, Take 25 mg by mouth 2 times daily, Disp: , Rfl:     metFORMIN (GLUCOPHAGE) 500 MG tablet, Take 500 mg by mouth daily, Disp: , Rfl:     polyethylene glycol (GLYCOLAX) 17 g packet, Take 17 g by mouth daily as needed for Constipation, Disp: , Rfl:     pantoprazole sodium (PROTONIX) 40 MG PACK packet, Take 40 mg by mouth daily (with breakfast), Disp: , Rfl:     famotidine (PEPCID) 10 MG tablet, Take 10 mg by mouth daily , Disp: , Rfl:     guaiFENesin (ROBITUSSIN) 100 MG/5ML syrup, Take 200 mg by mouth 4 times daily as needed for Cough , Disp: , Rfl:     senna (SENOKOT) 8.6 MG tablet, Take 1 tablet by mouth 2 times daily, Disp: , Rfl:     SODIUM CHLORIDE PO, Take 1 mg by mouth daily , Disp: , Rfl:     Alpha-Lipoic Acid 600 MG TABS, Take 600 mg by mouth daily, Disp: , Rfl:    polyvinyl alcohol (LIQUIFILM TEARS) 1.4 % ophthalmic solution, 1 drop 4 times daily , Disp: , Rfl:     clotrimazole-betamethasone (LOTRISONE) 1-0.05 % cream, Apply topically 2 times daily Apply topically 2 times daily. , Disp: , Rfl:     Ginger, Zingiber officinalis, (GINGER ROOT PO), Take 750 mg by mouth Daily in am, Disp: , Rfl:     ferrous sulfate (IRON 325) 325 (65 Fe) MG tablet, Take 325 mg by mouth daily In the morning for anemia, Disp: , Rfl:     b complex vitamins capsule, Take 1 capsule by mouth daily In the morning for supplement, Disp: , Rfl:     Vitamin D (CHOLECALCIFEROL) 25 MCG (1000 UT) TABS tablet, Take 4 capsules by mouth Give 4000 units in the morning for supplement, Disp: , Rfl:     aspirin 81 MG EC tablet, Take 1 tablet by mouth daily, Disp: 30 tablet, Rfl: 0    promethazine (PHENERGAN) 12.5 MG tablet, Take 12.5 mg by mouth every 6 hours as needed for Nausea , Disp: , Rfl:     traMADol (ULTRAM) 50 MG tablet, Take 50 mg by mouth 2 times daily.  , Disp: , Rfl:     aluminum & magnesium hydroxide-simethicone (MYLANTA) 400-400-40 MG/5ML SUSP, Take 30 mLs by mouth every 6 hours as needed, Disp: , Rfl:     lidocaine (LMX) 4 % cream, Apply topically every 8 hours as needed for Pain Apply topically as needed to painful muscles, Disp: , Rfl:     diclofenac sodium (VOLTAREN) 1 % GEL, Apply topically 4 times daily as needed for Pain, Disp: , Rfl:     glimepiride (AMARYL) 2 MG tablet, Take 2 mg by mouth every morning (before breakfast) , Disp: , Rfl:     bisacodyl (DULCOLAX) 10 MG suppository, Place 10 mg rectally daily as needed for Constipation Indications: IF NO RESULTS FROM MOM , Disp: , Rfl:     gabapentin (NEURONTIN) 300 MG capsule, Take 300 mg by mouth every evening. , Disp: , Rfl:     albuterol (PROVENTIL) 90 MCG/ACT inhaler, Inhale 2 puffs into the lungs 4 times daily, Disp: , Rfl: 0    pravastatin (PRAVACHOL) 20 MG tablet, TAKE 1 TABLET BY MOUTH  NIGHTLY, Disp: 90 tablet, Rfl: 1   amLODIPine (NORVASC) 5 MG tablet, TAKE 1 TABLET BY MOUTH  DAILY (Patient taking differently: Take 10 mg by mouth daily ), Disp: 90 tablet, Rfl: 1    fluticasone-vilanterol (BREO ELLIPTA) 100-25 MCG/INH AEPB inhaler, Inhale 1 puff into the lungs daily, Disp: 3 each, Rfl: 5    Multiple Vitamins-Minerals (OCUVITE ADULT FORMULA PO), Take 1 capsule by mouth daily, Disp: , Rfl:     Probiotic Product (PRO-BIOTIC BLEND PO), Take 1 capsule by mouth 2 times daily , Disp: , Rfl:     magnesium gluconate (MAGONATE) 500 MG tablet, Take 200 mg by mouth 2 times daily , Disp: , Rfl:     Coenzyme Q10 (COQ10) 200 MG CAPS, Take 200 mg by mouth daily , Disp: , Rfl:   Allergies   Allergen Reactions    Lisinopril Other (See Comments)     7/18/19 Pt states that she does not want to take LISINOPRIL       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 ([address] 5/18/2021    Bronchitis 5/23/2017    Bruising tendency ([address]     CAD (coronary artery disease)     Cough 5/23/2017    COVID     Dermatophytosis 6/25/2021    Diabetes mellitus (HCC)     GERD (gastroesophageal reflux disease) 5/23/2017    History of pulmonary embolus (PE) 5/29/2021    Hx of blood clots     Hyperlipidemia     Hypertension     Leg swelling 7/15/2021    Lung disease     Peripheral vascular angioplasty status 5/29/2021    Pseudoaneurysm of right femoral artery ([address] 5/29/2021    PVD (peripheral vascular disease) with claudication ([address] 4/10/2019    Restless legs syndrome     Rhinitis, allergic 5/23/2017    Sinusitis 5/23/2017    SOB (shortness of breath) 5/23/2017    Type 1 diabetes mellitus with left diabetic foot ulcer ([address] 5/18/2021    Ulcerated, foot, left, limited to breakdown of skin ([address] 6/25/2021    Urinary incontinence        There were no vitals filed for this visit. Work History/Social History:    Foot and ankle history: Focused Lower Extremity Physical Exam:      Neurovascular examination:    Dorsalis Pedis palpable bilateral.  Posterior tibialis non-palpable bilateral.    Capillary Refill Time:  Immediate return  Hair growth:  Symmetrical and bilateral   Skin:  Not atrophic  Edema: Mild edema bilateral feet. Mild edema bilateral ankles. Neurologic:  Light touch diminished bilateral.  Warm to coolness bilateral distal toes  Decreased epicritic sensation     Musculoskeletal/ Orthopedic examination:    Equinis: present bilateral  Dorsiflexion, plantarflexion, inversion, eversion bilateral 5 out of 5 muscle strength  Wiggling toes  Negative Homans bilateral.  No pain foot or ankle. Dermatology examination:    Toenails 1 through 5 bilateral thickened, elongated, dystrophic, mycotic with subungual debris. Web spaces 1 through 4 bilateral clean dry and intact. Mild hyperkeratotic tissue plantar first metatarsal bilateral.  No open skin lesions or abrasions. No erythema. Assessment and Plan: Rober Mann was seen today for callouses and nail problem. Diagnoses and all orders for this visit:    Tinea unguium    Corns and callosities    Hereditary sensory neuropathy    Type 2 diabetes mellitus with foot ulcer, unspecified whether long term insulin use ([facility]    Encounter for current long-term use of anticoagulants      Follow-up diabetic foot and ankle exam  Formal debridement toenails 1 through 5 right and left with manual debridement for thickness and overall length. Paring hyperkeratotic tissue plantar first metatarsal bilateral today. Son is present throughout entire visit. Avoid barefoot. Continues Eliquis long-term anticoagulant therapy. I will follow-up 2 months. Return in about 2 months (around 4/28/2022). Seen By:  Michael Cuellar DPM      Document was created using voice recognition software. Note was reviewed however may contain grammatical errors.

## 2022-04-22 NOTE — PROGRESS NOTES
[facility] Followup Visit Note    Referring Physician : Cristhian Truong MD  [address] RECORD NUMBER:  [id number]  AGE: 80 y.o. GENDER: female  : 1927  EPISODE DATE:  2021    Subjective:     Chief Complaint   Patient presents with    Wound Check     left foot medial and dorsal ulcers      HISTORY of PRESENT ILLNESS HPI   Merlinda Singh is a 80 y.o. female who presents today in regards to follow up evaluation and treatment of wound/ulcer. That patient's past medical, family and social hx were reviewed and changes were made if present. History of Wound Context: Patient has wounds on the left foot. Patient with history of diabetes, neuropathy as well as PVD recently underwent vascular intervention as well as had a abscess on the bottom of her left foot that required incision and drainage with a delayed primary closure. She is currently at [facility], local care consisting of Xeroform to the wounds as instructed.     Pt is not on abx at time of initial visit.     21 sutures removed, local care as instructed. Continue nonweightbearing to the left foot until next follow-up.    21 presents with her daughter today. Plantar wound remains healed. Wounds appreciated medial, lateral as well as dorsal left foot, stable. Depth undetermined, eschar decreased. There is no erythema or active drainage. No pain. No erythema or fluctuance. Patient can start partial weightbearing on the left foot with a slipper sock, I do not want a shoe covering due to concerns of the other wounds. 21 presents w/ her son, dianna Gamez last week, ordered US LE, left foot looks good, dorsal healed, medial small area of eschar, adhered to underlying surface, no erythema, fluctuance or increase in temperature. No pain. Plantar foot looks good. Minimal edema. Negative Homans. Okay to ambulate with a slipper sock only until the medial left foot wound resolves.       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 ([address] 5/18/2021    Bronchitis 5/23/2017    CAD (coronary artery disease)     Cough 5/23/2017    Dermatophytosis 6/25/2021    Diabetes mellitus (HCC)     GERD (gastroesophageal reflux disease) 5/23/2017    History of pulmonary embolus (PE) 5/29/2021    Hx of blood clots     Hyperlipidemia     Hypertension     Leg swelling 7/15/2021    Lung disease     Peripheral vascular angioplasty status 5/29/2021    Pseudoaneurysm of right femoral artery ([address] 5/29/2021    PVD (peripheral vascular disease) with claudication ([address] 4/10/2019    Restless legs syndrome     Rhinitis, allergic 5/23/2017    Sinusitis 5/23/2017    SOB (shortness of breath) 5/23/2017    Type 1 diabetes mellitus with left diabetic foot ulcer ([address] 5/18/2021    Ulcerated, foot, left, limited to breakdown of skin ([address] 6/25/2021    Urinary incontinence      Past Surgical History:   Procedure Laterality Date    ABDOMEN SURGERY      APPENDECTOMY      CARDIAC SURGERY      CHOLECYSTECTOMY      COLONOSCOPY      CORONARY ARTERY BYPASS GRAFT      8/28/10    ENDOSCOPY, COLON, DIAGNOSTIC      FOOT DEBRIDEMENT Left 5/16/2021    FOOT DEBRIDEMENT INCISION AND DRAINAGE.    performed by Ray Willson DPM at [place] [id number] Left 5/21/2021    LEFT FOOT DEBRIDEMENT  WITH DELAYED PRIMARY CLOSURE performed by Vinicio Maza DPM at [facility]    HYSTERECTOMY      frankie and bso 1997    TONSILLECTOMY AND ADENOIDECTOMY       Family History   Problem Relation Age of Onset    Hypertension Father     Heart Disease Brother      Social History     Tobacco Use    Smoking status: Never Smoker    Smokeless tobacco: Never Used   Vaping Use    Vaping Use: Never used   Substance Use Topics    Alcohol use: Yes     Comment: occasional    Drug use: No     Allergies   Allergen Reactions    Lisinopril Other (See Comments)     7/18/19 Pt states that she does not want to take LISINOPRIL     Current Outpatient Medications on File Prior to Encounter   Medication Sig Dispense Refill    meloxicam (MOBIC) 7.5 MG tablet Take 7.5 mg by mouth daily For 2 weeks. End date 7-29-21      miconazole (MICOTIN) 2 % cream Apply 1 Act topically nightly Topical to rash of toes, feet and ankles end date 7-25-21      vancomycin (VANCOCIN) 250 MG capsule Take 250 mg by mouth 4 times daily End date:  7-26-21,   Then give 250 mg in morning at HS from 7-27-21  Until  8-3-21      vitamin B-1 (THIAMINE) 100 MG tablet Take 100 mg by mouth daily      ferrous sulfate (IRON 325) 325 (65 Fe) MG tablet Take 325 mg by mouth daily In the morning for anemia      folic acid (FOLVITE) 1 MG tablet Take 1 mg by mouth daily In the morning for anemia      nystatin (MYCOSTATIN) 303791 UNIT/ML suspension Take 500,000 Units by mouth 4 times daily Thrush swish and swallow      omeprazole (PRILOSEC) 20 MG delayed release capsule Take 20 mg by mouth daily In morning for reflux      b complex vitamins capsule Take 1 capsule by mouth daily In the morning for supplement      Vitamin D (CHOLECALCIFEROL) 25 MCG (1000 UT) TABS tablet Take 4 capsules by mouth Give 4000 units in the morning for supplement      aspirin 81 MG EC tablet Take 1 tablet by mouth daily 30 tablet 0    furosemide (LASIX) 20 MG tablet Take 20 mg by mouth daily *MON-WED-FRI*       traMADol (ULTRAM) 50 MG tablet Take 50 mg by mouth three times daily.  glimepiride (AMARYL) 2 MG tablet Take 2 mg by mouth every morning (before breakfast)      gabapentin (NEURONTIN) 300 MG capsule Take 300 mg by mouth every evening.        albuterol (PROVENTIL) 90 MCG/ACT inhaler Inhale 2 puffs into the lungs 4 times daily  0    pravastatin (PRAVACHOL) 20 MG tablet TAKE 1 TABLET BY MOUTH  NIGHTLY 90 tablet 1    amLODIPine (NORVASC) 5 MG tablet TAKE 1 TABLET BY MOUTH  DAILY 90 tablet 1    fluticasone-vilanterol (BREO ELLIPTA) 100-25 MCG/INH AEPB inhaler Inhale 1 puff into the lungs daily 3 each 5    Multiple Vitamins-Minerals (OCUVITE ADULT FORMULA PO) Take 1 capsule by mouth daily      Probiotic Product (PRO-BIOTIC BLEND PO) Take 1 capsule by mouth 2 times daily       magnesium gluconate (MAGONATE) 500 MG tablet Take 500 mg by mouth 2 times daily       Coenzyme Q10 (COQ10) 200 MG CAPS Take 200 mg by mouth daily       calcium carbonate (TUMS) 500 MG chewable tablet Take 1 tablet by mouth 4 times daily as needed (indigestion) Not to exceed 15 tablet in a 24hr period      oxyCODONE-acetaminophen (PERCOCET) 7.5-325 MG per tablet Take 1 tablet by mouth every 6 hours as needed for Pain.  promethazine (PHENERGAN) 12.5 MG tablet Take 12.5 mg by mouth every 6 hours as needed for Nausea      aluminum & magnesium hydroxide-simethicone (MYLANTA) 400-400-40 MG/5ML SUSP Take 30 mLs by mouth every 6 hours as needed      lidocaine (LMX) 4 % cream Apply topically every 8 hours as needed for Pain Apply topically as needed to painful muscles      diclofenac sodium (VOLTAREN) 1 % GEL Apply topically 4 times daily as needed for Pain      bisacodyl (DULCOLAX) 10 MG suppository Place 10 mg rectally daily as needed for Constipation Indications: IF NO RESULTS FROM MOM        No current facility-administered medications on file prior to encounter.        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 ([address] - Primary    Relevant Orders    Initiate Outpatient Wound Care Protocol             Wound 06/14/21 Foot Left;Dorsal #2 (Active)   Wound Image   07/22/21 1037   Dressing Status New dressing applied;Clean; Intact;Dry 07/08/21 1111   Wound Cleansed Cleansed with saline 07/08/21 1111   Dressing/Treatment Xeroform;Dry dressing 07/08/21 1111   Offloading for Diabetic Foot Ulcers Offloading boot 07/08/21 1111   Wound Length (cm) 0 cm 07/22/21 1037   Wound Width (cm) 0 cm 07/22/21 1037   Wound Depth (cm) 0 cm 07/22/21 1037   Wound Surface Area (cm^2) 0 cm^2 07/22/21 1037   Change in Wound Size % (l*w) 100 07/22/21 1037   Wound Volume (cm^3) 0 cm^3 07/22/21 1037   Wound Healing % 100 07/22/21 1037   Wound Assessment Granulation tissue;Pink/red 07/08/21 0953   Drainage Amount Scant 07/08/21 0953   Drainage Description Serosanguinous 07/08/21 0953   Odor None 07/08/21 0953   Ira-wound Assessment Dry/flaky;Fragile 07/08/21 0953   Number of days: 37       Wound 06/14/21 Foot Left;Medial #3 (Active)   Wound Image   07/22/21 1037   Dressing Status New dressing applied;Clean;Dry; Intact 07/22/21 1114   Wound Cleansed Cleansed with saline 07/22/21 1114   Dressing/Treatment Xeroform;Dry dressing 07/22/21 1114   Offloading for Diabetic Foot Ulcers Offloading boot 07/22/21 1114   Wound Length (cm) 0.5 cm 07/22/21 1037   Wound Width (cm) 0.9 cm 07/22/21 1037   Wound Depth (cm) 0.1 cm 07/22/21 1037   Wound Surface Area (cm^2) 0.45 cm^2 07/22/21 1037   Change in Wound Size % (l*w) 71.15 07/22/21 1037   Wound Volume (cm^3) 0.045 cm^3 07/22/21 1037   Wound Healing % 72 07/22/21 1037   Wound Assessment Eschar dry;Fibrin 07/22/21 1037   Drainage Amount None 07/22/21 1037   Odor None 07/22/21 1037   Ira-wound Assessment Blanchable erythema;Dry/flaky 07/22/21 1037   Number of days: 37              Plan:   Treatment Note please see attached Discharge Instructions    Written patient dismissal instructions given to patient and signed by patient or POA. Discharge Instructions       Visit Discharge/Physician Orders     Discharge condition: Stable     Assessment of pain at discharge:minimal     Anesthetic used: 4% lidocaine     Discharge to: [facility] [place]     Left via:Private automobile     Accompanied by: accompanied by daughter     ECF/HHA:      Dressing Orders: cleanse left foot ulcer with normal saline apply xeroform and dry dressing daily.     Treatment Orders:  Eat foods high in protein and vitamin c     Take multivitamin daily     Ok for partial weight bearing to left foot, with antislip sock- no shoe     [address] [place] [address],[address] followup visit _________2week_________________  (Please note your next appointment above and if you are unable to keep, kindly give a 24 hour notice.  Thank you.)     Physician signature:__________________________        If you experience any of the following, please call the [address] during business hours:     * Increase in Pain  * Temperature over 101  * Increase in drainage from your wound  * Drainage with a foul odor  * Bleeding  * Increase in swelling  * Need for compression bandage changes due to slippage, breakthrough drainage.     If you need medical attention outside of the business hours of the [facility] [address] please contact your PCP or go to the nearest emergency room.                                Electronically signed by Luis Tirado DPM on 7/22/2021 at 12:21 PM Normal vision: sees adequately in most situations; can see medication labels, newsprint

## 2022-04-26 ENCOUNTER — HOSPITAL ENCOUNTER (OUTPATIENT)
Dept: INTERVENTIONAL RADIOLOGY/VASCULAR | Age: 87
Discharge: HOME | End: 2022-04-28
Payer: MEDICARE

## 2022-04-26 DIAGNOSIS — I73.9 PVD (PERIPHERAL VASCULAR DISEASE) WITH CLAUDICATION (HCC): ICD-10-CM

## 2022-04-26 DIAGNOSIS — Z98.62 PERIPHERAL VASCULAR ANGIOPLASTY STATUS: ICD-10-CM

## 2022-04-26 PROCEDURE — 93923 UPR/LXTR ART STDY 3+ LVLS: CPT

## 2022-04-26 PROCEDURE — 93923 UPR/LXTR ART STDY 3+ LVLS: CPT | Performed by: SURGERY

## 2022-05-02 ENCOUNTER — PROCEDURE VISIT (OUTPATIENT)
Dept: PODIATRY | Age: 87
End: 2022-05-02
Payer: MEDICARE

## 2022-05-02 VITALS — BODY MASS INDEX: 23.57 KG/M2 | WEIGHT: 133 LBS | HEIGHT: 63 IN

## 2022-05-02 DIAGNOSIS — M20.41 HAMMER TOES OF BOTH FEET: ICD-10-CM

## 2022-05-02 DIAGNOSIS — G60.8 HEREDITARY SENSORY NEUROPATHY: ICD-10-CM

## 2022-05-02 DIAGNOSIS — L84 CORNS AND CALLOSITIES: ICD-10-CM

## 2022-05-02 DIAGNOSIS — M20.42 HAMMER TOES OF BOTH FEET: ICD-10-CM

## 2022-05-02 DIAGNOSIS — Z79.01 ENCOUNTER FOR CURRENT LONG-TERM USE OF ANTICOAGULANTS: ICD-10-CM

## 2022-05-02 DIAGNOSIS — B35.1 TINEA UNGUIUM: Primary | ICD-10-CM

## 2022-05-02 DIAGNOSIS — L97.509 TYPE 2 DIABETES MELLITUS WITH FOOT ULCER, UNSPECIFIED WHETHER LONG TERM INSULIN USE (HCC): ICD-10-CM

## 2022-05-02 DIAGNOSIS — E11.621 TYPE 2 DIABETES MELLITUS WITH FOOT ULCER, UNSPECIFIED WHETHER LONG TERM INSULIN USE (HCC): ICD-10-CM

## 2022-05-02 PROCEDURE — 11056 PARNG/CUTG B9 HYPRKR LES 2-4: CPT | Performed by: PODIATRIST

## 2022-05-02 PROCEDURE — 11721 DEBRIDE NAIL 6 OR MORE: CPT | Performed by: PODIATRIST

## 2022-05-02 NOTE — PROGRESS NOTES
Екатерина Vanegas is here today for a diabetic foot exam and nail care. her PCP is Larry Quintanilla DO last OV was 02/24/2022. CC:   Diabetic foot exam and painful elongated toenails 1-5 right and left    HPI:   Follow-up foot and ankle exam.  Follow-up diabetic exam.  Continues Eliquis. Does present today with her daughter. Here today elongated toenails 1-5 right and left. No calf pain. No additional pedal complaints. ROS:  Const: Denies constitutional symptoms  Musculo: Denies symptoms other than stated above  Skin: Denies symptoms other than stated above      Current Outpatient Medications:     oxyCODONE (ROXICODONE) 5 MG immediate release tablet, Take 5 mg by mouth every 4 hours as needed for Pain., Disp: , Rfl:     acetaminophen (TYLENOL) 500 MG tablet, Take 500 mg by mouth every 6 hours as needed for Pain, Disp: , Rfl:     apixaban (ELIQUIS) 5 MG TABS tablet, Take 1 tablet by mouth 2 times daily, Disp: 60 tablet, Rfl: 0    ciclopirox (PENLAC) 8 % solution, Apply once daily all toenails. , Disp: 6 mL, Rfl: 1    sucralfate (CARAFATE) 1 GM tablet, Take 1 g by mouth three times daily, Disp: , Rfl:     hydrALAZINE (APRESOLINE) 25 MG tablet, Take 25 mg by mouth 2 times daily, Disp: , Rfl:     metFORMIN (GLUCOPHAGE) 500 MG tablet, Take 500 mg by mouth daily, Disp: , Rfl:     polyethylene glycol (GLYCOLAX) 17 g packet, Take 17 g by mouth daily as needed for Constipation, Disp: , Rfl:     pantoprazole sodium (PROTONIX) 40 MG PACK packet, Take 40 mg by mouth daily (with breakfast), Disp: , Rfl:     famotidine (PEPCID) 10 MG tablet, Take 10 mg by mouth daily , Disp: , Rfl:     guaiFENesin (ROBITUSSIN) 100 MG/5ML syrup, Take 200 mg by mouth 4 times daily as needed for Cough , Disp: , Rfl:     senna (SENOKOT) 8.6 MG tablet, Take 1 tablet by mouth 2 times daily, Disp: , Rfl:     SODIUM CHLORIDE PO, Take 1 mg by mouth daily , Disp: , Rfl:     Alpha-Lipoic Acid 600 MG TABS, Take 600 mg by mouth daily, Disp: , Rfl:     polyvinyl alcohol (LIQUIFILM TEARS) 1.4 % ophthalmic solution, 1 drop 4 times daily , Disp: , Rfl:     clotrimazole-betamethasone (LOTRISONE) 1-0.05 % cream, Apply topically 2 times daily Apply topically 2 times daily. , Disp: , Rfl:     Ginger, Zingiber officinalis, (GINGER ROOT PO), Take 750 mg by mouth Daily in am, Disp: , Rfl:     ferrous sulfate (IRON 325) 325 (65 Fe) MG tablet, Take 325 mg by mouth daily In the morning for anemia, Disp: , Rfl:     b complex vitamins capsule, Take 1 capsule by mouth daily In the morning for supplement, Disp: , Rfl:     Vitamin D (CHOLECALCIFEROL) 25 MCG (1000 UT) TABS tablet, Take 4 capsules by mouth Give 4000 units in the morning for supplement, Disp: , Rfl:     aspirin 81 MG EC tablet, Take 1 tablet by mouth daily, Disp: 30 tablet, Rfl: 0    promethazine (PHENERGAN) 12.5 MG tablet, Take 12.5 mg by mouth every 6 hours as needed for Nausea , Disp: , Rfl:     traMADol (ULTRAM) 50 MG tablet, Take 50 mg by mouth 2 times daily.  , Disp: , Rfl:     aluminum & magnesium hydroxide-simethicone (MYLANTA) 400-400-40 MG/5ML SUSP, Take 30 mLs by mouth every 6 hours as needed, Disp: , Rfl:     lidocaine (LMX) 4 % cream, Apply topically every 8 hours as needed for Pain Apply topically as needed to painful muscles, Disp: , Rfl:     diclofenac sodium (VOLTAREN) 1 % GEL, Apply topically 4 times daily as needed for Pain, Disp: , Rfl:     glimepiride (AMARYL) 2 MG tablet, Take 2 mg by mouth every morning (before breakfast) , Disp: , Rfl:     bisacodyl (DULCOLAX) 10 MG suppository, Place 10 mg rectally daily as needed for Constipation Indications: IF NO RESULTS FROM MOM , Disp: , Rfl:     gabapentin (NEURONTIN) 300 MG capsule, Take 300 mg by mouth every evening. , Disp: , Rfl:     albuterol (PROVENTIL) 90 MCG/ACT inhaler, Inhale 2 puffs into the lungs 4 times daily, Disp: , Rfl: 0    pravastatin (PRAVACHOL) 20 MG tablet, TAKE 1 TABLET BY MOUTH  NIGHTLY, Disp: 90 tablet, Rfl: 1    amLODIPine (NORVASC) 5 MG tablet, TAKE 1 TABLET BY MOUTH  DAILY (Patient taking differently: Take 10 mg by mouth daily ), Disp: 90 tablet, Rfl: 1    fluticasone-vilanterol (BREO ELLIPTA) 100-25 MCG/INH AEPB inhaler, Inhale 1 puff into the lungs daily, Disp: 3 each, Rfl: 5    Multiple Vitamins-Minerals (OCUVITE ADULT FORMULA PO), Take 1 capsule by mouth daily, Disp: , Rfl:     Probiotic Product (PRO-BIOTIC BLEND PO), Take 1 capsule by mouth 2 times daily , Disp: , Rfl:     magnesium gluconate (MAGONATE) 500 MG tablet, Take 200 mg by mouth 2 times daily , Disp: , Rfl:     Coenzyme Q10 (COQ10) 200 MG CAPS, Take 200 mg by mouth daily , Disp: , Rfl:   Allergies   Allergen Reactions    Lisinopril Other (See Comments)     7/18/19 Pt states that she does not want to take LISINOPRIL       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 ([address] 5/18/2021    Bronchitis 5/23/2017    Bruising tendency (HCC)     CAD (coronary artery disease)     Cough 5/23/2017    COVID     Dermatophytosis 6/25/2021    Diabetes mellitus (HCC)     GERD (gastroesophageal reflux disease) 5/23/2017    History of pulmonary embolus (PE) 5/29/2021    Hx of blood clots     Hyperlipidemia     Hypertension     Leg swelling 7/15/2021    Lung disease     Peripheral vascular angioplasty status 5/29/2021    Pseudoaneurysm of right femoral artery ([address] 5/29/2021    PVD (peripheral vascular disease) with claudication ([address] 4/10/2019    Restless legs syndrome     Rhinitis, allergic 5/23/2017    Sinusitis 5/23/2017    SOB (shortness of breath) 5/23/2017    Type 1 diabetes mellitus with left diabetic foot ulcer ([address] 5/18/2021    Ulcerated, foot, left, limited to breakdown of skin ([address] 6/25/2021    Urinary incontinence        There were no vitals filed for this visit.        Work History/Social History: Foot and ankle history:     Focused Lower Extremity Physical Exam:      Neurovascular examination:    Dorsalis Pedis palpable bilateral.  Posterior tibialis non-palpable bilateral.    Capillary Refill Time:  Immediate return  Hair growth:  Symmetrical and bilateral   Skin:  Not atrophic  Edema: Mild edema bilateral feet. Mild edema bilateral ankles. Neurologic:  Light touch diminished bilateral.  Warm to coolness bilateral distal toes  Decreased epicritic sensation     Musculoskeletal/ Orthopedic examination:    Equinis: present bilateral  Dorsiflexion, plantarflexion, inversion, eversion bilateral 5 out of 5 muscle strength  Wiggling toes  Negative Homans bilateral.  No pain foot or ankle. Dermatology examination:    Toenails 1 through 5 bilateral thickened, elongated, dystrophic, mycotic with subungual debris. Web spaces 1 through 4 bilateral clean dry and intact. Hyperkeratotic tissue plantar first metatarsal head bilateral.  No open skin lesions or abrasions. Assessment and Plan: Allen Cifuentes was seen today for callouses, nail problem and diabetes. Diagnoses and all orders for this visit:    Tinea unguium    Corns and callosities    Hereditary sensory neuropathy    Type 2 diabetes mellitus with foot ulcer, unspecified whether long term insulin use ([facility] [address]    Encounter for current long-term use of anticoagulants    Hammer toes of both feet      Follow-up diabetic foot and ankle exam  Formal debridement toenails 1 through 5 right and left with manual debridement for thickness and overall length temperature  I did pare hyperkeratotic tissue plantar first metatarsal head bilateral.  Tolerated well. Daughter present throughout entire visit. Follow-up in office 2 months diabetic foot and ankle exam        Return in about 2 months (around 7/2/2022). Seen By:  Ty Colon DPM      Document was created using voice recognition software. Note was reviewed however may contain grammatical errors.

## 2022-07-17 ENCOUNTER — HOSPITAL ENCOUNTER (INPATIENT)
Age: 87
LOS: 7 days | Discharge: NURSING, REHAB OR LONG-TERM CARE | DRG: 378 | End: 2022-07-25
Attending: EMERGENCY MEDICINE | Admitting: INTERNAL MEDICINE
Payer: MEDICARE

## 2022-07-17 ENCOUNTER — APPOINTMENT (OUTPATIENT)
Dept: GENERAL RADIOLOGY | Age: 87
DRG: 378 | End: 2022-07-17
Payer: MEDICARE

## 2022-07-17 ENCOUNTER — APPOINTMENT (OUTPATIENT)
Dept: ULTRASOUND IMAGING | Age: 87
DRG: 378 | End: 2022-07-17
Payer: MEDICARE

## 2022-07-17 DIAGNOSIS — K92.2 GASTROINTESTINAL HEMORRHAGE, UNSPECIFIED GASTROINTESTINAL HEMORRHAGE TYPE: Primary | ICD-10-CM

## 2022-07-17 LAB
ALBUMIN SERPL-MCNC: 3.7 G/DL (ref 3.5–5.2)
ALP BLD-CCNC: 53 U/L (ref 35–104)
ALT SERPL-CCNC: 30 U/L (ref 0–32)
ANION GAP SERPL CALCULATED.3IONS-SCNC: 11 MMOL/L (ref 7–16)
ANISOCYTOSIS: ABNORMAL
APTT: 30 SEC (ref 24.5–35.1)
AST SERPL-CCNC: 26 U/L (ref 0–31)
BASOPHILIC STIPPLING: ABNORMAL
BASOPHILS ABSOLUTE: 0 E9/L (ref 0–0.2)
BASOPHILS RELATIVE PERCENT: 0 % (ref 0–2)
BILIRUB SERPL-MCNC: 0.3 MG/DL (ref 0–1.2)
BILIRUBIN DIRECT: <0.2 MG/DL (ref 0–0.3)
BILIRUBIN, INDIRECT: ABNORMAL MG/DL (ref 0–1)
BUN BLDV-MCNC: 19 MG/DL (ref 6–23)
CALCIUM SERPL-MCNC: 8.6 MG/DL (ref 8.6–10.2)
CHLORIDE BLD-SCNC: 89 MMOL/L (ref 98–107)
CO2: 18 MMOL/L (ref 22–29)
CREAT SERPL-MCNC: 0.6 MG/DL (ref 0.5–1)
D DIMER: <200 NG/ML DDU
EOSINOPHILS ABSOLUTE: 0.24 E9/L (ref 0.05–0.5)
EOSINOPHILS RELATIVE PERCENT: 2.6 % (ref 0–6)
GFR AFRICAN AMERICAN: >60
GFR NON-AFRICAN AMERICAN: >60 ML/MIN/1.73
GLUCOSE BLD-MCNC: 192 MG/DL (ref 74–99)
HCT VFR BLD CALC: 15.7 % (ref 34–48)
HEMOGLOBIN: 5.2 G/DL (ref 11.5–15.5)
HYPOCHROMIA: ABNORMAL
LYMPHOCYTES ABSOLUTE: 0.91 E9/L (ref 1.5–4)
LYMPHOCYTES RELATIVE PERCENT: 10.4 % (ref 20–42)
MCH RBC QN AUTO: 35.9 PG (ref 26–35)
MCHC RBC AUTO-ENTMCNC: 33.1 % (ref 32–34.5)
MCV RBC AUTO: 108.3 FL (ref 80–99.9)
MONOCYTES ABSOLUTE: 0.46 E9/L (ref 0.1–0.95)
MONOCYTES RELATIVE PERCENT: 5.2 % (ref 2–12)
NEUTROPHILS ABSOLUTE: 7.46 E9/L (ref 1.8–7.3)
NEUTROPHILS RELATIVE PERCENT: 81.8 % (ref 43–80)
NUCLEATED RED BLOOD CELLS: 3.5 /100 WBC
OVALOCYTES: ABNORMAL
PAPPENHEIMER BODIES: ABNORMAL
PDW BLD-RTO: 16.7 FL (ref 11.5–15)
PLATELET # BLD: 410 E9/L (ref 130–450)
PMV BLD AUTO: 9.7 FL (ref 7–12)
POLYCHROMASIA: ABNORMAL
POTASSIUM REFLEX MAGNESIUM: 4.1 MMOL/L (ref 3.5–5)
PRO-BNP: 3508 PG/ML (ref 0–450)
RBC # BLD: 1.45 E12/L (ref 3.5–5.5)
SARS-COV-2, NAAT: NOT DETECTED
SCHISTOCYTES: ABNORMAL
SODIUM BLD-SCNC: 118 MMOL/L (ref 132–146)
TARGET CELLS: ABNORMAL
TOTAL PROTEIN: 5.7 G/DL (ref 6.4–8.3)
TROPONIN, HIGH SENSITIVITY: 47 NG/L (ref 0–9)
WBC # BLD: 9.1 E9/L (ref 4.5–11.5)

## 2022-07-17 PROCEDURE — 87635 SARS-COV-2 COVID-19 AMP PRB: CPT

## 2022-07-17 PROCEDURE — C9113 INJ PANTOPRAZOLE SODIUM, VIA: HCPCS | Performed by: STUDENT IN AN ORGANIZED HEALTH CARE EDUCATION/TRAINING PROGRAM

## 2022-07-17 PROCEDURE — 83935 ASSAY OF URINE OSMOLALITY: CPT

## 2022-07-17 PROCEDURE — 83880 ASSAY OF NATRIURETIC PEPTIDE: CPT

## 2022-07-17 PROCEDURE — 86900 BLOOD TYPING SEROLOGIC ABO: CPT

## 2022-07-17 PROCEDURE — 84133 ASSAY OF URINE POTASSIUM: CPT

## 2022-07-17 PROCEDURE — 71045 X-RAY EXAM CHEST 1 VIEW: CPT

## 2022-07-17 PROCEDURE — 36415 COLL VENOUS BLD VENIPUNCTURE: CPT

## 2022-07-17 PROCEDURE — 80076 HEPATIC FUNCTION PANEL: CPT

## 2022-07-17 PROCEDURE — 84156 ASSAY OF PROTEIN URINE: CPT

## 2022-07-17 PROCEDURE — 82570 ASSAY OF URINE CREATININE: CPT

## 2022-07-17 PROCEDURE — 86850 RBC ANTIBODY SCREEN: CPT

## 2022-07-17 PROCEDURE — 85025 COMPLETE CBC W/AUTO DIFF WBC: CPT

## 2022-07-17 PROCEDURE — 85378 FIBRIN DEGRADE SEMIQUANT: CPT

## 2022-07-17 PROCEDURE — 86901 BLOOD TYPING SEROLOGIC RH(D): CPT

## 2022-07-17 PROCEDURE — 86923 COMPATIBILITY TEST ELECTRIC: CPT

## 2022-07-17 PROCEDURE — P9016 RBC LEUKOCYTES REDUCED: HCPCS

## 2022-07-17 PROCEDURE — 96374 THER/PROPH/DIAG INJ IV PUSH: CPT

## 2022-07-17 PROCEDURE — 85730 THROMBOPLASTIN TIME PARTIAL: CPT

## 2022-07-17 PROCEDURE — 2580000003 HC RX 258: Performed by: STUDENT IN AN ORGANIZED HEALTH CARE EDUCATION/TRAINING PROGRAM

## 2022-07-17 PROCEDURE — 84484 ASSAY OF TROPONIN QUANT: CPT

## 2022-07-17 PROCEDURE — 93005 ELECTROCARDIOGRAM TRACING: CPT | Performed by: STUDENT IN AN ORGANIZED HEALTH CARE EDUCATION/TRAINING PROGRAM

## 2022-07-17 PROCEDURE — 81001 URINALYSIS AUTO W/SCOPE: CPT

## 2022-07-17 PROCEDURE — A4216 STERILE WATER/SALINE, 10 ML: HCPCS | Performed by: STUDENT IN AN ORGANIZED HEALTH CARE EDUCATION/TRAINING PROGRAM

## 2022-07-17 PROCEDURE — 93970 EXTREMITY STUDY: CPT

## 2022-07-17 PROCEDURE — 99285 EMERGENCY DEPT VISIT HI MDM: CPT

## 2022-07-17 PROCEDURE — 82436 ASSAY OF URINE CHLORIDE: CPT

## 2022-07-17 PROCEDURE — 80048 BASIC METABOLIC PNL TOTAL CA: CPT

## 2022-07-17 PROCEDURE — 6360000002 HC RX W HCPCS: Performed by: STUDENT IN AN ORGANIZED HEALTH CARE EDUCATION/TRAINING PROGRAM

## 2022-07-17 PROCEDURE — 84540 ASSAY OF URINE/UREA-N: CPT

## 2022-07-17 PROCEDURE — 84300 ASSAY OF URINE SODIUM: CPT

## 2022-07-17 RX ORDER — SODIUM CHLORIDE 9 MG/ML
INJECTION, SOLUTION INTRAVENOUS PRN
Status: DISCONTINUED | OUTPATIENT
Start: 2022-07-17 | End: 2022-07-18 | Stop reason: SDUPTHER

## 2022-07-17 RX ORDER — FUROSEMIDE 10 MG/ML
20 INJECTION INTRAMUSCULAR; INTRAVENOUS ONCE
Status: DISCONTINUED | OUTPATIENT
Start: 2022-07-17 | End: 2022-07-17

## 2022-07-17 RX ORDER — BUMETANIDE 0.25 MG/ML
1 INJECTION, SOLUTION INTRAMUSCULAR; INTRAVENOUS ONCE
Status: COMPLETED | OUTPATIENT
Start: 2022-07-17 | End: 2022-07-18

## 2022-07-17 RX ADMIN — SODIUM CHLORIDE 80 MG: 9 INJECTION, SOLUTION INTRAMUSCULAR; INTRAVENOUS; SUBCUTANEOUS at 23:47

## 2022-07-17 ASSESSMENT — ENCOUNTER SYMPTOMS
DIARRHEA: 0
BACK PAIN: 0
NAUSEA: 1
SORE THROAT: 0
VOMITING: 1
COUGH: 0
SHORTNESS OF BREATH: 1
SINUS PRESSURE: 0
ABDOMINAL DISTENTION: 0
EYE DISCHARGE: 0
EYE PAIN: 0
WHEEZING: 0

## 2022-07-17 ASSESSMENT — PAIN - FUNCTIONAL ASSESSMENT: PAIN_FUNCTIONAL_ASSESSMENT: NONE - DENIES PAIN

## 2022-07-18 ENCOUNTER — ANESTHESIA EVENT (OUTPATIENT)
Dept: ENDOSCOPY | Age: 87
DRG: 378 | End: 2022-07-18
Payer: MEDICARE

## 2022-07-18 ENCOUNTER — ANESTHESIA (OUTPATIENT)
Dept: ENDOSCOPY | Age: 87
DRG: 378 | End: 2022-07-18
Payer: MEDICARE

## 2022-07-18 PROBLEM — I50.9 ACUTE HEART FAILURE (HCC): Status: ACTIVE | Noted: 2022-07-18

## 2022-07-18 LAB
ABO/RH: NORMAL
ABO/RH: NORMAL
ANION GAP SERPL CALCULATED.3IONS-SCNC: 10 MMOL/L (ref 7–16)
ANION GAP SERPL CALCULATED.3IONS-SCNC: 12 MMOL/L (ref 7–16)
ANION GAP SERPL CALCULATED.3IONS-SCNC: 13 MMOL/L (ref 7–16)
ANTIBODY SCREEN: NORMAL
BACTERIA: ABNORMAL /HPF
BILIRUBIN URINE: NEGATIVE
BLOOD BANK DISPENSE STATUS: NORMAL
BLOOD BANK DISPENSE STATUS: NORMAL
BLOOD BANK PRODUCT CODE: NORMAL
BLOOD BANK PRODUCT CODE: NORMAL
BLOOD, URINE: NEGATIVE
BPU ID: NORMAL
BPU ID: NORMAL
BUN BLDV-MCNC: 15 MG/DL (ref 6–23)
BUN BLDV-MCNC: 16 MG/DL (ref 6–23)
BUN BLDV-MCNC: 16 MG/DL (ref 6–23)
CALCIUM SERPL-MCNC: 8 MG/DL (ref 8.6–10.2)
CALCIUM SERPL-MCNC: 8.1 MG/DL (ref 8.6–10.2)
CALCIUM SERPL-MCNC: 8.2 MG/DL (ref 8.6–10.2)
CALCIUM SERPL-MCNC: 8.3 MG/DL (ref 8.6–10.2)
CALCIUM SERPL-MCNC: 8.3 MG/DL (ref 8.6–10.2)
CHLORIDE BLD-SCNC: 88 MMOL/L (ref 98–107)
CHLORIDE BLD-SCNC: 89 MMOL/L (ref 98–107)
CHLORIDE BLD-SCNC: 90 MMOL/L (ref 98–107)
CHLORIDE BLD-SCNC: 90 MMOL/L (ref 98–107)
CHLORIDE BLD-SCNC: 91 MMOL/L (ref 98–107)
CHLORIDE URINE RANDOM: <20 MMOL/L
CLARITY: CLEAR
CO2: 19 MMOL/L (ref 22–29)
CO2: 20 MMOL/L (ref 22–29)
CO2: 20 MMOL/L (ref 22–29)
CO2: 21 MMOL/L (ref 22–29)
CO2: 21 MMOL/L (ref 22–29)
COLOR: YELLOW
CREAT SERPL-MCNC: 0.5 MG/DL (ref 0.5–1)
CREAT SERPL-MCNC: 0.6 MG/DL (ref 0.5–1)
CREATININE URINE: 70 MG/DL (ref 29–226)
DESCRIPTION BLOOD BANK: NORMAL
DESCRIPTION BLOOD BANK: NORMAL
EKG ATRIAL RATE: 70 BPM
EKG P AXIS: 65 DEGREES
EKG P-R INTERVAL: 236 MS
EKG Q-T INTERVAL: 392 MS
EKG QRS DURATION: 106 MS
EKG QTC CALCULATION (BAZETT): 423 MS
EKG R AXIS: 19 DEGREES
EKG T AXIS: 29 DEGREES
EKG VENTRICULAR RATE: 70 BPM
FERRITIN: 41 NG/ML
GFR AFRICAN AMERICAN: >60
GFR NON-AFRICAN AMERICAN: >60 ML/MIN/1.73
GLUCOSE BLD-MCNC: 145 MG/DL (ref 74–99)
GLUCOSE BLD-MCNC: 185 MG/DL (ref 74–99)
GLUCOSE BLD-MCNC: 188 MG/DL (ref 74–99)
GLUCOSE BLD-MCNC: 191 MG/DL (ref 74–99)
GLUCOSE BLD-MCNC: 229 MG/DL (ref 74–99)
GLUCOSE URINE: NEGATIVE MG/DL
HAPTOGLOBIN: 49 MG/DL (ref 30–200)
HCT VFR BLD CALC: 20 % (ref 34–48)
HCT VFR BLD CALC: 23.6 % (ref 34–48)
HCT VFR BLD CALC: 25 % (ref 34–48)
HCT VFR BLD CALC: 27.2 % (ref 34–48)
HEMOGLOBIN: 6.7 G/DL (ref 11.5–15.5)
HEMOGLOBIN: 8.2 G/DL (ref 11.5–15.5)
HEMOGLOBIN: 8.7 G/DL (ref 11.5–15.5)
HEMOGLOBIN: 9.4 G/DL (ref 11.5–15.5)
IRON SATURATION: 52 % (ref 15–50)
IRON: 181 MCG/DL (ref 37–145)
KETONES, URINE: NEGATIVE MG/DL
LEUKOCYTE ESTERASE, URINE: NEGATIVE
MAGNESIUM: 1.6 MG/DL (ref 1.6–2.6)
MAGNESIUM: 1.6 MG/DL (ref 1.6–2.6)
MAGNESIUM: 1.7 MG/DL (ref 1.6–2.6)
MAGNESIUM: 2 MG/DL (ref 1.6–2.6)
MAGNESIUM: 2.1 MG/DL (ref 1.6–2.6)
NITRITE, URINE: NEGATIVE
OSMOLALITY URINE: 452 MOSM/KG (ref 300–900)
PH UA: 6 (ref 5–9)
POTASSIUM REFLEX MAGNESIUM: 3.2 MMOL/L (ref 3.5–5)
POTASSIUM REFLEX MAGNESIUM: 3.3 MMOL/L (ref 3.5–5)
POTASSIUM REFLEX MAGNESIUM: 3.4 MMOL/L (ref 3.5–5)
POTASSIUM REFLEX MAGNESIUM: 3.4 MMOL/L (ref 3.5–5)
POTASSIUM REFLEX MAGNESIUM: 3.5 MMOL/L (ref 3.5–5)
POTASSIUM, UR: 45.1 MMOL/L
PRO-BNP: 5044 PG/ML (ref 0–450)
PROTEIN PROTEIN: 153 MG/DL (ref 0–12)
PROTEIN UA: 100 MG/DL
PROTEIN/CREAT RATIO: 2.2
PROTEIN/CREAT RATIO: 2.2 (ref 0–0.2)
RBC UA: ABNORMAL /HPF (ref 0–2)
SODIUM BLD-SCNC: 120 MMOL/L (ref 132–146)
SODIUM BLD-SCNC: 121 MMOL/L (ref 132–146)
SODIUM URINE: <20 MMOL/L
SPECIFIC GRAVITY UA: 1.02 (ref 1–1.03)
TOTAL IRON BINDING CAPACITY: 349 MCG/DL (ref 250–450)
TROPONIN, HIGH SENSITIVITY: 47 NG/L (ref 0–9)
TROPONIN, HIGH SENSITIVITY: 49 NG/L (ref 0–9)
TROPONIN, HIGH SENSITIVITY: 92 NG/L (ref 0–9)
UREA NITROGEN, UR: 742 MG/DL (ref 800–1666)
UROBILINOGEN, URINE: 0.2 E.U./DL
WBC UA: ABNORMAL /HPF (ref 0–5)

## 2022-07-18 PROCEDURE — 3609017100 HC EGD: Performed by: SURGERY

## 2022-07-18 PROCEDURE — C9113 INJ PANTOPRAZOLE SODIUM, VIA: HCPCS | Performed by: STUDENT IN AN ORGANIZED HEALTH CARE EDUCATION/TRAINING PROGRAM

## 2022-07-18 PROCEDURE — 2580000003 HC RX 258: Performed by: STUDENT IN AN ORGANIZED HEALTH CARE EDUCATION/TRAINING PROGRAM

## 2022-07-18 PROCEDURE — 2580000003 HC RX 258: Performed by: NURSE PRACTITIONER

## 2022-07-18 PROCEDURE — 3700000000 HC ANESTHESIA ATTENDED CARE: Performed by: SURGERY

## 2022-07-18 PROCEDURE — 85018 HEMOGLOBIN: CPT

## 2022-07-18 PROCEDURE — 7100000010 HC PHASE II RECOVERY - FIRST 15 MIN: Performed by: SURGERY

## 2022-07-18 PROCEDURE — 83550 IRON BINDING TEST: CPT

## 2022-07-18 PROCEDURE — 2709999900 HC NON-CHARGEABLE SUPPLY: Performed by: SURGERY

## 2022-07-18 PROCEDURE — 36430 TRANSFUSION BLD/BLD COMPNT: CPT

## 2022-07-18 PROCEDURE — 6370000000 HC RX 637 (ALT 250 FOR IP): Performed by: NURSE PRACTITIONER

## 2022-07-18 PROCEDURE — 6360000002 HC RX W HCPCS: Performed by: NURSE PRACTITIONER

## 2022-07-18 PROCEDURE — 2500000003 HC RX 250 WO HCPCS: Performed by: STUDENT IN AN ORGANIZED HEALTH CARE EDUCATION/TRAINING PROGRAM

## 2022-07-18 PROCEDURE — 84484 ASSAY OF TROPONIN QUANT: CPT

## 2022-07-18 PROCEDURE — 6360000002 HC RX W HCPCS: Performed by: STUDENT IN AN ORGANIZED HEALTH CARE EDUCATION/TRAINING PROGRAM

## 2022-07-18 PROCEDURE — 6360000002 HC RX W HCPCS: Performed by: INTERNAL MEDICINE

## 2022-07-18 PROCEDURE — 83010 ASSAY OF HAPTOGLOBIN QUANT: CPT

## 2022-07-18 PROCEDURE — 82728 ASSAY OF FERRITIN: CPT

## 2022-07-18 PROCEDURE — 7100000011 HC PHASE II RECOVERY - ADDTL 15 MIN: Performed by: SURGERY

## 2022-07-18 PROCEDURE — 94640 AIRWAY INHALATION TREATMENT: CPT

## 2022-07-18 PROCEDURE — A4216 STERILE WATER/SALINE, 10 ML: HCPCS | Performed by: STUDENT IN AN ORGANIZED HEALTH CARE EDUCATION/TRAINING PROGRAM

## 2022-07-18 PROCEDURE — 83540 ASSAY OF IRON: CPT

## 2022-07-18 PROCEDURE — 85014 HEMATOCRIT: CPT

## 2022-07-18 PROCEDURE — 2060000000 HC ICU INTERMEDIATE R&B

## 2022-07-18 PROCEDURE — 80048 BASIC METABOLIC PNL TOTAL CA: CPT

## 2022-07-18 PROCEDURE — 83880 ASSAY OF NATRIURETIC PEPTIDE: CPT

## 2022-07-18 PROCEDURE — 36415 COLL VENOUS BLD VENIPUNCTURE: CPT

## 2022-07-18 PROCEDURE — 2580000003 HC RX 258: Performed by: NURSE ANESTHETIST, CERTIFIED REGISTERED

## 2022-07-18 PROCEDURE — 2700000000 HC OXYGEN THERAPY PER DAY

## 2022-07-18 PROCEDURE — 83735 ASSAY OF MAGNESIUM: CPT

## 2022-07-18 PROCEDURE — 6360000002 HC RX W HCPCS: Performed by: NURSE ANESTHETIST, CERTIFIED REGISTERED

## 2022-07-18 PROCEDURE — 0DJ08ZZ INSPECTION OF UPPER INTESTINAL TRACT, VIA NATURAL OR ARTIFICIAL OPENING ENDOSCOPIC: ICD-10-PCS | Performed by: SURGERY

## 2022-07-18 PROCEDURE — 94664 DEMO&/EVAL PT USE INHALER: CPT

## 2022-07-18 RX ORDER — ALBUTEROL 90 MCG
2 AEROSOL (GRAM) INHALATION 4 TIMES DAILY
Status: DISCONTINUED | OUTPATIENT
Start: 2022-07-18 | End: 2022-07-18 | Stop reason: CLARIF

## 2022-07-18 RX ORDER — PROPOFOL 10 MG/ML
INJECTION, EMULSION INTRAVENOUS PRN
Status: DISCONTINUED | OUTPATIENT
Start: 2022-07-18 | End: 2022-07-18 | Stop reason: SDUPTHER

## 2022-07-18 RX ORDER — ARFORMOTEROL TARTRATE 15 UG/2ML
15 SOLUTION RESPIRATORY (INHALATION) 2 TIMES DAILY
Status: DISCONTINUED | OUTPATIENT
Start: 2022-07-18 | End: 2022-07-25 | Stop reason: HOSPADM

## 2022-07-18 RX ORDER — ASPIRIN 81 MG/1
81 TABLET ORAL DAILY
Status: DISCONTINUED | OUTPATIENT
Start: 2022-07-18 | End: 2022-07-25 | Stop reason: HOSPADM

## 2022-07-18 RX ORDER — SODIUM CHLORIDE 0.9 % (FLUSH) 0.9 %
5-40 SYRINGE (ML) INJECTION EVERY 12 HOURS SCHEDULED
Status: DISCONTINUED | OUTPATIENT
Start: 2022-07-18 | End: 2022-07-25 | Stop reason: HOSPADM

## 2022-07-18 RX ORDER — HYDRALAZINE HYDROCHLORIDE 20 MG/ML
INJECTION INTRAMUSCULAR; INTRAVENOUS
Status: DISPENSED
Start: 2022-07-18 | End: 2022-07-18

## 2022-07-18 RX ORDER — MAGNESIUM SULFATE IN WATER 40 MG/ML
2000 INJECTION, SOLUTION INTRAVENOUS ONCE
Status: COMPLETED | OUTPATIENT
Start: 2022-07-18 | End: 2022-07-18

## 2022-07-18 RX ORDER — BUDESONIDE AND FORMOTEROL FUMARATE DIHYDRATE 80; 4.5 UG/1; UG/1
2 AEROSOL RESPIRATORY (INHALATION) 2 TIMES DAILY
Status: DISCONTINUED | OUTPATIENT
Start: 2022-07-18 | End: 2022-07-18 | Stop reason: CLARIF

## 2022-07-18 RX ORDER — GABAPENTIN 300 MG/1
300 CAPSULE ORAL EVERY EVENING
Status: DISCONTINUED | OUTPATIENT
Start: 2022-07-18 | End: 2022-07-25 | Stop reason: HOSPADM

## 2022-07-18 RX ORDER — SODIUM CHLORIDE 9 MG/ML
INJECTION, SOLUTION INTRAVENOUS CONTINUOUS
Status: DISCONTINUED | OUTPATIENT
Start: 2022-07-18 | End: 2022-07-19

## 2022-07-18 RX ORDER — FUROSEMIDE 10 MG/ML
20 INJECTION INTRAMUSCULAR; INTRAVENOUS ONCE
Status: COMPLETED | OUTPATIENT
Start: 2022-07-18 | End: 2022-07-18

## 2022-07-18 RX ORDER — FERROUS SULFATE 325(65) MG
325 TABLET ORAL DAILY
Status: DISCONTINUED | OUTPATIENT
Start: 2022-07-18 | End: 2022-07-25 | Stop reason: HOSPADM

## 2022-07-18 RX ORDER — HYDRALAZINE HYDROCHLORIDE 25 MG/1
25 TABLET, FILM COATED ORAL 2 TIMES DAILY
Status: DISCONTINUED | OUTPATIENT
Start: 2022-07-18 | End: 2022-07-25 | Stop reason: HOSPADM

## 2022-07-18 RX ORDER — AMLODIPINE BESYLATE 10 MG/1
10 TABLET ORAL DAILY
Status: DISCONTINUED | OUTPATIENT
Start: 2022-07-18 | End: 2022-07-25 | Stop reason: HOSPADM

## 2022-07-18 RX ORDER — BUDESONIDE 0.25 MG/2ML
0.25 INHALANT ORAL 2 TIMES DAILY
Status: DISCONTINUED | OUTPATIENT
Start: 2022-07-18 | End: 2022-07-25 | Stop reason: HOSPADM

## 2022-07-18 RX ORDER — POTASSIUM CHLORIDE 20 MEQ/1
40 TABLET, EXTENDED RELEASE ORAL ONCE
Status: DISCONTINUED | OUTPATIENT
Start: 2022-07-18 | End: 2022-07-18

## 2022-07-18 RX ORDER — PRAVASTATIN SODIUM 20 MG
20 TABLET ORAL NIGHTLY
Status: DISCONTINUED | OUTPATIENT
Start: 2022-07-18 | End: 2022-07-25 | Stop reason: HOSPADM

## 2022-07-18 RX ORDER — ALBUTEROL SULFATE 2.5 MG/3ML
2.5 SOLUTION RESPIRATORY (INHALATION) 4 TIMES DAILY
Status: DISCONTINUED | OUTPATIENT
Start: 2022-07-18 | End: 2022-07-25 | Stop reason: HOSPADM

## 2022-07-18 RX ORDER — SODIUM CHLORIDE 9 MG/ML
INJECTION, SOLUTION INTRAVENOUS PRN
Status: DISCONTINUED | OUTPATIENT
Start: 2022-07-18 | End: 2022-07-25 | Stop reason: HOSPADM

## 2022-07-18 RX ORDER — OXYCODONE HYDROCHLORIDE 5 MG/1
5 TABLET ORAL EVERY 4 HOURS PRN
Status: CANCELLED | OUTPATIENT
Start: 2022-07-18

## 2022-07-18 RX ORDER — ACETAMINOPHEN 325 MG/1
650 TABLET ORAL EVERY 6 HOURS PRN
Status: DISCONTINUED | OUTPATIENT
Start: 2022-07-18 | End: 2022-07-25 | Stop reason: HOSPADM

## 2022-07-18 RX ORDER — POTASSIUM CHLORIDE 7.45 MG/ML
10 INJECTION INTRAVENOUS
Status: COMPLETED | OUTPATIENT
Start: 2022-07-18 | End: 2022-07-18

## 2022-07-18 RX ORDER — ONDANSETRON 4 MG/1
4 TABLET, ORALLY DISINTEGRATING ORAL EVERY 8 HOURS PRN
Status: DISCONTINUED | OUTPATIENT
Start: 2022-07-18 | End: 2022-07-25 | Stop reason: HOSPADM

## 2022-07-18 RX ORDER — SODIUM CHLORIDE 0.9 % (FLUSH) 0.9 %
5-40 SYRINGE (ML) INJECTION PRN
Status: DISCONTINUED | OUTPATIENT
Start: 2022-07-18 | End: 2022-07-25 | Stop reason: HOSPADM

## 2022-07-18 RX ORDER — ONDANSETRON 2 MG/ML
4 INJECTION INTRAMUSCULAR; INTRAVENOUS EVERY 6 HOURS PRN
Status: DISCONTINUED | OUTPATIENT
Start: 2022-07-18 | End: 2022-07-25 | Stop reason: HOSPADM

## 2022-07-18 RX ORDER — SODIUM CHLORIDE 9 MG/ML
INJECTION, SOLUTION INTRAVENOUS CONTINUOUS PRN
Status: DISCONTINUED | OUTPATIENT
Start: 2022-07-18 | End: 2022-07-18 | Stop reason: SDUPTHER

## 2022-07-18 RX ORDER — POLYVINYL ALCOHOL 14 MG/ML
1 SOLUTION/ DROPS OPHTHALMIC 4 TIMES DAILY
Status: DISCONTINUED | OUTPATIENT
Start: 2022-07-18 | End: 2022-07-25 | Stop reason: HOSPADM

## 2022-07-18 RX ORDER — HYDRALAZINE HYDROCHLORIDE 20 MG/ML
10 INJECTION INTRAMUSCULAR; INTRAVENOUS EVERY 6 HOURS PRN
Status: DISCONTINUED | OUTPATIENT
Start: 2022-07-18 | End: 2022-07-22

## 2022-07-18 RX ORDER — SODIUM CHLORIDE 9 MG/ML
1000 INJECTION, SOLUTION INTRAVENOUS CONTINUOUS
Status: DISCONTINUED | OUTPATIENT
Start: 2022-07-18 | End: 2022-07-19

## 2022-07-18 RX ORDER — ACETAMINOPHEN 650 MG/1
650 SUPPOSITORY RECTAL EVERY 6 HOURS PRN
Status: DISCONTINUED | OUTPATIENT
Start: 2022-07-18 | End: 2022-07-25 | Stop reason: HOSPADM

## 2022-07-18 RX ADMIN — FUROSEMIDE 20 MG: 10 INJECTION, SOLUTION INTRAVENOUS at 09:46

## 2022-07-18 RX ADMIN — POLYVINYL ALCOHOL 1 DROP: 14 SOLUTION/ DROPS OPHTHALMIC at 20:29

## 2022-07-18 RX ADMIN — POLYVINYL ALCOHOL 1 DROP: 14 SOLUTION/ DROPS OPHTHALMIC at 16:56

## 2022-07-18 RX ADMIN — Medication 10 ML: at 09:54

## 2022-07-18 RX ADMIN — ALBUTEROL SULFATE 2.5 MG: 2.5 SOLUTION RESPIRATORY (INHALATION) at 19:56

## 2022-07-18 RX ADMIN — SODIUM CHLORIDE 40 MG: 9 INJECTION, SOLUTION INTRAMUSCULAR; INTRAVENOUS; SUBCUTANEOUS at 09:46

## 2022-07-18 RX ADMIN — MAGNESIUM SULFATE HEPTAHYDRATE 2000 MG: 40 INJECTION, SOLUTION INTRAVENOUS at 16:37

## 2022-07-18 RX ADMIN — POLYVINYL ALCOHOL 1 DROP: 14 SOLUTION/ DROPS OPHTHALMIC at 12:55

## 2022-07-18 RX ADMIN — HYDRALAZINE HYDROCHLORIDE 10 MG: 20 INJECTION INTRAMUSCULAR; INTRAVENOUS at 14:02

## 2022-07-18 RX ADMIN — POTASSIUM CHLORIDE 10 MEQ: 7.46 INJECTION, SOLUTION INTRAVENOUS at 14:46

## 2022-07-18 RX ADMIN — PROPOFOL 70 MG: 10 INJECTION, EMULSION INTRAVENOUS at 13:20

## 2022-07-18 RX ADMIN — ONDANSETRON 4 MG: 2 INJECTION INTRAMUSCULAR; INTRAVENOUS at 16:56

## 2022-07-18 RX ADMIN — ARFORMOTEROL TARTRATE 15 MCG: 15 SOLUTION RESPIRATORY (INHALATION) at 07:42

## 2022-07-18 RX ADMIN — POTASSIUM CHLORIDE 10 MEQ: 7.46 INJECTION, SOLUTION INTRAVENOUS at 12:54

## 2022-07-18 RX ADMIN — BUDESONIDE 250 MCG: 0.25 SUSPENSION RESPIRATORY (INHALATION) at 19:57

## 2022-07-18 RX ADMIN — BUMETANIDE 1 MG: 0.25 INJECTION, SOLUTION INTRAMUSCULAR; INTRAVENOUS at 01:02

## 2022-07-18 RX ADMIN — SODIUM CHLORIDE: 9 INJECTION, SOLUTION INTRAVENOUS at 13:12

## 2022-07-18 RX ADMIN — ALBUTEROL SULFATE 2.5 MG: 2.5 SOLUTION RESPIRATORY (INHALATION) at 16:50

## 2022-07-18 RX ADMIN — SODIUM CHLORIDE: 9 INJECTION, SOLUTION INTRAVENOUS at 14:11

## 2022-07-18 RX ADMIN — HYDRALAZINE HYDROCHLORIDE 10 MG: 20 INJECTION INTRAMUSCULAR; INTRAVENOUS at 03:18

## 2022-07-18 RX ADMIN — ALBUTEROL SULFATE 2.5 MG: 2.5 SOLUTION RESPIRATORY (INHALATION) at 07:42

## 2022-07-18 RX ADMIN — SODIUM CHLORIDE 1000 ML: 9 INJECTION, SOLUTION INTRAVENOUS at 01:04

## 2022-07-18 RX ADMIN — ARFORMOTEROL TARTRATE 15 MCG: 15 SOLUTION RESPIRATORY (INHALATION) at 19:57

## 2022-07-18 RX ADMIN — BUDESONIDE 250 MCG: 0.25 SUSPENSION RESPIRATORY (INHALATION) at 07:42

## 2022-07-18 RX ADMIN — Medication 10 ML: at 20:29

## 2022-07-18 RX ADMIN — POTASSIUM CHLORIDE 10 MEQ: 7.46 INJECTION, SOLUTION INTRAVENOUS at 11:23

## 2022-07-18 ASSESSMENT — ENCOUNTER SYMPTOMS: SHORTNESS OF BREATH: 1

## 2022-07-18 ASSESSMENT — PAIN SCALES - GENERAL
PAINLEVEL_OUTOF10: 0

## 2022-07-18 NOTE — OP NOTE
Operative Note      Patient: Reji Hadley  YOB: 1927  MRN: [id number]    Date of Procedure: 7/18/2022    Pre-Op Diagnosis: Pain [R52]    Post-Op Diagnosis:     No evidence of an active source for an upper GI bleed       Procedure(s):  EGD ESOPHAGOGASTRODUODENOSCOPY    Surgeon(s):  Yomi Parikh MD    Assistant:   * No surgical staff found *    Anesthesia: Monitor Anesthesia Care    Estimated Blood Loss (mL): Minimal    Complications: None    Specimens:   * No specimens in log *    Implants:  * No implants in log *      Drains:   Urinary Catheter Ziegler (Active)   $ Urethral catheter insertion $ Not inserted for procedure 07/18/22 0051   Catheter Indications Need for fluid volume management of the critically ill patient in a critical care setting 07/18/22 1258   Urine Color Pink 07/18/22 1330   Urine Appearance Sediment 07/18/22 1258   Collection Container Standard 07/18/22 1258   Securement Method Securing device (Describe) 07/18/22 1258   Catheter Care Completed Yes 07/18/22 0051   Catheter Best Practices  Drainage tube clipped to bed 07/18/22 1258   Status Patent 07/18/22 1330   Output (mL) 900 mL 07/18/22 1258       Findings: As above    Detailed Description of Procedure: The patient was brought to the endoscopy suite and placed on the table left lateral decubitus position. The patient received anesthesia per the department of anesthesiology. A bite-block was placed. The endoscope was passed through the lumen of the esophagus, stomach and duodenum. The endoscope was retrieved. The duodenum was normal.  Upon reentry into the stomach, the patient had grossly normal findings. The GE junction was marked at 40 cm from incisors and grossly normal.  The remainder of the examination was uneventful. The patient tolerated the procedure well. Assessment:    No upper GI source for the findings. Plan:    Continue to monitor the patient's hemoglobin hematocrit.     Electronically signed by Yisel Soria MD on 7/18/2022 at 1:51 PM

## 2022-07-18 NOTE — H&P
[facility] Medicine History & Physical      PCP: Christina Singh DO    Date of Admission: 7/17/2022    Date of Service: .July 18, 2022    Chief Complaint:  SOB. History Of Present Illness:     80 y.o. female presented with GIB AND SOB. STATES HER STOOLS HAVE BEEN BLACK . SHE THOUGHT IT WAS FROM HER IRON PILLS, THEN SHE BECAME VERY SOB. SHE IS ON ELIQUIS FOR HISTORY OF PE.  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 ([address] 5/18/2021    Bronchitis 5/23/2017    Bruising tendency (HCC)     CAD (coronary artery disease)     Cough 5/23/2017    COVID     Dermatophytosis 6/25/2021    Diabetes mellitus ([address]     GERD (gastroesophageal reflux disease) 5/23/2017    History of pulmonary embolus (PE) 5/29/2021    Hx of blood clots     Hyperlipidemia     Hypertension     Leg swelling 7/15/2021    Lung disease     Peripheral vascular angioplasty status 5/29/2021    Pseudoaneurysm of right femoral artery ([address] 5/29/2021    PVD (peripheral vascular disease) with claudication ([address] 4/10/2019    Restless legs syndrome     Rhinitis, allergic 5/23/2017    Sinusitis 5/23/2017    SOB (shortness of breath) 5/23/2017    Type 1 diabetes mellitus with left diabetic foot ulcer ([address] 5/18/2021    Ulcerated, foot, left, limited to breakdown of skin ([address] 6/25/2021    Urinary incontinence        Past Surgical History:          Procedure Laterality Date    ABDOMEN SURGERY      APPENDECTOMY      CARDIAC SURGERY      CHOLECYSTECTOMY      COLONOSCOPY      CORONARY ARTERY BYPASS GRAFT      8/28/10    ENDOSCOPY, COLON, DIAGNOSTIC      FOOT DEBRIDEMENT Left 5/16/2021    FOOT DEBRIDEMENT INCISION AND DRAINAGE.    performed by Noam Spain DPM at [facility]    FOOT DEBRIDEMENT Left 5/21/2021    LEFT FOOT DEBRIDEMENT  WITH DELAYED PRIMARY CLOSURE performed by Carlos A Kumari DPM at [address] ([facility])      frankie and bso 1997    TONSILLECTOMY AND ADENOIDECTOMY      UPPER GASTROINTESTINAL ENDOSCOPY N/A 7/18/2022    EGD ESOPHAGOGASTRODUODENOSCOPY performed by Stephanie Penn MD at [facility] ENDOSCOPY       Medications Prior to Admission:      Prior to Admission medications    Medication Sig Start Date End Date Taking? Authorizing Provider   oxyCODONE (ROXICODONE) 5 MG immediate release tablet Take 5 mg by mouth every 4 hours as needed for Pain. Historical Provider, MD   acetaminophen (TYLENOL) 500 MG tablet Take 500 mg by mouth every 6 hours as needed for Pain    Historical Provider, MD   apixaban (ELIQUIS) 5 MG TABS tablet Take 1 tablet by mouth 2 times daily 12/6/21   Jared Florez MD   ciclopirox [facility]) 8 % solution Apply once daily all toenails.  9/20/21   Nancy Thornton DPM   sucralfate (CARAFATE) 1 GM tablet Take 1 g by mouth three times daily    Historical Provider, MD   hydrALAZINE (APRESOLINE) 25 MG tablet Take 25 mg by mouth 2 times daily    Historical Provider, MD   metFORMIN (GLUCOPHAGE) 500 MG tablet Take 500 mg by mouth daily    Historical Provider, MD   polyethylene glycol (GLYCOLAX) 17 g packet Take 17 g by mouth daily as needed for Constipation    Historical Provider, MD   pantoprazole sodium (PROTONIX) 40 MG PACK packet Take 40 mg by mouth daily (with breakfast)    Historical Provider, MD   famotidine (PEPCID) 10 MG tablet Take 10 mg by mouth daily   Patient not taking: Reported on 7/18/2022    Historical Provider, MD   guaiFENesin (ROBITUSSIN) 100 MG/5ML syrup Take 200 mg by mouth 4 times daily as needed for Cough     Historical Provider, MD   senna (SENOKOT) 8.6 MG tablet Take 1 tablet by mouth 2 times daily    Historical Provider, MD   SODIUM CHLORIDE PO Take 1 mg by mouth daily     Historical Provider, MD Alpha-Lipoic Acid 600 MG TABS Take 600 mg by mouth daily    Historical Provider, MD   polyvinyl alcohol (LIQUIFILM TEARS) 1.4 % ophthalmic solution 1 drop 4 times daily     Historical Provider, MD   clotrimazole-betamethasone (LOTRISONE) 1-0.05 % cream Apply topically 2 times daily Apply topically 2 times daily. Historical Provider, MD [place] Zingiber officinalis, (GINGER ROOT PO) Take 750 mg by mouth Daily in am    Historical Provider, MD   ferrous sulfate (IRON 325) 325 (65 Fe) MG tablet Take 325 mg by mouth daily In the morning for anemia 6/7/21   Historical Provider, MD   b complex vitamins capsule Take 1 capsule by mouth daily In the morning for supplement    Historical Provider, MD   Vitamin D (CHOLECALCIFEROL) 25 MCG (1000 UT) TABS tablet Take 4 capsules by mouth Give 4000 units in the morning for supplement    Historical Provider, MD   aspirin 81 MG EC tablet Take 1 tablet by mouth daily 6/2/21   Albertina Verdin MD   promethazine (PHENERGAN) 12.5 MG tablet Take 12.5 mg by mouth every 6 hours as needed for Nausea     Historical Provider, MD   traMADol (ULTRAM) 50 MG tablet Take 50 mg by mouth 2 times daily. Historical Provider, MD   aluminum & magnesium hydroxide-simethicone (MYLANTA) 400-400-40 MG/5ML SUSP Take 30 mLs by mouth every 6 hours as needed    Historical Provider, MD   lidocaine (LMX) 4 % cream Apply topically every 8 hours as needed for Pain Apply topically as needed to painful muscles    Historical Provider, MD   diclofenac sodium (VOLTAREN) 1 % GEL Apply topically 4 times daily as needed for Pain    Historical Provider, MD   glimepiride (AMARYL) 2 MG tablet Take 2 mg by mouth every morning (before breakfast)     Historical Provider, MD   bisacodyl (DULCOLAX) 10 MG suppository Place 10 mg rectally daily as needed for Constipation Indications: IF NO RESULTS FROM MOM     Historical Provider, MD   gabapentin (NEURONTIN) 300 MG capsule Take 300 mg by mouth every evening.      Historical Provider, MD   albuterol (PROVENTIL) 90 MCG/ACT inhaler Inhale 2 puffs into the lungs 4 times daily 7/22/19   Ramez Keith MD   pravastatin (PRAVACHOL) 20 MG tablet TAKE 1 TABLET BY MOUTH  NIGHTLY 5/6/19   Ramez Keith MD   amLODIPine (NORVASC) 5 MG tablet TAKE 1 TABLET BY MOUTH  DAILY  Patient taking differently: Take 10 mg by mouth in the morning. 5/6/19   Ramez Keith MD   fluticasone-vilanterol (BREO ELLIPTA) 100-25 MCG/INH AEPB inhaler Inhale 1 puff into the lungs daily 1/28/19   Ramez Keith MD   Multiple Vitamins-Minerals (OCUVITE ADULT FORMULA PO) Take 1 capsule by mouth daily    Historical Provider, MD   Probiotic Product (PRO-BIOTIC BLEND PO) Take 1 capsule by mouth 2 times daily     Historical Provider, MD   magnesium gluconate (MAGONATE) 500 MG tablet Take 200 mg by mouth 2 times daily     Historical Provider, MD   Coenzyme Q10 (COQ10) 200 MG CAPS Take 200 mg by mouth daily     Historical Provider, MD       Allergies:  Lisinopril    Social History:      The patient currently lives IN A FACILITY    TOBACCO:   reports that she has never smoked. She has never used smokeless tobacco.  ETOH:   reports current alcohol use. Family History:       Reviewed in detail and negative for DM, CAD, Cancer, CVA. Positive as follows:        Problem Relation Age of Onset    Hypertension Father     Heart Disease Brother        REVIEW OF SYSTEMS:   Pertinent positives as noted in the HPI. All other systems reviewed and negative. PHYSICAL EXAM:    BP (!) 153/79   Pulse 74   Temp 98.5 °F (36.9 °C) (Oral)   Resp 20   Ht 5' 3\" (1.6 m)   Wt 135 lb (61.2 kg)   LMP 12/03/1997   SpO2 98%   BMI 23.91 kg/m²     General appearance:  No apparent distress, appears stated age and cooperative. HEENT:  Normal cephalic, atraumatic without obvious deformity. Pupils equal, round, and reactive to light. Extra ocular muscles intact. Conjunctivae/corneas clear. Neck: Supple, with full range of motion.  No jugular venous distention. Trachea midline. Respiratory:  Normal respiratory effort. Clear to auscultation, bilaterally without Rales/Wheezes/Rhonchi. Cardiovascular:  Regular rate and rhythm   Abdomen: Soft, non-tender, non-distended with normal bowel sounds. Musculoskeletal:  No clubbing, cyanosis or edema bilaterally. Skin: Skin color, texture, turgor normal.  No rashes or lesions. Neurologic:  Neurovascularly intact without any focal sensory/motor deficits. Cranial nerves: II-XII intact, grossly non-focal.  Psychiatric:  Alert and oriented, thought content appropriate, normal insight        Labs:     Recent Labs     07/17/22 2132 07/18/22 0405 07/18/22  1125 07/18/22  1535   WBC 9.1  --   --   --    HGB 5.2* 6.7* 8.7* 9.4*   HCT 15.7* 20.0* 25.0* 27.2*     --   --   --      Recent Labs     07/18/22 0405 07/18/22  1125 07/18/22  1535   * 121* 121*   K 3.5 3.3* 3.4*   CL 91* 90* 88*   CO2 20* 19* 20*   BUN 16 16 15   CREATININE 0.6 0.5 0.6   CALCIUM 8.3* 8.3* 8.2*     Recent Labs     07/17/22 2132   AST 26   ALT 30   BILIDIR <0.2   BILITOT 0.3   ALKPHOS 53     No results for input(s): INR in the last 72 hours. No results for input(s): Jaye Nestor in the last 72 hours. Urinalysis:      Lab Results   Component Value Date/Time    NITRU Negative 07/17/2022 10:43 PM    WBCUA NONE 07/17/2022 10:43 PM    BACTERIA NONE SEEN 07/17/2022 10:43 PM    RBCUA NONE 07/17/2022 10:43 PM    RBCUA 5-10 09/02/2012 11:00 AM    BLOODU Negative 07/17/2022 10:43 PM    SPECGRAV 1.020 07/17/2022 10:43 PM    GLUCOSEU Negative 07/17/2022 10:43 PM       Radiology:     CXR: I have reviewed the CXR with the following interpretation:  US DUP LOWER EXTREMITIES BILATERAL VENOUS   Final Result   No evidence of DVT in either lower extremity. XR CHEST PORTABLE   Final Result   Cardiomegaly with pulmonary edema.              ASSESSMENT:    Active Hospital Problems    Diagnosis Date Noted    Acute heart failure ([facility] [address] [I50.9]

## 2022-07-18 NOTE — PROGRESS NOTES
[facility] Quality Flow/Interdisciplinary Rounds Progress Note        Quality Flow Rounds held on July 18, 2022    Disciplines Attending:  Bedside Nurse, , , and Nursing Unit Leadership    Kishan Bateman was admitted on 7/17/2022  9:06 PM    Anticipated Discharge Date:       Disposition:    Scott Score:       Readmission Risk              Risk of Unplanned Readmission:  [id number]           Discussed patient goal for the day, patient clinical progression, and barriers to discharge. The following Goal(s) of the Day/Commitment(s) have been identified:  Patient hemoglobin 6.7, currently getting 1 unit pRBC's, awaiting input from Nephrology and general surgery.       Darian Martin RN  July 18, 2022

## 2022-07-18 NOTE — CARE COORDINATION
Social work / Discharge Planning:       Patient is from [facility]. Awaiting PT/OT evaluations to determine if patient can return to [facility] LOC. Currently on oxygen which she does not have at [facility]. Social work will follow.    Electronically signed by GLORIA Beach on 7/18/2022 at 10:29 AM

## 2022-07-18 NOTE — CONSULTS
daily     Historical Provider, MD   clotrimazole-betamethasone (LOTRISONE) 1-0.05 % cream Apply topically 2 times daily Apply topically 2 times daily. Historical Provider, MD [place] Zingiber officinalis, (GINGER ROOT PO) Take 750 mg by mouth Daily in am    Historical Provider, MD   ferrous sulfate (IRON 325) 325 (65 Fe) MG tablet Take 325 mg by mouth daily In the morning for anemia 6/7/21   Historical Provider, MD   b complex vitamins capsule Take 1 capsule by mouth daily In the morning for supplement    Historical Provider, MD   Vitamin D (CHOLECALCIFEROL) 25 MCG (1000 UT) TABS tablet Take 4 capsules by mouth Give 4000 units in the morning for supplement    Historical Provider, MD   aspirin 81 MG EC tablet Take 1 tablet by mouth daily 6/2/21   Shirley Palma MD   promethazine (PHENERGAN) 12.5 MG tablet Take 12.5 mg by mouth every 6 hours as needed for Nausea     Historical Provider, MD   traMADol (ULTRAM) 50 MG tablet Take 50 mg by mouth 2 times daily. Historical Provider, MD   aluminum & magnesium hydroxide-simethicone (MYLANTA) 400-400-40 MG/5ML SUSP Take 30 mLs by mouth every 6 hours as needed    Historical Provider, MD   lidocaine (LMX) 4 % cream Apply topically every 8 hours as needed for Pain Apply topically as needed to painful muscles    Historical Provider, MD   diclofenac sodium (VOLTAREN) 1 % GEL Apply topically 4 times daily as needed for Pain    Historical Provider, MD   glimepiride (AMARYL) 2 MG tablet Take 2 mg by mouth every morning (before breakfast)     Historical Provider, MD   bisacodyl (DULCOLAX) 10 MG suppository Place 10 mg rectally daily as needed for Constipation Indications: IF NO RESULTS FROM MOM     Historical Provider, MD   gabapentin (NEURONTIN) 300 MG capsule Take 300 mg by mouth every evening.      Historical Provider, MD   albuterol (PROVENTIL) 90 MCG/ACT inhaler Inhale 2 puffs into the lungs 4 times daily 7/22/19   Sally Mayen MD   pravastatin (PRAVACHOL) 20 MG tablet TAKE 1 TABLET BY MOUTH  NIGHTLY 5/6/19   Ree Adams MD   amLODIPine (NORVASC) 5 MG tablet TAKE 1 TABLET BY MOUTH  DAILY  Patient taking differently: Take 10 mg by mouth in the morning. 5/6/19   Ree Adams MD   fluticasone-vilanterol (BREO ELLIPTA) 100-25 MCG/INH AEPB inhaler Inhale 1 puff into the lungs daily 1/28/19   Ree Adams MD   Multiple Vitamins-Minerals (OCUVITE ADULT FORMULA PO) Take 1 capsule by mouth daily    Historical Provider, MD   Probiotic Product (PRO-BIOTIC BLEND PO) Take 1 capsule by mouth 2 times daily     Historical Provider, MD   magnesium gluconate (MAGONATE) 500 MG tablet Take 200 mg by mouth 2 times daily     Historical Provider, MD   Coenzyme Q10 (COQ10) 200 MG CAPS Take 200 mg by mouth daily     Historical Provider, MD       Allergies   Allergen Reactions    Lisinopril Other (See Comments)     7/18/19 Pt states that she does not want to take LISINOPRIL       Family History   Problem Relation Age of Onset    Hypertension Father     Heart Disease Brother        Social History     Tobacco Use    Smoking status: Never    Smokeless tobacco: Never   Vaping Use    Vaping Use: Never used   Substance Use Topics    Alcohol use: Yes     Comment: occasional    Drug use: No         Review of Systems   General ROS: negative  Hematological and Lymphatic ROS: negative  Respiratory ROS: positive for - shortness of breath  Cardiovascular ROS: negative  Gastrointestinal ROS: no abdominal pain, change in bowel habits  Genito-Urinary ROS: negative  Musculoskeletal ROS: negative      PHYSICAL EXAM:    Vitals:    07/18/22 0711   BP: (!) 171/75   Pulse: 75   Resp: 18   Temp: 98.2 °F (36.8 °C)   SpO2: 94%       General Appearance:  awake, alert, oriented, in no acute distress  Skin:  Skin color, texture, turgor normal. No rashes or lesions. Head/face:  NCAT  Eyes:  No gross abnormalities. Lungs:  Normal expansion. Clear to auscultation.   Heart:  Heart regular rate   Abdomen:  Soft, non-tender,

## 2022-07-18 NOTE — PROGRESS NOTES
Occupational Therapy   OT orders received and chart reviewed. Pt currently receiving blood and stated she was too tired to participate in OT assessment at this time. Will attempt evaluation at a later time when pt able to participate.    Katelyn Willis, OTR/L [id number]

## 2022-07-18 NOTE — PROGRESS NOTES
Dr. Selina Espino also gave orders for repeat BMP in 4 hrs. Is already ordered via perfect serve. Also perfect serve message . re/to HGB of 6.7 after 1 unit of  PRBC's.

## 2022-07-18 NOTE — CONSULTS
Department of Internal Medicine  Nephrology Nurse Practitioner Consult Note      Reason for Consult:  Hyponatremia  Requesting Physician:  VIK Cervantes - CNP    CHIEF COMPLAINT:  Shortness of breath    History Obtained From:  patient, electronic medical record    HISTORY OF PRESENT ILLNESS:  Briefly, Ms. Mark Shah is a 80year old female with a PMH of HTN, type II DM, asthma, CAD s/p CABG, history of PE, HLD, HFpEF 68%, PVD, who was admitted on July 17, 2022 after presenting to the ER with shortness of breath. A chest x-ray was obtained which revealed cardiomegaly with pulmonary edema. Upon arrival to ER, she was found to be hyponatremic with a sodium level of 118, which is the reason for this consultation. She was also found to be anemic with a hemoglobin of 5.2. She received PRBCs in ER followed by Bumex 1 mg IV. Her pro-BNP on admission was 3,508. She states she eats 3 small meals a day but drinks about 6 bottles of water a day. She denies any vomiting or diarrhea.      Past Medical History:        Diagnosis Date    Arthritis     Asthma     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 ([address] 5/18/2021    Bronchitis 5/23/2017    Bruising tendency (HCC)     CAD (coronary artery disease)     Cough 5/23/2017    COVID     Dermatophytosis 6/25/2021    Diabetes mellitus ([address]     GERD (gastroesophageal reflux disease) 5/23/2017    History of pulmonary embolus (PE) 5/29/2021    Hx of blood clots     Hyperlipidemia     Hypertension     Leg swelling 7/15/2021    Lung disease     Peripheral vascular angioplasty status 5/29/2021    Pseudoaneurysm of right femoral artery ([address] 5/29/2021    PVD (peripheral vascular disease) with claudication (HCC) 4/10/2019    Restless legs syndrome     Rhinitis, allergic 5/23/2017    Sinusitis 5/23/2017    SOB (shortness of breath) 5/23/2017    Type 1 diabetes mellitus with left diabetic foot ulcer ([address] 5/18/2021    Ulcerated, foot, left, 10 mg, 10 mg, IntraVENous, Q6H PRN  hydrALAZINE (APRESOLINE) 20 MG/ML injection, , ,   albuterol (PROVENTIL) nebulizer solution 2.5 mg, 2.5 mg, Nebulization, 4x daily  Arformoterol Tartrate (BROVANA) nebulizer solution 15 mcg, 15 mcg, Nebulization, BID **AND** budesonide (PULMICORT) nebulizer suspension 250 mcg, 0.25 mg, Nebulization, BID  0.9 % sodium chloride infusion, , IntraVENous, PRN  pantoprazole (PROTONIX) 40 mg in sodium chloride (PF) 10 mL injection, 40 mg, IntraVENous, Q12H  potassium chloride 10 mEq/100 mL IVPB (Peripheral Line), 10 mEq, IntraVENous, Q2H  Allergies:  Lisinopril    Social History:    TOBACCO:   reports that she has never smoked. She has never used smokeless tobacco.  ETOH:   reports current alcohol use. Family History:       Problem Relation Age of Onset    Hypertension Father     Heart Disease Brother      REVIEW OF SYSTEMS:    CONSTITUTIONAL:  negative for  fevers and chills  EYES:  negative for  double vision and blurred vision  HEENT:  negative for  epistaxis  RESPIRATORY:  positive for  dyspnea  CARDIOVASCULAR:  positive for  dyspnea, edema  GASTROINTESTINAL:  negative for nausea, vomiting, diarrhea, and abdominal pain  GENITOURINARY:  negative  INTEGUMENT/BREAST:  negative  HEMATOLOGIC/LYMPHATIC:  negative  ALLERGIC/IMMUNOLOGIC:  negative  ENDOCRINE:  negative  MUSCULOSKELETAL:  negative for  decreased range of motion and muscle weakness  NEUROLOGICAL:  negative for headaches and dizziness  BEHAVIOR/PSYCH:  positive for poor appetite    PHYSICAL EXAM:      Vitals:    VITALS:  BP (!) 177/70   Pulse 75   Temp 98 °F (36.7 °C)   Resp 20   Ht 5' 3\" (1.6 m)   Wt 135 lb (61.2 kg)   LMP 12/03/1997   SpO2 100%   BMI 23.91 kg/m²   24HR INTAKE/OUTPUT:    Intake/Output Summary (Last 24 hours) at 7/18/2022 1142  Last data filed at 7/18/2022 1111  Gross per 24 hour   Intake 616.67 ml   Output 1075 ml   Net -458. 33 ml       Constitutional:  Alert and oriented, NAD  HEENT: Normocephalic, PERRL, dry mucous membranes  Respiratory:  Diminished lung sounds  Cardiovascular/Edema:  RRR, S1,S2  Gastrointestinal:  Soft, rounded, nontender, nondistended  Neurologic:  Nonfocal, SHARMA  Skin:  Warm, dry, ecchymotic  Other:  + edema BLE and sacral edema     DATA:    CBC:   Lab Results   Component Value Date/Time    WBC 9.1 07/17/2022 09:32 PM    RBC 1.45 07/17/2022 09:32 PM    HGB 6.7 07/18/2022 04:05 AM    HCT 20.0 07/18/2022 04:05 AM    .3 07/17/2022 09:32 PM    MCH 35.9 07/17/2022 09:32 PM    MCHC 33.1 07/17/2022 09:32 PM    RDW 16.7 07/17/2022 09:32 PM     07/17/2022 09:32 PM    MPV 9.7 07/17/2022 09:32 PM     CMP:    Lab Results   Component Value Date/Time     07/18/2022 04:05 AM    K 3.5 07/18/2022 04:05 AM    CL 91 07/18/2022 04:05 AM    CO2 20 07/18/2022 04:05 AM    BUN 16 07/18/2022 04:05 AM    CREATININE 0.6 07/18/2022 04:05 AM    GFRAA >60 07/18/2022 04:05 AM    LABGLOM >60 07/18/2022 04:05 AM    GLUCOSE 145 07/18/2022 04:05 AM    PROT 5.7 07/17/2022 09:32 PM    LABALBU 3.7 07/17/2022 09:32 PM    CALCIUM 8.3 07/18/2022 04:05 AM    BILITOT 0.3 07/17/2022 09:32 PM    ALKPHOS 53 07/17/2022 09:32 PM    AST 26 07/17/2022 09:32 PM    ALT 30 07/17/2022 09:32 PM     Magnesium:    Lab Results   Component Value Date/Time    MG 1.6 07/18/2022 04:05 AM     Phosphorus:  No results found for: PHOS  Radiology Review:      CXR 7/17/22   Cardiomegaly with pulmonary edema. IMPRESSION/RECOMMENDATIONS:      Briefly, Ms. Edith Olsen is a 80year old female with a PMH of HTN, type II DM, asthma, CAD s/p CABG, history of PE, HLD, HFpEF 68%, PVD, who was admitted on July 17, 2022 after presenting to the ER with shortness of breath. A chest x-ray was obtained which revealed cardiomegaly with pulmonary edema. Upon arrival to ER, she was found to be hyponatremic with a sodium level of 118, which is the reason for this consultation. She was also found to be anemic with a hemoglobin of 5.2. She received PRBCs in ER followed by Bumex 1 mg IV. Her pro-BNP on admission was 3,508. She states she eats 3 small meals a day but drinks about 6 bottles of water a day. She denies any vomiting or diarrhea. Hypotonic hyponatremia, likely hemodynamically mediated secondary to intravascular volume depletion (anemia, diuretics) vs HF. Urine sodium <20 and urine osmolality 452. We will start on IV fluids due to suspected intravascular volume depletion. Probable CKD, UPCR 2.2. Will need repeated in 3 months. HTN, BP meds on hold  HFpEF 68%, pro-BNP 3,508>>5,044, to hold diuretics for now  --------------------------------------------  CAD, s/p CABG  History of PE, apixaban on hold  Macrocytic anemia, s/p multiple transfusions. For EGD. Plan:    NS at 50 cc/hr  Replace potassium  Replace magnesium   Monitor H&H  Monitor magnesium level  Monitor potassium level  Monitor sodium level closely, BMP every 4 hours  Call if sodium <120 or >123  Avoid rapid correction of sodium   Strict I&Os  1L fluid restriction   Obtain cortisol level  Obtain TSH    Case and plan discussed with Dr. Marquise Miranda    Thank you so much VIK Ceron CNP for allowing us to participate in the care of Ms. Ryan Cam.     Electronically signed by VIK Shabazz CNP on 7/18/2022 at 3:42 PM

## 2022-07-18 NOTE — ANESTHESIA PRE PROCEDURE
Department of Anesthesiology  Preprocedure Note       Name:  Riley Stevens   Age:  80 y.o.  :  1927                                          MRN:  [id number]         Date:  2022      Surgeon: Joshua Kowalski):  Lonny Irwin MD    Procedure: Procedure(s):  EGD ESOPHAGOGASTRODUODENOSCOPY    Medications prior to admission:   Prior to Admission medications    Medication Sig Start Date End Date Taking? Authorizing Provider   oxyCODONE (ROXICODONE) 5 MG immediate release tablet Take 5 mg by mouth every 4 hours as needed for Pain. Historical Provider, MD   acetaminophen (TYLENOL) 500 MG tablet Take 500 mg by mouth every 6 hours as needed for Pain    Historical Provider, MD   apixaban (ELIQUIS) 5 MG TABS tablet Take 1 tablet by mouth 2 times daily 21   Jess MedicMD fran   ciclopirox [facility]) 8 % solution Apply once daily all toenails.  21   Marcelle Thornton DPM   sucralfate (CARAFATE) 1 GM tablet Take 1 g by mouth three times daily    Historical Provider, MD   hydrALAZINE (APRESOLINE) 25 MG tablet Take 25 mg by mouth 2 times daily    Historical Provider, MD   metFORMIN (GLUCOPHAGE) 500 MG tablet Take 500 mg by mouth daily    Historical Provider, MD   polyethylene glycol (GLYCOLAX) 17 g packet Take 17 g by mouth daily as needed for Constipation    Historical Provider, MD   pantoprazole sodium (PROTONIX) 40 MG PACK packet Take 40 mg by mouth daily (with breakfast)    Historical Provider, MD   famotidine (PEPCID) 10 MG tablet Take 10 mg by mouth daily   Patient not taking: Reported on 2022    Historical Provider, MD   guaiFENesin (ROBITUSSIN) 100 MG/5ML syrup Take 200 mg by mouth 4 times daily as needed for Cough     Historical Provider, MD   senna (SENOKOT) 8.6 MG tablet Take 1 tablet by mouth 2 times daily    Historical Provider, MD   SODIUM CHLORIDE PO Take 1 mg by mouth daily     Historical Provider, MD   Alpha-Lipoic Acid 600 MG TABS Take 600 mg by mouth daily    Historical Provider, MD polyvinyl alcohol (LIQUIFILM TEARS) 1.4 % ophthalmic solution 1 drop 4 times daily     Historical Provider, MD   clotrimazole-betamethasone (LOTRISONE) 1-0.05 % cream Apply topically 2 times daily Apply topically 2 times daily. Historical Provider, MD [place] Zingiber officinalis, (GINGER ROOT PO) Take 750 mg by mouth Daily in am    Historical Provider, MD   ferrous sulfate (IRON 325) 325 (65 Fe) MG tablet Take 325 mg by mouth daily In the morning for anemia 6/7/21   Historical Provider, MD   b complex vitamins capsule Take 1 capsule by mouth daily In the morning for supplement    Historical Provider, MD   Vitamin D (CHOLECALCIFEROL) 25 MCG (1000 UT) TABS tablet Take 4 capsules by mouth Give 4000 units in the morning for supplement    Historical Provider, MD   aspirin 81 MG EC tablet Take 1 tablet by mouth daily 6/2/21   Rob Peralta MD   promethazine (PHENERGAN) 12.5 MG tablet Take 12.5 mg by mouth every 6 hours as needed for Nausea     Historical Provider, MD   traMADol (ULTRAM) 50 MG tablet Take 50 mg by mouth 2 times daily. Historical Provider, MD   aluminum & magnesium hydroxide-simethicone (MYLANTA) 400-400-40 MG/5ML SUSP Take 30 mLs by mouth every 6 hours as needed    Historical Provider, MD   lidocaine (LMX) 4 % cream Apply topically every 8 hours as needed for Pain Apply topically as needed to painful muscles    Historical Provider, MD   diclofenac sodium (VOLTAREN) 1 % GEL Apply topically 4 times daily as needed for Pain    Historical Provider, MD   glimepiride (AMARYL) 2 MG tablet Take 2 mg by mouth every morning (before breakfast)     Historical Provider, MD   bisacodyl (DULCOLAX) 10 MG suppository Place 10 mg rectally daily as needed for Constipation Indications: IF NO RESULTS FROM MOM     Historical Provider, MD   gabapentin (NEURONTIN) 300 MG capsule Take 300 mg by mouth every evening.      Historical Provider, MD   albuterol (PROVENTIL) 90 MCG/ACT inhaler Inhale 2 puffs into the lungs 4 times daily 7/22/19   Sally Mayen MD   pravastatin (PRAVACHOL) 20 MG tablet TAKE 1 TABLET BY MOUTH  NIGHTLY 5/6/19   Sally Mayen MD   amLODIPine (NORVASC) 5 MG tablet TAKE 1 TABLET BY MOUTH  DAILY  Patient taking differently: Take 10 mg by mouth in the morning.  5/6/19   Sally Mayen MD   fluticasone-vilanterol (BREO ELLIPTA) 100-25 MCG/INH AEPB inhaler Inhale 1 puff into the lungs daily 1/28/19   Sally Mayen MD   Multiple Vitamins-Minerals (OCUVITE ADULT FORMULA PO) Take 1 capsule by mouth daily    Historical Provider, MD   Probiotic Product (PRO-BIOTIC BLEND PO) Take 1 capsule by mouth 2 times daily     Historical Provider, MD   magnesium gluconate (MAGONATE) 500 MG tablet Take 200 mg by mouth 2 times daily     Historical Provider, MD   Coenzyme Q10 (COQ10) 200 MG CAPS Take 200 mg by mouth daily     Historical Provider, MD       Current medications:    Current Facility-Administered Medications   Medication Dose Route Frequency Provider Last Rate Last Admin    0.9 % sodium chloride infusion  1,000 mL IntraVENous Continuous Mari Davis MD   Stopped at 07/18/22 0954    [Held by provider] amLODIPine (NORVASC) tablet 10 mg  10 mg Oral Daily Fiona Jessi, APRN - CNP        [Held by provider] apixaban (ELIQUIS) tablet 5 mg  5 mg Oral BID Fiona Jessi, APRN - CNP        [Held by provider] aspirin EC tablet 81 mg  81 mg Oral Daily Tita Marcos, APRN - CNP        [Held by provider] ferrous sulfate (IRON 325) tablet 325 mg  325 mg Oral Daily Tita Marcos, APRN - CNP        [Held by provider] gabapentin (NEURONTIN) capsule 300 mg  300 mg Oral QPM Tiat Marcos, APRN - CNP        [Held by provider] hydrALAZINE (APRESOLINE) tablet 25 mg  25 mg Oral BID Fiona Jessi, APRN - CNP        polyvinyl alcohol (LIQUIFILM TEARS) 1.4 % ophthalmic solution 1 drop  1 drop Both Eyes 4x Daily Fiona Jessi, APRN - CNP   1 drop at 07/18/22 1255    [Held by provider] pravastatin (PRAVACHOL) tablet 20 mg 20 mg Oral Nightly Jamila Lemusiths, APRN - CNP        sodium chloride flush 0.9 % injection 5-40 mL  5-40 mL IntraVENous 2 times per day Jamila Hernandez, APRN - CNP   10 mL at 07/18/22 0954    sodium chloride flush 0.9 % injection 5-40 mL  5-40 mL IntraVENous PRN Tita Marcos, APRN - CNP        0.9 % sodium chloride infusion   IntraVENous PRN Jamila Lemusiths, APRN - CNP        ondansetron (ZOFRAN-ODT) disintegrating tablet 4 mg  4 mg Oral Q8H PRN Jamila David, APRN - CNP        Or    ondansetron (ZOFRAN) injection 4 mg  4 mg IntraVENous Q6H PRN Jamila David, APRN - CNP        acetaminophen (TYLENOL) tablet 650 mg  650 mg Oral Q6H PRN Jamila David, APRN - CNP        Or    acetaminophen (TYLENOL) suppository 650 mg  650 mg Rectal Q6H PRN Jamila David, APRN - CNP        hydrALAZINE (APRESOLINE) injection 10 mg  10 mg IntraVENous Q6H PRN Jamila Lemusiths, APRN - CNP   10 mg at 07/18/22 0318    hydrALAZINE (APRESOLINE) 20 MG/ML injection             albuterol (PROVENTIL) nebulizer solution 2.5 mg  2.5 mg Nebulization 4x daily Jamila Lemusiths, APRN - CNP   2.5 mg at 07/18/22 0742    Arformoterol Tartrate (BROVANA) nebulizer solution 15 mcg  15 mcg Nebulization BID Jamila Lemusiths, APRN - CNP   15 mcg at 07/18/22 0742    And    budesonide (PULMICORT) nebulizer suspension 250 mcg  0.25 mg Nebulization BID Jamila Lemusiths, APRN - CNP   250 mcg at 07/18/22 0742    0.9 % sodium chloride infusion   IntraVENous PRN Gulshan Horowitz DO        pantoprazole (PROTONIX) 40 mg in sodium chloride (PF) 10 mL injection  40 mg IntraVENous Q12H Aniya Browning MD   40 mg at 07/18/22 0946    potassium chloride 10 mEq/100 mL IVPB (Peripheral Line)  10 mEq IntraVENous Q2H Gulshan Horowitz  mL/hr at 07/18/22 1254 10 mEq at 07/18/22 1254    perflutren lipid microspheres (DEFINITY) injection 1.65 mg  1.5 mL IntraVENous ONCE PRN Maren Sumner DO           Allergies:     Allergies   Allergen Reactions  Lisinopril Other (See Comments)     7/18/19 Pt states that she does not want to take LISINOPRIL       Problem List:    Patient Active Problem List   Diagnosis Code    Asthma J45.909    CAD (coronary artery disease) I25.10    Vitamin D deficiency E55.9    HTN (hypertension) I10    Idiopathic peripheral neuropathy G60.9    Rhinitis, allergic J30.9    GERD (gastroesophageal reflux disease) K21.9    PVD (peripheral vascular disease) with claudication ([facility]) I73.9    Gait instability R26.81    Hyponatremia E87.1    DM (diabetes mellitus), type 2 ([address] E11.9    History of pulmonary embolus (PE) Z86.711    Peripheral vascular angioplasty status Z98.62    Non-proliferative diabetic retinopathy, mild, both eyes ([facility]) A22.7367    Leg swelling M79.89    Compression fracture of L5 lumbar vertebra, closed, initial encounter ([facility] S32.050A    HLD (hyperlipidemia) E78.5    Compression fracture of thoracic vertebra ([facility]) S22.000A    Acute anemia D64.9    Spinal stenosis M48.00    Acute heart failure ([facility]) I50.9       Past Medical History:        Diagnosis Date    Arthritis     Asthma     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 ([address] 5/18/2021    Bronchitis 5/23/2017    Bruising tendency ([address]     CAD (coronary artery disease)     Cough 5/23/2017    COVID     Dermatophytosis 6/25/2021    Diabetes mellitus (HCC)     GERD (gastroesophageal reflux disease) 5/23/2017    History of pulmonary embolus (PE) 5/29/2021    Hx of blood clots     Hyperlipidemia     Hypertension     Leg swelling 7/15/2021    Lung disease     Peripheral vascular angioplasty status 5/29/2021    Pseudoaneurysm of right femoral artery ([address] 5/29/2021    PVD (peripheral vascular disease) with claudication ([address] 4/10/2019    Restless legs syndrome     Rhinitis, allergic 5/23/2017    Sinusitis 5/23/2017    SOB (shortness of breath) 5/23/2017    Type 1 diabetes mellitus with left diabetic foot ulcer ([facility] [address] 5/18/2021    Ulcerated, foot, left, limited to breakdown of skin ([facility] [address] 6/25/2021    Urinary incontinence        Past Surgical History:        Procedure Laterality Date    ABDOMEN SURGERY      APPENDECTOMY      CARDIAC SURGERY      CHOLECYSTECTOMY      COLONOSCOPY      CORONARY ARTERY BYPASS GRAFT      8/28/10    ENDOSCOPY, COLON, DIAGNOSTIC      FOOT DEBRIDEMENT Left 5/16/2021    FOOT DEBRIDEMENT INCISION AND DRAINAGE. performed by Lit Anthony DPM at [facility] Left 5/21/2021    LEFT FOOT DEBRIDEMENT  WITH DELAYED PRIMARY CLOSURE performed by Antony Rodriguez DPM at [facility] (CERVIX STATUS UNKNOWN)      frankie and bso 1997    TONSILLECTOMY AND ADENOIDECTOMY         Social History:    Social History     Tobacco Use    Smoking status: Never    Smokeless tobacco: Never   Substance Use Topics    Alcohol use: Yes     Comment: occasional                                Counseling given: Not Answered      Vital Signs (Current):   Vitals:    07/18/22 0711 07/18/22 0857 07/18/22 1111 07/18/22 1113   BP: (!) 171/75 (!) 164/60 (!) 177/70 (!) 177/70   Pulse: 75 71 75 75   Resp: 18 20 20 20   Temp: 36.8 °C (98.2 °F) 37.3 °C (99.2 °F) 36.7 °C (98 °F) 36.7 °C (98 °F)   TempSrc: Oral Oral Oral    SpO2: 94% 99% 100% 100%   Weight:       Height:                                                  BP Readings from Last 3 Encounters:   07/18/22 (!) 177/70   03/03/22 (!) 147/63   12/14/21 (!) 145/70       NPO Status:                                                                                 BMI:   Wt Readings from Last 3 Encounters:   07/17/22 135 lb (61.2 kg)   05/02/22 133 lb (60.3 kg)   03/03/22 133 lb (60.3 kg)     Body mass index is 23.91 kg/m².     CBC:   Lab Results   Component Value Date/Time    WBC 9.1 07/17/2022 09:32 PM    RBC 1.45 07/17/2022 09:32 PM    HGB 8.7 07/18/2022 11:25 AM    HCT 25.0 07/18/2022 11:25 AM    .3 07/17/2022 09:32 PM RDW 16.7 07/17/2022 09:32 PM     07/17/2022 09:32 PM       CMP:   Lab Results   Component Value Date/Time     07/18/2022 11:25 AM    K 3.3 07/18/2022 11:25 AM    CL 90 07/18/2022 11:25 AM    CO2 19 07/18/2022 11:25 AM    BUN 16 07/18/2022 11:25 AM    CREATININE 0.5 07/18/2022 11:25 AM    GFRAA >60 07/18/2022 11:25 AM    LABGLOM >60 07/18/2022 11:25 AM    GLUCOSE 191 07/18/2022 11:25 AM    PROT 5.7 07/17/2022 09:32 PM    CALCIUM 8.3 07/18/2022 11:25 AM    BILITOT 0.3 07/17/2022 09:32 PM    ALKPHOS 53 07/17/2022 09:32 PM    AST 26 07/17/2022 09:32 PM    ALT 30 07/17/2022 09:32 PM       POC Tests: No results for input(s): POCGLU, POCNA, POCK, POCCL, POCBUN, POCHEMO, POCHCT in the last 72 hours. Coags:   Lab Results   Component Value Date/Time    PROTIME 14.9 05/29/2021 05:16 AM    INR 1.3 05/29/2021 05:16 AM    APTT 30.0 07/17/2022 09:32 PM       HCG (If Applicable): No results found for: PREGTESTUR, PREGSERUM, HCG, HCGQUANT     ABGs: No results found for: PHART, PO2ART, DID7QRS, QWF8LBM, BEART, [id number]     Type & Screen (If Applicable):  No results found for: LABABO, LABRH    Drug/Infectious Status (If Applicable):  No results found for: HIV, HEPCAB    COVID-19 Screening (If Applicable):   Lab Results   Component Value Date/Time    COVID19 Not Detected 07/17/2022 10:43 PM           Anesthesia Evaluation  Patient summary reviewed no history of anesthetic complications:   Airway: Mallampati: II       Mouth opening: > = 3 FB   Dental:    (+) edentulous      Pulmonary:   (+) shortness of breath: chronic,  decreased breath sounds asthma:                            Cardiovascular:    (+) hypertension:, CAD:,         Rhythm: regular                      Neuro/Psych:   Negative Neuro/Psych ROS  (+) neuromuscular disease:,             GI/Hepatic/Renal:   (+) GERD:,           Endo/Other:    (+) DiabetesType II DM, , .                 Abdominal:       Abdomen: soft. Vascular:           Other Findings:

## 2022-07-18 NOTE — CONSULTS
CARDIOLOGY CONSULTATION    Patient Name:  Ras Ventura    :  1927    Reason for Consultation:   Shortness of breath, pulmonary edema, heart failure    History of Present Illness:   Ras Ventura presents to [facility] for evaluation of shortness of breath x5 days; she has a history of pulmonary embolism and asthma, reported Eliquis compliance. In the ER she was found to have hemoglobin 5.2, pulmonary edema on her chest x-ray, and hyponatremia at 118. Patient's mentation was normal.  She had a positive stool Hemoccult and was given IV Protonix. Nephrology was consulted; patient was transfused PRBCs and afterward was treated with 1 mg IV Bumex and was also placed on normal saline infusion per nephrology. General surgery was consulted for GI bleed. In the ER patient had slightly elevated troponins at 47, repeat was 49, stable. She has normal renal function. proBNP was found to be 3508, significantly elevated from patient's historical baselines. We were consulted for evaluation of her shortness of breath and pulmonary edema; her most recent echocardiogram was in 2021 and was normal, EF was estimated at 68% and there was no evidence for mitral regurg. Patient denied any chest pain or palpitations; her shortness of breath is worsened on exertion.     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 ([address] Bronchitis, Bruising tendency ([address] CAD (coronary artery disease), Cough, COVID, Dermatophytosis, Diabetes mellitus ([address] GERD (gastroesophageal reflux disease), History of pulmonary embolus (PE), Hx of blood clots, Hyperlipidemia, Hypertension, Leg swelling, Lung disease, Peripheral vascular angioplasty status, Pseudoaneurysm of right femoral artery ([address] PVD (peripheral vascular disease) with claudication ([address] Restless legs syndrome, Rhinitis, allergic, daily   Patient not taking: Reported on 7/18/2022    Historical Provider, MD   guaiFENesin (ROBITUSSIN) 100 MG/5ML syrup Take 200 mg by mouth 4 times daily as needed for Cough     Historical Provider, MD   senna (SENOKOT) 8.6 MG tablet Take 1 tablet by mouth 2 times daily    Historical Provider, MD   SODIUM CHLORIDE PO Take 1 mg by mouth daily     Historical Provider, MD   Alpha-Lipoic Acid 600 MG TABS Take 600 mg by mouth daily    Historical Provider, MD   polyvinyl alcohol (LIQUIFILM TEARS) 1.4 % ophthalmic solution 1 drop 4 times daily     Historical Provider, MD   clotrimazole-betamethasone (LOTRISONE) 1-0.05 % cream Apply topically 2 times daily Apply topically 2 times daily. Historical Provider, MD   Gingaashish Zingiber officinalis, (GINGER ROOT PO) Take 750 mg by mouth Daily in am    Historical Provider, MD   ferrous sulfate (IRON 325) 325 (65 Fe) MG tablet Take 325 mg by mouth daily In the morning for anemia 6/7/21   Historical Provider, MD   b complex vitamins capsule Take 1 capsule by mouth daily In the morning for supplement    Historical Provider, MD   Vitamin D (CHOLECALCIFEROL) 25 MCG (1000 UT) TABS tablet Take 4 capsules by mouth Give 4000 units in the morning for supplement    Historical Provider, MD   aspirin 81 MG EC tablet Take 1 tablet by mouth daily 6/2/21   Thony Goldman MD   promethazine (PHENERGAN) 12.5 MG tablet Take 12.5 mg by mouth every 6 hours as needed for Nausea     Historical Provider, MD   traMADol (ULTRAM) 50 MG tablet Take 50 mg by mouth 2 times daily.      Historical Provider, MD   aluminum & magnesium hydroxide-simethicone (MYLANTA) 400-400-40 MG/5ML SUSP Take 30 mLs by mouth every 6 hours as needed    Historical Provider, MD   lidocaine (LMX) 4 % cream Apply topically every 8 hours as needed for Pain Apply topically as needed to painful muscles    Historical Provider, MD   diclofenac sodium (VOLTAREN) 1 % GEL Apply topically 4 times daily as needed for Pain    Historical Provider, MD   glimepiride (AMARYL) 2 MG tablet Take 2 mg by mouth every morning (before breakfast)     Historical Provider, MD   bisacodyl (DULCOLAX) 10 MG suppository Place 10 mg rectally daily as needed for Constipation Indications: IF NO RESULTS FROM MOM     Historical Provider, MD   gabapentin (NEURONTIN) 300 MG capsule Take 300 mg by mouth every evening. Historical Provider, MD   albuterol (PROVENTIL) 90 MCG/ACT inhaler Inhale 2 puffs into the lungs 4 times daily 7/22/19   Michael Escalante MD   pravastatin (PRAVACHOL) 20 MG tablet TAKE 1 TABLET BY MOUTH  NIGHTLY 5/6/19   Michael Escalante MD   amLODIPine (NORVASC) 5 MG tablet TAKE 1 TABLET BY MOUTH  DAILY  Patient taking differently: Take 10 mg by mouth in the morning. 5/6/19   Michael Escalante MD   fluticasone-vilanterol (BREO ELLIPTA) 100-25 MCG/INH AEPB inhaler Inhale 1 puff into the lungs daily 1/28/19   Michael Escalante MD   Multiple Vitamins-Minerals (OCUVITE ADULT FORMULA PO) Take 1 capsule by mouth daily    Historical Provider, MD   Probiotic Product (PRO-BIOTIC BLEND PO) Take 1 capsule by mouth 2 times daily     Historical Provider, MD   magnesium gluconate (MAGONATE) 500 MG tablet Take 200 mg by mouth 2 times daily     Historical Provider, MD   Coenzyme Q10 (COQ10) 200 MG CAPS Take 200 mg by mouth daily     Historical Provider, MD       Allergies:  Lisinopril     Review of Systems:   Constitutional: there has been no unanticipated weight loss. There's been no significant change in energy or activity level, nor sleep pattern . No fever chills or rigors. Eyes: No visual changes or diplopia. No scleral icterus. ENT: No Headaches, hearing loss or vertigo. No mouth sores or sore throat. No change in taste or smell. Cardiovascular: No chest discomfort, + dyspnea on exertion, negative for palpitations, loss of consciousness, no phlebitis, no claudication. Respiratory: No cough or wheezing, no sputum production. No hemoptysis, pleuritic pain.   + Shortness of breath  Gastrointestinal: No abdominal pain, appetite loss, blood in stools; + dark stools. No change in bowel habits. No hematemesis  Genitourinary: No dysuria, trouble voiding or hematuria. No nocturia or increased frequency. Musculoskeletal:  No gait disturbance, weakness or joint complaints. Integumentary: No rash or pruritis. Neurological: No headache, diplopia, change in muscle strength, numbness or tingling. No change in gait, balance, coordination, mood, affect, memory, mentation, behavior. Psychiatric: No anxiety or depression. Endocrine: No temperature intolerance. No excessive thirst, fluid intake, or urination. No tremor. Hematologic/Lymphatic: No abnormal bruising or bleeding, blood clots or swollen lymph nodes. Allergic/Immunologic: No nasal congestion or hives. Physical Examination:    Vital Signs: BP (!) 177/70   Pulse 75   Temp 98 °F (36.7 °C)   Resp 20   Ht 5' 3\" (1.6 m)   Wt 135 lb (61.2 kg)   LMP 12/03/1997   SpO2 100%   BMI 23.91 kg/m²   General appearance: Well preserved, mesomorphic body habitus, alert, no distress. Skin: Skin color, texture, turgor normal. No rashes or lesions. No induration or tightening palpated. Head: Normocephalic. No masses, lesions, tenderness or abnormalities  Eyes: Conjunctivae/corneas clear. PERRL, EOMs intact. Sclera non icteric. Ears: External ears normal. Canals clear. TM's clear bilaterally. Hearing normal to finger rub. Nose/Sinuses: Nares normal. Septum midline. Mucosa dry. No drainage or sinus tenderness. Oropharynx: Lips, mucosa, and tongue normal. Oropharynx clear with no exudate seen. Neck: Neck supple and symmetric. No adenopathy. Thyroid symmetric, normal size, without nodules. Trachea is midline. Carotids brisk in upstroke without bruits, no abnormal JVP noted at 45°. Chest: Even excursion  Lungs: Lungs clear to auscultation bilaterally. No retractions or use of accessory muscles. No tactile vocal fremitus.  No rhonchi, crackles or rales. Heart:  S1 > S2. Regular rhythm. No gallop. Grade 2/6 short systolic murmur heard in the apex and LMSB. No rub, palpable thrill or heave noted. PMI 5th intercostal space midclavicular line. Abdomen: Abdomen soft, moderately protuberant, non-tender. BS normal. No masses, organomegaly. No hernia noted. Extremities: Extremities normal. No deformities, edema, or skin discoloration. No cyanosis or clubbing noted to the nails. Peripheral pulses present 1+ upper extremities and present 1+  lower extremities. Musculoskeletal: Spine ROM normal. Muscular strength intact. Neuro: Cranial nerves intact. Motor: Strength 5/5 in all extremities. Reflexes 2+ in all extremities. No focal weakness. Sensory: grossly normal to touch. Coordination intact. Pertinent Labs:  CBC:   Recent Labs     07/17/22 2132 07/18/22 0405 07/18/22  1125   WBC 9.1  --   --    HGB 5.2* 6.7* 8.7*     --   --      BMP:  Recent Labs     07/17/22 2132 07/18/22  0405   * 121*   K 4.1 3.5   CL 89* 91*   CO2 18* 20*   BUN 19 16   CREATININE 0.6 0.6   GLUCOSE 192* 145*   LABGLOM >60 >60     ABGs: No results found for: PH, PO2, PCO2  INR: No results for input(s): INR in the last 72 hours. PRO-BNP:   Lab Results   Component Value Date    PROBNP 3,508 (H) 07/17/2022    PROBNP 613 (H) 05/29/2021      Cardiac Injury Profile:   Recent Labs     07/17/22 2132 07/18/22  0405   TROPHS 47* 49*      Lipid Profile:   Lab Results   Component Value Date/Time    TRIG 61 05/02/2019 10:13 AM    HDL 68 05/02/2019 10:13 AM    LDLCALC 79 05/02/2019 10:13 AM    CHOL 159 05/02/2019 10:13 AM      Thyroid:   Lab Results   Component Value Date    TSH 1.600 05/29/2021      Hemoglobin A1C: No components found for: HGBA1C   ECG:  See report    Radiology:  US DUP LOWER EXTREMITIES BILATERAL VENOUS   Final Result   No evidence of DVT in either lower extremity. XR CHEST PORTABLE   Final Result   Cardiomegaly with pulmonary edema. Assessment:    Patient Active Problem List   Diagnosis Code    Asthma J45.909    CAD (coronary artery disease) I25.10    Vitamin D deficiency E55.9    HTN (hypertension) I10    Idiopathic peripheral neuropathy G60.9    Rhinitis, allergic J30.9    GERD (gastroesophageal reflux disease) K21.9    PVD (peripheral vascular disease) with claudication ([facility]) I73.9    Gait instability R26.81    Hyponatremia E87.1    DM (diabetes mellitus), type 2 ([facility]) E11.9    History of pulmonary embolus (PE) Z86.711    Peripheral vascular angioplasty status Z98.62    Non-proliferative diabetic retinopathy, mild, both eyes ([facility]) J12.8107    Leg swelling M79.89    Compression fracture of L5 lumbar vertebra, closed, initial encounter ([facility] S32.050A    HLD (hyperlipidemia) E78.5    Compression fracture of thoracic vertebra ([facility]) S22.000A    Acute anemia D64.9    Spinal stenosis M48.00    Acute heart failure ([facility]) I50.9     Plan:    In light of patient's endorsing dark tarry stools, and having a positive Hemoccult despite history of oral iron supplementation, she is being evaluated by general surgery for GI bleed. Her hemoglobin has improved s/p PRBCs and she has received 1 dose of IV Bumex for her pulmonary edema; as she is being monitored closely with nephrology for her hyponatremia which is improving. From our standpoint, patient would benefit from a repeat echocardiogram to assess her cardiac function given the increase in BNP from her baseline; repeat BMP is ordered and is pending at this time. We will continue medical management as planned. I have spent more than 55 minutes face to face with Latesha Roberts reviewing notes and laboratory data with greater than 50% of this time instructing and counseling the patient regarding my findings and recommendations and I have answered all questions as posed to me by Ms. Wilbur Davis. Thank you, Lilo Melvin DO for allowing me to consult in the care of this patient.     Marcin Mast Andre Brenner,  , FACP, FACC, [facility] [place]    NOTE:  This report was transcribed using voice recognition software. Every effort was made to ensure accuracy; however, inadvertent computerized transcription errors may be present.

## 2022-07-18 NOTE — PROGRESS NOTES
Pt treated with hydralazine 10 mg IV push x 1 for SBP > 170. SBP subsequently 160. Report called to 6th floor to RN, pt awaiting transport to return to room.

## 2022-07-18 NOTE — ED PROVIDER NOTES
66-year-old female presenting emergency department for shortness of breath. Has a history of both PE, as well as asthma. Has ongoing the last 5 days, progressive in onset, persistent, worse with ambulation, improved by rest, associate with fatigue. She denies any fevers, chills, did have an episode of emesis today. Noted on exam patient's left lower extremity was swollen, she states that she had a surgery on it a year ago and has been like that since. She states she is compliant with her Eliquis therapy. She lives in [address] [place] [address]. Review of Systems   Constitutional:  Negative for chills and fever. HENT:  Negative for ear pain, sinus pressure and sore throat. Eyes:  Negative for pain and discharge. Respiratory:  Positive for shortness of breath. Negative for cough and wheezing. Cardiovascular:  Negative for chest pain. Gastrointestinal:  Positive for nausea and vomiting. Negative for abdominal distention and diarrhea. Genitourinary:  Negative for dysuria and frequency. Musculoskeletal:  Negative for arthralgias and back pain. Skin:  Negative for rash and wound. Neurological:  Negative for weakness and headaches. Hematological:  Negative for adenopathy. All other systems reviewed and are negative. Physical Exam  Vitals and nursing note reviewed. Constitutional:       Appearance: Normal appearance. HENT:      Head: Normocephalic and atraumatic. Right Ear: External ear normal.      Left Ear: External ear normal.      Nose: Nose normal.      Mouth/Throat:      Mouth: Mucous membranes are moist.   Eyes:      Extraocular Movements: Extraocular movements intact. Pupils: Pupils are equal, round, and reactive to light. Cardiovascular:      Rate and Rhythm: Normal rate and regular rhythm. Pulses: Normal pulses. Heart sounds: Normal heart sounds. Pulmonary:      Effort: Pulmonary effort is normal. No respiratory distress.       Breath sounds: Normal breath sounds. No stridor. No wheezing. Abdominal:      General: Abdomen is flat. Bowel sounds are normal. There is no distension. Palpations: Abdomen is soft. There is no mass. Tenderness: There is no abdominal tenderness. Musculoskeletal:         General: Normal range of motion. Cervical back: Normal range of motion and neck supple. Right lower leg: No edema. Left lower leg: Edema present. Skin:     General: Skin is warm and dry. Neurological:      General: No focal deficit present. Mental Status: She is alert and oriented to person, place, and time. Cranial Nerves: No cranial nerve deficit. Sensory: No sensory deficit. Motor: No weakness. Procedures     MDM     Amount and/or Complexity of Data Reviewed  Clinical lab tests: reviewed  Tests in the radiology section of CPT®: reviewed  Tests in the medicine section of CPT®: reviewed  Decide to obtain previous medical records or to obtain history from someone other than the patient: yes         ED Course as of 07/18/22 0330   Sun Jul 17, 2022 2155 EKG: This EKG is signed by emergency department physician. Rate: 70  Rhythm: Sinus  Interpretation: Sinus rhythm first-degree block, occasional PVC  Comparison: stable as compared to patient's most recent EKG      [JG]   2156 Hemoglobin 5.2, likely underlying cause of patient's shortness of breath, will transfuse. [JG]   2212 Chest x-ray shows pulmonary edema as well, which may be underlying cause of shortness of breath as well. Will diurese with Lasix. D-dimer less than 200. [JG]   2219 Sodium 118, put in for urine studies, patient currently mentating well. [JG]   2219 DC patient's Lasix ordered due to hyponatremia.  [JG]   2226 Hemoccult positive, appears to be melanotic, but patient states she is on iron pills, will cover with Protonix [JG]   2230 Dr. Jordin Rose- Urine electrolytes, and recommends a milligram of Bumex after blood transfusion, would like called after urine electrolyte results [JG]   Mon Jul 18, 2022 0032 Urine electrolytes back, will consult nephrology again [JG]   0037 Dr. Jennine Fabry would like the patient placed on normal saline at 60 an hour as he suspects volume depletion, would like to give Bumex as well, says she can go to the floor. Would like a repeat BMP in 4 hours, and called if the sodium is greater than 120 [JG]      ED Course User Index  [JG] Emily Black MD      ED Course as of 07/18/22 0330   Sun Jul 17, 2022 2155 EKG: This EKG is signed by emergency department physician. Rate: 70  Rhythm: Sinus  Interpretation: Sinus rhythm first-degree block, occasional PVC  Comparison: stable as compared to patient's most recent EKG      [JG]   2156 Hemoglobin 5.2, likely underlying cause of patient's shortness of breath, will transfuse. [JG]   2212 Chest x-ray shows pulmonary edema as well, which may be underlying cause of shortness of breath as well. Will diurese with Lasix. D-dimer less than 200. [JG]   2219 Sodium 118, put in for urine studies, patient currently mentating well. [JG]   2219 DC patient's Lasix ordered due to hyponatremia. [JG]   2226 Hemoccult positive, appears to be melanotic, but patient states she is on iron pills, will cover with Protonix [JG]   2230 Dr. Jennine Fabry- Urine electrolytes, and recommends a milligram of Bumex after blood transfusion, would like called after urine electrolyte results [JG]   Mon Jul 18, 2022 0032 Urine electrolytes back, will consult nephrology again [JG]   0037 Dr. Jennine Fabry would like the patient placed on normal saline at 60 an hour as he suspects volume depletion, would like to give Bumex as well, says she can go to the floor. Would like a repeat BMP in 4 hours, and called if the sodium is greater than 120 [JG]      ED Course User Index  [JG] Emily Black MD     80year-old female presenting the emergency department for shortness of breath. Ongoing for last week, was progressive in nature.   Found profoundly anemic on laboratory work with hemoglobin of 5.2. Hemoccult positive, appeared dark, however patient without iron pills did not appear to be melanotic, given Protonix. Was also found to be hyponatremic with a sodium 118, consulted nephrology, patient was mentating well and neurologically intact. Recommendations were appreciated, electrolytes were ordered, started on IV fluids at the recommendation. Order blood, given Bumex as well as patient was fluid overloaded on chest x-ray at the recommendation of nephrology. She was admitted to hospital for further work-up and management.    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 ([address] Bronchitis, Bruising tendency ([address] CAD (coronary artery disease), Cough, COVID, Dermatophytosis, Diabetes mellitus ([address] GERD (gastroesophageal reflux disease), History of pulmonary embolus (PE), Hx of blood clots, Hyperlipidemia, Hypertension, Leg swelling, Lung disease, Peripheral vascular angioplasty status, Pseudoaneurysm of right femoral artery ([address] PVD (peripheral vascular disease) with claudication ([address] Restless legs syndrome, Rhinitis, allergic, Sinusitis, SOB (shortness of breath), Type 1 diabetes mellitus with left diabetic foot ulcer ([address] Ulcerated, foot, left, limited to breakdown of skin ([address] and Urinary incontinence. Past Surgical History:  has a past surgical history that includes Hysterectomy; Cholecystectomy; Tonsillectomy and adenoidectomy; Coronary artery bypass graft; Abdomen surgery; Appendectomy; Colonoscopy; Endoscopy, colon, diagnostic; Cardiac surgery; Foot Debridement (Left, 5/16/2021); and Foot Debridement (Left, 5/21/2021). Social History:  reports that she has never smoked.  She has never used smokeless tobacco. She reports current alcohol use. She reports that she does not use drugs. Family History: family history includes Heart Disease in her brother; Hypertension in her father. The patients home medications have been reviewed.     Allergies: Lisinopril    -------------------------------------------------- RESULTS -------------------------------------------------    Lab  Results for orders placed or performed during the hospital encounter of 07/17/22   COVID-19, Rapid    Specimen: Nasopharyngeal Swab   Result Value Ref Range    SARS-CoV-2, NAAT Not Detected Not Detected   CBC with Auto Differential   Result Value Ref Range    WBC 9.1 4.5 - 11.5 E9/L    RBC 1.45 (L) 3.50 - 5.50 E12/L    Hemoglobin 5.2 (LL) 11.5 - 15.5 g/dL    Hematocrit 15.7 (L) 34.0 - 48.0 %    .3 (H) 80.0 - 99.9 fL    MCH 35.9 (H) 26.0 - 35.0 pg    MCHC 33.1 32.0 - 34.5 %    RDW 16.7 (H) 11.5 - 15.0 fL    Platelets 476 146 - 712 E9/L    MPV 9.7 7.0 - 12.0 fL    Neutrophils % 81.8 (H) 43.0 - 80.0 %    Lymphocytes % 10.4 (L) 20.0 - 42.0 %    Monocytes % 5.2 2.0 - 12.0 %    Eosinophils % 2.6 0.0 - 6.0 %    Basophils % 0.0 0.0 - 2.0 %    Neutrophils Absolute 7.46 (H) 1.80 - 7.30 E9/L    Lymphocytes Absolute 0.91 (L) 1.50 - 4.00 E9/L    Monocytes Absolute 0.46 0.10 - 0.95 E9/L    Eosinophils Absolute 0.24 0.05 - 0.50 E9/L    Basophils Absolute 0.00 0.00 - 0.20 E9/L    nRBC 3.5 /100 WBC    Anisocytosis 1+     Polychromasia 1+     Hypochromia 1+     Schistocytes 1+     Ovalocytes 1+     Target Cells 1+     Basophilic Stippling 1+     Pappenheimer Bodies 1+    Basic Metabolic Panel w/ Reflex to MG   Result Value Ref Range    Sodium 118 (LL) 132 - 146 mmol/L    Potassium reflex Magnesium 4.1 3.5 - 5.0 mmol/L    Chloride 89 (L) 98 - 107 mmol/L    CO2 18 (L) 22 - 29 mmol/L    Anion Gap 11 7 - 16 mmol/L    Glucose 192 (H) 74 - 99 mg/dL    BUN 19 6 - 23 mg/dL    CREATININE 0.6 0.5 - 1.0 mg/dL    GFR Non-African American >60 >=60 mL/min/1.73    GFR African American >60     Calcium 8.6 8.6 - 10.2 mg/dL   Hepatic Function Panel   Result Value Ref Range    Total Protein 5.7 (L) 6.4 - 8.3 g/dL    Albumin 3.7 3.5 - 5.2 g/dL    Alkaline Phosphatase 53 35 - 104 U/L    ALT 30 0 - 32 U/L    AST 26 0 - 31 U/L    Total Bilirubin 0.3 0.0 - 1.2 mg/dL    Bilirubin, Direct <0.2 0.0 - 0.3 mg/dL    Bilirubin, Indirect see below 0.0 - 1.0 mg/dL   Troponin   Result Value Ref Range    Troponin, High Sensitivity 47 (H) 0 - 9 ng/L   Brain Natriuretic Peptide   Result Value Ref Range    Pro-BNP 3,508 (H) 0 - 450 pg/mL   APTT   Result Value Ref Range    aPTT 30.0 24.5 - 35.1 sec   D-Dimer, Quantitative   Result Value Ref Range    D-Dimer, Quant <200 ng/mL DDU   Urinalysis with Microscopic   Result Value Ref Range    Color, UA Yellow Straw/Yellow    Clarity, UA Clear Clear    Glucose, Ur Negative Negative mg/dL    Bilirubin Urine Negative Negative    Ketones, Urine Negative Negative mg/dL    Specific Gravity, UA 1.020 1.005 - 1.030    Blood, Urine Negative Negative    pH, UA 6.0 5.0 - 9.0    Protein,  (A) Negative mg/dL    Urobilinogen, Urine 0.2 <2.0 E.U./dL    Nitrite, Urine Negative Negative    Leukocyte Esterase, Urine Negative Negative    WBC, UA NONE 0 - 5 /HPF    RBC, UA NONE 0 - 2 /HPF    Bacteria, UA NONE SEEN None Seen /HPF   Protein / creatinine ratio, urine   Result Value Ref Range    Protein, Ur 153 (H) 0 - 12 mg/dL    Creatinine, Ur 70 29 - 226 mg/dL    Protein/Creat Ratio 2.2 (H) 0.0 - 0.2    Protein/Creat Ratio 2.2    Urine electrolytes   Result Value Ref Range    Sodium, Ur <20 Not Established mmol/L    Potassium, Ur 45.1 Not Established mmol/L    Chloride <20 Not Established mmol/L   Urea nitrogen, urine   Result Value Ref Range    Urea Nitrogen, Ur 742 (L) 800 - 1666 mg/dL   OSMOLALITY, URINE   Result Value Ref Range    Osmolality, Ur 452 300 - 900 mOsm/kg   EKG 12 Lead   Result Value Ref Range    Ventricular Rate 70 BPM    Atrial Rate 70 BPM    P-R Interval 236 ms    QRS Duration 106 ms    Q-T Interval 392 ms    QTc Calculation (Bazett) 423 ms    P Axis 65 degrees    R Axis 19 degrees    T Axis 29 degrees   TYPE AND SCREEN   Result Value Ref Range    ABO/Rh CANCELED     Antibody Screen NEG    PREPARE RBC (CROSSMATCH), 2 Units   Result Value Ref Range    Product Code Blood Bank T1041F14     Description Blood Bank Red Blood Cells, Leuko-reduced     Unit Number [id number]     Dispense Status Blood Bank issued     Product Code Blood Bank [id number]     Description Blood Bank Red Blood Cells, Leuko-reduced     Unit Number [id number]     Dispense Status Blood Bank selected    ABORH TUBE   Result Value Ref Range    ABO/Rh B POS        Radiology  US DUP LOWER EXTREMITIES BILATERAL VENOUS   Final Result   No evidence of DVT in either lower extremity. XR CHEST PORTABLE   Final Result   Cardiomegaly with pulmonary edema. ------------------------- NURSING NOTES AND VITALS REVIEWED ---------------------------  Date / Time Roomed:  7/17/2022  9:06 PM  ED Bed Assignment:  [id number]    The nursing notes within the ED encounter and vital signs as below have been reviewed.    Patient Vitals for the past 24 hrs:   BP Temp Temp src Pulse Resp SpO2 Height Weight   07/18/22 0310 (!) 205/86 97.6 °F (36.4 °C) Oral 80 18 91 % -- --   07/18/22 0237 (!) 187/77 98.4 °F (36.9 °C) Oral 73 18 97 % -- --   07/18/22 0056 (!) 178/79 98.3 °F (36.8 °C) Oral 72 18 98 % -- --   07/18/22 0051 (!) 179/81 98.4 °F (36.9 °C) Oral 72 18 98 % -- --   07/18/22 0046 (!) 169/63 98.4 °F (36.9 °C) Oral 70 18 98 % -- --   07/18/22 0034 (!) 161/67 98.3 °F (36.8 °C) Oral 71 18 99 % -- --   07/17/22 2346 (!) 154/84 -- -- 66 18 100 % -- --   07/17/22 2113 (!) 163/65 98.8 °F (37.1 °C) Oral 65 18 95 % 5' 3\" (1.6 m) 135 lb (61.2 kg)       Oxygen Saturation Interpretation: Normal      ------------------------------------------ PROGRESS NOTES ------------------------------------------    I have spoken with the patient and discussed todays results, in addition to providing specific details for the plan of care and counseling regarding the diagnosis and prognosis. Their questions are answered at this time and they are agreeable with the plan.      --------------------------------- ADDITIONAL PROVIDER NOTES ---------------------------------  Consultations:  Spoke with Dr. Nuvia RIVAS Agus  They will admit this patient and will provide consultation. This patient's ED course included: a personal history and physicial examination, re-evaluation prior to disposition, multiple bedside re-evaluations, IV medications, cardiac monitoring, continuous pulse oximetry, and complex medical decision making and emergency management    This patient has remained hemodynamically stable and been closely monitored during their ED course. Please note that the withdrawal or failure to initiate urgent interventions for this patient would likely result in a life threatening deterioration or permanent disability. Accordingly this patient received 35 minutes of critical care time, excluding separately billable procedures. Clinical Impression  1. Gastrointestinal hemorrhage, unspecified gastrointestinal hemorrhage type          Disposition  Patient's disposition: Admit to telemetry  Patient's condition is stable.           Jonathan Shane MD  Resident  07/18/22 Frankie 1019, DO  07/18/22 5820

## 2022-07-18 NOTE — ANESTHESIA POSTPROCEDURE EVALUATION
Department of Anesthesiology  Postprocedure Note    Patient: Ting Herrera  MRN: [id number]  YOB: 1927  Date of evaluation: 7/18/2022      Procedure Summary     Date: 07/18/22 Room / Location: [facility] / [facility]    Anesthesia Start: 1316 Anesthesia Stop: 1326    Procedure: EGD ESOPHAGOGASTRODUODENOSCOPY Diagnosis:       Pain      (Pain [R52])    Surgeons: Patricia Maria MD Responsible Provider: Gilmer Mtz MD    Anesthesia Type: MAC ASA Status: 4          Anesthesia Type: MAC    Jayjay Phase I:      Jayjay Phase II: Jayjay Score: 9      Anesthesia Post Evaluation    Patient location during evaluation: bedside  Patient participation: complete - patient participated  Level of consciousness: awake  Pain score: 0  Airway patency: patent  Nausea & Vomiting: no nausea and no vomiting  Complications: no  Cardiovascular status: hemodynamically stable  Respiratory status: acceptable  Hydration status: euvolemic

## 2022-07-19 LAB
ANION GAP SERPL CALCULATED.3IONS-SCNC: 10 MMOL/L (ref 7–16)
ANION GAP SERPL CALCULATED.3IONS-SCNC: 12 MMOL/L (ref 7–16)
ANION GAP SERPL CALCULATED.3IONS-SCNC: 13 MMOL/L (ref 7–16)
ANION GAP SERPL CALCULATED.3IONS-SCNC: 8 MMOL/L (ref 7–16)
APTT: 27.3 SEC (ref 24.5–35.1)
BUN BLDV-MCNC: 13 MG/DL (ref 6–23)
BUN BLDV-MCNC: 14 MG/DL (ref 6–23)
BUN BLDV-MCNC: 14 MG/DL (ref 6–23)
BUN BLDV-MCNC: 16 MG/DL (ref 6–23)
CALCIUM SERPL-MCNC: 7.7 MG/DL (ref 8.6–10.2)
CALCIUM SERPL-MCNC: 8.1 MG/DL (ref 8.6–10.2)
CALCIUM SERPL-MCNC: 8.2 MG/DL (ref 8.6–10.2)
CALCIUM SERPL-MCNC: 8.2 MG/DL (ref 8.6–10.2)
CHLORIDE BLD-SCNC: 90 MMOL/L (ref 98–107)
CHLORIDE BLD-SCNC: 91 MMOL/L (ref 98–107)
CHLORIDE URINE RANDOM: 47 MMOL/L
CO2: 20 MMOL/L (ref 22–29)
CO2: 21 MMOL/L (ref 22–29)
CORTISOL TOTAL: 20.69 MCG/DL (ref 2.68–18.4)
CREAT SERPL-MCNC: 0.6 MG/DL (ref 0.5–1)
CREAT SERPL-MCNC: 0.7 MG/DL (ref 0.5–1)
GFR AFRICAN AMERICAN: >60
GFR NON-AFRICAN AMERICAN: >60 ML/MIN/1.73
GLUCOSE BLD-MCNC: 144 MG/DL (ref 74–99)
GLUCOSE BLD-MCNC: 177 MG/DL (ref 74–99)
GLUCOSE BLD-MCNC: 194 MG/DL (ref 74–99)
GLUCOSE BLD-MCNC: 198 MG/DL (ref 74–99)
HCT VFR BLD CALC: 22.5 % (ref 34–48)
HCT VFR BLD CALC: 23.9 % (ref 34–48)
HCT VFR BLD CALC: 26.1 % (ref 34–48)
HCT VFR BLD CALC: 26.1 % (ref 34–48)
HEMOGLOBIN: 7.7 G/DL (ref 11.5–15.5)
HEMOGLOBIN: 8.3 G/DL (ref 11.5–15.5)
HEMOGLOBIN: 8.9 G/DL (ref 11.5–15.5)
HEMOGLOBIN: 9.1 G/DL (ref 11.5–15.5)
INR BLD: 1.1
LV EF: 45 %
LVEF MODALITY: NORMAL
MAGNESIUM: 1.8 MG/DL (ref 1.6–2.6)
MAGNESIUM: 1.9 MG/DL (ref 1.6–2.6)
OSMOLALITY URINE: 432 MOSM/KG (ref 300–900)
POTASSIUM REFLEX MAGNESIUM: 3.1 MMOL/L (ref 3.5–5)
POTASSIUM REFLEX MAGNESIUM: 3.3 MMOL/L (ref 3.5–5)
POTASSIUM REFLEX MAGNESIUM: 3.3 MMOL/L (ref 3.5–5)
POTASSIUM REFLEX MAGNESIUM: 3.5 MMOL/L (ref 3.5–5)
POTASSIUM, UR: 39.5 MMOL/L
PRO-BNP: 8313 PG/ML (ref 0–450)
PROTHROMBIN TIME: 12.4 SEC (ref 9.3–12.4)
SODIUM BLD-SCNC: 119 MMOL/L (ref 132–146)
SODIUM BLD-SCNC: 122 MMOL/L (ref 132–146)
SODIUM BLD-SCNC: 123 MMOL/L (ref 132–146)
SODIUM BLD-SCNC: 125 MMOL/L (ref 132–146)
SODIUM URINE: 42 MMOL/L
TSH SERPL DL<=0.05 MIU/L-ACNC: 1.41 UIU/ML (ref 0.27–4.2)
URIC ACID, SERUM: 5.2 MG/DL (ref 2.4–5.7)

## 2022-07-19 PROCEDURE — 83735 ASSAY OF MAGNESIUM: CPT

## 2022-07-19 PROCEDURE — 85018 HEMOGLOBIN: CPT

## 2022-07-19 PROCEDURE — 2580000003 HC RX 258: Performed by: NURSE PRACTITIONER

## 2022-07-19 PROCEDURE — 85730 THROMBOPLASTIN TIME PARTIAL: CPT

## 2022-07-19 PROCEDURE — 85014 HEMATOCRIT: CPT

## 2022-07-19 PROCEDURE — 83935 ASSAY OF URINE OSMOLALITY: CPT

## 2022-07-19 PROCEDURE — 6360000002 HC RX W HCPCS: Performed by: NURSE PRACTITIONER

## 2022-07-19 PROCEDURE — 6360000002 HC RX W HCPCS: Performed by: INTERNAL MEDICINE

## 2022-07-19 PROCEDURE — 80048 BASIC METABOLIC PNL TOTAL CA: CPT

## 2022-07-19 PROCEDURE — 2060000000 HC ICU INTERMEDIATE R&B

## 2022-07-19 PROCEDURE — 84443 ASSAY THYROID STIM HORMONE: CPT

## 2022-07-19 PROCEDURE — 2700000000 HC OXYGEN THERAPY PER DAY

## 2022-07-19 PROCEDURE — 6370000000 HC RX 637 (ALT 250 FOR IP): Performed by: INTERNAL MEDICINE

## 2022-07-19 PROCEDURE — 83880 ASSAY OF NATRIURETIC PEPTIDE: CPT

## 2022-07-19 PROCEDURE — 84550 ASSAY OF BLOOD/URIC ACID: CPT

## 2022-07-19 PROCEDURE — 93005 ELECTROCARDIOGRAM TRACING: CPT | Performed by: INTERNAL MEDICINE

## 2022-07-19 PROCEDURE — 84133 ASSAY OF URINE POTASSIUM: CPT

## 2022-07-19 PROCEDURE — 82436 ASSAY OF URINE CHLORIDE: CPT

## 2022-07-19 PROCEDURE — 84300 ASSAY OF URINE SODIUM: CPT

## 2022-07-19 PROCEDURE — 94640 AIRWAY INHALATION TREATMENT: CPT

## 2022-07-19 PROCEDURE — 85610 PROTHROMBIN TIME: CPT

## 2022-07-19 PROCEDURE — 93306 TTE W/DOPPLER COMPLETE: CPT

## 2022-07-19 PROCEDURE — 36415 COLL VENOUS BLD VENIPUNCTURE: CPT

## 2022-07-19 PROCEDURE — 82533 TOTAL CORTISOL: CPT

## 2022-07-19 RX ORDER — SODIUM BICARBONATE 650 MG/1
650 TABLET ORAL 2 TIMES DAILY
Status: DISCONTINUED | OUTPATIENT
Start: 2022-07-19 | End: 2022-07-21

## 2022-07-19 RX ORDER — FUROSEMIDE 10 MG/ML
40 INJECTION INTRAMUSCULAR; INTRAVENOUS ONCE
Status: COMPLETED | OUTPATIENT
Start: 2022-07-19 | End: 2022-07-19

## 2022-07-19 RX ORDER — POTASSIUM CHLORIDE 7.45 MG/ML
10 INJECTION INTRAVENOUS
Status: COMPLETED | OUTPATIENT
Start: 2022-07-19 | End: 2022-07-19

## 2022-07-19 RX ADMIN — POTASSIUM CHLORIDE 10 MEQ: 7.46 INJECTION, SOLUTION INTRAVENOUS at 22:53

## 2022-07-19 RX ADMIN — ALBUTEROL SULFATE 2.5 MG: 2.5 SOLUTION RESPIRATORY (INHALATION) at 15:40

## 2022-07-19 RX ADMIN — FUROSEMIDE 40 MG: 10 INJECTION, SOLUTION INTRAMUSCULAR; INTRAVENOUS at 08:31

## 2022-07-19 RX ADMIN — BUDESONIDE 250 MCG: 0.25 SUSPENSION RESPIRATORY (INHALATION) at 19:00

## 2022-07-19 RX ADMIN — ARFORMOTEROL TARTRATE 15 MCG: 15 SOLUTION RESPIRATORY (INHALATION) at 19:00

## 2022-07-19 RX ADMIN — POLYVINYL ALCOHOL 1 DROP: 14 SOLUTION/ DROPS OPHTHALMIC at 20:07

## 2022-07-19 RX ADMIN — Medication 10 ML: at 20:07

## 2022-07-19 RX ADMIN — POLYVINYL ALCOHOL 1 DROP: 14 SOLUTION/ DROPS OPHTHALMIC at 14:38

## 2022-07-19 RX ADMIN — ALBUTEROL SULFATE 2.5 MG: 2.5 SOLUTION RESPIRATORY (INHALATION) at 07:42

## 2022-07-19 RX ADMIN — POLYVINYL ALCOHOL 1 DROP: 14 SOLUTION/ DROPS OPHTHALMIC at 08:01

## 2022-07-19 RX ADMIN — SODIUM CHLORIDE, PRESERVATIVE FREE 10 ML: 5 INJECTION INTRAVENOUS at 14:05

## 2022-07-19 RX ADMIN — SODIUM BICARBONATE 650 MG: 650 TABLET ORAL at 20:07

## 2022-07-19 RX ADMIN — BUDESONIDE 250 MCG: 0.25 SUSPENSION RESPIRATORY (INHALATION) at 07:42

## 2022-07-19 RX ADMIN — POTASSIUM CHLORIDE 10 MEQ: 7.45 INJECTION INTRAVENOUS at 09:11

## 2022-07-19 RX ADMIN — Medication 10 ML: at 08:02

## 2022-07-19 RX ADMIN — SODIUM CHLORIDE: 9 INJECTION, SOLUTION INTRAVENOUS at 06:25

## 2022-07-19 RX ADMIN — ALBUTEROL SULFATE 2.5 MG: 2.5 SOLUTION RESPIRATORY (INHALATION) at 19:00

## 2022-07-19 RX ADMIN — ARFORMOTEROL TARTRATE 15 MCG: 15 SOLUTION RESPIRATORY (INHALATION) at 07:42

## 2022-07-19 RX ADMIN — POTASSIUM CHLORIDE 10 MEQ: 7.46 INJECTION, SOLUTION INTRAVENOUS at 21:54

## 2022-07-19 RX ADMIN — POTASSIUM CHLORIDE 10 MEQ: 7.45 INJECTION INTRAVENOUS at 10:14

## 2022-07-19 RX ADMIN — POLYVINYL ALCOHOL 1 DROP: 14 SOLUTION/ DROPS OPHTHALMIC at 16:38

## 2022-07-19 RX ADMIN — SODIUM BICARBONATE 650 MG: 650 TABLET ORAL at 14:37

## 2022-07-19 NOTE — PROGRESS NOTES
PROGRESS NOTE       PATIENT PROBLEM LIST:  Patient Active Problem List   Diagnosis Code    Asthma J45.909    CAD (coronary artery disease) I25.10    Vitamin D deficiency E55.9    HTN (hypertension) I10    Idiopathic peripheral neuropathy G60.9    Rhinitis, allergic J30.9    GERD (gastroesophageal reflux disease) K21.9    PVD (peripheral vascular disease) with claudication ([facility]) I73.9    Gait instability R26.81    Hyponatremia E87.1    DM (diabetes mellitus), type 2 ([facility]) E11.9    History of pulmonary embolus (PE) Z86.711    Peripheral vascular angioplasty status Z98.62    Non-proliferative diabetic retinopathy, mild, both eyes ([facility]) U10.6061    Leg swelling M79.89    Compression fracture of L5 lumbar vertebra, closed, initial encounter ([facility] S32.050A    HLD (hyperlipidemia) E78.5    Compression fracture of thoracic vertebra ([facility]) S22.000A    Acute anemia D64.9    Spinal stenosis M48.00    Acute heart failure ([facility] I50.9       SUBJECTIVE:  Joanne Strange states she is feeling somewhat better today. Denies any chest symptoms or shortness of breath at this time. Is eating lunch and in no distress on my evaluation today. REVIEW OF SYSTEMS:  General ROS: negative for - fatigue, malaise,  weight gain or weight loss  Psychological ROS: negative for - anxiety , depression  Ophthalmic ROS: negative for - decreased vision or visual distortion. ENT ROS: negative  Allergy and Immunology ROS: negative  Hematological and Lymphatic ROS: negative  Endocrine: no heat or cold intolerance and no polyphagia, polydipsia, or polyuria  Respiratory ROS: positive for - shortness of breath  Cardiovascular ROS: positive for - dyspnea on exertion.   Gastrointestinal ROS: no abdominal pain, change in bowel habits, or black or bloody stools  Genito-Urinary ROS: no nocturia, dysuria, trouble voiding, frequency or hematuria  Musculoskeletal ROS: negative for- myalgias, arthralgias, or claudication  Neurological ROS: no TIA or stroke symptoms bilaterally; present 1+ DP and present 1+ PT bilaterally. Data:   Scheduled Meds: Reviewed  Continuous Infusions:    sodium chloride      sodium chloride         Intake/Output Summary (Last 24 hours) at 7/19/2022 1129  Last data filed at 7/19/2022 1114  Gross per 24 hour   Intake 446.24 ml   Output 3140 ml   Net -2693.76 ml     CBC:   Recent Labs     07/17/22 2132 07/18/22 0405 07/18/22 1125 07/18/22  1535 07/18/22 2206 07/19/22 0418   WBC 9.1  --   --   --   --   --    HGB 5.2* 6.7* 8.7* 9.4* 8.2* 8.3*   HCT 15.7* 20.0* 25.0* 27.2* 23.6* 23.9*     --   --   --   --   --      BMP:  Recent Labs     07/17/22 2132 07/18/22 0405 07/18/22 1125 07/18/22  1535 07/18/22  1809 07/18/22 2206 07/19/22  0418   * 121* 121* 121* 120* 121* 122*   K 4.1 3.5 3.3* 3.4* 3.4* 3.2* 3.1*   CL 89* 91* 90* 88* 89* 90* 91*   CO2 18* 20* 19* 20* 21* 21* 21*   BUN 19 16 16 15 15 15 14   CREATININE 0.6 0.6 0.5 0.6 0.6 0.6 0.6   LABGLOM >60 >60 >60 >60 >60 >60 >60     ABGs: No results found for: PH, PO2, PCO2  INR:   Recent Labs     07/19/22 0418   INR 1.1     PRO-BNP:   Lab Results   Component Value Date    PROBNP 5,044 (H) 07/18/2022    PROBNP 3,508 (H) 07/17/2022      TSH:   Lab Results   Component Value Date    TSH 1.410 07/19/2022      Cardiac Injury Profile:   Recent Labs     07/18/22 0405 07/18/22 1125 07/18/22  1535   TROPHS 49* 47* 92*      Lipid Profile:   Lab Results   Component Value Date/Time    TRIG 61 05/02/2019 10:13 AM    HDL 68 05/02/2019 10:13 AM    LDLCALC 79 05/02/2019 10:13 AM    CHOL 159 05/02/2019 10:13 AM      Hemoglobin A1C: No components found for: HGBA1C      RAD:   US DUP LOWER EXTREMITIES BILATERAL VENOUS   Final Result   No evidence of DVT in either lower extremity. XR CHEST PORTABLE   Final Result   Cardiomegaly with pulmonary edema.                EKG: See Report  Echo: See Report      IMPRESSIONS:  Patient Active Problem List   Diagnosis Code    Asthma J45.909    CAD (coronary artery disease) I25.10    Vitamin D deficiency E55.9    HTN (hypertension) I10    Idiopathic peripheral neuropathy G60.9    Rhinitis, allergic J30.9    GERD (gastroesophageal reflux disease) K21.9    PVD (peripheral vascular disease) with claudication ([facility]) I73.9    Gait instability R26.81    Hyponatremia E87.1    DM (diabetes mellitus), type 2 ([facility]) E11.9    History of pulmonary embolus (PE) Z86.711    Peripheral vascular angioplasty status Z98.62    Non-proliferative diabetic retinopathy, mild, both eyes ([facility]) A39.4929    Leg swelling M79.89    Compression fracture of L5 lumbar vertebra, closed, initial encounter ([facility] S32.050A    HLD (hyperlipidemia) E78.5    Compression fracture of thoracic vertebra ([facility]) S22.000A    Acute anemia D64.9    Spinal stenosis M48.00    Acute heart failure ([facility]) I50.9       RECOMMENDATIONS:    Ms. Jake Lake is in good spirits today, eating lunch and in no distress. Denies any difficulties breathing today, states she is starting to feel better from that standpoint. We reviewed her echocardiogram today, EF is decreased from previous 68% in March 2021, now down to 45%. She is still being monitored closely with nephrology for her hyponatremia as well as general surgery for her GI bleed/hemoglobinuria; her sodium today was 119. As such, diuresis as per nephrology given the precarious nature of her hyponatremia at this time. No need for aggressive diuresis from our standpoint at this moment given that she is having improving symptoms, though this will need to be followed closely. I have spent more than 25 minutes face to face with Catherine Milton and reviewing notes and laboratory data, with greater than 50% of this time instructing and counseling the patient face to face regarding my findings and recommendations and I have answered all questions as posed to me by Ms. Jake Lake.     Liseth Hoover,  FACP,FACC,FSCAI      NOTE:  This report was transcribed using voice recognition software.   Every effort was made to ensure accuracy; however, inadvertent computerized transcription errors may be present

## 2022-07-19 NOTE — PROGRESS NOTES
Hospitalist Progress Note      PCP: Monica Martel DO    Date of Admission: 7/17/2022        Hospital Course:  80 y.o. female presented with GIB AND SOB. STATES HER STOOLS HAVE BEEN BLACK . SHE THOUGHT IT WAS FROM HER IRON PILLS, THEN SHE BECAME VERY SOB. SHE IS ON ELIQUIS FOR HISTORY OF PE.  FOUND TO HAVE A LOW HGB OF 5.2 , HAD TO RECEIVE A TRANSFUSION** WAS VERY SOB WITH RALES THIS AM, GIVEN LASIX X 1         Subjective: FEELING BETTER           Medications:  Reviewed    Infusion Medications    sodium chloride      sodium chloride       Scheduled Medications    sodium bicarbonate  650 mg Oral BID    amLODIPine  10 mg Oral Daily    [Held by provider] apixaban  5 mg Oral BID    [Held by provider] aspirin  81 mg Oral Daily    [Held by provider] ferrous sulfate  325 mg Oral Daily    [Held by provider] gabapentin  300 mg Oral QPM    [Held by provider] hydrALAZINE  25 mg Oral BID    polyvinyl alcohol  1 drop Both Eyes 4x Daily    [Held by provider] pravastatin  20 mg Oral Nightly    sodium chloride flush  5-40 mL IntraVENous 2 times per day    albuterol  2.5 mg Nebulization 4x daily    Arformoterol Tartrate  15 mcg Nebulization BID    And    budesonide  0.25 mg Nebulization BID     PRN Meds: sodium chloride flush, sodium chloride, ondansetron **OR** ondansetron, acetaminophen **OR** acetaminophen, hydrALAZINE, sodium chloride, perflutren lipid microspheres      Intake/Output Summary (Last 24 hours) at 7/19/2022 1449  Last data filed at 7/19/2022 1114  Gross per 24 hour   Intake 396.24 ml   Output 1840 ml   Net -1443.76 ml       Exam:    BP (!) 158/56   Pulse 78   Temp 97.4 °F (36.3 °C) (Oral)   Resp 22   Ht 5' 3\" (1.6 m)   Wt 135 lb (61.2 kg)   LMP 12/03/1997   SpO2 94%   BMI 23.91 kg/m²       General appearance:  No apparent distress,   HEENT:  Normal cephalic,   Neck: Supple, with full range of motion. No jugular venous distention. Trachea midline. Respiratory:  Normal respiratory effort.  FEW SCD  Diet: ADULT DIET; Regular;  Low Sodium (2 gm); 1000 ml  Code Status: Full Code    PT/OT Eval Status: ORDERED    Dispo - HOME VS CANDIDA      Electronically signed by Ray Cunningham DO on 7/19/2022 at 2:49 PM [facility]

## 2022-07-19 NOTE — PROGRESS NOTES
Occupational Therapy   OT orders received and chart reviewed. Nursing requesting therapy to hold assessment at this time. OT evaluation will completed at a later time when medically able to participate.    Jose Guadalupe Leblanc, OTR/L [id number]

## 2022-07-19 NOTE — CONSULTS
Department of Internal Medicine  Nephrology progress Note          CHIEF COMPLAINT:  One episode of Shortness of breath    History Obtained From:  patient, electronic medical record        Past Medical History:        Diagnosis Date    Arthritis     Asthma     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 ([address] 5/18/2021    Bronchitis 5/23/2017    Bruising tendency (HCC)     CAD (coronary artery disease)     Cough 5/23/2017    COVID     Dermatophytosis 6/25/2021    Diabetes mellitus (HCC)     GERD (gastroesophageal reflux disease) 5/23/2017    History of pulmonary embolus (PE) 5/29/2021    Hx of blood clots     Hyperlipidemia     Hypertension     Leg swelling 7/15/2021    Lung disease     Peripheral vascular angioplasty status 5/29/2021    Pseudoaneurysm of right femoral artery ([address] 5/29/2021    PVD (peripheral vascular disease) with claudication ([address] 4/10/2019    Restless legs syndrome     Rhinitis, allergic 5/23/2017    Sinusitis 5/23/2017    SOB (shortness of breath) 5/23/2017    Type 1 diabetes mellitus with left diabetic foot ulcer ([address] 5/18/2021    Ulcerated, foot, left, limited to breakdown of skin ([address] 6/25/2021    Urinary incontinence      Past Surgical History:        Procedure Laterality Date    ABDOMEN SURGERY      APPENDECTOMY      CARDIAC SURGERY      CHOLECYSTECTOMY      COLONOSCOPY      CORONARY ARTERY BYPASS GRAFT      8/28/10    ENDOSCOPY, COLON, DIAGNOSTIC      FOOT DEBRIDEMENT Left 5/16/2021    FOOT DEBRIDEMENT INCISION AND DRAINAGE.    performed by Liz Babb DPM at [facility] [address] Left 5/21/2021    LEFT FOOT DEBRIDEMENT  WITH DELAYED PRIMARY CLOSURE performed by Gladys Kohler DPM at . [facility] (CERVIX STATUS UNKNOWN)      frankie and bso 1997    TONSILLECTOMY AND ADENOIDECTOMY      UPPER GASTROINTESTINAL ENDOSCOPY N/A 7/18/2022    EGD ESOPHAGOGASTRODUODENOSCOPY performed by Tali King MD at [address] [place] [address] Medications:    Current Facility-Administered Medications: amLODIPine (NORVASC) tablet 10 mg, 10 mg, Oral, Daily  [Held by provider] apixaban (ELIQUIS) tablet 5 mg, 5 mg, Oral, BID  [Held by provider] aspirin EC tablet 81 mg, 81 mg, Oral, Daily  [Held by provider] ferrous sulfate (IRON 325) tablet 325 mg, 325 mg, Oral, Daily  [Held by provider] gabapentin (NEURONTIN) capsule 300 mg, 300 mg, Oral, QPM  [Held by provider] hydrALAZINE (APRESOLINE) tablet 25 mg, 25 mg, Oral, BID  polyvinyl alcohol (LIQUIFILM TEARS) 1.4 % ophthalmic solution 1 drop, 1 drop, Both Eyes, 4x Daily  [Held by provider] pravastatin (PRAVACHOL) tablet 20 mg, 20 mg, Oral, Nightly  sodium chloride flush 0.9 % injection 5-40 mL, 5-40 mL, IntraVENous, 2 times per day  sodium chloride flush 0.9 % injection 5-40 mL, 5-40 mL, IntraVENous, PRN  0.9 % sodium chloride infusion, , IntraVENous, PRN  ondansetron (ZOFRAN-ODT) disintegrating tablet 4 mg, 4 mg, Oral, Q8H PRN **OR** ondansetron (ZOFRAN) injection 4 mg, 4 mg, IntraVENous, Q6H PRN  acetaminophen (TYLENOL) tablet 650 mg, 650 mg, Oral, Q6H PRN **OR** acetaminophen (TYLENOL) suppository 650 mg, 650 mg, Rectal, Q6H PRN  hydrALAZINE (APRESOLINE) injection 10 mg, 10 mg, IntraVENous, Q6H PRN  albuterol (PROVENTIL) nebulizer solution 2.5 mg, 2.5 mg, Nebulization, 4x daily  Arformoterol Tartrate (BROVANA) nebulizer solution 15 mcg, 15 mcg, Nebulization, BID **AND** budesonide (PULMICORT) nebulizer suspension 250 mcg, 0.25 mg, Nebulization, BID  0.9 % sodium chloride infusion, , IntraVENous, PRN  perflutren lipid microspheres (DEFINITY) injection 1.65 mg, 1.5 mL, IntraVENous, ONCE PRN  Allergies:  Lisinopril    Social History:    TOBACCO:   reports that she has never smoked. She has never used smokeless tobacco.  ETOH:   reports current alcohol use.     Family History:       Problem Relation Age of Onset    Hypertension Father     Heart Disease Brother      REVIEW OF SYSTEMS:    CONSTITUTIONAL:  negative for fevers and chills  EYES:  negative for  double vision and blurred vision  HEENT:  negative for  epistaxis  RESPIRATORY:  positive for  dyspnea  CARDIOVASCULAR:  positive for  dyspnea, edema  GASTROINTESTINAL:  negative for nausea, vomiting, diarrhea, and abdominal pain  GENITOURINARY:  negative  INTEGUMENT/BREAST:  negative  HEMATOLOGIC/LYMPHATIC:  negative  ALLERGIC/IMMUNOLOGIC:  negative  ENDOCRINE:  negative  MUSCULOSKELETAL:  negative for  decreased range of motion and muscle weakness  NEUROLOGICAL:  negative for headaches and dizziness  BEHAVIOR/PSYCH:  positive for poor appetite    PHYSICAL EXAM:      Vitals:    VITALS:  BP (!) 158/56   Pulse 78   Temp 97.4 °F (36.3 °C) (Oral)   Resp 22   Ht 5' 3\" (1.6 m)   Wt 135 lb (61.2 kg)   LMP 12/03/1997   SpO2 94%   BMI 23.91 kg/m²   24HR INTAKE/OUTPUT:    Intake/Output Summary (Last 24 hours) at 7/19/2022 1332  Last data filed at 7/19/2022 1114  Gross per 24 hour   Intake 396.24 ml   Output 2240 ml   Net -1843.76 ml         Constitutional:  Alert and oriented, NAD  HEENT:  Normocephalic, PERRL, dry mucous membranes  Respiratory:  Diminished lung sounds  Cardiovascular/Edema:  RRR, S1,S2  Gastrointestinal:  Soft, rounded, nontender, nondistended  Neurologic:  Nonfocal, SHARMA  Skin:  Warm, dry, ecchymotic  Other:  + edema BLE and sacral edema     DATA:    CBC:   Lab Results   Component Value Date/Time    WBC 9.1 07/17/2022 09:32 PM    RBC 1.45 07/17/2022 09:32 PM    HGB 8.9 07/19/2022 11:12 AM    HCT 26.1 07/19/2022 11:12 AM    .3 07/17/2022 09:32 PM    MCH 35.9 07/17/2022 09:32 PM    MCHC 33.1 07/17/2022 09:32 PM    RDW 16.7 07/17/2022 09:32 PM     07/17/2022 09:32 PM    MPV 9.7 07/17/2022 09:32 PM     CMP:    Lab Results   Component Value Date/Time     07/19/2022 11:12 AM    K 3.3 07/19/2022 11:12 AM    CL 90 07/19/2022 11:12 AM    CO2 21 07/19/2022 11:12 AM    BUN 13 07/19/2022 11:12 AM    CREATININE 0.6 07/19/2022 11:12 AM    GFRAA >60 07/19/2022 11:12 AM    LABGLOM >60 07/19/2022 11:12 AM    GLUCOSE 194 07/19/2022 11:12 AM    PROT 5.7 07/17/2022 09:32 PM    LABALBU 3.7 07/17/2022 09:32 PM    CALCIUM 8.2 07/19/2022 11:12 AM    BILITOT 0.3 07/17/2022 09:32 PM    ALKPHOS 53 07/17/2022 09:32 PM    AST 26 07/17/2022 09:32 PM    ALT 30 07/17/2022 09:32 PM     Magnesium:    Lab Results   Component Value Date/Time    MG 1.8 07/19/2022 11:12 AM     Phosphorus:  No results found for: PHOS  Radiology Review:      CXR 7/17/22   Cardiomegaly with pulmonary edema. IMPRESSION/RECOMMENDATIONS:      Briefly, Ms. Ishmael Gaitan is a 80year old female with a PMH of HTN, type II DM, asthma, CAD s/p CABG, history of PE, HLD, HFpEF 68%, PVD, who was admitted on July 17, 2022 after presenting to the ER with shortness of breath. A chest x-ray was obtained which revealed cardiomegaly with pulmonary edema. Upon arrival to ER, she was found to be hyponatremic with a sodium level of 118, which is the reason for this consultation. She was also found to be anemic with a hemoglobin of 5.2. She received PRBCs in ER followed by Bumex 1 mg IV. Her pro-BNP on admission was 3,508. She states she eats 3 small meals a day but drinks about 6 bottles of water a day. She denies any vomiting or diarrhea. Hypotonic hyponatremia, likely hemodynamically mediated secondary to intravascular volume depletion (anemia, diuretics) vs HF. Urine sodium <20 and urine osmolality 452. We will start on IV fluids due to suspected intravascular volume depletion. Probable CKD, UPCR 2.2. Will need repeated in 3 months. HTN, BP meds on hold  HFpEF 68%, pro-BNP 3,508>>5,044, to hold diuretics for now  Non anion gap metabolic acidosis. --------------------------------------------  CAD, s/p CABG  History of PE, apixaban on hold  Macrocytic anemia, s/p multiple transfusions. For EGD. Plan:    Replace K, Mg as needed.   Monitor H&H  Monitor sodium level closely, BMP every 4 hours  Start oral

## 2022-07-19 NOTE — PROGRESS NOTES
Notified NP on call of CMR notification patient converted from SR to a fib. VS obtained. EKG completed. Patient asymptomatic, denies any chest pain or SOB. Will continue to monitor.

## 2022-07-19 NOTE — PROGRESS NOTES
Answering service for cardiology called to notify of rhythm conversion from SR to a fib. Pending call back. Patient remains asymptomatic. 2010:  Received call back from cardiologist, updated on patient conditions and VS. Will continue to monitor and notify MD if HR sustains rate >135. Patient updated.

## 2022-07-19 NOTE — CARE COORDINATION
Social work / Discharge planning:       Awaiting PT/OT evaluations to determine if patient can return to [facility] LOC. She  resides at [facility]. Patient is currently using oxygen which she does not have at [facility].      Electronically signed by GLORIA Cabrera on 7/19/2022 at 10:22 AM

## 2022-07-19 NOTE — PROGRESS NOTES
Physical Therapy  PT eval attempted, but held by RN due to respiratory status. Will re-attempt at later date.

## 2022-07-19 NOTE — PLAN OF CARE
Patient's chart updated to reflect:      . - HF care plan, HF education points and HF discharge instructions.  -Orders: 2 gram sodium diet, daily weights, I/O.  -PCP and/or Cardiologist appointment to be scheduled within 7 days of hospital discharge.  -History of HF, not primary admission Dx. Patient admitted for treatment of Anemia.      Verna Ramirez RN BSN  Heart Failure Navigator

## 2022-07-19 NOTE — PLAN OF CARE
Problem: Discharge Planning  Goal: Discharge to home or other facility with appropriate resources  Outcome: Progressing Towards Goal  Flowsheets (Taken 7/18/2022 2015)  Discharge to home or other facility with appropriate resources: Identify barriers to discharge with patient and caregiver     Problem: Pain  Goal: Verbalizes/displays adequate comfort level or baseline comfort level  Outcome: Progressing Towards Goal  Flowsheets (Taken 7/18/2022 2015)  Verbalizes/displays adequate comfort level or baseline comfort level: Encourage patient to monitor pain and request assistance     Problem: Safety - Adult  Goal: Free from fall injury  Outcome: Progressing Towards Goal  Flowsheets (Taken 7/19/2022 0216)  Free From Fall Injury: Instruct family/caregiver on patient safety     Problem: ABCDS Injury Assessment  Goal: Absence of physical injury  Outcome: Progressing Towards Goal     Problem: Skin/Tissue Integrity  Goal: Absence of new skin breakdown  Description: 1. Monitor for areas of redness and/or skin breakdown  2. Assess vascular access sites hourly  3. Every 4-6 hours minimum:  Change oxygen saturation probe site  4. Every 4-6 hours:  If on nasal continuous positive airway pressure, respiratory therapy assess nares and determine need for appliance change or resting period.   Outcome: Progressing Towards Goal     Problem: Chronic Conditions and Co-morbidities  Goal: Patient's chronic conditions and co-morbidity symptoms are monitored and maintained or improved  Outcome: Progressing Towards Goal  Flowsheets (Taken 7/18/2022 2015)  Care Plan - Patient's Chronic Conditions and Co-Morbidity Symptoms are Monitored and Maintained or Improved: Monitor and assess patient's chronic conditions and comorbid symptoms for stability, deterioration, or improvement

## 2022-07-19 NOTE — PROGRESS NOTES
GENERAL SURGERY  DAILY PROGRESS NOTE  7/19/2022  Chief Complaint   Patient presents with    Shortness of Breath     States going on for a week, denies any pain       Subjective:  She has no pain. Resting comfortably in bed. Patient is passing gas and having dark bowel movements. Hgb stable 8.3 from 8.2 yesterday. Objective:  BP (!) 166/66   Pulse 68   Temp 97.1 °F (36.2 °C) (Infrared)   Resp 21   Ht 5' 3\" (1.6 m)   Wt 135 lb (61.2 kg)   LMP 12/03/1997   SpO2 96%   BMI 23.91 kg/m²     General appearance: alert, cooperative and in no acute distress. Eyes: grossly normal  Lungs: nonlabored breathing on 2 L O2 nasal cannula  Heart: regular rate  Abdomen: soft, non-tender, non distended  Skin: No skin abnormalities  Neurologic: Alert and oriented x 3. Grossly normal  Musculoskeletal: No clubbing cyanosis or edema    Assessment/Plan:  80 y.o. female with anemia. EGD 7/18 unremarkable for source of bleeding. Aurea Chimes for diet as tolerated  Trend H/H and transfuse as needed  No plans for further endoscopy at this time.      Electronically signed by Lenard Oleary MD on 7/19/2022 at 7:37 AM

## 2022-07-19 NOTE — DISCHARGE INSTRUCTIONS
HEART FAILURE  / CONGESTIVE HEART FAILURE  DISCHARGE INSTRUCTIONS:  GUIDELINES TO FOLLOW AT HOME    Future Appointments   Date Time Provider Ramona Bell   3/2/2023  2:30 PM Jayda Parkinson MD [facility]       Self- Managed Care:     MEDICATIONS:  Take your medication as directed. If you are experiencing any side effects, inform your doctor, Do not stop taking any of your medications without letting your doctor know. Check with your doctor before taking any over-the-counter medications / herbal / or dietary supplements. They may interfere with your other medications. Do not take ibuprofen (Advil or Motrin) and naproxen (Aleve) without talking to your doctor first. They could make your heart failure worse. WEIGHT MONITORING:   Weigh yourself everyday (with the same scale) around the same time of the day and write it down. (you can chart them on a calendar or keep track of them on paper. Notify your doctor of a weight gain of 3 pounds or more in 1 day   OR a total of 5 pounds or more in 1 week    Take your weight record to your doctor visits  Also, the same goes if you loose more than 3# in one day, let your heart doctor know. DIET:   Cardiac heart healthy diet- Low saturated / low trans fat, no added salt, caffeine restricted, Low sodium diet-   No more than 2,000mg (2 grams) of salt / sodium per day (which equals to a little less than  a teaspoon of salt)  If your doctor wants you on a fluid restriction. ..it is usually recommended a fluid limit of 2,000cc -  Fluid restriction- 2,000 ml (milliliters) = 64 ounces = you can have 8 glasses of fluid per day (each glass 8 ounces)    Follow a low salt diet - avoid using salt at the table, avoid / limit use of canned soups, processed / packaged foods, salted snacks, olives and pickles.   Do not use a salt substitute without checking with your doctor, they may contain a high amount of potassioum. (Mrs. Dulce Maria Arenas is safe to use).    Limit the use of alcohol       CALL YOUR DOCTOR THE FIRST DAY YOU NOTICE ANY OF THESE   SYMPTOMS:  You have a weight gain of 3 pounds or more in 1 day         OR 5 pounds or more in one week  More shortness of breath  More swelling of your stomach, legs, ankles or feet  Feeling more tired, No energy  Dry hacky cough  Dizziness  More chest pain / discomfort       (CALL 911 IF ANY OF THE FOLLOWING OCCURS  Chest pain (not relieved with nitroglycerine, if you have been prescribed this medication)  Severe shortness of breath  Faint / Pass out  Confusion / cannot think clearly  If symptoms get worse           SMOKING - TOBACCO USE:  * IF YOU SMOKE - STOP! Kick the habit. [address] Program is offered at [facility] and [address] [facility]. Call [phone] extension [phone] for more information. ACTIVITY:   (Ask your doctor when you will be able to return to work and before starting any exercise program.  Do not drive unless unless your doctor has given you permission to do so). Start light exercise. Even if you can only do a small amount, exercise will help you get stronger, have more energy, help manage your weight and decrease  stress. Walking is an easy way to get exercise. Start out slowly and  increase the amount you walk as tolerated  If you become short of breath, dizzy or have chest pain; stop, sit down, and rest.  If you feel \"wiped out\" the day after you exercise, walk at a slower pace or for a shorter distance. You can gradually increase the pace or amount of time. (Do not exercise right after a meal or in extreme temperatures, such as above 85 degrees, if the air is really humid, or wind chill is less than 20 degrees)                                             ADDITIONAL INFORMATION:  Avoid getting sick from colds and the flu.  Stay away from friends or family that you know may have a contagious illness  Get plenty of rest   Get a flu shot each year. Get a pneumococcal vaccine shot. If you have had one before, ask your doctor whether you need another dose. My Goal for Self-management of Heart Failure Includes 5 steps :    1. Notice a change in symptoms ( weight gain, short of breath, leg swelling, decreased activity level, bloating. ...)    2. Evaluate the change: (use the Heart Failure Zones )     3. Decide to take action: decide what your options are, such as: (call your doctor for an extra visit, take a prescribed medication, such as your water pill if your doctor has given you directions to do so, [address])    4. Come up with a strategy:  (now you call the doctor for advice / appointment. This is where you take action!!! Do not wait, catch the symptom early and treat it before it worsens. 5. Evaluate the response: The next day, check your Heart Failure Zones: are you in the GREEN ZONE (safe zone)? Worsening symptoms of YELLOW ZONE? Or have you moved to the RED ZONE and need to call 911 or go to the Emergency Room for evaluation? Call your doctor's office to update them on your symptoms of heart failure. Learning About Heart Failure Zones  What are heart failure zones? Heart failure zones give you an easy way to see changes in your heart failure symptoms. They also tell you when you need to get help. Check every day to see which zone you are in. Green zone. You are doing well. This is where you want to be. Your weight is stable. It's not going up or down. You breathe easily. You are sleeping well. You are able to lie flat without shortness of breath. You can do your usual activities. Yellow zone. Be careful. Your symptoms are changing. Call your doctor. You have new or increased shortness of breath. You are dizzy or lightheaded, or you feel like you may faint. You have sudden weight gain, such as more than 2 to 3 pounds in a day or 5 pounds in a week.  (Your doctor may suggest a different range of weight gain.)  You have increased swelling in your legs, ankles, or feet. You are so tired or weak that you can't do your usual activities. You are not sleeping well. Shortness of breath wakes you up at night. You need extra pillows. Red zone. 911  This is an emergency. Call . You have symptoms of sudden heart failure. For example: You have severe trouble breathing. You cough up pink, foamy mucus. You have a new irregular or fast heartbeat. You have symptoms of a heart attack. These may include:  Chest pain or pressure, or a strange feeling in the chest.  Sweating. Shortness of breath. Nausea or vomiting. Pain, pressure, or a strange feeling in the back, neck, jaw, or upper belly or in one or both shoulders or arms. Lightheadedness or sudden weakness. A fast or irregular heartbeat. If you have symptoms of a heart attack 911 : After you call , the  may tell you to chew 1 adult-strength or 2 to 4 low-dose aspirin. Wait for an ambulance. Do not try to drive yourself. Follow-up care is a key part of your treatment and safety. Be sure to make and go to all appointments, and call your doctor if you are having problems. It's also a good idea to know your test results and keep a list of the medicines you take. Where can you learn more? Go to [email or web address]. org and sign in to your [employer] account. Enter T174 in the [facility] box to learn more about \"Learning About Heart Failure Zones. \"     If you do not have an account, please click on the \"Sign Up Now\" link. Current as of: August 31, 2020               Content Version: 12.9  © 5409-4997 Healthwise, [employer]. Care instructions adapted under license by [facility] ([facility]).  If you have questions about a medical condition or this instruction, always ask your healthcare professional. Yo Olguin any warranty or liability for your use of this information.

## 2022-07-19 NOTE — PROGRESS NOTES
Spoke with Dr Soto Files re: pt having increased work of breathing. Patient gasping for air with SpO2 in the high 90s (96-99) after receiving breathing treatment. Crackles to posterior lungs throughout. Patient repositioned without relief. NRB placed on patient per Dr Tanner Covert recommendation. See orders for interventions.     Dr Selina Espino also notified of lasix order, change in status as well as BMP results from this AM.

## 2022-07-20 LAB
ANION GAP SERPL CALCULATED.3IONS-SCNC: 10 MMOL/L (ref 7–16)
ANION GAP SERPL CALCULATED.3IONS-SCNC: 11 MMOL/L (ref 7–16)
ANION GAP SERPL CALCULATED.3IONS-SCNC: 13 MMOL/L (ref 7–16)
ANION GAP SERPL CALCULATED.3IONS-SCNC: 7 MMOL/L (ref 7–16)
ANION GAP SERPL CALCULATED.3IONS-SCNC: 7 MMOL/L (ref 7–16)
BUN BLDV-MCNC: 15 MG/DL (ref 6–23)
BUN BLDV-MCNC: 15 MG/DL (ref 6–23)
BUN BLDV-MCNC: 16 MG/DL (ref 6–23)
CALCIUM SERPL-MCNC: 7.9 MG/DL (ref 8.6–10.2)
CALCIUM SERPL-MCNC: 8.1 MG/DL (ref 8.6–10.2)
CALCIUM SERPL-MCNC: 8.4 MG/DL (ref 8.6–10.2)
CALCIUM SERPL-MCNC: 8.5 MG/DL (ref 8.6–10.2)
CALCIUM SERPL-MCNC: 8.5 MG/DL (ref 8.6–10.2)
CHLORIDE BLD-SCNC: 91 MMOL/L (ref 98–107)
CHLORIDE BLD-SCNC: 92 MMOL/L (ref 98–107)
CHLORIDE BLD-SCNC: 93 MMOL/L (ref 98–107)
CHLORIDE BLD-SCNC: 93 MMOL/L (ref 98–107)
CHLORIDE BLD-SCNC: 97 MMOL/L (ref 98–107)
CO2: 20 MMOL/L (ref 22–29)
CO2: 20 MMOL/L (ref 22–29)
CO2: 21 MMOL/L (ref 22–29)
CO2: 22 MMOL/L (ref 22–29)
CO2: 22 MMOL/L (ref 22–29)
CREAT SERPL-MCNC: 0.5 MG/DL (ref 0.5–1)
CREAT SERPL-MCNC: 0.6 MG/DL (ref 0.5–1)
CREAT SERPL-MCNC: 0.6 MG/DL (ref 0.5–1)
GFR AFRICAN AMERICAN: >60
GFR NON-AFRICAN AMERICAN: >60 ML/MIN/1.73
GLUCOSE BLD-MCNC: 147 MG/DL (ref 74–99)
GLUCOSE BLD-MCNC: 184 MG/DL (ref 74–99)
GLUCOSE BLD-MCNC: 256 MG/DL (ref 74–99)
GLUCOSE BLD-MCNC: 268 MG/DL (ref 74–99)
GLUCOSE BLD-MCNC: 293 MG/DL (ref 74–99)
HCT VFR BLD CALC: 23.6 % (ref 34–48)
HCT VFR BLD CALC: 25.3 % (ref 34–48)
HCT VFR BLD CALC: 25.8 % (ref 34–48)
HEMOGLOBIN: 7.9 G/DL (ref 11.5–15.5)
HEMOGLOBIN: 8.7 G/DL (ref 11.5–15.5)
HEMOGLOBIN: 8.9 G/DL (ref 11.5–15.5)
MAGNESIUM: 1.8 MG/DL (ref 1.6–2.6)
POTASSIUM REFLEX MAGNESIUM: 3.5 MMOL/L (ref 3.5–5)
POTASSIUM REFLEX MAGNESIUM: 3.9 MMOL/L (ref 3.5–5)
POTASSIUM SERPL-SCNC: 4.2 MMOL/L (ref 3.5–5)
POTASSIUM SERPL-SCNC: 4.2 MMOL/L (ref 3.5–5)
POTASSIUM SERPL-SCNC: 5.1 MMOL/L (ref 3.5–5)
SODIUM BLD-SCNC: 120 MMOL/L (ref 132–146)
SODIUM BLD-SCNC: 120 MMOL/L (ref 132–146)
SODIUM BLD-SCNC: 126 MMOL/L (ref 132–146)
SODIUM BLD-SCNC: 126 MMOL/L (ref 132–146)
SODIUM BLD-SCNC: 127 MMOL/L (ref 132–146)

## 2022-07-20 PROCEDURE — 97161 PT EVAL LOW COMPLEX 20 MIN: CPT

## 2022-07-20 PROCEDURE — 36415 COLL VENOUS BLD VENIPUNCTURE: CPT

## 2022-07-20 PROCEDURE — 97165 OT EVAL LOW COMPLEX 30 MIN: CPT

## 2022-07-20 PROCEDURE — 85018 HEMOGLOBIN: CPT

## 2022-07-20 PROCEDURE — 85014 HEMATOCRIT: CPT

## 2022-07-20 PROCEDURE — 97530 THERAPEUTIC ACTIVITIES: CPT

## 2022-07-20 PROCEDURE — 6370000000 HC RX 637 (ALT 250 FOR IP): Performed by: INTERNAL MEDICINE

## 2022-07-20 PROCEDURE — 2580000003 HC RX 258: Performed by: SURGERY

## 2022-07-20 PROCEDURE — 6360000002 HC RX W HCPCS: Performed by: INTERNAL MEDICINE

## 2022-07-20 PROCEDURE — 80048 BASIC METABOLIC PNL TOTAL CA: CPT

## 2022-07-20 PROCEDURE — 2700000000 HC OXYGEN THERAPY PER DAY

## 2022-07-20 PROCEDURE — 6360000002 HC RX W HCPCS: Performed by: SURGERY

## 2022-07-20 PROCEDURE — 6370000000 HC RX 637 (ALT 250 FOR IP): Performed by: NURSE PRACTITIONER

## 2022-07-20 PROCEDURE — 94640 AIRWAY INHALATION TREATMENT: CPT

## 2022-07-20 PROCEDURE — 83735 ASSAY OF MAGNESIUM: CPT

## 2022-07-20 PROCEDURE — 2060000000 HC ICU INTERMEDIATE R&B

## 2022-07-20 RX ORDER — MAGNESIUM SULFATE 1 G/100ML
1000 INJECTION INTRAVENOUS ONCE
Status: COMPLETED | OUTPATIENT
Start: 2022-07-20 | End: 2022-07-20

## 2022-07-20 RX ORDER — LORAZEPAM 0.5 MG/1
0.5 TABLET ORAL EVERY 8 HOURS PRN
Status: DISCONTINUED | OUTPATIENT
Start: 2022-07-20 | End: 2022-07-20

## 2022-07-20 RX ORDER — POTASSIUM CHLORIDE 20 MEQ/1
40 TABLET, EXTENDED RELEASE ORAL 2 TIMES DAILY WITH MEALS
Status: COMPLETED | OUTPATIENT
Start: 2022-07-20 | End: 2022-07-20

## 2022-07-20 RX ORDER — SODIUM CHLORIDE 1000 MG
1 TABLET, SOLUBLE MISCELLANEOUS
Status: DISCONTINUED | OUTPATIENT
Start: 2022-07-20 | End: 2022-07-21

## 2022-07-20 RX ADMIN — ALBUTEROL SULFATE 2.5 MG: 2.5 SOLUTION RESPIRATORY (INHALATION) at 12:21

## 2022-07-20 RX ADMIN — Medication 10 ML: at 07:42

## 2022-07-20 RX ADMIN — SODIUM BICARBONATE 650 MG: 650 TABLET ORAL at 07:41

## 2022-07-20 RX ADMIN — POTASSIUM CHLORIDE 40 MEQ: 20 TABLET, EXTENDED RELEASE ORAL at 17:26

## 2022-07-20 RX ADMIN — ALBUTEROL SULFATE 2.5 MG: 2.5 SOLUTION RESPIRATORY (INHALATION) at 09:10

## 2022-07-20 RX ADMIN — MAGNESIUM SULFATE HEPTAHYDRATE 1000 MG: 10 INJECTION, SOLUTION INTRAVENOUS at 10:34

## 2022-07-20 RX ADMIN — Medication 1 G: at 14:25

## 2022-07-20 RX ADMIN — ALBUTEROL SULFATE 2.5 MG: 2.5 SOLUTION RESPIRATORY (INHALATION) at 15:57

## 2022-07-20 RX ADMIN — Medication 1 G: at 17:26

## 2022-07-20 RX ADMIN — Medication 10 ML: at 20:59

## 2022-07-20 RX ADMIN — POLYVINYL ALCOHOL 1 DROP: 14 SOLUTION/ DROPS OPHTHALMIC at 20:59

## 2022-07-20 RX ADMIN — LORAZEPAM 0.5 MG: 0.5 TABLET ORAL at 17:26

## 2022-07-20 RX ADMIN — POLYVINYL ALCOHOL 1 DROP: 14 SOLUTION/ DROPS OPHTHALMIC at 13:02

## 2022-07-20 RX ADMIN — AMLODIPINE BESYLATE 10 MG: 10 TABLET ORAL at 07:41

## 2022-07-20 RX ADMIN — SODIUM BICARBONATE 650 MG: 650 TABLET ORAL at 20:59

## 2022-07-20 RX ADMIN — POLYVINYL ALCOHOL 1 DROP: 14 SOLUTION/ DROPS OPHTHALMIC at 16:39

## 2022-07-20 RX ADMIN — BUDESONIDE 250 MCG: 0.25 SUSPENSION RESPIRATORY (INHALATION) at 09:10

## 2022-07-20 RX ADMIN — POTASSIUM CHLORIDE 40 MEQ: 20 TABLET, EXTENDED RELEASE ORAL at 10:40

## 2022-07-20 RX ADMIN — POLYVINYL ALCOHOL 1 DROP: 14 SOLUTION/ DROPS OPHTHALMIC at 07:41

## 2022-07-20 RX ADMIN — ARFORMOTEROL TARTRATE 15 MCG: 15 SOLUTION RESPIRATORY (INHALATION) at 09:10

## 2022-07-20 ASSESSMENT — PAIN SCALES - GENERAL
PAINLEVEL_OUTOF10: 0

## 2022-07-20 NOTE — PROGRESS NOTES
Physical Therapy  Facility/Department: [facility]  Physical Therapy Initial Assessment    Name: Lizeth Pena  : 1927  MRN: [id number]  Date of Service: 2022       Patient Diagnosis(es): The encounter diagnosis was Gastrointestinal hemorrhage, unspecified gastrointestinal hemorrhage type. 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 ([address] Bronchitis, Bruising tendency ([address] CAD (coronary artery disease), Cough, COVID, Dermatophytosis, Diabetes mellitus ([address] GERD (gastroesophageal reflux disease), History of pulmonary embolus (PE), Hx of blood clots, Hyperlipidemia, Hypertension, Leg swelling, Lung disease, Peripheral vascular angioplasty status, Pseudoaneurysm of right femoral artery ([address] PVD (peripheral vascular disease) with claudication ([address] Restless legs syndrome, Rhinitis, allergic, Sinusitis, SOB (shortness of breath), Type 1 diabetes mellitus with left diabetic foot ulcer ([address] Ulcerated, foot, left, limited to breakdown of skin ([address] and Urinary incontinence. Past Surgical History:  has a past surgical history that includes Hysterectomy; Cholecystectomy; Tonsillectomy and adenoidectomy; Coronary artery bypass graft; Abdomen surgery; Appendectomy; Colonoscopy; Endoscopy, colon, diagnostic; Cardiac surgery; Foot Debridement (Left, 2021); Foot Debridement (Left, 2021); and Upper gastrointestinal endoscopy (N/A, 2022). Referring provider:  Mervin Lewis DO    PT Order:  PT eval and treat     Evaluating PT:  Emily Mishra PT, DPT PT [id number]    Room #:  8634/2199-B  Diagnosis:  Anemia [D64.9]  Gastrointestinal hemorrhage, unspecified gastrointestinal hemorrhage type [K92.2]  Precautions:  fall risk, O2  Equipment Needs:  none. SUBJECTIVE:    [place] at [place]. Pt reported she was using a rollator for ambulation.       OBJECTIVE:   Initial Evaluation  Date: 7/20 Treatment Short Term/ Long Term   Goals   Was pt agreeable to Eval/treatment? yes     Does pt have pain? Back pain     Bed Mobility  Rolling: NT  Supine to sit: mod A  Sit to supine: NT  Scooting: Mod A to sitting EOB  SBA   Transfers Sit to stand: Mod A  Stand to sit: Mod A  Stand pivot: Mod A with w/w  SBA   Ambulation    25 feet with w/w Mod/Min A    50 feet with w/w SBA    Stair negotiation: ascended and descended  NT      ROM BLE:  WFL     Strength BLE:  grossly 4-/5  Grossly 4/5   Balance Sitting EOB:  CGA  Dynamic Standing:  Mod/Min A  Sitting EOB:  supervision  Dynamic Standing:  SBA with w/w   AM-PAC 6 Clicks 71/34       Orientation:  grossly WFL. Pt hard of hearing. Sensation:  Pt denies numbness and tingling to extremities    Patient education  Pt educated on PT objectives during eval and while in the hospital, hand placement during transfers, walker usage and safety, deep breathing. Patient response to education:   Pt verbalized understanding Pt demonstrated skill Pt requires further education in this area   yes With cueing yes     ASSESSMENT:    Conditions Requiring Skilled Therapeutic Intervention:    [x]Decreased strength     []Decreased ROM  [x]Decreased functional mobility  [x]Decreased balance   [x]Decreased endurance   [x]Decreased posture  []Decreased sensation  []Decreased coordination   []Decreased vision  []Decreased safety awareness   [x]Increased pain       Comments:  Pt found in bed. No report of dizziness during functional mobility. Cueing required for hand placement during transfers. Pt reported feeling SOB during mobility. Lowest SPO2 was 93% on 3L. Pt with unsteady gait during ambulation. Cueing required for walker usage and safety. At end of eval, pt left sitting up in the chair with call light in reach and chair alarm on.       Treatment:  Patient practiced and was instructed in the following treatment:    Functional mobility performed as

## 2022-07-20 NOTE — PROGRESS NOTES
Occupational Therapy    OCCUPATIONAL THERAPY INITIAL EVALUATION    [facility], [place]         Date:2022                                                  Patient Name: Rosendo Lucero    MRN: [id number]    : 1927    Room: [address] [place] [address]      Evaluating OT: Evans Bloch OTR/L   [id number]      Referring Rosemary Cuevas MD    Specific Provider Orders/Date:OT eval and treat 2022      Diagnosis:  Anemia [D64.9]  Gastrointestinal hemorrhage, unspecified gastrointestinal hemorrhage type [K92.2]     Pertinent Medical History: asthma, COVID, PVD, l foot ulcer, urinary incontinence      Precautions:  Fall Risk, O2, alarm     Assessment of current deficits    [x] Functional mobility  [x]ADLs  [x] Strength               []Cognition    [x] Functional transfers   [x] IADLs         [x] Safety Awareness   [x]Endurance    [] Fine Coordination              [x] Balance      [] Vision/perception   []Sensation     []Gross Motor Coordination  [] ROM  [] Delirium                   [] Motor Control     OT PLAN OF CARE   OT POC based on physician orders, patient diagnosis and results of clinical assessment    Frequency/Duration  2-4 days/wk for 2 weeks PRN   Specific OT Treatment Interventions to include:   ADL retraining/adapted techniques and AE recommendations to increase functional independence within precautions                    Energy conservation techniques to improve tolerance for selfcare routine   Functional transfer/mobility training/DME recommendations for increased independence, safety and fall prevention         Patient/family education to increase safety and functional independence             Environmental modifications for safe mobility and completion of ADLs                             Therapeutic activity to improve functional performance during ADLs.                                          Therapeutic exercise to improve tolerance and functional strength for ADLs    Balance retraining/tolerance tasks for facilitation of postural control with dynamic challenges during ADLs . Positioning to improve functional independence      Recommended Adaptive Equipment: TBD     Home Living: Pt lives at [address],   assist as needed with ADLs,   rollator for mobility. Equipment owned: rollator, reacher     Pain Level: no pain ; feeling nauseous   Cognition: A&O  pleasant, following commands, conversing    Memory:  fair    Sequencing:  fair   Problem solving:  fair    Judgement/safety:  fair      Functional Assessment:  AM-PAC Daily Activity Raw Score: 14/24   Initial Eval Status  Date: 7/20/22 Treatment Status  Date: STGs = LTGs  Time frame: 10-14 days   Feeding Set-up   Independent    Grooming Min A,seated   Decrease standing balance/tolerance   Supervision    UB Dressing Min A  Supervision    LB Dressing MaxA   Min A   Bathing Mod A   Min A   Toileting Mod A   SBA    Bed Mobility  Mod A  Supine to sit  SBA    Functional Transfers Mod A  Sit -stand from bed , chair   CGA/SBA   Functional Mobility Mod/Min A,w/walker,O2  Ambulating in room   Decrease endurance   O2 sats >93%   CGA/SBA  with good tolerance    Balance Sitting:     Static:  CGA/SBA - EOB     Dynamic:Mod A   Standing: Mod/Aydee   Supervision- sitting   CGA/SBA - standing    Activity Tolerance Nauseas limiting tolerance   Overall decrease endurance   No SOB   Good  with ADL activity    Visual/  Perceptual Glasses: reading   Limited vision         UE ROM/strength  UE ROM throughout   Weakness , decrease endurance   Tolerate UE therapeutic activity/exercise to increase ROM/strength for ADL activity      Hand Dominance right    Hearing: [place]  Sensation:  No c/o numbness or tingling   Tone: WFL   Edema: none observed     Comments: Upon arrival patient lying in bed . At end of session, patient sitting in chair  with call light and phone within reach, all lines and tubes intact.   Mei Craft Overall patient demonstrated  decreased independence and safety during completion of ADL/functional transfer/mobility tasks. Pt would benefit from continued skilled OT to increase safety and independence with completion of ADL/IADL tasks for functional independence and quality of life. Comments/Treatment:        Patient practiced and was instructed on following during evaluation and OT session:          -Bed mobility - technique and safety         -Tranfers - hand placement, tech and safety         -Mobility - safety, and tech with  a device         -ADLs - tech, AE if appropriate/needed  - continue to educate and instruct         -Education: , importance of OOB activity and participating in ADLs, safety awareness             Rehab Potential: good for established goals     Patient / Family Goal: none stated       Patient and/or family were instructed on functional diagnosis, prognosis/goals and OT plan of care. Demonstrated fair + understanding. Eval Complexity: Low    Time In: 1145  Time Out: 1218  Total Treatment Time: 14    Min Units   OT Eval Low 97165 x  1   OT Eval Medium 19645      OT Eval High 30788      OT Re-Eval [id number]       Therapeutic Ex 16253      Therapeutic Activities 92764  14  1   ADL/Self Care 32497       Orthotic Management 31885       Manual [id number]     Neuro Re-Ed 33757       Non-Billable Time          Evaluation Time additionally includes thorough review of current medical information, gathering information on past medical history/social history and prior level of function, interpretation of standardized testing/informal observation of tasks, assessment of data and development of plan of care and goals.             Justin Garrett  OTR/L  OT [id number]

## 2022-07-20 NOTE — CARDIO/PULMONARY
Pulse ox was ______90% on room air at rest.  Ambulated patient on room air. Oxygen saturation was __95____% on room air while ambulating.

## 2022-07-20 NOTE — PROGRESS NOTES
Patients daughter came to this RN in [facility] stating \"I think mom is having a reaction to that medication you gave her\". Immedicately went into room, patient alert and oriented, able to say where she is, what month, what year, her name,  her daughters name. Skin is warm and dry, pale. Respirations much more relaxed and easy at 18 per minute. Remains SR on monitor. Repositioned in the bed. Daughter states \"I don't think she should have that medication any more. Patient looking around. Aroused easily. Denies pain, shortness of breath. Comfort measures offered and provided. Daughter remains at the bedside. Daughter voicing that she would like to stay the night with her mother. Call light in reach. Safety maintained. Vitals obtained, 167/68, HR 83 Pulse ox on oxygen 3 liters nasal cannula 95%.

## 2022-07-20 NOTE — PROGRESS NOTES
Hospitalist Progress Note      PCP: Lilo Melvin DO    Date of Admission: 7/17/2022        Hospital Course:  80 y.o. female presented with GIB AND SOB. STATES HER STOOLS HAVE BEEN BLACK . SHE THOUGHT IT WAS FROM HER IRON PILLS, THEN SHE BECAME VERY SOB.   SHE IS ON ELIQUIS FOR HISTORY OF PE.  FOUND TO HAVE A LOW HGB OF 5.2 , HAD TO RECEIVE A TRANSFUSION** WAS VERY SOB WITH RALES THIS AM, GIVEN LASIX X 1  VERY SOB THIS AM, NO FURTHER CARDIAC INTERVENTION        Subjective:  SOB          Medications:  Reviewed    Infusion Medications    sodium chloride      sodium chloride       Scheduled Medications    potassium chloride  40 mEq Oral BID WC    sodium chloride  1 g Oral TID WC    sodium bicarbonate  650 mg Oral BID    amLODIPine  10 mg Oral Daily    [Held by provider] apixaban  5 mg Oral BID    [Held by provider] aspirin  81 mg Oral Daily    [Held by provider] ferrous sulfate  325 mg Oral Daily    [Held by provider] gabapentin  300 mg Oral QPM    [Held by provider] hydrALAZINE  25 mg Oral BID    polyvinyl alcohol  1 drop Both Eyes 4x Daily    [Held by provider] pravastatin  20 mg Oral Nightly    sodium chloride flush  5-40 mL IntraVENous 2 times per day    albuterol  2.5 mg Nebulization 4x daily    Arformoterol Tartrate  15 mcg Nebulization BID    And    budesonide  0.25 mg Nebulization BID     PRN Meds: sodium chloride flush, sodium chloride, ondansetron **OR** ondansetron, acetaminophen **OR** acetaminophen, hydrALAZINE, sodium chloride, perflutren lipid microspheres      Intake/Output Summary (Last 24 hours) at 7/20/2022 1544  Last data filed at 7/20/2022 0448  Gross per 24 hour   Intake 860 ml   Output 900 ml   Net -40 ml       Exam:    BP (!) 165/68   Pulse 81   Temp 98 °F (36.7 °C) (Temporal)   Resp 20   Ht 5' 3\" (1.6 m)   Wt 135 lb (61.2 kg)   LMP 12/03/1997   SpO2 96%   BMI 23.91 kg/m²     General appearance:  No apparent distress,  HEENT:  Normal cephalic,  Neck: Supple, with full range of motion. No jugular venous distention. Trachea midline. Respiratory:  Normal respiratory effort. CRACKLES NOTED BILATERALLY   Cardiovascular:  Regular rate and rhythm  Abdomen: Soft, non-tender, non-distended with normal bowel sounds. Musculoskeletal:  No clubbing, cyanosis or edema bilaterally. Skin: Skin color, texture, turgor normal.  No rashes or lesions. Neurologic:  Neurovascularly intact al.  Psychiatric:  Alert and oriented,               Labs:   Recent Labs     07/17/22 2132 07/18/22  0405 07/20/22  0405 07/20/22  0750 07/20/22  1440   WBC 9.1  --   --   --   --    HGB 5.2*   < > 7.9* 8.7* 8.9*   HCT 15.7*   < > 23.6* 25.3* 25.8*     --   --   --   --     < > = values in this interval not displayed. Recent Labs     07/20/22  0010 07/20/22  0405 07/20/22  0950   * 120* 126*   K 3.9 3.5 4.2   CL 91* 92* 93*   CO2 22 21* 20*   BUN 16 15 15   CREATININE 0.6 0.6 0.5   CALCIUM 7.9* 8.1* 8.4*     Recent Labs     07/17/22 2132   AST 26   ALT 30   BILIDIR <0.2   BILITOT 0.3   ALKPHOS 53     Recent Labs     07/19/22  0418   INR 1.1     No results for input(s): CKTOTAL, TROPONINI in the last 72 hours. Recent Labs     07/17/22 2132   AST 26   ALT 30   BILIDIR <0.2   BILITOT 0.3   ALKPHOS 53     No results for input(s): LACTA in the last 72 hours.   Lab Results   Component Value Date    LABURIC 5.2 07/19/2022     No results found for: AMMONIA    Assessment:    Active Hospital Problems    Diagnosis Date Noted    Acute heart failure ([facility] [I50.9] 07/18/2022     Priority: Medium    Acute anemia [D64.9] 12/03/2021    HLD (hyperlipidemia) [E78.5] 12/03/2021    History of pulmonary embolus (PE) [Z86.711] 05/29/2021    Hyponatremia [E87.1] 05/18/2021    DM (diabetes mellitus), type 2 ([facility] [E11.9] 05/18/2021    GERD (gastroesophageal reflux disease) [K21.9] 05/23/2017    Idiopathic peripheral neuropathy [G60.9] 09/26/2016    CAD (coronary artery disease) [I25.10] 09/26/2016    HTN (hypertension) [I10] 09/26/2016       Plan:  NO DIURETICS FOR NOW PER NEPHROLOGY   ON BICARB       DVT Prophylaxis: SCD  Diet: ADULT DIET; Regular;  Low Sodium (2 gm); 1000 ml  Code Status: Full Code     PT/OT Eval Status: ORDERED     Dispo - HOME VS CANDIDA     Electronically signed by Ulisses Pierce DO on 7/20/2022 at 3:44 PM Kendy Parker

## 2022-07-20 NOTE — PLAN OF CARE
Problem: Discharge Planning  Goal: Discharge to home or other facility with appropriate resources  Outcome: Progressing     Problem: Pain  Goal: Verbalizes/displays adequate comfort level or baseline comfort level  7/20/2022 0803 by Jazmin Anderson RN  Outcome: Progressing  Flowsheets (Taken 7/20/2022 0730)  Verbalizes/displays adequate comfort level or baseline comfort level: Encourage patient to monitor pain and request assistance  7/20/2022 0515 by Aminata Amanda RN  Outcome: Progressing     Problem: Safety - Adult  Goal: Free from fall injury  7/20/2022 0803 by Jazmin Anderson RN  Outcome: Progressing  7/20/2022 0515 by Aminata Amanda RN  Outcome: Progressing     Problem: ABCDS Injury Assessment  Goal: Absence of physical injury  Outcome: Progressing     Problem: Skin/Tissue Integrity  Goal: Absence of new skin breakdown  Outcome: Progressing     Problem: Chronic Conditions and Co-morbidities  Goal: Patient's chronic conditions and co-morbidity symptoms are monitored and maintained or improved  Outcome: Progressing

## 2022-07-20 NOTE — PROGRESS NOTES
intravascular volume depletion (anemia, diuretics) vs HF. Urine sodium <20 and urine osmolality 452. Repeat urine sodium 42 and urine osmolality 432. Sodium level improved today. Probable CKD, UPCR 2.2. Will need repeated in 3 months. HTN, BP meds on hold  HFpEF 68%, pro-BNP 3,508>>5,044, to hold diuretics for now  Non anion gap metabolic acidosis, on sodium bicarbonate  --------------------------------------------  CAD, s/p CABG  History of PE, apixaban on hold  Macrocytic anemia, s/p multiple transfusions. For EGD.        Plan:    Sodium chloride 1 g PO TID  Continue sodium bicarbonate 650 mg PO BID  Continue to monitor H&H  Continue to monitor sodium level closely, BMP every 4 hours  Call if sodium level >127 or <123  Strict I&Os  Dry tray    Case and plan discussed with Dr. Jose Kramer    Electronically signed by VIK Simpson CNP on 7/20/2022 at 12:50 PM

## 2022-07-20 NOTE — PROGRESS NOTES
PROGRESS NOTE       PATIENT PROBLEM LIST:  Patient Active Problem List   Diagnosis Code    Asthma J45.909    CAD (coronary artery disease) I25.10    Vitamin D deficiency E55.9    HTN (hypertension) I10    Idiopathic peripheral neuropathy G60.9    Rhinitis, allergic J30.9    GERD (gastroesophageal reflux disease) K21.9    PVD (peripheral vascular disease) with claudication ([facility]) I73.9    Gait instability R26.81    Hyponatremia E87.1    DM (diabetes mellitus), type 2 ([facility]) E11.9    History of pulmonary embolus (PE) Z86.711    Peripheral vascular angioplasty status Z98.62    Non-proliferative diabetic retinopathy, mild, both eyes ([facility]) P11.4665    Leg swelling M79.89    Compression fracture of L5 lumbar vertebra, closed, initial encounter ([facility] [address] S32.050A    HLD (hyperlipidemia) E78.5    Compression fracture of thoracic vertebra ([facility]) S22.000A    Acute anemia D64.9    Spinal stenosis M48.00    Acute heart failure ([facility] [address] I50.9       SUBJECTIVE:  Fanta Oliveros states she is feeling short of breath today more so than yesterday. Denies any chest discomfort. REVIEW OF SYSTEMS:  General ROS: negative for - fatigue, malaise,  weight gain or weight loss  Psychological ROS: negative for - anxiety , depression  Ophthalmic ROS: negative for - decreased vision or visual distortion. ENT ROS: negative  Allergy and Immunology ROS: negative  Hematological and Lymphatic ROS: negative  Endocrine: no heat or cold intolerance and no polyphagia, polydipsia, or polyuria  Respiratory ROS: positive for - shortness of breath  Cardiovascular ROS: positive for - dyspnea on exertion.   Gastrointestinal ROS: no abdominal pain, change in bowel habits, or black or bloody stools  Genito-Urinary ROS: no nocturia, dysuria, trouble voiding, frequency or hematuria  Musculoskeletal ROS: negative for- myalgias, arthralgias, or claudication  Neurological ROS: no TIA or stroke symptoms otherwise no significant change in symptoms or problems since yesterday as documented in previous progress notes. SCHEDULED MEDICATIONS:   potassium chloride  40 mEq Oral BID WC    magnesium sulfate  1,000 mg IntraVENous Once    sodium bicarbonate  650 mg Oral BID    amLODIPine  10 mg Oral Daily    [Held by provider] apixaban  5 mg Oral BID    [Held by provider] aspirin  81 mg Oral Daily    [Held by provider] ferrous sulfate  325 mg Oral Daily    [Held by provider] gabapentin  300 mg Oral QPM    [Held by provider] hydrALAZINE  25 mg Oral BID    polyvinyl alcohol  1 drop Both Eyes 4x Daily    [Held by provider] pravastatin  20 mg Oral Nightly    sodium chloride flush  5-40 mL IntraVENous 2 times per day    albuterol  2.5 mg Nebulization 4x daily    Arformoterol Tartrate  15 mcg Nebulization BID    And    budesonide  0.25 mg Nebulization BID       VITAL SIGNS:                                                                                                                          BP (!) 166/75   Pulse 83   Temp 98.4 °F (36.9 °C) (Oral)   Resp 20   Ht 5' 3\" (1.6 m)   Wt 135 lb (61.2 kg)   LMP 12/03/1997   SpO2 93%   BMI 23.91 kg/m²   Patient Vitals for the past 96 hrs (Last 3 readings):   Weight   07/17/22 2113 135 lb (61.2 kg)       OBJECTIVE:    HEENT: PERRL, EOM  Intact; sclera non-icteric, conjunctiva pink. Carotids are brisk in upstroke with normal contour. No carotid bruits. Normal jugular venous pulsation at 45°. No palpable cervical nor supraclavicular nodes. Thyroid not palpable. Trachea midline. Chest: Even excursion  Lungs: Wheezes and diminished air movement bilaterally; no decreased tactile fremitus without inspiratory rales. Heart: Regular  rhythm; S1 > S2, no gallop. Grade 2/6 short systolic murmur heard in the apex and LMSB No clicks, rub, palpable thrills   or heaves. PMI nondisplaced, 5th intercostal space MCL. Abdomen: Soft, nontender, nondistended,  mildly protuberant, no masses or organomegaly. Bowel sounds active.   Extremities: Without clubbing, cyanosis or edema. Pulses present 3+ upper extermities bilaterally; present 1+ DP and present 1+ PT bilaterally. Data:   Scheduled Meds: Reviewed  Continuous Infusions:    sodium chloride      sodium chloride         Intake/Output Summary (Last 24 hours) at 7/20/2022 1030  Last data filed at 7/20/2022 0448  Gross per 24 hour   Intake 860 ml   Output 1850 ml   Net -990 ml       CBC:   Recent Labs     07/17/22 2132 07/18/22  0405 07/18/22 2206 07/19/22  0418 07/19/22  1112 07/19/22  1642 07/19/22  2230 07/20/22  0405 07/20/22  0750   WBC 9.1  --   --   --   --   --   --   --   --    HGB 5.2*   < > 8.2* 8.3* 8.9* 9.1* 7.7* 7.9* 8.7*   HCT 15.7*   < > 23.6* 23.9* 26.1* 26.1* 22.5* 23.6* 25.3*     --   --   --   --   --   --   --   --     < > = values in this interval not displayed.        BMP:  Recent Labs     07/18/22 2206 07/19/22 0418 07/19/22 1112 07/19/22 1642 07/19/22 2010 07/20/22  0010 07/20/22  0405   * 122* 119* 125* 123* 120* 120*   K 3.2* 3.1* 3.3* 3.5 3.3* 3.9 3.5   CL 90* 91* 90* 91* 91* 91* 92*   CO2 21* 21* 21* 21* 20* 22 21*   BUN 15 14 13 14 16 16 15   CREATININE 0.6 0.6 0.6 0.7 0.6 0.6 0.6   LABGLOM >60 >60 >60 >60 >60 >60 >60       ABGs: No results found for: PH, PO2, PCO2  INR:   Recent Labs     07/19/22 0418   INR 1.1       PRO-BNP:   Lab Results   Component Value Date    PROBNP 8,313 (H) 07/19/2022    PROBNP 5,044 (H) 07/18/2022        TSH:   Lab Results   Component Value Date    TSH 1.410 07/19/2022      Cardiac Injury Profile:   Recent Labs     07/18/22  0405 07/18/22  1125 07/18/22  1535   TROPHS 49* 47* 92*        Lipid Profile:   Lab Results   Component Value Date/Time    TRIG 61 05/02/2019 10:13 AM    HDL 68 05/02/2019 10:13 AM    LDLCALC 79 05/02/2019 10:13 AM    CHOL 159 05/02/2019 10:13 AM        Hemoglobin A1C: No components found for: HGBA1C      RAD:   US DUP LOWER EXTREMITIES BILATERAL VENOUS   Final Result   No evidence of DVT in either lower extremity. XR CHEST PORTABLE   Final Result   Cardiomegaly with pulmonary edema. EKG: See Report  Echo: See Report      IMPRESSIONS:  Patient Active Problem List   Diagnosis Code    Asthma J45.909    CAD (coronary artery disease) I25.10    Vitamin D deficiency E55.9    HTN (hypertension) I10    Idiopathic peripheral neuropathy G60.9    Rhinitis, allergic J30.9    GERD (gastroesophageal reflux disease) K21.9    PVD (peripheral vascular disease) with claudication ([facility]) I73.9    Gait instability R26.81    Hyponatremia E87.1    DM (diabetes mellitus), type 2 (HCC) E11.9    History of pulmonary embolus (PE) Z86.711    Peripheral vascular angioplasty status Z98.62    Non-proliferative diabetic retinopathy, mild, both eyes ([facility]) Q68.3319    Leg swelling M79.89    Compression fracture of L5 lumbar vertebra, closed, initial encounter ([facility] [address] S32.050A    HLD (hyperlipidemia) E78.5    Compression fracture of thoracic vertebra ([facility]) S22.000A    Acute anemia D64.9    Spinal stenosis M48.00    Acute heart failure (HCC) I50.9       RECOMMENDATIONS:    Ms. Liz Galicia is feeling more short of breath today. She remains alert and oriented but appears more fatigued. Denies any chest discomfort today. Again, as noted her EF is decreased from previous 68% in March 2021, now down to 45%. Her BNP has been steadily increasing since admission, most recently over 8000. She has significant bilateral wheezing on my examination today. She is still being monitored closely with nephrology for her hyponatremia as well as general surgery for her GI bleed/hemoglobinemia; her sodium appears to be improving and starting to hold steady, is 126 today. As such, diuresis as per nephrology given the precarious nature of her hyponatremia at this time, though from our standpoint given her increasing BNP and shortness of breath, would recommend closely monitored IV diuresis with replacement with normal saline.      I have spent more than 26 minutes face to face with Zainab Morales and reviewing notes and laboratory data, with greater than 50% of this time instructing and counseling the patient face to face regarding my findings and recommendations and I have answered all questions as posed to me by Ms. Olivier Victoria. Jaime Grijalva, DO FACP,FACC,[facility]      NOTE:  This report was transcribed using voice recognition software.   Every effort was made to ensure accuracy; however, inadvertent computerized transcription errors may be present

## 2022-07-20 NOTE — CARE COORDINATION
Social work / Discharge Planning:       Pulse ox testing completed and patient does not qualify for home oxygen. AM PAC 11/24 with patient walking 25 feet with ww. Social work spoke to liaison for [facility] and confirmed patient can return to [address] [place] [address]. Will need negative covid result prior to discharge.    Electronically signed by GLORIA Diaz on 7/20/2022 at 3:31 PM

## 2022-07-20 NOTE — PROGRESS NOTES
Na-123. Per order parameters, no MD notification needed. Will continue to monitor. K noted to be 3.3 with Q 4 hr BMP On call nephrologist notified.

## 2022-07-20 NOTE — CARE COORDINATION
Social work / Discharge Planning:       Patient is from [facility]. Therapy was unable to work with patient yesterday due to respiratory status. Will need work with therapy when appropriate to determine if patient can return to [facility]. Currently on 3 liters of oxygen which she does not wear at [facility]. Social work continues to follow.   Electronically signed by GLORIA Keller on 7/20/2022 at 8:17 AM

## 2022-07-21 LAB
ANION GAP SERPL CALCULATED.3IONS-SCNC: 13 MMOL/L (ref 7–16)
ANION GAP SERPL CALCULATED.3IONS-SCNC: 9 MMOL/L (ref 7–16)
ANION GAP SERPL CALCULATED.3IONS-SCNC: 9 MMOL/L (ref 7–16)
BUN BLDV-MCNC: 16 MG/DL (ref 6–23)
BUN BLDV-MCNC: 20 MG/DL (ref 6–23)
BUN BLDV-MCNC: 20 MG/DL (ref 6–23)
CALCIUM SERPL-MCNC: 8.6 MG/DL (ref 8.6–10.2)
CALCIUM SERPL-MCNC: 9 MG/DL (ref 8.6–10.2)
CALCIUM SERPL-MCNC: 9.1 MG/DL (ref 8.6–10.2)
CHLORIDE BLD-SCNC: 95 MMOL/L (ref 98–107)
CHLORIDE BLD-SCNC: 97 MMOL/L (ref 98–107)
CHLORIDE BLD-SCNC: 98 MMOL/L (ref 98–107)
CHLORIDE URINE RANDOM: <20 MMOL/L
CO2: 19 MMOL/L (ref 22–29)
CO2: 21 MMOL/L (ref 22–29)
CO2: 21 MMOL/L (ref 22–29)
CREAT SERPL-MCNC: 0.6 MG/DL (ref 0.5–1)
EKG ATRIAL RATE: 267 BPM
EKG P AXIS: -80 DEGREES
EKG Q-T INTERVAL: 428 MS
EKG QRS DURATION: 108 MS
EKG QTC CALCULATION (BAZETT): 475 MS
EKG R AXIS: 2 DEGREES
EKG T AXIS: -141 DEGREES
EKG VENTRICULAR RATE: 74 BPM
GFR AFRICAN AMERICAN: >60
GFR NON-AFRICAN AMERICAN: >60 ML/MIN/1.73
GLUCOSE BLD-MCNC: 223 MG/DL (ref 74–99)
GLUCOSE BLD-MCNC: 341 MG/DL (ref 74–99)
GLUCOSE BLD-MCNC: 399 MG/DL (ref 74–99)
HBA1C MFR BLD: 4.6 % (ref 4–5.6)
HCT VFR BLD CALC: 27.4 % (ref 34–48)
HCT VFR BLD CALC: 28.9 % (ref 34–48)
HEMOGLOBIN: 8.9 G/DL (ref 11.5–15.5)
HEMOGLOBIN: 9.6 G/DL (ref 11.5–15.5)
MAGNESIUM: 2.4 MG/DL (ref 1.6–2.6)
METER GLUCOSE: 150 MG/DL (ref 74–99)
METER GLUCOSE: 221 MG/DL (ref 74–99)
METER GLUCOSE: 356 MG/DL (ref 74–99)
OSMOLALITY URINE: 554 MOSM/KG (ref 300–900)
POTASSIUM SERPL-SCNC: 4.7 MMOL/L (ref 3.5–5)
POTASSIUM SERPL-SCNC: 4.8 MMOL/L (ref 3.5–5)
POTASSIUM SERPL-SCNC: 4.9 MMOL/L (ref 3.5–5)
POTASSIUM, UR: 46.6 MMOL/L
SODIUM BLD-SCNC: 127 MMOL/L (ref 132–146)
SODIUM BLD-SCNC: 127 MMOL/L (ref 132–146)
SODIUM BLD-SCNC: 128 MMOL/L (ref 132–146)
SODIUM URINE: <20 MMOL/L

## 2022-07-21 PROCEDURE — 6370000000 HC RX 637 (ALT 250 FOR IP): Performed by: NURSE PRACTITIONER

## 2022-07-21 PROCEDURE — 82436 ASSAY OF URINE CHLORIDE: CPT

## 2022-07-21 PROCEDURE — 6360000002 HC RX W HCPCS: Performed by: SURGERY

## 2022-07-21 PROCEDURE — 94640 AIRWAY INHALATION TREATMENT: CPT

## 2022-07-21 PROCEDURE — 36415 COLL VENOUS BLD VENIPUNCTURE: CPT

## 2022-07-21 PROCEDURE — 6370000000 HC RX 637 (ALT 250 FOR IP): Performed by: INTERNAL MEDICINE

## 2022-07-21 PROCEDURE — 6370000000 HC RX 637 (ALT 250 FOR IP): Performed by: SURGERY

## 2022-07-21 PROCEDURE — 2060000000 HC ICU INTERMEDIATE R&B

## 2022-07-21 PROCEDURE — 2700000000 HC OXYGEN THERAPY PER DAY

## 2022-07-21 PROCEDURE — 85018 HEMOGLOBIN: CPT

## 2022-07-21 PROCEDURE — 83935 ASSAY OF URINE OSMOLALITY: CPT

## 2022-07-21 PROCEDURE — 2580000003 HC RX 258: Performed by: SURGERY

## 2022-07-21 PROCEDURE — 83036 HEMOGLOBIN GLYCOSYLATED A1C: CPT

## 2022-07-21 PROCEDURE — 84300 ASSAY OF URINE SODIUM: CPT

## 2022-07-21 PROCEDURE — 85014 HEMATOCRIT: CPT

## 2022-07-21 PROCEDURE — 80048 BASIC METABOLIC PNL TOTAL CA: CPT

## 2022-07-21 PROCEDURE — 84133 ASSAY OF URINE POTASSIUM: CPT

## 2022-07-21 PROCEDURE — 82962 GLUCOSE BLOOD TEST: CPT

## 2022-07-21 PROCEDURE — 83735 ASSAY OF MAGNESIUM: CPT

## 2022-07-21 RX ORDER — LACTULOSE 10 G/15ML
20 SOLUTION ORAL 2 TIMES DAILY
Status: DISCONTINUED | OUTPATIENT
Start: 2022-07-21 | End: 2022-07-25 | Stop reason: HOSPADM

## 2022-07-21 RX ORDER — INSULIN GLARGINE 100 [IU]/ML
12 INJECTION, SOLUTION SUBCUTANEOUS 2 TIMES DAILY
Status: DISCONTINUED | OUTPATIENT
Start: 2022-07-21 | End: 2022-07-24

## 2022-07-21 RX ORDER — INSULIN LISPRO 100 [IU]/ML
0-10 INJECTION, SOLUTION INTRAVENOUS; SUBCUTANEOUS
Status: DISCONTINUED | OUTPATIENT
Start: 2022-07-21 | End: 2022-07-25 | Stop reason: HOSPADM

## 2022-07-21 RX ORDER — DEXTROSE MONOHYDRATE 100 MG/ML
INJECTION, SOLUTION INTRAVENOUS CONTINUOUS PRN
Status: DISCONTINUED | OUTPATIENT
Start: 2022-07-21 | End: 2022-07-25 | Stop reason: HOSPADM

## 2022-07-21 RX ORDER — SODIUM BICARBONATE 650 MG/1
650 TABLET ORAL DAILY
Status: DISCONTINUED | OUTPATIENT
Start: 2022-07-22 | End: 2022-07-25 | Stop reason: HOSPADM

## 2022-07-21 RX ORDER — SODIUM CHLORIDE 1000 MG
1 TABLET, SOLUBLE MISCELLANEOUS DAILY
Status: DISCONTINUED | OUTPATIENT
Start: 2022-07-22 | End: 2022-07-22

## 2022-07-21 RX ORDER — BISACODYL 10 MG
10 SUPPOSITORY, RECTAL RECTAL DAILY PRN
Status: DISCONTINUED | OUTPATIENT
Start: 2022-07-21 | End: 2022-07-25 | Stop reason: HOSPADM

## 2022-07-21 RX ADMIN — POLYVINYL ALCOHOL 1 DROP: 14 SOLUTION/ DROPS OPHTHALMIC at 20:31

## 2022-07-21 RX ADMIN — Medication 1 G: at 14:13

## 2022-07-21 RX ADMIN — INSULIN GLARGINE 12 UNITS: 100 INJECTION, SOLUTION SUBCUTANEOUS at 13:18

## 2022-07-21 RX ADMIN — POLYVINYL ALCOHOL 1 DROP: 14 SOLUTION/ DROPS OPHTHALMIC at 09:57

## 2022-07-21 RX ADMIN — Medication 1 G: at 14:14

## 2022-07-21 RX ADMIN — INSULIN GLARGINE 12 UNITS: 100 INJECTION, SOLUTION SUBCUTANEOUS at 21:53

## 2022-07-21 RX ADMIN — POLYVINYL ALCOHOL 1 DROP: 14 SOLUTION/ DROPS OPHTHALMIC at 15:48

## 2022-07-21 RX ADMIN — ARFORMOTEROL TARTRATE 15 MCG: 15 SOLUTION RESPIRATORY (INHALATION) at 08:23

## 2022-07-21 RX ADMIN — AMLODIPINE BESYLATE 10 MG: 10 TABLET ORAL at 09:55

## 2022-07-21 RX ADMIN — ALBUTEROL SULFATE 2.5 MG: 2.5 SOLUTION RESPIRATORY (INHALATION) at 19:31

## 2022-07-21 RX ADMIN — LACTULOSE 20 G: 20 SOLUTION ORAL at 20:29

## 2022-07-21 RX ADMIN — ARFORMOTEROL TARTRATE 15 MCG: 15 SOLUTION RESPIRATORY (INHALATION) at 19:31

## 2022-07-21 RX ADMIN — SODIUM BICARBONATE 650 MG: 650 TABLET ORAL at 09:55

## 2022-07-21 RX ADMIN — ALBUTEROL SULFATE 2.5 MG: 2.5 SOLUTION RESPIRATORY (INHALATION) at 08:24

## 2022-07-21 RX ADMIN — ALBUTEROL SULFATE 2.5 MG: 2.5 SOLUTION RESPIRATORY (INHALATION) at 16:10

## 2022-07-21 RX ADMIN — Medication 10 ML: at 09:58

## 2022-07-21 RX ADMIN — Medication 10 ML: at 20:31

## 2022-07-21 RX ADMIN — ALBUTEROL SULFATE 2.5 MG: 2.5 SOLUTION RESPIRATORY (INHALATION) at 12:11

## 2022-07-21 RX ADMIN — BUDESONIDE 250 MCG: 0.25 SUSPENSION RESPIRATORY (INHALATION) at 19:31

## 2022-07-21 RX ADMIN — INSULIN LISPRO 4 UNITS: 100 INJECTION, SOLUTION INTRAVENOUS; SUBCUTANEOUS at 15:50

## 2022-07-21 RX ADMIN — BUDESONIDE 250 MCG: 0.25 SUSPENSION RESPIRATORY (INHALATION) at 08:24

## 2022-07-21 RX ADMIN — INSULIN LISPRO 8 UNITS: 100 INJECTION, SOLUTION INTRAVENOUS; SUBCUTANEOUS at 12:40

## 2022-07-21 RX ADMIN — POLYVINYL ALCOHOL 1 DROP: 14 SOLUTION/ DROPS OPHTHALMIC at 12:50

## 2022-07-21 RX ADMIN — Medication 1 G: at 09:55

## 2022-07-21 RX ADMIN — ONDANSETRON 4 MG: 4 TABLET, ORALLY DISINTEGRATING ORAL at 09:55

## 2022-07-21 RX ADMIN — BISACODYL 10 MG: 10 SUPPOSITORY RECTAL at 17:43

## 2022-07-21 RX ADMIN — LACTULOSE 20 G: 20 SOLUTION ORAL at 12:57

## 2022-07-21 ASSESSMENT — PAIN SCALES - GENERAL: PAINLEVEL_OUTOF10: 0

## 2022-07-21 NOTE — PROGRESS NOTES
Department of Internal Medicine  Nephrology Progress Note      Events reviewed. SUBJECTIVE:  We are following Ms. Damian Armendariz for hyponatremia. She reports feeling a little bit better today.     PHYSICAL EXAM:      Vitals:    VITALS:  /80   Pulse 73   Temp (!) 95.8 °F (35.4 °C) (Axillary)   Resp 18   Ht 5' 3\" (1.6 m)   Wt 135 lb (61.2 kg)   LMP 12/03/1997   SpO2 99%   BMI 23.91 kg/m²   24HR INTAKE/OUTPUT:    Intake/Output Summary (Last 24 hours) at 7/21/2022 1318  Last data filed at 7/21/2022 2038  Gross per 24 hour   Intake 60 ml   Output 750 ml   Net -690 ml         Constitutional:  Alert and oriented, NAD  HEENT:  Normocephalic, PERRL, dry mucous membranes  Respiratory:  Diminished lung sounds  Cardiovascular/Edema:  RRR, S1,S2  Gastrointestinal:  Soft, rounded, nontender, nondistended  Neurologic:  Nonfocal, SHARMA  Skin:  Warm, dry, ecchymotic  Other:  Trace sacral edema     Scheduled Meds:   insulin lispro  0-10 Units SubCUTAneous 4x Daily AC & HS    insulin glargine  12 Units SubCUTAneous BID    lactulose  20 g Oral BID    sodium chloride  1 g Oral TID WC    sodium bicarbonate  650 mg Oral BID    amLODIPine  10 mg Oral Daily    [Held by provider] apixaban  5 mg Oral BID    [Held by provider] aspirin  81 mg Oral Daily    [Held by provider] ferrous sulfate  325 mg Oral Daily    [Held by provider] gabapentin  300 mg Oral QPM    [Held by provider] hydrALAZINE  25 mg Oral BID    polyvinyl alcohol  1 drop Both Eyes 4x Daily    [Held by provider] pravastatin  20 mg Oral Nightly    sodium chloride flush  5-40 mL IntraVENous 2 times per day    albuterol  2.5 mg Nebulization 4x daily    Arformoterol Tartrate  15 mcg Nebulization BID    And    budesonide  0.25 mg Nebulization BID     Continuous Infusions:   dextrose      sodium chloride      sodium chloride       PRN Meds:.glucose, dextrose bolus **OR** dextrose bolus, glucagon (rDNA), dextrose, bisacodyl, sodium chloride flush, sodium chloride, ondansetron a day but drinks about 6 bottles of water a day. She denies any vomiting or diarrhea. IMPRESSION/RECOMMENDATIONS:      Hypotonic hyponatremia, likely hemodynamically mediated secondary to intravascular volume depletion (anemia, diuretics) vs HF. Urine sodium <20 and urine osmolality 452. Repeat urine sodium 42 and urine osmolality 432. Sodium level improved today. Probable CKD, UPCR 2.2. Will need repeated in 3 months. HTN, BP meds on hold  HFpEF 68%, pro-BNP 3,508>>5,044, to hold diuretics for now  Non anion gap metabolic acidosis, on sodium bicarbonate  --------------------------------------------  CAD, s/p CABG  History of PE, apixaban on hold  Macrocytic anemia, s/p multiple transfusions. For EGD.        Plan:    Sodium chloride 1 g PO TID  Continue sodium bicarbonate 650 mg PO BID  Continue to monitor sodium level closely, BMP daily  Dry tray      Electronically signed by Dorrine Gitelman, MD on 7/21/2022 at 1:18 PM

## 2022-07-21 NOTE — CARE COORDINATION
Social work / Discharge planning:       Discharge plan is to return to [facility]. Patient does not have home oxygen and will need pulse ox testing when discharge is anticipated. Will need negative covid result on day of discharge.    .Electronically signed by GLORIA Sawyer on 7/21/2022 at 11:38 AM

## 2022-07-21 NOTE — PROGRESS NOTES
P Quality Flow/Interdisciplinary Rounds Progress Note        Quality Flow Rounds held on July 21, 2022    Disciplines Attending:  Bedside Nurse, , , and Nursing Unit Leadership    Joanne Strange was admitted on 7/17/2022  9:06 PM    Anticipated Discharge Date:       Disposition:    Scott Score:  Scott Scale Score: 16    Readmission Risk              Risk of Unplanned Readmission:  [id number]           Discussed patient goal for the day, patient clinical progression, and barriers to discharge. The following Goal(s) of the Day/Commitment(s) have been identified:  monitor sodium level Q4, check Nephrology plan, monitor Hgb.        Suleiman Wilkes RN  July 21, 2022

## 2022-07-21 NOTE — PROGRESS NOTES
Hospitalist Progress Note      PCP: Desmond Douglass DO    Date of Admission: 7/17/2022        Hospital Course:  80 y.o. female presented with GIB AND SOB. STATES HER STOOLS HAVE BEEN BLACK . EGD DONE  No evidence of an active source for an upper GI bleed  SHE THOUGHT IT WAS FROM HER IRON PILLS, THEN SHE BECAME VERY SOB. SHE IS ON ELIQUIS FOR HISTORY OF PE.  FOUND TO HAVE A LOW HGB OF 5.2 , HAD TO RECEIVE A TRANSFUSION** WAS VERY SOB WITH RALES THIS AM, GIVEN LASIX X 1  VERY SOB THIS AM, NO FURTHER CARDIAC INTERVENTION           Subjective:  DAUGHTER HERE FROM [place], ALL QUESTIONS ANSWERED.   PT STILL SOB, BUT  IMPROVED FROM YESTERDAY           Medications:  Reviewed    Infusion Medications    dextrose      sodium chloride      sodium chloride       Scheduled Medications    insulin lispro  0-10 Units SubCUTAneous 4x Daily AC & HS    insulin glargine  12 Units SubCUTAneous BID    lactulose  20 g Oral BID    [START ON 7/22/2022] sodium bicarbonate  650 mg Oral Daily    [START ON 7/22/2022] sodium chloride  1 g Oral Daily    amLODIPine  10 mg Oral Daily    [Held by provider] apixaban  5 mg Oral BID    [Held by provider] aspirin  81 mg Oral Daily    [Held by provider] ferrous sulfate  325 mg Oral Daily    [Held by provider] gabapentin  300 mg Oral QPM    [Held by provider] hydrALAZINE  25 mg Oral BID    polyvinyl alcohol  1 drop Both Eyes 4x Daily    [Held by provider] pravastatin  20 mg Oral Nightly    sodium chloride flush  5-40 mL IntraVENous 2 times per day    albuterol  2.5 mg Nebulization 4x daily    Arformoterol Tartrate  15 mcg Nebulization BID    And    budesonide  0.25 mg Nebulization BID     PRN Meds: glucose, dextrose bolus **OR** dextrose bolus, glucagon (rDNA), dextrose, bisacodyl, sodium chloride flush, sodium chloride, ondansetron **OR** ondansetron, acetaminophen **OR** acetaminophen, hydrALAZINE, sodium chloride, perflutren lipid microspheres      Intake/Output Summary (Last 24 hours) at 7/21/2022 1550  Last data filed at 7/21/2022 7759  Gross per 24 hour   Intake 60 ml   Output 750 ml   Net -690 ml       Exam:    /80   Pulse 73   Temp (!) 95.8 °F (35.4 °C) (Axillary)   Resp 18   Ht 5' 3\" (1.6 m)   Wt 135 lb (61.2 kg)   LMP 12/03/1997   SpO2 99%   BMI 23.91 kg/m²       General appearance:  No apparent distress,  HEENT:  Normal cephalic,  Neck: Supple, with full range of motion. No jugular venous distention. Trachea midline. Respiratory:  Normal respiratory effort. CTA   BILATERALLY   Cardiovascular:  Regular rate and rhythm  Abdomen: Soft, non-tender, non-distended with normal bowel sounds. Musculoskeletal:  No clubbing, cyanosis or edema bilaterally. Skin: Skin color, texture, turgor normal.  No rashes or lesions. Neurologic:  Neurovascularly intact al.  Psychiatric:  Alert and oriented,                 Labs:   Recent Labs     07/20/22  0750 07/20/22  1440 07/21/22  1140   HGB 8.7* 8.9* 9.6*   HCT 25.3* 25.8* 28.9*     Recent Labs     07/21/22  0125 07/21/22  0513 07/21/22  1140   * 127* 128*   K 4.7 4.9 4.8   CL 97* 95* 98   CO2 21* 19* 21*   BUN 16 20 20   CREATININE 0.6 0.6 0.6   CALCIUM 8.6 9.0 9.1     No results for input(s): AST, ALT, BILIDIR, BILITOT, ALKPHOS in the last 72 hours. Recent Labs     07/19/22  0418   INR 1.1     No results for input(s): [place] in the last 72 hours. No results for input(s): AST, ALT, ALB, BILIDIR, BILITOT, ALKPHOS in the last 72 hours. No results for input(s): LACTA in the last 72 hours.   Lab Results   Component Value Date    LABURIC 5.2 07/19/2022     No results found for: AMMONIA    Assessment:    Active Hospital Problems    Diagnosis Date Noted    Acute heart failure ([facility] [address] [I50.9] 07/18/2022     Priority: Medium    Acute anemia [D64.9] 12/03/2021    HLD (hyperlipidemia) [E78.5] 12/03/2021    History of pulmonary embolus (PE) [Z86.711] 05/29/2021    Hyponatremia [E87.1] 05/18/2021    DM (diabetes mellitus), type 2 ([facility] [E11.9] 05/18/2021    GERD (gastroesophageal reflux disease) [K21.9] 05/23/2017    Idiopathic peripheral neuropathy [G60.9] 09/26/2016    CAD (coronary artery disease) [I25.10] 09/26/2016    HTN (hypertension) [I10] 09/26/2016       Plan:  NO DIURETICS FOR NOW PER NEPHROLOGY   ON BICARB        DVT Prophylaxis: SCD  Diet: ADULT DIET; Regular;  Low Sodium (2 gm); 1000 ml  Code Status: Full Code     PT/OT Eval Status: ORDERED     Dispo - HOME VS CANDIDA      Electronically signed by Jonathan Mace DO on 7/21/2022 at 3:50 PM [facility]

## 2022-07-21 NOTE — PROGRESS NOTES
PROGRESS NOTE       PATIENT PROBLEM LIST:  Patient Active Problem List   Diagnosis Code    Asthma J45.909    CAD (coronary artery disease) I25.10    Vitamin D deficiency E55.9    HTN (hypertension) I10    Idiopathic peripheral neuropathy G60.9    Rhinitis, allergic J30.9    GERD (gastroesophageal reflux disease) K21.9    PVD (peripheral vascular disease) with claudication ([facility]) I73.9    Gait instability R26.81    Hyponatremia E87.1    DM (diabetes mellitus), type 2 ([facility]) E11.9    History of pulmonary embolus (PE) Z86.711    Peripheral vascular angioplasty status Z98.62    Non-proliferative diabetic retinopathy, mild, both eyes ([facility]) S84.2177    Leg swelling M79.89    Compression fracture of L5 lumbar vertebra, closed, initial encounter ([facility] S32.050A    HLD (hyperlipidemia) E78.5    Compression fracture of thoracic vertebra ([facility]) S22.000A    Acute anemia D64.9    Spinal stenosis M48.00    Acute heart failure ([facility] I50.9       SUBJECTIVE:  Isaura Reza remains short of breath and continues to experience intermittent coughing which she notes results in chest pain but not pressure. REVIEW OF SYSTEMS:  General ROS: negative for - fatigue, malaise,  weight gain or weight loss  Psychological ROS: negative for - anxiety , depression  Ophthalmic ROS: negative for - decreased vision or visual distortion. ENT ROS: negative  Allergy and Immunology ROS: negative  Hematological and Lymphatic ROS: negative  Endocrine: no heat or cold intolerance and no polyphagia, polydipsia, or polyuria  Respiratory ROS: positive for - shortness of breath and recurrent mucinous cough  Cardiovascular ROS: positive for - dyspnea on exertion.   Gastrointestinal ROS: no abdominal pain, change in bowel habits, or black or bloody stools  Genito-Urinary ROS: no nocturia, dysuria, trouble voiding, frequency or hematuria  Musculoskeletal ROS: negative for- myalgias, arthralgias, or claudication  Neurological ROS: no TIA or stroke symptoms otherwise nondistended,  mildly protuberant, no masses or organomegaly. Bowel sounds active. Extremities: Without clubbing, cyanosis or edema. Pulses present 3+ upper extermities bilaterally; present 1+ DP and present 1+ PT bilaterally. Data:   Scheduled Meds: Reviewed  Continuous Infusions:    dextrose      sodium chloride      sodium chloride         Intake/Output Summary (Last 24 hours) at 7/21/2022 1203  Last data filed at 7/21/2022 0923  Gross per 24 hour   Intake 60 ml   Output 750 ml   Net -690 ml     CBC:   Recent Labs     07/19/22  0418 07/19/22  1112 07/19/22  1642 07/19/22  2230 07/20/22  0405 07/20/22  0750 07/20/22  1440   HGB 8.3* 8.9* 9.1* 7.7* 7.9* 8.7* 8.9*   HCT 23.9* 26.1* 26.1* 22.5* 23.6* 25.3* 25.8*     BMP:  Recent Labs     07/20/22  0010 07/20/22  0405 07/20/22  0950 07/20/22  1500 07/20/22  2100 07/21/22  0125 07/21/22  0513   * 120* 126* 126* 127* 127* 127*   K 3.9 3.5 4.2 4.2 5.1* 4.7 4.9   CL 91* 92* 93* 93* 97* 97* 95*   CO2 22 21* 20* 22 20* 21* 19*   BUN 16 15 15 16 16 16 20   CREATININE 0.6 0.6 0.5 0.5 0.5 0.6 0.6   LABGLOM >60 >60 >60 >60 >60 >60 >60     ABGs: No results found for: PH, PO2, PCO2  INR:   Recent Labs     07/19/22 0418   INR 1.1     PRO-BNP:   Lab Results   Component Value Date    PROBNP 8,313 (H) 07/19/2022    PROBNP 5,044 (H) 07/18/2022      TSH:   Lab Results   Component Value Date    TSH 1.410 07/19/2022      Cardiac Injury Profile:   Recent Labs     07/18/22  1535   TROPHS 92*      Lipid Profile:   Lab Results   Component Value Date/Time    TRIG 61 05/02/2019 10:13 AM    HDL 68 05/02/2019 10:13 AM    LDLCALC 79 05/02/2019 10:13 AM    CHOL 159 05/02/2019 10:13 AM      Hemoglobin A1C: No components found for: HGBA1C      RAD:   US DUP LOWER EXTREMITIES BILATERAL VENOUS   Final Result   No evidence of DVT in either lower extremity. XR CHEST PORTABLE   Final Result   Cardiomegaly with pulmonary edema.                EKG: See Report  Echo: See Report      IMPRESSIONS:  Patient Active Problem List   Diagnosis Code    Asthma J45.909    CAD (coronary artery disease) I25.10    Vitamin D deficiency E55.9    HTN (hypertension) I10    Idiopathic peripheral neuropathy G60.9    Rhinitis, allergic J30.9    GERD (gastroesophageal reflux disease) K21.9    PVD (peripheral vascular disease) with claudication ([facility]) I73.9    Gait instability R26.81    Hyponatremia E87.1    DM (diabetes mellitus), type 2 (HCC) E11.9    History of pulmonary embolus (PE) Z86.711    Peripheral vascular angioplasty status Z98.62    Non-proliferative diabetic retinopathy, mild, both eyes ([facility]) A04.6005    Leg swelling M79.89    Compression fracture of L5 lumbar vertebra, closed, initial encounter ([facility] S32.050A    HLD (hyperlipidemia) E78.5    Compression fracture of thoracic vertebra ([facility]) S22.000A    Acute anemia D64.9    Spinal stenosis M48.00    Acute heart failure ([facility]) I50.9       RECOMMENDATIONS:  Ms. Irene Eli to have a mucinous cough and short of breath with any effort. Need to find a source of bleed and will Withhold until the source is found but we will need to reinstitute within the next 2 weeks. Continue to carefully monitor blood pressure and electrolytes. I have spent more than 25 minutes face to face with Zainab Morales and reviewing notes and laboratory data, with greater than 50% of this time instructing and counseling the patient face to face regarding my findings and recommendations and I have answered all questions as posed to me by Ms. Aguayo Agustinsalbador. Jaime Grijalva, DO FACP,FACC,[facility]      NOTE:  This report was transcribed using voice recognition software.   Every effort was made to ensure accuracy; however, inadvertent computerized transcription errors may be present

## 2022-07-21 NOTE — PLAN OF CARE
Problem: Discharge Planning  Goal: Discharge to home or other facility with appropriate resources  Outcome: Progressing     Problem: Pain  Goal: Verbalizes/displays adequate comfort level or baseline comfort level  Outcome: Progressing     Problem: Safety - Adult  Goal: Free from fall injury  Outcome: Progressing  Flowsheets (Taken 7/21/2022 0900)  Free From Fall Injury: Instruct family/caregiver on patient safety     Problem: ABCDS Injury Assessment  Goal: Absence of physical injury  Outcome: Progressing  Flowsheets (Taken 7/21/2022 0900)  Absence of Physical Injury: Implement safety measures based on patient assessment     Problem: Skin/Tissue Integrity  Goal: Absence of new skin breakdown  Outcome: Progressing     Problem: Chronic Conditions and Co-morbidities  Goal: Patient's chronic conditions and co-morbidity symptoms are monitored and maintained or improved  Outcome: Progressing

## 2022-07-22 LAB
ANION GAP SERPL CALCULATED.3IONS-SCNC: 7 MMOL/L (ref 7–16)
ANION GAP SERPL CALCULATED.3IONS-SCNC: 8 MMOL/L (ref 7–16)
BUN BLDV-MCNC: 21 MG/DL (ref 6–23)
BUN BLDV-MCNC: 22 MG/DL (ref 6–23)
CALCIUM SERPL-MCNC: 8.8 MG/DL (ref 8.6–10.2)
CALCIUM SERPL-MCNC: 8.8 MG/DL (ref 8.6–10.2)
CHLORIDE BLD-SCNC: 102 MMOL/L (ref 98–107)
CHLORIDE BLD-SCNC: 99 MMOL/L (ref 98–107)
CO2: 22 MMOL/L (ref 22–29)
CO2: 23 MMOL/L (ref 22–29)
CREAT SERPL-MCNC: 0.6 MG/DL (ref 0.5–1)
CREAT SERPL-MCNC: 0.6 MG/DL (ref 0.5–1)
GFR AFRICAN AMERICAN: >60
GFR AFRICAN AMERICAN: >60
GFR NON-AFRICAN AMERICAN: >60 ML/MIN/1.73
GFR NON-AFRICAN AMERICAN: >60 ML/MIN/1.73
GLUCOSE BLD-MCNC: 149 MG/DL (ref 74–99)
GLUCOSE BLD-MCNC: 196 MG/DL (ref 74–99)
HCT VFR BLD CALC: 25.5 % (ref 34–48)
HCT VFR BLD CALC: 27.4 % (ref 34–48)
HEMOGLOBIN: 8.2 G/DL (ref 11.5–15.5)
HEMOGLOBIN: 9.1 G/DL (ref 11.5–15.5)
MAGNESIUM: 2.3 MG/DL (ref 1.6–2.6)
METER GLUCOSE: 142 MG/DL (ref 74–99)
METER GLUCOSE: 176 MG/DL (ref 74–99)
METER GLUCOSE: 188 MG/DL (ref 74–99)
METER GLUCOSE: 246 MG/DL (ref 74–99)
POTASSIUM SERPL-SCNC: 4.5 MMOL/L (ref 3.5–5)
POTASSIUM SERPL-SCNC: 5.1 MMOL/L (ref 3.5–5)
SODIUM BLD-SCNC: 130 MMOL/L (ref 132–146)
SODIUM BLD-SCNC: 131 MMOL/L (ref 132–146)

## 2022-07-22 PROCEDURE — 94640 AIRWAY INHALATION TREATMENT: CPT

## 2022-07-22 PROCEDURE — 2500000003 HC RX 250 WO HCPCS: Performed by: INTERNAL MEDICINE

## 2022-07-22 PROCEDURE — 6370000000 HC RX 637 (ALT 250 FOR IP): Performed by: INTERNAL MEDICINE

## 2022-07-22 PROCEDURE — 6370000000 HC RX 637 (ALT 250 FOR IP): Performed by: NURSE PRACTITIONER

## 2022-07-22 PROCEDURE — 80048 BASIC METABOLIC PNL TOTAL CA: CPT

## 2022-07-22 PROCEDURE — 2580000003 HC RX 258: Performed by: NURSE PRACTITIONER

## 2022-07-22 PROCEDURE — 85018 HEMOGLOBIN: CPT

## 2022-07-22 PROCEDURE — 85014 HEMATOCRIT: CPT

## 2022-07-22 PROCEDURE — 6360000002 HC RX W HCPCS: Performed by: SURGERY

## 2022-07-22 PROCEDURE — 2580000003 HC RX 258: Performed by: SURGERY

## 2022-07-22 PROCEDURE — 82962 GLUCOSE BLOOD TEST: CPT

## 2022-07-22 PROCEDURE — 36415 COLL VENOUS BLD VENIPUNCTURE: CPT

## 2022-07-22 PROCEDURE — 2700000000 HC OXYGEN THERAPY PER DAY

## 2022-07-22 PROCEDURE — 1200000000 HC SEMI PRIVATE

## 2022-07-22 PROCEDURE — 2060000000 HC ICU INTERMEDIATE R&B

## 2022-07-22 PROCEDURE — 83735 ASSAY OF MAGNESIUM: CPT

## 2022-07-22 RX ORDER — HYDRALAZINE HYDROCHLORIDE 20 MG/ML
5 INJECTION INTRAMUSCULAR; INTRAVENOUS EVERY 6 HOURS PRN
Status: DISCONTINUED | OUTPATIENT
Start: 2022-07-22 | End: 2022-07-25 | Stop reason: HOSPADM

## 2022-07-22 RX ORDER — LOSARTAN POTASSIUM 25 MG/1
25 TABLET ORAL DAILY
Status: DISCONTINUED | OUTPATIENT
Start: 2022-07-22 | End: 2022-07-25 | Stop reason: HOSPADM

## 2022-07-22 RX ORDER — BUMETANIDE 0.25 MG/ML
0.5 INJECTION, SOLUTION INTRAMUSCULAR; INTRAVENOUS ONCE
Status: COMPLETED | OUTPATIENT
Start: 2022-07-22 | End: 2022-07-22

## 2022-07-22 RX ORDER — TORSEMIDE 10 MG/1
10 TABLET ORAL DAILY
Status: DISCONTINUED | OUTPATIENT
Start: 2022-07-22 | End: 2022-07-25 | Stop reason: HOSPADM

## 2022-07-22 RX ORDER — GUAIFENESIN/DEXTROMETHORPHAN 100-10MG/5
5 SYRUP ORAL EVERY 4 HOURS PRN
Status: DISCONTINUED | OUTPATIENT
Start: 2022-07-22 | End: 2022-07-25 | Stop reason: HOSPADM

## 2022-07-22 RX ORDER — SODIUM CHLORIDE 9 MG/ML
INJECTION, SOLUTION INTRAVENOUS CONTINUOUS
Status: DISCONTINUED | OUTPATIENT
Start: 2022-07-22 | End: 2022-07-23

## 2022-07-22 RX ADMIN — INSULIN GLARGINE 12 UNITS: 100 INJECTION, SOLUTION SUBCUTANEOUS at 10:20

## 2022-07-22 RX ADMIN — Medication 10 ML: at 21:26

## 2022-07-22 RX ADMIN — ALBUTEROL SULFATE 2.5 MG: 2.5 SOLUTION RESPIRATORY (INHALATION) at 19:29

## 2022-07-22 RX ADMIN — TORSEMIDE 10 MG: 10 TABLET ORAL at 13:58

## 2022-07-22 RX ADMIN — SODIUM CHLORIDE: 9 INJECTION, SOLUTION INTRAVENOUS at 11:34

## 2022-07-22 RX ADMIN — POLYVINYL ALCOHOL 1 DROP: 14 SOLUTION/ DROPS OPHTHALMIC at 10:27

## 2022-07-22 RX ADMIN — LACTULOSE 20 G: 20 SOLUTION ORAL at 10:20

## 2022-07-22 RX ADMIN — ONDANSETRON 4 MG: 2 INJECTION INTRAMUSCULAR; INTRAVENOUS at 10:49

## 2022-07-22 RX ADMIN — GUAIFENESIN AND DEXTROMETHORPHAN 5 ML: 100; 10 SYRUP ORAL at 11:26

## 2022-07-22 RX ADMIN — ARFORMOTEROL TARTRATE 15 MCG: 15 SOLUTION RESPIRATORY (INHALATION) at 19:29

## 2022-07-22 RX ADMIN — BUDESONIDE 250 MCG: 0.25 SUSPENSION RESPIRATORY (INHALATION) at 19:29

## 2022-07-22 RX ADMIN — INSULIN LISPRO 4 UNITS: 100 INJECTION, SOLUTION INTRAVENOUS; SUBCUTANEOUS at 16:51

## 2022-07-22 RX ADMIN — LACTULOSE 20 G: 20 SOLUTION ORAL at 21:26

## 2022-07-22 RX ADMIN — INSULIN GLARGINE 12 UNITS: 100 INJECTION, SOLUTION SUBCUTANEOUS at 21:26

## 2022-07-22 RX ADMIN — POLYVINYL ALCOHOL 1 DROP: 14 SOLUTION/ DROPS OPHTHALMIC at 14:00

## 2022-07-22 RX ADMIN — INSULIN LISPRO 2 UNITS: 100 INJECTION, SOLUTION INTRAVENOUS; SUBCUTANEOUS at 11:34

## 2022-07-22 RX ADMIN — ALBUTEROL SULFATE 2.5 MG: 2.5 SOLUTION RESPIRATORY (INHALATION) at 12:53

## 2022-07-22 RX ADMIN — GABAPENTIN 300 MG: 300 CAPSULE ORAL at 16:51

## 2022-07-22 RX ADMIN — Medication 1 G: at 10:20

## 2022-07-22 RX ADMIN — INSULIN LISPRO 2 UNITS: 100 INJECTION, SOLUTION INTRAVENOUS; SUBCUTANEOUS at 21:26

## 2022-07-22 RX ADMIN — BUMETANIDE 0.5 MG: 0.25 INJECTION, SOLUTION INTRAMUSCULAR; INTRAVENOUS at 13:58

## 2022-07-22 RX ADMIN — AMLODIPINE BESYLATE 10 MG: 10 TABLET ORAL at 10:20

## 2022-07-22 RX ADMIN — ALBUTEROL SULFATE 2.5 MG: 2.5 SOLUTION RESPIRATORY (INHALATION) at 17:04

## 2022-07-22 RX ADMIN — POLYVINYL ALCOHOL 1 DROP: 14 SOLUTION/ DROPS OPHTHALMIC at 21:26

## 2022-07-22 RX ADMIN — SODIUM BICARBONATE 650 MG: 650 TABLET ORAL at 10:20

## 2022-07-22 RX ADMIN — LOSARTAN POTASSIUM 25 MG: 25 TABLET, FILM COATED ORAL at 11:25

## 2022-07-22 RX ADMIN — POLYVINYL ALCOHOL 1 DROP: 14 SOLUTION/ DROPS OPHTHALMIC at 17:04

## 2022-07-22 RX ADMIN — PRAVASTATIN SODIUM 20 MG: 20 TABLET ORAL at 21:26

## 2022-07-22 RX ADMIN — Medication 10 ML: at 10:27

## 2022-07-22 ASSESSMENT — PAIN SCALES - GENERAL: PAINLEVEL_OUTOF10: 0

## 2022-07-22 NOTE — PROGRESS NOTES
Hospitalist Progress Note      PCP: Saira Simpson DO    Date of Admission: 7/17/2022        Hospital Course:  80 y.o. female presented with GIB AND SOB. STATES HER STOOLS HAVE BEEN BLACK . EGD DONE  No evidence of an active source for an upper GI bleed  SHE THOUGHT IT WAS FROM HER IRON PILLS, THEN SHE BECAME VERY SOB. SHE IS ON ELIQUIS FOR HISTORY OF PE.  FOUND TO HAVE A LOW HGB OF 5.2 ,  FLUIDS STARTED TODAY BY NEPHROLOGY,  SHE IS BEGINNING TO HAVE A BM . DAUGHTER CONCERNED ABOUT DISCHARGE.   HOPEFULLY QUESTIONS ANSWERED AND REASSURANCE GIVEN            Subjective:  NEEDS TO HAVE A BM          Medications:  Reviewed    Infusion Medications    sodium chloride 50 mL/hr at 07/22/22 1134    dextrose      sodium chloride      sodium chloride       Scheduled Medications    losartan  25 mg Oral Daily    torsemide  10 mg Oral Daily    insulin lispro  0-10 Units SubCUTAneous 4x Daily AC & HS    insulin glargine  12 Units SubCUTAneous BID    lactulose  20 g Oral BID    sodium bicarbonate  650 mg Oral Daily    amLODIPine  10 mg Oral Daily    [Held by provider] apixaban  5 mg Oral BID    [Held by provider] aspirin  81 mg Oral Daily    ferrous sulfate  325 mg Oral Daily    gabapentin  300 mg Oral QPM    [Held by provider] hydrALAZINE  25 mg Oral BID    polyvinyl alcohol  1 drop Both Eyes 4x Daily    pravastatin  20 mg Oral Nightly    sodium chloride flush  5-40 mL IntraVENous 2 times per day    albuterol  2.5 mg Nebulization 4x daily    Arformoterol Tartrate  15 mcg Nebulization BID    And    budesonide  0.25 mg Nebulization BID     PRN Meds: hydrALAZINE, guaiFENesin-dextromethorphan, glucose, dextrose bolus **OR** dextrose bolus, glucagon (rDNA), dextrose, bisacodyl, sodium chloride flush, sodium chloride, ondansetron **OR** ondansetron, acetaminophen **OR** acetaminophen, sodium chloride, perflutren lipid microspheres      Intake/Output Summary (Last 24 hours) at 7/22/2022 1518  Last data filed at 7/22/2022 1415  Gross per 24 hour   Intake --   Output 850 ml   Net -850 ml       Exam:    BP (!) 155/68   Pulse 79   Temp 98.8 °F (37.1 °C) (Oral)   Resp 19   Ht 5' 3\" (1.6 m)   Wt 135 lb (61.2 kg)   LMP 12/03/1997   SpO2 93%   BMI 23.91 kg/m²       General appearance:  No apparent distress,  HEENT:  Normal cephalic,  Neck: Supple, with full range of motion. Trachea midline. Respiratory:  Normal respiratory effort. CTA   BILATERALLY   Cardiovascular:  Regular rate and rhythm  Abdomen: Soft, non-tender, non-distended with normal bowel sounds. Musculoskeletal:  No clubbing, cyanosis or edema bilaterally. Skin: Skin color, texture, turgor normal.  No rashes or lesions. Neurologic:  Neurovascularly intact al.  Psychiatric:  Alert and oriented,                  Labs:   Recent Labs     07/21/22  1140 07/21/22  1540 07/22/22  0300   HGB 9.6* 8.9* 8.2*   HCT 28.9* 27.4* 25.5*     Recent Labs     07/21/22  0513 07/21/22  1140 07/22/22  0300   * 128* 131*   K 4.9 4.8 4.5   CL 95* 98 102   CO2 19* 21* 22   BUN 20 20 22   CREATININE 0.6 0.6 0.6   CALCIUM 9.0 9.1 8.8     No results for input(s): AST, ALT, BILIDIR, BILITOT, ALKPHOS in the last 72 hours. No results for input(s): INR in the last 72 hours. No results for input(s): Jones Jakes in the last 72 hours. No results for input(s): AST, ALT, ALB, BILIDIR, BILITOT, ALKPHOS in the last 72 hours. No results for input(s): LACTA in the last 72 hours.   Lab Results   Component Value Date    LABURIC 5.2 07/19/2022     No results found for: AMMONIA    Assessment:    Active Hospital Problems    Diagnosis Date Noted    Acute heart failure ([facility] [I50.9] 07/18/2022     Priority: Medium    Acute anemia [D64.9] 12/03/2021    HLD (hyperlipidemia) [E78.5] 12/03/2021    History of pulmonary embolus (PE) [Z86.711] 05/29/2021    Hyponatremia [E87.1] 05/18/2021    DM (diabetes mellitus), type 2 ([facility] [E11.9] 05/18/2021    GERD (gastroesophageal reflux disease) [K21.9] 05/23/2017    Idiopathic peripheral neuropathy [G60.9] 09/26/2016    CAD (coronary artery disease) [I25.10] 09/26/2016    HTN (hypertension) [I10] 09/26/2016       Plan:  NO DIURETICS FOR NOW PER NEPHROLOGY   ON BICARB   IVF     DVT Prophylaxis: SCD  Diet: ADULT DIET; Regular;  Low Sodium (2 gm); 1000 ml  Code Status: Full Code     PT/OT Eval Status: ORDERED     Dispo - HOME VS CANDIDA         Electronically signed by Errol Rossi DO on 7/22/2022 at 3:18 PM [facility]

## 2022-07-22 NOTE — DISCHARGE INSTR - COC
Continuity of Care Form    Patient Name: Rubens Damon   :  1927  MRN:  [id number]    Admit date:  2022  Discharge date:  22    Code Status Order: Full Code   Advance Directives:     Admitting Physician:  Elroy Siddiqi DO  PCP: Monica Martel DO    Discharging Nurse: Naomi Howell RN    [address] Unit/Room#: 8104/6535-Z  Discharging Unit Phone Number: [phone]    Emergency Contact:   Extended Emergency Contact Information  Primary Emergency Contact: [facility]  Address: [facility],  [facility] Phone: [phone]  Relation: Child  Secondary Emergency Contact: Arian Melvin  Address: [address] [place] [address],  [address] Phone: [phone]  Relation: Child    Past Surgical History:  Past Surgical History:   Procedure Laterality Date    ABDOMEN SURGERY      APPENDECTOMY      CARDIAC SURGERY      CHOLECYSTECTOMY      COLONOSCOPY      CORONARY ARTERY BYPASS GRAFT      8/28/10    ENDOSCOPY, COLON, DIAGNOSTIC      FOOT DEBRIDEMENT Left 2021    FOOT DEBRIDEMENT INCISION AND DRAINAGE.    performed by Cale Mayer DPM at . [address] Left 2021    LEFT FOOT DEBRIDEMENT  WITH DELAYED PRIMARY CLOSURE performed by Carlos A Kumari DPM at . [address] ([address] [place] [address])      frankie and bso     TONSILLECTOMY AND ADENOIDECTOMY      UPPER GASTROINTESTINAL ENDOSCOPY N/A 2022    EGD ESOPHAGOGASTRODUODENOSCOPY performed by Stephanie Penn MD at [facility] ENDOSCOPY       Immunization History:   Immunization History   Administered Date(s) Administered    COVID-19, PFIZER PURPLE top, DILUTE for use, (age 15 y+), 30mcg/0.3mL 2021, 10/07/2021    Influenza, High Dose (Fluzone 65 yrs and older) 2015, 2016, 10/24/2017, 10/03/2018    Pneumococcal Conjugate 13-valent (Iusijwi34) 2015    Pneumococcal Polysaccharide (Pmyzmdtyt64) 2014    Tdap (Boostrix, Adacel) 07/05/2019       Active Problems:  Patient Active Problem List   Diagnosis Code    Asthma J45.909    CAD (coronary artery disease) I25.10    Vitamin D deficiency E55.9    HTN (hypertension) I10    Idiopathic peripheral neuropathy G60.9    Rhinitis, allergic J30.9    GERD (gastroesophageal reflux disease) K21.9    PVD (peripheral vascular disease) with claudication ([facility]) I73.9    Gait instability R26.81    Hyponatremia E87.1    DM (diabetes mellitus), type 2 ([facility]) E11.9    History of pulmonary embolus (PE) Z86.711    Peripheral vascular angioplasty status Z98.62    Non-proliferative diabetic retinopathy, mild, both eyes ([facility]) Q67.6442    Leg swelling M79.89    Compression fracture of L5 lumbar vertebra, closed, initial encounter ([facility] S32.050A    HLD (hyperlipidemia) E78.5    Compression fracture of thoracic vertebra ([facility]) S22.000A    Acute anemia D64.9    Spinal stenosis M48.00    Acute heart failure ([facility]) I50.9       Isolation/Infection:   Isolation            No Isolation          Patient Infection Status       Infection Onset Added Last Indicated Last Indicated By Review Planned Expiration Resolved Resolved By    None active    Resolved    COVID-19 (Rule Out) 07/17/22 07/17/22 07/17/22 COVID-19, Rapid (Ordered)   07/17/22 Rule-Out Test Resulted            Nurse Assessment:  Last Vital Signs: BP (!) 155/68   Pulse 79   Temp 98.8 °F (37.1 °C) (Oral)   Resp 19   Ht 5' 3\" (1.6 m)   Wt 135 lb (61.2 kg)   LMP 12/03/1997   SpO2 93%   BMI 23.91 kg/m²     Last documented pain score (0-10 scale): Pain Level: 0  Last Weight:   Wt Readings from Last 1 Encounters:   05/02/22 133 lb (60.3 kg)     Mental Status:  oriented and alert    IV Access:  - None    Nursing Mobility/ADLs:  Walking   Assisted  Transfer  Assisted  Bathing  Assisted  Dressing  Assisted  Toileting  Assisted  Feeding  Independent  Med Admin  Independent  Med Delivery   whole    Wound Care Documentation and Therapy:  Wound 06/14/21 Foot Left;Medial #3 (Active)   Number of days: 403        Elimination:  Continence: Bowel: Yes  Bladder: No  Urinary Catheter: None   Colostomy/Ileostomy/Ileal Conduit: No       Date of Last BM: 7/24/22    Intake/Output Summary (Last 24 hours) at 7/22/2022 1456  Last data filed at 7/22/2022 1415  Gross per 24 hour   Intake --   Output 850 ml   Net -850 ml     I/O last 3 completed shifts: In: 61 [P.O.:60]  Out: 1150 [Urine:1150]    Safety Concerns: At Risk for Falls    Impairments/Disabilities:      None    Nutrition Therapy:  Current Nutrition Therapy:   - Oral Diet:  General    Routes of Feeding: Oral  Liquids: Thin Liquids  Daily Fluid Restriction: no  Last Modified Barium Swallow with Video (Video Swallowing Test): not done    Treatments at the Time of Hospital Discharge:   Respiratory Treatments:   Oxygen Therapy:  is on oxygen at 2 L/min per nasal cannula.  At night  Ventilator:    - No ventilator support    Rehab Therapies: Physical Therapy and Occupational Therapy  Weight Bearing Status/Restrictions: No weight bearing restrictions  Other Medical Equipment (for information only, NOT a DME order):  wheelchair  Other Treatments:     Patient's personal belongings (please select all that are sent with patient):  Hearing Aides bilateral, Dentures upper and lower    RN SIGNATURE:  Electronically signed by Khadijah Gutierrez RN on 7/25/22 at 6:36 PM EDT    CASE MANAGEMENT/SOCIAL WORK SECTION    Inpatient Status Date:     Readmission Risk Assessment Score:  Readmission Risk              Risk of Unplanned Readmission:  [id number]           Discharging to Facility/ Agency   Name: [facility]  Address:  Phone: [phone]    Dialysis Facility (if applicable)   Name:  Address:  Dialysis Schedule:  Phone:  Fax:    / signature: Electronically signed by GLORIA Dill on 7/22/2022 at 2:57 PM    PHYSICIAN SECTION    Prognosis: Fair    Condition at Discharge: Stable    Rehab Potential (if transferring to Rehab): Fair    Recommended Labs or Other Treatments After Discharge:     Physician Certification: I certify the above information and transfer of Tim Paiz  is necessary for the continuing treatment of the diagnosis listed and that she requires [facility] for less 30 days.      Update Admission H&P: No change in H&P    PHYSICIAN SIGNATURE:  Dre Ortega MD

## 2022-07-22 NOTE — PROGRESS NOTES
PROGRESS NOTE       PATIENT PROBLEM LIST:  Patient Active Problem List   Diagnosis Code    Asthma J45.909    CAD (coronary artery disease) I25.10    Vitamin D deficiency E55.9    HTN (hypertension) I10    Idiopathic peripheral neuropathy G60.9    Rhinitis, allergic J30.9    GERD (gastroesophageal reflux disease) K21.9    PVD (peripheral vascular disease) with claudication ([facility]) I73.9    Gait instability R26.81    Hyponatremia E87.1    DM (diabetes mellitus), type 2 ([facility]) E11.9    History of pulmonary embolus (PE) Z86.711    Peripheral vascular angioplasty status Z98.62    Non-proliferative diabetic retinopathy, mild, both eyes ([facility]) J34.4721    Leg swelling M79.89    Compression fracture of L5 lumbar vertebra, closed, initial encounter ([facility] S32.050A    HLD (hyperlipidemia) E78.5    Compression fracture of thoracic vertebra ([facility]) S22.000A    Acute anemia D64.9    Spinal stenosis M48.00    Ac[facility] failure [facility] I50.9       SUBJECTIVE:  Nicole Stanford states she Feels somewhat less short of breath today But has persistent intermittent mucinous cough for which she has difficulty in expectoration. She denies any chest discomfort or lightheadedness. REVIEW OF SYSTEMS:  General ROS: negative for - fatigue, malaise,  weight gain or weight loss  Psychological ROS: negative for - anxiety , depression  Ophthalmic ROS: negative for - decreased vision or visual distortion. ENT ROS: negative  Allergy and Immunology ROS: negative  Hematological and Lymphatic ROS: negative  Endocrine: no heat or cold intolerance and no polyphagia, polydipsia, or polyuria  Respiratory ROS: positive for - shortness of breath  Cardiovascular ROS: positive for - dyspnea on exertion.   Gastrointestinal ROS: no abdominal pain, change in bowel habits, or black or bloody stools  Genito-Urinary ROS: no nocturia, dysuria, trouble voiding, frequency or hematuria  Musculoskeletal ROS: negative for- myalgias, arthralgias, or claudication  Neurological ROS: no TIA or stroke symptoms otherwise no significant change in symptoms or problems since yesterday as documented in previous progress notes. SCHEDULED MEDICATIONS:   losartan  25 mg Oral Daily    insulin lispro  0-10 Units SubCUTAneous 4x Daily AC & HS    insulin glargine  12 Units SubCUTAneous BID    lactulose  20 g Oral BID    sodium bicarbonate  650 mg Oral Daily    amLODIPine  10 mg Oral Daily    [Held by provider] apixaban  5 mg Oral BID    [Held by provider] aspirin  81 mg Oral Daily    ferrous sulfate  325 mg Oral Daily    gabapentin  300 mg Oral QPM    [Held by provider] hydrALAZINE  25 mg Oral BID    polyvinyl alcohol  1 drop Both Eyes 4x Daily    pravastatin  20 mg Oral Nightly    sodium chloride flush  5-40 mL IntraVENous 2 times per day    albuterol  2.5 mg Nebulization 4x daily    Arformoterol Tartrate  15 mcg Nebulization BID    And    budesonide  0.25 mg Nebulization BID       VITAL SIGNS:                                                                                                                          BP (!) 176/81   Pulse 87   Temp 98.9 °F (37.2 °C) (Oral)   Resp 18   Ht 5' 3\" (1.6 m)   Wt 135 lb (61.2 kg)   LMP 12/03/1997   SpO2 93%   BMI 23.91 kg/m²   No data found. OBJECTIVE:    HEENT: PERRL, EOM  Intact; sclera non-icteric, conjunctiva pink. Carotids are brisk in upstroke with normal contour. No carotid bruits. Normal jugular venous pulsation at 45°. No palpable cervical nor supraclavicular nodes. Thyroid not palpable. Trachea midline. Chest: Even excursion  Lungs: Grossly clear to auscultation bilaterally no decreased tactile fremitus without inspiratory rales. Heart: Regular  rhythm; S1 > S2, no gallop. Grade 2/6 short systolic murmur heard in the apex and LMSB No clicks, rub, palpable thrills   or heaves. PMI nondisplaced, 5th intercostal space MCL. Abdomen: Soft, nontender, nondistended,  mildly protuberant, no masses or organomegaly. Report      IMPRESSIONS:  Patient Active Problem List   Diagnosis Code    Asthma J45.909    CAD (coronary artery disease) I25.10    Vitamin D deficiency E55.9    HTN (hypertension) I10    Idiopathic peripheral neuropathy G60.9    Rhinitis, allergic J30.9    GERD (gastroesophageal reflux disease) K21.9    PVD (peripheral vascular disease) with claudication ([facility]) I73.9    Gait instability R26.81    Hyponatremia E87.1    DM (diabetes mellitus), type 2 (HCC) E11.9    History of pulmonary embolus (PE) Z86.711    Peripheral vascular angioplasty status Z98.62    Non-proliferative diabetic retinopathy, mild, both eyes ([facility]) G70.0507    Leg swelling M79.89    Compression fracture of L5 lumbar vertebra, closed, initial encounter ([facility] S32.050A    HLD (hyperlipidemia) E78.5    Compression fracture of thoracic vertebra ([facility]) S22.000A    Acute anemia D64.9    Spinal stenosis M48.00    Acute heart failure (HCC) I50.9       RECOMMENDATIONS:    Ms. Graham Comment slowly improving specifically that of her serum sodium and I remain concerned as far as her volume status due to her elevated proBNP and knowledge that her left ventricular systolic function has reduced to a left ventricular ejection fraction of 45 ± 5%. Would adjust fluid management with nephrology to avoid hyponatremia yet avoid volume overload at the same time. I have spent more than 25 minutes face to face with Rubens Damon and reviewing notes and laboratory data, with greater than 50% of this time instructing and counseling the patient face to face regarding my findings and recommendations and I have answered all questions as posed to me by Ms. Henri Duggan. Delmy Chaves DO FACP,FACC,FSCAI      NOTE:  This report was transcribed using voice recognition software.   Every effort was made to ensure accuracy; however, inadvertent computerized transcription errors may be present

## 2022-07-22 NOTE — CARE COORDINATION
Social Work:    Social work spoke with patient's daughter Sushma Bee, as well as son/POA Aubrie Licona, advised them about social work &  roles, as well as discussed discharge planning. Javier Blum feel Mrs. Liz Galicia would benefit from a short rehab stay at [facility] at the [facility] ([facility]). Aubrie Licona inquired about approximate discharge day as he feels his mother is improving and hopes she does not discharge too soon.  Andrea Boles assisted social work on speaker phone and advised Aubrie Licona that once Mrs. Melvin's labs stabilize, she likely will transfer which could be as soon as 1-2 days. Aubrie Licona was appreciative of the update. Social work called a referral in to [employer] [facility] [email or web address] at the [place].      Electronically signed by GLORIA Lara on 7/22/2022 at 2:49 PM

## 2022-07-22 NOTE — CARE COORDINATION
Social Work:    Accepted at [facility]. Social work updated patient, daughter Catarina Powell, and son Matt Vasquez at bedside.   ([place], N-17, ambulance completed)    Electronically signed by GLORIA Hylton on 7/22/2022 at 3:39 PM

## 2022-07-22 NOTE — PROGRESS NOTES
[facility] Quality Flow/Interdisciplinary Rounds Progress Note        Quality Flow Rounds held on July 22, 2022    Disciplines Attending:  Bedside Nurse, , , and Nursing Unit Leadership    Ace Segura was admitted on 7/17/2022  9:06 PM    Anticipated Discharge Date:  Expected Discharge Date: 07/22/22    Disposition:    Scott Score:  Scott Scale Score: 17    Readmission Risk              Risk of Unplanned Readmission:  [id number]           Discussed patient goal for the day, patient clinical progression, and barriers to discharge. The following Goal(s) of the Day/Commitment(s) have been identified:  Remove shi cath, check Nephrology plan for ok to return.       Pradeep Ruiz RN  July 22, 2022

## 2022-07-22 NOTE — PROGRESS NOTES
Department of Internal Medicine  Nephrology Progress Note      Events reviewed. SUBJECTIVE:  We are following Ms. Yamilet Cole for hyponatremia. She reports continued shortness of breath.     PHYSICAL EXAM:      Vitals:    VITALS:  BP (!) 176/81   Pulse 87   Temp 98.9 °F (37.2 °C) (Oral)   Resp 18   Ht 5' 3\" (1.6 m)   Wt 135 lb (61.2 kg)   LMP 12/03/1997   SpO2 93%   BMI 23.91 kg/m²   24HR INTAKE/OUTPUT:    Intake/Output Summary (Last 24 hours) at 7/22/2022 1104  Last data filed at 7/21/2022 2221  Gross per 24 hour   Intake --   Output 400 ml   Net -400 ml       Constitutional:  Alert and oriented, NAD  HEENT:  Normocephalic, PERRL, dry mucous membranes  Respiratory:  Diminished lung sounds  Cardiovascular/Edema:  RRR, S1,S2  Gastrointestinal:  Soft, rounded, nontender, nondistended  Neurologic:  Nonfocal, SHARMA  Skin:  Warm, dry, ecchymotic  Other:  No edema     Scheduled Meds:   losartan  25 mg Oral Daily    insulin lispro  0-10 Units SubCUTAneous 4x Daily AC & HS    insulin glargine  12 Units SubCUTAneous BID    lactulose  20 g Oral BID    sodium bicarbonate  650 mg Oral Daily    amLODIPine  10 mg Oral Daily    [Held by provider] apixaban  5 mg Oral BID    [Held by provider] aspirin  81 mg Oral Daily    ferrous sulfate  325 mg Oral Daily    gabapentin  300 mg Oral QPM    [Held by provider] hydrALAZINE  25 mg Oral BID    polyvinyl alcohol  1 drop Both Eyes 4x Daily    pravastatin  20 mg Oral Nightly    sodium chloride flush  5-40 mL IntraVENous 2 times per day    albuterol  2.5 mg Nebulization 4x daily    Arformoterol Tartrate  15 mcg Nebulization BID    And    budesonide  0.25 mg Nebulization BID     Continuous Infusions:   sodium chloride      dextrose      sodium chloride      sodium chloride       PRN Meds:.hydrALAZINE, guaiFENesin-dextromethorphan, glucose, dextrose bolus **OR** dextrose bolus, glucagon (rDNA), dextrose, bisacodyl, sodium chloride flush, sodium chloride, ondansetron **OR** ondansetron, acetaminophen **OR** acetaminophen, hydrALAZINE, sodium chloride, perflutren lipid microspheres    DATA:    CBC:   Lab Results   Component Value Date/Time    WBC 9.1 07/17/2022 09:32 PM    RBC 1.45 07/17/2022 09:32 PM    HGB 8.2 07/22/2022 03:00 AM    HCT 25.5 07/22/2022 03:00 AM    .3 07/17/2022 09:32 PM    MCH 35.9 07/17/2022 09:32 PM    MCHC 33.1 07/17/2022 09:32 PM    RDW 16.7 07/17/2022 09:32 PM     07/17/2022 09:32 PM    MPV 9.7 07/17/2022 09:32 PM     CMP:    Lab Results   Component Value Date/Time     07/22/2022 03:00 AM    K 4.5 07/22/2022 03:00 AM    K 3.5 07/20/2022 04:05 AM     07/22/2022 03:00 AM    CO2 22 07/22/2022 03:00 AM    BUN 22 07/22/2022 03:00 AM    CREATININE 0.6 07/22/2022 03:00 AM    GFRAA >60 07/22/2022 03:00 AM    LABGLOM >60 07/22/2022 03:00 AM    GLUCOSE 149 07/22/2022 03:00 AM    PROT 5.7 07/17/2022 09:32 PM    LABALBU 3.7 07/17/2022 09:32 PM    CALCIUM 8.8 07/22/2022 03:00 AM    BILITOT 0.3 07/17/2022 09:32 PM    ALKPHOS 53 07/17/2022 09:32 PM    AST 26 07/17/2022 09:32 PM    ALT 30 07/17/2022 09:32 PM     Magnesium:    Lab Results   Component Value Date/Time    MG 2.3 07/22/2022 03:00 AM     Phosphorus:  No results found for: PHOS  Radiology Review:      CXR 7/17/22   Cardiomegaly with pulmonary edema. BRIEF SUMMARY OF INITIAL CONSULT:    Briefly, Ms. Kahlil Bob is a 80year old female with a PMH of HTN, type II DM, asthma, CAD s/p CABG, history of PE, HLD, HFpEF 68%, PVD, who was admitted on July 17, 2022 after presenting to the ER with shortness of breath. A chest x-ray was obtained which revealed cardiomegaly with pulmonary edema. Upon arrival to ER, she was found to be hyponatremic with a sodium level of 118, which is the reason for this consultation. She was also found to be anemic with a hemoglobin of 5.2. She received PRBCs in ER followed by Bumex 1 mg IV. Her pro-BNP on admission was 3,508.  She states she eats 3 small meals a day but drinks about 6 bottles of water a day. She denies any vomiting or diarrhea. Problems resolved:  Non anion gap metabolic acidosis, on sodium bicarbonate    IMPRESSION/RECOMMENDATIONS:      Hypotonic hyponatremia, likely hemodynamically mediated secondary to intravascular volume depletion (anemia, diuretics). May also have a component of SIADH. Urine sodium <20 and urine osmolality 452. Repeat urine sodium 42 and urine osmolality 432. Most recent urine sodium <20 and urine osmolality 554. She states she has been vomiting and not eating well. Will start on normal saline. Sodium level improving. Probable CKD, UPCR 2.2. Will need repeated in 3 months. HTN, on amlodipine. Needs better BP control. Will add losartan due to proteinuria. HFpEF 68%, pro-BNP 3,508>>5,044, to hold diuretics for now  --------------------------------------------  CAD, s/p CABG  History of PE, apixaban on hold  Macrocytic anemia, s/p multiple transfusions. EGD unremarkable. Iron 181, iron saturation 181%, and ferritin 41.       Plan:    NS at 50 cc/hr  Discontinue sodium chloride tablets  Continue sodium bicarbonate 650 mg PO BID  Continue to monitor sodium level, BMP 4 PM  Dry tray  Repeat pro-BNP      Electronically signed by VIK Cordova CNP on 7/22/2022 at 11:04 AM

## 2022-07-23 LAB
ANION GAP SERPL CALCULATED.3IONS-SCNC: 8 MMOL/L (ref 7–16)
BUN BLDV-MCNC: 20 MG/DL (ref 6–23)
CALCIUM SERPL-MCNC: 8.7 MG/DL (ref 8.6–10.2)
CHLORIDE BLD-SCNC: 104 MMOL/L (ref 98–107)
CO2: 24 MMOL/L (ref 22–29)
CREAT SERPL-MCNC: 0.7 MG/DL (ref 0.5–1)
GFR AFRICAN AMERICAN: >60
GFR NON-AFRICAN AMERICAN: >60 ML/MIN/1.73
GLUCOSE BLD-MCNC: 47 MG/DL (ref 74–99)
HCT VFR BLD CALC: 26.4 % (ref 34–48)
HEMOGLOBIN: 8.5 G/DL (ref 11.5–15.5)
MAGNESIUM: 2 MG/DL (ref 1.6–2.6)
METER GLUCOSE: 109 MG/DL (ref 74–99)
METER GLUCOSE: 110 MG/DL (ref 74–99)
METER GLUCOSE: 159 MG/DL (ref 74–99)
METER GLUCOSE: 206 MG/DL (ref 74–99)
METER GLUCOSE: 46 MG/DL (ref 74–99)
METER GLUCOSE: 59 MG/DL (ref 74–99)
METER GLUCOSE: 65 MG/DL (ref 74–99)
METER GLUCOSE: 92 MG/DL (ref 74–99)
POTASSIUM SERPL-SCNC: 3.6 MMOL/L (ref 3.5–5)
PRO-BNP: ABNORMAL PG/ML (ref 0–450)
SODIUM BLD-SCNC: 136 MMOL/L (ref 132–146)

## 2022-07-23 PROCEDURE — 80048 BASIC METABOLIC PNL TOTAL CA: CPT

## 2022-07-23 PROCEDURE — 6370000000 HC RX 637 (ALT 250 FOR IP): Performed by: INTERNAL MEDICINE

## 2022-07-23 PROCEDURE — 36415 COLL VENOUS BLD VENIPUNCTURE: CPT

## 2022-07-23 PROCEDURE — 85014 HEMATOCRIT: CPT

## 2022-07-23 PROCEDURE — 2580000003 HC RX 258: Performed by: NURSE PRACTITIONER

## 2022-07-23 PROCEDURE — 85018 HEMOGLOBIN: CPT

## 2022-07-23 PROCEDURE — 2700000000 HC OXYGEN THERAPY PER DAY

## 2022-07-23 PROCEDURE — 6370000000 HC RX 637 (ALT 250 FOR IP): Performed by: NURSE PRACTITIONER

## 2022-07-23 PROCEDURE — 83735 ASSAY OF MAGNESIUM: CPT

## 2022-07-23 PROCEDURE — 2580000003 HC RX 258: Performed by: INTERNAL MEDICINE

## 2022-07-23 PROCEDURE — 6360000002 HC RX W HCPCS: Performed by: SURGERY

## 2022-07-23 PROCEDURE — 1200000000 HC SEMI PRIVATE

## 2022-07-23 PROCEDURE — 94640 AIRWAY INHALATION TREATMENT: CPT

## 2022-07-23 PROCEDURE — 82962 GLUCOSE BLOOD TEST: CPT

## 2022-07-23 PROCEDURE — 83880 ASSAY OF NATRIURETIC PEPTIDE: CPT

## 2022-07-23 PROCEDURE — 2580000003 HC RX 258: Performed by: SURGERY

## 2022-07-23 PROCEDURE — 2060000000 HC ICU INTERMEDIATE R&B

## 2022-07-23 RX ORDER — POTASSIUM CHLORIDE 20 MEQ/1
40 TABLET, EXTENDED RELEASE ORAL ONCE
Status: COMPLETED | OUTPATIENT
Start: 2022-07-23 | End: 2022-07-23

## 2022-07-23 RX ORDER — CALCIUM CARBONATE 200(500)MG
500 TABLET,CHEWABLE ORAL 3 TIMES DAILY PRN
Status: DISCONTINUED | OUTPATIENT
Start: 2022-07-23 | End: 2022-07-25 | Stop reason: HOSPADM

## 2022-07-23 RX ADMIN — Medication 10 ML: at 20:31

## 2022-07-23 RX ADMIN — GABAPENTIN 300 MG: 300 CAPSULE ORAL at 18:50

## 2022-07-23 RX ADMIN — POLYVINYL ALCOHOL 1 DROP: 14 SOLUTION/ DROPS OPHTHALMIC at 14:50

## 2022-07-23 RX ADMIN — ARFORMOTEROL TARTRATE 15 MCG: 15 SOLUTION RESPIRATORY (INHALATION) at 07:56

## 2022-07-23 RX ADMIN — ARFORMOTEROL TARTRATE 15 MCG: 15 SOLUTION RESPIRATORY (INHALATION) at 20:56

## 2022-07-23 RX ADMIN — DEXTROSE MONOHYDRATE 125 ML: 100 INJECTION, SOLUTION INTRAVENOUS at 06:09

## 2022-07-23 RX ADMIN — SODIUM BICARBONATE 650 MG: 650 TABLET ORAL at 09:23

## 2022-07-23 RX ADMIN — POTASSIUM CHLORIDE 40 MEQ: 20 TABLET, EXTENDED RELEASE ORAL at 10:39

## 2022-07-23 RX ADMIN — LACTULOSE 20 G: 20 SOLUTION ORAL at 20:31

## 2022-07-23 RX ADMIN — PRAVASTATIN SODIUM 20 MG: 20 TABLET ORAL at 20:31

## 2022-07-23 RX ADMIN — INSULIN LISPRO 2 UNITS: 100 INJECTION, SOLUTION INTRAVENOUS; SUBCUTANEOUS at 15:41

## 2022-07-23 RX ADMIN — AMLODIPINE BESYLATE 10 MG: 10 TABLET ORAL at 09:23

## 2022-07-23 RX ADMIN — POLYVINYL ALCOHOL 1 DROP: 14 SOLUTION/ DROPS OPHTHALMIC at 09:39

## 2022-07-23 RX ADMIN — BUDESONIDE 250 MCG: 0.25 SUSPENSION RESPIRATORY (INHALATION) at 20:55

## 2022-07-23 RX ADMIN — DEXTROSE MONOHYDRATE 125 ML: 100 INJECTION, SOLUTION INTRAVENOUS at 03:18

## 2022-07-23 RX ADMIN — ALBUTEROL SULFATE 2.5 MG: 2.5 SOLUTION RESPIRATORY (INHALATION) at 07:56

## 2022-07-23 RX ADMIN — FERROUS SULFATE TAB 325 MG (65 MG ELEMENTAL FE) 325 MG: 325 (65 FE) TAB at 09:23

## 2022-07-23 RX ADMIN — INSULIN GLARGINE 12 UNITS: 100 INJECTION, SOLUTION SUBCUTANEOUS at 09:24

## 2022-07-23 RX ADMIN — LOSARTAN POTASSIUM 25 MG: 25 TABLET, FILM COATED ORAL at 09:27

## 2022-07-23 RX ADMIN — LACTULOSE 20 G: 20 SOLUTION ORAL at 09:23

## 2022-07-23 RX ADMIN — BUDESONIDE 250 MCG: 0.25 SUSPENSION RESPIRATORY (INHALATION) at 07:57

## 2022-07-23 RX ADMIN — SODIUM CHLORIDE: 9 INJECTION, SOLUTION INTRAVENOUS at 06:55

## 2022-07-23 RX ADMIN — ALBUTEROL SULFATE 2.5 MG: 2.5 SOLUTION RESPIRATORY (INHALATION) at 12:32

## 2022-07-23 RX ADMIN — POLYVINYL ALCOHOL 1 DROP: 14 SOLUTION/ DROPS OPHTHALMIC at 20:31

## 2022-07-23 RX ADMIN — ALBUTEROL SULFATE 2.5 MG: 2.5 SOLUTION RESPIRATORY (INHALATION) at 20:56

## 2022-07-23 RX ADMIN — INSULIN LISPRO 4 UNITS: 100 INJECTION, SOLUTION INTRAVENOUS; SUBCUTANEOUS at 10:41

## 2022-07-23 RX ADMIN — TORSEMIDE 10 MG: 10 TABLET ORAL at 09:23

## 2022-07-23 ASSESSMENT — PAIN SCALES - GENERAL: PAINLEVEL_OUTOF10: 0

## 2022-07-23 NOTE — PLAN OF CARE
Problem: Discharge Planning  Goal: Discharge to home or other facility with appropriate resources  Outcome: Progressing  Flowsheets (Taken 7/23/2022 0930)  Discharge to home or other facility with appropriate resources:   Arrange for needed discharge resources and transportation as appropriate   Identify barriers to discharge with patient and caregiver     Problem: Pain  Goal: Verbalizes/displays adequate comfort level or baseline comfort level  Outcome: Progressing     Problem: Safety - Adult  Goal: Free from fall injury  Outcome: Progressing  Flowsheets (Taken 7/23/2022 0930)  Free From Fall Injury: Instruct family/caregiver on patient safety     Problem: ABCDS Injury Assessment  Goal: Absence of physical injury  Outcome: Progressing  Flowsheets (Taken 7/23/2022 0930)  Absence of Physical Injury: Implement safety measures based on patient assessment     Problem: Skin/Tissue Integrity  Goal: Absence of new skin breakdown  Outcome: Progressing     Problem: Chronic Conditions and Co-morbidities  Goal: Patient's chronic conditions and co-morbidity symptoms are monitored and maintained or improved  Outcome: Progressing  Flowsheets (Taken 7/23/2022 0930)  Care Plan - Patient's Chronic Conditions and Co-Morbidity Symptoms are Monitored and Maintained or Improved: Monitor and assess patient's chronic conditions and comorbid symptoms for stability, deterioration, or improvement

## 2022-07-23 NOTE — PROGRESS NOTES
Hospitalist Progress Note      PCP: Christina Singh DO    Date of Admission: 7/17/2022        Hospital Course:  80 y.o. female presented with GIB AND SOB. STATES HER STOOLS HAVE BEEN BLACK . EGD DONE  No evidence of an active source for an upper GI bleed  SHE THOUGHT IT WAS FROM HER IRON PILLS, THEN SHE BECAME VERY SOB. SHE IS ON ELIQUIS FOR HISTORY OF PE.  FOUND TO HAVE A LOW HGB OF 5.2 ,  FLUIDS STARTED TODAY BY NEPHROLOGY,  SHE IS BEGINNING TO HAVE A BM .  SIGNIFICANT BUMP IN HER BNP.   WILL HOLD IVF UNTIL DISCUSSED WITH RENAL           Subjective:  FEELING BETTER          Medications:  Reviewed    Infusion Medications    dextrose      sodium chloride      sodium chloride       Scheduled Medications    losartan  25 mg Oral Daily    torsemide  10 mg Oral Daily    insulin lispro  0-10 Units SubCUTAneous 4x Daily AC & HS    insulin glargine  12 Units SubCUTAneous BID    lactulose  20 g Oral BID    sodium bicarbonate  650 mg Oral Daily    amLODIPine  10 mg Oral Daily    [Held by provider] apixaban  5 mg Oral BID    [Held by provider] aspirin  81 mg Oral Daily    ferrous sulfate  325 mg Oral Daily    gabapentin  300 mg Oral QPM    [Held by provider] hydrALAZINE  25 mg Oral BID    polyvinyl alcohol  1 drop Both Eyes 4x Daily    pravastatin  20 mg Oral Nightly    sodium chloride flush  5-40 mL IntraVENous 2 times per day    albuterol  2.5 mg Nebulization 4x daily    Arformoterol Tartrate  15 mcg Nebulization BID    And    budesonide  0.25 mg Nebulization BID     PRN Meds: hydrALAZINE, guaiFENesin-dextromethorphan, glucose, dextrose bolus **OR** dextrose bolus, glucagon (rDNA), dextrose, bisacodyl, sodium chloride flush, sodium chloride, ondansetron **OR** ondansetron, acetaminophen **OR** acetaminophen, sodium chloride, perflutren lipid microspheres      Intake/Output Summary (Last 24 hours) at 7/23/2022 1515  Last data filed at 7/22/2022 1705  Gross per 24 hour   Intake --   Output 1000 ml   Net -1000 ml Exam:    BP (!) 129/52   Pulse 73   Temp 98.7 °F (37.1 °C) (Axillary)   Resp 16   Ht 5' 3\" (1.6 m)   Wt 135 lb (61.2 kg)   LMP 12/03/1997   SpO2 96%   BMI 23.91 kg/m²       General appearance:  No apparent distress,  HEENT:  Normal cephalic,  Neck: Supple, with full range of motion. Trachea midline. Respiratory:  Normal respiratory effort. CTA   BILATERALLY   Cardiovascular:  Regular rate and rhythm  Abdomen: Soft, non-tender, non-distended with normal bowel sounds. Musculoskeletal:  No clubbing, cyanosis or edema bilaterally. Skin: Skin color, texture, turgor normal.  No rashes or lesions. Neurologic:  Neurovascularly intact   Psychiatric:  Alert and oriented,                Labs:   Recent Labs     07/22/22  0300 07/22/22  1615 07/23/22  0325   HGB 8.2* 9.1* 8.5*   HCT 25.5* 27.4* 26.4*     Recent Labs     07/22/22  0300 07/22/22  1615 07/23/22  0325   * 130* 136   K 4.5 5.1* 3.6    99 104   CO2 22 23 24   BUN 22 21 20   CREATININE 0.6 0.6 0.7   CALCIUM 8.8 8.8 8.7     No results for input(s): AST, ALT, BILIDIR, BILITOT, ALKPHOS in the last 72 hours. No results for input(s): INR in the last 72 hours. No results for input(s): Jones Jakes in the last 72 hours. No results for input(s): AST, ALT, ALB, BILIDIR, BILITOT, ALKPHOS in the last 72 hours. No results for input(s): LACTA in the last 72 hours.   Lab Results   Component Value Date    LABURIC 5.2 07/19/2022     No results found for: AMMONIA    Assessment:    Active Hospital Problems    Diagnosis Date Noted    Acute heart failure ([facility] [address] [I50.9] 07/18/2022     Priority: Medium    Acute anemia [D64.9] 12/03/2021    HLD (hyperlipidemia) [E78.5] 12/03/2021    History of pulmonary embolus (PE) [Z86.711] 05/29/2021    Hyponatremia [E87.1] 05/18/2021    DM (diabetes mellitus), type 2 ([facility] [E11.9] 05/18/2021    GERD (gastroesophageal reflux disease) [K21.9] 05/23/2017    Idiopathic peripheral neuropathy [G60.9] 09/26/2016    CAD

## 2022-07-23 NOTE — PROGRESS NOTES
Department of Internal Medicine  Nephrology Progress Note      Events reviewed. SUBJECTIVE:  We are following MsYusra Cormier for hyponatremia. She reports feeling better today.     PHYSICAL EXAM:      Vitals:    VITALS:  BP (!) 129/52   Pulse 73   Temp 98.7 °F (37.1 °C) (Axillary)   Resp 16   Ht 5' 3\" (1.6 m)   Wt 135 lb (61.2 kg)   LMP 12/03/1997   SpO2 96%   BMI 23.91 kg/m²   24HR INTAKE/OUTPUT:    Intake/Output Summary (Last 24 hours) at 7/23/2022 1451  Last data filed at 7/22/2022 1705  Gross per 24 hour   Intake --   Output 1000 ml   Net -1000 ml       Constitutional:  Alert and oriented, NAD  HEENT:  Normocephalic, PERRL, dry mucous membranes  Respiratory:  Diminished lung sounds  Cardiovascular/Edema:  RRR, S1,S2  Gastrointestinal:  Soft, rounded, nontender, nondistended  Neurologic:  Nonfocal, SHARMA  Skin:  Warm, dry, ecchymotic  Other:  No edema     Scheduled Meds:   losartan  25 mg Oral Daily    torsemide  10 mg Oral Daily    insulin lispro  0-10 Units SubCUTAneous 4x Daily AC & HS    insulin glargine  12 Units SubCUTAneous BID    lactulose  20 g Oral BID    sodium bicarbonate  650 mg Oral Daily    amLODIPine  10 mg Oral Daily    [Held by provider] apixaban  5 mg Oral BID    [Held by provider] aspirin  81 mg Oral Daily    ferrous sulfate  325 mg Oral Daily    gabapentin  300 mg Oral QPM    [Held by provider] hydrALAZINE  25 mg Oral BID    polyvinyl alcohol  1 drop Both Eyes 4x Daily    pravastatin  20 mg Oral Nightly    sodium chloride flush  5-40 mL IntraVENous 2 times per day    albuterol  2.5 mg Nebulization 4x daily    Arformoterol Tartrate  15 mcg Nebulization BID    And    budesonide  0.25 mg Nebulization BID     Continuous Infusions:   [Held by provider] sodium chloride 50 mL/hr at 07/23/22 0655    dextrose      sodium chloride      sodium chloride       PRN Meds:.hydrALAZINE, guaiFENesin-dextromethorphan, glucose, dextrose bolus **OR** dextrose bolus, glucagon (rDNA), dextrose, bisacodyl, sodium chloride flush, sodium chloride, ondansetron **OR** ondansetron, acetaminophen **OR** acetaminophen, sodium chloride, perflutren lipid microspheres    DATA:    CBC:   Lab Results   Component Value Date/Time    WBC 9.1 07/17/2022 09:32 PM    RBC 1.45 07/17/2022 09:32 PM    HGB 8.5 07/23/2022 03:25 AM    HCT 26.4 07/23/2022 03:25 AM    .3 07/17/2022 09:32 PM    MCH 35.9 07/17/2022 09:32 PM    MCHC 33.1 07/17/2022 09:32 PM    RDW 16.7 07/17/2022 09:32 PM     07/17/2022 09:32 PM    MPV 9.7 07/17/2022 09:32 PM     CMP:    Lab Results   Component Value Date/Time     07/23/2022 03:25 AM    K 3.6 07/23/2022 03:25 AM    K 3.5 07/20/2022 04:05 AM     07/23/2022 03:25 AM    CO2 24 07/23/2022 03:25 AM    BUN 20 07/23/2022 03:25 AM    CREATININE 0.7 07/23/2022 03:25 AM    GFRAA >60 07/23/2022 03:25 AM    LABGLOM >60 07/23/2022 03:25 AM    GLUCOSE 47 07/23/2022 03:25 AM    PROT 5.7 07/17/2022 09:32 PM    LABALBU 3.7 07/17/2022 09:32 PM    CALCIUM 8.7 07/23/2022 03:25 AM    BILITOT 0.3 07/17/2022 09:32 PM    ALKPHOS 53 07/17/2022 09:32 PM    AST 26 07/17/2022 09:32 PM    ALT 30 07/17/2022 09:32 PM     Magnesium:    Lab Results   Component Value Date/Time    MG 2.0 07/23/2022 03:25 AM     Phosphorus:  No results found for: PHOS  Radiology Review:      CXR 7/17/22   Cardiomegaly with pulmonary edema. BRIEF SUMMARY OF INITIAL CONSULT:    Briefly, Ms. Verdis Fothergill is a 80year old female with a PMH of HTN, type II DM, asthma, CAD s/p CABG, history of PE, HLD, HFpEF 68%, PVD, who was admitted on July 17, 2022 after presenting to the ER with shortness of breath. A chest x-ray was obtained which revealed cardiomegaly with pulmonary edema. Upon arrival to ER, she was found to be hyponatremic with a sodium level of 118, which is the reason for this consultation. She was also found to be anemic with a hemoglobin of 5.2. She received PRBCs in ER followed by Bumex 1 mg IV.  Her pro-BNP on admission was

## 2022-07-24 LAB
ANION GAP SERPL CALCULATED.3IONS-SCNC: 13 MMOL/L (ref 7–16)
BUN BLDV-MCNC: 17 MG/DL (ref 6–23)
CALCIUM SERPL-MCNC: 8.5 MG/DL (ref 8.6–10.2)
CHLORIDE BLD-SCNC: 99 MMOL/L (ref 98–107)
CO2: 20 MMOL/L (ref 22–29)
CREAT SERPL-MCNC: 0.6 MG/DL (ref 0.5–1)
GFR AFRICAN AMERICAN: >60
GFR NON-AFRICAN AMERICAN: >60 ML/MIN/1.73
GLUCOSE BLD-MCNC: 153 MG/DL (ref 74–99)
METER GLUCOSE: 129 MG/DL (ref 74–99)
METER GLUCOSE: 201 MG/DL (ref 74–99)
METER GLUCOSE: 237 MG/DL (ref 74–99)
METER GLUCOSE: 66 MG/DL (ref 74–99)
METER GLUCOSE: 83 MG/DL (ref 74–99)
POTASSIUM SERPL-SCNC: 4.5 MMOL/L (ref 3.5–5)
SODIUM BLD-SCNC: 132 MMOL/L (ref 132–146)

## 2022-07-24 PROCEDURE — 6360000002 HC RX W HCPCS: Performed by: SURGERY

## 2022-07-24 PROCEDURE — 80048 BASIC METABOLIC PNL TOTAL CA: CPT

## 2022-07-24 PROCEDURE — 2580000003 HC RX 258: Performed by: SURGERY

## 2022-07-24 PROCEDURE — 82962 GLUCOSE BLOOD TEST: CPT

## 2022-07-24 PROCEDURE — 6370000000 HC RX 637 (ALT 250 FOR IP): Performed by: NURSE PRACTITIONER

## 2022-07-24 PROCEDURE — 94640 AIRWAY INHALATION TREATMENT: CPT

## 2022-07-24 PROCEDURE — 1200000000 HC SEMI PRIVATE

## 2022-07-24 PROCEDURE — 36415 COLL VENOUS BLD VENIPUNCTURE: CPT

## 2022-07-24 PROCEDURE — 6370000000 HC RX 637 (ALT 250 FOR IP): Performed by: INTERNAL MEDICINE

## 2022-07-24 PROCEDURE — 2060000000 HC ICU INTERMEDIATE R&B

## 2022-07-24 PROCEDURE — 2700000000 HC OXYGEN THERAPY PER DAY

## 2022-07-24 RX ADMIN — AMLODIPINE BESYLATE 10 MG: 10 TABLET ORAL at 08:04

## 2022-07-24 RX ADMIN — TORSEMIDE 10 MG: 10 TABLET ORAL at 08:04

## 2022-07-24 RX ADMIN — ARFORMOTEROL TARTRATE 15 MCG: 15 SOLUTION RESPIRATORY (INHALATION) at 19:28

## 2022-07-24 RX ADMIN — POLYVINYL ALCOHOL 1 DROP: 14 SOLUTION/ DROPS OPHTHALMIC at 13:02

## 2022-07-24 RX ADMIN — ALBUTEROL SULFATE 2.5 MG: 2.5 SOLUTION RESPIRATORY (INHALATION) at 16:08

## 2022-07-24 RX ADMIN — BUDESONIDE 250 MCG: 0.25 SUSPENSION RESPIRATORY (INHALATION) at 08:28

## 2022-07-24 RX ADMIN — FERROUS SULFATE TAB 325 MG (65 MG ELEMENTAL FE) 325 MG: 325 (65 FE) TAB at 08:04

## 2022-07-24 RX ADMIN — POLYVINYL ALCOHOL 1 DROP: 14 SOLUTION/ DROPS OPHTHALMIC at 21:21

## 2022-07-24 RX ADMIN — ALBUTEROL SULFATE 2.5 MG: 2.5 SOLUTION RESPIRATORY (INHALATION) at 08:28

## 2022-07-24 RX ADMIN — LACTULOSE 20 G: 20 SOLUTION ORAL at 08:04

## 2022-07-24 RX ADMIN — GABAPENTIN 300 MG: 300 CAPSULE ORAL at 18:35

## 2022-07-24 RX ADMIN — ALBUTEROL SULFATE 2.5 MG: 2.5 SOLUTION RESPIRATORY (INHALATION) at 12:13

## 2022-07-24 RX ADMIN — POLYVINYL ALCOHOL 1 DROP: 14 SOLUTION/ DROPS OPHTHALMIC at 08:04

## 2022-07-24 RX ADMIN — DIPHENHYDRAMINE HYDROCHLORIDE 5 ML: 12.5 LIQUID ORAL at 11:00

## 2022-07-24 RX ADMIN — LOSARTAN POTASSIUM 25 MG: 25 TABLET, FILM COATED ORAL at 08:04

## 2022-07-24 RX ADMIN — POLYVINYL ALCOHOL 1 DROP: 14 SOLUTION/ DROPS OPHTHALMIC at 16:00

## 2022-07-24 RX ADMIN — ALBUTEROL SULFATE 2.5 MG: 2.5 SOLUTION RESPIRATORY (INHALATION) at 19:28

## 2022-07-24 RX ADMIN — SODIUM BICARBONATE 650 MG: 650 TABLET ORAL at 08:04

## 2022-07-24 RX ADMIN — ARFORMOTEROL TARTRATE 15 MCG: 15 SOLUTION RESPIRATORY (INHALATION) at 08:28

## 2022-07-24 RX ADMIN — Medication 10 ML: at 21:20

## 2022-07-24 RX ADMIN — PRAVASTATIN SODIUM 20 MG: 20 TABLET ORAL at 21:21

## 2022-07-24 RX ADMIN — Medication 10 ML: at 08:04

## 2022-07-24 RX ADMIN — BUDESONIDE 250 MCG: 0.25 SUSPENSION RESPIRATORY (INHALATION) at 19:28

## 2022-07-24 RX ADMIN — INSULIN LISPRO 4 UNITS: 100 INJECTION, SOLUTION INTRAVENOUS; SUBCUTANEOUS at 21:20

## 2022-07-24 RX ADMIN — INSULIN LISPRO 4 UNITS: 100 INJECTION, SOLUTION INTRAVENOUS; SUBCUTANEOUS at 15:57

## 2022-07-24 RX ADMIN — DIPHENHYDRAMINE HYDROCHLORIDE 5 ML: 12.5 LIQUID ORAL at 15:57

## 2022-07-24 NOTE — PROGRESS NOTES
Hospitalist Progress Note      PCP: Ambreen Spears DO    Date of Admission: 7/17/2022        Hospital Course:  80 y.o. female presented with GIB AND SOB. STATES HER STOOLS HAVE BEEN BLACK . EGD DONE  No evidence of an active source for an upper GI bleed  SHE THOUGHT IT WAS FROM HER IRON PILLS, THEN SHE BECAME VERY SOB. SHE IS ON ELIQUIS FOR HISTORY OF PE.  FOUND TO HAVE A LOW HGB OF 5.2 ,  FLUIDS STARTED TODAY BY NEPHROLOGY,  SHE IS BEGINNING TO HAVE A BM .  SIGNIFICANT BUMP IN HER BNP.   WILL HOLD IVF UNTIL DISCUSSED WITH RENAL** LOSARTAN INCREASED BY CARDIOLOGY           Subjective:   NO COMPLAINTS          Medications:  Reviewed    Infusion Medications    dextrose      sodium chloride      sodium chloride       Scheduled Medications    losartan  25 mg Oral Daily    torsemide  10 mg Oral Daily    insulin lispro  0-10 Units SubCUTAneous 4x Daily AC & HS    lactulose  20 g Oral BID    sodium bicarbonate  650 mg Oral Daily    amLODIPine  10 mg Oral Daily    [Held by provider] apixaban  5 mg Oral BID    [Held by provider] aspirin  81 mg Oral Daily    ferrous sulfate  325 mg Oral Daily    gabapentin  300 mg Oral QPM    [Held by provider] hydrALAZINE  25 mg Oral BID    polyvinyl alcohol  1 drop Both Eyes 4x Daily    pravastatin  20 mg Oral Nightly    sodium chloride flush  5-40 mL IntraVENous 2 times per day    albuterol  2.5 mg Nebulization 4x daily    Arformoterol Tartrate  15 mcg Nebulization BID    And    budesonide  0.25 mg Nebulization BID     PRN Meds: magic (miracle) mouthwash, calcium carbonate, hydrALAZINE, guaiFENesin-dextromethorphan, glucose, dextrose bolus **OR** dextrose bolus, glucagon (rDNA), dextrose, bisacodyl, sodium chloride flush, sodium chloride, ondansetron **OR** ondansetron, acetaminophen **OR** acetaminophen, sodium chloride, perflutren lipid microspheres      Intake/Output Summary (Last 24 hours) at 7/24/2022 1420  Last data filed at 7/23/2022 1548  Gross per 24 hour   Intake -- Output 950 ml   Net -950 ml       Exam:    BP (!) 156/72   Pulse 73   Temp 98.4 °F (36.9 °C) (Oral)   Resp 18   Ht 5' 3\" (1.6 m)   Wt 135 lb (61.2 kg)   LMP 12/03/1997   SpO2 94%   BMI 23.91 kg/m²       General appearance:  No apparent distress,  HEENT:  Normal cephalic,  Neck: Supple, with full range of motion. Trachea midline. Respiratory:  Normal respiratory effort. CTA   BILATERALLY   Cardiovascular:  Regular rate and rhythm  Abdomen: Soft, non-tender, non-distended with normal bowel sounds. Musculoskeletal:  No clubbing, cyanosis or edema bilaterally. Skin: Skin color, texture, turgor normal.  No rashes or lesions. Neurologic:  Neurovascularly intact  Psychiatric:  Alert and oriented,                Labs:   Recent Labs     07/22/22  0300 07/22/22  1615 07/23/22  0325   HGB 8.2* 9.1* 8.5*   HCT 25.5* 27.4* 26.4*     Recent Labs     07/22/22  0300 07/22/22  1615 07/23/22  0325   * 130* 136   K 4.5 5.1* 3.6    99 104   CO2 22 23 24   BUN 22 21 20   CREATININE 0.6 0.6 0.7   CALCIUM 8.8 8.8 8.7     No results for input(s): AST, ALT, BILIDIR, BILITOT, ALKPHOS in the last 72 hours. No results for input(s): INR in the last 72 hours. No results for input(s): Tracie Shanks in the last 72 hours. No results for input(s): AST, ALT, ALB, BILIDIR, BILITOT, ALKPHOS in the last 72 hours. No results for input(s): LACTA in the last 72 hours.   Lab Results   Component Value Date    LABURIC 5.2 07/19/2022     No results found for: AMMONIA    Assessment:    Active Hospital Problems    Diagnosis Date Noted    Acute heart failure ([facility] [address] [I50.9] 07/18/2022     Priority: Medium    Acute anemia [D64.9] 12/03/2021    HLD (hyperlipidemia) [E78.5] 12/03/2021    History of pulmonary embolus (PE) [Z86.711] 05/29/2021    Hyponatremia [E87.1] 05/18/2021    DM (diabetes mellitus), type 2 ([facility] [E11.9] 05/18/2021    GERD (gastroesophageal reflux disease) [K21.9] 05/23/2017    Idiopathic peripheral neuropathy [G60.9] 09/26/2016    CAD (coronary artery disease) [I25.10] 09/26/2016    HTN (hypertension) [I10] 09/26/2016       Plan:  DEM[facility]EX RESTARTED  ON BICARB   IVF     DVT Prophylaxis: SCD  Diet: ADULT DIET; Regular;  Low Sodium (2 gm); 1000 ml  Code Status: Full Code     PT/OT Eval Status: ORDERED     Dispo - HOME VS C[facility]IDA       Electronically signed by Natalia Nicholson DO on 7/24/2022 at 2:20 PM [facility]

## 2022-07-24 NOTE — PROGRESS NOTES
Department of Internal Medicine  Nephrology Progress Note      Events reviewed. SUBJECTIVE:  We are following MsYusra Isidro for hyponatremia. She reports no complaints.     PHYSICAL EXAM:      Vitals:    VITALS:  BP (!) 156/72   Pulse 73   Temp 98.4 °F (36.9 °C) (Oral)   Resp 18   Ht 5' 3\" (1.6 m)   Wt 135 lb (61.2 kg)   LMP 12/03/1997   SpO2 94%   BMI 23.91 kg/m²   24HR INTAKE/OUTPUT:    Intake/Output Summary (Last 24 hours) at 7/24/2022 1431  Last data filed at 7/23/2022 1548  Gross per 24 hour   Intake --   Output 950 ml   Net -950 ml       Constitutional:  Alert and oriented, NAD  HEENT:  Normocephalic, PERRL, dry mucous membranes  Respiratory:  Diminished lung sounds  Cardiovascular/Edema:  RRR, S1,S2  Gastrointestinal:  Soft, rounded, nontender, nondistended  Neurologic:  Nonfocal, SHARMA  Skin:  Warm, dry, ecchymotic  Other:  No edema     Scheduled Meds:   losartan  25 mg Oral Daily    torsemide  10 mg Oral Daily    insulin lispro  0-10 Units SubCUTAneous 4x Daily AC & HS    lactulose  20 g Oral BID    sodium bicarbonate  650 mg Oral Daily    amLODIPine  10 mg Oral Daily    [Held by provider] apixaban  5 mg Oral BID    [Held by provider] aspirin  81 mg Oral Daily    ferrous sulfate  325 mg Oral Daily    gabapentin  300 mg Oral QPM    [Held by provider] hydrALAZINE  25 mg Oral BID    polyvinyl alcohol  1 drop Both Eyes 4x Daily    pravastatin  20 mg Oral Nightly    sodium chloride flush  5-40 mL IntraVENous 2 times per day    albuterol  2.5 mg Nebulization 4x daily    Arformoterol Tartrate  15 mcg Nebulization BID    And    budesonide  0.25 mg Nebulization BID     Continuous Infusions:   dextrose      sodium chloride      sodium chloride       PRN Meds:.magic (miracle) mouthwash, calcium carbonate, hydrALAZINE, guaiFENesin-dextromethorphan, glucose, dextrose bolus **OR** dextrose bolus, glucagon (rDNA), dextrose, bisacodyl, sodium chloride flush, sodium chloride, ondansetron **OR** ondansetron, acetaminophen **OR** acetaminophen, sodium chloride, perflutren lipid microspheres    DATA:    CBC:   Lab Results   Component Value Date/Time    WBC 9.1 07/17/2022 09:32 PM    RBC 1.45 07/17/2022 09:32 PM    HGB 8.5 07/23/2022 03:25 AM    HCT 26.4 07/23/2022 03:25 AM    .3 07/17/2022 09:32 PM    MCH 35.9 07/17/2022 09:32 PM    MCHC 33.1 07/17/2022 09:32 PM    RDW 16.7 07/17/2022 09:32 PM     07/17/2022 09:32 PM    MPV 9.7 07/17/2022 09:32 PM     CMP:    Lab Results   Component Value Date/Time     07/24/2022 09:28 AM    K 4.5 07/24/2022 09:28 AM    K 3.5 07/20/2022 04:05 AM    CL 99 07/24/2022 09:28 AM    CO2 20 07/24/2022 09:28 AM    BUN 17 07/24/2022 09:28 AM    CREATININE 0.6 07/24/2022 09:28 AM    GFRAA >60 07/24/2022 09:28 AM    LABGLOM >60 07/24/2022 09:28 AM    GLUCOSE 153 07/24/2022 09:28 AM    PROT 5.7 07/17/2022 09:32 PM    LABALBU 3.7 07/17/2022 09:32 PM    CALCIUM 8.5 07/24/2022 09:28 AM    BILITOT 0.3 07/17/2022 09:32 PM    ALKPHOS 53 07/17/2022 09:32 PM    AST 26 07/17/2022 09:32 PM    ALT 30 07/17/2022 09:32 PM     Magnesium:    Lab Results   Component Value Date/Time    MG 2.0 07/23/2022 03:25 AM     Phosphorus:  No results found for: PHOS  Radiology Review:      CXR 7/17/22   Cardiomegaly with pulmonary edema. BRIEF SUMMARY OF INITIAL CONSULT:    Briefly, Ms. Kaleb Lugo is a 80year old female with a PMH of HTN, type II DM, asthma, CAD s/p CABG, history of PE, HLD, HFpEF 68%, PVD, who was admitted on July 17, 2022 after presenting to the ER with shortness of breath. A chest x-ray was obtained which revealed cardiomegaly with pulmonary edema. Upon arrival to ER, she was found to be hyponatremic with a sodium level of 118, which is the reason for this consultation. She was also found to be anemic with a hemoglobin of 5.2. She received PRBCs in ER followed by Bumex 1 mg IV. Her pro-BNP on admission was 3,508.  She states she eats 3 small meals a day but drinks about 6 bottles

## 2022-07-24 NOTE — PLAN OF CARE
Problem: Discharge Planning  Goal: Discharge to home or other facility with appropriate resources  Outcome: Progressing  Flowsheets (Taken 7/24/2022 0759)  Discharge to home or other facility with appropriate resources: Identify barriers to discharge with patient and caregiver     Problem: Pain  Goal: Verbalizes/displays adequate comfort level or baseline comfort level  Outcome: Progressing     Problem: Safety - Adult  Goal: Free from fall injury  Outcome: Progressing  Flowsheets (Taken 7/24/2022 0842)  Free From Fall Injury: Instruct family/caregiver on patient safety     Problem: ABCDS Injury Assessment  Goal: Absence of physical injury  Outcome: Progressing  Flowsheets (Taken 7/24/2022 0842)  Absence of Physical Injury: Implement safety measures based on patient assessment     Problem: Skin/Tissue Integrity  Goal: Absence of new skin breakdown  Description: 1. Monitor for areas of redness and/or skin breakdown  2. Assess vascular access sites hourly  3. Every 4-6 hours minimum:  Change oxygen saturation probe site  4. Every 4-6 hours:  If on nasal continuous positive airway pressure, respiratory therapy assess nares and determine need for appliance change or resting period.   Outcome: Progressing     Problem: Chronic Conditions and Co-morbidities  Goal: Patient's chronic conditions and co-morbidity symptoms are monitored and maintained or improved  Outcome: Progressing  Flowsheets (Taken 7/24/2022 0759)  Care Plan - Patient's Chronic Conditions and Co-Morbidity Symptoms are Monitored and Maintained or Improved: Monitor and assess patient's chronic conditions and comorbid symptoms for stability, deterioration, or improvement

## 2022-07-24 NOTE — PROGRESS NOTES
PROGRESS NOTE       PATIENT PROBLEM LIST:  Patient Active Problem List   Diagnosis Code    Asthma J45.909    CAD (coronary artery disease) I25.10    Vitamin D deficiency E55.9    HTN (hypertension) I10    Idiopathic peripheral neuropathy G60.9    Rhinitis, allergic J30.9    GERD (gastroesophageal reflux disease) K21.9    PVD (peripheral vascular disease) with claudication ([facility]) I73.9    Gait instability R26.81    Hyponatremia E87.1    DM (diabetes mellitus), type 2 ([facility]) E11.9    History of pulmonary embolus (PE) Z86.711    Peripheral vascular angioplasty status Z98.62    Non-proliferative diabetic retinopathy, mild, both eyes ([facility]) S06.3335    Leg swelling M79.89    Compression fracture of L5 lumbar vertebra, closed, initial encounter ([facility] S32.050A    HLD (hyperlipidemia) E78.5    Compression fracture of thoracic vertebra ([facility]) S22.000A    Acute anemia D64.9    Spinal stenosis M48.00    Acute heart failure ([facility] I50.9       SUBJECTIVE:  Michael Bateman states she feels better and is less short of breath but does have significant pulmonary secretions. She denies any chest discomfort nor lightheadedness. Her family members are sitting at her bedside. REVIEW OF SYSTEMS:  General ROS: negative for - fatigue, malaise,  weight gain or weight loss  Psychological ROS: negative for - anxiety , depression  Ophthalmic ROS: negative for - decreased vision or visual distortion. ENT ROS: negative  Allergy and Immunology ROS: negative  Hematological and Lymphatic ROS: negative  Endocrine: no heat or cold intolerance and no polyphagia, polydipsia, or polyuria  Respiratory ROS: positive for - shortness of breath  Cardiovascular ROS: positive for - dyspnea on exertion.   Gastrointestinal ROS: no abdominal pain, change in bowel habits, or black or bloody stools  Genito-Urinary ROS: no nocturia, dysuria, trouble voiding, frequency or hematuria  Musculoskeletal ROS: negative for- myalgias, arthralgias, or claudication  Neurological ROS: no TIA or stroke symptoms otherwise no significant change in symptoms or problems since yesterday as documented in previous progress notes. SCHEDULED MEDICATIONS:   losartan  25 mg Oral Daily    torsemide  10 mg Oral Daily    insulin lispro  0-10 Units SubCUTAneous 4x Daily AC & HS    lactulose  20 g Oral BID    sodium bicarbonate  650 mg Oral Daily    amLODIPine  10 mg Oral Daily    [Held by provider] apixaban  5 mg Oral BID    [Held by provider] aspirin  81 mg Oral Daily    ferrous sulfate  325 mg Oral Daily    gabapentin  300 mg Oral QPM    [Held by provider] hydrALAZINE  25 mg Oral BID    polyvinyl alcohol  1 drop Both Eyes 4x Daily    pravastatin  20 mg Oral Nightly    sodium chloride flush  5-40 mL IntraVENous 2 times per day    albuterol  2.5 mg Nebulization 4x daily    Arformoterol Tartrate  15 mcg Nebulization BID    And    budesonide  0.25 mg Nebulization BID       VITAL SIGNS:                                                                                                                          BP (!) 156/72   Pulse 73   Temp 98.4 °F (36.9 °C) (Oral)   Resp 18   Ht 5' 3\" (1.6 m)   Wt 135 lb (61.2 kg)   LMP 12/03/1997   SpO2 94%   BMI 23.91 kg/m²   No data found. OBJECTIVE:    HEENT: PERRL, EOM  Intact; sclera non-icteric, conjunctiva pink. Carotids are brisk in upstroke with normal contour. No carotid bruits. Normal jugular venous pulsation at 45°. No palpable cervical nor supraclavicular nodes. Thyroid not palpable. Trachea midline. Chest: Even excursion  Lungs: Grossly clear to auscultation bilaterally no decreased tactile fremitus without inspiratory rales. Heart: Irregular, regular rhythm; S1 > S2, no gallop. Grade 2/6 short systolic murmur heard in the apex and LMSB No clicks, rub, palpable thrills   or heaves. PMI nondisplaced, 5th intercostal space MCL. Abdomen: Soft, nontender, nondistended,  mildly protuberant, no masses or organomegaly.   Bowel sounds active. Extremities: Without clubbing, cyanosis or edema. Pulses present 3+ upper extermities bilaterally; present 1+ DP and present 1+ PT bilaterally. Data:   Scheduled Meds: Reviewed  Continuous Infusions:    dextrose      sodium chloride      sodium chloride         Intake/Output Summary (Last 24 hours) at 7/24/2022 1129  Last data filed at 7/23/2022 1548  Gross per 24 hour   Intake --   Output 950 ml   Net -950 ml     CBC:   Recent Labs     07/21/22  1140 07/21/22  1540 07/22/22  0300 07/22/22  1615 07/23/22  0325   HGB 9.6* 8.9* 8.2* 9.1* 8.5*   HCT 28.9* 27.4* 25.5* 27.4* 26.4*     BMP:  Recent Labs     07/21/22  1140 07/22/22  0300 07/22/22  1615 07/23/22  0325   * 131* 130* 136   K 4.8 4.5 5.1* 3.6   CL 98 102 99 104   CO2 21* 22 23 24   BUN 20 22 21 20   CREATININE 0.6 0.6 0.6 0.7   LABGLOM >60 >60 >60 >60     ABGs: No results found for: PH, PO2, PCO2  INR:   No results for input(s): INR in the last 72 hours. PRO-BNP:   Lab Results   Component Value Date    PROBNP 21,707 (H) 07/23/2022    PROBNP 8,313 (H) 07/19/2022      TSH:   Lab Results   Component Value Date    TSH 1.410 07/19/2022      Cardiac Injury Profile:   No results for input(s): TROPHS in the last 72 hours. Lipid Profile:   Lab Results   Component Value Date/Time    TRIG 61 05/02/2019 10:13 AM    HDL 68 05/02/2019 10:13 AM    LDLCALC 79 05/02/2019 10:13 AM    CHOL 159 05/02/2019 10:13 AM      Hemoglobin A1C: No components found for: HGBA1C      RAD:   US DUP LOWER EXTREMITIES BILATERAL VENOUS   Final Result   No evidence of DVT in either lower extremity. XR CHEST PORTABLE   Final Result   Cardiomegaly with pulmonary edema.                EKG: See Report  Echo: See Report      IMPRESSIONS:  Patient Active Problem List   Diagnosis Code    Asthma J45.909    CAD (coronary artery disease) I25.10    Vitamin D deficiency E55.9    HTN (hypertension) I10    Idiopathic peripheral neuropathy G60.9    Rhinitis, allergic J30.9 GERD (gastroesophageal reflux disease) K21.9    PVD (peripheral vascular disease) with claudication ([facility]) I73.9    Gait instability R26.81    Hyponatremia E87.1    DM (diabetes mellitus), type 2 ([facility]) E11.9    History of pulmonary embolus (PE) Z86.711    Peripheral vascular angioplasty status Z98.62    Non-proliferative diabetic retinopathy, mild, both eyes ([facility]) L59.6710    Leg swelling M79.89    Compression fracture of L5 lumbar vertebra, closed, initial encounter ([facility] [address] S32.050A    HLD (hyperlipidemia) E78.5    Compression fracture of thoracic vertebra ([facility]) S22.000A    Acute anemia D64.9    Spinal stenosis M48.00    Acute heart failure ([facility]) I50.9       RECOMMENDATIONS:    Ms. Jake Lake seems clinically better sitting up in a chair eating but having significant expectoration of pulmonary secretions. She denies any lightheadedness no chest discomfort presently. Her heart rate and blood pressure remained stable as does her renal function. Unfortunately she will require diuretic therapy as her left ventricular systolic function is decreased and her proBNP in the presence of normal renal function is significantly elevated. Additionally she will require long-term anticoagulation. As her blood pressure remains somewhat elevated will increase dosage of losartan to 50 mg daily and carefully monitor renal function. I have spent more than 26 minutes face to face with Catherine Milton and reviewing notes and laboratory data, with greater than 50% of this time instructing and counseling the patient and her daughters face to face regarding my findings and recommendations and I have answered all questions as posed to me by Ms. Jake Lake and her daughters. Liseth Hoover,  FACP,FACC,FSCAI      NOTE:  This report was transcribed using voice recognition software.   Every effort was made to ensure accuracy; however, inadvertent computerized transcription errors may be present

## 2022-07-25 VITALS
TEMPERATURE: 98.2 F | WEIGHT: 128.8 LBS | SYSTOLIC BLOOD PRESSURE: 113 MMHG | BODY MASS INDEX: 22.82 KG/M2 | HEART RATE: 72 BPM | DIASTOLIC BLOOD PRESSURE: 96 MMHG | OXYGEN SATURATION: 94 % | RESPIRATION RATE: 18 BRPM | HEIGHT: 63 IN

## 2022-07-25 LAB
ANION GAP SERPL CALCULATED.3IONS-SCNC: 8 MMOL/L (ref 7–16)
BUN BLDV-MCNC: 14 MG/DL (ref 6–23)
CALCIUM SERPL-MCNC: 8.3 MG/DL (ref 8.6–10.2)
CHLORIDE BLD-SCNC: 102 MMOL/L (ref 98–107)
CO2: 23 MMOL/L (ref 22–29)
CREAT SERPL-MCNC: 0.6 MG/DL (ref 0.5–1)
GFR AFRICAN AMERICAN: >60
GFR NON-AFRICAN AMERICAN: >60 ML/MIN/1.73
GLUCOSE BLD-MCNC: 96 MG/DL (ref 74–99)
HCT VFR BLD CALC: 29.2 % (ref 34–48)
HEMOGLOBIN: 9.4 G/DL (ref 11.5–15.5)
MCH RBC QN AUTO: 33.7 PG (ref 26–35)
MCHC RBC AUTO-ENTMCNC: 32.2 % (ref 32–34.5)
MCV RBC AUTO: 104.7 FL (ref 80–99.9)
METER GLUCOSE: 112 MG/DL (ref 74–99)
METER GLUCOSE: 194 MG/DL (ref 74–99)
METER GLUCOSE: 198 MG/DL (ref 74–99)
PDW BLD-RTO: 18.8 FL (ref 11.5–15)
PLATELET # BLD: 360 E9/L (ref 130–450)
PMV BLD AUTO: 10.2 FL (ref 7–12)
POTASSIUM SERPL-SCNC: 4.1 MMOL/L (ref 3.5–5)
PRO-BNP: 7669 PG/ML (ref 0–450)
RBC # BLD: 2.79 E12/L (ref 3.5–5.5)
SARS-COV-2, NAAT: NOT DETECTED
SODIUM BLD-SCNC: 133 MMOL/L (ref 132–146)
WBC # BLD: 10.5 E9/L (ref 4.5–11.5)

## 2022-07-25 PROCEDURE — 82962 GLUCOSE BLOOD TEST: CPT

## 2022-07-25 PROCEDURE — 97530 THERAPEUTIC ACTIVITIES: CPT

## 2022-07-25 PROCEDURE — 6370000000 HC RX 637 (ALT 250 FOR IP): Performed by: NURSE PRACTITIONER

## 2022-07-25 PROCEDURE — 85027 COMPLETE CBC AUTOMATED: CPT

## 2022-07-25 PROCEDURE — 6370000000 HC RX 637 (ALT 250 FOR IP): Performed by: INTERNAL MEDICINE

## 2022-07-25 PROCEDURE — 94640 AIRWAY INHALATION TREATMENT: CPT

## 2022-07-25 PROCEDURE — 83880 ASSAY OF NATRIURETIC PEPTIDE: CPT

## 2022-07-25 PROCEDURE — 36415 COLL VENOUS BLD VENIPUNCTURE: CPT

## 2022-07-25 PROCEDURE — 6360000002 HC RX W HCPCS: Performed by: SURGERY

## 2022-07-25 PROCEDURE — 2580000003 HC RX 258: Performed by: SURGERY

## 2022-07-25 PROCEDURE — 2700000000 HC OXYGEN THERAPY PER DAY

## 2022-07-25 PROCEDURE — 6370000000 HC RX 637 (ALT 250 FOR IP): Performed by: SURGERY

## 2022-07-25 PROCEDURE — 80048 BASIC METABOLIC PNL TOTAL CA: CPT

## 2022-07-25 PROCEDURE — 87635 SARS-COV-2 COVID-19 AMP PRB: CPT

## 2022-07-25 PROCEDURE — 97535 SELF CARE MNGMENT TRAINING: CPT

## 2022-07-25 RX ORDER — AMLODIPINE BESYLATE 10 MG/1
10 TABLET ORAL DAILY
Qty: 30 TABLET | Refills: 3 | DISCHARGE
Start: 2022-07-26

## 2022-07-25 RX ORDER — LOSARTAN POTASSIUM 25 MG/1
25 TABLET ORAL DAILY
Qty: 30 TABLET | Refills: 3 | DISCHARGE
Start: 2022-07-26 | End: 2022-08-28

## 2022-07-25 RX ORDER — TORSEMIDE 10 MG/1
10 TABLET ORAL DAILY
Qty: 30 TABLET | Refills: 3 | DISCHARGE
Start: 2022-07-26 | End: 2022-08-28

## 2022-07-25 RX ORDER — SODIUM BICARBONATE 650 MG/1
650 TABLET ORAL DAILY
Qty: 30 TABLET | Refills: 0 | DISCHARGE
Start: 2022-07-26 | End: 2022-08-25

## 2022-07-25 RX ADMIN — AMLODIPINE BESYLATE 10 MG: 10 TABLET ORAL at 10:17

## 2022-07-25 RX ADMIN — LOSARTAN POTASSIUM 25 MG: 25 TABLET, FILM COATED ORAL at 10:17

## 2022-07-25 RX ADMIN — BUDESONIDE 250 MCG: 0.25 SUSPENSION RESPIRATORY (INHALATION) at 07:44

## 2022-07-25 RX ADMIN — INSULIN LISPRO 2 UNITS: 100 INJECTION, SOLUTION INTRAVENOUS; SUBCUTANEOUS at 16:55

## 2022-07-25 RX ADMIN — SODIUM BICARBONATE 650 MG: 650 TABLET ORAL at 10:17

## 2022-07-25 RX ADMIN — GABAPENTIN 300 MG: 300 CAPSULE ORAL at 16:55

## 2022-07-25 RX ADMIN — POLYVINYL ALCOHOL 1 DROP: 14 SOLUTION/ DROPS OPHTHALMIC at 16:54

## 2022-07-25 RX ADMIN — LACTULOSE 20 G: 20 SOLUTION ORAL at 10:20

## 2022-07-25 RX ADMIN — TORSEMIDE 10 MG: 10 TABLET ORAL at 10:17

## 2022-07-25 RX ADMIN — ARFORMOTEROL TARTRATE 15 MCG: 15 SOLUTION RESPIRATORY (INHALATION) at 07:44

## 2022-07-25 RX ADMIN — ALBUTEROL SULFATE 2.5 MG: 2.5 SOLUTION RESPIRATORY (INHALATION) at 12:00

## 2022-07-25 RX ADMIN — INSULIN LISPRO 2 UNITS: 100 INJECTION, SOLUTION INTRAVENOUS; SUBCUTANEOUS at 11:15

## 2022-07-25 RX ADMIN — ALBUTEROL SULFATE 2.5 MG: 2.5 SOLUTION RESPIRATORY (INHALATION) at 16:38

## 2022-07-25 RX ADMIN — FERROUS SULFATE TAB 325 MG (65 MG ELEMENTAL FE) 325 MG: 325 (65 FE) TAB at 10:17

## 2022-07-25 RX ADMIN — Medication 10 ML: at 10:17

## 2022-07-25 RX ADMIN — ACETAMINOPHEN 650 MG: 325 TABLET ORAL at 13:21

## 2022-07-25 RX ADMIN — CALCIUM CARBONATE 500 MG: 500 TABLET, CHEWABLE ORAL at 13:33

## 2022-07-25 RX ADMIN — DIPHENHYDRAMINE HYDROCHLORIDE 5 ML: 12.5 LIQUID ORAL at 10:28

## 2022-07-25 RX ADMIN — POLYVINYL ALCOHOL 1 DROP: 14 SOLUTION/ DROPS OPHTHALMIC at 10:18

## 2022-07-25 RX ADMIN — ALBUTEROL SULFATE 2.5 MG: 2.5 SOLUTION RESPIRATORY (INHALATION) at 07:43

## 2022-07-25 RX ADMIN — POLYVINYL ALCOHOL 1 DROP: 14 SOLUTION/ DROPS OPHTHALMIC at 13:23

## 2022-07-25 ASSESSMENT — PAIN DESCRIPTION - DESCRIPTORS: DESCRIPTORS: ACHING

## 2022-07-25 ASSESSMENT — PAIN DESCRIPTION - LOCATION: LOCATION: HEAD

## 2022-07-25 ASSESSMENT — PAIN SCALES - GENERAL
PAINLEVEL_OUTOF10: 3
PAINLEVEL_OUTOF10: 0

## 2022-07-25 ASSESSMENT — PAIN - FUNCTIONAL ASSESSMENT: PAIN_FUNCTIONAL_ASSESSMENT: ACTIVITIES ARE NOT PREVENTED

## 2022-07-25 ASSESSMENT — PAIN DESCRIPTION - ORIENTATION: ORIENTATION: INNER

## 2022-07-25 NOTE — PROGRESS NOTES
Physical Therapy  Facility/Department: [facility]  Physical Therapy Treatment Note    Name: Ting Herrera  : 1927  MRN: [id number]  Date of Service: 2022       Patient Diagnosis(es): The encounter diagnosis was Gastrointestinal hemorrhage, unspecified gastrointestinal hemorrhage type. 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 ([address] Bronchitis, Bruising tendency ([address] CAD (coronary artery disease), Cough, COVID, Dermatophytosis, Diabetes mellitus ([address] GERD (gastroesophageal reflux disease), History of pulmonary embolus (PE), Hx of blood clots, Hyperlipidemia, Hypertension, Leg swelling, Lung disease, Peripheral vascular angioplasty status, Pseudoaneurysm of right femoral artery ([address] PVD (peripheral vascular disease) with claudication ([address] Restless legs syndrome, Rhinitis, allergic, Sinusitis, SOB (shortness of breath), Type 1 diabetes mellitus with left diabetic foot ulcer ([address] Ulcerated, foot, left, limited to breakdown of skin ([address] and Urinary incontinence. Past Surgical History:  has a past surgical history that includes Hysterectomy; Cholecystectomy; Tonsillectomy and adenoidectomy; Coronary artery bypass graft; Abdomen surgery; Appendectomy; Colonoscopy; Endoscopy, colon, diagnostic; Cardiac surgery; Foot Debridement (Left, 2021); Foot Debridement (Left, 2021); and Upper gastrointestinal endoscopy (N/A, 2022). Referring provider:  Hattie Moses MD    PT Order:  PT eval and treat     Evaluating PT:  Tao Bill PT, DPT PT [id number]    Room #:  1106/7911-K  Diagnosis:  Anemia [D64.9]  Gastrointestinal hemorrhage, unspecified gastrointestinal hemorrhage type [K92.2]  Precautions:  fall risk, O2  Equipment Needs:  none. SUBJECTIVE:    Pt resides at [facility]. Pt reported she was using a rollator for ambulation.       OBJECTIVE:   Initial Evaluation  Date:  Treatment  7/25/2022 Short Term/ Long Term   Goals   Was pt agreeable to Eval/treatment? yes yes    Does pt have pain? Back pain No complaints    Bed Mobility  Rolling: NT  Supine to sit: mod A  Sit to supine: NT  Scooting: Mod A to sitting EOB Rolling: Min A  Supine to sit: Min A  Scooting: Min A seated to EOB SBA   Transfers Sit to stand: Mod A  Stand to sit: Mod A  Stand pivot: Mod A with w/w Sit <> stand: Min A SBA   Ambulation    25 feet with w/w Mod/Min A   40 + 15 feet x 1 each using rollator [address] for support Min A for balance 50 feet with w/w SBA    Stair negotiation: ascended and descended  NT  NT    ROM BLE:  WFL     Strength BLE:  grossly 4-/5  Grossly 4/5   Balance Sitting EOB:  CGA  Dynamic Standing:  Mod/Min A  Sitting EOB:  supervision  Dynamic Standing:  SBA with w/w   AM-PAC 6 Clicks 25/87 00/92      Pt is alert and able to follow instruction  Balance: poor dynamic using rollator [address] for support    Pt performed therapeutic exercise of the following: NT    Patient education/treatment  Pt was educated on UE usage to assist with transfers, rollator safety with release of breaking mechanism prior to gait, gait mechanics promoting posture    Patient response to education:   Pt verbalized understanding Pt demonstrated skill Pt requires further education in this area   yes With prompt for transfer safety yes     ASSESSMENT:   Comments: Nurse ok with Rx. Pt sat EOB SBA for balance. Gait slow and consistent. Pt unsteady throughout, required constant hands on assist for balance and safety. Pt short of breath after activity, 02 saturation 93% on room air. Pt unsafe to gait or transfer alone presently.     Pt was left in a bedside chair with call light in reach, waffle cushion in place    Chair/bed alarm: chair alarm active    Time in 1354   Time out 1418   Total Treatment Time 24 minutes   CPT codes:     Therapeutic activities 34182 24 minutes   Therapeutic exercises 06385 0 minutes       Pt is making fair progress toward established Physical Therapy goals. Continue with physical therapy current plan of care.     Fede Agosto PTA   License Number: [id number]

## 2022-07-25 NOTE — PROGRESS NOTES
Occupational Therapy  [facility] TREATMENT NOTE      Date:2022  Patient Name: Ruben Almaraz  MRN: [id number]  : 1927  Room: [address] [place] [address]     Evaluating OT: Justin Garrett OTR/L   [id number]       Referring Brant Kiran MD    Specific Provider Orders/Date:OT eval and treat 2022       Diagnosis:  Anemia [D64.9]  Gastrointestinal hemorrhage, unspecified gastrointestinal hemorrhage type [K92.2]     Pertinent Medical History: asthma, COVID, PVD, l foot ulcer, urinary incontinence      Precautions:  Fall Risk, O2, alarm      Assessment of current deficits   [x] Functional mobility            [x]ADLs           [x] Strength                  []Cognition   [x] Functional transfers          [x] IADLs         [x] Safety Awareness   [x]Endurance   [] Fine Coordination                         [x] Balance      [] Vision/perception   []Sensation      []Gross Motor Coordination             [] ROM           [] Delirium                   [] Motor Control     OT PLAN OF CARE   OT POC based on physician orders, patient diagnosis and results of clinical assessment     Frequency/Duration  2-4 days/wk for 2 weeks PRN  Specific OT Treatment Interventions to include:   ADL retraining/adapted techniques and AE recommendations to increase functional independence within precautions                    Energy conservation techniques to improve tolerance for selfcare routine  Functional transfer/mobility training/DME recommendations for increased independence, safety and fall prevention         Patient/family education to increase safety and functional independence             Environmental modifications for safe mobility and completion of ADLs                             Therapeutic activity to improve functional performance during ADLs.                                          Therapeutic exercise to improve tolerance and functional strength for ADLs   Balance retraining/tolerance tasks for facilitation of postural patient cleared with nursing and agreeable to session. Good participation and improvement demonstrated. Daughter present. Patient in chair with call light in reach and alarm on at the end of the session    Education/treatment: ADL and functional transfer/activity performed to increase safety and independence during self care tasks. Education provided on safety awareness, adl reeducation, functional transfer training, daily orientation    Pt has made progress towards set goals.      Time In: 2:00  Time Out: 2:23     Min Units   Therapeutic Ex 55723     Therapeutic Activities 94954 25 7   ADL/Self Care 61980 10 1   Orthotic Management [id number]     Neuro Re-Ed 92560     Non-Billable Time     TOTAL TIMED TREATMENT 23 [address]14 [employer] WRIGHT/L [id number]

## 2022-07-25 NOTE — CARE COORDINATION
Social work / Discharge Planning:        Awaiting negative covid result. Discharge to [address] at the [facility] at 9pm via [address] transport. Social work updated RN and facility liaison. RN is informing family at bedside.    Electronically signed by GLORIA Sawyer on 7/25/2022 at 3:49 PM

## 2022-07-25 NOTE — CARE COORDINATION
Social work / Discharge Planning:       Discharge plan is for [address] at the [facility]. No precert needed. [place], [id number] and transport form completed. Will need negative covid result on the day of discharge.    Electronically signed by GLORIA Dillard on 7/25/2022 at 10:37 AM [f rep st]



                                                                OPERATIVE REPORT





DATE OF OPERATION:  08/16/2018



SURGEON:  Gilbert Marvin MD



ANESTHESIA:  General endotracheal anesthesia.



ANESTHESIOLOGIST:  Meche Colin MD.



PREOPERATIVE DIAGNOSIS:  Limiting claudication, right leg.



POSTOPERATIVE DIAGNOSIS:  Limiting claudication, right leg.



PROCEDURE PERFORMED:  Right femoral endarterectomy with [place]-Mahesh bypass to the distal superficial fem
oral artery.



FINDINGS:  Patient was found to have markedly calcified plaques obstructing his common femoral artery
 and superficial femoral artery as well as the profunda femoris.  He was found to have good pulses po
stoperatively.





DESCRIPTION OF PROCEDURE:  Patient was taken to the operating room where he received a satisfactory g
eneral endotracheal anesthesia by Dr. Colin.  He was prepped and draped in the usual sterile fashio
n.  A longitudinal incision was made in the right groin.  Dissection extended down through the subcut
aneous tissue.  Hemostasis was obtained with electrocautery.  Cervical lymph nodes were ligated with 
3-0 Vicryl.  The superficial femoral, common femoral and profunda femoris arteries were all dissected
 free and controlled with vessel loops.  Dissection of the common femoral extended up underneath the 
inguinal ligament and actually included the external iliac artery.  In the superficial femoral artery
 the plaque was quite obstructive in the proximal 4 inches of the artery.  The artery was much softer
 distally.  Elected to bypass that dissection.  Patient was systemically heparinized.  An arteriotomy
 was made in the common femoral artery, exposing a large amount of calcified plaque and near-complete
 obstruction of the common femoral artery.  This incision extended down into the superficial femoral 
artery, which was also completely obstructed.  Endarterectomy then ensued of the common femoral arter
y and the profunda orifice extending down into the superficial femoral artery.  All debris was remove
d.  All vessels were flushed.  A 7 mm [place]-Mahesh graft was used.  A momin was created for the common fem
oral artery and profunda orifice.  This was sutured in place with a running Hemashield 6 suture.  All
 vessels were flushed, and flow was first initiated through the distal graft, and then flow was estab
lished through the profunda femoris.  The distal graft was then anastomosed end-to-side to the superf
icial femoral artery, again using a Hemashield 7 suture, creating a 1.5 cm anastomosis.  All vessels 
were back-flushed, and flow was then re-established down the SFA, resulting in good pulses in his fee
t.  Suture lines appeared to be hemostatic.  The proximal SFA was ligated with an 0 silk for security
 reasons, even though it was actually occluded.  The wound was irrigated.  Hemostasis was assured.  H
eparin was reversed with protamine.  The wound was sprayed with some topical thrombin and then closed
 in layers using 2-0 Vicryl for the fascia, with 3-0 Vicryl for the subcu and skin staples for the sk
in.  The wound was infiltrated with 0.5% Marcaine as well.  He tolerated the procedure well, was take
n to the recovery room in good condition.  There were no complications.  __________





Job #:  [id number]
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
## 2022-07-25 NOTE — PROGRESS NOTES
PROGRESS NOTE       PATIENT PROBLEM LIST:  Patient Active Problem List   Diagnosis Code    Asthma J45.909    CAD (coronary artery disease) I25.10    Vitamin D deficiency E55.9    HTN (hypertension) I10    Idiopathic peripheral neuropathy G60.9    Rhinitis, allergic J30.9    GERD (gastroesophageal reflux disease) K21.9    PVD (peripheral vascular disease) with claudication ([facility]) I73.9    Gait instability R26.81    Hyponatremia E87.1    DM (diabetes mellitus), type 2 ([facility]) E11.9    History of pulmonary embolus (PE) Z86.711    Peripheral vascular angioplasty status Z98.62    Non-proliferative diabetic retinopathy, mild, both eyes ([facility]) H60.6589    Leg swelling M79.89    Compression fracture of L5 lumbar vertebra, closed, initial encounter ([facility] [address] S32.050A    HLD (hyperlipidemia) E78.5    Compression fracture of thoracic vertebra ([facility]) S22.000A    Acute anemia D64.9    Spinal stenosis M48.00    [facility] I50.9       SUBJECTIVE:  Tim Paiz states she feels much improved since ingesting Advil/cream on 2 consecutive days. Her appetite has improved significantly. She denies any shortness of breath, chest discomfort nor palpitations but remains in atrial fibrillation with a controlled ventricular response. Her daughter is at her bedside helping in her care. REVIEW OF SYSTEMS:  General ROS: negative for - fatigue, malaise,  weight gain or weight loss  Psychological ROS: negative for - anxiety , depression  Ophthalmic ROS: negative for - decreased vision or visual distortion.   ENT ROS: negative  Allergy and Immunology ROS: negative  Hematological and Lymphatic ROS: negative  Endocrine: no heat or cold intolerance and no polyphagia, polydipsia, or polyuria  Respiratory ROS: positive for - cough and shortness of breath-improving  Cardiovascular ROS: positive for - irregular heartbeat and shortness of breath.-Improving  Gastrointestinal ROS: no abdominal pain, change in bowel habits, or black or bloody stools  Genito-Urinary ROS: no nocturia, dysuria, trouble voiding, frequency or hematuria  Musculoskeletal ROS: negative for- myalgias, arthralgias, or claudication  Neurological ROS: no TIA or stroke symptoms otherwise no significant change in symptoms or problems since yesterday as documented in previous progress notes. SCHEDULED MEDICATIONS:   losartan  25 mg Oral Daily    torsemide  10 mg Oral Daily    insulin lispro  0-10 Units SubCUTAneous 4x Daily AC & HS    lactulose  20 g Oral BID    sodium bicarbonate  650 mg Oral Daily    amLODIPine  10 mg Oral Daily    [Held by provider] apixaban  5 mg Oral BID    [Held by provider] aspirin  81 mg Oral Daily    ferrous sulfate  325 mg Oral Daily    gabapentin  300 mg Oral QPM    [Held by provider] hydrALAZINE  25 mg Oral BID    polyvinyl alcohol  1 drop Both Eyes 4x Daily    pravastatin  20 mg Oral Nightly    sodium chloride flush  5-40 mL IntraVENous 2 times per day    albuterol  2.5 mg Nebulization 4x daily    Arformoterol Tartrate  15 mcg Nebulization BID    And    budesonide  0.25 mg Nebulization BID       VITAL SIGNS:                                                                                                                          /62   Pulse 72   Temp 98.4 °F (36.9 °C) (Oral)   Resp 20   Ht 5' 3\" (1.6 m)   Wt 135 lb (61.2 kg)   LMP 12/03/1997   SpO2 100%   BMI 23.91 kg/m²   No data found. OBJECTIVE:    HEENT: PERRL, EOM  Intact; sclera non-icteric, conjunctiva pink. Carotids are brisk in upstroke with normal contour. No carotid bruits. Normal jugular venous pulsation at 45°. No palpable cervical nor supraclavicular nodes. Thyroid not palpable. Trachea midline. Chest: Even excursion  Lungs: CTA B, no expiratory wheezes or rhonchi, no decreased tactile fremitus without inspiratory rales. Heart: Regular  rhythm; S1 > S2, no gallop or murmur. No clicks, rub, palpable thrills   or heaves. PMI nondisplaced, 5th intercostal space MCL.    Abdomen: Soft, nontender, nondistended,  mildly protuberant, no masses or organomegaly. Bowel sounds active. Extremities: Without clubbing, cyanosis or edema. Pulses present 3+ upper extermities bilaterally; present 1+ DP and present 1+ PT bilaterally. Data:   Scheduled Meds: Reviewed  Continuous Infusions:    dextrose      sodium chloride      sodium chloride         Intake/Output Summary (Last 24 hours) at 7/25/2022 1147  Last data filed at 7/25/2022 9473  Gross per 24 hour   Intake 60 ml   Output 450 ml   Net -390 ml     CBC:   Recent Labs     07/22/22  1615 07/23/22  0325 07/25/22  1045   WBC  --   --  10.5   HGB 9.1* 8.5* 9.4*   HCT 27.4* 26.4* 29.2*   PLT  --   --  360     BMP:  Recent Labs     07/22/22  1615 07/23/22  0325 07/24/22  0928 07/25/22  0400   * 136 132 133   K 5.1* 3.6 4.5 4.1   CL 99 104 99 102   CO2 23 24 20* 23   BUN 21 20 17 14   CREATININE 0.6 0.7 0.6 0.6   LABGLOM >60 >60 >60 >60     ABGs: No results found for: PH, PO2, PCO2  INR: No results for input(s): INR in the last 72 hours. PRO-BNP:   Lab Results   Component Value Date    PROBNP 7,669 (H) 07/25/2022    PROBNP 21,707 (H) 07/23/2022      TSH:   Lab Results   Component Value Date    TSH 1.410 07/19/2022      Cardiac Injury Profile: No results for input(s): TROPHS in the last 72 hours. Lipid Profile:   Lab Results   Component Value Date/Time    TRIG 61 05/02/2019 10:13 AM    HDL 68 05/02/2019 10:13 AM    LDLCALC 79 05/02/2019 10:13 AM    CHOL 159 05/02/2019 10:13 AM      Hemoglobin A1C: No components found for: HGBA1C      RAD:   US DUP LOWER EXTREMITIES BILATERAL VENOUS   Final Result   No evidence of DVT in either lower extremity. XR CHEST PORTABLE   Final Result   Cardiomegaly with pulmonary edema.                EKG: See Report  Echo: See Report      IMPRESSIONS:  Patient Active Problem List   Diagnosis Code    Asthma J45.909    CAD (coronary artery disease) I25.10    Vitamin D deficiency E55.9    HTN (hypertension) I10 Idiopathic peripheral neuropathy G60.9    Rhinitis, allergic J30.9    GERD (gastroesophageal reflux disease) K21.9    PVD (peripheral vascular disease) with claudication ([facility]) I73.9    Gait instability R26.81    Hyponatremia E87.1    DM (diabetes mellitus), type 2 ([facility]) E11.9    History of pulmonary embolus (PE) Z86.711    Peripheral vascular angioplasty status Z98.62    Non-proliferative diabetic retinopathy, mild, both eyes ([facility]) P69.8875    Leg swelling M79.89    Compression fracture of L5 lumbar vertebra, closed, initial encounter ([facility] [address] S32.050A    HLD (hyperlipidemia) E78.5    Compression fracture of thoracic vertebra ([facility]) S22.000A    Acute anemia D64.9    Spinal stenosis M48.00    Acute heart failure ([facility]) I50.9       RECOMMENDATIONS:  At this time we need to add physical therapy and consider transfer to a long-term care facility with rehabilitation. She is to maintain her present cardiac medications but wished to reinstitute apixaban and am awaiting accurate weight from today's vitals as she is so close to 60 kg and technically would then be on 2.5 mg of apixaban twice daily. I have spent more than 26 minutes face to face with Amtia Russell and reviewing notes and laboratory data, with greater than 50% of this time instructing and counseling the patient and her daughter who is at the bedside face to face regarding my findings and recommendations and I have answered all questions as posed to me by Ms. Edward Nguyen and her daughter. Nancy Melgar, DO FACP,FACC,FSCAI      NOTE:  This report was transcribed using voice recognition software.   Every effort was made to ensure accuracy; however, inadvertent computerized transcription errors may be present

## 2022-07-25 NOTE — DISCHARGE SUMMARY
[facility]   Discharge summary   Patient ID:  Rubens Damon  [id number]  66 y.o.  9/20/1927    Admit date: 7/17/2022    Discharge date and time: 7/25/2022    Admission Diagnoses:   Patient Active Problem List   Diagnosis    Asthma    CAD (coronary artery disease)    Vitamin D deficiency    HTN (hypertension)    Idiopathic peripheral neuropathy    Rhinitis, allergic    GERD (gastroesophageal reflux disease)    PVD (peripheral vascular disease) with claudication (HCC)    Gait instability    Hyponatremia    DM (diabetes mellitus), type 2 (HCC)    History of pulmonary embolus (PE)    Peripheral vascular angioplasty status    Non-proliferative diabetic retinopathy, mild, both eyes (HCC)    Leg swelling    Compression fracture of L5 lumbar vertebra, closed, initial encounter ([facility]    HLD (hyperlipidemia)    Compression fracture of thoracic vertebra ([facility]    Acute anemia    Spinal stenosis    Acute heart failure ([facility]       Discharge Diagnoses: acute anemia    Consults: cardiology and nephrology    Procedures: none    Hospital Course:      80 y.o. female presented with GIB AND SOB. STATES HER STOOLS HAVE BEEN BLACK . EGD DONE  No evidence of an active source for an upper GI bleed  SHE THOUGHT IT WAS FROM HER IRON PILLS, THEN SHE BECAME VERY SOB. SHE IS ON ELIQUIS FOR HISTORY OF PE.  FOUND TO HAVE A LOW HGB OF 5.2 ,  FLUIDS STARTED TODAY BY NEPHROLOGY,  SHE IS BEGINNING TO HAVE A BM .  SIGNIFICANT BUMP IN HER BNP.   WILL HOLD IVF UNTIL DISCUSSED WITH RENAL** LOSARTAN INCREASED BY CARDIOLOGY, discontinue hydralazine and ASA per cardio and restart elquis but decreased at 2.5mg BID     Recent Labs     07/23/22  0325 07/25/22  1045   WBC  --  10.5   HGB 8.5* 9.4*   HCT 26.4* 29.2*   PLT  --  360       Recent Labs     07/23/22  0325 07/24/22  0928 07/25/22  0400    132 133   K 3.6 4.5 4.1    99 102   CO2 24 20* 23   BUN 20 17 14   CREATININE 0.7 0.6 0.6   CALCIUM 8.7 8.5* 8.3*       XR CHEST PORTABLE    Result Date: 7/17/2022  EXAMINATION: ONE XRAY VIEW OF THE CHEST 7/17/2022 9:54 pm COMPARISON: 05/28/2021 HISTORY: ORDERING SYSTEM PROVIDED HISTORY: sob TECHNOLOGIST PROVIDED HISTORY: Reason for exam:->sob FINDINGS: There is cardiomegaly. There are increased interstitial opacities. No pneumothorax or large pleural effusion. Cardiomegaly with pulmonary edema. US DUP LOWER EXTREMITIES BILATERAL VENOUS    Result Date: 7/17/2022  EXAMINATION: DUPLEX VENOUS ULTRASOUND OF THE BILATERAL LOWER EXTREMITIES7/17/2022 10:17 pm TECHNIQUE: Duplex ultrasound using B-mode/gray scaled imaging, Doppler spectral analysis and color flow Doppler was obtained of the deep venous structures of the lower bilateral extremities. COMPARISON: None. HISTORY: ORDERING SYSTEM PROVIDED HISTORY: r/o dvt TECHNOLOGIST PROVIDED HISTORY: Reason for exam:->r/o dvt What reading provider will be dictating this exam?->CRC FINDINGS: The visualized veins of the bilateral lower extremities are patent and free of echogenic thrombus. The veins demonstrate good compressibility with normal color flow study and spectral analysis. No evidence of DVT in either lower extremity. Discharge Exam:    HEENT: NCAT,  PERRLA, No JVD  Heart:  RRR, no murmurs, gallops, or rubs. Lungs:  CTA bilaterally, no wheeze, rales or rhonchi  Abd: bowel sounds present, nontender, nondistended, no masses  Extrem:  No clubbing, cyanosis, or edema    Disposition: [facility]     Patient Condition at Discharge: Stable     Patient Instructions:      Medication List        START taking these medications      losartan 25 MG tablet  Commonly known as: COZAAR  Take 1 tablet by mouth in the morning. Start taking on: July 26, 2022     sodium bicarbonate 650 MG tablet  Take 1 tablet by mouth in the morning. Start taking on: July 26, 2022     torsemide 10 MG tablet  Commonly known as: DEMADEX  Take 1 tablet by mouth in the morning.   Start taking on: July 26, 2022 CHANGE how you take these medications      amLODIPine 10 MG tablet  Commonly known as: NORVASC  Take 1 tablet by mouth in the morning. Start taking on: July 26, 2022  What changed:   medication strength  how much to take            CONTINUE taking these medications      acetaminophen 500 MG tablet  Commonly known as: TYLENOL     Alpha-Lipoic Acid 600 MG Tabs     aluminum & magnesium hydroxide-simethicone 400-400-40 MG/5ML Susp  Commonly known as: MYLANTA     b complex vitamins capsule     bisacodyl 10 MG suppository  Commonly known as: DULCOLAX     ciclopirox 8 % solution  Commonly known as: PENLAC  Apply once daily all toenails.      clotrimazole-betamethasone 1-0.05 % cream  Commonly known as: LOTRISONE     CoQ10 200 MG Caps     diclofenac sodium 1 % Gel  Commonly known as: VOLTAREN     ferrous sulfate 325 (65 Fe) MG tablet  Commonly known as: IRON 325     fluticasone-vilanterol 100-25 MCG/INH Aepb inhaler  Commonly known as: Breo Ellipta  Inhale 1 puff into the lungs daily     gabapentin 300 MG capsule  Commonly known as: NEURONTIN     JUAN MIGUEL ROOT PO     glimepiride 2 MG tablet  Commonly known as: AMARYL     guaiFENesin 100 MG/5ML syrup  Commonly known as: ROBITUSSIN     lidocaine 4 % cream  Commonly known as: LMX     magnesium gluconate 500 MG tablet  Commonly known as: MAGONATE     metFORMIN 500 MG tablet  Commonly known as: GLUCOPHAGE     OCUVITE ADULT FORMULA PO     oxyCODONE 5 MG immediate release tablet  Commonly known as: ROXICODONE     polyethylene glycol 17 g packet  Commonly known as: GLYCOLAX     polyvinyl alcohol 1.4 % ophthalmic solution  Commonly known as: LIQUIFILM TEARS     pravastatin 20 MG tablet  Commonly known as: PRAVACHOL  TAKE 1 TABLET BY MOUTH  NIGHTLY     PRO-BIOTIC BLEND PO     promethazine 12.5 MG tablet  Commonly known as: PHENERGAN     PROVENTIL 90 MCG/ACT inhaler  Generic drug: albuterol  Inhale 2 puffs into the lungs 4 times daily     senna 8.6 MG tablet  Commonly known as: SENOKOT     traMADol 50 MG tablet  Commonly known as: ULTRAM            STOP taking these medications      aspirin 81 MG EC tablet     famotidine 10 MG tablet  Commonly known as: PEPCID     hydrALAZINE 25 MG tablet  Commonly known as: APRESOLINE     pantoprazole sodium 40 MG Pack packet  Commonly known as: PROTONIX     SODIUM CHLORIDE PO     sucralfate 1 GM tablet  Commonly known as: CARAFATE     Vitamin D 25 MCG (1000 UT) Tabs tablet  Commonly known as: CHOLECALCIFEROL            ASK your doctor about these medications      * apixaban 5 MG Tabs tablet  Commonly known as: ELIQUIS  Take 2 tablets by mouth 2 times daily for 5 days Followed by 5 mg twice daily for 3 months  Ask about: Which instructions should I use? * apixaban 5 MG Tabs tablet  Commonly known as: ELIQUIS  Take 0.5 tablets by mouth in the morning and 0.5 tablets before bedtime. Ask about: Which instructions should I use? * This list has 2 medication(s) that are the same as other medications prescribed for you. Read the directions carefully, and ask your doctor or other care provider to review them with you. Where to Get Your Medications        These medications were sent to [facility] [address], [address] [address]      Phone: [phone]   apixaban 5 MG Tabs tablet       Information about where to get these medications is not yet available    Ask your nurse or doctor about these medications  amLODIPine 10 MG tablet  apixaban 5 MG Tabs tablet  losartan 25 MG tablet  sodium bicarbonate 650 MG tablet  torsemide 10 MG tablet       Activity: activity as tolerated  Diet: regular diet    Pt has been advised to: Follow-up with Lilo Melvin DO in 1 week.   Follow-up with consultants as recommended by them    Note that over 30 minutes was spent in preparing discharge papers, discussing discharge with patient, medication review, etc.    Signed:  VIK Acuña CNP  7/25/2022  6:00 PM     Above note edited to reflect my thoughts     I personally saw, examined and provided care for the patient. Radiographs, labs and medication list were reviewed by me independently. The case was discussed in detail and plans for care were established. Review of MILA Acuña   , documentation was conducted and revisions were made as appropriate directly by me. I agree with the above documented exam, problem list, and plan of care.      Jared Florez MD  7/25/2022

## 2022-07-25 NOTE — PROGRESS NOTES
Nurse to nurse called to [facility] at the [facility]. Per nurse at the [facility], their fax machine is not working, they will wait for paperwork to arrive with family. Discharge AVS, N17, H&P and MAR report printed to be sent with pt.

## 2022-07-30 ENCOUNTER — APPOINTMENT (OUTPATIENT)
Dept: CT IMAGING | Age: 87
DRG: 377 | End: 2022-07-30
Payer: MEDICARE

## 2022-07-30 ENCOUNTER — HOSPITAL ENCOUNTER (INPATIENT)
Age: 87
LOS: 2 days | Discharge: HOSPICE | DRG: 377 | End: 2022-08-01
Attending: EMERGENCY MEDICINE | Admitting: INTERNAL MEDICINE
Payer: MEDICARE

## 2022-07-30 DIAGNOSIS — R57.8 HEMORRHAGIC SHOCK (HCC): Primary | ICD-10-CM

## 2022-07-30 PROBLEM — K92.2 GI BLEED: Status: ACTIVE | Noted: 2022-07-30

## 2022-07-30 LAB
ABO/RH: NORMAL
ABO/RH: NORMAL
ALBUMIN SERPL-MCNC: 3 G/DL (ref 3.5–5.2)
ALP BLD-CCNC: 47 U/L (ref 35–104)
ALT SERPL-CCNC: 18 U/L (ref 0–32)
ANION GAP SERPL CALCULATED.3IONS-SCNC: 11 MMOL/L (ref 7–16)
ANTIBODY SCREEN: NORMAL
APTT: 29 SEC (ref 24.5–35.1)
AST SERPL-CCNC: 16 U/L (ref 0–31)
BASOPHILS ABSOLUTE: 0.01 E9/L (ref 0–0.2)
BASOPHILS RELATIVE PERCENT: 0.1 % (ref 0–2)
BILIRUB SERPL-MCNC: 0.2 MG/DL (ref 0–1.2)
BLOOD BANK DISPENSE STATUS: NORMAL
BLOOD BANK DISPENSE STATUS: NORMAL
BLOOD BANK PRODUCT CODE: NORMAL
BLOOD BANK PRODUCT CODE: NORMAL
BPU ID: NORMAL
BPU ID: NORMAL
BUN BLDV-MCNC: 44 MG/DL (ref 6–23)
CALCIUM SERPL-MCNC: 8.9 MG/DL (ref 8.6–10.2)
CHLORIDE BLD-SCNC: 101 MMOL/L (ref 98–107)
CO2: 20 MMOL/L (ref 22–29)
CREAT SERPL-MCNC: 0.8 MG/DL (ref 0.5–1)
DESCRIPTION BLOOD BANK: NORMAL
DESCRIPTION BLOOD BANK: NORMAL
EOSINOPHILS ABSOLUTE: 0.01 E9/L (ref 0.05–0.5)
EOSINOPHILS RELATIVE PERCENT: 0.1 % (ref 0–6)
GFR AFRICAN AMERICAN: >60
GFR NON-AFRICAN AMERICAN: >60 ML/MIN/1.73
GLUCOSE BLD-MCNC: 213 MG/DL (ref 74–99)
HCT VFR BLD CALC: 21.4 % (ref 34–48)
HCT VFR BLD CALC: 24.6 % (ref 34–48)
HCT VFR BLD CALC: 27.1 % (ref 34–48)
HEMOGLOBIN: 6.8 G/DL (ref 11.5–15.5)
HEMOGLOBIN: 7.9 G/DL (ref 11.5–15.5)
HEMOGLOBIN: 8.8 G/DL (ref 11.5–15.5)
IMMATURE GRANULOCYTES #: 0.04 E9/L
IMMATURE GRANULOCYTES %: 0.5 % (ref 0–5)
INR BLD: 1.5
LYMPHOCYTES ABSOLUTE: 1.15 E9/L (ref 1.5–4)
LYMPHOCYTES RELATIVE PERCENT: 14 % (ref 20–42)
MCH RBC QN AUTO: 33.7 PG (ref 26–35)
MCHC RBC AUTO-ENTMCNC: 31.8 % (ref 32–34.5)
MCV RBC AUTO: 105.9 FL (ref 80–99.9)
METER GLUCOSE: 138 MG/DL (ref 74–99)
MONOCYTES ABSOLUTE: 0.43 E9/L (ref 0.1–0.95)
MONOCYTES RELATIVE PERCENT: 5.2 % (ref 2–12)
NEUTROPHILS ABSOLUTE: 6.56 E9/L (ref 1.8–7.3)
NEUTROPHILS RELATIVE PERCENT: 80.1 % (ref 43–80)
PDW BLD-RTO: 17.7 FL (ref 11.5–15)
PLATELET # BLD: 376 E9/L (ref 130–450)
PMV BLD AUTO: 10.9 FL (ref 7–12)
POTASSIUM REFLEX MAGNESIUM: 5.1 MMOL/L (ref 3.5–5)
POTASSIUM SERPL-SCNC: 5.5 MMOL/L (ref 3.5–5)
PROTHROMBIN TIME: 16.6 SEC (ref 9.3–12.4)
RBC # BLD: 2.02 E12/L (ref 3.5–5.5)
SODIUM BLD-SCNC: 132 MMOL/L (ref 132–146)
TOTAL PROTEIN: 5.2 G/DL (ref 6.4–8.3)
WBC # BLD: 8.2 E9/L (ref 4.5–11.5)

## 2022-07-30 PROCEDURE — 86901 BLOOD TYPING SEROLOGIC RH(D): CPT

## 2022-07-30 PROCEDURE — 85025 COMPLETE CBC W/AUTO DIFF WBC: CPT

## 2022-07-30 PROCEDURE — 96375 TX/PRO/DX INJ NEW DRUG ADDON: CPT

## 2022-07-30 PROCEDURE — 86920 COMPATIBILITY TEST SPIN: CPT

## 2022-07-30 PROCEDURE — 2000000000 HC ICU R&B

## 2022-07-30 PROCEDURE — 74174 CTA ABD&PLVS W/CONTRAST: CPT

## 2022-07-30 PROCEDURE — 36592 COLLECT BLOOD FROM PICC: CPT

## 2022-07-30 PROCEDURE — 87081 CULTURE SCREEN ONLY: CPT

## 2022-07-30 PROCEDURE — 6360000002 HC RX W HCPCS: Performed by: PHYSICIAN ASSISTANT

## 2022-07-30 PROCEDURE — 85610 PROTHROMBIN TIME: CPT

## 2022-07-30 PROCEDURE — 51702 INSERT TEMP BLADDER CATH: CPT

## 2022-07-30 PROCEDURE — 85018 HEMOGLOBIN: CPT

## 2022-07-30 PROCEDURE — 2580000003 HC RX 258

## 2022-07-30 PROCEDURE — 6360000004 HC RX CONTRAST MEDICATION: Performed by: RADIOLOGY

## 2022-07-30 PROCEDURE — 2580000003 HC RX 258: Performed by: INTERNAL MEDICINE

## 2022-07-30 PROCEDURE — 6360000002 HC RX W HCPCS

## 2022-07-30 PROCEDURE — C9113 INJ PANTOPRAZOLE SODIUM, VIA: HCPCS | Performed by: EMERGENCY MEDICINE

## 2022-07-30 PROCEDURE — 36415 COLL VENOUS BLD VENIPUNCTURE: CPT

## 2022-07-30 PROCEDURE — 36556 INSERT NON-TUNNEL CV CATH: CPT

## 2022-07-30 PROCEDURE — 99285 EMERGENCY DEPT VISIT HI MDM: CPT

## 2022-07-30 PROCEDURE — 86923 COMPATIBILITY TEST ELECTRIC: CPT

## 2022-07-30 PROCEDURE — 84132 ASSAY OF SERUM POTASSIUM: CPT

## 2022-07-30 PROCEDURE — 86900 BLOOD TYPING SEROLOGIC ABO: CPT

## 2022-07-30 PROCEDURE — 82962 GLUCOSE BLOOD TEST: CPT

## 2022-07-30 PROCEDURE — 85014 HEMATOCRIT: CPT

## 2022-07-30 PROCEDURE — 80053 COMPREHEN METABOLIC PANEL: CPT

## 2022-07-30 PROCEDURE — 86850 RBC ANTIBODY SCREEN: CPT

## 2022-07-30 PROCEDURE — 36430 TRANSFUSION BLD/BLD COMPNT: CPT

## 2022-07-30 PROCEDURE — 6370000000 HC RX 637 (ALT 250 FOR IP): Performed by: PHYSICIAN ASSISTANT

## 2022-07-30 PROCEDURE — 02HV33Z INSERTION OF INFUSION DEVICE INTO SUPERIOR VENA CAVA, PERCUTANEOUS APPROACH: ICD-10-PCS | Performed by: EMERGENCY MEDICINE

## 2022-07-30 PROCEDURE — 85730 THROMBOPLASTIN TIME PARTIAL: CPT

## 2022-07-30 PROCEDURE — C9113 INJ PANTOPRAZOLE SODIUM, VIA: HCPCS | Performed by: PHYSICIAN ASSISTANT

## 2022-07-30 PROCEDURE — 6360000002 HC RX W HCPCS: Performed by: NURSE PRACTITIONER

## 2022-07-30 PROCEDURE — 2580000003 HC RX 258: Performed by: EMERGENCY MEDICINE

## 2022-07-30 PROCEDURE — 96374 THER/PROPH/DIAG INJ IV PUSH: CPT

## 2022-07-30 PROCEDURE — 6360000002 HC RX W HCPCS: Performed by: EMERGENCY MEDICINE

## 2022-07-30 PROCEDURE — P9016 RBC LEUKOCYTES REDUCED: HCPCS

## 2022-07-30 PROCEDURE — A4216 STERILE WATER/SALINE, 10 ML: HCPCS | Performed by: EMERGENCY MEDICINE

## 2022-07-30 RX ORDER — POTASSIUM CHLORIDE 7.45 MG/ML
10 INJECTION INTRAVENOUS PRN
Status: DISCONTINUED | OUTPATIENT
Start: 2022-07-30 | End: 2022-07-31

## 2022-07-30 RX ORDER — ALBUTEROL 90 MCG
2 AEROSOL (GRAM) INHALATION 4 TIMES DAILY
Status: DISCONTINUED | OUTPATIENT
Start: 2022-07-30 | End: 2022-07-30 | Stop reason: SDUPTHER

## 2022-07-30 RX ORDER — ARFORMOTEROL TARTRATE 15 UG/2ML
15 SOLUTION RESPIRATORY (INHALATION) 2 TIMES DAILY
Status: DISCONTINUED | OUTPATIENT
Start: 2022-07-30 | End: 2022-08-01 | Stop reason: HOSPADM

## 2022-07-30 RX ORDER — POLYVINYL ALCOHOL 14 MG/ML
1 SOLUTION/ DROPS OPHTHALMIC 4 TIMES DAILY
Status: DISCONTINUED | OUTPATIENT
Start: 2022-07-30 | End: 2022-08-01 | Stop reason: HOSPADM

## 2022-07-30 RX ORDER — DEXTROSE MONOHYDRATE 100 MG/ML
INJECTION, SOLUTION INTRAVENOUS CONTINUOUS PRN
Status: DISCONTINUED | OUTPATIENT
Start: 2022-07-30 | End: 2022-07-31

## 2022-07-30 RX ORDER — ALBUTEROL SULFATE 2.5 MG/3ML
2.5 SOLUTION RESPIRATORY (INHALATION) 4 TIMES DAILY
Status: DISCONTINUED | OUTPATIENT
Start: 2022-07-30 | End: 2022-08-01 | Stop reason: HOSPADM

## 2022-07-30 RX ORDER — ONDANSETRON 4 MG/1
4 TABLET, ORALLY DISINTEGRATING ORAL EVERY 8 HOURS PRN
Status: DISCONTINUED | OUTPATIENT
Start: 2022-07-30 | End: 2022-08-01 | Stop reason: HOSPADM

## 2022-07-30 RX ORDER — INSULIN LISPRO 100 [IU]/ML
0-4 INJECTION, SOLUTION INTRAVENOUS; SUBCUTANEOUS NIGHTLY
Status: DISCONTINUED | OUTPATIENT
Start: 2022-07-30 | End: 2022-07-31

## 2022-07-30 RX ORDER — SODIUM CHLORIDE 9 MG/ML
INJECTION, SOLUTION INTRAVENOUS CONTINUOUS
Status: ACTIVE | OUTPATIENT
Start: 2022-07-30 | End: 2022-07-31

## 2022-07-30 RX ORDER — SODIUM CHLORIDE 9 MG/ML
INJECTION, SOLUTION INTRAVENOUS PRN
Status: DISCONTINUED | OUTPATIENT
Start: 2022-07-30 | End: 2022-07-31

## 2022-07-30 RX ORDER — TORSEMIDE 10 MG/1
10 TABLET ORAL DAILY
Status: DISCONTINUED | OUTPATIENT
Start: 2022-07-30 | End: 2022-07-30

## 2022-07-30 RX ORDER — PANTOPRAZOLE SODIUM 40 MG/10ML
40 INJECTION, POWDER, LYOPHILIZED, FOR SOLUTION INTRAVENOUS 2 TIMES DAILY
Status: DISCONTINUED | OUTPATIENT
Start: 2022-07-30 | End: 2022-07-31

## 2022-07-30 RX ORDER — 0.9 % SODIUM CHLORIDE 0.9 %
250 INTRAVENOUS SOLUTION INTRAVENOUS ONCE
Status: COMPLETED | OUTPATIENT
Start: 2022-07-30 | End: 2022-07-30

## 2022-07-30 RX ORDER — FERROUS SULFATE 325(65) MG
325 TABLET ORAL
Status: DISCONTINUED | OUTPATIENT
Start: 2022-07-30 | End: 2022-07-31

## 2022-07-30 RX ORDER — INSULIN LISPRO 100 [IU]/ML
0-4 INJECTION, SOLUTION INTRAVENOUS; SUBCUTANEOUS
Status: DISCONTINUED | OUTPATIENT
Start: 2022-07-30 | End: 2022-07-31

## 2022-07-30 RX ORDER — MAGNESIUM HYDROXIDE/ALUMINUM HYDROXICE/SIMETHICONE 120; 1200; 1200 MG/30ML; MG/30ML; MG/30ML
30 SUSPENSION ORAL EVERY 6 HOURS PRN
Status: DISCONTINUED | OUTPATIENT
Start: 2022-07-30 | End: 2022-07-31

## 2022-07-30 RX ORDER — GABAPENTIN 300 MG/1
300 CAPSULE ORAL EVERY EVENING
Status: DISCONTINUED | OUTPATIENT
Start: 2022-07-30 | End: 2022-07-30

## 2022-07-30 RX ORDER — FLUTICASONE FUROATE AND VILANTEROL 100; 25 UG/1; UG/1
1 POWDER RESPIRATORY (INHALATION) DAILY
Status: DISCONTINUED | OUTPATIENT
Start: 2022-07-30 | End: 2022-07-30 | Stop reason: SDUPTHER

## 2022-07-30 RX ORDER — MAGNESIUM SULFATE IN WATER 40 MG/ML
2000 INJECTION, SOLUTION INTRAVENOUS PRN
Status: DISCONTINUED | OUTPATIENT
Start: 2022-07-30 | End: 2022-07-31

## 2022-07-30 RX ORDER — ACETAMINOPHEN 325 MG/1
650 TABLET ORAL EVERY 6 HOURS PRN
Status: DISCONTINUED | OUTPATIENT
Start: 2022-07-30 | End: 2022-08-01 | Stop reason: HOSPADM

## 2022-07-30 RX ORDER — OXYCODONE HYDROCHLORIDE 5 MG/1
5 TABLET ORAL EVERY 4 HOURS PRN
Status: DISCONTINUED | OUTPATIENT
Start: 2022-07-30 | End: 2022-07-31

## 2022-07-30 RX ORDER — PRAVASTATIN SODIUM 20 MG
20 TABLET ORAL NIGHTLY
Status: DISCONTINUED | OUTPATIENT
Start: 2022-07-30 | End: 2022-07-30

## 2022-07-30 RX ORDER — GUAIFENESIN 100 MG/5ML
200 SOLUTION ORAL 4 TIMES DAILY PRN
Status: DISCONTINUED | OUTPATIENT
Start: 2022-07-30 | End: 2022-07-31

## 2022-07-30 RX ORDER — ACETAMINOPHEN 650 MG/1
650 SUPPOSITORY RECTAL EVERY 6 HOURS PRN
Status: DISCONTINUED | OUTPATIENT
Start: 2022-07-30 | End: 2022-08-01 | Stop reason: HOSPADM

## 2022-07-30 RX ORDER — SODIUM BICARBONATE 650 MG/1
650 TABLET ORAL DAILY
Status: DISCONTINUED | OUTPATIENT
Start: 2022-07-30 | End: 2022-07-31

## 2022-07-30 RX ORDER — SODIUM CHLORIDE 0.9 % (FLUSH) 0.9 %
10 SYRINGE (ML) INJECTION PRN
Status: DISCONTINUED | OUTPATIENT
Start: 2022-07-30 | End: 2022-08-01 | Stop reason: HOSPADM

## 2022-07-30 RX ORDER — SODIUM CHLORIDE 0.9 % (FLUSH) 0.9 %
10 SYRINGE (ML) INJECTION EVERY 12 HOURS SCHEDULED
Status: DISCONTINUED | OUTPATIENT
Start: 2022-07-30 | End: 2022-08-01 | Stop reason: HOSPADM

## 2022-07-30 RX ORDER — POTASSIUM CHLORIDE 20 MEQ/1
40 TABLET, EXTENDED RELEASE ORAL PRN
Status: DISCONTINUED | OUTPATIENT
Start: 2022-07-30 | End: 2022-07-31

## 2022-07-30 RX ORDER — TRAMADOL HYDROCHLORIDE 50 MG/1
50 TABLET ORAL 2 TIMES DAILY
Status: DISCONTINUED | OUTPATIENT
Start: 2022-07-30 | End: 2022-07-31

## 2022-07-30 RX ORDER — AMLODIPINE BESYLATE 10 MG/1
10 TABLET ORAL DAILY
Status: DISCONTINUED | OUTPATIENT
Start: 2022-07-31 | End: 2022-07-30

## 2022-07-30 RX ORDER — ONDANSETRON 2 MG/ML
4 INJECTION INTRAMUSCULAR; INTRAVENOUS ONCE
Status: COMPLETED | OUTPATIENT
Start: 2022-07-30 | End: 2022-07-30

## 2022-07-30 RX ORDER — MORPHINE SULFATE 2 MG/ML
1 INJECTION, SOLUTION INTRAMUSCULAR; INTRAVENOUS EVERY 4 HOURS PRN
Status: DISCONTINUED | OUTPATIENT
Start: 2022-07-30 | End: 2022-07-31

## 2022-07-30 RX ORDER — ONDANSETRON 2 MG/ML
4 INJECTION INTRAMUSCULAR; INTRAVENOUS EVERY 6 HOURS PRN
Status: DISCONTINUED | OUTPATIENT
Start: 2022-07-30 | End: 2022-08-01 | Stop reason: HOSPADM

## 2022-07-30 RX ORDER — LOSARTAN POTASSIUM 25 MG/1
25 TABLET ORAL DAILY
Status: DISCONTINUED | OUTPATIENT
Start: 2022-07-30 | End: 2022-07-30

## 2022-07-30 RX ORDER — 0.9 % SODIUM CHLORIDE 0.9 %
1000 INTRAVENOUS SOLUTION INTRAVENOUS ONCE
Status: COMPLETED | OUTPATIENT
Start: 2022-07-30 | End: 2022-07-30

## 2022-07-30 RX ORDER — SODIUM CHLORIDE 9 MG/ML
INJECTION, SOLUTION INTRAVENOUS PRN
Status: COMPLETED | OUTPATIENT
Start: 2022-07-30 | End: 2022-07-30

## 2022-07-30 RX ORDER — BUDESONIDE 0.25 MG/2ML
250 INHALANT ORAL 2 TIMES DAILY
Status: DISCONTINUED | OUTPATIENT
Start: 2022-07-30 | End: 2022-08-01 | Stop reason: HOSPADM

## 2022-07-30 RX ADMIN — SODIUM CHLORIDE 1000 ML: 9 INJECTION, SOLUTION INTRAVENOUS at 10:59

## 2022-07-30 RX ADMIN — IOPAMIDOL 75 ML: 755 INJECTION, SOLUTION INTRAVENOUS at 16:24

## 2022-07-30 RX ADMIN — TRAMADOL HYDROCHLORIDE 50 MG: 50 TABLET, COATED ORAL at 13:15

## 2022-07-30 RX ADMIN — MORPHINE SULFATE 1 MG: 2 INJECTION, SOLUTION INTRAMUSCULAR; INTRAVENOUS at 20:23

## 2022-07-30 RX ADMIN — ONDANSETRON 4 MG: 2 INJECTION INTRAMUSCULAR; INTRAVENOUS at 14:13

## 2022-07-30 RX ADMIN — ONDANSETRON 4 MG: 2 INJECTION INTRAMUSCULAR; INTRAVENOUS at 10:50

## 2022-07-30 RX ADMIN — LOSARTAN POTASSIUM 25 MG: 25 TABLET, FILM COATED ORAL at 13:15

## 2022-07-30 RX ADMIN — POLYVINYL ALCOHOL 1 DROP: 14 SOLUTION/ DROPS OPHTHALMIC at 13:15

## 2022-07-30 RX ADMIN — SODIUM CHLORIDE: 9 INJECTION, SOLUTION INTRAVENOUS at 11:00

## 2022-07-30 RX ADMIN — PANTOPRAZOLE SODIUM 40 MG: 40 INJECTION, POWDER, FOR SOLUTION INTRAVENOUS at 18:36

## 2022-07-30 RX ADMIN — ARFORMOTEROL TARTRATE 15 MCG: 15 SOLUTION RESPIRATORY (INHALATION) at 19:18

## 2022-07-30 RX ADMIN — FERROUS SULFATE TAB 325 MG (65 MG ELEMENTAL FE) 325 MG: 325 (65 FE) TAB at 13:15

## 2022-07-30 RX ADMIN — SODIUM BICARBONATE 650 MG: 650 TABLET ORAL at 13:15

## 2022-07-30 RX ADMIN — SODIUM CHLORIDE 250 ML: 9 INJECTION, SOLUTION INTRAVENOUS at 16:38

## 2022-07-30 RX ADMIN — BUDESONIDE 250 MCG: 0.25 SUSPENSION RESPIRATORY (INHALATION) at 19:18

## 2022-07-30 RX ADMIN — SODIUM CHLORIDE 80 MG: 9 INJECTION, SOLUTION INTRAMUSCULAR; INTRAVENOUS; SUBCUTANEOUS at 10:59

## 2022-07-30 RX ADMIN — SODIUM CHLORIDE: 9 INJECTION, SOLUTION INTRAVENOUS at 16:40

## 2022-07-30 RX ADMIN — ALBUTEROL SULFATE 2.5 MG: 2.5 SOLUTION RESPIRATORY (INHALATION) at 19:18

## 2022-07-30 ASSESSMENT — PAIN SCALES - GENERAL
PAINLEVEL_OUTOF10: 10
PAINLEVEL_OUTOF10: 10
PAINLEVEL_OUTOF10: 6
PAINLEVEL_OUTOF10: 8
PAINLEVEL_OUTOF10: 5
PAINLEVEL_OUTOF10: 0
PAINLEVEL_OUTOF10: 3

## 2022-07-30 ASSESSMENT — PAIN DESCRIPTION - DESCRIPTORS: DESCRIPTORS: DULL;DISCOMFORT;ACHING

## 2022-07-30 ASSESSMENT — ENCOUNTER SYMPTOMS
PHOTOPHOBIA: 0
TROUBLE SWALLOWING: 0
VOICE CHANGE: 0
VOMITING: 0
BACK PAIN: 0
DIARRHEA: 1
SHORTNESS OF BREATH: 0
DIARRHEA: 0
NAUSEA: 1
ABDOMINAL PAIN: 0
COUGH: 0
BLOOD IN STOOL: 1
NAUSEA: 0
WHEEZING: 0
RHINORRHEA: 0

## 2022-07-30 ASSESSMENT — PAIN DESCRIPTION - ORIENTATION: ORIENTATION: INNER

## 2022-07-30 ASSESSMENT — PAIN - FUNCTIONAL ASSESSMENT: PAIN_FUNCTIONAL_ASSESSMENT: NONE - DENIES PAIN

## 2022-07-30 ASSESSMENT — PAIN DESCRIPTION - LOCATION: LOCATION: GENERALIZED

## 2022-07-30 NOTE — PROGRESS NOTES
Patient admitted from ED to 210, with the following belongings dentures (upper and lower), placed on monitor, patient oriented to room and unit visiting hours. Patient guide at bedside, reviewed patient rights and responsibilities. MRSA nasal swab obtained. Bed alarm on. Call light within reach.

## 2022-07-30 NOTE — ED NOTES
Dr. Amy Huang spoke with patient's son Desire Barbosa, on the phone, in regards to patient's DNR status. Patient's POA authorized patient's daughter Henri Davis to sign DNR-CCA paperwork in his absence. Witnessed by this RN.       Weston Cespedes RN  07/30/22 160 Saman Garcia RN  07/30/22 [id number]

## 2022-07-30 NOTE — Clinical Note
Discharge Plan[de-identified] Other/[facility])   Telemetry/Cardiac Monitoring Required?: Yes

## 2022-07-30 NOTE — PROGRESS NOTES
Pharmacy Note    Keegan Rosa was ordered Alpha Lipoic Acid. As per the [address] [place] [address], herbals and certain dietary supplements will be discontinued. The herbal or dietary supplement may be continued after discharge from the hospital.  Thank You, [address] [place] [address]. D 7/30/2022 12:42 PM

## 2022-07-30 NOTE — CONSULTS
Critical Care Admit/Consult Note         Patient - Nviia Stark   MRN -  [id number]   Thor # - [de-identified]   - 1927      Date of Admission -  2022  9:45 AM  Date of evaluation -  2022  [id number]   Hospital Day - 0    ADMIT/CONSULT DETAILS     Reason for Admit/Consult   GI Bleed on blood Thinner  Consulting Service/Physician   Consulting - Neela Rios MD  Primary Care Physician - DO EWA Forrest   The patient is a 80 y.o. female with significant past medical history of Arthritis, asthma, atherosclerosis, CAD, COVID, PE, Chronic Cough, GERD, DM, HLD, HTN, PVD, urinary incontinence. Patient presented to the ER with complaints of weakness, dark bloody stools from nursing facility. Geena Trimble discharged on  after no evidence of upper GI bleeding on upper scope, unable to tolerate prep for colonoscopy at that time. Patient on eliquis, and was resumed after leaving the hospital per daughter.       Past Medical History         Diagnosis Date    Arthritis     Asthma     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 ([address] 2021    Bronchitis 2017    Bruising tendency (HCC)     CAD (coronary artery disease)     Cough 2017    COVID     Dermatophytosis 2021    Diabetes mellitus ([address]     GERD (gastroesophageal reflux disease) 2017    History of pulmonary embolus (PE) 2021    Hx of blood clots     Hyperlipidemia     Hypertension     Leg swelling 7/15/2021    Lung disease     Peripheral vascular angioplasty status 2021    Pseudoaneurysm of right femoral artery ([address] 2021    PVD (peripheral vascular disease) with claudication ([address] 4/10/2019    Restless legs syndrome     Rhinitis, allergic 2017    Sinusitis 2017    SOB (shortness of breath) 2017    Type 1 diabetes mellitus with left diabetic foot ulcer ([address] 2021    Ulcerated, foot, left, limited to breakdown of skin ([address] 2021 Urinary incontinence         Past Surgical History           Procedure Laterality Date    ABDOMEN SURGERY      APPENDECTOMY      CARDIAC SURGERY      CHOLECYSTECTOMY      COLONOSCOPY      CORONARY ARTERY BYPASS GRAFT      8/28/10    ENDOSCOPY, COLON, DIAGNOSTIC      FOOT DEBRIDEMENT Left 5/16/2021    FOOT DEBRIDEMENT INCISION AND DRAINAGE.    performed by Julia Espinal DPM at [facility] Left 5/21/2021    LEFT FOOT DEBRIDEMENT  WITH DELAYED PRIMARY CLOSURE performed by Marissa Howe DPM at [address] [facility] ([facility])      frankie and bso 1997    TONSILLECTOMY AND ADENOIDECTOMY      UPPER GASTROINTESTINAL ENDOSCOPY N/A 7/18/2022    EGD ESOPHAGOGASTRODUODENOSCOPY performed by Pascale Marin MD at [facility] ENDOSCOPY       Current Medications   Current Medications    PROVENTIL  2 puff Inhalation 4x Daily    Alpha-Lipoic Acid  600 mg Oral Daily    amLODIPine  10 mg Oral Daily    ferrous sulfate  325 mg Oral Daily    fluticasone-vilanterol  1 puff Inhalation Daily    gabapentin  300 mg Oral QPM    losartan  25 mg Oral Daily    polyvinyl alcohol  1 drop Both Eyes 4x Daily    pravastatin  20 mg Oral Nightly    sodium bicarbonate  650 mg Oral Daily    torsemide  10 mg Oral Daily    traMADol  50 mg Oral BID    insulin lispro  0-4 Units SubCUTAneous TID WC    insulin lispro  0-4 Units SubCUTAneous Nightly    sodium chloride flush  10 mL IntraVENous 2 times per day    pantoprazole  40 mg IntraVENous BID     aluminum & magnesium hydroxide-simethicone, guaiFENesin, oxyCODONE, glucose, dextrose bolus **OR** dextrose bolus, glucagon (rDNA), dextrose, sodium chloride flush, sodium chloride, potassium chloride **OR** potassium alternative oral replacement **OR** potassium chloride, ondansetron **OR** ondansetron, magnesium hydroxide, acetaminophen **OR** acetaminophen, magnesium sulfate  IV Drips/Infusions   dextrose      sodium chloride       Home Medications  Medications Prior to Admission: losartan (COZAAR) 25 MG tablet, Take 1 tablet by mouth in the morning. amLODIPine (NORVASC) 10 MG tablet, Take 1 tablet by mouth in the morning. torsemide (DEMADEX) 10 MG tablet, Take 1 tablet by mouth in the morning.  sodium bicarbonate 650 MG tablet, Take 1 tablet by mouth in the morning. apixaban (ELIQUIS) 5 MG TABS tablet, Take 0.5 tablets by mouth in the morning and 0.5 tablets before bedtime. oxyCODONE (ROXICODONE) 5 MG immediate release tablet, Take 5 mg by mouth every 4 hours as needed for Pain. acetaminophen (TYLENOL) 500 MG tablet, Take 500 mg by mouth every 6 hours as needed for Pain  ciclopirox (PENLAC) 8 % solution, Apply once daily all toenails. metFORMIN (GLUCOPHAGE) 500 MG tablet, Take 500 mg by mouth daily  polyethylene glycol (GLYCOLAX) 17 g packet, Take 17 g by mouth daily as needed for Constipation  guaiFENesin (ROBITUSSIN) 100 MG/5ML syrup, Take 200 mg by mouth 4 times daily as needed for Cough   senna (SENOKOT) 8.6 MG tablet, Take 1 tablet by mouth 2 times daily  Alpha-Lipoic Acid 600 MG TABS, Take 600 mg by mouth daily  polyvinyl alcohol (LIQUIFILM TEARS) 1.4 % ophthalmic solution, 1 drop 4 times daily   clotrimazole-betamethasone (LOTRISONE) 1-0.05 % cream, Apply topically 2 times daily Apply topically 2 times daily. Ginger, Zingiber officinalis, (GINGER ROOT PO), Take 750 mg by mouth Daily in am  ferrous sulfate (IRON 325) 325 (65 Fe) MG tablet, Take 325 mg by mouth daily In the morning for anemia  b complex vitamins capsule, Take 1 capsule by mouth daily In the morning for supplement  promethazine (PHENERGAN) 12.5 MG tablet, Take 12.5 mg by mouth every 6 hours as needed for Nausea   traMADol (ULTRAM) 50 MG tablet, Take 50 mg by mouth 2 times daily.    aluminum & magnesium hydroxide-simethicone (MYLANTA) 400-400-40 MG/5ML SUSP, Take 30 mLs by mouth every 6 hours as needed  lidocaine (LMX) 4 % cream, Apply topically every 8 hours as needed for Pain Apply topically as needed to painful muscles  diclofenac sodium (VOLTAREN) 1 % GEL, Apply topically 4 times daily as needed for Pain  glimepiride (AMARYL) 2 MG tablet, Take 2 mg by mouth every morning (before breakfast)   apixaban (ELIQUIS) 5 MG TABS tablet, Take 2 tablets by mouth 2 times daily for 5 days Followed by 5 mg twice daily for 3 months  bisacodyl (DULCOLAX) 10 MG suppository, Place 10 mg rectally daily as needed for Constipation Indications: IF NO RESULTS FROM MOM   gabapentin (NEURONTIN) 300 MG capsule, Take 300 mg by mouth every evening. albuterol (PROVENTIL) 90 MCG/ACT inhaler, Inhale 2 puffs into the lungs 4 times daily  pravastatin (PRAVACHOL) 20 MG tablet, TAKE 1 TABLET BY MOUTH  NIGHTLY  fluticasone-vilanterol (BREO ELLIPTA) 100-25 MCG/INH AEPB inhaler, Inhale 1 puff into the lungs daily  Multiple Vitamins-Minerals (OCUVITE ADULT FORMULA PO), Take 1 capsule by mouth daily  Probiotic Product (PRO-BIOTIC BLEND PO), Take 1 capsule by mouth 2 times daily   magnesium gluconate (MAGONATE) 500 MG tablet, Take 200 mg by mouth 2 times daily   Coenzyme Q10 (COQ10) 200 MG CAPS, Take 200 mg by mouth daily   Diet/Nutrition   ADULT DIET; Clear Liquid  Allergies   Lisinopril  Social History   Tobacco   reports that she has never smoked. She has never used smokeless tobacco.    Alcohol     reports current alcohol use. Occupational history :    Family History         Problem Relation Age of Onset    Hypertension Father     Heart Disease Brother        Sleep History   n/a    ROS     REVIEW OF SYSTEMS:  Review of Systems   Constitutional:  Positive for fatigue. Negative for chills and fever. HENT:  Negative for trouble swallowing and voice change. Eyes:  Negative for photophobia and visual disturbance. Respiratory:  Negative for shortness of breath and wheezing. Cardiovascular:  Negative for chest pain. Gastrointestinal:  Positive for blood in stool. Negative for abdominal pain, diarrhea, nausea and vomiting.    Genitourinary:  Negative for dysuria and urgency. Musculoskeletal:  Negative for arthralgias, neck pain and neck stiffness. Skin:  Negative for rash and wound. Neurological:  Positive for headaches. Negative for dizziness, syncope and weakness. Psychiatric/Behavioral:  Negative for behavioral problems and confusion. The patient is not nervous/anxious. Lines and Devices    Right Femoral Central Line, PIV    Mechanical Ventilation Data   VENT SETTINGS (Comprehensive)     Additional Respiratory Assessments  Heart Rate: 65  Resp: 23  SpO2: 90 %    ABG  No results found for: PH, PCO2, PO2, HCO3, O2SAT  No results found for: IFIO2, MODE, SETTIDVOL, SETPEEP  Vitals    height is 5' 3\" (1.6 m) and weight is 126 lb 1.7 oz (57.2 kg). Her oral temperature is 97.1 °F (36.2 °C). Her blood pressure is 110/52 (abnormal) and her pulse is 65. Her respiration is 23 and oxygen saturation is 90%.        Temperature Range: Temp: 97.1 °F (36.2 °C) Temp  Av.6 °F (36.4 °C)  Min: 97.1 °F (36.2 °C)  Max: 97.8 °F (36.6 °C)  BP Range:  Systolic (12IIF), RJX:654 , Min:63 , MYV:295     Diastolic (60XQH), JIJ:95, Min:34, Max:60    Pulse Range: Pulse  Av.6  Min: 58  Max: 74  Respiration Range: Resp  Av.4  Min: 16  Max: 23  Current Pulse Ox[de-identified]  SpO2: 90 %  24HR Pulse Ox Range:  SpO2  Av.6 %  Min: 90 %  Max: 99 %  Oxygen Amount and Delivery:        I/O (24 Hours)    Patient Vitals for the past 8 hrs:   BP Temp Temp src Pulse Resp SpO2 Height Weight   22 1227 (!) 110/52 97.1 °F (36.2 °C) Oral 65 23 90 % 5' 3\" (1.6 m) 126 lb 1.7 oz (57.2 kg)   22 1151 119/60 -- -- 63 -- 97 % -- --   22 1137 (!) 140/50 97.7 °F (36.5 °C) Oral 60 16 97 % -- --   22 1132 (!) 140/49 -- -- 58 -- 95 % -- --   22 1107 (!) 116/54 97.7 °F (36.5 °C) Oral 59 16 94 % -- --   22 1105 (!) 114/44 -- -- 60 -- 95 % -- --   22 1050 (!) 63/34 97.6 °F (36.4 °C) Oral 74 16 98 % -- --   22 0959 (!) 81/44 97.8 °F (36.6 °C) Oral 70 16 99 % 5' 3\" (1.6 m) 128 lb (58.1 kg)       Intake/Output Summary (Last 24 hours) at 7/30/2022 1238  Last data filed at 7/30/2022 1207  Gross per 24 hour   Intake 1350 ml   Output --   Net 1350 ml     No intake/output data recorded. Date 07/30/22 0000 - 07/30/22 2359   Shift 8216-1480 3382-1293 3256-2329 24 Hour Total   INTAKE   I.V.  0(0)  0(0)   Blood  350(6.1)  350(6.1)   IV Piggyback  1000(17.5)  1000(17.5)   Shift Total(mL/kg)  1350(23.6)  1350(23.6)   OUTPUT   Shift Total(mL/kg)       Weight (kg)  57.2 57.2 57.2     Patient Vitals for the past 96 hrs (Last 3 readings):   Weight   07/30/22 1227 126 lb 1.7 oz (57.2 kg)   07/30/22 0959 128 lb (58.1 kg)         Drains/Tubes Outputs  PureWick    Exam   Physical Exam  Vitals reviewed. Constitutional:       General: She is not in acute distress. Appearance: Normal appearance. She is not ill-appearing. HENT:      Head: Normocephalic. Right Ear: External ear normal.      Left Ear: External ear normal.      Nose: Nose normal.      Mouth/Throat:      Pharynx: Oropharynx is clear. Eyes:      General:         Right eye: No discharge. Left eye: No discharge. Conjunctiva/sclera: Conjunctivae normal.   Cardiovascular:      Rate and Rhythm: Normal rate and regular rhythm. Heart sounds: No friction rub. Pulmonary:      Effort: Pulmonary effort is normal. No respiratory distress. Breath sounds: No stridor. No rhonchi or rales. Abdominal:      General: There is no distension. Palpations: Abdomen is soft. Tenderness: There is no abdominal tenderness. There is no guarding or rebound. Musculoskeletal:         General: No tenderness or deformity. Cervical back: Normal range of motion and neck supple. No rigidity or tenderness. Right lower leg: No edema. Left lower leg: No edema. Skin:     General: Skin is warm. Coloration: Skin is pale. Skin is not jaundiced. Findings: No erythema.    Neurological: Mental Status: She is alert and oriented to person, place, and time. Sensory: No sensory deficit. Motor: No weakness. Psychiatric:         Mood and Affect: Mood normal.         Behavior: Behavior normal.      Data   Old records and images have been reviewed    Lab Results   CBC     Lab Results   Component Value Date/Time    WBC 8.2 07/30/2022 10:14 AM    RBC 2.02 07/30/2022 10:14 AM    HGB 6.8 07/30/2022 10:14 AM    HCT 21.4 07/30/2022 10:14 AM     07/30/2022 10:14 AM    .9 07/30/2022 10:14 AM    MCH 33.7 07/30/2022 10:14 AM    MCHC 31.8 07/30/2022 10:14 AM    RDW 17.7 07/30/2022 10:14 AM    NRBC 3.5 07/17/2022 09:32 PM    LYMPHOPCT 14.0 07/30/2022 10:14 AM    MONOPCT 5.2 07/30/2022 10:14 AM    BASOPCT 0.1 07/30/2022 10:14 AM    MONOSABS 0.43 07/30/2022 10:14 AM    LYMPHSABS 1.15 07/30/2022 10:14 AM    EOSABS 0.01 07/30/2022 10:14 AM    BASOSABS 0.01 07/30/2022 10:14 AM       BMP   Lab Results   Component Value Date/Time     07/30/2022 10:14 AM    K 5.1 07/30/2022 10:14 AM     07/30/2022 10:14 AM    CO2 20 07/30/2022 10:14 AM    BUN 44 07/30/2022 10:14 AM    CREATININE 0.8 07/30/2022 10:14 AM    GLUCOSE 213 07/30/2022 10:14 AM    CALCIUM 8.9 07/30/2022 10:14 AM       LFTS  Lab Results   Component Value Date/Time    ALKPHOS 47 07/30/2022 10:14 AM    ALT 18 07/30/2022 10:14 AM    AST 16 07/30/2022 10:14 AM    PROT 5.2 07/30/2022 10:14 AM    BILITOT 0.2 07/30/2022 10:14 AM    BILIDIR <0.2 07/17/2022 09:32 PM    IBILI see below 07/17/2022 09:32 PM    LABALBU 3.0 07/30/2022 10:14 AM       INR  Recent Labs     07/30/22  1014   PROTIME 16.6*   INR 1.5       APTT  Recent Labs     07/30/22  1014   APTT 29.0       Lactic Acid  Lab Results   Component Value Date/Time    LACTA 1.2 06/25/2021 02:05 PM    LACTA 1.2 10/02/2017 05:57 PM    LACTA 0.7 11/23/2014 09:00 PM        BNP   No results for input(s): BNP in the last 72 hours.      Cultures     No results for input(s): BC in the last 72 hours. No results for input(s): Runell Clutter in the last 72 hours. No results for input(s): LABURIN in the last 72 hours. Radiology     No orders to display         SYSTEMS ASSESSMENT    Neuro   AOX 4        Respiratory   On room air Keep O2 sat 90-92%    Cardiovascular   Monitor Heart Rate    Gastrointestinal   Blood in stool  Dark stool noted on posterior  Bright red stool noted in the ER  Patient with previous Upper GI scope by Dr. Alfred Camacho and Dr. Jose Zambrano ordered   On Clear liquids only   Consult placed in ER  Renal   Will hold torsemide today secondary to acute bleeding  Plan to reevaluate tomorrow     Infectious Disease   No acute issues at this time     Hematology/Oncology   HGB 6.8   Monitor HgB  1 unit prBC running  Recheck H+H  Replete <7     Endocrine   Monitor Glucose      Social/Spiritual/DNR/Other   DNR-CCA    Sheila Sotomayor DO  Emergency Medicine PGY-2  12:38 PM  07/30/22 7/30/2022  12:38 PM    Please See Attending Note/Attestation for further information/ Final Plan    I personally saw, examined and provided care for the patient. Radiographs, labs and medication list were reviewed by me independently. Review of Residents documentation was conducted and revisions were made as appropriate. I agree with the above documented exam, problem list and plan of care. Patient with significant ongoing LGIB. Patient transfused pRBC without improvement. Had recent EGD. Had discussion with patient and family regarding goals of care. Would want to make patient more comfortable. Discussed commfort care measures and to intiate once more family arrives.     CHAN-CC    CCT excluding procedures 36 minutes    Kiki Ferguson DO

## 2022-07-30 NOTE — ED NOTES
Answered phone call from lab, Hgb 6.8, notified Dr Frank Fofana.       Melvin Urbano RN  07/30/22 0668

## 2022-07-30 NOTE — ED PROVIDER NOTES
Owen Slade Is a 81 YO Female with Hx of CHF, GI Bleed, CAD, Essential HTN, and CABG that presents to the ER from her nursing home complaining of weakness and dark-colored stools. Patient reports that she was recently discharged from the hospital on 7/25/2022 for investigation of her dark-colored stools. Onset: this morning. Location: lower abd tenderness. Duration: constant. Aggravating/relieving factors: Denies any. Characterizes her abdominal tenderness as achy and her stool is very dark. Severity: Moderate. Assx Sxs: dark stools, abd tenderness, nausea, weakness. She Denies: CP/SHOB, Fevers/Chills,          Review of Systems   Constitutional:  Positive for activity change and appetite change. HENT:  Negative for congestion and rhinorrhea. Respiratory:  Negative for cough and shortness of breath. Cardiovascular:  Negative for chest pain and leg swelling. Gastrointestinal:  Positive for blood in stool, diarrhea and nausea. Endocrine: Negative for cold intolerance and heat intolerance. Genitourinary:  Negative for difficulty urinating and dysuria. Musculoskeletal:  Negative for arthralgias and back pain. Skin:  Positive for pallor. Negative for rash. Allergic/Immunologic: Negative for environmental allergies and food allergies. Neurological:  Positive for light-headedness. Negative for facial asymmetry and headaches. Hematological:  Negative for adenopathy. Does not bruise/bleed easily. Psychiatric/Behavioral:  Negative for agitation and behavioral problems. Physical Exam  Constitutional:       Appearance: She is ill-appearing. HENT:      Head: Normocephalic and atraumatic. Nose: Nose normal.      Mouth/Throat:      Mouth: Mucous membranes are moist.      Pharynx: Oropharynx is clear. Eyes:      Extraocular Movements: Extraocular movements intact. Pupils: Pupils are equal, round, and reactive to light.    Cardiovascular:      Rate and Rhythm: Normal rate and regular rhythm. Heart sounds: No murmur heard. No gallop. Pulmonary:      Effort: Pulmonary effort is normal.      Breath sounds: Normal breath sounds. Abdominal:      Palpations: Abdomen is soft. There is no mass. Tenderness: There is abdominal tenderness. There is no guarding or rebound. Comments: Pt had large bowel movement with dark melanotic stool during physical examination. Musculoskeletal:         General: Normal range of motion. Cervical back: Normal range of motion and neck supple. Skin:     General: Skin is dry. Coloration: Skin is pale. Neurological:      General: No focal deficit present. Mental Status: She is alert and oriented to person, place, and time. Cranial Nerves: No cranial nerve deficit. Motor: No weakness. Psychiatric:         Mood and Affect: Mood normal.         Thought Content: Thought content normal.        Procedures       PROCEDURE  7/30/22       Time: 1100     CENTRAL LINE INSERTION  Risks, benefits and alternatives (for applicable procedures below) described. Performed By: Karen Gibbs DO. Indication: vascular access, poor peripheral access, centrally administered medications, and severe bleeding. Informed consent: Written consent obtained. The patient was and family members were counseled regarding the procedure in person, it's indications, risks, potential complications and alternatives and any questions were answered. Consent was obtained. .  Procedure: After routine sterile preparation, local anesthesia obtained by infiltration using 5cc 1% Lidocaine without epinephrine. A right 3-Lumen 7F Central Venous Catheter was placed by femoral vein approach and secured by standard fashion. Ultrasound Guidance:   used. Number of Attempts: 1     Post-procedure Findings: A post procedural chest x-ray  was not indicated. Patient tolerated the procedure well.       MDM  Number of Diagnoses or Management Options  Hemorrhagic shock [facility])  Diagnosis management comments: Upon evaluation the patient she was AOx4, cooperative, pleasant, and ill-appearing. During physical examination she had a large dark melanotic bowel movement. Peripheral IV was immediately placed, and cardiac monitoring. Central line placed in the right femoral vein with ultrasound. Hemoglobin 6.8. Blood products and fluids given. Patient admitted to ICU for further evaluation and workup. Patient and patient's family signed DNR/CCA paperwork and expressed desire for no desire for heroic measures. At time of transfer patient hemodynamically stable, and remained Aox4. ED Course as of 09/14/22 0057   Sat Jul 30, 2022   1100 Central line in femoral vein placed and 1 unit of blood started  [JR]   1123 Paging on-call Physician for admission  [JR]   36 Spoke with PA for Dr. Pedro Becker, will admit the patient    [JR]   1153 Examined Pt before transfer to ICU. Blood products running. [JR]   2311 Had long discussion with patient's daughter at bedside and patient's son who is medical power of  over the phone Vernon Styles. We discussed CODE STATUS discussion and they do not want patient to be intubated received ACLS treatment during cardiac arrest.  However they are interested in actively pursuing treatment of this bleeding and are willing to go forward with any imaging or surgery that may need to be done at this time. We did make a decision to make patient's CODE STATUS DNR CCA limited with no intubation. The patient's daughter signed in place of son who was on the phone. Nurse Madan Ragsdale was witness to this conversation.  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 ([address] Bronchitis, Bruising tendency ([address] CAD (coronary artery disease), Cough, COVID, Dermatophytosis, Diabetes mellitus ([address] GERD (gastroesophageal reflux disease), History of pulmonary embolus (PE), Hx of blood clots, Hyperlipidemia, Hypertension, Leg swelling, Lung disease, Peripheral vascular angioplasty status, Pseudoaneurysm of right femoral artery ([address] PVD (peripheral vascular disease) with claudication ([address] Restless legs syndrome, Rhinitis, allergic, Sinusitis, SOB (shortness of breath), Type 1 diabetes mellitus with left diabetic foot ulcer ([address] Ulcerated, foot, left, limited to breakdown of skin ([address] and Urinary incontinence. Past Surgical History:  has a past surgical history that includes Hysterectomy; Cholecystectomy; Tonsillectomy and adenoidectomy; Coronary artery bypass graft; Abdomen surgery; Appendectomy; Colonoscopy; Endoscopy, colon, diagnostic; Cardiac surgery; Foot Debridement (Left, 5/16/2021); Foot Debridement (Left, 5/21/2021); and Upper gastrointestinal endoscopy (N/A, 7/18/2022). Social History:  reports that she has never smoked. She has never used smokeless tobacco. She reports current alcohol use. She reports that she does not use drugs. Family History: family history includes Heart Disease in her brother; Hypertension in her father. The patients home medications have been reviewed.     Allergies: Lisinopril    -------------------------------------------------- RESULTS -------------------------------------------------    LABS:  Results for orders placed or performed during the hospital encounter of 07/30/22   CBC with Auto Differential   Result Value Ref Range    WBC 8.2 4.5 - 11.5 E9/L    RBC 2.02 (L) 3.50 - 5.50 E12/L    Hemoglobin 6.8 (LL) 11.5 - 15.5 g/dL    Hematocrit 21.4 (L) 34.0 - 48.0 %    .9 (H) 80.0 - 99.9 fL    MCH 33.7 26.0 - 35.0 pg    MCHC 31.8 (L) 32.0 - 34.5 %    RDW 17.7 (H) 11.5 - 15.0 fL    Platelets 278 781 - 870 E9/L    MPV 10.9 7.0 - 12.0 fL    Neutrophils % 80.1 (H) 43.0 - 80.0 %    Immature Granulocytes % 0.5 0.0 - 5.0 %    Lymphocytes % 14.0 (L) 20.0 - 42.0 %    Monocytes % 5.2 2.0 - 12.0 %    Eosinophils % 0.1 0.0 - 6.0 %    Basophils % 0.1 0.0 - 2.0 %    Neutrophils Absolute 6.56 1.80 - 7.30 E9/L    Immature Granulocytes # 0.04 E9/L    Lymphocytes Absolute 1.15 (L) 1.50 - 4.00 E9/L    Monocytes Absolute 0.43 0.10 - 0.95 E9/L    Eosinophils Absolute 0.01 (L) 0.05 - 0.50 E9/L    Basophils Absolute 0.01 0.00 - 0.20 E9/L   Comprehensive Metabolic Panel w/ Reflex to MG   Result Value Ref Range    Sodium 132 132 - 146 mmol/L    Potassium reflex Magnesium 5.1 (H) 3.5 - 5.0 mmol/L    Chloride 101 98 - 107 mmol/L    CO2 20 (L) 22 - 29 mmol/L    Anion Gap 11 7 - 16 mmol/L    Glucose 213 (H) 74 - 99 mg/dL    BUN 44 (H) 6 - 23 mg/dL    Creatinine 0.8 0.5 - 1.0 mg/dL    GFR Non-African American >60 >=60 mL/min/1.73    GFR African American >60     Calcium 8.9 8.6 - 10.2 mg/dL    Total Protein 5.2 (L) 6.4 - 8.3 g/dL    Albumin 3.0 (L) 3.5 - 5.2 g/dL    Total Bilirubin 0.2 0.0 - 1.2 mg/dL    Alkaline Phosphatase 47 35 - 104 U/L    ALT 18 0 - 32 U/L    AST 16 0 - 31 U/L   Protime-INR   Result Value Ref Range    Protime 16.6 (H) 9.3 - 12.4 sec    INR 1.5    APTT   Result Value Ref Range    aPTT 29.0 24.5 - 35.1 sec   Hemoglobin and Hematocrit   Result Value Ref Range    Hemoglobin 7.9 (L) 11.5 - 15.5 g/dL    Hematocrit 24.6 (L) 34.0 - 48.0 %   Potassium   Result Value Ref Range    Potassium 5.5 (H) 3.5 - 5.0 mmol/L   Hemoglobin and Hematocrit   Result Value Ref Range    Hemoglobin 8.8 (L) 11.5 - 15.5 g/dL    Hematocrit 27.1 (L) 34.0 - 48.0 %   POCT Glucose   Result Value Ref Range    Meter Glucose 138 (H) 74 - 99 mg/dL   TYPE AND SCREEN   Result Value Ref Range    ABO/Rh CANCELED     Antibody Screen NEG    PREPARE RBC (CROSSMATCH), 2 Units   Result Value Ref Range    Product Code Blood Bank [id number]     Description Blood Bank Red Blood Cells, Leuko-reduced     Unit Number [id number]     Dispense Status Blood Bank transfused     Product Code Blood Bank [id number]     Description Blood Bank Red Blood Cells, Leuko-reduced     Unit Number [id number]     Dispense Status Blood Bank transfused    ABORH TUBE   Result Value Ref Range    ABO/Rh B POS    PREPARE RBC (CROSSMATCH), 2 Units   Result Value Ref Range    Product Code Blood Bank [id number]     Description Blood Bank Red Blood Cells, Leuko-reduced     Unit Number [id number]     Dispense Status Blood Bank transfused     Product Code Blood Bank [id number]     Description Blood Bank Red Blood Cells, Leuko-reduced     Unit Number [id number]     Dispense Status Blood Bank selected        RADIOLOGY:  CTA ABDOMEN PELVIS W CONTRAST   Final Result   1. Presence of cluster of confluent focal linear hyper arterial enhancement   in the distal bulb region of the proximal duodenum, distal to the pylorus,   which fades in the late arterial/early portal venous phase. Ectatic vessels   with active GI bleeding considered. Further evaluation with upper GI   endoscopy recommended. 2.  In the distal rectum towards the anal region, during the early arterial   phase there are discrete petechial/telangiectatic-like areas of wall   enhancement which also fades away in the late arterial/early portal venous   phase. Active distal rectal wall dysplastic potential bleeding considered. Correlation with lower in GI endoscopy recommended. 3.  The above findings can be correlated with nuclear medicine GI bleeding   scan prior to an interventional procedure. 4.  See above other comments and recommendations.               ------------------------- NURSING NOTES AND VITALS REVIEWED ---------------------------  Date / Time Roomed:  7/30/2022  9:45 AM  ED Bed Assignment:  0210/0210-A    The nursing notes within the ED encounter and vital signs as below have been reviewed.      Patient Vitals for the past 24 hrs:   BP management    This patient has remained hemodynamically stable during their ED course. Diagnosis:  1. Hemorrhagic shock ([address]        Disposition:  Patient's disposition: Admit to CCU/ICU  Patient's condition is fair. Precious Hanna DO  Resident  07/30/22 Sky 55, DO  07/30/22 1352       Shadia Cho DO  07/31/22 6224    ATTENDING PROVIDER ATTESTATION:     I have personally performed and/or participated in the history, exam, medical decision making, and procedures and agree with all pertinent clinical information unless otherwise noted. I have also reviewed and agree with the past medical, family and social history unless otherwise noted. I have discussed this patient in detail with the resident and provided the instruction and education regarding the evidence-based evaluation and treatment of GI Bleeding    My plan: Symptomatic and supportive care. He was seen and examined. Labs and imaging were ordered. Patient appears to be in hemorrhagic shock from [facility] of trauma blood. Patient's blood pressure did improve in the emergency department she was admitted to the ICU for further close evaluation. EKG: This EKG is signed and interpreted by the EP. Time: 1144  Rate: 48  Rhythm: Sinus  Interpretation: sinus bradycardia and 1st degree AV block  Comparison: changes compared to previous EKG      Please note that the withdrawal or failure to initiate urgent interventions for this patient would likely result in a life threatening deterioration or permanent disability. Accordingly this patient received 30 minutes of critical care time, excluding separately billable procedures.       Electronically signed by Shadia Cho DO on 9/14/22 at 12:57 AM EDT           Shadia Cho DO  09/14/22 0918

## 2022-07-31 VITALS
RESPIRATION RATE: 13 BRPM | HEART RATE: 76 BPM | BODY MASS INDEX: 22.34 KG/M2 | SYSTOLIC BLOOD PRESSURE: 125 MMHG | OXYGEN SATURATION: 100 % | DIASTOLIC BLOOD PRESSURE: 63 MMHG | WEIGHT: 126.1 LBS | TEMPERATURE: 98.2 F | HEIGHT: 63 IN

## 2022-07-31 PROCEDURE — 6360000002 HC RX W HCPCS: Performed by: INTERNAL MEDICINE

## 2022-07-31 PROCEDURE — 94640 AIRWAY INHALATION TREATMENT: CPT

## 2022-07-31 PROCEDURE — 2580000003 HC RX 258: Performed by: INTERNAL MEDICINE

## 2022-07-31 PROCEDURE — 6360000002 HC RX W HCPCS: Performed by: NURSE PRACTITIONER

## 2022-07-31 PROCEDURE — 1200000000 HC SEMI PRIVATE

## 2022-07-31 PROCEDURE — 2580000003 HC RX 258: Performed by: NURSE PRACTITIONER

## 2022-07-31 PROCEDURE — 2500000003 HC RX 250 WO HCPCS

## 2022-07-31 PROCEDURE — 2700000000 HC OXYGEN THERAPY PER DAY

## 2022-07-31 PROCEDURE — 2500000003 HC RX 250 WO HCPCS: Performed by: INTERNAL MEDICINE

## 2022-07-31 PROCEDURE — 6360000002 HC RX W HCPCS: Performed by: PHYSICIAN ASSISTANT

## 2022-07-31 RX ORDER — GLYCOPYRROLATE 0.2 MG/ML
INJECTION INTRAMUSCULAR; INTRAVENOUS
Status: COMPLETED
Start: 2022-07-31 | End: 2022-07-31

## 2022-07-31 RX ORDER — GLYCOPYRROLATE 0.2 MG/ML
0.2 INJECTION INTRAMUSCULAR; INTRAVENOUS EVERY 4 HOURS PRN
Status: DISCONTINUED | OUTPATIENT
Start: 2022-07-31 | End: 2022-08-01 | Stop reason: HOSPADM

## 2022-07-31 RX ORDER — MORPHINE SULFATE 2 MG/ML
1 INJECTION, SOLUTION INTRAMUSCULAR; INTRAVENOUS
Status: DISCONTINUED | OUTPATIENT
Start: 2022-07-31 | End: 2022-08-01 | Stop reason: HOSPADM

## 2022-07-31 RX ADMIN — GLYCOPYRROLATE 0.2 MG: 0.2 INJECTION, SOLUTION INTRAMUSCULAR; INTRAVENOUS at 11:04

## 2022-07-31 RX ADMIN — GLYCOPYRROLATE 0.2 MG: 0.2 INJECTION, SOLUTION INTRAMUSCULAR; INTRAVENOUS at 16:59

## 2022-07-31 RX ADMIN — Medication 10 ML: at 11:06

## 2022-07-31 RX ADMIN — ALBUTEROL SULFATE 2.5 MG: 2.5 SOLUTION RESPIRATORY (INHALATION) at 08:02

## 2022-07-31 RX ADMIN — ONDANSETRON 4 MG: 2 INJECTION INTRAMUSCULAR; INTRAVENOUS at 00:02

## 2022-07-31 RX ADMIN — ARFORMOTEROL TARTRATE 15 MCG: 15 SOLUTION RESPIRATORY (INHALATION) at 08:02

## 2022-07-31 RX ADMIN — MORPHINE SULFATE 2 MG/HR: 4 INJECTION, SOLUTION INTRAMUSCULAR; INTRAVENOUS at 02:06

## 2022-07-31 RX ADMIN — MORPHINE SULFATE 4 MG/HR: 50 INJECTION, SOLUTION, CONCENTRATE INTRAVENOUS at 12:47

## 2022-07-31 RX ADMIN — BUDESONIDE 250 MCG: 0.25 SUSPENSION RESPIRATORY (INHALATION) at 08:02

## 2022-07-31 RX ADMIN — POLYVINYL ALCOHOL 1 DROP: 14 SOLUTION/ DROPS OPHTHALMIC at 11:04

## 2022-07-31 RX ADMIN — MORPHINE SULFATE 1 MG: 2 INJECTION, SOLUTION INTRAMUSCULAR; INTRAVENOUS at 00:02

## 2022-07-31 RX ADMIN — MORPHINE SULFATE 1 MG: 2 INJECTION, SOLUTION INTRAMUSCULAR; INTRAVENOUS at 00:44

## 2022-07-31 ASSESSMENT — PAIN SCALES - GENERAL
PAINLEVEL_OUTOF10: 0
PAINLEVEL_OUTOF10: 10
PAINLEVEL_OUTOF10: 6
PAINLEVEL_OUTOF10: 0
PAINLEVEL_OUTOF10: 0

## 2022-07-31 ASSESSMENT — PAIN SCALES - WONG BAKER: WONGBAKER_NUMERICALRESPONSE: 2

## 2022-07-31 ASSESSMENT — PAIN DESCRIPTION - DESCRIPTORS: DESCRIPTORS: DISCOMFORT;DULL

## 2022-07-31 ASSESSMENT — PAIN DESCRIPTION - LOCATION: LOCATION: GENERALIZED

## 2022-07-31 NOTE — CONSULTS
Consultation    The patient presents for a recurrent gastrointestinal hemorrhage. The family has decided to proceed with hospice care. General surgery will not be involved in the patient's care.     Ruddy Box MD

## 2022-07-31 NOTE — PROGRESS NOTES
This nurse spoke to Rachel Martinez, regarding hospice care. Son, Eileen Sheets is will to go forward with consulting hospice at this time. Consult place for hospice.

## 2022-07-31 NOTE — PROGRESS NOTES
Patient family with patient at bedside at this time. Awaiting son, Angelika Sewell. Morphine gtt ordered.

## 2022-07-31 NOTE — PROGRESS NOTES
Consult received, chart reviewed. Pt on MS gtt. Hospice services and philosophy discussed with family at bedside. Recommendation given to transfer pt to the hospice house. Family is concerned that the pt may not be stable enough for transport and would like her to remain inpatient for today. Son Barbara Quinones states that if she survives today they would be more agreeable to moving her to the hospice house. Provided family with this nurses cell phone number for any questions that may arise and a hospice house brochure. HOT[facility] will follow up tomorrow. Bedside updated on conversation.

## 2022-07-31 NOTE — PROGRESS NOTES
Son, Nonnie Kocher is at bedside with family. Updated on patient status. All questions answered and support given.

## 2022-07-31 NOTE — PLAN OF CARE
Problem: Pain  Goal: Verbalizes/displays adequate comfort level or baseline comfort level  Outcome: Progressing  Note: Morphine gtt started

## 2022-08-01 LAB
BLOOD BANK DISPENSE STATUS: NORMAL
BLOOD BANK DISPENSE STATUS: NORMAL
BLOOD BANK PRODUCT CODE: NORMAL
BLOOD BANK PRODUCT CODE: NORMAL
BPU ID: NORMAL
BPU ID: NORMAL
DESCRIPTION BLOOD BANK: NORMAL
DESCRIPTION BLOOD BANK: NORMAL
MRSA CULTURE ONLY: NORMAL

## 2022-08-01 PROCEDURE — 2700000000 HC OXYGEN THERAPY PER DAY

## 2022-08-01 PROCEDURE — 6360000002 HC RX W HCPCS: Performed by: INTERNAL MEDICINE

## 2022-08-01 PROCEDURE — 2580000003 HC RX 258: Performed by: INTERNAL MEDICINE

## 2022-08-01 RX ADMIN — MORPHINE SULFATE 8 MG/HR: 50 INJECTION, SOLUTION, CONCENTRATE INTRAVENOUS at 06:32

## 2022-08-01 NOTE — PROGRESS NOTES
Family agreeable for [facility] transfer. Reviewed case with JASMINA Cochran CNP who approved GIP admission. [facility] to  patient in about 15 minutes. Report called to RENETTA at the [facility]. Hospice consents signed by son Gautam Escobedo. Update provided to bedside RN. Please leave IV lines and shi in place.      Electronically signed by Ashby Gaucher, RN on 8/1/2022 at 9:19 AM

## 2022-08-01 NOTE — PLAN OF CARE
Problem: Skin/Tissue Integrity  Goal: Absence of new skin breakdown  Description: 1. Monitor for areas of redness and/or skin breakdown  2. Assess vascular access sites hourly  3. Every 4-6 hours minimum:  Change oxygen saturation probe site  4. Every 4-6 hours:  If on nasal continuous positive airway pressure, respiratory therapy assess nares and determine need for appliance change or resting period. Outcome: Not Progressing     Problem: Skin/Tissue Integrity  Goal: Absence of new skin breakdown  Description: 1. Monitor for areas of redness and/or skin breakdown  2. Assess vascular access sites hourly  3. Every 4-6 hours minimum:  Change oxygen saturation probe site  4. Every 4-6 hours:  If on nasal continuous positive airway pressure, respiratory therapy assess nares and determine need for appliance change or resting period.   Outcome: Not Progressing

## 2022-08-06 LAB — SARS-COV-2, PCR: NOT DETECTED

## 2022-08-08 NOTE — DISCHARGE SUMMARY
[facility]   Discharge summary   Patient ID:  Ace Segura  [id number]  35 y.o.  9/20/1927    Admit date: 7/30/2022    Discharge date and time: 8/1/2022    Admission Diagnoses:   Patient Active Problem List   Diagnosis    Asthma    CAD (coronary artery disease)    Vitamin D deficiency    HTN (hypertension)    Idiopathic peripheral neuropathy    Rhinitis, allergic    GERD (gastroesophageal reflux disease)    PVD (peripheral vascular disease) with claudication (HCC)    Gait instability    Hyponatremia    DM (diabetes mellitus), type 2 (HCC)    History of pulmonary embolus (PE)    Peripheral vascular angioplasty status    Non-proliferative diabetic retinopathy, mild, both eyes (HCC)    Leg swelling    Compression fracture of L5 lumbar vertebra, closed, initial encounter ([facility]    HLD (hyperlipidemia)    Compression fracture of thoracic vertebra (HCC)    Acute anemia    Spinal stenosis    Acute heart failure (HCC)    GI bleed    Hemorrhagic shock ([facility]       Discharge Diagnoses: GI Bleed    Consults: general surgery and hospice    Procedures: None    Hospital Course: The patient is a 80 y.o. female of [facility],      Patient presented to the Ed with a GI Bleed and family at bedside decided to make patient a [facility] and wanted hospice consulted. Patient was discharged to [facility]. No results for input(s): WBC, HGB, HCT, PLT in the last 72 hours. No results for input(s): NA, K, CL, CO2, BUN, CREATININE, GLU, CALCIUM in the last 72 hours. No results found. Discharge Exam:    HEENT: NCAT,  PERRLA, No JVD  Heart:  RRR, no murmurs, gallops, or rubs.   Lungs:  CTA bilaterally, no wheeze, rales or rhonchi  Abd: bowel sounds present, nontender, nondistended, no masses  Extrem:  No clubbing, cyanosis, or edema    Disposition: hospice house     Patient Condition at Discharge: Guarded    Patient Instructions:      Medication List        ASK your doctor about these medications acetaminophen 500 MG tablet  Commonly known as: TYLENOL     Alpha-Lipoic Acid 600 MG Tabs     aluminum & magnesium hydroxide-simethicone 400-400-40 MG/5ML Susp  Commonly known as: MYLANTA     amLODIPine 10 MG tablet  Commonly known as: NORVASC  Take 1 tablet by mouth in the morning. * apixaban 5 MG Tabs tablet  Commonly known as: ELIQUIS  Take 2 tablets by mouth 2 times daily for 5 days Followed by 5 mg twice daily for 3 months     * apixaban 5 MG Tabs tablet  Commonly known as: ELIQUIS  Take 0.5 tablets by mouth in the morning and 0.5 tablets before bedtime. aspirin 81 MG EC tablet  Take 1 tablet by mouth daily     b complex vitamins capsule     bisacodyl 10 MG suppository  Commonly known as: DULCOLAX     ciclopirox 8 % solution  Commonly known as: PENLAC  Apply once daily all toenails.      clotrimazole-betamethasone 1-0.05 % cream  Commonly known as: LOTRISONE     CoQ10 200 MG Caps     diclofenac sodium 1 % Gel  Commonly known as: VOLTAREN     famotidine 10 MG tablet  Commonly known as: PEPCID     ferrous sulfate 325 (65 Fe) MG tablet  Commonly known as: IRON 325     fluticasone-vilanterol 100-25 MCG/INH Aepb inhaler  Commonly known as: Breo Ellipta  Inhale 1 puff into the lungs daily     gabapentin 300 MG capsule  Commonly known as: NEURONTIN     JUAN MIGUEL ROOT PO     glimepiride 2 MG tablet  Commonly known as: AMARYL     guaiFENesin 100 MG/5ML syrup  Commonly known as: ROBITUSSIN     lidocaine 4 % cream  Commonly known as: LMX     losartan 25 MG tablet  Commonly known as: COZAAR  Take 1 tablet by mouth in the morning.     magnesium gluconate 500 MG tablet  Commonly known as: MAGONATE     metFORMIN 500 MG tablet  Commonly known as: GLUCOPHAGE     OCUVITE ADULT FORMULA PO     oxyCODONE 5 MG immediate release tablet  Commonly known as: ROXICODONE     polyethylene glycol 17 g packet  Commonly known as: GLYCOLAX     polyvinyl alcohol 1.4 % ophthalmic solution  Commonly known as: LIQUIFILM TEARS pravastatin 20 MG tablet  Commonly known as: PRAVACHOL  TAKE 1 TABLET BY MOUTH  NIGHTLY     PRO-BIOTIC BLEND PO     promethazine 12.5 MG tablet  Commonly known as: PHENERGAN     PROVENTIL 90 MCG/ACT inhaler  Generic drug: albuterol  Inhale 2 puffs into the lungs 4 times daily     senna 8.6 MG tablet  Commonly known as: SENOKOT     sodium bicarbonate 650 MG tablet  Take 1 tablet by mouth in the morning. torsemide 10 MG tablet  Commonly known as: DEMADEX  Take 1 tablet by mouth in the morning. traMADol 50 MG tablet  Commonly known as: ULTRAM           * This list has 2 medication(s) that are the same as other medications prescribed for you. Read the directions carefully, and ask your doctor or other care provider to review them with you. Activity: activity as tolerated  Diet: regular diet    Pt has been advised to: Follow-up with Isaiah Whiting DO in 1 week.   Follow-up with consultants as recommended by them    Note that over 30 minutes was spent in preparing discharge papers, discussing discharge with patient, medication review, etc.    Signed:  Russ Stubbs MD  8/1/2022

## 2022-08-08 NOTE — H&P
PRIMARY CLOSURE performed by Betsy Powers DPM at Health system ([address] [facility])      frankie and bso 1997    TONSILLECTOMY AND ADENOIDECTOMY      UPPER GASTROINTESTINAL ENDOSCOPY N/A 7/18/2022    EGD ESOPHAGOGASTRODUODENOSCOPY performed by Edgar Armendariz MD at [facility] ENDOSCOPY       Medications Prior to Admission:    Medications Prior to Admission: losartan (COZAAR) 25 MG tablet, Take 1 tablet by mouth in the morning. amLODIPine (NORVASC) 10 MG tablet, Take 1 tablet by mouth in the morning. torsemide (DEMADEX) 10 MG tablet, Take 1 tablet by mouth in the morning.  sodium bicarbonate 650 MG tablet, Take 1 tablet by mouth in the morning. apixaban (ELIQUIS) 5 MG TABS tablet, Take 0.5 tablets by mouth in the morning and 0.5 tablets before bedtime. oxyCODONE (ROXICODONE) 5 MG immediate release tablet, Take 5 mg by mouth every 4 hours as needed for Pain. acetaminophen (TYLENOL) 500 MG tablet, Take 500 mg by mouth every 6 hours as needed for Pain  ciclopirox (PENLAC) 8 % solution, Apply once daily all toenails. metFORMIN (GLUCOPHAGE) 500 MG tablet, Take 500 mg by mouth daily  polyethylene glycol (GLYCOLAX) 17 g packet, Take 17 g by mouth daily as needed for Constipation  guaiFENesin (ROBITUSSIN) 100 MG/5ML syrup, Take 200 mg by mouth 4 times daily as needed for Cough   senna (SENOKOT) 8.6 MG tablet, Take 1 tablet by mouth 2 times daily  Alpha-Lipoic Acid 600 MG TABS, Take 600 mg by mouth daily  polyvinyl alcohol (LIQUIFILM TEARS) 1.4 % ophthalmic solution, 1 drop 4 times daily   clotrimazole-betamethasone (LOTRISONE) 1-0.05 % cream, Apply topically 2 times daily Apply topically 2 times daily.    Ginger, Zingiber officinalis, (GINGER ROOT PO), Take 750 mg by mouth Daily in am  ferrous sulfate (IRON 325) 325 (65 Fe) MG tablet, Take 325 mg by mouth daily In the morning for anemia  b complex vitamins capsule, Take 1 capsule by mouth daily In the morning for supplement  promethazine (PHENERGAN) 12.5 MG Resp 13   Ht 5' 3\" (1.6 m)   Wt 126 lb 1.7 oz (57.2 kg)   LMP 12/03/1997   SpO2 100%   BMI 22.34 kg/m²     General:  Ill appearing  HEENT:  Normocephalic, atraumatic. Pupils equal, round, reactive to light. No scleral icterus. No conjunctival injection. Normal lips, teeth, and gums. No nasal discharge. Neck:  Supple  Heart:  RRR, no murmurs, gallops, rubs  Lungs:  CTA bilaterally, bilat symmetrical expansion, no wheeze, rales, or rhonchi  Abdomen:   Bowel sounds present, soft, nontender, no masses, no organomegaly, no peritoneal signs  Extremities:  No clubbing, cyanosis, or edema  Skin:  Warm and dry, no open lesions or rash  Neuro:  Cranial nerves 2-12 intact, no focal deficits      LABS:    CBC with Differential:    Lab Results   Component Value Date/Time    WBC 8.2 07/30/2022 10:14 AM    RBC 2.02 07/30/2022 10:14 AM    HGB 7.9 07/30/2022 09:00 PM    HCT 24.6 07/30/2022 09:00 PM     07/30/2022 10:14 AM    .9 07/30/2022 10:14 AM    MCH 33.7 07/30/2022 10:14 AM    MCHC 31.8 07/30/2022 10:14 AM    RDW 17.7 07/30/2022 10:14 AM    NRBC 3.5 07/17/2022 09:32 PM    LYMPHOPCT 14.0 07/30/2022 10:14 AM    MONOPCT 5.2 07/30/2022 10:14 AM    BASOPCT 0.1 07/30/2022 10:14 AM    MONOSABS 0.43 07/30/2022 10:14 AM    LYMPHSABS 1.15 07/30/2022 10:14 AM    EOSABS 0.01 07/30/2022 10:14 AM    BASOSABS 0.01 07/30/2022 10:14 AM     CMP:    Lab Results   Component Value Date/Time     07/30/2022 10:14 AM    K 5.5 07/30/2022 02:05 PM    K 5.1 07/30/2022 10:14 AM     07/30/2022 10:14 AM    CO2 20 07/30/2022 10:14 AM    BUN 44 07/30/2022 10:14 AM    CREATININE 0.8 07/30/2022 10:14 AM    GFRAA >60 07/30/2022 10:14 AM    LABGLOM >60 07/30/2022 10:14 AM    GLUCOSE 213 07/30/2022 10:14 AM    PROT 5.2 07/30/2022 10:14 AM    LABALBU 3.0 07/30/2022 10:14 AM    CALCIUM 8.9 07/30/2022 10:14 AM    BILITOT 0.2 07/30/2022 10:14 AM    ALKPHOS 47 07/30/2022 10:14 AM    AST 16 07/30/2022 10:14 AM    ALT 18 07/30/2022 10:14 AM       ASSESSMENT:      Patient Active Problem List   Diagnosis    Asthma    CAD (coronary artery disease)    Vitamin D deficiency    HTN (hypertension)    Idiopathic peripheral neuropathy    Rhinitis, allergic    GERD (gastroesophageal reflux disease)    PVD (peripheral vascular disease) with claudication (HCC)    Gait instability    Hyponatremia    DM (diabetes mellitus), type 2 ([address]    History of pulmonary embolus (PE)    Peripheral vascular angioplasty status    Non-proliferative diabetic retinopathy, mild, both eyes (HCC)    Leg swelling    Compression fracture of L5 lumbar vertebra, closed, initial encounter ([address]    HLD (hyperlipidemia)    Compression fracture of thoracic vertebra (HCC)    Acute anemia    Spinal stenosis    Acute heart failure (HCC)    GI bleed    Hemorrhagic shock ([address]       PLAN:    Monitor H&H  Consult General Surgery  Hold anticoagulations    Discussion with family at bedside and their wishe are to keep her comfortable.       Palliative Care consulted    Thony Goldman MD  7/31/2022

## 2022-08-26 ENCOUNTER — APPOINTMENT (OUTPATIENT)
Dept: CT IMAGING | Age: 87
DRG: 194 | End: 2022-08-26
Payer: MEDICARE

## 2022-08-26 ENCOUNTER — HOSPITAL ENCOUNTER (INPATIENT)
Age: 87
LOS: 2 days | Discharge: NURSING, REHAB OR LONG-TERM CARE | DRG: 194 | End: 2022-08-28
Attending: EMERGENCY MEDICINE | Admitting: INTERNAL MEDICINE
Payer: MEDICARE

## 2022-08-26 ENCOUNTER — APPOINTMENT (OUTPATIENT)
Dept: GENERAL RADIOLOGY | Age: 87
DRG: 194 | End: 2022-08-26
Payer: MEDICARE

## 2022-08-26 DIAGNOSIS — R07.9 CHEST PAIN, UNSPECIFIED TYPE: ICD-10-CM

## 2022-08-26 DIAGNOSIS — J18.9 PNEUMONIA OF RIGHT MIDDLE LOBE DUE TO INFECTIOUS ORGANISM: ICD-10-CM

## 2022-08-26 DIAGNOSIS — K59.00 CONSTIPATION, UNSPECIFIED CONSTIPATION TYPE: ICD-10-CM

## 2022-08-26 DIAGNOSIS — R10.84 GENERALIZED ABDOMINAL PAIN: Primary | ICD-10-CM

## 2022-08-26 DIAGNOSIS — N12 PYELONEPHRITIS: ICD-10-CM

## 2022-08-26 LAB
ALBUMIN SERPL-MCNC: 3.4 G/DL (ref 3.5–5.2)
ALP BLD-CCNC: 70 U/L (ref 35–104)
ALT SERPL-CCNC: 15 U/L (ref 0–32)
ANION GAP SERPL CALCULATED.3IONS-SCNC: 10 MMOL/L (ref 7–16)
AST SERPL-CCNC: 14 U/L (ref 0–31)
BACTERIA: ABNORMAL /HPF
BASOPHILS ABSOLUTE: 0.01 E9/L (ref 0–0.2)
BASOPHILS RELATIVE PERCENT: 0.1 % (ref 0–2)
BILIRUB SERPL-MCNC: 0.2 MG/DL (ref 0–1.2)
BILIRUBIN URINE: NEGATIVE
BLOOD, URINE: ABNORMAL
BUN BLDV-MCNC: 19 MG/DL (ref 6–23)
CALCIUM SERPL-MCNC: 8.7 MG/DL (ref 8.6–10.2)
CHLORIDE BLD-SCNC: 100 MMOL/L (ref 98–107)
CLARITY: CLEAR
CO2: 22 MMOL/L (ref 22–29)
COLOR: YELLOW
CREAT SERPL-MCNC: 0.6 MG/DL (ref 0.5–1)
EKG ATRIAL RATE: 48 BPM
EKG ATRIAL RATE: 51 BPM
EKG P AXIS: 25 DEGREES
EKG P AXIS: 64 DEGREES
EKG P-R INTERVAL: 218 MS
EKG P-R INTERVAL: 222 MS
EKG Q-T INTERVAL: 404 MS
EKG Q-T INTERVAL: 450 MS
EKG QRS DURATION: 102 MS
EKG QRS DURATION: 102 MS
EKG QTC CALCULATION (BAZETT): 360 MS
EKG QTC CALCULATION (BAZETT): 414 MS
EKG R AXIS: -15 DEGREES
EKG T AXIS: 69 DEGREES
EKG T AXIS: 85 DEGREES
EKG VENTRICULAR RATE: 48 BPM
EKG VENTRICULAR RATE: 51 BPM
EOSINOPHILS ABSOLUTE: 0.03 E9/L (ref 0.05–0.5)
EOSINOPHILS RELATIVE PERCENT: 0.2 % (ref 0–6)
EPITHELIAL CELLS, UA: ABNORMAL /HPF
GFR AFRICAN AMERICAN: >60
GFR NON-AFRICAN AMERICAN: >60 ML/MIN/1.73
GLUCOSE BLD-MCNC: 149 MG/DL (ref 74–99)
GLUCOSE URINE: NEGATIVE MG/DL
HCT VFR BLD CALC: 26.8 % (ref 34–48)
HEMOGLOBIN: 8.4 G/DL (ref 11.5–15.5)
IMMATURE GRANULOCYTES #: 0.13 E9/L
IMMATURE GRANULOCYTES %: 0.9 % (ref 0–5)
KETONES, URINE: NEGATIVE MG/DL
LACTIC ACID, SEPSIS: 1.6 MMOL/L (ref 0.5–1.9)
LEUKOCYTE ESTERASE, URINE: ABNORMAL
LIPASE: 53 U/L (ref 13–60)
LYMPHOCYTES ABSOLUTE: 1.21 E9/L (ref 1.5–4)
LYMPHOCYTES RELATIVE PERCENT: 8.3 % (ref 20–42)
MCH RBC QN AUTO: 32.1 PG (ref 26–35)
MCHC RBC AUTO-ENTMCNC: 31.3 % (ref 32–34.5)
MCV RBC AUTO: 102.3 FL (ref 80–99.9)
MONOCYTES ABSOLUTE: 1 E9/L (ref 0.1–0.95)
MONOCYTES RELATIVE PERCENT: 6.9 % (ref 2–12)
NEUTROPHILS ABSOLUTE: 12.19 E9/L (ref 1.8–7.3)
NEUTROPHILS RELATIVE PERCENT: 83.6 % (ref 43–80)
NITRITE, URINE: POSITIVE
PDW BLD-RTO: 19.4 FL (ref 11.5–15)
PH UA: 6.5 (ref 5–9)
PLATELET # BLD: 384 E9/L (ref 130–450)
PMV BLD AUTO: 10.2 FL (ref 7–12)
POTASSIUM REFLEX MAGNESIUM: 4.2 MMOL/L (ref 3.5–5)
PRO-BNP: 870 PG/ML (ref 0–450)
PROTEIN UA: NEGATIVE MG/DL
RBC # BLD: 2.62 E12/L (ref 3.5–5.5)
RBC UA: ABNORMAL /HPF (ref 0–2)
SODIUM BLD-SCNC: 132 MMOL/L (ref 132–146)
SPECIFIC GRAVITY UA: <=1.005 (ref 1–1.03)
TOTAL PROTEIN: 5.7 G/DL (ref 6.4–8.3)
TROPONIN, HIGH SENSITIVITY: 68 NG/L (ref 0–9)
TROPONIN, HIGH SENSITIVITY: 73 NG/L (ref 0–9)
UROBILINOGEN, URINE: 0.2 E.U./DL
WBC # BLD: 14.6 E9/L (ref 4.5–11.5)
WBC UA: ABNORMAL /HPF (ref 0–5)

## 2022-08-26 PROCEDURE — 71045 X-RAY EXAM CHEST 1 VIEW: CPT

## 2022-08-26 PROCEDURE — 85025 COMPLETE CBC W/AUTO DIFF WBC: CPT

## 2022-08-26 PROCEDURE — 6370000000 HC RX 637 (ALT 250 FOR IP): Performed by: STUDENT IN AN ORGANIZED HEALTH CARE EDUCATION/TRAINING PROGRAM

## 2022-08-26 PROCEDURE — 93005 ELECTROCARDIOGRAM TRACING: CPT | Performed by: STUDENT IN AN ORGANIZED HEALTH CARE EDUCATION/TRAINING PROGRAM

## 2022-08-26 PROCEDURE — 83880 ASSAY OF NATRIURETIC PEPTIDE: CPT

## 2022-08-26 PROCEDURE — 81001 URINALYSIS AUTO W/SCOPE: CPT

## 2022-08-26 PROCEDURE — 6360000004 HC RX CONTRAST MEDICATION: Performed by: RADIOLOGY

## 2022-08-26 PROCEDURE — 87040 BLOOD CULTURE FOR BACTERIA: CPT

## 2022-08-26 PROCEDURE — 2060000000 HC ICU INTERMEDIATE R&B

## 2022-08-26 PROCEDURE — 80053 COMPREHEN METABOLIC PANEL: CPT

## 2022-08-26 PROCEDURE — 6360000002 HC RX W HCPCS: Performed by: STUDENT IN AN ORGANIZED HEALTH CARE EDUCATION/TRAINING PROGRAM

## 2022-08-26 PROCEDURE — 83690 ASSAY OF LIPASE: CPT

## 2022-08-26 PROCEDURE — 99285 EMERGENCY DEPT VISIT HI MDM: CPT

## 2022-08-26 PROCEDURE — 74177 CT ABD & PELVIS W/CONTRAST: CPT

## 2022-08-26 PROCEDURE — 2580000003 HC RX 258: Performed by: STUDENT IN AN ORGANIZED HEALTH CARE EDUCATION/TRAINING PROGRAM

## 2022-08-26 PROCEDURE — 84484 ASSAY OF TROPONIN QUANT: CPT

## 2022-08-26 PROCEDURE — 83605 ASSAY OF LACTIC ACID: CPT

## 2022-08-26 RX ORDER — DOXYCYCLINE HYCLATE 100 MG/1
100 CAPSULE ORAL ONCE
Status: COMPLETED | OUTPATIENT
Start: 2022-08-26 | End: 2022-08-26

## 2022-08-26 RX ADMIN — DOXYCYCLINE HYCLATE 100 MG: 100 CAPSULE ORAL at 17:45

## 2022-08-26 RX ADMIN — CEFTRIAXONE 1000 MG: 1 INJECTION, POWDER, FOR SOLUTION INTRAMUSCULAR; INTRAVENOUS at 17:45

## 2022-08-26 RX ADMIN — IOPAMIDOL 75 ML: 755 INJECTION, SOLUTION INTRAVENOUS at 14:38

## 2022-08-26 ASSESSMENT — ENCOUNTER SYMPTOMS
WHEEZING: 0
EYE REDNESS: 0
SINUS PRESSURE: 0
COUGH: 0
BACK PAIN: 0
NAUSEA: 0
DIARRHEA: 0
EYE DISCHARGE: 0
SORE THROAT: 0
VOMITING: 0
ABDOMINAL DISTENTION: 0
EYE PAIN: 0
SHORTNESS OF BREATH: 0

## 2022-08-26 ASSESSMENT — PAIN SCALES - GENERAL
PAINLEVEL_OUTOF10: 0

## 2022-08-26 ASSESSMENT — PAIN - FUNCTIONAL ASSESSMENT
PAIN_FUNCTIONAL_ASSESSMENT: 0-10
PAIN_FUNCTIONAL_ASSESSMENT: 0-10

## 2022-08-26 NOTE — ED PROVIDER NOTES
ATTENDING PROVIDER ATTESTATION:     Sherri Carrizales presented to the emergency department for evaluation of Abdominal Pain and Chest Pain   and was initially evaluated by the Medical Resident. See Original ED Note for H&P and ED course above. I have reviewed and discussed the case, including pertinent history (medical, surgical, family and social) and exam findings with the Medical Resident assigned to Sherri Carrizales. I have personally performed and/or participated in the history, exam, medical decision making, and procedures and agree with all pertinent clinical information and any additional changes or corrections are noted below in my assessment and plan. I have discussed this patient in detail with the resident, and provided the instruction and education,       I have reviewed my findings and recommendations with the assigned Medical Resident, Sherri Carrizales and members of family present at the time of disposition. I have performed a history and physical examination of this patient and directly supervised the resident caring for this patient      History of Present Illness:    Presents to the ED for abdominal pain and constipation, beginning prior to arrival.  The complaint has been constant, moderate in severity, and worsened by nothing. Patient reports abdominal pain and distention for 4 days. Was at the nursing home, also with chest pain. NTG given to her there. Then passed out. No ripping or taring pain. Not sharp or stabbing pain. Says she has not having bowel movements. Says it is causing pressure in her abdomen and feels distended. No exertional chest pain. No nausea. She says she feels like she needs to have a bowel movement. No shortness of breath. She denies any other complaints. On eliquis.          Review of Systems:   A complete review of systems was performed and pertinent positives and negatives are stated within HPI, all other systems reviewed and are negative.    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 ([address] Bronchitis, Bruising tendency ([address] CAD (coronary artery disease), Cough, COVID, Dermatophytosis, Diabetes mellitus ([address] GERD (gastroesophageal reflux disease), History of pulmonary embolus (PE), Hx of blood clots, Hyperlipidemia, Hypertension, Leg swelling, Lung disease, Peripheral vascular angioplasty status, Pseudoaneurysm of right femoral artery ([address] PVD (peripheral vascular disease) with claudication ([address] Restless legs syndrome, Rhinitis, allergic, Sinusitis, SOB (shortness of breath), Type 1 diabetes mellitus with left diabetic foot ulcer ([address] Ulcerated, foot, left, limited to breakdown of skin ([address] and Urinary incontinence. Past Surgical History:  has a past surgical history that includes Hysterectomy; Cholecystectomy; Tonsillectomy and adenoidectomy; Coronary artery bypass graft; Abdomen surgery; Appendectomy; Colonoscopy; Endoscopy, colon, diagnostic; Cardiac surgery; Foot Debridement (Left, 5/16/2021); Foot Debridement (Left, 5/21/2021); and Upper gastrointestinal endoscopy (N/A, 7/18/2022). Social History:  reports that she has never smoked. She has never used smokeless tobacco. She reports current alcohol use. She reports that she does not use drugs. Family History: family history includes Heart Disease in her brother; Hypertension in her father. Unless otherwise noted, family history is non contributory    The patients home medications have been reviewed.     Allergies: Ativan [lorazepam] and Lisinopril    EKG Interpretation  Interpreted by emergency department physician, Dr. Sridhar Calderon     8/26/22  Time: 7911    Rhythm: sinus bradycardia and 1 degree AV block  Rate: bradycardia  Axis: left  Conduction: 1st degree AV block  ST Segments: no acute change  T Waves: no acute change    Clinical Impression: sinus bradycardia, no acute ischemic changes  Comparison to Old EKG  stable from prior      EKG Interpretation  Interpreted by emergency department physician, Dr. Atilio Anderson     8/26/22  Time: 3712    Rhythm: sinus bradycardia   Rate: bradycardia  Axis: left  Conduction: normal  ST Segments: no acute change  T Waves: no acute change    Clinical Impression: sinus bradycardia, no acute ischemic changes  Comparison to Old EKG  stable from prior    Physical Exam:  Constitutional/General: Alert and oriented x3  Head: Normocephalic and atraumatic  Eyes: PERRL, EOMI, sclera non icteric  ENT: Oropharynx clear, handling secretions  Neck: Supple, full ROM, no stridor, no meningeal signs  Respiratory: Lungs clear to auscultation bilaterally, no wheezes, rales, or rhonchi. Not in respiratory distress  Cardiovascular:  bradycardic but regular rhythm. 2+ distal pulses. Equal extremity pulses. GI:  Abdomen Soft, slightly distended, Non tender, No rebound, guarding, or rigidity. No pulsatile masses. Musculoskeletal: Moves all extremities x 4. Warm and well perfused,  no clubbing, no cyanosis, no edema. Palpable peripheral pulses  Integument: skin warm and dry. No rashes. Neurologic: GCS 15, no focal deficits  Psychiatric: Normal Affect      I directly supervised any procedures performed by the resident and was present for the procedure including all critical portions of the procedure      The cardiac monitor revealed sinus rhythm with a heart rate in the 50s as interpreted by me. The cardiac monitor was ordered secondary to the patient's abdominal pain and to monitor the patient for dysrhythmia.    CPT [id number]      I, Dr. Atilio Anderson, am the primary provider of record    My Medical Decision Making:         Abdominal pain, constipation, CT pending to rule out obstruction  Chest pain--trop with non significant delta but does have risk factors and did also have syncope. ?from NTG. Nothing to suggest dissection, will need admission        1. Generalized abdominal pain    2. Constipation, unspecified constipation type    3.  Chest pain, unspecified type            Wes Daniels MD  08/26/22 4546 [facility], [place]  08/26/22 6155

## 2022-08-26 NOTE — LETTER
[address] [place] [address] Dr Department Medicaid  CERTIFICATION OF NECESSITY  FOR NON-EMERGENCY TRANSPORTATION   BY GROUND AMBULANCE      Individual Information   1. Name: Devante Batista 2. [address] [place] [address] Dr Medicaid Billing Number:    3. Address: [facility] [address]. [address]      Transportation Provider Information   4. Provider Name: Kaden Yennifer   5. [address] [place] [address] Dr Medicaid Provider Number:  National Provider Identifier (NPI):      Certification  7. Criteria:  During transport, this individual requires:  [x] Medical treatment or continuous     supervision by an EMT. [] The administration or regulation of oxygen by another person. [] Supervised protective restraint. 8. Period Beginning Date:     8/28/2022     9. Length  [x] Not more than 10 day(s)  [] One Year     Additional Information Relevant to Certification   10. Comments or Explanations, If Necessary or Appropriate     Coccyx wound; left foot ulcerated     Certifying Practitioner Information   11. Name of Practitioner: Contreras Remy MD   12. [address] [place] [address] Dr Medicaid Provider Number, If Applicable:  [address] Provider Identifier (NPI):     Signature Information   14. Date of Signature: 8/28/2022   15. Name of Person Signing: Grabiel Ibarra RN     16. Signature and Professional Designation:  Electronically signed by Grabiel Ibarra RN on 8/28/2022 at 12:34 PM       OD 92526  Rev. 7/2015    [address] [place] [address] Encounter Date/Time: 8/26/2022 10 [facility] Account: [de-identified]    MRN: [id number]    Patient: Devante Batista    Contact Serial #: [id number]      ENCOUNTER          Patient Class: I Private Enc?   No Unit RM BD: [id number]   Hospital Service: MED   Encounter DX: Pneumonia [J18.9]   ADM Provider: Contreras Remy MD   Procedure:     ATT Provider: Contreras Remy MD   REF Provider:        Admission DX: Pneumonia, Generalized abdominal pain, Constipation, unspecified constipation type, Chest pain, unspecified type and DX codes: J18.9, R10.84, K59.00, R07.9    PATIENT                 Name: Sal Malagon : 1927 (89 yrs)   Address: [facility] S* Sex: Female   City: [address] [place] [address]         Marital Status:    Employer: RETIRED         [employer]: [employer]   Primary Care Provider: Liseth Fernandes DO         Primary Phone: [phone]   EMERGENCY CONTACT   Contact Name Legal Guardian? Relationship to Patient Home Phone Work Phone   1. Carey Melvin  2. NgoziArian      Child  Child [phone]              GUARANTOR            Guarantor: Sal Malagon     : 1927   Address: [address] [place] [address]; Room * Sex: Female     [place] [address]     Relation to Patient: Self       Home Phone: [phone]   Guarantor ID: [id number]       Work Phone:     Guarantor Employer: RETIRED         Status: RETIRED      COVERAGE        PRIMARY INSURANCE   Payor: MEDICARE Plan: RAILROAD MEDICARE   Payor Address:  [address] [address],  [facility], [facility] [address]       Group Number:   Insurance Type: INDEMNITY   Subscriber Name: Priti Carter : 1927   Subscriber ID: [id number] Kimble Burkitt. Rel. to Sub: Self   SECONDARY INSURANCE   Payor: MEDICAID [place] Plan: [facility] DEPT OF*   Payor Address:   [address], [place], [address]          Group Number:   Insurance Type: INDEMNITY   Subscriber Name: Priti Carter : 1927   Subscriber ID: [id number] Kimble Burkitt.  Rel. to Sub: SELF         CSN: [id number]

## 2022-08-26 NOTE — ED TRIAGE NOTES
Pt arrives from SNF with abdominal pain, chest pain, and constipation x4 days. Pt had syncopal episode at facility. Pt was also given 1 nitroglycerine at facility, pt unsure if she had syncopal episode before or after being given the nitroglycerine. Pt states the pain has resolved.

## 2022-08-27 ENCOUNTER — OUTSIDE SERVICES (OUTPATIENT)
Dept: PRIMARY CARE CLINIC | Age: 87
End: 2022-08-27
Payer: MEDICARE

## 2022-08-27 DIAGNOSIS — E11.9 TYPE 2 DIABETES MELLITUS WITHOUT COMPLICATION, UNSPECIFIED WHETHER LONG TERM INSULIN USE (HCC): ICD-10-CM

## 2022-08-27 DIAGNOSIS — K92.2 ACUTE GI BLEEDING: Primary | ICD-10-CM

## 2022-08-27 DIAGNOSIS — I50.9 CHRONIC CONGESTIVE HEART FAILURE, UNSPECIFIED HEART FAILURE TYPE (HCC): ICD-10-CM

## 2022-08-27 DIAGNOSIS — I25.10 CORONARY ARTERY DISEASE INVOLVING NATIVE CORONARY ARTERY OF NATIVE HEART WITHOUT ANGINA PECTORIS: ICD-10-CM

## 2022-08-27 DIAGNOSIS — I10 PRIMARY HYPERTENSION: ICD-10-CM

## 2022-08-27 PROBLEM — N39.0 URINARY TRACT INFECTION: Status: ACTIVE | Noted: 2022-08-27

## 2022-08-27 PROBLEM — S31.809A WOUND OF BUTTOCK: Status: ACTIVE | Noted: 2022-08-27

## 2022-08-27 LAB
ANION GAP SERPL CALCULATED.3IONS-SCNC: 11 MMOL/L (ref 7–16)
BUN BLDV-MCNC: 17 MG/DL (ref 6–23)
CALCIUM SERPL-MCNC: 9 MG/DL (ref 8.6–10.2)
CHLORIDE BLD-SCNC: 103 MMOL/L (ref 98–107)
CO2: 19 MMOL/L (ref 22–29)
CREAT SERPL-MCNC: 0.5 MG/DL (ref 0.5–1)
GFR AFRICAN AMERICAN: >60
GFR NON-AFRICAN AMERICAN: >60 ML/MIN/1.73
GLUCOSE BLD-MCNC: 98 MG/DL (ref 74–99)
HBA1C MFR BLD: 5.6 % (ref 4–5.6)
L. PNEUMOPHILA SEROGP 1 UR AG: NORMAL
METER GLUCOSE: 138 MG/DL (ref 74–99)
METER GLUCOSE: 155 MG/DL (ref 74–99)
METER GLUCOSE: 202 MG/DL (ref 74–99)
METER GLUCOSE: 232 MG/DL (ref 74–99)
METER GLUCOSE: 99 MG/DL (ref 74–99)
POTASSIUM REFLEX MAGNESIUM: 4.6 MMOL/L (ref 3.5–5)
PROCALCITONIN: 0.05 NG/ML (ref 0–0.08)
SODIUM BLD-SCNC: 133 MMOL/L (ref 132–146)

## 2022-08-27 PROCEDURE — 92610 EVALUATE SWALLOWING FUNCTION: CPT

## 2022-08-27 PROCEDURE — 87449 NOS EACH ORGANISM AG IA: CPT

## 2022-08-27 PROCEDURE — 80048 BASIC METABOLIC PNL TOTAL CA: CPT

## 2022-08-27 PROCEDURE — 2580000003 HC RX 258: Performed by: NURSE PRACTITIONER

## 2022-08-27 PROCEDURE — 6370000000 HC RX 637 (ALT 250 FOR IP): Performed by: NURSE PRACTITIONER

## 2022-08-27 PROCEDURE — 1200000000 HC SEMI PRIVATE

## 2022-08-27 PROCEDURE — 6360000002 HC RX W HCPCS: Performed by: NURSE PRACTITIONER

## 2022-08-27 PROCEDURE — 82962 GLUCOSE BLOOD TEST: CPT

## 2022-08-27 PROCEDURE — 87070 CULTURE OTHR SPECIMN AEROBIC: CPT

## 2022-08-27 PROCEDURE — 83036 HEMOGLOBIN GLYCOSYLATED A1C: CPT

## 2022-08-27 PROCEDURE — 94640 AIRWAY INHALATION TREATMENT: CPT

## 2022-08-27 PROCEDURE — 99306 1ST NF CARE HIGH MDM 50: CPT | Performed by: INTERNAL MEDICINE

## 2022-08-27 PROCEDURE — 36415 COLL VENOUS BLD VENIPUNCTURE: CPT

## 2022-08-27 PROCEDURE — 84145 PROCALCITONIN (PCT): CPT

## 2022-08-27 RX ORDER — BUDESONIDE 0.25 MG/2ML
250 INHALANT ORAL 2 TIMES DAILY
Status: DISCONTINUED | OUTPATIENT
Start: 2022-08-27 | End: 2022-08-28 | Stop reason: HOSPADM

## 2022-08-27 RX ORDER — INSULIN LISPRO 100 [IU]/ML
0-4 INJECTION, SOLUTION INTRAVENOUS; SUBCUTANEOUS EVERY 4 HOURS
Status: DISCONTINUED | OUTPATIENT
Start: 2022-08-27 | End: 2022-08-28 | Stop reason: HOSPADM

## 2022-08-27 RX ORDER — ONDANSETRON 2 MG/ML
4 INJECTION INTRAMUSCULAR; INTRAVENOUS EVERY 6 HOURS PRN
Status: DISCONTINUED | OUTPATIENT
Start: 2022-08-27 | End: 2022-08-28 | Stop reason: HOSPADM

## 2022-08-27 RX ORDER — DEXTROSE MONOHYDRATE 100 MG/ML
INJECTION, SOLUTION INTRAVENOUS CONTINUOUS PRN
Status: DISCONTINUED | OUTPATIENT
Start: 2022-08-27 | End: 2022-08-28 | Stop reason: HOSPADM

## 2022-08-27 RX ORDER — PANTOPRAZOLE SODIUM 40 MG/1
40 GRANULE, DELAYED RELEASE ORAL
COMMUNITY

## 2022-08-27 RX ORDER — ARFORMOTEROL TARTRATE 15 UG/2ML
15 SOLUTION RESPIRATORY (INHALATION) 2 TIMES DAILY
Status: DISCONTINUED | OUTPATIENT
Start: 2022-08-27 | End: 2022-08-28 | Stop reason: HOSPADM

## 2022-08-27 RX ORDER — SENNA PLUS 8.6 MG/1
1 TABLET ORAL 2 TIMES DAILY
Status: DISCONTINUED | OUTPATIENT
Start: 2022-08-27 | End: 2022-08-28 | Stop reason: HOSPADM

## 2022-08-27 RX ORDER — GABAPENTIN 300 MG/1
300 CAPSULE ORAL EVERY EVENING
Status: DISCONTINUED | OUTPATIENT
Start: 2022-08-27 | End: 2022-08-28 | Stop reason: HOSPADM

## 2022-08-27 RX ORDER — ACETAMINOPHEN 325 MG/1
650 TABLET ORAL EVERY 4 HOURS PRN
COMMUNITY

## 2022-08-27 RX ORDER — GLYCERIN/PROPYLENE GLYCOL 0.6 %-0.6%
1 DROPPERETTE, SINGLE-USE DROP DISPENSER OPHTHALMIC (EYE) 2 TIMES DAILY
COMMUNITY

## 2022-08-27 RX ORDER — BACLOFEN 5 MG/1
5 TABLET ORAL PRN
COMMUNITY

## 2022-08-27 RX ORDER — ALBUTEROL SULFATE 2.5 MG/3ML
2.5 SOLUTION RESPIRATORY (INHALATION) 4 TIMES DAILY PRN
Status: DISCONTINUED | OUTPATIENT
Start: 2022-08-27 | End: 2022-08-28 | Stop reason: HOSPADM

## 2022-08-27 RX ORDER — ENOXAPARIN SODIUM 100 MG/ML
40 INJECTION SUBCUTANEOUS DAILY
Status: DISCONTINUED | OUTPATIENT
Start: 2022-08-27 | End: 2022-08-28 | Stop reason: HOSPADM

## 2022-08-27 RX ORDER — OXYCODONE HYDROCHLORIDE 5 MG/1
5 TABLET ORAL EVERY 4 HOURS PRN
Status: DISCONTINUED | OUTPATIENT
Start: 2022-08-27 | End: 2022-08-28 | Stop reason: HOSPADM

## 2022-08-27 RX ORDER — AMLODIPINE BESYLATE 10 MG/1
10 TABLET ORAL DAILY
Status: DISCONTINUED | OUTPATIENT
Start: 2022-08-27 | End: 2022-08-28 | Stop reason: HOSPADM

## 2022-08-27 RX ORDER — HYDROCODONE BITARTRATE AND ACETAMINOPHEN 5; 325 MG/1; MG/1
1 TABLET ORAL EVERY 6 HOURS PRN
COMMUNITY

## 2022-08-27 RX ORDER — ONDANSETRON 4 MG/1
4 TABLET, FILM COATED ORAL EVERY 6 HOURS PRN
COMMUNITY
End: 2022-10-03

## 2022-08-27 RX ORDER — LOSARTAN POTASSIUM 50 MG/1
25 TABLET ORAL DAILY
Status: DISCONTINUED | OUTPATIENT
Start: 2022-08-27 | End: 2022-08-28 | Stop reason: HOSPADM

## 2022-08-27 RX ORDER — ALBUTEROL 90 MCG
2 AEROSOL (GRAM) INHALATION 4 TIMES DAILY
Status: DISCONTINUED | OUTPATIENT
Start: 2022-08-27 | End: 2022-08-27 | Stop reason: CLARIF

## 2022-08-27 RX ORDER — DEXAMETHASONE 2 MG/1
2 TABLET ORAL DAILY PRN
COMMUNITY

## 2022-08-27 RX ORDER — ONDANSETRON 4 MG/1
4 TABLET, ORALLY DISINTEGRATING ORAL EVERY 8 HOURS PRN
Status: DISCONTINUED | OUTPATIENT
Start: 2022-08-27 | End: 2022-08-28 | Stop reason: HOSPADM

## 2022-08-27 RX ORDER — IPRATROPIUM BROMIDE AND ALBUTEROL SULFATE 2.5; .5 MG/3ML; MG/3ML
1 SOLUTION RESPIRATORY (INHALATION)
Status: DISCONTINUED | OUTPATIENT
Start: 2022-08-27 | End: 2022-08-28 | Stop reason: HOSPADM

## 2022-08-27 RX ORDER — PRAVASTATIN SODIUM 20 MG
20 TABLET ORAL NIGHTLY
Status: DISCONTINUED | OUTPATIENT
Start: 2022-08-27 | End: 2022-08-28 | Stop reason: HOSPADM

## 2022-08-27 RX ORDER — GUAIFENESIN 100 MG/5ML
200 SOLUTION ORAL 4 TIMES DAILY PRN
Status: DISCONTINUED | OUTPATIENT
Start: 2022-08-27 | End: 2022-08-28 | Stop reason: HOSPADM

## 2022-08-27 RX ORDER — POLYETHYLENE GLYCOL 3350 17 G/17G
17 POWDER, FOR SOLUTION ORAL DAILY
Status: DISCONTINUED | OUTPATIENT
Start: 2022-08-27 | End: 2022-08-28 | Stop reason: HOSPADM

## 2022-08-27 RX ORDER — SODIUM CHLORIDE 0.9 % (FLUSH) 0.9 %
10 SYRINGE (ML) INJECTION PRN
Status: DISCONTINUED | OUTPATIENT
Start: 2022-08-27 | End: 2022-08-28 | Stop reason: HOSPADM

## 2022-08-27 RX ORDER — ACETAMINOPHEN 650 MG/1
650 SUPPOSITORY RECTAL EVERY 4 HOURS PRN
COMMUNITY

## 2022-08-27 RX ORDER — TORSEMIDE 10 MG/1
10 TABLET ORAL DAILY
Status: DISCONTINUED | OUTPATIENT
Start: 2022-08-27 | End: 2022-08-28 | Stop reason: HOSPADM

## 2022-08-27 RX ORDER — SODIUM CHLORIDE 9 MG/ML
INJECTION, SOLUTION INTRAVENOUS PRN
Status: DISCONTINUED | OUTPATIENT
Start: 2022-08-27 | End: 2022-08-28 | Stop reason: HOSPADM

## 2022-08-27 RX ORDER — ACETAMINOPHEN 650 MG/1
650 SUPPOSITORY RECTAL EVERY 6 HOURS PRN
Status: DISCONTINUED | OUTPATIENT
Start: 2022-08-27 | End: 2022-08-28 | Stop reason: HOSPADM

## 2022-08-27 RX ORDER — DOCUSATE SODIUM 100 MG/1
100 CAPSULE, LIQUID FILLED ORAL DAILY PRN
COMMUNITY

## 2022-08-27 RX ORDER — FERROUS SULFATE 325(65) MG
325 TABLET ORAL DAILY
Status: DISCONTINUED | OUTPATIENT
Start: 2022-08-27 | End: 2022-08-28 | Stop reason: HOSPADM

## 2022-08-27 RX ORDER — SODIUM CHLORIDE 0.9 % (FLUSH) 0.9 %
5-40 SYRINGE (ML) INJECTION EVERY 12 HOURS SCHEDULED
Status: DISCONTINUED | OUTPATIENT
Start: 2022-08-27 | End: 2022-08-28 | Stop reason: HOSPADM

## 2022-08-27 RX ORDER — CHOLECALCIFEROL (VITAMIN D3) 125 MCG
5 CAPSULE ORAL NIGHTLY
COMMUNITY

## 2022-08-27 RX ORDER — AZITHROMYCIN 250 MG/1
500 TABLET, FILM COATED ORAL EVERY 24 HOURS
Status: DISCONTINUED | OUTPATIENT
Start: 2022-08-27 | End: 2022-08-28 | Stop reason: HOSPADM

## 2022-08-27 RX ORDER — BISACODYL 10 MG
10 SUPPOSITORY, RECTAL RECTAL DAILY PRN
Status: DISCONTINUED | OUTPATIENT
Start: 2022-08-27 | End: 2022-08-28 | Stop reason: HOSPADM

## 2022-08-27 RX ORDER — ACETAMINOPHEN 325 MG/1
650 TABLET ORAL EVERY 6 HOURS PRN
Status: DISCONTINUED | OUTPATIENT
Start: 2022-08-27 | End: 2022-08-28 | Stop reason: HOSPADM

## 2022-08-27 RX ORDER — FLUTICASONE FUROATE AND VILANTEROL 100; 25 UG/1; UG/1
1 POWDER RESPIRATORY (INHALATION) DAILY
Status: DISCONTINUED | OUTPATIENT
Start: 2022-08-27 | End: 2022-08-27 | Stop reason: CLARIF

## 2022-08-27 RX ADMIN — GABAPENTIN 300 MG: 300 CAPSULE ORAL at 17:44

## 2022-08-27 RX ADMIN — IPRATROPIUM BROMIDE AND ALBUTEROL SULFATE 1 AMPULE: .5; 2.5 SOLUTION RESPIRATORY (INHALATION) at 10:59

## 2022-08-27 RX ADMIN — POLYETHYLENE GLYCOL 3350 17 G: 17 POWDER, FOR SOLUTION ORAL at 09:53

## 2022-08-27 RX ADMIN — IPRATROPIUM BROMIDE AND ALBUTEROL SULFATE 1 AMPULE: .5; 2.5 SOLUTION RESPIRATORY (INHALATION) at 14:28

## 2022-08-27 RX ADMIN — IPRATROPIUM BROMIDE AND ALBUTEROL SULFATE 1 AMPULE: .5; 2.5 SOLUTION RESPIRATORY (INHALATION) at 18:34

## 2022-08-27 RX ADMIN — ENOXAPARIN SODIUM 40 MG: 100 INJECTION SUBCUTANEOUS at 09:53

## 2022-08-27 RX ADMIN — ACETAMINOPHEN 650 MG: 325 TABLET, FILM COATED ORAL at 10:02

## 2022-08-27 RX ADMIN — AZITHROMYCIN MONOHYDRATE 500 MG: 250 TABLET ORAL at 01:07

## 2022-08-27 RX ADMIN — SODIUM CHLORIDE, PRESERVATIVE FREE 10 ML: 5 INJECTION INTRAVENOUS at 01:08

## 2022-08-27 RX ADMIN — ARFORMOTEROL TARTRATE 15 MCG: 15 SOLUTION RESPIRATORY (INHALATION) at 10:59

## 2022-08-27 RX ADMIN — BUDESONIDE 250 MCG: 0.25 SUSPENSION RESPIRATORY (INHALATION) at 11:00

## 2022-08-27 RX ADMIN — INSULIN LISPRO 1 UNITS: 100 INJECTION, SOLUTION INTRAVENOUS; SUBCUTANEOUS at 17:39

## 2022-08-27 RX ADMIN — SENNOSIDES 8.6 MG: 8.6 TABLET, FILM COATED ORAL at 01:08

## 2022-08-27 RX ADMIN — SENNOSIDES 8.6 MG: 8.6 TABLET, FILM COATED ORAL at 09:52

## 2022-08-27 RX ADMIN — FERROUS SULFATE TAB 325 MG (65 MG ELEMENTAL FE) 325 MG: 325 (65 FE) TAB at 09:52

## 2022-08-27 RX ADMIN — TORSEMIDE 10 MG: 10 TABLET ORAL at 09:52

## 2022-08-27 RX ADMIN — AMLODIPINE BESYLATE 10 MG: 10 TABLET ORAL at 09:53

## 2022-08-27 RX ADMIN — ACETAMINOPHEN 650 MG: 325 TABLET, FILM COATED ORAL at 19:29

## 2022-08-27 RX ADMIN — Medication 10 ML: at 09:53

## 2022-08-27 RX ADMIN — PRAVASTATIN SODIUM 20 MG: 20 TABLET ORAL at 01:08

## 2022-08-27 RX ADMIN — LOSARTAN POTASSIUM 25 MG: 50 TABLET, FILM COATED ORAL at 09:52

## 2022-08-27 ASSESSMENT — ENCOUNTER SYMPTOMS
CONSTIPATION: 1
ABDOMINAL PAIN: 1
SORE THROAT: 0
RHINORRHEA: 0
WHEEZING: 0
TROUBLE SWALLOWING: 0
COUGH: 0
SINUS PRESSURE: 0
EYE DISCHARGE: 0
EYE ITCHING: 0
SHORTNESS OF BREATH: 0
VOICE CHANGE: 0
SINUS PAIN: 0
EYE REDNESS: 0
GASTROINTESTINAL NEGATIVE: 1

## 2022-08-27 ASSESSMENT — PAIN DESCRIPTION - FREQUENCY
FREQUENCY: INTERMITTENT
FREQUENCY: INTERMITTENT

## 2022-08-27 ASSESSMENT — PAIN DESCRIPTION - ONSET
ONSET: ON-GOING
ONSET: ON-GOING

## 2022-08-27 ASSESSMENT — PAIN DESCRIPTION - LOCATION
LOCATION: BUTTOCKS

## 2022-08-27 ASSESSMENT — VISUAL ACUITY: OU: 1

## 2022-08-27 ASSESSMENT — PAIN DESCRIPTION - DESCRIPTORS
DESCRIPTORS: ACHING;SORE;TENDER
DESCRIPTORS: ACHING;DISCOMFORT;SORE

## 2022-08-27 ASSESSMENT — PAIN DESCRIPTION - ORIENTATION
ORIENTATION: RIGHT;LEFT
ORIENTATION: RIGHT;LEFT

## 2022-08-27 ASSESSMENT — PAIN SCALES - GENERAL
PAINLEVEL_OUTOF10: 5
PAINLEVEL_OUTOF10: 8
PAINLEVEL_OUTOF10: 3
PAINLEVEL_OUTOF10: 0

## 2022-08-27 ASSESSMENT — PAIN DESCRIPTION - PAIN TYPE
TYPE: CHRONIC PAIN
TYPE: ACUTE PAIN

## 2022-08-27 ASSESSMENT — PAIN - FUNCTIONAL ASSESSMENT
PAIN_FUNCTIONAL_ASSESSMENT: PREVENTS OR INTERFERES SOME ACTIVE ACTIVITIES AND ADLS
PAIN_FUNCTIONAL_ASSESSMENT: PREVENTS OR INTERFERES SOME ACTIVE ACTIVITIES AND ADLS

## 2022-08-27 ASSESSMENT — PAIN SCALES - WONG BAKER: WONGBAKER_NUMERICALRESPONSE: 0

## 2022-08-27 NOTE — ED NOTES
Report called to floor. Spoke with Zulma Collier.  Pt placed in transport     [place], [address] [place] [address]  08/26/22 2059

## 2022-08-27 NOTE — PROGRESS NOTES
Comprehensive Nutrition Assessment    Type and Reason for Visit:  Initial, Positive Nutrition Screen, Wound    Nutrition Recommendations/Plan:   Continue current diet. Recommend and start Glucerna once daily and Gelatein BID to optimize intake and wound healing. Will continue to monitor. Malnutrition Assessment:  Malnutrition Status: At risk for malnutrition (Comment) (08/27/22 9510)    Context:  Acute Illness     Findings of the 6 clinical characteristics of malnutrition:  Energy Intake:  Mild decrease in energy intake (Comment)  Weight Loss:  Unable to assess (NEAL d/t lack of measured wt hx and hx of CHF/possible fluid shifts; Wt ranges from 126-140#;)     Body Fat Loss:  Unable to assess     Muscle Mass Loss:  Unable to assess    Fluid Accumulation:  No significant fluid accumulation     Strength:  Not Performed    Nutrition Assessment:    Pt. admitted from SNF w/ abdominal pain and chest pain + constipation x4 days (resolved). Noted UTI, PNA. Hx of CAD,DM, CHF. Pt. passed swallow eval and started on regular diet. Noted pressure wounds x2. No intakes to assess at this time. Will start ONS and monitor. Nutrition Related Findings:    A&Ox4, +I/O, GI WDL, no edema, Wound Type: Multiple, Pressure Injury, Stage III, Unstageable       Current Nutrition Intake & Therapies:    Average Meal Intake: Unable to assess  Average Supplements Intake: None Ordered  ADULT DIET; Regular; 4 carb choices (60 gm/meal)    Anthropometric Measures:  Height: 5' 3\" (160 cm)  Ideal Body Weight (IBW): 115 lbs (52 kg)    Admission Body Weight: 116 lb (52.6 kg) (8/26 stated)  Current Body Weight: 116 lb (52.6 kg) (stated 8/26), 100.9 % IBW. Weight Source: Stated  Current BMI (kg/m2): 20.6  Usual Body Weight:  (NEAL d/t lack of measured wt hx and hx of CHF/possible fluid shifts;  Wt ranges from 126-140#;)     Weight Adjustment For: No Adjustment                 BMI Categories: Underweight (BMI less than 22) age over 72    Estimated Daily Nutrient Needs:  Energy Requirements Based On: Formula  Weight Used for Energy Requirements: Current  Energy (kcal/day): 1100-1200kcal (MSJ x1. 3SF)  Weight Used for Protein Requirements: Current  Protein (g/day): 78-95g (1.5-1.8g/kg)  Method Used for Fluid Requirements: 1 ml/kcal  Fluid (ml/day): 1100-1200ml    Nutrition Diagnosis:   Increased nutrient needs related to increase demand for energy/nutrients as evidenced by wounds    Nutrition Interventions:   Food and/or Nutrient Delivery: Continue Current Diet, Start Oral Nutrition Supplement (Glucerna once daily , Gelatein BID)  Nutrition Education/Counseling: No recommendation at this time  Coordination of Nutrition Care: Continue to monitor while inpatient       Goals:     Goals: PO intake 50% or greater, by next RD assessment       Nutrition Monitoring and Evaluation:   Behavioral-Environmental Outcomes: None Identified  Food/Nutrient Intake Outcomes: Food and Nutrient Intake, Supplement Intake  Physical Signs/Symptoms Outcomes: Biochemical Data, GI Status, Fluid Status or Edema, Nutrition Focused Physical Findings, Skin, Weight    Discharge Planning:     Too soon to determine     Joe ANNI Ontiveros  Contact: ext [phone]

## 2022-08-27 NOTE — PLAN OF CARE
Problem: Discharge Planning  Goal: Discharge to home or other facility with appropriate resources  Outcome: Progressing     Problem: Pain  Goal: Verbalizes/displays adequate comfort level or baseline comfort level  Outcome: Progressing     Problem: Skin/Tissue Integrity  Goal: Absence of new skin breakdown  Description: 1. Monitor for areas of redness and/or skin breakdown  2. Assess vascular access sites hourly  3. Every 4-6 hours minimum:  Change oxygen saturation probe site  4. Every 4-6 hours:  If on nasal continuous positive airway pressure, respiratory therapy assess nares and determine need for appliance change or resting period.   Outcome: Progressing     Problem: Falls - Risk of:  Goal: Will remain free from falls  Description: Will remain free from falls  Outcome: Progressing

## 2022-08-27 NOTE — PLAN OF CARE
Problem: Discharge Planning  Goal: Discharge to home or other facility with appropriate resources  8/27/2022 0221 by Elli Archibald RN  Outcome: Progressing  8/27/2022 0006 by Elli Archibald RN  Outcome: Progressing     Problem: Pain  Goal: Verbalizes/displays adequate comfort level or baseline comfort level  8/27/2022 0221 by Elli Archibald RN  Outcome: Progressing  8/27/2022 0006 by Elli Archibald RN  Outcome: Progressing     Problem: Skin/Tissue Integrity  Goal: Absence of new skin breakdown  Description: 1. Monitor for areas of redness and/or skin breakdown  2. Assess vascular access sites hourly  3. Every 4-6 hours minimum:  Change oxygen saturation probe site  4. Every 4-6 hours:  If on nasal continuous positive airway pressure, respiratory therapy assess nares and determine need for appliance change or resting period.   8/27/2022 0221 by Elli Archibald RN  Outcome: Progressing  8/27/2022 0006 by Elli Archibald RN  Outcome: Progressing     Problem: Falls - Risk of:  Goal: Will remain free from falls  Description: Will remain free from falls  8/27/2022 0221 by Elli Archibald RN  Outcome: Progressing  8/27/2022 0006 by Elli Archibald RN  Outcome: Progressing     Problem: Safety - Adult  Goal: Free from fall injury  Outcome: Progressing

## 2022-08-27 NOTE — PROGRESS NOTES
Visit Date: 8/10/22  Evita Cardona  9/20/1927  female 80 y.o. Subjective:    CC: Patient presents with acute gi bleed. Patient presents for follow up of DM,HTN,CAD, and CHf. HPI:  Diabetes: Patient feeling well. Condition has been mostly well controlled since last visit. Taking medication as prescribed. No medication side effects noted. Trying to follow recommended diet. Present for awhile. Came on gradually. Rated as mild. Progressive. Condition has been mostly well controlled. Denies associated signs and symptoms. Diet: Somewhat compliant with diet plan. Hyperlipidemia: Patient feeling well. Condition has been mostly well controlled since last visit. Taking medication as prescribed. No medication side effects noted. Trying to follow recommended diet. Present for awhile. Came on gradually. Rated as mild. Progressive. Hypertension: Patient feeling well. Condition has been mostly well controlled since last visit. Taking medication as prescribed. No medication side effects noted. Trying to follow recommended diet. Present for awhile. Came on gradually. Rated as mild. Progressive. Congestive heart failure: Taking medication as prescribed. No medication side effects noted. Following recommended diet. Patient is exercising sporadically. Functional capacity is normal for age. Coronary artery disease: Condition has been mostly well controlled since last visit. Taking medication as prescribed. No medication side effects noted. Following recommended diet. Patient is exercising sporadically. Peripheral vascular disease: Patient feeling about the same compared to last visit. Condition has been improving since last visit. Taking antibiotics as prescribed. No medication side effects noted. Patient working to improve exercise routine. Experiencing no pain. Present for awhile. Progressive. Rated as mild. Patient unaware of any aggravating factors.     GI Problem  Primary symptoms do not include myalgias. The illness began more than 7 days ago (admitted to the hospital for acute gi bleeding she had a blood transfusion done. she was hospice but has improved and is now off hospice. she is admitted to the nursing home for continued care). The onset was sudden. The problem has been gradually improving. The illness does not include chills or anorexia. ROS:  Review of Systems   Constitutional: Negative. Negative for chills. HENT:  Negative for congestion, ear discharge, ear pain, mouth sores, nosebleeds, postnasal drip, rhinorrhea, sinus pressure, sinus pain, sneezing, sore throat, trouble swallowing and voice change. Eyes:  Negative for discharge, redness, itching and visual disturbance. Denies diplopia, recent change in visual acuity, blurred vision, and night vision loss. Does not wear corrective lenses. Respiratory:  Negative for cough, shortness of breath and wheezing. Denies asthma, COPD, hemoptysis   Cardiovascular: Negative. Gastrointestinal: Negative. Negative for anorexia. Endocrine: Negative. Genitourinary:  Negative for difficulty urinating and urgency. Denies hesitancy, incomplete voiding, and polyuria   Musculoskeletal:  Negative for myalgias. Denies joint pain   Skin: Negative. Neurological: Negative. Hematological: Negative. Psychiatric/Behavioral:  Negative for confusion and suicidal ideas. The patient is not nervous/anxious. Denies difficulty concentrating, depression, flight of ideas, slow thought processes, suicide attempts      Current Meds: Refer to nursing home record    PMH:    Medical Problems:reviewed and updated      Surgical Hx: reviewed and updated     FH: reviewed and updated     SH: reviewed and updated     Objective: Wt: 114.2 BP: 135/82 Pulse: 61 Resp: 18 T: 97.6F     Physical Exam:  Physical Exam  Vitals reviewed. Constitutional:       General: She is not in acute distress. Appearance: Normal appearance. She is normal weight. She is not ill-appearing or toxic-appearing. Comments: Appears appropriate for age   HENT:      Head: Normocephalic and atraumatic. Right Ear: Tympanic membrane and external ear normal.      Left Ear: Tympanic membrane and external ear normal.      Ears:      Comments: Middle ear well aerated. Nose: Nose normal.      Mouth/Throat:      Mouth: Mucous membranes are moist.      Dentition: Normal dentition. No gingival swelling or dental caries. Pharynx: Oropharynx is clear. Uvula midline. Comments: Gums appear healthy. No thrush no mucositis  Eyes:      General: Vision grossly intact. Right eye: No discharge. Left eye: No discharge. Extraocular Movements: Extraocular movements intact. Conjunctiva/sclera: Conjunctivae normal.      Pupils: Pupils are equal, round, and reactive to light. Comments: Fundoscopic exam is benign  Retinal vessels: Normal   Neck:      Vascular: No carotid bruit. Comments: Thyroid is normal in size. Carotids 2+ and equal bilaterally  Cardiovascular:      Rate and Rhythm: Normal rate and regular rhythm. Pulses: Normal pulses. Heart sounds: Normal heart sounds, S1 normal and S2 normal. No murmur heard. No friction rub. No gallop. Comments: Pedal pulses 2+ and equal bilaterally  Pulmonary:      Effort: Pulmonary effort is normal.      Breath sounds: Normal breath sounds. Abdominal:      General: Bowel sounds are normal. There is no distension. Palpations: Abdomen is soft. There is no mass. Tenderness: There is no abdominal tenderness. There is no guarding or rebound. Hernia: No hernia is present. Comments: No rigidity   Musculoskeletal:         General: Normal range of motion. Right shoulder: Normal.      Left shoulder: Normal.      Right upper arm: Normal.      Left upper arm: Normal.      Cervical back: Normal range of motion.       Right hip: Normal.      Left hip: Normal.      Right upper leg: Normal.      Left upper leg: Normal.      Right lower leg: Normal.      Left lower leg: Normal.   Lymphadenopathy:      Comments: No palpable or visible regional lymphadenopathy   Skin:     General: Skin is warm and dry. Findings: No bruising, ecchymosis, lesion or rash. Neurological:      General: No focal deficit present. Mental Status: She is alert. Mental status is at baseline. Cranial Nerves: Cranial nerves are intact. No cranial nerve deficit. Deep Tendon Reflexes: Reflexes normal.   Psychiatric:         Mood and Affect: Mood and affect normal. Mood is not depressed. Behavior: Behavior normal. Behavior is not agitated. Thought Content: Thought content normal.         Judgment: Judgment normal.      Comments: No apparent anxiety        Assessment & Plan:  1. Acute GI bleeding  2. Chronic congestive heart failure, unspecified heart failure type ([facility] [address]  3. Type 2 diabetes mellitus without complication, unspecified whether long term insulin use ([facility] [address]  4. Primary hypertension  5. Coronary artery disease involving native coronary artery of native heart without angina pectoris     Hospital notes reviewed   Chart and medications reviewed   Monitor labs   Continue therapy   Continue current treatment     Corazon THOMAS MA  am scribing for Dr. John Perez.  [facility]   8/27/22    MAGDALENA Marshall Tona Lime, MD, personally performed the services described in this documentation as scribed by Corazon Maguire and it is both accurate and complete

## 2022-08-27 NOTE — PROGRESS NOTES
Wound care notified of new consult via perfectserve.  Electronically signed by Doreen Buchanan RN on 8/27/2022 at 9:50 AM

## 2022-08-27 NOTE — PROGRESS NOTES
Spoke with Patient's son Olaf Mcmahon to assist in completing Admission data base,allergies, home medication list. Follow up is needed in the morning with the Patients's daughter Juanita Lr who may know the patient's medications and reaction to lisinopril.

## 2022-08-27 NOTE — PROGRESS NOTES
SPEECH/LANGUAGE PATHOLOGY  CLINICAL ASSESSMENT OF SWALLOWING FUNCTION   and PLAN OF CARE  PATIENT NAME:  Zakia Emanuel  (female)     MRN:  [id number]    :  1927  (80 y.o.)  STATUS:  Inpatient: Room 4516/4516-A    TODAY'S DATE:  2022  REFERRING PROVIDER:   VIK Knight CNP  SPECIFIC PROVIDER ORDER: Speech Language Pathology clinical swallowing screening Date of order:  22  REASON FOR REFERRAL: dysphagia   EVALUATING THERAPIST: Дмитрий Golden SLP                 ASSESSMENT:    DYSPHAGIA DIAGNOSIS:   Clinical indicators of normal swallow function      DIET RECOMMENDATIONS:  Regular consistency solids (IDDSI level 7) with  thin liquids (IDDSI level 0)     FEEDING RECOMMENDATIONS:     Assistance level:  No assistance needed      Compensatory strategies recommended: No strategies are recommended at this time      Discussed recommendations with nursing?: No secondary to no diet/liquid change recommended     SPEECH THERAPY  PLAN OF CARE   The dysphagia POC is established based on physician order, dysphagia diagnosis and results of clinical assessment     Dysphagia therapy is not recommended     Conditions Requiring Skilled Therapeutic Intervention for dysphagia:    not applicable    Specific dysphagia interventions to include:     Not applicable    Specific instructions for next treatment:  not applicable   Patient Treatment Goals:    Short Term Goals:  Not applicable no therapy warranted     Long Term Goals:   Not applicable no therapy warranted      Patient/family Goal:    not applicable                    ADMITTING DIAGNOSIS: Pneumonia [J18.9]  Generalized abdominal pain [R10.84]  Constipation, unspecified constipation type [K59.00]  Chest pain, unspecified type [R07.9]    VISIT DIAGNOSIS:   Visit Diagnoses         Codes    Generalized abdominal pain    -  Primary R10.84    Constipation, unspecified constipation type     K59.00    Chest pain, unspecified type     R07.9             PATIENT REPORT/COMPLAINT: denies difficulty swallowing  nursing not available at time of evaluation chart reviewed and patient is not NPO for any procedures per current documentation. PRIOR LEVEL OF SWALLOW FUNCTION:    PAST HISTORY OF DYSPHAGIA?: none reported    Home diet: Regular consistency solids (IDDSI level 7) with  thin liquids (IDDSI level 0)    Current Diet Order:  Diet NPO Exceptions are: Sips of Water with Meds    PROCEDURE:  Consistencies Administered During the Evaluation   Liquids: thin liquid   Solids:  pureed foods and soft solid foods      Method of Intake:   cup, straw, spoon  Self fed      Position:   Seated, upright    CLINICAL ASSESSMENT  Oral Stage: The oral stage of swallowing was within functional limits      Pharyngeal Stage:    No signs of aspiration were noted during this evaluation however, silent aspiration cannot be ruled out at bedside. If silent aspiration is suspected, a Videofluoroscopic Study of Swallowing (MBS) is recommended and requires a physician order. Cognition:   Within functional limits for this exam    Oral Peripheral Examination   Adequate lingual/labial strength     Current Respiratory Status    room air     Parameters of Speech Production  Respiration:  Adequate for speech production  Quality:   Within functional limits  Intensity: Within functional limits    Volitional Swallow: Present    Volitional Cough:    Present    Pain: No pain reported. EDUCATION:   The Speech Language Pathologist (SLP) completed education regarding results of evaluation and that intervention is not warranted at this time. Learner: Patient  Education:  Reviewed results and recommendations of this evaluation  Evaluation of Education: Verbalizes understanding    This plan will be re-evaluated and revised in 1 week  if warranted.       CPT code:  69462  bedside swallow eval      [x]The admitting diagnosis and active problem list, have been reviewed prior to initiation of this evaluation.         ACTIVE PROBLEM LIST:   Patient Active Problem List   Diagnosis    Asthma    CAD (coronary artery disease)    Vitamin D deficiency    HTN (hypertension)    Idiopathic peripheral neuropathy    Rhinitis, allergic    GERD (gastroesophageal reflux disease)    PVD (peripheral vascular disease) with claudication (HCC)    Gait instability    Hyponatremia    DM (diabetes mellitus), type 2 (HCC)    History of pulmonary embolus (PE)    Peripheral vascular angioplasty status    Non-proliferative diabetic retinopathy, mild, both eyes (HCC)    Leg swelling    Compression fracture of L5 lumbar vertebra, closed, initial encounter ([facility] [address]    HLD (hyperlipidemia)    Compression fracture of thoracic vertebra (HCC)    Acute anemia    Spinal stenosis    Acute heart failure (HCC)    GI bleed    Hemorrhagic shock (HCC)    Pneumonia

## 2022-08-27 NOTE — H&P
[facility]  History and Physical      CHIEF COMPLAINT:    Chief Complaint   Patient presents with    Abdominal Pain    Chest Pain        Patient of Tyronne Closs, DO presents with:  Pneumonia    History of Present Illness:   Patient is a 80-year-old female with a past medical history of arthritis, asthma, bronchitis, CAD, cough, COVID, diabetes, GERD, history of PE, hyperlipidemia, hypertension, and left foot ulcerated, and urinary incontinence. Patient presented to the ED with complaints of abdominal pain and chest pain. Patient's symptoms began approximately 4 days ago. Patient has not had a bowel movement in the past 4 days and she had previous abdominal abdominal surgery with a cholecystectomy approximately 1 month ago. Patient was admitted to hospice on 8/1 for level of care for symptom management related to terminal diagnosis of GI hemorrhage. Patient was then bridged to [facility] and now resides in a nursing home. Patient is alert and oriented on examination. Patient denies any chest pain, shortness of breath, fever, chills, headache, dizziness, blurred vision, and abdominal pain. She states when she came in she was having some abdominal pain however that has resolved. ER work-up revealed elevated WBC 14.6, troponin 68, chest x-ray right upper lobe pneumonia. Patient was admitted to telemetry unit for further testing and treatment. Discussion held with family for CODE STATUS and patient is now a DNR CCA as she came into the facility as a full code. REVIEW OF SYSTEMS:  Pertinent negatives are above in HPI. 10 point ROS otherwise negative.       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 ([facility] [address] 5/18/2021    Bronchitis 5/23/2017    Bruising tendency (HCC)     CAD (coronary artery disease)     Cough 5/23/2017    COVID     Dermatophytosis 6/25/2021    Diabetes mellitus ([address] GERD (gastroesophageal reflux disease) 5/23/2017    History of pulmonary embolus (PE) 5/29/2021    Hx of blood clots     Hyperlipidemia     Hypertension     Leg swelling 7/15/2021    Lung disease     Peripheral vascular angioplasty status 5/29/2021    Pseudoaneurysm of right femoral artery ([facility] [address] 5/29/2021    PVD (peripheral vascular disease) with claudication ([address] 4/10/2019    Restless legs syndrome     Rhinitis, allergic 5/23/2017    Sinusitis 5/23/2017    SOB (shortness of breath) 5/23/2017    Type 1 diabetes mellitus with left diabetic foot ulcer ([address] 5/18/2021    Ulcerated, foot, left, limited to breakdown of skin ([address] 6/25/2021    Urinary incontinence          Past Surgical History:   Procedure Laterality Date    ABDOMEN SURGERY      APPENDECTOMY      CARDIAC SURGERY      CHOLECYSTECTOMY      COLONOSCOPY      CORONARY ARTERY BYPASS GRAFT      8/28/10    ENDOSCOPY, COLON, DIAGNOSTIC      FOOT DEBRIDEMENT Left 5/16/2021    FOOT DEBRIDEMENT INCISION AND DRAINAGE. performed by Bertrand Goldmann, DPM at [address] [place] [address] Left 5/21/2021    LEFT FOOT DEBRIDEMENT  WITH DELAYED PRIMARY CLOSURE performed by Fidel Brooks DPM at [address]      frankie and bso 1997    TONSILLECTOMY AND ADENOIDECTOMY      UPPER GASTROINTESTINAL ENDOSCOPY N/A 7/18/2022    EGD ESOPHAGOGASTRODUODENOSCOPY performed by Ynes Handy MD at [facility] ENDOSCOPY       Medications Prior to Admission:    Medications Prior to Admission: losartan (COZAAR) 25 MG tablet, Take 1 tablet by mouth in the morning. amLODIPine (NORVASC) 10 MG tablet, Take 1 tablet by mouth in the morning. torsemide (DEMADEX) 10 MG tablet, Take 1 tablet by mouth in the morning. apixaban (ELIQUIS) 5 MG TABS tablet, Take 0.5 tablets by mouth in the morning and 0.5 tablets before bedtime. oxyCODONE (ROXICODONE) 5 MG immediate release tablet, Take 5 mg by mouth every 4 hours as needed for Pain.   acetaminophen (TYLENOL) 500 MG tablet, Take 500 mg by mouth every 6 hours as needed for Pain  ciclopirox (PENLAC) 8 % solution, Apply once daily all toenails. metFORMIN (GLUCOPHAGE) 500 MG tablet, Take 500 mg by mouth daily  polyethylene glycol (GLYCOLAX) 17 g packet, Take 17 g by mouth daily as needed for Constipation  guaiFENesin (ROBITUSSIN) 100 MG/5ML syrup, Take 200 mg by mouth 4 times daily as needed for Cough   senna (SENOKOT) 8.6 MG tablet, Take 1 tablet by mouth 2 times daily  Alpha-Lipoic Acid 600 MG TABS, Take 600 mg by mouth daily  polyvinyl alcohol (LIQUIFILM TEARS) 1.4 % ophthalmic solution, 1 drop 4 times daily   clotrimazole-betamethasone (LOTRISONE) 1-0.05 % cream, Apply topically 2 times daily Apply topically 2 times daily. Ginger, Zingiber officinalis, (GINGER ROOT PO), Take 750 mg by mouth Daily in am  ferrous sulfate (IRON 325) 325 (65 Fe) MG tablet, Take 325 mg by mouth daily In the morning for anemia  b complex vitamins capsule, Take 1 capsule by mouth daily In the morning for supplement  promethazine (PHENERGAN) 12.5 MG tablet, Take 12.5 mg by mouth every 6 hours as needed for Nausea   traMADol (ULTRAM) 50 MG tablet, Take 50 mg by mouth 2 times daily. aluminum & magnesium hydroxide-simethicone (MYLANTA) 400-400-40 MG/5ML SUSP, Take 30 mLs by mouth every 6 hours as needed  lidocaine (LMX) 4 % cream, Apply topically every 8 hours as needed for Pain Apply topically as needed to painful muscles  diclofenac sodium (VOLTAREN) 1 % GEL, Apply topically 4 times daily as needed for Pain  glimepiride (AMARYL) 2 MG tablet, Take 2 mg by mouth every morning (before breakfast)   apixaban (ELIQUIS) 5 MG TABS tablet, Take 2 tablets by mouth 2 times daily for 5 days Followed by 5 mg twice daily for 3 months  bisacodyl (DULCOLAX) 10 MG suppository, Place 10 mg rectally daily as needed for Constipation Indications: IF NO RESULTS FROM MOM   gabapentin (NEURONTIN) 300 MG capsule, Take 300 mg by mouth every evening.    albuterol (PROVENTIL) 90 MCG/ACT inhaler, Inhale 2 puffs into the lungs 4 times daily  pravastatin (PRAVACHOL) 20 MG tablet, TAKE 1 TABLET BY MOUTH  NIGHTLY  fluticasone-vilanterol (BREO ELLIPTA) 100-25 MCG/INH AEPB inhaler, Inhale 1 puff into the lungs daily  Multiple Vitamins-Minerals (OCUVITE ADULT FORMULA PO), Take 1 capsule by mouth daily  Probiotic Product (PRO-BIOTIC BLEND PO), Take 1 capsule by mouth 2 times daily   magnesium gluconate (MAGONATE) 500 MG tablet, Take 200 mg by mouth 2 times daily   Coenzyme Q10 (COQ10) 200 MG CAPS, Take 200 mg by mouth daily     Note that the patient's home medications were reviewed and the above list is accurate to the best of my knowledge at the time of the exam.    Allergies:    Ativan [lorazepam] and Lisinopril    Social History:    reports that she has never smoked. She has never used smokeless tobacco. She reports current alcohol use. She reports that she does not use drugs. Family History:   family history includes Heart Disease in her brother; Hypertension in her father. PHYSICAL EXAM:    Vitals:  BP (!) 138/54   Pulse 51   Temp 98.4 °F (36.9 °C) (Oral)   Resp 17   Ht 5' 3\" (1.6 m)   Wt 116 lb (52.6 kg)   LMP 12/03/1997   SpO2 95%   BMI 20.55 kg/m²       General appearance: NAD, conversant  Eyes: Sclerae anicteric, PERRLA  HEENT: AT/NC, MMM  Neck: FROM, supple, no thyromegaly  Lymph: No cervical / supraclavicular lymphadenopathy  Lungs: Clear to auscultation, WOB normal  CV: RRR, no MRGs, no lower extremity edema  Abdomen: Soft, non-tender; no masses or HSM, +BS,   Extremities: FROM without synovitis. No clubbing or cyanosis of the hands. Skin: coccyx wound  Psych: Calm and cooperative. Normal judgement and insight. Normal mood and affect. Neuro: Alert and interactive, face symmetric, speech fluent. LABS:  All labs reviewed.   Of note:  CBC with Differential:    Lab Results   Component Value Date/Time    WBC 14.6 08/26/2022 11:45 AM    RBC 2.62 08/26/2022 11:45 AM HGB 8.4 08/26/2022 11:45 AM    HCT 26.8 08/26/2022 11:45 AM     08/26/2022 11:45 AM    .3 08/26/2022 11:45 AM    MCH 32.1 08/26/2022 11:45 AM    MCHC 31.3 08/26/2022 11:45 AM    RDW 19.4 08/26/2022 11:45 AM    NRBC 3.5 07/17/2022 09:32 PM    LYMPHOPCT 8.3 08/26/2022 11:45 AM    MONOPCT 6.9 08/26/2022 11:45 AM    BASOPCT 0.1 08/26/2022 11:45 AM    MONOSABS 1.00 08/26/2022 11:45 AM    LYMPHSABS 1.21 08/26/2022 11:45 AM    EOSABS 0.03 08/26/2022 11:45 AM    BASOSABS 0.01 08/26/2022 11:45 AM     CMP:    Lab Results   Component Value Date/Time     08/27/2022 04:55 AM    K 4.6 08/27/2022 04:55 AM     08/27/2022 04:55 AM    CO2 19 08/27/2022 04:55 AM    BUN 17 08/27/2022 04:55 AM    CREATININE 0.5 08/27/2022 04:55 AM    GFRAA >60 08/27/2022 04:55 AM    LABGLOM >60 08/27/2022 04:55 AM    GLUCOSE 98 08/27/2022 04:55 AM    PROT 5.7 08/26/2022 11:45 AM    LABALBU 3.4 08/26/2022 11:45 AM    CALCIUM 9.0 08/27/2022 04:55 AM    BILITOT 0.2 08/26/2022 11:45 AM    ALKPHOS 70 08/26/2022 11:45 AM    AST 14 08/26/2022 11:45 AM    ALT 15 08/26/2022 11:45 AM       Imaging:  CT abdomen pelvis: Biliary dilation, potentially physiologic and related to cholecystectomy. Urethral ala thickening in both renal pelvises along with a hypoenhancing region in the lower pole of the left kidney. Possibilities include acute pyelonephritis. Solid left renal mass is not excluded. Catheter is urinary bladder demonstrating a thickened wall. Diffusely distended colon containing a large amount of stool. CXR: Right upper lobe pneumonia suggested. EKG:  I've personally reviewed the patient's EKG:  Sinus bradycardia first-degree    Telemetry:  I've personally reviewed the patient's telemetry:  SB    ASSESSMENT/PLAN:  Principal Problem:    Pneumonia  Active Problems:    Wound of buttock    Urinary tract infection  Resolved Problems:    * No resolved hospital problems.  *    80-year-old female with a significant past medical history presents to the ED with abdominal and chest pain and is admitted to telemetry unit with    Pneumonia-?  -Supplement O2 demands keeping oxygen saturation greater than 92%. Currently on room air  -Rocephin/Zithromax  -DuoNebs as needed  -Monitor labs hemoglobin. Monitor electrolytes place as necessary  -Swallow study -past resume diet  -Strep/Legionella -pending    Coccyx wound  Monitor labs-WBC 14.6  Consult wound nurse  Dressing every shift  Wound culture pending    UTI  Rocephin 1 g every 24 hours  Await culture    [facility]    Medication for other comorbidities continue as appropriate dose adjustment as necessary. DVT prophylaxis -Lovenox  PT OT  Discharge planning  Case discussed with attending and agreed upon plan of care. Code status: DNR CCA   Requires inpatient level of care  VIK Boss CNP    10:13 AM  8/27/2022     Above note edited to reflect my thoughts   She is resting comfortably on evaluation and denies any acute complaints  Procalcitonin is within normal limits  No evidence of acute sepsis  If afebrile through tomorrow she can be discharged back to her facility  Repeat CBC in the morning    I personally saw, examined and provided care for the patient. Radiographs, labs and medication list were reviewed by me independently. The case was discussed in detail and plans for care were established. Review of Sheryl FERNANDEZ CNP, documentation was conducted and revisions were made as appropriate directly by me. I agree with the above documented exam, problem list, and plan of care.      Bharathi Carty MD  4:36 PM  8/27/2022

## 2022-08-28 VITALS
SYSTOLIC BLOOD PRESSURE: 135 MMHG | WEIGHT: 116 LBS | BODY MASS INDEX: 20.55 KG/M2 | HEIGHT: 63 IN | RESPIRATION RATE: 17 BRPM | HEART RATE: 51 BPM | DIASTOLIC BLOOD PRESSURE: 39 MMHG | TEMPERATURE: 95.7 F | OXYGEN SATURATION: 100 %

## 2022-08-28 LAB
BASOPHILS ABSOLUTE: 0.01 E9/L (ref 0–0.2)
BASOPHILS RELATIVE PERCENT: 0.1 % (ref 0–2)
EOSINOPHILS ABSOLUTE: 0.21 E9/L (ref 0.05–0.5)
EOSINOPHILS RELATIVE PERCENT: 2.2 % (ref 0–6)
HCT VFR BLD CALC: 27.8 % (ref 34–48)
HEMOGLOBIN: 8.8 G/DL (ref 11.5–15.5)
IMMATURE GRANULOCYTES #: 0.07 E9/L
IMMATURE GRANULOCYTES %: 0.7 % (ref 0–5)
LYMPHOCYTES ABSOLUTE: 1.28 E9/L (ref 1.5–4)
LYMPHOCYTES RELATIVE PERCENT: 13.7 % (ref 20–42)
MCH RBC QN AUTO: 31.3 PG (ref 26–35)
MCHC RBC AUTO-ENTMCNC: 31.7 % (ref 32–34.5)
MCV RBC AUTO: 98.9 FL (ref 80–99.9)
METER GLUCOSE: 226 MG/DL (ref 74–99)
MONOCYTES ABSOLUTE: 0.57 E9/L (ref 0.1–0.95)
MONOCYTES RELATIVE PERCENT: 6.1 % (ref 2–12)
NEUTROPHILS ABSOLUTE: 7.23 E9/L (ref 1.8–7.3)
NEUTROPHILS RELATIVE PERCENT: 77.2 % (ref 43–80)
PDW BLD-RTO: 19.9 FL (ref 11.5–15)
PLATELET # BLD: 367 E9/L (ref 130–450)
PMV BLD AUTO: 10.3 FL (ref 7–12)
RBC # BLD: 2.81 E12/L (ref 3.5–5.5)
SARS-COV-2, NAAT: NOT DETECTED
WBC # BLD: 9.4 E9/L (ref 4.5–11.5)

## 2022-08-28 PROCEDURE — 6370000000 HC RX 637 (ALT 250 FOR IP): Performed by: NURSE PRACTITIONER

## 2022-08-28 PROCEDURE — 6360000002 HC RX W HCPCS: Performed by: NURSE PRACTITIONER

## 2022-08-28 PROCEDURE — 2580000003 HC RX 258: Performed by: NURSE PRACTITIONER

## 2022-08-28 PROCEDURE — 94640 AIRWAY INHALATION TREATMENT: CPT

## 2022-08-28 PROCEDURE — 85025 COMPLETE CBC W/AUTO DIFF WBC: CPT

## 2022-08-28 PROCEDURE — 82962 GLUCOSE BLOOD TEST: CPT

## 2022-08-28 PROCEDURE — 36415 COLL VENOUS BLD VENIPUNCTURE: CPT

## 2022-08-28 PROCEDURE — 87635 SARS-COV-2 COVID-19 AMP PRB: CPT

## 2022-08-28 RX ORDER — AZITHROMYCIN 500 MG/1
500 TABLET, FILM COATED ORAL DAILY
Qty: 1 PACKET | Refills: 0 | Status: SHIPPED | OUTPATIENT
Start: 2022-08-28 | End: 2022-08-31

## 2022-08-28 RX ORDER — FERROUS SULFATE 325(65) MG
325 TABLET ORAL DAILY
Qty: 30 TABLET | Refills: 3 | Status: SHIPPED | OUTPATIENT
Start: 2022-08-29

## 2022-08-28 RX ADMIN — SENNOSIDES 8.6 MG: 8.6 TABLET, FILM COATED ORAL at 00:00

## 2022-08-28 RX ADMIN — PRAVASTATIN SODIUM 20 MG: 20 TABLET ORAL at 00:00

## 2022-08-28 RX ADMIN — IPRATROPIUM BROMIDE AND ALBUTEROL SULFATE 1 AMPULE: .5; 2.5 SOLUTION RESPIRATORY (INHALATION) at 16:12

## 2022-08-28 RX ADMIN — Medication 10 ML: at 00:00

## 2022-08-28 RX ADMIN — FERROUS SULFATE TAB 325 MG (65 MG ELEMENTAL FE) 325 MG: 325 (65 FE) TAB at 08:09

## 2022-08-28 RX ADMIN — IPRATROPIUM BROMIDE AND ALBUTEROL SULFATE 1 AMPULE: .5; 2.5 SOLUTION RESPIRATORY (INHALATION) at 12:43

## 2022-08-28 RX ADMIN — ENOXAPARIN SODIUM 40 MG: 100 INJECTION SUBCUTANEOUS at 08:09

## 2022-08-28 RX ADMIN — Medication 10 ML: at 08:12

## 2022-08-28 RX ADMIN — INSULIN LISPRO 1 UNITS: 100 INJECTION, SOLUTION INTRAVENOUS; SUBCUTANEOUS at 09:49

## 2022-08-28 RX ADMIN — AZITHROMYCIN MONOHYDRATE 500 MG: 250 TABLET ORAL at 00:00

## 2022-08-28 NOTE — PLAN OF CARE
Problem: Discharge Planning  Goal: Discharge to home or other facility with appropriate resources  Outcome: Progressing     Problem: Pain  Goal: Verbalizes/displays adequate comfort level or baseline comfort level  Outcome: Not Progressing     Problem: Skin/Tissue Integrity  Goal: Absence of new skin breakdown  Description: 1. Monitor for areas of redness and/or skin breakdown  2. Assess vascular access sites hourly  3. Every 4-6 hours minimum:  Change oxygen saturation probe site  4. Every 4-6 hours:  If on nasal continuous positive airway pressure, respiratory therapy assess nares and determine need for appliance change or resting period.   Outcome: Progressing     Problem: Falls - Risk of:  Goal: Will remain free from falls  Description: Will remain free from falls  Outcome: Progressing     Problem: Safety - Adult  Goal: Free from fall injury  Outcome: Progressing     Problem: Nutrition Deficit:  Goal: Optimize nutritional status  Outcome: Progressing     Problem: Respiratory - Adult  Goal: Achieves optimal ventilation and oxygenation  Outcome: Progressing     Problem: Skin/Tissue Integrity - Adult  Goal: Incisions, wounds, or drain sites healing without S/S of infection  Outcome: Progressing     Problem: Infection - Adult  Goal: Absence of infection at discharge  Outcome: Progressing

## 2022-08-28 NOTE — CARE COORDINATION
Discharge order noted. Patient with a past medical history of arthritis, asthma, bronchitis, CAD, cough, COVID, diabetes, GERD, history of PE, hyperlipidemia, hypertension, and left foot ulcerated, and urinary incontinence was admitted with pneumonia and UTI. Patient is from [facility] at the [facility] phone# [phone]. I called and spoke to the patient's  Misael Bowie who said the plan is to return to the [facility]. Transportation set up via [place] for 3 pm today. RN, liasion and patient's son Misael Bowie all notified. Ambulance form in envelope in soft chart. No Hens needed since patient is from this facility.   Marcello Whiting RN CM  [phone]

## 2022-08-28 NOTE — DISCHARGE INSTR - COC
Continuity of Care Form    Patient Name: Milagro Reyes   :  1927  MRN:  [id number]    Admit date:  2022  Discharge date:  22    Code Status Order: DNR-CCA   Advance Directives:     Admitting Physician:  Starr Mccullough MD  PCP: Paresh Rainey DO    Discharging Nurse:SARWAT [facility] Unit/Room#: 5407/5407-B  Discharging Unit Phone Number: [phone]    Emergency Contact:   Extended Emergency Contact Information  Primary Emergency Contact: [address] Phone: [phone]  Mobile Phone: [phone]  Relation: Child   needed? No  Secondary Emergency Contact: Arian Melvin  Address: [address] [place] [address], [address] [place] [address] Phone: [phone]  Relation: Child    Past Surgical History:  Past Surgical History:   Procedure Laterality Date    ABDOMEN SURGERY      APPENDECTOMY      CARDIAC SURGERY      CHOLECYSTECTOMY      COLONOSCOPY      CORONARY ARTERY BYPASS GRAFT      8/28/10    ENDOSCOPY, COLON, DIAGNOSTIC      FOOT DEBRIDEMENT Left 2021    FOOT DEBRIDEMENT INCISION AND DRAINAGE.    performed by Willian Cantu DPM at [address] [place] [address] Left 2021    LEFT FOOT DEBRIDEMENT  WITH DELAYED PRIMARY CLOSURE performed by Jovani Renteria DPM at [address] [place] [address] ([address] [place] [address])      [facility] and bso     TONSILLECTOMY AND ADENOIDECTOMY      UPPER GASTROINTESTINAL ENDOSCOPY N/A 2022    EGD ESOPHAGOGASTRODUODENOSCOPY performed by Elaine Melchor MD at [facility] ENDOSCOPY       Immunization History:   Immunization History   Administered Date(s) Administered    COVID-19, PFIZER PURPLE top, DILUTE for use, (age 15 y+), 30mcg/0.3mL 2021, 10/07/2021    Influenza, High Dose (Fluzone 65 yrs and older) 2015, 2016, 10/24/2017, 10/03/2018    Pneumococcal Conjugate 13-valent (Gjrctme51) 2015    Pneumococcal Polysaccharide (Agqgpypid05) 2014    Tdap (Boostrix, Adacel) 2019 Active Problems:  Patient Active Problem List   Diagnosis Code    Asthma J45.909    CAD (coronary artery disease) I25.10    Vitamin D deficiency E55.9    HTN (hypertension) I10    Idiopathic peripheral neuropathy G60.9    Rhinitis, allergic J30.9    GERD (gastroesophageal reflux disease) K21.9    PVD (peripheral vascular disease) with claudication ([facility]) I73.9    Gait instability R26.81    Hyponatremia E87.1    DM (diabetes mellitus), type 2 ([facility]) E11.9    History of pulmonary embolus (PE) Z86.711    Peripheral vascular angioplasty status Z98.62    Non-proliferative diabetic retinopathy, mild, both eyes ([facility]) P96.6320    Leg swelling M79.89    Compression fracture of L5 lumbar vertebra, closed, initial encounter ([facility] S32.050A    HLD (hyperlipidemia) E78.5    Compression fracture of thoracic vertebra ([facility]) S22.000A    Acute anemia D64.9    Spinal stenosis M48.00    Acute heart failure ([facility]) I50.9    GI bleed K92.2    Hemorrhagic shock ([facility]) R57.8    Pneumonia J18.9    Wound of buttock S31.809A    Urinary tract infection N39.0       Isolation/Infection:   Isolation            No Isolation          Patient Infection Status       Infection Onset Added Last Indicated Last Indicated By Review Planned Expiration Resolved Resolved By    None active    Resolved    COVID-19 (Rule Out) 07/17/22 07/17/22 07/17/22 COVID-19, Rapid (Ordered)   07/17/22 Rule-Out Test Resulted            Nurse Assessment:  Last Vital Signs: BP (!) 135/39   Pulse 51   Temp (!) 95.7 °F (35.4 °C) (Temporal)   Resp 17   Ht 5' 3\" (1.6 m)   Wt 116 lb (52.6 kg)   LMP 12/03/1997   SpO2 100%   BMI 20.55 kg/m²     Last documented pain score (0-10 scale): Pain Level: 5  Last Weight:   Wt Readings from Last 1 Encounters:   08/26/22 116 lb (52.6 kg)     Mental Status:  disoriented and alert    IV Access:  - None    Nursing Mobility/ADLs:  Walking   Dependent  Transfer  Dependent  Bathing  Dependent  Dressing  Dependent  Toileting Dependent  Feeding  Independent  Med Admin  Independent  Med Delivery   whole    Wound Care Documentation and Therapy:  Wound 06/14/21 Foot Left;Medial #3 (Active)   Number of days: 439       Wound 08/26/22 Buttocks Right (Active)   Wound Etiology Pressure Stage 3 08/27/22 2345   Dressing Status New dressing applied 08/27/22 2345   Wound Cleansed Cleansed with saline 08/27/22 2345   Dressing/Treatment Dry dressing 08/27/22 2345   Dressing Change Due 08/28/22 08/27/22 0950   Wound Length (cm) 6.5 cm 08/27/22 0950   Wound Width (cm) 4.3 cm 08/27/22 0950   Wound Depth (cm) 1.8 cm 08/27/22 0950   Wound Surface Area (cm^2) 27.95 cm^2 08/27/22 0950   Change in Wound Size % (l*w) -86.33 08/27/22 0950   Wound Volume (cm^3) 50.31 cm^3 08/27/22 0950   Wound Assessment Slough;Pink/red 08/27/22 2345   Drainage Amount Small 08/27/22 2345   Drainage Description Purulent 08/27/22 2345   Odor None 08/27/22 2345   Ira-wound Assessment Fragile 08/27/22 2345   Number of days: 1       Wound 08/26/22 Buttocks (Active)   Wound Etiology Pressure Stage 3 08/27/22 2345   Dressing Status New dressing applied 08/27/22 2345   Wound Cleansed Cleansed with saline 08/27/22 2345   Dressing/Treatment Dry dressing 08/27/22 2345   Dressing Change Due 08/28/22 08/27/22 0950   Wound Length (cm) 2.4 cm 08/27/22 0950   Wound Width (cm) 3.4 cm 08/27/22 0950   Wound Depth (cm) 0.3 cm 08/27/22 0950   Wound Surface Area (cm^2) 8.16 cm^2 08/27/22 0950   Change in Wound Size % (l*w) 18.4 08/27/22 0950   Wound Volume (cm^3) 2.448 cm^3 08/27/22 0950   Wound Assessment Slough;Pink/red 08/27/22 2345   Drainage Amount Scant 08/27/22 2345   Drainage Description Purulent 08/27/22 2345   Odor None 08/27/22 2345   Ira-wound Assessment Fragile 08/27/22 2345   Number of days: 1       Wound 08/27/22 Heel Right (Active)   Wound Etiology Pressure Unstageable 08/27/22 2345   Dressing Status New dressing applied 08/27/22 0950   Dressing/Treatment Foam 08/27/22 1561 Dressing Change Due 08/30/22 08/27/22 0950   Wound Length (cm) 1.2 cm 08/27/22 0950   Wound Width (cm) 1.3 cm 08/27/22 0950   Wound Depth (cm) 0 cm 08/27/22 0950   Wound Surface Area (cm^2) 1.56 cm^2 08/27/22 0950   Wound Volume (cm^3) 0 cm^3 08/27/22 0950   Wound Assessment Non-blanchable erythema 08/27/22 1644   Drainage Amount None 08/27/22 2345   Odor None 08/27/22 2345   Ira-wound Assessment Dry/flaky 08/27/22 1644   Number of days: 1        Elimination:  Continence: Bowel: No  Bladder: ***HAS A DOWNING CATH  Urinary Catheter: Insertion Date: *** HAS A DOWNING CATHETER  Colostomy/Ileostomy/Ileal Conduit: No       Date of Last BM: 8/28/22    Intake/Output Summary (Last 24 hours) at 8/28/2022 1135  Last data filed at 8/28/2022 0550  Gross per 24 hour   Intake 310 ml   Output 1000 ml   Net -690 ml     I/O last 3 completed shifts: In: 550 [P.O.:540; I.V.:10]  Out: 1000 [Urine:1000]    Safety Concerns: At Risk for Falls    Impairments/Disabilities:      None    Nutrition Therapy:  Current Nutrition Therapy:   - Oral Diet:  Carb Control 4 carbs/meal (1800kcals/day)    Routes of Feeding: Oral  Liquids: Thin Liquids  Daily Fluid Restriction: no  Last Modified Barium Swallow with Video (Video Swallowing Test): not done    Treatments at the Time of Hospital Discharge:   Respiratory Treatments: PULMICORT BID  BRAVANA BID DUONEB Q4 HOURS WHILE AWAKE  Oxygen Therapy:  is not on home oxygen therapy.   Ventilator:    - No ventilator support    Rehab Therapies: Physical Therapy and Occupational Therapy  Weight Bearing Status/Restrictions: No weight bearing restrictions  Other Medical Equipment (for information only, NOT a DME order):  {EQUIPMENT:[id number]}  Other Treatments: ***    Patient's personal belongings (please select all that are sent with patient):  {[facility] DME Belongings:[id number]}    RN SIGNATURE:  Electronically signed by Hilda Quarles RN on 8/28/22 at 1:05 PM EDT    CASE MANAGEMENT/SOCIAL WORK SECTION    Inpatient Status Date: ***    Readmission Risk Assessment Score:  Readmission Risk              Risk of Unplanned Readmission:  35           Discharging to Facility/ Agency   Name:   Address:  Phone:  Fax:    Dialysis Facility (if applicable)   Name:  Address:  Dialysis Schedule:  Phone:  Fax:    / signature: {Anagnature:[id number]}    PHYSICIAN SECTION    Prognosis: Good    Condition at Discharge: Stable    Rehab Potential (if transferring to Rehab): Good    Recommended Labs or Other Treatments After Discharge: CBC and BMP in 1 week    Physician Certification: I certify the above information and transfer of Queen Melva  is necessary for the continuing treatment of the diagnosis listed and that she requires [facility] for greater 30 days.      Update Admission H&P: No change in H&P    PHYSICIAN SIGNATURE:  Electronically signed by Leary Alpers, APRN - CNP on 8/28/22 at 11:36 AM EDT

## 2022-08-30 LAB — WOUND/ABSCESS: NORMAL

## 2022-08-31 LAB
BLOOD CULTURE, ROUTINE: NORMAL
CULTURE, BLOOD 2: NORMAL

## 2022-08-31 PROCEDURE — 99309 SBSQ NF CARE MODERATE MDM 30: CPT | Performed by: INTERNAL MEDICINE

## 2022-09-07 ENCOUNTER — OUTSIDE SERVICES (OUTPATIENT)
Dept: PRIMARY CARE CLINIC | Age: 87
End: 2022-09-07
Payer: MEDICARE

## 2022-09-07 DIAGNOSIS — E11.9 TYPE 2 DIABETES MELLITUS WITHOUT COMPLICATION, UNSPECIFIED WHETHER LONG TERM INSULIN USE (HCC): ICD-10-CM

## 2022-09-07 DIAGNOSIS — K92.2 ACUTE GI BLEEDING: Primary | ICD-10-CM

## 2022-09-07 DIAGNOSIS — I50.9 CHRONIC CONGESTIVE HEART FAILURE, UNSPECIFIED HEART FAILURE TYPE (HCC): ICD-10-CM

## 2022-09-07 DIAGNOSIS — I25.10 CORONARY ARTERY DISEASE INVOLVING NATIVE CORONARY ARTERY OF NATIVE HEART WITHOUT ANGINA PECTORIS: ICD-10-CM

## 2022-09-07 DIAGNOSIS — I10 PRIMARY HYPERTENSION: ICD-10-CM

## 2022-09-07 PROCEDURE — 99309 SBSQ NF CARE MODERATE MDM 30: CPT | Performed by: INTERNAL MEDICINE

## 2022-09-07 NOTE — DISCHARGE SUMMARY
[facility]   Discharge summary   Patient ID:  Safia Ames  [id number]  80 y.o.  9/20/1927    Admit date: 8/26/2022    Discharge date and time: 8/28/2022    Admission Diagnoses:   Patient Active Problem List   Diagnosis    Asthma    CAD (coronary artery disease)    Vitamin D deficiency    HTN (hypertension)    Idiopathic peripheral neuropathy    Rhinitis, allergic    GERD (gastroesophageal reflux disease)    PVD (peripheral vascular disease) with claudication (HCC)    Gait instability    Hyponatremia    DM (diabetes mellitus), type 2 ([facility]    History of pulmonary embolus (PE)    Peripheral vascular angioplasty status    Non-proliferative diabetic retinopathy, mild, both eyes (HCC)    Leg swelling    Compression fracture of L5 lumbar vertebra, closed, initial encounter ([facility]    HLD (hyperlipidemia)    Compression fracture of thoracic vertebra (HCC)    Acute anemia    Spinal stenosis    Acute heart failure (HCC)    GI bleed    Hemorrhagic shock (HCC)    Pneumonia    Wound of buttock    Urinary tract infection       Discharge Diagnoses: PNA    Consults: none    Procedures: none    Hospital Course: The patient is a 80 y.o. female of [facility]     80year-old female with a significant past medical history presents to the ED with abdominal and chest pain and is admitted to telemetry unit with     Pneumonia-?  -Supplement O2 demands keeping oxygen saturation greater than 92%. Currently on room air  -Rocephin/Zithromax  -DuoNebs as needed  -Monitor labs hemoglobin. Monitor electrolytes place as necessary  -Swallow study -past resume diet  -Strep/Legionella -pending     Coccyx wound  Monitor labs-WBC 14.6  Consult wound nurse  Dressing every shift  Wound culture pending     UTI  Rocephin 1 g every 24 hours  Await culture    No results for input(s): WBC, HGB, HCT, PLT in the last 72 hours. No results for input(s): NA, K, CL, CO2, BUN, CREATININE, GLU, CALCIUM in the last 72 hours.     No results found. Discharge Exam:    HEENT: NCAT,  PERRLA, No JVD  Heart:  RRR, no murmurs, gallops, or rubs. Lungs:  CTA bilaterally, no wheeze, rales or rhonchi  Abd: bowel sounds present, nontender, nondistended, no masses  Extrem:  No clubbing, cyanosis, or edema    Disposition: [facility]     Patient Condition at Discharge: stable    Patient Instructions:      Medication List        CHANGE how you take these medications      ferrous sulfate 325 (65 Fe) MG tablet  Commonly known as: IRON 325  Take 1 tablet by mouth daily  What changed:   when to take this  additional instructions            CONTINUE taking these medications      * acetaminophen 650 MG suppository  Commonly known as: TYLENOL     * acetaminophen 325 MG tablet  Commonly known as: TYLENOL     aluminum & magnesium hydroxide-simethicone 400-400-40 MG/5ML Susp  Commonly known as: MYLANTA     amLODIPine 10 MG tablet  Commonly known as: NORVASC  Take 1 tablet by mouth in the morning. Baclofen 5 MG tablet  Commonly known as: LIORESAL     bisacodyl 10 MG suppository  Commonly known as: DULCOLAX     dexamethasone 2 MG tablet  Commonly known as: DECADRON     docusate sodium 100 MG capsule  Commonly known as: COLACE     gabapentin 250 MG/5ML solution  Commonly known as: NEURONTIN     HYDROcodone-acetaminophen 5-325 MG per tablet  Commonly known as: NORCO     lidocaine 4 % external patch     melatonin 5 MG Tabs tablet     ondansetron 4 MG tablet  Commonly known as: ZOFRAN     pantoprazole sodium 40 MG Pack packet  Commonly known as: PROTONIX     polyethylene glycol 17 g packet  Commonly known as: GLYCOLAX     polyvinyl alcohol 1.4 % ophthalmic solution  Commonly known as: LIQUIFILM TEARS     senna 8.6 MG tablet  Commonly known as: SENOKOT     sodium chloride 0.65 % nasal spray  Commonly known as: OCEAN, BABY AYR     Soothe XP Soln           * This list has 2 medication(s) that are the same as other medications prescribed for you.  Read the directions carefully, and ask your doctor or other care provider to review them with you. STOP taking these medications      Alpha-Lipoic Acid 600 MG Tabs     apixaban 5 MG Tabs tablet  Commonly known as: ELIQUIS     aspirin 81 MG EC tablet     b complex vitamins capsule     ciclopirox 8 % solution  Commonly known as: PENLAC     clotrimazole-betamethasone 1-0.05 % cream  Commonly known as: LOTRISONE     CoQ10 200 MG Caps     diclofenac sodium 1 % Gel  Commonly known as: VOLTAREN     famotidine 10 MG tablet  Commonly known as: PEPCID     fluticasone-vilanterol 100-25 MCG/INH Aepb inhaler  Commonly known as: Breo Ellipta     JUAN MIGUEL ROOT PO     glimepiride 2 MG tablet  Commonly known as: AMARYL     guaiFENesin 100 MG/5ML syrup  Commonly known as: ROBITUSSIN     losartan 25 MG tablet  Commonly known as: COZAAR     magnesium gluconate 500 MG tablet  Commonly known as: MAGONATE     metFORMIN 500 MG tablet  Commonly known as: GLUCOPHAGE     OCUVITE ADULT FORMULA PO     oxyCODONE 5 MG immediate release tablet  Commonly known as: ROXICODONE     pravastatin 20 MG tablet  Commonly known as: PRAVACHOL     PRO-BIOTIC BLEND PO     promethazine 12.5 MG tablet  Commonly known as: PHENERGAN     PROVENTIL 90 MCG/ACT inhaler  Generic drug: albuterol     torsemide 10 MG tablet  Commonly known as: DEMADEX     traMADol 50 MG tablet  Commonly known as: ULTRAM            ASK your doctor about these medications      azithromycin 500 MG tablet  Commonly known as: Zithromax  Take 1 tablet by mouth daily for 3 days  Ask about: Should I take this medication?                Where to Get Your Medications        These medications were sent to [address] [place] [address]  [address]      Phone: [phone]   azithromycin 500 MG tablet  ferrous sulfate 325 (65 Fe) MG tablet       Activity: activity as tolerated  Diet: regular diet    Pt has been advised to:    Follow-up with Robert Tam DO in 1 week.   Follow-up with consultants as recommended by them    Note that over 30 minutes was spent in preparing discharge papers, discussing discharge with patient, medication review, etc.    Signed:  Tsering Juárez MD  8/28/2022

## 2022-09-14 ENCOUNTER — OUTSIDE SERVICES (OUTPATIENT)
Dept: PRIMARY CARE CLINIC | Age: 87
End: 2022-09-14
Payer: MEDICARE

## 2022-09-14 DIAGNOSIS — E11.9 TYPE 2 DIABETES MELLITUS WITHOUT COMPLICATION, UNSPECIFIED WHETHER LONG TERM INSULIN USE (HCC): ICD-10-CM

## 2022-09-14 DIAGNOSIS — I50.9 CHRONIC CONGESTIVE HEART FAILURE, UNSPECIFIED HEART FAILURE TYPE (HCC): Primary | ICD-10-CM

## 2022-09-14 DIAGNOSIS — I10 PRIMARY HYPERTENSION: ICD-10-CM

## 2022-09-14 DIAGNOSIS — I25.10 CORONARY ARTERY DISEASE INVOLVING NATIVE CORONARY ARTERY OF NATIVE HEART WITHOUT ANGINA PECTORIS: ICD-10-CM

## 2022-09-14 DIAGNOSIS — S31.829D WOUND OF LEFT BUTTOCK, SUBSEQUENT ENCOUNTER: ICD-10-CM

## 2022-09-14 PROCEDURE — 99309 SBSQ NF CARE MODERATE MDM 30: CPT | Performed by: INTERNAL MEDICINE

## 2022-09-21 ENCOUNTER — OUTSIDE SERVICES (OUTPATIENT)
Dept: PRIMARY CARE CLINIC | Age: 87
End: 2022-09-21
Payer: MEDICARE

## 2022-09-21 DIAGNOSIS — I50.9 CHRONIC CONGESTIVE HEART FAILURE, UNSPECIFIED HEART FAILURE TYPE (HCC): ICD-10-CM

## 2022-09-21 DIAGNOSIS — E11.9 TYPE 2 DIABETES MELLITUS WITHOUT COMPLICATION, UNSPECIFIED WHETHER LONG TERM INSULIN USE (HCC): ICD-10-CM

## 2022-09-21 DIAGNOSIS — I25.10 CORONARY ARTERY DISEASE INVOLVING NATIVE CORONARY ARTERY OF NATIVE HEART WITHOUT ANGINA PECTORIS: ICD-10-CM

## 2022-09-21 DIAGNOSIS — K92.2 ACUTE GI BLEEDING: Primary | ICD-10-CM

## 2022-09-21 DIAGNOSIS — I10 PRIMARY HYPERTENSION: ICD-10-CM

## 2022-09-21 PROCEDURE — 99309 SBSQ NF CARE MODERATE MDM 30: CPT | Performed by: INTERNAL MEDICINE

## 2022-09-21 ASSESSMENT — ENCOUNTER SYMPTOMS
VOICE CHANGE: 0
SINUS PRESSURE: 0
EYE ITCHING: 0
WHEEZING: 0
RHINORRHEA: 0
COUGH: 0
SORE THROAT: 0
EYE DISCHARGE: 0
EYE REDNESS: 0
SHORTNESS OF BREATH: 0
TROUBLE SWALLOWING: 0
SINUS PAIN: 0
GASTROINTESTINAL NEGATIVE: 1

## 2022-09-21 ASSESSMENT — VISUAL ACUITY: OU: 1

## 2022-09-21 NOTE — PROGRESS NOTES
Visit Date: 8/17/22  Grecia Mena  9/20/1927  female 80 y.o. Subjective:    CC: Patient presents with acute gi bleed. Patient presents for follow up of DM,HTN,CAD, and CHf. HPI:  Diabetes: Patient feeling well. Condition has been mostly well controlled since last visit. Taking medication as prescribed. No medication side effects noted. Trying to follow recommended diet. Present for awhile. Came on gradually. Rated as mild. Progressive. Condition has been mostly well controlled. Denies associated signs and symptoms. Diet: Somewhat compliant with diet plan. Hyperlipidemia: Patient feeling well. Condition has been mostly well controlled since last visit. Taking medication as prescribed. No medication side effects noted. Trying to follow recommended diet. Present for awhile. Came on gradually. Rated as mild. Progressive. Hypertension: Patient feeling well. Condition has been mostly well controlled since last visit. Taking medication as prescribed. No medication side effects noted. Trying to follow recommended diet. Present for awhile. Came on gradually. Rated as mild. Progressive. Congestive heart failure: Taking medication as prescribed. No medication side effects noted. Following recommended diet. Patient is exercising sporadically. Functional capacity is normal for age. Coronary artery disease: Condition has been mostly well controlled since last visit. Taking medication as prescribed. No medication side effects noted. Following recommended diet. Patient is exercising sporadically. Peripheral vascular disease: Patient feeling about the same compared to last visit. Condition has been improving since last visit. Taking antibiotics as prescribed. No medication side effects noted. Patient working to improve exercise routine. Experiencing no pain. Present for awhile. Progressive. Rated as mild. Patient unaware of any aggravating factors.     GI Problem  Primary symptoms do not include myalgias. The illness began more than 7 days ago (admitted to the hospital for gi bleed no recurrence h&h stable). The onset was sudden. The problem has been gradually improving. The illness does not include chills or anorexia. Skin Problem  This is a recurrent (decubitus ulcer) problem. The current episode started 1 to 4 weeks ago. The problem has been gradually improving since onset. She was exposed to nothing. Pertinent negatives include no anorexia, congestion, cough, rhinorrhea, shortness of breath or sore throat. Treatments tried: working with wound care. The treatment provided mild relief. ROS:  Review of Systems   Constitutional: Negative. Negative for chills. HENT:  Negative for congestion, ear discharge, ear pain, mouth sores, nosebleeds, postnasal drip, rhinorrhea, sinus pressure, sinus pain, sneezing, sore throat, trouble swallowing and voice change. Eyes:  Negative for discharge, redness, itching and visual disturbance. Denies diplopia, recent change in visual acuity, blurred vision, and night vision loss. Does not wear corrective lenses. Respiratory:  Negative for cough, shortness of breath and wheezing. Denies asthma, COPD, hemoptysis   Cardiovascular: Negative. Gastrointestinal: Negative. Negative for anorexia. Endocrine: Negative. Genitourinary:  Negative for difficulty urinating and urgency. Denies hesitancy, incomplete voiding, and polyuria   Musculoskeletal:  Negative for myalgias. Denies joint pain   Skin: Negative. Neurological: Negative. Hematological: Negative. Psychiatric/Behavioral:  Negative for confusion and suicidal ideas. The patient is not nervous/anxious.          Denies difficulty concentrating, depression, flight of ideas, slow thought processes, suicide attempts      Current Meds: Refer to nursing home record    PMH:    Medical Problems:reviewed and updated      Surgical Hx: reviewed and updated     FH: reviewed and updated     SH: reviewed and updated     Objective: Wt: 129.8 BP: 122/76 Pulse: 68 Resp: 18 T: 97.8F     Physical Exam:  Physical Exam  Vitals reviewed. Constitutional:       General: She is not in acute distress. Appearance: Normal appearance. She is normal weight. She is not ill-appearing or toxic-appearing. Comments: Appears appropriate for age   HENT:      Head: Normocephalic and atraumatic. Right Ear: Tympanic membrane and external ear normal.      Left Ear: Tympanic membrane and external ear normal.      Ears:      Comments: Middle ear well aerated. Nose: Nose normal.      Mouth/Throat:      Mouth: Mucous membranes are moist.      Dentition: Normal dentition. No gingival swelling or dental caries. Pharynx: Oropharynx is clear. Uvula midline. Comments: Gums appear healthy. No thrush no mucositis  Eyes:      General: Vision grossly intact. Right eye: No discharge. Left eye: No discharge. Extraocular Movements: Extraocular movements intact. Conjunctiva/sclera: Conjunctivae normal.      Pupils: Pupils are equal, round, and reactive to light. Comments: Fundoscopic exam is benign  Retinal vessels: Normal   Neck:      Vascular: No carotid bruit. Comments: Thyroid is normal in size. Carotids 2+ and equal bilaterally  Cardiovascular:      Rate and Rhythm: Normal rate and regular rhythm. Pulses: Normal pulses. Heart sounds: Normal heart sounds, S1 normal and S2 normal. No murmur heard. No friction rub. No gallop. Comments: Pedal pulses 2+ and equal bilaterally  Pulmonary:      Effort: Pulmonary effort is normal.      Breath sounds: Normal breath sounds. Abdominal:      General: Bowel sounds are normal. There is no distension. Palpations: Abdomen is soft. There is no mass. Tenderness: There is no abdominal tenderness. There is no guarding or rebound. Hernia: No hernia is present.       Comments: No rigidity Musculoskeletal:         General: Normal range of motion. Right shoulder: Normal.      Left shoulder: Normal.      Right upper arm: Normal.      Left upper arm: Normal.      Cervical back: Normal range of motion. Right hip: Normal.      Left hip: Normal.      Right upper leg: Normal.      Left upper leg: Normal.      Right lower leg: Normal.      Left lower leg: Normal.   Lymphadenopathy:      Comments: No palpable or visible regional lymphadenopathy   Skin:     General: Skin is warm and dry. Findings: No bruising, ecchymosis, lesion or rash. Neurological:      General: No focal deficit present. Mental Status: She is alert. Mental status is at baseline. Cranial Nerves: Cranial nerves are intact. No cranial nerve deficit. Deep Tendon Reflexes: Reflexes normal.   Psychiatric:         Mood and Affect: Mood and affect normal. Mood is not depressed. Behavior: Behavior normal. Behavior is not agitated. Thought Content: Thought content normal.         Judgment: Judgment normal.      Comments: No apparent anxiety        Assessment & Plan:  1. Acute GI bleeding  2. Chronic congestive heart failure, unspecified heart failure type ([address]  3. Type 2 diabetes mellitus without complication, unspecified whether long term insulin use ([address]  4. Primary hypertension  5. Coronary artery disease involving native coronary artery of native heart without angina pectoris     Chart reviewed   Medication reviewed   Monitor labs   Continue wound care   Continue current treatment   Will discuss dnr status with family     I, Renae Nolan MA  am scribing for Dr. Sarwat Swan.  [facility]   8/17/22    MAGDALENA Galaviz Jann Berlin, MD, personally performed the services described in this documentation as scribed by Renae Nolan and it is both accurate and complete

## 2022-09-26 PROBLEM — N39.0 URINARY TRACT INFECTION: Status: RESOLVED | Noted: 2022-08-27 | Resolved: 2022-09-26

## 2022-09-27 NOTE — DISCHARGE INSTRUCTIONS
Visit Discharge/Physician Orders    Discharge condition: Stable    Assessment of pain at discharge: Minimal    Anesthetic used: 4% topical lidocaine    Discharge to: [facility]    Left via:Private automobile    Accompanied by: accompanied by son    JANET/AYANNA: Jason [address]: [phone]    Dressing Orders: To right and left buttock wounds, cleanse with normal saline solution. Apply alginate Ag and cover with duoderm or mepilex dressing (do not cut mepilex). Change daily. Treatment Orders:    CBC and CMP ordered, to be done by home healthcare    KEEP PRESSURE OFF OF WOUNDS    FOLLOW NUTRITIOUS DIET. CHOOSE FOODS HIGH IN PROTEIN -CHICKEN- FISH-AND EGGS,  CHOOSE FOODS HIGH IN VITAMIN C.   MULTIVITAMIN DAILY. [facility] followup visit _______1 week______________________  (Please note your next appointment above and if you are unable to keep, kindly give a 24 hour notice. Thank you.)    Physician signature:__________________________      If you experience any of the following, please call the [facility] during business hours:    * Increase in Pain  * Temperature over 101  * Increase in drainage from your wound  * Drainage with a foul odor  * Bleeding  * Increase in swelling  * Need for compression bandage changes due to slippage, breakthrough drainage. If you need medical attention outside of the business hours of the [facility] please contact your PCP or go to the nearest emergency room.

## 2022-10-03 ENCOUNTER — HOSPITAL ENCOUNTER (OUTPATIENT)
Dept: WOUND CARE | Age: 87
Discharge: HOME | End: 2022-10-03
Payer: MEDICARE

## 2022-10-03 VITALS
SYSTOLIC BLOOD PRESSURE: 120 MMHG | BODY MASS INDEX: 21.97 KG/M2 | HEIGHT: 63 IN | WEIGHT: 124 LBS | DIASTOLIC BLOOD PRESSURE: 84 MMHG | RESPIRATION RATE: 18 BRPM | TEMPERATURE: 96.5 F | HEART RATE: 70 BPM

## 2022-10-03 DIAGNOSIS — Z98.62 PERIPHERAL VASCULAR ANGIOPLASTY STATUS: ICD-10-CM

## 2022-10-03 DIAGNOSIS — S31.819A WOUND OF RIGHT BUTTOCK, INITIAL ENCOUNTER: ICD-10-CM

## 2022-10-03 DIAGNOSIS — Z87.19 HISTORY OF GI BLEED: ICD-10-CM

## 2022-10-03 DIAGNOSIS — S31.829A BUTTOCK WOUND, LEFT, INITIAL ENCOUNTER: ICD-10-CM

## 2022-10-03 DIAGNOSIS — I73.9 PVD (PERIPHERAL VASCULAR DISEASE) WITH CLAUDICATION (HCC): Primary | ICD-10-CM

## 2022-10-03 PROBLEM — I50.9 ACUTE HEART FAILURE (HCC): Status: RESOLVED | Noted: 2022-07-18 | Resolved: 2022-10-03

## 2022-10-03 PROBLEM — J18.9 PNEUMONIA: Status: RESOLVED | Noted: 2022-01-01 | Resolved: 2022-01-01

## 2022-10-03 PROBLEM — K92.2 GI BLEED: Status: RESOLVED | Noted: 2022-07-30 | Resolved: 2022-10-03

## 2022-10-03 PROBLEM — R57.8 HEMORRHAGIC SHOCK (HCC): Status: RESOLVED | Noted: 2022-07-30 | Resolved: 2022-10-03

## 2022-10-03 PROBLEM — S31.809A WOUND OF BUTTOCK: Status: RESOLVED | Noted: 2022-08-27 | Resolved: 2022-10-03

## 2022-10-03 PROCEDURE — 99215 OFFICE O/P EST HI 40 MIN: CPT | Performed by: SURGERY

## 2022-10-03 PROCEDURE — 99213 OFFICE O/P EST LOW 20 MIN: CPT

## 2022-10-03 PROCEDURE — 11042 DBRDMT SUBQ TIS 1ST 20SQCM/<: CPT | Performed by: SURGERY

## 2022-10-03 PROCEDURE — 87070 CULTURE OTHR SPECIMN AEROBIC: CPT

## 2022-10-03 PROCEDURE — 87186 SC STD MICRODIL/AGAR DIL: CPT

## 2022-10-03 PROCEDURE — 87075 CULTR BACTERIA EXCEPT BLOOD: CPT

## 2022-10-03 PROCEDURE — 87077 CULTURE AEROBIC IDENTIFY: CPT

## 2022-10-03 PROCEDURE — 11042 DBRDMT SUBQ TIS 1ST 20SQCM/<: CPT

## 2022-10-03 RX ORDER — GENTAMICIN SULFATE 1 MG/G
OINTMENT TOPICAL ONCE
Status: CANCELLED | OUTPATIENT
Start: 2022-10-03 | End: 2022-10-03

## 2022-10-03 RX ORDER — BACITRACIN, NEOMYCIN, POLYMYXIN B 400; 3.5; 5 [USP'U]/G; MG/G; [USP'U]/G
OINTMENT TOPICAL ONCE
Status: CANCELLED | OUTPATIENT
Start: 2022-10-03 | End: 2022-10-03

## 2022-10-03 RX ORDER — GINSENG 100 MG
CAPSULE ORAL ONCE
Status: CANCELLED | OUTPATIENT
Start: 2022-10-03 | End: 2022-10-03

## 2022-10-03 RX ORDER — CLOBETASOL PROPIONATE 0.5 MG/G
OINTMENT TOPICAL ONCE
Status: CANCELLED | OUTPATIENT
Start: 2022-10-03 | End: 2022-10-03

## 2022-10-03 RX ORDER — LIDOCAINE HYDROCHLORIDE 20 MG/ML
JELLY TOPICAL ONCE
Status: CANCELLED | OUTPATIENT
Start: 2022-10-03 | End: 2022-10-03

## 2022-10-03 RX ORDER — LIDOCAINE 40 MG/G
CREAM TOPICAL ONCE
Status: CANCELLED | OUTPATIENT
Start: 2022-10-03 | End: 2022-10-03

## 2022-10-03 RX ORDER — LIDOCAINE HYDROCHLORIDE 40 MG/ML
SOLUTION TOPICAL ONCE
Status: CANCELLED | OUTPATIENT
Start: 2022-10-03 | End: 2022-10-03

## 2022-10-03 RX ORDER — BACITRACIN ZINC AND POLYMYXIN B SULFATE 500; 1000 [USP'U]/G; [USP'U]/G
OINTMENT TOPICAL ONCE
Status: CANCELLED | OUTPATIENT
Start: 2022-10-03 | End: 2022-10-03

## 2022-10-03 RX ORDER — BETAMETHASONE DIPROPIONATE 0.05 %
OINTMENT (GRAM) TOPICAL ONCE
Status: CANCELLED | OUTPATIENT
Start: 2022-10-03 | End: 2022-10-03

## 2022-10-03 RX ORDER — LIDOCAINE 50 MG/G
OINTMENT TOPICAL ONCE
Status: CANCELLED | OUTPATIENT
Start: 2022-10-03 | End: 2022-10-03

## 2022-10-03 NOTE — PROGRESS NOTES
Wound Healing Center /Hyperbarics   History and Physical/Consultation  Vascular    Referring Physician : Cathy Cintron DO  Amy Guillen  MEDICAL RECORD NUMBER:  [id number]  AGE: 80 y.o. GENDER: female  : 1927  EPISODE DATE:  10/3/2022  Subjective:     Chief Complaint   Patient presents with    Wound Check     Right buttock          HISTORY of PRESENT ILLNESS EWA Guillen is a 80 y.o. female who presents today for wound/ulcer evaluation. History of Wound Context:  The patient has had a wounds of both buttocks, which was first noted approximately several weeks. This has been treated by the patient in the facility. On their initial visit to the wound healing center, 10/03/22, the patient has noted that the wound has not been improving. The patient has not had similar previous wounds in the past.      It is noted, patient lives by herself at the assisted care area and does not appear to be ambulatory tells me that she can walk short distances slowly around the facility with the help of a walker with 4 wheels    She tells me she is trying to sleep on the sides and avoid sleeping on the back to avoid bedsores    Pt is currently not on abx.       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    If patient has diabetic lower extremity wounds  Stearns Classification of diabetic lower extremity wounds:    Grade Description   []  0 No open wound   []  1 Superficial ulcer involving the full skin thickness   []  2 Deep ulcer involves ligament, tendon, joint capsule, or fascia  No bone involvement or abscess presence   []  3 Deep Ulcer with abcess formation and/or osteomyelitis   []  4 Localized gangrene   []  5 Extensive gangrene of the foot     Wound: Patient does have decubitus ulcers, right buttock more than the left buttock with some exudate    Other pertinent information:  Recent lab work was reviewed, does have elevated blood glucose levels, normal creatinine and BUN  Last ankle-brachial index was reviewed, as well as last angioplasty done by Dr. Jami Gomez      10/3/2022  The patient was explained, the etiology, of pressure necrosis, the importance of offloading the area, informed her that she needs to sleep on the sides only and also avoid sitting for prolonged time and sit only for the minimal amount of time, all the importance of improved nutrition from protein supplements and multivitamin supplements were discussed      PAST MEDICAL HISTORY      Diagnosis Date    Arthritis     Asthma     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 ([address] 5/18/2021    Bronchitis 5/23/2017    Bruising tendency     Buttock wound, left, initial encounter 10/3/2022    CAD (coronary artery disease)     Cough 5/23/2017    COVID     Dermatophytosis 6/25/2021    Diabetes mellitus ([address]     GERD (gastroesophageal reflux disease) 5/23/2017    History of GI bleed 10/3/2022    History of pulmonary embolus (PE) 5/29/2021    Hx of blood clots     Hyperlipidemia     Hypertension     Leg swelling 7/15/2021    Lung disease     Peripheral vascular angioplasty status 5/29/2021    Pseudoaneurysm of right femoral artery ([address] 5/29/2021    PVD (peripheral vascular disease) with claudication ([address] 4/10/2019    Restless legs syndrome     Rhinitis, allergic 5/23/2017    Sinusitis 5/23/2017    SOB (shortness of breath) 5/23/2017    Type 1 diabetes mellitus with left diabetic foot ulcer ([address] 5/18/2021    Ulcerated, foot, left, limited to breakdown of skin ([address] 6/25/2021    Urinary incontinence     Wound of right buttock 10/3/2022    Fat layer     Past Surgical History:   Procedure Laterality Date    ABDOMEN SURGERY      APPENDECTOMY      CARDIAC SURGERY      CHOLECYSTECTOMY      COLONOSCOPY      CORONARY ARTERY BYPASS GRAFT      8/28/10    ENDOSCOPY, COLON, DIAGNOSTIC      FOOT DEBRIDEMENT Left 5/16/2021    FOOT DEBRIDEMENT INCISION AND DRAINAGE. performed by Vaishali Mckeon DPM at [address] Left 5/21/2021    LEFT FOOT DEBRIDEMENT  WITH DELAYED PRIMARY CLOSURE performed by Kaushik Carbone DPM at [address] ([facility])      frankie and bso 1997    TONSILLECTOMY AND ADENOIDECTOMY      UPPER GASTROINTESTINAL ENDOSCOPY N/A 7/18/2022    EGD ESOPHAGOGASTRODUODENOSCOPY performed by Jaja Pérez MD at [facility] ENDOSCOPY     Family History   Problem Relation Age of Onset    Hypertension Father     Heart Disease Brother      Social History     Tobacco Use    Smoking status: Never    Smokeless tobacco: Never   Vaping Use    Vaping Use: Never used   Substance Use Topics    Alcohol use: Yes     Comment: occasional    Drug use: No     Allergies   Allergen Reactions    Ativan [Lorazepam] Hallucinations     Family reports adverse reaction to benzodiazepines.  Hallucination, restlessness    Lisinopril Other (See Comments)     7/18/19 Pt states that she does not want to take LISINOPRIL     Current Outpatient Medications on File Prior to Encounter   Medication Sig Dispense Refill    ferrous sulfate (IRON 325) 325 (65 Fe) MG tablet Take 1 tablet by mouth daily 30 tablet 3    acetaminophen (TYLENOL) 650 MG suppository Place 650 mg rectally every 4 hours as needed for Fever      Baclofen (LIORESAL) 5 MG tablet Take 5 mg by mouth as needed (muscle spasms, hiccups)      docusate sodium (COLACE) 100 MG capsule Take 100 mg by mouth daily as needed for Constipation      dexamethasone (DECADRON) 2 MG tablet Take 2 mg by mouth daily as needed (itching) melatonin 5 MG TABS tablet Take 5 mg by mouth nightly      HYDROcodone-acetaminophen (NORCO) 5-325 MG per tablet Take 1 tablet by mouth every 6 hours as needed for Pain.      pantoprazole sodium (PROTONIX) 40 MG PACK packet Take 40 mg by mouth every morning (before breakfast)      acetaminophen (TYLENOL) 325 MG tablet Take 650 mg by mouth every 4 hours as needed for Fever      Artificial Tear Solution (SOOTHE XP) SOLN Apply 1 drop to eye 2 times daily      amLODIPine (NORVASC) 10 MG tablet Take 1 tablet by mouth in the morning. 30 tablet 3    [DISCONTINUED] losartan (COZAAR) 25 MG tablet Take 1 tablet by mouth in the morning. (Patient taking differently: Take 25 mg by mouth daily Hold for SBP <100) 30 tablet 3    [DISCONTINUED] torsemide (DEMADEX) 10 MG tablet Take 1 tablet by mouth in the morning. (Patient not taking: Reported on 8/27/2022) 30 tablet 3    [DISCONTINUED] apixaban (ELIQUIS) 5 MG TABS tablet Take 0.5 tablets by mouth in the morning and 0.5 tablets before bedtime.  (Patient not taking: Reported on 8/27/2022) 60 tablet 0    polyethylene glycol (GLYCOLAX) 17 g packet Take 17 g by mouth daily as needed for Constipation      senna (SENOKOT) 8.6 MG tablet Take 1 tablet by mouth daily as needed for Constipation      polyvinyl alcohol (LIQUIFILM TEARS) 1.4 % ophthalmic solution Place 1 drop into both eyes 2 times daily as needed      [DISCONTINUED] metFORMIN (GLUCOPHAGE) 500 MG tablet Take 500 mg by mouth daily (Patient not taking: Reported on 8/27/2022)      [DISCONTINUED] famotidine (PEPCID) 10 MG tablet Take 10 mg by mouth daily  (Patient not taking: Reported on 7/18/2022)      [DISCONTINUED] Ginger, Zingiber officinalis, (GINGER ROOT PO) Take 750 mg by mouth Daily in am (Patient not taking: Reported on 8/27/2022)      [DISCONTINUED] b complex vitamins capsule Take 1 capsule by mouth daily In the morning for supplement (Patient not taking: Reported on 8/27/2022)      [DISCONTINUED] aspirin 81 MG EC tablet Take 1 tablet by mouth daily 30 tablet 0    aluminum & magnesium hydroxide-simethicone (MYLANTA) 400-400-40 MG/5ML SUSP Take 30 mLs by mouth every 6 hours as needed      lidocaine 4 % external patch Apply topically daily as needed for Pain Apply topically as needed to painful muscles      [DISCONTINUED] diclofenac sodium (VOLTAREN) 1 % GEL Apply topically 4 times daily as needed for Pain (Patient not taking: Reported on 8/27/2022)      bisacodyl (DULCOLAX) 10 MG suppository Place 10 mg rectally daily as needed for Constipation Indications: IF NO RESULTS FROM MOM  (Patient not taking: Reported on 10/3/2022)      gabapentin (NEURONTIN) 250 MG/5ML solution Take 300 mg by mouth every evening. [DISCONTINUED] albuterol (PROVENTIL) 90 MCG/ACT inhaler Inhale 2 puffs into the lungs 4 times daily (Patient not taking: No sig reported)  0    [DISCONTINUED] pravastatin (PRAVACHOL) 20 MG tablet TAKE 1 TABLET BY MOUTH  NIGHTLY (Patient not taking: Reported on 8/27/2022) 90 tablet 1    [DISCONTINUED] fluticasone-vilanterol (BREO ELLIPTA) 100-25 MCG/INH AEPB inhaler Inhale 1 puff into the lungs daily (Patient not taking: Reported on 8/27/2022) 3 each 5    [DISCONTINUED] magnesium gluconate (MAGONATE) 500 MG tablet Take 200 mg by mouth 2 times daily  (Patient not taking: Reported on 8/27/2022)       No current facility-administered medications on file prior to encounter.        REVIEW OF SYSTEMS   ROS : All others Negative if blank [], Positive if [x]  General Urinary   [] Fevers [] Hematuria   [] Chills [] Dysuria   [] Weight Loss Vascular   Skin [] Claudication   [x] Tissue Loss [] Rest Pain   Eyes Neurologic   [] Wears Glasses/Contacts [] Stroke/TIA   [] Vision Changes [] Focal weakness   Respiratory [] Slurred Speech    [] Shortness of breath ENT   Cardiovascular [] Difficulty swallowing   [] Chest Pain Gastrointestinal   [] Shortness of breath with exertion [] Abdominal Pain   Patient has nonhealing decubitus ulcers right buttock more than the left buttock close to the midline, with a fat layer exposed, with exudate from most comfortable cultures were done, history of diabetes mellitus, coronary artery disease, asthma, GERD, hypertension, hyperlipidemia, history of peripheral vascular disease status post angioplasty of the left lower extremity by Dr. Donato Libman in the past [] Melena       [] Hematochezia               Objective:    /84   Pulse 70   Temp (!) 96.5 °F (35.8 °C) (Temporal)   Resp 18   Ht 5' 3\" (1.6 m)   Wt 124 lb (56.2 kg)   LMP 12/03/1997   BMI 21.97 kg/m²   Wt Readings from Last 3 Encounters:   10/03/22 124 lb (56.2 kg)   08/26/22 116 lb (52.6 kg)   07/30/22 126 lb 1.7 oz (57.2 kg)       PHYSICAL EXAM  CONSTITUTIONAL:   Awake, alert, cooperative  PSYCHIATRIC :  Oriented to time, place and person      normal insight to disease process  ENT:  External ears and nose without lesions    Hearing deficits is not noted  NECK: Supple, symmetrical, trachea midline    Thyroid goiter not appreciated    Carotid bruit is not noted bilaterally  LUNGS:  No increased work of breathing                  Clear to auscultation bilaterally   CARDIOVASCULAR:  regular rate and rhythm   ABDOMEN:  soft, non-distended, non-tender    Hernias is not noted   Aorta is not palpable   Lymphatics : Cervical lymphadenopathy is not noted     Femoral lymphadenopathy is not noted  SKIN:   Skin color is normal   Texture and turgor is  normal   Induration is  noted  EXTREMITIES:   R UE Edema is not noted  L UE Edema is not noted  R LE Edema is not noted  L LE Edema is not noted  R femoral 2-3 L femoral 2   R dorsalis pedis 0 L dorsalis pedis 0   R posterior tibial 0 L posterior tibial 0     Assessment:     Problem List Items Addressed This Visit          Medium    Buttock wound, left, initial encounter    Relevant Orders    CBC    Comprehensive Metabolic Panel    History of GI bleed    Wound of right buttock    Relevant Orders    CBC Comprehensive Metabolic Panel       Unprioritized    PVD (peripheral vascular disease) with claudication ([address] - Primary     Procedure Note  Indications:  Based on my examination of this patient's wound(s)/ulcer(s) today, debridement is required to promote healing and evaluate the wound base. Performed by: Matt Calix MD    Consent obtained:  Yes    Time out taken:  Yes    Pain Control:       Debridement:Excisional Debridement    Using curette the wound(s)/ulcer(s) was/were sharply debrided down through and including the removal of epidermis, dermis, and subcutaneous tissue. Devitalized Tissue Debrided:  fibrin and slough    Pre Debridement Measurements:  Are located in the [place]  Documentation Flow Sheet    Wound/Ulcer #: 1 and 2    Post Debridement Measurements:  Wound/Ulcer Descriptions are Pre Debridement except measurements:    Wound 10/03/22 Buttocks Right #1 (Active)   Wound Image   10/03/22 1417   Wound Length (cm) 5.6 cm 10/03/22 1417   Wound Width (cm) 2.5 cm 10/03/22 1417   Wound Depth (cm) 0.3 cm 10/03/22 1417   Wound Surface Area (cm^2) 14 cm^2 10/03/22 1417   Wound Volume (cm^3) 4.2 cm^3 10/03/22 1417   Post-Procedure Length (cm) 5.7 cm 10/03/22 1449   Post-Procedure Width (cm) 2.6 cm 10/03/22 1449   Post-Procedure Depth (cm) 0.4 cm 10/03/22 1449   Post-Procedure Surface Area (cm^2) 14.82 cm^2 10/03/22 1449   Post-Procedure Volume (cm^3) 5.928 cm^3 10/03/22 1449   Tunneling Position ___ [employer] 10/03/22 1449   Wound Assessment Fibrin 10/03/22 1417   Drainage Amount Moderate 10/03/22 1417   Drainage Description Thin;Serosanguinous; Yellow 10/03/22 1417   Odor None 10/03/22 1417   Ira-wound Assessment Fragile; Intact 10/03/22 1417   Margins Attached edges 10/03/22 1417   Wound Thickness Description not for Pressure Injury Full thickness 10/03/22 1417   Number of days: 0       Wound 10/03/22 Buttocks Left #2 (Active)   Wound Image   10/03/22 6700   Wound Length (cm) 2.2 cm 10/03/22 1417   Wound Width (cm) 1.2 cm 10/03/22 1417   Wound Depth (cm) 0.1 cm 10/03/22 1417   Wound Surface Area (cm^2) 2.64 cm^2 10/03/22 1417   Wound Volume (cm^3) 0.264 cm^3 10/03/22 1417   Post-Procedure Length (cm) 2.3 cm 10/03/22 1449   Post-Procedure Width (cm) 1.3 cm 10/03/22 1449   Post-Procedure Depth (cm) 0.2 cm 10/03/22 1449   Post-Procedure Surface Area (cm^2) 2.99 cm^2 10/03/22 1449   Post-Procedure Volume (cm^3) 0.598 cm^3 10/03/22 1449   Wound Assessment Fibrin;Pink/red 10/03/22 1417   Drainage Amount Moderate 10/03/22 1417   Drainage Description Thin;Serosanguinous; Yellow 10/03/22 1417   Odor None 10/03/22 1417   Ira-wound Assessment Intact;Fragile 10/03/22 1417   Margins Attached edges 10/03/22 1417   Wound Thickness Description not for Pressure Injury Full thickness 10/03/22 1417   Number of days: 0       Percent of Wound/Ulcer Debrided: 80%    Total Surface Area Debrided:  15 sq cm     Estimated Blood Loss:  Minimal    Hemostasis Achieved:  by pressure    Procedural Pain:  4  / 10     Post Procedural Pain:  3 / 10     Response to treatment:  Well tolerated by patient. A culture was done. Plan:     Pt is not a smoker     In my professional opinion and based off the information that is available at this time this patient has appropriate indication for HBO Therapy: No    Treatment Note please see attached Discharge Instructions    Written patient dismissal instructions given to patient and signed by patient or POA. Discharge Instructions         Visit Discharge/Physician Orders    Discharge condition: Stable    Assessment of pain at discharge: Minimal    Anesthetic used: 4% topical lidocaine    Discharge to: [facility]    Left via:Private automobile    Accompanied by: accompanied by son    JANET/AYANNA: [address]: [phone]    Dressing Orders: To right and left buttock wounds, cleanse with normal saline solution.  Apply alginate Ag and cover with duoderm or mepilex dressing (do not cut mepilex). Change daily. Treatment Orders:    CBC and CMP ordered, to be done by home healthcare    KEEP PRESSURE OFF OF WOUNDS    FOLLOW NUTRITIOUS DIET. CHOOSE FOODS HIGH IN PROTEIN -CHICKEN- FISH-AND EGGS,  CHOOSE FOODS HIGH IN VITAMIN C.   MULTIVITAMIN DAILY. [facility] followup visit _______1 week______________________  (Please note your next appointment above and if you are unable to keep, kindly give a 24 hour notice. Thank you.)    Physician signature:__________________________      If you experience any of the following, please call the [facility] during business hours:    * Increase in Pain  * Temperature over 101  * Increase in drainage from your wound  * Drainage with a foul odor  * Bleeding  * Increase in swelling  * Need for compression bandage changes due to slippage, breakthrough drainage. If you need medical attention outside of the business hours of the [facility] [address] please contact your PCP or go to the nearest emergency room.          Electronically signed by Ellyn Staley MD on 10/3/2022 at 3:06 PM

## 2022-10-04 ENCOUNTER — OUTSIDE SERVICES (OUTPATIENT)
Dept: PRIMARY CARE CLINIC | Age: 87
End: 2022-10-04

## 2022-10-04 DIAGNOSIS — I25.10 CORONARY ARTERY DISEASE INVOLVING NATIVE CORONARY ARTERY OF NATIVE HEART WITHOUT ANGINA PECTORIS: ICD-10-CM

## 2022-10-04 DIAGNOSIS — K92.2 ACUTE GI BLEEDING: Primary | ICD-10-CM

## 2022-10-04 DIAGNOSIS — E11.9 TYPE 2 DIABETES MELLITUS WITHOUT COMPLICATION, UNSPECIFIED WHETHER LONG TERM INSULIN USE (HCC): ICD-10-CM

## 2022-10-04 DIAGNOSIS — I10 PRIMARY HYPERTENSION: ICD-10-CM

## 2022-10-04 DIAGNOSIS — I50.9 CHRONIC CONGESTIVE HEART FAILURE, UNSPECIFIED HEART FAILURE TYPE (HCC): ICD-10-CM

## 2022-10-04 ASSESSMENT — ENCOUNTER SYMPTOMS
SINUS PAIN: 0
COUGH: 0
SORE THROAT: 0
WHEEZING: 0
EYE DISCHARGE: 0
RHINORRHEA: 0
VOICE CHANGE: 0
SHORTNESS OF BREATH: 0
GASTROINTESTINAL NEGATIVE: 1
TROUBLE SWALLOWING: 0
EYE REDNESS: 0
EYE ITCHING: 0
SINUS PRESSURE: 0

## 2022-10-04 ASSESSMENT — VISUAL ACUITY: OU: 1

## 2022-10-04 NOTE — PROGRESS NOTES
Visit Date: 8/24/22  Marco Lang  9/20/1927  female 80 y.o. Subjective:    CC: Patient presents with acute gi bleed. Patient presents for follow up of DM,HTN,CAD, and CHf. HPI:  Diabetes: Patient feeling well. Condition has been mostly well controlled since last visit. Taking medication as prescribed. No medication side effects noted. Trying to follow recommended diet. Present for awhile. Came on gradually. Rated as mild. Progressive. Condition has been mostly well controlled. Denies associated signs and symptoms. Diet: Somewhat compliant with diet plan. Hyperlipidemia: Patient feeling well. Condition has been mostly well controlled since last visit. Taking medication as prescribed. No medication side effects noted. Trying to follow recommended diet. Present for awhile. Came on gradually. Rated as mild. Progressive. Hypertension: Patient feeling well. Condition has been mostly well controlled since last visit. Taking medication as prescribed. No medication side effects noted. Trying to follow recommended diet. Present for awhile. Came on gradually. Rated as mild. Progressive. Congestive heart failure: Taking medication as prescribed. No medication side effects noted. Following recommended diet. Patient is exercising sporadically. Functional capacity is normal for age. Coronary artery disease: Condition has been mostly well controlled since last visit. Taking medication as prescribed. No medication side effects noted. Following recommended diet. Patient is exercising sporadically. Peripheral vascular disease: Patient feeling about the same compared to last visit. Condition has been improving since last visit. Taking antibiotics as prescribed. No medication side effects noted. Patient working to improve exercise routine. Experiencing no pain. Present for awhile. Progressive. Rated as mild. Patient unaware of any aggravating factors.     GI Problem  Primary symptoms do not include updated     SH: reviewed and updated     Objective: Wt: 129.8 BP: 124/77 Pulse: 78 Resp: 18 T: 97.6F     Physical Exam:  Physical Exam  Vitals reviewed. Constitutional:       General: She is not in acute distress. Appearance: Normal appearance. She is normal weight. She is not ill-appearing or toxic-appearing. Comments: Appears appropriate for age   HENT:      Head: Normocephalic and atraumatic. Right Ear: Tympanic membrane and external ear normal.      Left Ear: Tympanic membrane and external ear normal.      Ears:      Comments: Middle ear well aerated. Nose: Nose normal.      Mouth/Throat:      Mouth: Mucous membranes are moist.      Dentition: Normal dentition. No gingival swelling or dental caries. Pharynx: Oropharynx is clear. Uvula midline. Comments: Gums appear healthy. No thrush no mucositis  Eyes:      General: Vision grossly intact. Right eye: No discharge. Left eye: No discharge. Extraocular Movements: Extraocular movements intact. Conjunctiva/sclera: Conjunctivae normal.      Pupils: Pupils are equal, round, and reactive to light. Comments: Fundoscopic exam is benign  Retinal vessels: Normal   Neck:      Vascular: No carotid bruit. Comments: Thyroid is normal in size. Carotids 2+ and equal bilaterally  Cardiovascular:      Rate and Rhythm: Normal rate and regular rhythm. Pulses: Normal pulses. Heart sounds: Normal heart sounds, S1 normal and S2 normal. No murmur heard. No friction rub. No gallop. Comments: Pedal pulses 2+ and equal bilaterally  Pulmonary:      Effort: Pulmonary effort is normal.      Breath sounds: Normal breath sounds. Abdominal:      General: Bowel sounds are normal. There is no distension. Palpations: Abdomen is soft. There is no mass. Tenderness: There is no abdominal tenderness. There is no guarding or rebound. Hernia: No hernia is present.       Comments: No rigidity Musculoskeletal:         General: Normal range of motion. Right shoulder: Normal.      Left shoulder: Normal.      Right upper arm: Normal.      Left upper arm: Normal.      Cervical back: Normal range of motion. Right hip: Normal.      Left hip: Normal.      Right upper leg: Normal.      Left upper leg: Normal.      Right lower leg: Normal.      Left lower leg: Normal.   Lymphadenopathy:      Comments: No palpable or visible regional lymphadenopathy   Skin:     General: Skin is warm and dry. Findings: No bruising, ecchymosis, lesion or rash. Neurological:      General: No focal deficit present. Mental Status: She is alert. Mental status is at baseline. Cranial Nerves: No cranial nerve deficit. Deep Tendon Reflexes: Reflexes normal.   Psychiatric:         Mood and Affect: Mood and affect normal. Mood is not depressed. Behavior: Behavior normal. Behavior is not agitated. Thought Content: Thought content normal.         Judgment: Judgment normal.      Comments: No apparent anxiety        Assessment & Plan:  1. Acute GI bleeding  2. Chronic congestive heart failure, unspecified heart failure type ([facility]  3. Type 2 diabetes mellitus without complication, unspecified whether long term insulin use ([facility]  4. Primary hypertension  5. Coronary artery disease involving native coronary artery of native heart without angina pectoris     Chart reviewed   Medication reviewed   Monitor labs   Continue wound care   Continue current treatment   Will discuss dnr status with family     I, Adelaida Colbert MA  am scribing for Dr. Gareth Foster.  Graham Nunez, [address] [place] [address] NaplesRosalia Ortiz MD, personally performed the services described in this documentation as scribed by Adelaida Colbert and it is both accurate and complete

## 2022-10-06 ENCOUNTER — OUTSIDE SERVICES (OUTPATIENT)
Dept: PRIMARY CARE CLINIC | Age: 87
End: 2022-10-06
Payer: MEDICARE

## 2022-10-06 DIAGNOSIS — I25.10 CORONARY ARTERY DISEASE INVOLVING NATIVE CORONARY ARTERY OF NATIVE HEART WITHOUT ANGINA PECTORIS: ICD-10-CM

## 2022-10-06 DIAGNOSIS — K92.2 ACUTE GI BLEEDING: Primary | ICD-10-CM

## 2022-10-06 DIAGNOSIS — I50.9 CHRONIC CONGESTIVE HEART FAILURE, UNSPECIFIED HEART FAILURE TYPE (HCC): ICD-10-CM

## 2022-10-06 DIAGNOSIS — S31.819D WOUND OF RIGHT BUTTOCK, SUBSEQUENT ENCOUNTER: ICD-10-CM

## 2022-10-06 DIAGNOSIS — E11.9 TYPE 2 DIABETES MELLITUS WITHOUT COMPLICATION, UNSPECIFIED WHETHER LONG TERM INSULIN USE (HCC): ICD-10-CM

## 2022-10-06 DIAGNOSIS — I10 PRIMARY HYPERTENSION: ICD-10-CM

## 2022-10-06 DIAGNOSIS — R53.1 WEAKNESS: ICD-10-CM

## 2022-10-06 LAB — ANAEROBIC CULTURE: NORMAL

## 2022-10-06 ASSESSMENT — ENCOUNTER SYMPTOMS
TROUBLE SWALLOWING: 0
GASTROINTESTINAL NEGATIVE: 1
SORE THROAT: 0
RHINORRHEA: 0
COUGH: 0
VOICE CHANGE: 0
EYE DISCHARGE: 0
SINUS PRESSURE: 0
SHORTNESS OF BREATH: 0
SINUS PAIN: 0
WHEEZING: 0
EYE REDNESS: 0
EYE ITCHING: 0

## 2022-10-06 ASSESSMENT — VISUAL ACUITY: OU: 1

## 2022-10-06 NOTE — PROGRESS NOTES
Visit Date: 8/31/22  Milagro Reyes  9/20/1927  female 80 y.o. Subjective:    CC: Patient presents with acute gi bleed. Patient presents for follow up of DM,HTN,CAD, and CHf. HPI:  Diabetes: Patient feeling well. Condition has been mostly well controlled since last visit. Taking medication as prescribed. No medication side effects noted. Trying to follow recommended diet. Present for awhile. Came on gradually. Rated as mild. Progressive. Condition has been mostly well controlled. Denies associated signs and symptoms. Diet: Somewhat compliant with diet plan. Hyperlipidemia: Patient feeling well. Condition has been mostly well controlled since last visit. Taking medication as prescribed. No medication side effects noted. Trying to follow recommended diet. Present for awhile. Came on gradually. Rated as mild. Progressive. Hypertension: Patient feeling well. Condition has been mostly well controlled since last visit. Taking medication as prescribed. No medication side effects noted. Trying to follow recommended diet. Present for awhile. Came on gradually. Rated as mild. Progressive. Congestive heart failure: Taking medication as prescribed. No medication side effects noted. Following recommended diet. Patient is exercising sporadically. Functional capacity is normal for age. Coronary artery disease: Condition has been mostly well controlled since last visit. Taking medication as prescribed. No medication side effects noted. Following recommended diet. Patient is exercising sporadically. Peripheral vascular disease: Patient feeling about the same compared to last visit. Condition has been improving since last visit. Taking antibiotics as prescribed. No medication side effects noted. Patient working to improve exercise routine. Experiencing no pain. Present for awhile. Progressive. Rated as mild. Patient unaware of any aggravating factors.     GI Problem  Primary symptoms do not include myalgias. The illness began more than 7 days ago (admitted to the hospital for gi bleed no recurrence h&h stable). The onset was sudden. The problem has been gradually improving. The illness does not include chills or anorexia. Skin Problem  This is a recurrent (decubitus ulcer) problem. The current episode started 1 to 4 weeks ago. The problem has been gradually improving since onset. She was exposed to nothing. Pertinent negatives include no anorexia, congestion, cough, rhinorrhea, shortness of breath or sore throat. Treatments tried: working with wound care. The treatment provided mild relief. Extremity Weakness  This is a chronic problem. The current episode started 1 to 4 weeks ago. The problem occurs intermittently. The problem has been gradually improving. Pertinent negatives include no anorexia, chills, congestion, coughing, myalgias or sore throat. Nothing aggravates the symptoms. Treatments tried: working with therapy. The treatment provided mild relief. ROS:  Review of Systems   Constitutional: Negative. Negative for chills. HENT:  Negative for congestion, ear discharge, ear pain, mouth sores, nosebleeds, postnasal drip, rhinorrhea, sinus pressure, sinus pain, sneezing, sore throat, trouble swallowing and voice change. Eyes:  Negative for discharge, redness, itching and visual disturbance. Denies diplopia, recent change in visual acuity, blurred vision, and night vision loss. Does not wear corrective lenses. Respiratory:  Negative for cough, shortness of breath and wheezing. Denies asthma, COPD, hemoptysis   Cardiovascular: Negative. Gastrointestinal: Negative. Negative for anorexia. Endocrine: Negative. Genitourinary:  Negative for difficulty urinating and urgency. Denies hesitancy, incomplete voiding, and polyuria   Musculoskeletal:  Negative for myalgias. Denies joint pain   Skin: Negative. Hematological: Negative.     Psychiatric/Behavioral: Negative for confusion and suicidal ideas. The patient is not nervous/anxious. Denies difficulty concentrating, depression, flight of ideas, slow thought processes, suicide attempts      Current Meds: Refer to nursing home record    PMH:    Medical Problems:reviewed and updated      Surgical Hx: reviewed and updated     FH: reviewed and updated     SH: reviewed and updated     Objective: Wt: 129.8 BP: 139/76 Pulse: 77 Resp: 18 T: 97.6F     Physical Exam:  Physical Exam  Vitals reviewed. Constitutional:       General: She is not in acute distress. Appearance: Normal appearance. She is normal weight. She is not ill-appearing or toxic-appearing. Comments: Appears appropriate for age   HENT:      Head: Normocephalic and atraumatic. Right Ear: Tympanic membrane and external ear normal.      Left Ear: Tympanic membrane and external ear normal.      Ears:      Comments: Middle ear well aerated. Nose: Nose normal.      Mouth/Throat:      Mouth: Mucous membranes are moist.      Dentition: Normal dentition. No gingival swelling or dental caries. Pharynx: Oropharynx is clear. Uvula midline. Comments: Gums appear healthy. No thrush no mucositis  Eyes:      General: Vision grossly intact. Right eye: No discharge. Left eye: No discharge. Extraocular Movements: Extraocular movements intact. Conjunctiva/sclera: Conjunctivae normal.      Pupils: Pupils are equal, round, and reactive to light. Comments: Fundoscopic exam is benign  Retinal vessels: Normal   Neck:      Vascular: No carotid bruit. Comments: Thyroid is normal in size. Carotids 2+ and equal bilaterally  Cardiovascular:      Rate and Rhythm: Normal rate and regular rhythm. Pulses: Normal pulses. Heart sounds: Normal heart sounds, S1 normal and S2 normal. No murmur heard. No friction rub. No gallop.       Comments: Pedal pulses 2+ and equal bilaterally  Pulmonary:      Effort: Pulmonary effort is normal.      Breath sounds: Normal breath sounds. Abdominal:      General: Bowel sounds are normal. There is no distension. Palpations: Abdomen is soft. There is no mass. Tenderness: There is no abdominal tenderness. There is no guarding or rebound. Hernia: No hernia is present. Comments: No rigidity   Musculoskeletal:         General: Normal range of motion. Right shoulder: Normal.      Left shoulder: Normal.      Right upper arm: Normal.      Left upper arm: Normal.      Cervical back: Normal range of motion. Right hip: Normal.      Left hip: Normal.      Right upper leg: Normal.      Left upper leg: Normal.      Right lower leg: Normal.      Left lower leg: Normal.   Lymphadenopathy:      Comments: No palpable or visible regional lymphadenopathy   Skin:     General: Skin is warm and dry. Findings: No bruising, ecchymosis, lesion or rash. Neurological:      General: No focal deficit present. Mental Status: She is alert. Mental status is at baseline. Cranial Nerves: No cranial nerve deficit. Deep Tendon Reflexes: Reflexes normal.   Psychiatric:         Mood and Affect: Mood and affect normal. Mood is not depressed. Behavior: Behavior normal. Behavior is not agitated. Thought Content: Thought content normal.         Judgment: Judgment normal.      Comments: No apparent anxiety        Assessment & Plan:  1. Acute GI bleeding  2. Chronic congestive heart failure, unspecified heart failure type ([address]  3. Type 2 diabetes mellitus without complication, unspecified whether long term insulin use ([address]  4. Primary hypertension  5. Coronary artery disease involving native coronary artery of native heart without angina pectoris  6. Wound of right buttock, subsequent encounter  7.  Weakness     Chart reviewed   Medication reviewed   Monitor labs   Continue wound care   Continue current treatment       I, Skinny Lantigua MA  am scribing for Dr. Moris Blake.  [place]   I, Anthony Sharma MD, personally performed the services described in this documentation as scribed by Lisbeth Gabriel and it is both accurate and complete

## 2022-10-07 LAB
ORGANISM: ABNORMAL
WOUND/ABSCESS: ABNORMAL

## 2022-10-10 ENCOUNTER — HOSPITAL ENCOUNTER (OUTPATIENT)
Dept: WOUND CARE | Age: 87
Discharge: HOME | End: 2022-10-10
Payer: MEDICARE

## 2022-10-10 VITALS
DIASTOLIC BLOOD PRESSURE: 84 MMHG | SYSTOLIC BLOOD PRESSURE: 122 MMHG | RESPIRATION RATE: 18 BRPM | TEMPERATURE: 97.1 F | HEART RATE: 70 BPM

## 2022-10-10 DIAGNOSIS — S31.829S WOUND OF LEFT BUTTOCK, SEQUELA: ICD-10-CM

## 2022-10-10 DIAGNOSIS — S31.829A BUTTOCK WOUND, LEFT, INITIAL ENCOUNTER: Primary | ICD-10-CM

## 2022-10-10 DIAGNOSIS — S31.819A WOUND OF RIGHT BUTTOCK, INITIAL ENCOUNTER: ICD-10-CM

## 2022-10-10 PROCEDURE — 11042 DBRDMT SUBQ TIS 1ST 20SQCM/<: CPT | Performed by: SURGERY

## 2022-10-10 PROCEDURE — 11042 DBRDMT SUBQ TIS 1ST 20SQCM/<: CPT

## 2022-10-10 RX ORDER — GENTAMICIN SULFATE 1 MG/G
OINTMENT TOPICAL ONCE
Status: CANCELLED | OUTPATIENT
Start: 2022-10-10 | End: 2022-10-10

## 2022-10-10 RX ORDER — LIDOCAINE HYDROCHLORIDE 20 MG/ML
JELLY TOPICAL ONCE
Status: CANCELLED | OUTPATIENT
Start: 2022-10-10 | End: 2022-10-10

## 2022-10-10 RX ORDER — BACITRACIN ZINC AND POLYMYXIN B SULFATE 500; 1000 [USP'U]/G; [USP'U]/G
OINTMENT TOPICAL ONCE
Status: CANCELLED | OUTPATIENT
Start: 2022-10-10 | End: 2022-10-10

## 2022-10-10 RX ORDER — LIDOCAINE 40 MG/G
CREAM TOPICAL ONCE
Status: CANCELLED | OUTPATIENT
Start: 2022-10-10 | End: 2022-10-10

## 2022-10-10 RX ORDER — LIDOCAINE HYDROCHLORIDE 40 MG/ML
SOLUTION TOPICAL ONCE
Status: COMPLETED | OUTPATIENT
Start: 2022-10-10 | End: 2022-10-10

## 2022-10-10 RX ORDER — LEVOFLOXACIN 500 MG/1
500 TABLET, FILM COATED ORAL DAILY
Qty: 7 TABLET | Refills: 0 | Status: SHIPPED | OUTPATIENT
Start: 2022-10-10 | End: 2022-10-17

## 2022-10-10 RX ORDER — CLOBETASOL PROPIONATE 0.5 MG/G
OINTMENT TOPICAL ONCE
Status: CANCELLED | OUTPATIENT
Start: 2022-10-10 | End: 2022-10-10

## 2022-10-10 RX ORDER — GINSENG 100 MG
CAPSULE ORAL ONCE
Status: CANCELLED | OUTPATIENT
Start: 2022-10-10 | End: 2022-10-10

## 2022-10-10 RX ORDER — BETAMETHASONE DIPROPIONATE 0.05 %
OINTMENT (GRAM) TOPICAL ONCE
Status: CANCELLED | OUTPATIENT
Start: 2022-10-10 | End: 2022-10-10

## 2022-10-10 RX ORDER — LIDOCAINE HYDROCHLORIDE 40 MG/ML
SOLUTION TOPICAL ONCE
Status: CANCELLED | OUTPATIENT
Start: 2022-10-10 | End: 2022-10-10

## 2022-10-10 RX ORDER — BACITRACIN, NEOMYCIN, POLYMYXIN B 400; 3.5; 5 [USP'U]/G; MG/G; [USP'U]/G
OINTMENT TOPICAL ONCE
Status: CANCELLED | OUTPATIENT
Start: 2022-10-10 | End: 2022-10-10

## 2022-10-10 RX ORDER — LIDOCAINE 50 MG/G
OINTMENT TOPICAL ONCE
Status: CANCELLED | OUTPATIENT
Start: 2022-10-10 | End: 2022-10-10

## 2022-10-10 RX ADMIN — LIDOCAINE HYDROCHLORIDE: 40 SOLUTION TOPICAL at 14:06

## 2022-10-10 ASSESSMENT — ENCOUNTER SYMPTOMS
TROUBLE SWALLOWING: 0
EYE REDNESS: 0
SINUS PAIN: 0
EYE DISCHARGE: 0
GASTROINTESTINAL NEGATIVE: 1
COUGH: 0
EYE ITCHING: 0
SHORTNESS OF BREATH: 0
RHINORRHEA: 0
SINUS PRESSURE: 0
WHEEZING: 0
SORE THROAT: 0
VOICE CHANGE: 0

## 2022-10-10 ASSESSMENT — PAIN DESCRIPTION - DESCRIPTORS: DESCRIPTORS: BURNING

## 2022-10-10 ASSESSMENT — PAIN DESCRIPTION - ORIENTATION: ORIENTATION: RIGHT

## 2022-10-10 ASSESSMENT — VISUAL ACUITY: OU: 1

## 2022-10-10 ASSESSMENT — PAIN DESCRIPTION - LOCATION: LOCATION: BUTTOCKS

## 2022-10-10 ASSESSMENT — PAIN - FUNCTIONAL ASSESSMENT: PAIN_FUNCTIONAL_ASSESSMENT: ACTIVITIES ARE NOT PREVENTED

## 2022-10-10 NOTE — DISCHARGE INSTRUCTIONS
Visit Discharge/Physician Orders     Discharge condition: Stable     Assessment of pain at discharge: Minimal     Anesthetic used: 4% topical lidocaine     Discharge to: [facility]     Left via:Private automobile     Accompanied by: accompanied by son     JANET/AYANNA: Jason 97: [phone]     Dressing Orders: To right and left buttock wounds, cleanse with normal saline solution. Apply alginate Ag and cover with duoderm or mepilex dressing (do not cut mepilex). Change daily. Treatment Orders:     CBC and CMP ordered, to be done by home healthcare  Culture reviewed- antibiotic ordered. Take as prescribed for 7 days. KEEP PRESSURE OFF OF WOUNDS     FOLLOW NUTRITIOUS DIET. CHOOSE FOODS HIGH IN PROTEIN -CHICKEN- FISH-AND EGGS,  CHOOSE FOODS HIGH IN VITAMIN C.   MULTIVITAMIN DAILY. [address] [place] [address],[address] followup visit _______1 week______________________  (Please note your next appointment above and if you are unable to keep, kindly give a 24 hour notice. Thank you.)     Physician signature:__________________________        If you experience any of the following, please call the [facility] during business hours:     * Increase in Pain  * Temperature over 101  * Increase in drainage from your wound  * Drainage with a foul odor  * Bleeding  * Increase in swelling  * Need for compression bandage changes due to slippage, breakthrough drainage. If you need medical attention outside of the business hours of the [facility] please contact your PCP or go to the nearest emergency room.

## 2022-10-10 NOTE — PROGRESS NOTES
Visit Date: 9/7//22  [facility]  9/20/1927  female 80 y.o. Subjective:    CC: Patient presents with acute gi bleed. Patient presents for follow up of DM,HTN,CAD, and CHf. HPI:  Peripheral vascular disease: Patient feeling about the same compared to last visit. Condition has been improving since last visit. Taking antibiotics as prescribed. No medication side effects noted. Patient working to improve exercise routine. Experiencing no pain. Present for awhile. Progressive. Rated as mild. Patient unaware of any aggravating factors. GI Problem  Primary symptoms do not include myalgias. The illness began more than 7 days ago (admitted to the hospital for acute gi bleeding she had a blood transfusion done. she was hospice but has improved and is now off hospice. she is admitted to the nursing home for continued care). The onset was sudden. The problem has been gradually improving. The illness does not include chills or anorexia. Diabetes  She presents for her follow-up diabetic visit. She has type 2 diabetes mellitus. Her disease course has been stable. There are no hypoglycemic associated symptoms. Pertinent negatives for hypoglycemia include no confusion or nervousness/anxiousness. There are no diabetic associated symptoms. There are no hypoglycemic complications. Symptoms are stable. There are no diabetic complications. Risk factors for coronary artery disease include diabetes mellitus, dyslipidemia and hypertension. Current diabetic treatments: taking medications, no medication side effects. She is compliant with treatment most of the time. Her weight is fluctuating minimally. She is following a generally healthy diet. When asked about meal planning, she reported none. Her home blood glucose trend is fluctuating minimally. Hypertension  This is a chronic problem. The current episode started more than 1 year ago. The problem has been waxing and waning since onset. The problem is controlled. Pertinent negatives include no shortness of breath. There are no associated agents to hypertension. Risk factors for coronary artery disease include diabetes mellitus and dyslipidemia. Treatments tried: taking medications, no medication side effects. The current treatment provides mild improvement. There are no compliance problems. Hyperlipidemia  This is a chronic problem. The current episode started more than 1 year ago. The problem is controlled. Recent lipid tests were reviewed and are variable. There are no known factors aggravating her hyperlipidemia. Pertinent negatives include no myalgias or shortness of breath. Treatments tried: taking medications, no medication side effects. The current treatment provides mild improvement of lipids. There are no compliance problems. Risk factors for coronary artery disease include diabetes mellitus, dyslipidemia and hypertension. Coronary Artery Disease  Presents for follow-up visit. Pertinent negatives include no shortness of breath. Risk factors include hyperlipidemia. Her past medical history is significant for CHF. The symptoms have been stable. Compliance with diet is variable. Compliance with exercise is variable. Compliance with medications is good (taking medications, no medication side effects). Congestive Heart Failure  Presents for follow-up visit. Pertinent negatives include no shortness of breath. The symptoms have been stable. Her past medical history is significant for CAD. Compliance with total regimen is 51-75%. Compliance with diet is 51-75%. Compliance with exercise is 51-75%. Compliance with medications: taking medications, no medication side effects. FOOT [employer]LCER better getting treatment by wound care  ROS:  Review of Systems   Constitutional: Negative. Negative for chills.    HENT:  Negative for congestion, ear discharge, ear pain, mouth sores, nosebleeds, postnasal drip, rhinorrhea, sinus pressure, sinus pain, sneezing, sore throat, trouble swallowing and voice change. Eyes:  Negative for discharge, redness, itching and visual disturbance. Denies diplopia, recent change in visual acuity, blurred vision, and night vision loss. Does not wear corrective lenses. Respiratory:  Negative for cough, shortness of breath and wheezing. Denies asthma, COPD, hemoptysis   Cardiovascular: Negative. Gastrointestinal: Negative. Negative for anorexia. Endocrine: Negative. Genitourinary:  Negative for difficulty urinating and urgency. Denies hesitancy, incomplete voiding, and polyuria   Musculoskeletal:  Negative for myalgias. Denies joint pain   Skin: Negative. Neurological: Negative. Hematological: Negative. Psychiatric/Behavioral:  Negative for confusion and suicidal ideas. The patient is not nervous/anxious. Denies difficulty concentrating, depression, flight of ideas, slow thought processes, suicide attempts      Current Meds: Refer to nursing home record    PMH:    Medical Problems:reviewed and updated      Surgical Hx: reviewed and updated     FH: reviewed and updated     SH: reviewed and updated     Objective: Wt: 129.8 BP: 130/77 Pulse: 68 Resp: 18 T: 97.8F     Physical Exam:  Physical Exam  Vitals reviewed. Constitutional:       General: She is not in acute distress. Appearance: Normal appearance. She is normal weight. She is not ill-appearing or toxic-appearing. Comments: Appears appropriate for age   HENT:      Head: Normocephalic and atraumatic. Right Ear: Tympanic membrane and external ear normal.      Left Ear: Tympanic membrane and external ear normal.      Ears:      Comments: Middle ear well aerated. Nose: Nose normal.      Mouth/Throat:      Mouth: Mucous membranes are moist.      Dentition: Normal dentition. No gingival swelling or dental caries. Pharynx: Oropharynx is clear. Uvula midline. Comments: Gums appear healthy.    No thrush no mucositis  Eyes: General: Vision grossly intact. Right eye: No discharge. Left eye: No discharge. Extraocular Movements: Extraocular movements intact. Conjunctiva/sclera: Conjunctivae normal.      Pupils: Pupils are equal, round, and reactive to light. Comments: Fundoscopic exam is benign  Retinal vessels: Normal   Neck:      Vascular: No carotid bruit. Comments: Thyroid is normal in size. Carotids 2+ and equal bilaterally  Cardiovascular:      Rate and Rhythm: Normal rate and regular rhythm. Pulses: Normal pulses. Heart sounds: Normal heart sounds, S1 normal and S2 normal. No murmur heard. No friction rub. No gallop. Comments: Pedal pulses 2+ and equal bilaterally  Pulmonary:      Effort: Pulmonary effort is normal.      Breath sounds: Normal breath sounds. Abdominal:      General: Bowel sounds are normal. There is no distension. Palpations: Abdomen is soft. There is no mass. Tenderness: There is no abdominal tenderness. There is no guarding or rebound. Hernia: No hernia is present. Comments: No rigidity   Musculoskeletal:         General: Normal range of motion. Right shoulder: Normal.      Left shoulder: Normal.      Right upper arm: Normal.      Left upper arm: Normal.      Cervical back: Normal range of motion. Right hip: Normal.      Left hip: Normal.      Right upper leg: Normal.      Left upper leg: Normal.      Right lower leg: Normal.      Left lower leg: Normal.   Lymphadenopathy:      Comments: No palpable or visible regional lymphadenopathy   Skin:     General: Skin is warm and dry. Findings: No bruising, ecchymosis, lesion or rash. Neurological:      General: No focal deficit present. Mental Status: She is alert. Mental status is at baseline. Cranial Nerves: No cranial nerve deficit. Deep Tendon Reflexes: Reflexes normal.   Psychiatric:         Mood and Affect: Mood and affect normal. Mood is not depressed. Behavior: Behavior normal. Behavior is not agitated. Thought Content: Thought content normal.         Judgment: Judgment normal.      Comments: No apparent anxiety        Assessment & Plan:  1. Acute GI bleeding  2. Chronic congestive heart failure, unspecified heart failure type ([facility]  3. Type 2 diabetes mellitus without complication, unspecified whether long term insulin use ([facility]  4. Primary hypertension  5. Coronary artery disease involving native coronary artery of native heart without angina pectoris     Chart reviewed   Medications reviewed   Monitor labs   Continue therapy   Continue current treatment     I, Adelaida Colbert MA  am scribing for Dr. Gareth Foster.  [place], [place]     I, Rosalia Foster MD, personally performed the services described in this documentation as scribed by Adelaida Colbert and it is both accurate and complete

## 2022-10-10 NOTE — PROGRESS NOTES
Wound Healing Center Followup Visit Note    Referring Physician : Carolina Velasquez DO  [address] [facility] RECORD NUMBER:  [id number]  AGE: 80 y.o. GENDER: female  : 1927  EPISODE DATE:  10/10/2022    Subjective:     Chief Complaint   Patient presents with    Wound Check     Buttock right      HISTORY of PRESENT ILLNESS HPI   Ezra Hering is a 80 y.o. female who presents today in regards to follow up evaluation and treatment of wound/ulcer. That patient's past medical, family and social hx were reviewed and changes were made if present. History of Wound Context:  The patient has had a wounds of both buttocks, which was first noted approximately several weeks. This has been treated by the patient in the facility. On their initial visit to the wound healing center, 10/03/22, the patient has noted that the wound has not been improving.   The patient has not had similar previous wounds in the past.       It is noted, patient lives by herself at the assisted care area and does not appear to be ambulatory tells me that she can walk short distances slowly around the facility with the help of a walker with 4 wheels     She tells me she is trying to sleep on the sides and avoid sleeping on the back to avoid bedsores     Pt is currently not on abx.       10/10/2022  Wound stable, still has some exudate, wound cultures reviewed, light growth of multiple bacteria, mostly resistant to various antibiotics empirically treated for 7 days with Levaquin for now and if infection persists at the wound worsen, we will get a formal ID consult, discussed with the patient, again regarding offloading the ulcer area and to avoid sitting in a chair         Wound/Ulcer Pain Timing/Severity: constant  Quality of pain: dull, aching  Severity:  3 / 10   Modifying Factors: None  Associated Signs/Symptoms: drainage and pain     Ulcer Identification:  Ulcer Type: pressure and non-healing/non-surgical  Contributing Factors: chronic pressure, decreased mobility, and shear force, diabetes mellitus     Diabetic/Pressure/Non Pressure Ulcers only:  Ulcer: Patient does have diabetes mellitus 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     If patient has diabetic lower extremity wounds  Stearns Classification of diabetic lower extremity wounds:     Grade Description   []  0 No open wound   []  1 Superficial ulcer involving the full skin thickness   []  2 Deep ulcer involves ligament, tendon, joint capsule, or fascia  No bone involvement or abscess presence   []  3 Deep Ulcer with abcess formation and/or osteomyelitis   []  4 Localized gangrene   []  5 Extensive gangrene of the foot      Wound: Patient does have decubitus ulcers, right buttock more than the left buttock with some exudate     Other pertinent information:  Recent lab work was reviewed, does have elevated blood glucose levels, normal creatinine and BUN  Last ankle-brachial index was reviewed, as well as last angioplasty done by Dr. Jami Gomez        10/3/2022  The patient was explained, the etiology, of pressure necrosis, the importance of offloading the area, informed her that she needs to sleep on the sides only and also avoid sitting for prolonged time and sit only for the minimal amount of time, all the importance of improved nutrition from protein supplements and multivitamin supplements were discussed                 PAST MEDICAL HISTORY      Diagnosis Date    Arthritis     Asthma     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 ([address] 5/18/2021    Bronchitis 5/23/2017    Bruising tendency     Buttock wound, left, initial encounter 10/3/2022    CAD (coronary artery disease)     Cough 5/23/2017    COVID     Dermatophytosis 6/25/2021    Diabetes mellitus ([address]     GERD (gastroesophageal reflux disease) 5/23/2017    History of GI bleed 10/3/2022    History of pulmonary embolus (PE) 5/29/2021    Hx of blood clots Hyperlipidemia     Hypertension     Leg swelling 7/15/2021    Lung disease     Peripheral vascular angioplasty status 5/29/2021    Pseudoaneurysm of right femoral artery ([facility] 5/29/2021    PVD (peripheral vascular disease) with claudication ([facility]) 4/10/2019    Restless legs syndrome     Rhinitis, allergic 5/23/2017    Sinusitis 5/23/2017    SOB (shortness of breath) 5/23/2017    Type 1 diabetes mellitus with left diabetic foot ulcer ([address] 5/18/2021    Ulcerated, foot, left, limited to breakdown of skin ([address] 6/25/2021    Urinary incontinence     Wound of left buttock 10/10/2022    Wound of right buttock 10/3/2022    Fat layer     Past Surgical History:   Procedure Laterality Date    ABDOMEN SURGERY      APPENDECTOMY      CARDIAC SURGERY      CHOLECYSTECTOMY      COLONOSCOPY      CORONARY ARTERY BYPASS GRAFT      8/28/10    ENDOSCOPY, COLON, DIAGNOSTIC      FOOT DEBRIDEMENT Left 5/16/2021    FOOT DEBRIDEMENT INCISION AND DRAINAGE. performed by Jaime Centeno DPM at [address] [place] [address] Left 5/21/2021    LEFT FOOT DEBRIDEMENT  WITH DELAYED PRIMARY CLOSURE performed by Bernice Jordan DPM at [facility] ([address] [place] [address])      frankie and bso 1997    TONSILLECTOMY AND ADENOIDECTOMY      UPPER GASTROINTESTINAL ENDOSCOPY N/A 7/18/2022    EGD ESOPHAGOGASTRODUODENOSCOPY performed by Shelby Carpenter MD at [facility] ENDOSCOPY     Family History   Problem Relation Age of Onset    Hypertension Father     Heart Disease Brother      Social History     Tobacco Use    Smoking status: Never    Smokeless tobacco: Never   Vaping Use    Vaping Use: Never used   Substance Use Topics    Alcohol use: Yes     Comment: occasional    Drug use: No     Allergies   Allergen Reactions    Ativan [Lorazepam] Hallucinations     Family reports adverse reaction to benzodiazepines.  Hallucination, restlessness    Lisinopril Other (See Comments)     7/18/19 Pt states that she does not want to take LISINOPRIL     Current Outpatient Medications on File Prior to Encounter   Medication Sig Dispense Refill    ferrous sulfate (IRON 325) 325 (65 Fe) MG tablet Take 1 tablet by mouth daily 30 tablet 3    acetaminophen (TYLENOL) 650 MG suppository Place 650 mg rectally every 4 hours as needed for Fever      Baclofen (LIORESAL) 5 MG tablet Take 5 mg by mouth as needed (muscle spasms, hiccups)      docusate sodium (COLACE) 100 MG capsule Take 100 mg by mouth daily as needed for Constipation      dexamethasone (DECADRON) 2 MG tablet Take 2 mg by mouth daily as needed (itching)      melatonin 5 MG TABS tablet Take 5 mg by mouth nightly      HYDROcodone-acetaminophen (NORCO) 5-325 MG per tablet Take 1 tablet by mouth every 6 hours as needed for Pain.      pantoprazole sodium (PROTONIX) 40 MG PACK packet Take 40 mg by mouth every morning (before breakfast)      acetaminophen (TYLENOL) 325 MG tablet Take 650 mg by mouth every 4 hours as needed for Fever      Artificial Tear Solution (SOOTHE XP) SOLN Apply 1 drop to eye 2 times daily      amLODIPine (NORVASC) 10 MG tablet Take 1 tablet by mouth in the morning. 30 tablet 3    [DISCONTINUED] losartan (COZAAR) 25 MG tablet Take 1 tablet by mouth in the morning. (Patient taking differently: Take 25 mg by mouth daily Hold for SBP <100) 30 tablet 3    [DISCONTINUED] torsemide (DEMADEX) 10 MG tablet Take 1 tablet by mouth in the morning. (Patient not taking: Reported on 8/27/2022) 30 tablet 3    [DISCONTINUED] apixaban (ELIQUIS) 5 MG TABS tablet Take 0.5 tablets by mouth in the morning and 0.5 tablets before bedtime.  (Patient not taking: Reported on 8/27/2022) 60 tablet 0    polyethylene glycol (GLYCOLAX) 17 g packet Take 17 g by mouth daily as needed for Constipation      senna (SENOKOT) 8.6 MG tablet Take 1 tablet by mouth daily as needed for Constipation      polyvinyl alcohol (LIQUIFILM TEARS) 1.4 % ophthalmic solution Place 1 drop into both eyes 2 times daily as needed      [DISCONTINUED] metFORMIN (GLUCOPHAGE) 500 MG tablet Take 500 mg by mouth daily (Patient not taking: Reported on 8/27/2022)      [DISCONTINUED] famotidine (PEPCID) 10 MG tablet Take 10 mg by mouth daily  (Patient not taking: Reported on 7/18/2022)      [DISCONTINUED] Ginger, Zingiber officinalis, (GINGER ROOT PO) Take 750 mg by mouth Daily in am (Patient not taking: Reported on 8/27/2022)      [DISCONTINUED] b complex vitamins capsule Take 1 capsule by mouth daily In the morning for supplement (Patient not taking: Reported on 8/27/2022)      [DISCONTINUED] aspirin 81 MG EC tablet Take 1 tablet by mouth daily 30 tablet 0    aluminum & magnesium hydroxide-simethicone (MYLANTA) 400-400-40 MG/5ML SUSP Take 30 mLs by mouth every 6 hours as needed      lidocaine 4 % external patch Apply topically daily as needed for Pain Apply topically as needed to painful muscles      [DISCONTINUED] diclofenac sodium (VOLTAREN) 1 % GEL Apply topically 4 times daily as needed for Pain (Patient not taking: Reported on 8/27/2022)      bisacodyl (DULCOLAX) 10 MG suppository Place 10 mg rectally daily as needed for Constipation Indications: IF NO RESULTS FROM MOM  (Patient not taking: Reported on 10/3/2022)      gabapentin (NEURONTIN) 250 MG/5ML solution Take 300 mg by mouth every evening. [DISCONTINUED] albuterol (PROVENTIL) 90 MCG/ACT inhaler Inhale 2 puffs into the lungs 4 times daily (Patient not taking: No sig reported)  0    [DISCONTINUED] pravastatin (PRAVACHOL) 20 MG tablet TAKE 1 TABLET BY MOUTH  NIGHTLY (Patient not taking: Reported on 8/27/2022) 90 tablet 1    [DISCONTINUED] fluticasone-vilanterol (BREO ELLIPTA) 100-25 MCG/INH AEPB inhaler Inhale 1 puff into the lungs daily (Patient not taking: Reported on 8/27/2022) 3 each 5    [DISCONTINUED] magnesium gluconate (MAGONATE) 500 MG tablet Take 200 mg by mouth 2 times daily  (Patient not taking: Reported on 8/27/2022)       No current facility-administered medications on file prior to encounter.        REVIEW OF SYSTEMS See HPI    Objective:    /84   Pulse 70   Temp 97.1 °F (36.2 °C) (Temporal)   Resp 18   LMP 12/03/1997   Wt Readings from Last 3 Encounters:   10/03/22 124 lb (56.2 kg)   08/26/22 116 lb (52.6 kg)   07/30/22 126 lb 1.7 oz (57.2 kg)     PHYSICAL EXAM  CONSTITUTIONAL:   Awake, alert, cooperative   EYES:  lids and lashes normal   ENT: external ears and nose without lesions   NECK:  supple, symmetrical, trachea midline   SKIN:  Open wound Present    Assessment:     Problem List Items Addressed This Visit          High    Wound of left buttock    Wound of right buttock    Relevant Orders    Initiate Outpatient Wound Care Protocol       Unprioritized    Buttock wound, left, initial encounter - Primary    Relevant Orders    Initiate Outpatient Wound Care Protocol       Pre Debridement Measurements:  Are located in the [place]  Documentation Flow Sheet  Post Debridement Measurements:  Wound/Ulcer Descriptions are Pre Debridement except measurements:    Wound 10/03/22 Buttocks Right #1 (Active)   Wound Image   10/03/22 1417   Wound Length (cm) 5 cm 10/10/22 1401   Wound Width (cm) 2.2 cm 10/10/22 1401   Wound Depth (cm) 0.3 cm 10/10/22 1401   Wound Surface Area (cm^2) 11 cm^2 10/10/22 1401   Change in Wound Size % (l*w) 21.43 10/10/22 1401   Wound Volume (cm^3) 3.3 cm^3 10/10/22 1401   Wound Healing % 21 10/10/22 1401   Post-Procedure Length (cm) 5.1 cm 10/10/22 1431   Post-Procedure Width (cm) 2.3 cm 10/10/22 1431   Post-Procedure Depth (cm) 0.4 cm 10/10/22 1431   Post-Procedure Surface Area (cm^2) 11.73 cm^2 10/10/22 1431   Post-Procedure Volume (cm^3) 4.692 cm^3 10/10/22 1431   Tunneling Position ___ [email or web address] 10/03/22 1449   Wound Assessment Fibrin 10/10/22 1401   Drainage Amount Moderate 10/10/22 1401   Drainage Description Thin;Serosanguinous; Yellow 10/10/22 1401   Odor None 10/10/22 1401   Ira-wound Assessment Fragile; Intact 10/10/22 1401   Margins Attached edges 10/10/22 1401   Wound Thickness Description not for Pressure Injury Full thickness 10/10/22 1401   Number of days: 7       Wound 10/03/22 Buttocks Left #2 (Active)   Wound Image   10/03/22 1417   Wound Length (cm) 2 cm 10/10/22 1401   Wound Width (cm) 0.9 cm 10/10/22 1401   Wound Depth (cm) 0.1 cm 10/10/22 1401   Wound Surface Area (cm^2) 1.8 cm^2 10/10/22 1401   Change in Wound Size % (l*w) 31.82 10/10/22 1401   Wound Volume (cm^3) 0.18 cm^3 10/10/22 1401   Wound Healing % 32 10/10/22 1401   Post-Procedure Length (cm) 2.3 cm 10/03/22 1449   Post-Procedure Width (cm) 1.3 cm 10/03/22 1449   Post-Procedure Depth (cm) 0.2 cm 10/03/22 1449   Post-Procedure Surface Area (cm^2) 2.99 cm^2 10/03/22 1449   Post-Procedure Volume (cm^3) 0.598 cm^3 10/03/22 1449   Wound Assessment Fibrin;Pink/red 10/10/22 1401   Drainage Amount Moderate 10/10/22 1401   Drainage Description Thin;Serosanguinous; Yellow 10/10/22 1401   Odor None 10/10/22 1401   Ira-wound Assessment Intact;Fragile 10/10/22 1401   Margins Attached edges 10/10/22 1401   Wound Thickness Description not for Pressure Injury Full thickness 10/10/22 1401   Number of days: 7          Procedure Note  Indications:  Based on my examination of this patient's wound(s)/ulcer(s) today, debridement is required to promote healing and evaluate the wound base. Performed by: Olivia Valera MD    Consent obtained:  Yes    Time out taken:  Yes    Pain Control: Anesthetic  Anesthetic: 4% Lidocaine Liquid Topical     Debridement:Excisional Debridement    Using curette the wound(s)/ulcer(s) was/were sharply debrided down through and including the removal of epidermis, dermis, and subcutaneous tissue.         Devitalized Tissue Debrided:  fibrin and slough to stimulate bleeding to promote healing, post debridement good bleeding base and wound edges noted    Wound/Ulcer #: 1 and 2    Percent of Wound/Ulcer Debrided: 100%    Total Surface Area Debrided:  14 sq cm     Estimated Blood Loss: Minimal  Hemostasis Achieved:  by pressure    Procedural Pain:  3  / 10   Post Procedural Pain:  3 / 10     Response to treatment:  Well tolerated by patient. Plan:   Treatment Note please see attached Discharge Instructions    Written patient dismissal instructions given to patient and signed by patient or POA. Discharge Instructions              Visit Discharge/Physician Orders     Discharge condition: Stable     Assessment of pain at discharge: Minimal     Anesthetic used: 4% topical lidocaine     Discharge to: [facility]     Left via:Private automobile     Accompanied by: accompanied by son     ECF/[facility]: [facility]: [phone]     Dressing Orders: To right and left buttock wounds, cleanse with normal saline solution. Apply alginate Ag and cover with duoderm or mepilex dressing (do not cut mepilex). Change daily. Treatment Orders:     CBC and CMP ordered, to be done by home healthcare     KEEP PRESSURE OFF OF WOUNDS     FOLLOW NUTRITIOUS DIET. CHOOSE FOODS HIGH IN PROTEIN -CHICKEN- FISH-AND EGGS,  CHOOSE FOODS HIGH IN VITAMIN C.   MULTIVITAMIN DAILY. [address] [place] [address],[address] followup visit _______1 week______________________  (Please note your next appointment above and if you are unable to keep, kindly give a 24 hour notice. Thank you.)     Physician signature:__________________________        If you experience any of the following, please call the [facility] during business hours:     * Increase in Pain  * Temperature over 101  * Increase in drainage from your wound  * Drainage with a foul odor  * Bleeding  * Increase in swelling  * Need for compression bandage changes due to slippage, breakthrough drainage. If you need medical attention outside of the business hours of the [facility] please contact your PCP or go to the nearest emergency room.          Electronically signed by Matt Calix MD on 10/10/2022 at 2:41 PM

## 2022-10-11 ASSESSMENT — ENCOUNTER SYMPTOMS
GASTROINTESTINAL NEGATIVE: 1
RHINORRHEA: 0
SHORTNESS OF BREATH: 0
EYE DISCHARGE: 0
SINUS PAIN: 0
SORE THROAT: 0
TROUBLE SWALLOWING: 0
EYE ITCHING: 0
VOICE CHANGE: 0
EYE REDNESS: 0
WHEEZING: 0
COUGH: 0
SINUS PRESSURE: 0

## 2022-10-11 ASSESSMENT — VISUAL ACUITY: OU: 1

## 2022-10-11 NOTE — PROGRESS NOTES
Visit Date: 9/14/22  Mark Richard  9/20/1927  female 80 y.o. Subjective:    CC: Patient presents with acute gi bleed. Patient presents for follow up of DM,HTN,CAD, and CHf. HPI:  Peripheral vascular disease: Patient feeling about the same compared to last visit. Condition has been improving since last visit. Taking antibiotics as prescribed. No medication side effects noted. Patient working to improve exercise routine. Experiencing no pain. Present for awhile. Progressive. Rated as mild. Patient unaware of any aggravating factors. Diabetes  She presents for her follow-up diabetic visit. She has type 2 diabetes mellitus. Her disease course has been stable. There are no hypoglycemic associated symptoms. Pertinent negatives for hypoglycemia include no confusion or nervousness/anxiousness. There are no diabetic associated symptoms. There are no hypoglycemic complications. Symptoms are stable. There are no diabetic complications. Risk factors for coronary artery disease include diabetes mellitus, dyslipidemia and hypertension. Current diabetic treatments: taking medications, no medication side effects. She is compliant with treatment most of the time. Her weight is fluctuating minimally. She is following a generally healthy diet. When asked about meal planning, she reported none. Her home blood glucose trend is fluctuating minimally. Hypertension  This is a chronic problem. The current episode started more than 1 year ago. The problem has been waxing and waning since onset. The problem is controlled. Pertinent negatives include no shortness of breath. There are no associated agents to hypertension. Risk factors for coronary artery disease include diabetes mellitus and dyslipidemia. Treatments tried: taking medications, no medication side effects. The current treatment provides mild improvement. There are no compliance problems. Hyperlipidemia  This is a chronic problem.  The current episode started more than 1 year ago. The problem is controlled. Recent lipid tests were reviewed and are variable. There are no known factors aggravating her hyperlipidemia. Pertinent negatives include no shortness of breath. Treatments tried: taking medications, no medication side effects. The current treatment provides mild improvement of lipids. There are no compliance problems. Risk factors for coronary artery disease include diabetes mellitus, dyslipidemia and hypertension. Coronary Artery Disease  Presents for follow-up visit. Pertinent negatives include no shortness of breath. Risk factors include hyperlipidemia. Her past medical history is significant for CHF. The symptoms have been stable. Compliance with diet is variable. Compliance with exercise is variable. Compliance with medications is good (taking medications, no medication side effects). Congestive Heart Failure  Presents for follow-up visit. Pertinent negatives include no shortness of breath. The symptoms have been stable. Her past medical history is significant for CAD. Compliance with total regimen is 51-75%. Compliance with diet is 51-75%. Compliance with exercise is 51-75%. Compliance with medications: taking medications, no medication side effects. FOOT ULCER better getting treatment by wound care  ROS:  Review of Systems   Constitutional: Negative. HENT:  Negative for congestion, ear discharge, ear pain, mouth sores, nosebleeds, postnasal drip, rhinorrhea, sinus pressure, sinus pain, sneezing, sore throat, trouble swallowing and voice change. Eyes:  Negative for discharge, redness, itching and visual disturbance. Denies diplopia, recent change in visual acuity, blurred vision, and night vision loss. Does not wear corrective lenses. Respiratory:  Negative for cough, shortness of breath and wheezing. Denies asthma, COPD, hemoptysis   Cardiovascular: Negative. Gastrointestinal: Negative. Endocrine: Negative.     Genitourinary: Negative for difficulty urinating. Denies hesitancy, incomplete voiding, and polyuria   Musculoskeletal:         Denies joint pain   Skin: Negative. Neurological: Negative. Hematological: Negative. Psychiatric/Behavioral:  Negative for confusion and suicidal ideas. The patient is not nervous/anxious. Denies difficulty concentrating, depression, flight of ideas, slow thought processes, suicide attempts      Current Meds: Refer to nursing home record    PMH:    Medical Problems:reviewed and updated      Surgical Hx: reviewed and updated     FH: reviewed and updated     SH: reviewed and updated     Objective: Wt: 129 BP: 122/78 Pulse: 66 Resp: 18 T: 97.9F     Physical Exam:  Physical Exam  Vitals reviewed. Constitutional:       General: She is not in acute distress. Appearance: Normal appearance. She is normal weight. She is not ill-appearing or toxic-appearing. Comments: Appears appropriate for age   HENT:      Head: Normocephalic and atraumatic. Right Ear: Tympanic membrane and external ear normal.      Left Ear: Tympanic membrane and external ear normal.      Ears:      Comments: Middle ear well aerated. Nose: Nose normal.      Mouth/Throat:      Mouth: Mucous membranes are moist.      Dentition: Normal dentition. No gingival swelling or dental caries. Pharynx: Oropharynx is clear. Uvula midline. Comments: Gums appear healthy. No thrush no mucositis  Eyes:      General: Vision grossly intact. Right eye: No discharge. Left eye: No discharge. Extraocular Movements: Extraocular movements intact. Conjunctiva/sclera: Conjunctivae normal.      Pupils: Pupils are equal, round, and reactive to light. Comments: Fundoscopic exam is benign  Retinal vessels: Normal   Neck:      Vascular: No carotid bruit. Comments: Thyroid is normal in size.   Carotids 2+ and equal bilaterally  Cardiovascular:      Rate and Rhythm: Normal rate and regular rhythm. Pulses: Normal pulses. Heart sounds: Normal heart sounds, S1 normal and S2 normal. No murmur heard. No friction rub. No gallop. Comments: Pedal pulses 2+ and equal bilaterally  Pulmonary:      Effort: Pulmonary effort is normal.      Breath sounds: Normal breath sounds. Abdominal:      General: Bowel sounds are normal. There is no distension. Palpations: Abdomen is soft. There is no mass. Tenderness: There is no abdominal tenderness. There is no guarding or rebound. Hernia: No hernia is present. Comments: No rigidity   Musculoskeletal:         General: Normal range of motion. Right shoulder: Normal.      Left shoulder: Normal.      Right upper arm: Normal.      Left upper arm: Normal.      Cervical back: Normal range of motion. Right hip: Normal.      Left hip: Normal.      Right upper leg: Normal.      Left upper leg: Normal.      Right lower leg: Normal.      Left lower leg: Normal.   Lymphadenopathy:      Comments: No palpable or visible regional lymphadenopathy   Skin:     General: Skin is warm and dry. Findings: No bruising, ecchymosis, lesion or rash. Neurological:      General: No focal deficit present. Mental Status: She is alert. Mental status is at baseline. Cranial Nerves: No cranial nerve deficit. Deep Tendon Reflexes: Reflexes normal.   Psychiatric:         Mood and Affect: Mood and affect normal. Mood is not depressed. Behavior: Behavior normal. Behavior is not agitated. Thought Content: Thought content normal.         Judgment: Judgment normal.      Comments: No apparent anxiety        Assessment & Plan:  1. Chronic congestive heart failure, unspecified heart failure type ([facility]  2. Type 2 diabetes mellitus without complication, unspecified whether long term insulin use ([facility]  3. Primary hypertension  4.  Coronary artery disease involving native coronary artery of native heart without angina pectoris  5. Wound of left buttock, subsequent encounter     Chart reviewed   Medications reviewed   Monitor labs   Continue therapy   Continue wound care   Continue current treatment     ITriston MA am scribing for Dr. Jeff Masterson.  Ann Monsivais   IBen MD, personally performed the services described in this documentation as scribed by Triston De Jesus and it is both accurate and complete

## 2022-10-11 NOTE — DISCHARGE INSTRUCTIONS
Visit Discharge/Physician Orders     Discharge condition: Stable     Assessment of pain at discharge: Minimal     Anesthetic used: 4% topical lidocaine     Discharge to: [facility]     Left via:Private automobile     Accompanied by: accompanied by son     JANET/AYANNA: Jason 97: [phone]     Dressing Orders: To right and left buttock wounds, cleanse with normal saline solution. Apply alginate Ag and cover with duoderm or mepilex dressing (do not cut mepilex). Please pack the right side tunnel @12 with alginate Ag rope. Change daily. Treatment Orders:     CBC and CMP ordered, to be done by home healthcare  Culture reviewed- antibiotic ordered. Take as prescribed for 7 days. KEEP PRESSURE OFF OF WOUNDS     FOLLOW NUTRITIOUS DIET. CHOOSE FOODS HIGH IN PROTEIN -CHICKEN- FISH-AND EGGS,  CHOOSE FOODS HIGH IN VITAMIN C.   MULTIVITAMIN DAILY. [facility] followup visit _______1 week______________________  (Please note your next appointment above and if you are unable to keep, kindly give a 24 hour notice. Thank you.)     Physician signature:__________________________        If you experience any of the following, please call the [address] during business hours:     * Increase in Pain  * Temperature over 101  * Increase in drainage from your wound  * Drainage with a foul odor  * Bleeding  * Increase in swelling  * Need for compression bandage changes due to slippage, breakthrough drainage. If you need medical attention outside of the business hours of the [address] please contact your PCP or go to the nearest emergency room.

## 2022-10-17 ENCOUNTER — HOSPITAL ENCOUNTER (OUTPATIENT)
Dept: WOUND CARE | Age: 87
Discharge: HOME | End: 2022-10-17
Payer: MEDICARE

## 2022-10-17 VITALS
TEMPERATURE: 96.5 F | DIASTOLIC BLOOD PRESSURE: 60 MMHG | RESPIRATION RATE: 18 BRPM | SYSTOLIC BLOOD PRESSURE: 124 MMHG | HEART RATE: 60 BPM

## 2022-10-17 DIAGNOSIS — S31.819A WOUND OF RIGHT BUTTOCK, INITIAL ENCOUNTER: ICD-10-CM

## 2022-10-17 DIAGNOSIS — S31.829D WOUND OF LEFT BUTTOCK, SUBSEQUENT ENCOUNTER: ICD-10-CM

## 2022-10-17 DIAGNOSIS — S31.829A BUTTOCK WOUND, LEFT, INITIAL ENCOUNTER: Primary | ICD-10-CM

## 2022-10-17 PROCEDURE — 11042 DBRDMT SUBQ TIS 1ST 20SQCM/<: CPT

## 2022-10-17 PROCEDURE — 11042 DBRDMT SUBQ TIS 1ST 20SQCM/<: CPT | Performed by: SURGERY

## 2022-10-17 PROCEDURE — 6370000000 HC RX 637 (ALT 250 FOR IP): Performed by: SURGERY

## 2022-10-17 RX ORDER — LIDOCAINE 50 MG/G
OINTMENT TOPICAL ONCE
Status: CANCELLED | OUTPATIENT
Start: 2022-10-17 | End: 2022-10-17

## 2022-10-17 RX ORDER — LIDOCAINE HYDROCHLORIDE 20 MG/ML
JELLY TOPICAL ONCE
Status: CANCELLED | OUTPATIENT
Start: 2022-10-17 | End: 2022-10-17

## 2022-10-17 RX ORDER — BACITRACIN, NEOMYCIN, POLYMYXIN B 400; 3.5; 5 [USP'U]/G; MG/G; [USP'U]/G
OINTMENT TOPICAL ONCE
Status: CANCELLED | OUTPATIENT
Start: 2022-10-17 | End: 2022-10-17

## 2022-10-17 RX ORDER — LIDOCAINE HYDROCHLORIDE 40 MG/ML
SOLUTION TOPICAL ONCE
Status: CANCELLED | OUTPATIENT
Start: 2022-10-17 | End: 2022-10-17

## 2022-10-17 RX ORDER — GENTAMICIN SULFATE 1 MG/G
OINTMENT TOPICAL ONCE
Status: CANCELLED | OUTPATIENT
Start: 2022-10-17 | End: 2022-10-17

## 2022-10-17 RX ORDER — BACITRACIN ZINC AND POLYMYXIN B SULFATE 500; 1000 [USP'U]/G; [USP'U]/G
OINTMENT TOPICAL ONCE
Status: CANCELLED | OUTPATIENT
Start: 2022-10-17 | End: 2022-10-17

## 2022-10-17 RX ORDER — BETAMETHASONE DIPROPIONATE 0.05 %
OINTMENT (GRAM) TOPICAL ONCE
Status: CANCELLED | OUTPATIENT
Start: 2022-10-17 | End: 2022-10-17

## 2022-10-17 RX ORDER — CLOBETASOL PROPIONATE 0.5 MG/G
OINTMENT TOPICAL ONCE
Status: CANCELLED | OUTPATIENT
Start: 2022-10-17 | End: 2022-10-17

## 2022-10-17 RX ORDER — LIDOCAINE 40 MG/G
CREAM TOPICAL ONCE
Status: CANCELLED | OUTPATIENT
Start: 2022-10-17 | End: 2022-10-17

## 2022-10-17 RX ORDER — GINSENG 100 MG
CAPSULE ORAL ONCE
Status: CANCELLED | OUTPATIENT
Start: 2022-10-17 | End: 2022-10-17

## 2022-10-17 RX ORDER — LIDOCAINE HYDROCHLORIDE 40 MG/ML
SOLUTION TOPICAL ONCE
Status: COMPLETED | OUTPATIENT
Start: 2022-10-17 | End: 2022-10-17

## 2022-10-17 RX ADMIN — LIDOCAINE HYDROCHLORIDE 10 ML: 40 SOLUTION TOPICAL at 13:54

## 2022-10-17 NOTE — PROGRESS NOTES
Wound Healing Center Followup Visit Note    Referring Physician : Kerry Catalan DO  [address] [facility] RECORD NUMBER:  [id number]  AGE: 80 y.o. GENDER: female  : 1927  EPISODE DATE:  10/17/2022    Subjective:     Chief Complaint   Patient presents with    Wound Check     Coccyx        HISTORY of PRESENT ILLNESS HPI   Afua Carrillo is a 80 y.o. female who presents today in regards to follow up evaluation and treatment of wound/ulcer. That patient's past medical, family and social hx were reviewed and changes were made if present. History of Wound Context:  The patient has had a wounds of both buttocks, which was first noted approximately several weeks. This has been treated by the patient in the facility. On their initial visit to the wound healing center, 10/03/22, the patient has noted that the wound has not been improving.   The patient has not had similar previous wounds in the past.       It is noted, patient lives by herself at the assisted care area and does not appear to be ambulatory tells me that she can walk short distances slowly around the facility with the help of a walker with 4 wheels     She tells me she is trying to sleep on the sides and avoid sleeping on the back to avoid bedsores     Pt is currently not on abx.       10/10/2022  Wound stable, still has some exudate, wound cultures reviewed, light growth of multiple bacteria, mostly resistant to various antibiotics empirically treated for 7 days with Levaquin for now and if infection persists at the wound worsen, we will get a formal ID consult, discussed with the patient, again regarding offloading the ulcer area and to avoid sitting in a chair  10/17/2022  Wound looks visibly improved, patient, has tunneling of the right buttock wound, towards the 12 o'clock position by about 2 cm, rediscussed the patient on importance of not sleeping on the back, also not to sit for long prolonged times       Wound/Ulcer Pain Timing/Severity: constant  Quality of pain: dull, aching  Severity:  3 / 10   Modifying Factors: None  Associated Signs/Symptoms: drainage and pain     Ulcer Identification:  Ulcer Type: pressure and non-healing/non-surgical  Contributing Factors: chronic pressure, decreased mobility, and shear force, diabetes mellitus     Diabetic/Pressure/Non Pressure Ulcers only:  Ulcer: Patient does have diabetes mellitus 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     If patient has diabetic lower extremity wounds  Stearns Classification of diabetic lower extremity wounds:     Grade Description   []  0 No open wound   []  1 Superficial ulcer involving the full skin thickness   []  2 Deep ulcer involves ligament, tendon, joint capsule, or fascia  No bone involvement or abscess presence   []  3 Deep Ulcer with abcess formation and/or osteomyelitis   []  4 Localized gangrene   []  5 Extensive gangrene of the foot      Wound: Patient does have decubitus ulcers, right buttock more than the left buttock with some exudate     Other pertinent information:  Recent lab work was reviewed, does have elevated blood glucose levels, normal creatinine and BUN  Last ankle-brachial index was reviewed, as well as last angioplasty done by Dr. 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 Hallucination, restlessness    Lisinopril Other (See Comments)     7/18/19 Pt states that she does not want to take LISINOPRIL     Current Outpatient Medications on File Prior to Encounter   Medication Sig Dispense Refill    levoFLOXacin (LEVAQUIN) 500 MG tablet Take 1 tablet by mouth daily for 7 days 7 tablet 0    ferrous sulfate (IRON 325) 325 (65 Fe) MG tablet Take 1 tablet by mouth daily 30 tablet 3    acetaminophen (TYLENOL) 650 MG suppository Place 650 mg rectally every 4 hours as needed for Fever      Baclofen (LIORESAL) 5 MG tablet Take 5 mg by mouth as needed (muscle spasms, hiccups)      docusate sodium (COLACE) 100 MG capsule Take 100 mg by mouth daily as needed for Constipation      dexamethasone (DECADRON) 2 MG tablet Take 2 mg by mouth daily as needed (itching)      melatonin 5 MG TABS tablet Take 5 mg by mouth nightly      HYDROcodone-acetaminophen (NORCO) 5-325 MG per tablet Take 1 tablet by mouth every 6 hours as needed for Pain.      pantoprazole sodium (PROTONIX) 40 MG PACK packet Take 40 mg by mouth every morning (before breakfast)      acetaminophen (TYLENOL) 325 MG tablet Take 650 mg by mouth every 4 hours as needed for Fever      Artificial Tear Solution (SOOTHE XP) SOLN Apply 1 drop to eye 2 times daily      amLODIPine (NORVASC) 10 MG tablet Take 1 tablet by mouth in the morning. 30 tablet 3    [DISCONTINUED] losartan (COZAAR) 25 MG tablet Take 1 tablet by mouth in the morning. (Patient taking differently: Take 25 mg by mouth daily Hold for SBP <100) 30 tablet 3    [DISCONTINUED] torsemide (DEMADEX) 10 MG tablet Take 1 tablet by mouth in the morning. (Patient not taking: Reported on 8/27/2022) 30 tablet 3    [DISCONTINUED] apixaban (ELIQUIS) 5 MG TABS tablet Take 0.5 tablets by mouth in the morning and 0.5 tablets before bedtime.  (Patient not taking: Reported on 8/27/2022) 60 tablet 0    polyethylene glycol (GLYCOLAX) 17 g packet Take 17 g by mouth daily as needed for Constipation      senna (SENOKOT) 8.6 MG tablet Take 1 tablet by mouth daily as needed for Constipation      polyvinyl alcohol (LIQUIFILM TEARS) 1.4 % ophthalmic solution Place 1 drop into both eyes 2 times daily as needed      [DISCONTINUED] metFORMIN (GLUCOPHAGE) 500 MG tablet Take 500 mg by mouth daily (Patient not taking: Reported on 8/27/2022)      [DISCONTINUED] famotidine (PEPCID) 10 MG tablet Take 10 mg by mouth daily  (Patient not taking: Reported on 7/18/2022)      [DISCONTINUED] Ginger, Zingiber officinalis, (GINGER ROOT PO) Take 750 mg by mouth Daily in am (Patient not taking: Reported on 8/27/2022)      [DISCONTINUED] b complex vitamins capsule Take 1 capsule by mouth daily In the morning for supplement (Patient not taking: Reported on 8/27/2022)      [DISCONTINUED] aspirin 81 MG EC tablet Take 1 tablet by mouth daily 30 tablet 0    aluminum & magnesium hydroxide-simethicone (MYLANTA) 400-400-40 MG/5ML SUSP Take 30 mLs by mouth every 6 hours as needed      lidocaine 4 % external patch Apply topically daily as needed for Pain Apply topically as needed to painful muscles      [DISCONTINUED] diclofenac sodium (VOLTAREN) 1 % GEL Apply topically 4 times daily as needed for Pain (Patient not taking: Reported on 8/27/2022)      bisacodyl (DULCOLAX) 10 MG suppository Place 10 mg rectally daily as needed for Constipation Indications: IF NO RESULTS FROM MOM  (Patient not taking: No sig reported)      gabapentin (NEURONTIN) 250 MG/5ML solution Take 300 mg by mouth every evening.        [DISCONTINUED] albuterol (PROVENTIL) 90 MCG/ACT inhaler Inhale 2 puffs into the lungs 4 times daily (Patient not taking: No sig reported)  0    [DISCONTINUED] pravastatin (PRAVACHOL) 20 MG tablet TAKE 1 TABLET BY MOUTH  NIGHTLY (Patient not taking: Reported on 8/27/2022) 90 tablet 1    [DISCONTINUED] fluticasone-vilanterol (BREO ELLIPTA) 100-25 MCG/INH AEPB inhaler Inhale 1 puff into the lungs daily (Patient not taking: Reported on 8/27/2022) 3 each 5    [DISCONTINUED] magnesium gluconate (MAGONATE) 500 MG tablet Take 200 mg by mouth 2 times daily  (Patient not taking: Reported on 8/27/2022)       No current facility-administered medications on file prior to encounter.        REVIEW OF SYSTEMS See HPI    Objective:    /60   Pulse 60   Temp (!) 96.5 °F (35.8 °C) (Temporal)   Resp 18   LMP 12/03/1997   Wt Readings from Last 3 Encounters:   10/03/22 124 lb (56.2 kg)   08/26/22 116 lb (52.6 kg)   07/30/22 126 lb 1.7 oz (57.2 kg)     PHYSICAL EXAM  CONSTITUTIONAL:   Awake, alert, cooperative   EYES:  lids and lashes normal   ENT: external ears and nose without lesions   NECK:  supple, symmetrical, trachea midline   SKIN:  Open wound Present    Assessment:     Problem List Items Addressed This Visit          High    Wound of left buttock    Wound of right buttock    Relevant Orders    Initiate Outpatient Wound Care Protocol       Unprioritized    Buttock wound, left, initial encounter - Primary    Relevant Orders    Initiate Outpatient Wound Care Protocol       Pre Debridement Measurements:  Are located in the [place]  Documentation Flow Sheet  Post Debridement Measurements:  Wound/Ulcer Descriptions are Pre Debridement except measurements:    Wound 10/03/22 Buttocks Right #1 (Active)   Wound Image   10/03/22 1417   Dressing Status New dressing applied 10/10/22 1505   Wound Cleansed Cleansed with saline 10/10/22 1505   Dressing/Treatment Alginate with Ag;Hydrocolloid 10/10/22 1505   Offloading for Diabetic Foot Ulcers Other (comment) 10/10/22 1505   Wound Length (cm) 4.8 cm 10/17/22 1346   Wound Width (cm) 1.9 cm 10/17/22 1346   Wound Depth (cm) 0.5 cm 10/17/22 1346   Wound Surface Area (cm^2) 9.12 cm^2 10/17/22 1346   Change in Wound Size % (l*w) 34.86 10/17/22 1346   Wound Volume (cm^3) 4.56 cm^3 10/17/22 1346   Wound Healing % -9 10/17/22 1346   Post-Procedure Length (cm) 4.9 cm 10/17/22 1419   Post-Procedure Width (cm) 2 cm 10/17/22 1419   Post-Procedure Depth (cm) 0.6 cm 10/17/22 1419   Post-Procedure Surface Area (cm^2) 9.8 cm^2 10/17/22 1419   Post-Procedure Volume (cm^3) 5.88 cm^3 10/17/22 1419   Tunneling Position ___ [email or web address] 10/17/22 1419   Wound Assessment Pink/red;Fibrin 10/17/22 1346   Drainage Amount Moderate 10/17/22 1346   Drainage Description Brown 10/17/22 1346   Odor None 10/17/22 1346   Ira-wound Assessment Intact 10/17/22 1346   Margins Attached edges 10/10/22 1401   Wound Thickness Description not for Pressure Injury Full thickness 10/10/22 1401   Number of days: 14       Wound 10/03/22 Buttocks Left #2 (Active)   Wound Image   10/03/22 1417   Dressing Status New dressing applied 10/10/22 1505   Wound Cleansed Cleansed with saline 10/10/22 1505   Dressing/Treatment Alginate with Ag;Hydrocolloid 10/10/22 1505   Offloading for Diabetic Foot Ulcers Other (comment) 10/10/22 1505   Wound Length (cm) 1.2 cm 10/17/22 1346   Wound Width (cm) 0.4 cm 10/17/22 1346   Wound Depth (cm) 0.1 cm 10/17/22 1346   Wound Surface Area (cm^2) 0.48 cm^2 10/17/22 1346   Change in Wound Size % (l*w) 81.82 10/17/22 1346   Wound Volume (cm^3) 0.048 cm^3 10/17/22 1346   Wound Healing % 82 10/17/22 1346   Post-Procedure Length (cm) 1.3 cm 10/17/22 1419   Post-Procedure Width (cm) 0.5 cm 10/17/22 1419   Post-Procedure Depth (cm) 0.2 cm 10/17/22 1419   Post-Procedure Surface Area (cm^2) 0.65 cm^2 10/17/22 1419   Post-Procedure Volume (cm^3) 0.13 cm^3 10/17/22 1419   Wound Assessment Fibrin;Pink/red 10/17/22 1346   Drainage Amount Moderate 10/17/22 1346   Drainage Description Brown 10/17/22 1346   Odor None 10/17/22 1346   Ira-wound Assessment Intact 10/17/22 1346   Margins Attached edges 10/10/22 1401   Wound Thickness Description not for Pressure Injury Full thickness 10/10/22 1401   Number of days: 14          Procedure Note  Indications:  Based on my examination of this patient's wound(s)/ulcer(s) today, debridement is required to promote healing and evaluate the wound base. Performed by: Dorian Machado MD    Consent obtained:  Yes    Time out taken:  Yes    Pain Control: Anesthetic  Anesthetic: 4% Lidocaine Liquid Topical     Debridement:Excisional Debridement    Using curette the wound(s)/ulcer(s) was/were sharply debrided down through and including the removal of epidermis, dermis, and subcutaneous tissue. Devitalized Tissue Debrided:  fibrin and slough to stimulate bleeding to promote healing, post debridement good bleeding base and wound edges noted    Wound/Ulcer #: 1 and 2    Percent of Wound/Ulcer Debrided: 100%    Total Surface Area Debrided:  14 sq cm     Estimated Blood Loss:  Minimal  Hemostasis Achieved:  by pressure    Procedural Pain:  3  / 10   Post Procedural Pain:  3 / 10     Response to treatment:  Well tolerated by patient. Plan:   Treatment Note please see attached Discharge Instructions    Written patient dismissal instructions given to patient and signed by patient or POA. Discharge Instructions         Visit Discharge/Physician Orders     Discharge condition: Stable     Assessment of pain at discharge: Minimal     Anesthetic used: 4% topical lidocaine     Discharge to: [facility]     Left via:Private automobile     Accompanied by: accompanied by son     ECF/HHA: [address]: [phone]     Dressing Orders: To right and left buttock wounds, cleanse with normal saline solution. Apply alginate Ag and cover with duoderm or mepilex dressing (do not cut mepilex). Please pack the right side tunnel @12 with alginate Ag rope. Change daily. Treatment Orders:     CBC and CMP ordered, to be done by home healthcare  Culture reviewed- antibiotic ordered. Take as prescribed for 7 days. KEEP PRESSURE OFF OF WOUNDS     FOLLOW NUTRITIOUS DIET.  CHOOSE FOODS HIGH IN PROTEIN -CHICKEN- FISH-AND EGGS,  CHOOSE FOODS HIGH IN VITAMIN C.   MULTIVITAMIN DAILY. [facility] followup visit _______1 week______________________  (Please note your next appointment above and if you are unable to keep, kindly give a 24 hour notice. Thank you.)     Physician signature:__________________________        If you experience any of the following, please call the [facility] during business hours:     * Increase in Pain  * Temperature over 101  * Increase in drainage from your wound  * Drainage with a foul odor  * Bleeding  * Increase in swelling  * Need for compression bandage changes due to slippage, breakthrough drainage. If you need medical attention outside of the business hours of the [facility] please contact your PCP or go to the nearest emergency room.       Electronically signed by Janette Ram MD on 10/17/2022 at 2:33 PM

## 2022-10-20 NOTE — DISCHARGE INSTRUCTIONS
Visit Discharge/Physician Orders     Discharge condition: Stable     Assessment of pain at discharge: Minimal     Anesthetic used: 4% topical lidocaine     Discharge to: [facility]     Left via:Private automobile     Accompanied by: accompanied by son     JANET/AYANNA: [address]: [phone]     Dressing Orders: To right and left buttock wounds, cleanse with normal saline solution. Apply alginate Ag and cover with duoderm or mepilex dressing (do not cut mepilex). Please pack the right side tunnel @12 with alginate Ag rope. Change daily. Treatment Orders:     CBC and CMP ordered, to be done by home healthcare  Culture reviewed- antibiotic completed     KEEP PRESSURE OFF OF WOUNDS     FOLLOW NUTRITIOUS DIET. CHOOSE FOODS HIGH IN PROTEIN -CHICKEN- FISH-AND EGGS,  CHOOSE FOODS HIGH IN VITAMIN C.   MULTIVITAMIN DAILY. [address] [place] [address] followup visit _______1 week______________________  (Please note your next appointment above and if you are unable to keep, kindly give a 24 hour notice. Thank you.)     Physician signature:__________________________        If you experience any of the following, please call the [address] [address] during business hours:     * Increase in Pain  * Temperature over 101  * Increase in drainage from your wound  * Drainage with a foul odor  * Bleeding  * Increase in swelling  * Need for compression bandage changes due to slippage, breakthrough drainage. If you need medical attention outside of the business hours of the [address] [address] please contact your PCP or go to the nearest emergency room.

## 2022-10-24 ENCOUNTER — HOSPITAL ENCOUNTER (OUTPATIENT)
Dept: WOUND CARE | Age: 87
Discharge: HOME | End: 2022-10-24
Payer: MEDICARE

## 2022-10-24 DIAGNOSIS — S31.819A WOUND OF RIGHT BUTTOCK, INITIAL ENCOUNTER: ICD-10-CM

## 2022-10-24 DIAGNOSIS — S31.829A BUTTOCK WOUND, LEFT, INITIAL ENCOUNTER: Primary | ICD-10-CM

## 2022-10-24 DIAGNOSIS — S31.829D WOUND OF LEFT BUTTOCK, SUBSEQUENT ENCOUNTER: ICD-10-CM

## 2022-10-24 PROCEDURE — 11042 DBRDMT SUBQ TIS 1ST 20SQCM/<: CPT | Performed by: SURGERY

## 2022-10-24 PROCEDURE — 11042 DBRDMT SUBQ TIS 1ST 20SQCM/<: CPT

## 2022-10-24 RX ORDER — BACITRACIN, NEOMYCIN, POLYMYXIN B 400; 3.5; 5 [USP'U]/G; MG/G; [USP'U]/G
OINTMENT TOPICAL ONCE
Status: CANCELLED | OUTPATIENT
Start: 2022-10-24 | End: 2022-10-24

## 2022-10-24 RX ORDER — GENTAMICIN SULFATE 1 MG/G
OINTMENT TOPICAL ONCE
Status: CANCELLED | OUTPATIENT
Start: 2022-10-24 | End: 2022-10-24

## 2022-10-24 RX ORDER — BETAMETHASONE DIPROPIONATE 0.05 %
OINTMENT (GRAM) TOPICAL ONCE
Status: CANCELLED | OUTPATIENT
Start: 2022-10-24 | End: 2022-10-24

## 2022-10-24 RX ORDER — LIDOCAINE 40 MG/G
CREAM TOPICAL ONCE
Status: CANCELLED | OUTPATIENT
Start: 2022-10-24 | End: 2022-10-24

## 2022-10-24 RX ORDER — LIDOCAINE HYDROCHLORIDE 20 MG/ML
JELLY TOPICAL ONCE
Status: CANCELLED | OUTPATIENT
Start: 2022-10-24 | End: 2022-10-24

## 2022-10-24 RX ORDER — LIDOCAINE HYDROCHLORIDE 40 MG/ML
SOLUTION TOPICAL ONCE
Status: DISCONTINUED | OUTPATIENT
Start: 2022-10-24 | End: 2022-10-25 | Stop reason: HOSPADM

## 2022-10-24 RX ORDER — GINSENG 100 MG
CAPSULE ORAL ONCE
Status: CANCELLED | OUTPATIENT
Start: 2022-10-24 | End: 2022-10-24

## 2022-10-24 RX ORDER — LIDOCAINE 50 MG/G
OINTMENT TOPICAL ONCE
Status: CANCELLED | OUTPATIENT
Start: 2022-10-24 | End: 2022-10-24

## 2022-10-24 RX ORDER — BACITRACIN ZINC AND POLYMYXIN B SULFATE 500; 1000 [USP'U]/G; [USP'U]/G
OINTMENT TOPICAL ONCE
Status: CANCELLED | OUTPATIENT
Start: 2022-10-24 | End: 2022-10-24

## 2022-10-24 RX ORDER — CLOBETASOL PROPIONATE 0.5 MG/G
OINTMENT TOPICAL ONCE
Status: CANCELLED | OUTPATIENT
Start: 2022-10-24 | End: 2022-10-24

## 2022-10-24 RX ORDER — LIDOCAINE HYDROCHLORIDE 40 MG/ML
SOLUTION TOPICAL ONCE
Status: CANCELLED | OUTPATIENT
Start: 2022-10-24 | End: 2022-10-24

## 2022-10-24 NOTE — PROGRESS NOTES
Wound Healing Center Followup Visit Note    Referring Physician : Anthony Muro, DO  [address] [facility] RECORD NUMBER:  [id number]  AGE: 80 y.o. GENDER: female  : 1927  EPISODE DATE:  10/24/2022    Subjective:     Chief Complaint   Patient presents with    Wound Check      HISTORY of PRESENT ILLNESS HPI   Kenrick Winn is a 80 y.o. female who presents today in regards to follow up evaluation and treatment of wound/ulcer. That patient's past medical, family and social hx were reviewed and changes were made if present. History of Wound Context:  The patient has had a wounds of both buttocks, which was first noted approximately several weeks. This has been treated by the patient in the facility. On their initial visit to the wound healing center, 10/03/22, the patient has noted that the wound has not been improving.   The patient has not had similar previous wounds in the past.       It is noted, patient lives by herself at the assisted care area and does not appear to be ambulatory tells me that she can walk short distances slowly around the facility with the help of a walker with 4 wheels     She tells me she is trying to sleep on the sides and avoid sleeping on the back to avoid bedsores     Pt is currently not on abx.       10/10/2022  Wound stable, still has some exudate, wound cultures reviewed, light growth of multiple bacteria, mostly resistant to various antibiotics empirically treated for 7 days with Levaquin for now and if infection persists at the wound worsen, we will get a formal ID consult, discussed with the patient, again regarding offloading the ulcer area and to avoid sitting in a chair  10/17/2022  Wound looks visibly improved, patient, has tunneling of the right buttock wound, towards the 12 o'clock position by about 2 cm, rediscussed the patient on importance of not sleeping on the back, also not to sit for long prolonged times  10/24/2022  Wounds look improved, less tunneling today, rediscussed the patient regarding the importance of offloading     Wound/Ulcer Pain Timing/Severity: constant  Quality of pain: dull, aching  Severity:  3 / 10   Modifying Factors: None  Associated Signs/Symptoms: drainage and pain     Ulcer Identification:  Ulcer Type: pressure and non-healing/non-surgical  Contributing Factors: chronic pressure, decreased mobility, and shear force, diabetes mellitus     Diabetic/Pressure/Non Pressure Ulcers only:  Ulcer: Patient does have diabetes mellitus 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     If patient has diabetic lower extremity wounds  Stearns Classification of diabetic lower extremity wounds:     Grade Description   []  0 No open wound   []  1 Superficial ulcer involving the full skin thickness   []  2 Deep ulcer involves ligament, tendon, joint capsule, or fascia  No bone involvement or abscess presence   []  3 Deep Ulcer with abcess formation and/or osteomyelitis   []  4 Localized gangrene   []  5 Extensive gangrene of the foot      Wound: Patient does have decubitus ulcers, right buttock more than the left buttock with some exudate     Other pertinent information:  Recent lab work was reviewed, does have elevated blood glucose levels, normal creatinine and BUN  Last ankle-brachial index was reviewed, as well as last angioplasty done by Dr. 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 ([facility] [address] 5/18/2021    Bronchitis 5/23/2017    Bruising tendency     Buttock wound, left, initial encounter 10/3/2022    CAD (coronary artery disease)     Cough 5/23/2017    COVID     Dermatophytosis 6/25/2021    Diabetes mellitus ([address]     GERD (gastroesophageal reflux disease) 5/23/2017    History of GI bleed 10/3/2022    History of pulmonary embolus (PE) 5/29/2021    Hx of blood clots     Hyperlipidemia     Hypertension     Leg swelling 7/15/2021    Lung disease     Peripheral vascular angioplasty status 5/29/2021    Pseudoaneurysm of right femoral artery ([address] 5/29/2021    PVD (peripheral vascular disease) with claudication ([address] 4/10/2019    Restless legs syndrome     Rhinitis, allergic 5/23/2017    Sinusitis 5/23/2017    SOB (shortness of breath) 5/23/2017    Type 1 diabetes mellitus with left diabetic foot ulcer ([address] 5/18/2021    Ulcerated, foot, left, limited to breakdown of skin ([address] 6/25/2021    Urinary incontinence     Wound of left buttock 10/10/2022    Wound of right buttock 10/3/2022    Fat layer     Past Surgical History:   Procedure Laterality Date    ABDOMEN SURGERY      APPENDECTOMY      CARDIAC SURGERY      CHOLECYSTECTOMY      COLONOSCOPY      CORONARY ARTERY BYPASS GRAFT      8/28/10    ENDOSCOPY, COLON, DIAGNOSTIC      FOOT DEBRIDEMENT Left 5/16/2021    FOOT DEBRIDEMENT INCISION AND DRAINAGE.    performed by Ramona Chowdhury DPM at [address] [place] [address] Left 5/21/2021    LEFT FOOT DEBRIDEMENT  WITH DELAYED PRIMARY CLOSURE performed by Peyton Greene DPM at [address] [place] [address] ([address] [place] [address])      frankie and bso 1997    TONSILLECTOMY AND ADENOIDECTOMY      UPPER GASTROINTESTINAL ENDOSCOPY N/A 7/18/2022    EGD ESOPHAGOGASTRODUODENOSCOPY performed by Caty Swanson MD at [facility] ENDOSCOPY     Family History   Problem Relation Age of Onset    Hypertension Father     Heart Disease Brother      Social History     Tobacco Use    Smoking status: Never    Smokeless tobacco: Never   Vaping Use    Vaping Use: Never used   Substance Use Topics Alcohol use: Yes     Comment: occasional    Drug use: No     Allergies   Allergen Reactions    Ativan [Lorazepam] Hallucinations     Family reports adverse reaction to benzodiazepines. Hallucination, restlessness    Lisinopril Other (See Comments)     7/18/19 Pt states that she does not want to take LISINOPRIL     Current Outpatient Medications on File Prior to Encounter   Medication Sig Dispense Refill    ferrous sulfate (IRON 325) 325 (65 Fe) MG tablet Take 1 tablet by mouth daily 30 tablet 3    acetaminophen (TYLENOL) 650 MG suppository Place 650 mg rectally every 4 hours as needed for Fever      Baclofen (LIORESAL) 5 MG tablet Take 5 mg by mouth as needed (muscle spasms, hiccups)      docusate sodium (COLACE) 100 MG capsule Take 100 mg by mouth daily as needed for Constipation      dexamethasone (DECADRON) 2 MG tablet Take 2 mg by mouth daily as needed (itching)      melatonin 5 MG TABS tablet Take 5 mg by mouth nightly      HYDROcodone-acetaminophen (NORCO) 5-325 MG per tablet Take 1 tablet by mouth every 6 hours as needed for Pain.      pantoprazole sodium (PROTONIX) 40 MG PACK packet Take 40 mg by mouth every morning (before breakfast)      acetaminophen (TYLENOL) 325 MG tablet Take 650 mg by mouth every 4 hours as needed for Fever      Artificial Tear Solution (SOOTHE XP) SOLN Apply 1 drop to eye 2 times daily      amLODIPine (NORVASC) 10 MG tablet Take 1 tablet by mouth in the morning. 30 tablet 3    [DISCONTINUED] losartan (COZAAR) 25 MG tablet Take 1 tablet by mouth in the morning. (Patient taking differently: Take 25 mg by mouth daily Hold for SBP <100) 30 tablet 3    [DISCONTINUED] torsemide (DEMADEX) 10 MG tablet Take 1 tablet by mouth in the morning. (Patient not taking: Reported on 8/27/2022) 30 tablet 3    [DISCONTINUED] apixaban (ELIQUIS) 5 MG TABS tablet Take 0.5 tablets by mouth in the morning and 0.5 tablets before bedtime.  (Patient not taking: Reported on 8/27/2022) 60 tablet 0    polyethylene glycol (GLYCOLAX) 17 g packet Take 17 g by mouth daily as needed for Constipation      senna (SENOKOT) 8.6 MG tablet Take 1 tablet by mouth daily as needed for Constipation      polyvinyl alcohol (LIQUIFILM TEARS) 1.4 % ophthalmic solution Place 1 drop into both eyes 2 times daily as needed      [DISCONTINUED] metFORMIN (GLUCOPHAGE) 500 MG tablet Take 500 mg by mouth daily (Patient not taking: Reported on 8/27/2022)      [DISCONTINUED] famotidine (PEPCID) 10 MG tablet Take 10 mg by mouth daily  (Patient not taking: Reported on 7/18/2022)      [DISCONTINUED] Ginger, Zingiber officinalis, (GINGER ROOT PO) Take 750 mg by mouth Daily in am (Patient not taking: Reported on 8/27/2022)      [DISCONTINUED] b complex vitamins capsule Take 1 capsule by mouth daily In the morning for supplement (Patient not taking: Reported on 8/27/2022)      [DISCONTINUED] aspirin 81 MG EC tablet Take 1 tablet by mouth daily 30 tablet 0    aluminum & magnesium hydroxide-simethicone (MYLANTA) 400-400-40 MG/5ML SUSP Take 30 mLs by mouth every 6 hours as needed      lidocaine 4 % external patch Apply topically daily as needed for Pain Apply topically as needed to painful muscles      [DISCONTINUED] diclofenac sodium (VOLTAREN) 1 % GEL Apply topically 4 times daily as needed for Pain (Patient not taking: Reported on 8/27/2022)      bisacodyl (DULCOLAX) 10 MG suppository Place 10 mg rectally daily as needed for Constipation Indications: IF NO RESULTS FROM MOM  (Patient not taking: No sig reported)      gabapentin (NEURONTIN) 250 MG/5ML solution Take 300 mg by mouth every evening.        [DISCONTINUED] albuterol (PROVENTIL) 90 MCG/ACT inhaler Inhale 2 puffs into the lungs 4 times daily (Patient not taking: No sig reported)  0    [DISCONTINUED] pravastatin (PRAVACHOL) 20 MG tablet TAKE 1 TABLET BY MOUTH  NIGHTLY (Patient not taking: Reported on 8/27/2022) 90 tablet 1    [DISCONTINUED] fluticasone-vilanterol (BREO ELLIPTA) 100-25 MCG/INH AEPB inhaler Inhale 1 puff into the lungs daily (Patient not taking: Reported on 8/27/2022) 3 each 5    [DISCONTINUED] magnesium gluconate (MAGONATE) 500 MG tablet Take 200 mg by mouth 2 times daily  (Patient not taking: Reported on 8/27/2022)       No current facility-administered medications on file prior to encounter.        REVIEW OF SYSTEMS See HPI    Objective:    LMP 12/03/1997   Wt Readings from Last 3 Encounters:   10/03/22 124 lb (56.2 kg)   08/26/22 116 lb (52.6 kg)   07/30/22 126 lb 1.7 oz (57.2 kg)     PHYSICAL EXAM  CONSTITUTIONAL:   Awake, alert, cooperative   EYES:  lids and lashes normal   ENT: external ears and nose without lesions   NECK:  supple, symmetrical, trachea midline   SKIN:  Open wound Present    Assessment:     Problem List Items Addressed This Visit          High    Wound of left buttock    Wound of right buttock    Relevant Medications    lidocaine (XYLOCAINE) 4 % external solution    Other Relevant Orders    Initiate Outpatient Wound Care Protocol       Unprioritized    Buttock wound, left, initial encounter - Primary    Relevant Medications    lidocaine (XYLOCAINE) 4 % external solution    Other Relevant Orders    Initiate Outpatient Wound Care Protocol       Pre Debridement Measurements:  Are located in the [place]  Documentation Flow Sheet  Post Debridement Measurements:  Wound/Ulcer Descriptions are Pre Debridement except measurements:    Wound 10/03/22 Buttocks Right #1 (Active)   Wound Image   10/03/22 1417   Dressing Status New dressing applied 10/17/22 1436   Wound Cleansed Cleansed with saline 10/17/22 1436   Dressing/Treatment Alginate with Ag 10/17/22 1436   Offloading for Diabetic Foot Ulcers Other (comment) 10/17/22 1436   Wound Length (cm) 4.2 cm 10/24/22 1349   Wound Width (cm) 1.9 cm 10/24/22 1349   Wound Depth (cm) 0.4 cm 10/24/22 1349   Wound Surface Area (cm^2) 7.98 cm^2 10/24/22 1349   Change in Wound Size % (l*w) 43 10/24/22 1349   Wound Volume (cm^3) 3.192 cm^3 10/24/22 1349   Wound Healing % 24 10/24/22 1349   Post-Procedure Length (cm) 4.3 cm 10/24/22 1405   Post-Procedure Width (cm) 2 cm 10/24/22 1405   Post-Procedure Depth (cm) 0.5 cm 10/24/22 1405   Post-Procedure Surface Area (cm^2) 8.6 cm^2 10/24/22 1405   Post-Procedure Volume (cm^3) 4.3 cm^3 10/24/22 1405   Tunneling Position ___ [place] [email or web address] 10/17/22 1419   Wound Assessment Pink/red;Fibrin 10/24/22 1349   Drainage Amount Moderate 10/24/22 1349   Drainage Description Brown 10/24/22 1349   Odor None 10/17/22 1346   Ira-wound Assessment Blanchable erythema 10/24/22 1349   Margins Attached edges 10/10/22 1401   Wound Thickness Description not for Pressure Injury Full thickness 10/10/22 1401   Number of days: 21       Wound 10/03/22 Buttocks Left #2 (Active)   Wound Image   10/03/22 1417   Dressing Status New dressing applied 10/10/22 1505   Wound Cleansed Cleansed with saline 10/10/22 1505   Dressing/Treatment Alginate with Ag;Hydrocolloid 10/10/22 1505   Offloading for Diabetic Foot Ulcers Other (comment) 10/10/22 1505   Wound Length (cm) 0.4 cm 10/24/22 1349   Wound Width (cm) 0.2 cm 10/24/22 1349   Wound Depth (cm) 0.1 cm 10/24/22 1349   Wound Surface Area (cm^2) 0.08 cm^2 10/24/22 1349   Change in Wound Size % (l*w) 96.97 10/24/22 1349   Wound Volume (cm^3) 0.008 cm^3 10/24/22 1349   Wound Healing % 97 10/24/22 1349   Post-Procedure Length (cm) 0.5 cm 10/24/22 1405   Post-Procedure Width (cm) 0.3 cm 10/24/22 1405   Post-Procedure Depth (cm) 0.2 cm 10/24/22 1405   Post-Procedure Surface Area (cm^2) 0.15 cm^2 10/24/22 1405   Post-Procedure Volume (cm^3) 0.03 cm^3 10/24/22 1405   Wound Assessment Fibrin;Pink/red 10/24/22 1349   Drainage Amount Moderate 10/24/22 1349   Drainage Description Brown 10/24/22 1349   Odor None 10/24/22 1349   Ira-wound Assessment Blanchable erythema 10/24/22 1349   Margins Attached edges 10/10/22 1401   Wound Thickness Description not for Pressure Injury Full thickness 10/10/22 1401   Number of days: 20          Procedure Note  Indications:  Based on my examination of this patient's wound(s)/ulcer(s) today, debridement is required to promote healing and evaluate the wound base. Performed by: Dalila Payne MD    Consent obtained:  Yes    Time out taken:  Yes    Pain Control:       Debridement:Excisional Debridement    Using curette the wound(s)/ulcer(s) was/were sharply debrided down through and including the removal of epidermis, dermis, and subcutaneous tissue. Devitalized Tissue Debrided:  fibrin and slough to stimulate bleeding to promote healing, post debridement good bleeding base and wound edges noted    Wound/Ulcer #: 1 and 2    Percent of Wound/Ulcer Debrided: 100%    Total Surface Area Debrided:  9 sq cm     Estimated Blood Loss:  Minimal  Hemostasis Achieved:  by pressure    Procedural Pain:  3  / 10   Post Procedural Pain:  3 / 10     Response to treatment:  Well tolerated by patient. Plan:   Treatment Note please see attached Discharge Instructions    Written patient dismissal instructions given to patient and signed by patient or POA. Discharge Instructions         Visit Discharge/Physician Orders     Discharge condition: Stable     Assessment of pain at discharge: Minimal     Anesthetic used: 4% topical lidocaine     Discharge to: [facility]     Left via:Private automobile     Accompanied by: accompanied by son     ECF/HHA: [address]: [phone]     Dressing Orders: To right and left buttock wounds, cleanse with normal saline solution. Apply alginate Ag and cover with duoderm or mepilex dressing (do not cut mepilex). Please pack the right side tunnel @12 with alginate Ag rope. Change daily. Treatment Orders:     CBC and CMP ordered, to be done by home healthcare  Culture reviewed- antibiotic completed     KEEP PRESSURE OFF OF WOUNDS     FOLLOW NUTRITIOUS DIET.  CHOOSE FOODS HIGH IN PROTEIN -CHICKEN- FISH-AND EGGS, CHOOSE FOODS HIGH IN VITAMIN C.   MULTIVITAMIN DAILY. [address] [place] [address],[address] followup visit _______1 week______________________  (Please note your next appointment above and if you are unable to keep, kindly give a 24 hour notice. Thank you.)     Physician signature:__________________________        If you experience any of the following, please call the [facility] [facility] during business hours:     * Increase in Pain  * Temperature over 101  * Increase in drainage from your wound  * Drainage with a foul odor  * Bleeding  * Increase in swelling  * Need for compression bandage changes due to slippage, breakthrough drainage. If you need medical attention outside of the business hours of the [facility] please contact your PCP or go to the nearest emergency room.          Electronically signed by Yomaira Zafar MD on 10/24/2022 at 2:15 PM

## 2022-10-25 NOTE — DISCHARGE INSTRUCTIONS
Visit Discharge/Physician Orders     Discharge condition: Stable     Assessment of pain at discharge: Minimal     Anesthetic used: 4% topical lidocaine     Discharge to: [facility]     Left via:Private automobile     Accompanied by: accompanied by son     JANET/AYANNA: [address]: [phone]     Dressing Orders: To right buttock wounds, cleanse with normal saline solution. Apply alginate Ag and cover with duoderm or mepilex dressing (do not cut mepilex). Please pack the right side tunnel @12 with alginate Ag rope. Change daily. Left Buttock wound healed 10/31/22. Treatment Orders:     CBC and CMP ordered, to be done by home healthcare  Culture reviewed- antibiotic completed     KEEP PRESSURE OFF OF WOUNDS     FOLLOW NUTRITIOUS DIET. CHOOSE FOODS HIGH IN PROTEIN -CHICKEN- FISH-AND EGGS,  CHOOSE FOODS HIGH IN VITAMIN C.   MULTIVITAMIN DAILY. [address] [place] [address],[address] followup visit _______1 week______________________  (Please note your next appointment above and if you are unable to keep, kindly give a 24 hour notice. Thank you.)     Physician signature:__________________________        If you experience any of the following, please call the [facility] [address] during business hours:     * Increase in Pain  * Temperature over 101  * Increase in drainage from your wound  * Drainage with a foul odor  * Bleeding  * Increase in swelling  * Need for compression bandage changes due to slippage, breakthrough drainage. If you need medical attention outside of the business hours of the [facility] [address] please contact your PCP or go to the nearest emergency room.

## 2022-10-31 ENCOUNTER — HOSPITAL ENCOUNTER (OUTPATIENT)
Dept: WOUND CARE | Age: 87
Discharge: HOME | End: 2022-10-31
Payer: MEDICARE

## 2022-10-31 VITALS
TEMPERATURE: 95.9 F | HEART RATE: 60 BPM | DIASTOLIC BLOOD PRESSURE: 74 MMHG | SYSTOLIC BLOOD PRESSURE: 117 MMHG | RESPIRATION RATE: 18 BRPM | WEIGHT: 124 LBS | BODY MASS INDEX: 21.97 KG/M2 | HEIGHT: 63 IN

## 2022-10-31 DIAGNOSIS — S31.829D WOUND OF LEFT BUTTOCK, SUBSEQUENT ENCOUNTER: ICD-10-CM

## 2022-10-31 DIAGNOSIS — S31.829A BUTTOCK WOUND, LEFT, INITIAL ENCOUNTER: Primary | ICD-10-CM

## 2022-10-31 DIAGNOSIS — S31.819A WOUND OF RIGHT BUTTOCK, INITIAL ENCOUNTER: ICD-10-CM

## 2022-10-31 PROCEDURE — 11042 DBRDMT SUBQ TIS 1ST 20SQCM/<: CPT | Performed by: SURGERY

## 2022-10-31 PROCEDURE — 11042 DBRDMT SUBQ TIS 1ST 20SQCM/<: CPT

## 2022-10-31 RX ORDER — LIDOCAINE HYDROCHLORIDE 20 MG/ML
JELLY TOPICAL ONCE
Status: CANCELLED | OUTPATIENT
Start: 2022-10-31 | End: 2022-10-31

## 2022-10-31 RX ORDER — BACITRACIN, NEOMYCIN, POLYMYXIN B 400; 3.5; 5 [USP'U]/G; MG/G; [USP'U]/G
OINTMENT TOPICAL ONCE
Status: CANCELLED | OUTPATIENT
Start: 2022-10-31 | End: 2022-10-31

## 2022-10-31 RX ORDER — LIDOCAINE 50 MG/G
OINTMENT TOPICAL ONCE
Status: CANCELLED | OUTPATIENT
Start: 2022-10-31 | End: 2022-10-31

## 2022-10-31 RX ORDER — BACITRACIN ZINC AND POLYMYXIN B SULFATE 500; 1000 [USP'U]/G; [USP'U]/G
OINTMENT TOPICAL ONCE
Status: CANCELLED | OUTPATIENT
Start: 2022-10-31 | End: 2022-10-31

## 2022-10-31 RX ORDER — GENTAMICIN SULFATE 1 MG/G
OINTMENT TOPICAL ONCE
Status: CANCELLED | OUTPATIENT
Start: 2022-10-31 | End: 2022-10-31

## 2022-10-31 RX ORDER — LIDOCAINE HYDROCHLORIDE 40 MG/ML
SOLUTION TOPICAL ONCE
Status: CANCELLED | OUTPATIENT
Start: 2022-10-31 | End: 2022-10-31

## 2022-10-31 RX ORDER — LIDOCAINE 40 MG/G
CREAM TOPICAL ONCE
Status: CANCELLED | OUTPATIENT
Start: 2022-10-31 | End: 2022-10-31

## 2022-10-31 RX ORDER — GINSENG 100 MG
CAPSULE ORAL ONCE
Status: CANCELLED | OUTPATIENT
Start: 2022-10-31 | End: 2022-10-31

## 2022-10-31 RX ORDER — CLOBETASOL PROPIONATE 0.5 MG/G
OINTMENT TOPICAL ONCE
Status: CANCELLED | OUTPATIENT
Start: 2022-10-31 | End: 2022-10-31

## 2022-10-31 RX ORDER — LIDOCAINE HYDROCHLORIDE 40 MG/ML
SOLUTION TOPICAL ONCE
Status: COMPLETED | OUTPATIENT
Start: 2022-10-31 | End: 2022-10-31

## 2022-10-31 RX ORDER — BETAMETHASONE DIPROPIONATE 0.05 %
OINTMENT (GRAM) TOPICAL ONCE
Status: CANCELLED | OUTPATIENT
Start: 2022-10-31 | End: 2022-10-31

## 2022-10-31 RX ADMIN — LIDOCAINE HYDROCHLORIDE 5 ML: 40 SOLUTION TOPICAL at 13:10

## 2022-10-31 ASSESSMENT — PAIN SCALES - GENERAL: PAINLEVEL_OUTOF10: 0

## 2022-10-31 NOTE — PROGRESS NOTES
Wound Healing Center Followup Visit Note    Referring Physician : Tyronne Closs, DO  [address] [facility] RECORD NUMBER:  [id number]  AGE: 80 y.o. GENDER: female  : 1927  EPISODE DATE:  10/31/2022    Subjective:     Chief Complaint   Patient presents with    Wound Check     Buttocks        HISTORY of PRESENT ILLNESS HPI   Queen Melva is a 80 y.o. female who presents today in regards to follow up evaluation and treatment of wound/ulcer. That patient's past medical, family and social hx were reviewed and changes were made if present. History of Wound Context:  The patient has had a wounds of both buttocks, which was first noted approximately several weeks. This has been treated by the patient in the facility. On their initial visit to the wound healing center, 10/03/22, the patient has noted that the wound has not been improving.   The patient has not had similar previous wounds in the past.       It is noted, patient lives by herself at the assisted care area and does not appear to be ambulatory tells me that she can walk short distances slowly around the facility with the help of a walker with 4 wheels     She tells me she is trying to sleep on the sides and avoid sleeping on the back to avoid bedsores     Pt is currently not on abx.       10/10/2022  Wound stable, still has some exudate, wound cultures reviewed, light growth of multiple bacteria, mostly resistant to various antibiotics empirically treated for 7 days with Levaquin for now and if infection persists at the wound worsen, we will get a formal ID consult, discussed with the patient, again regarding offloading the ulcer area and to avoid sitting in a chair  10/17/2022  Wound looks visibly improved, patient, has tunneling of the right buttock wound, towards the 12 o'clock position by about 2 cm, rediscussed the patient on importance of not sleeping on the back, also not to sit for long prolonged times  10/24/2022  Wounds look improved, less tunneling today, rediscussed the patient regarding the importance of offloading  10/31/2022  Left buttock wound almost healed, right buttock wound improving  Wound/Ulcer Pain Timing/Severity: constant  Quality of pain: dull, aching  Severity:  3 / 10   Modifying Factors: None  Associated Signs/Symptoms: drainage and pain     Ulcer Identification:  Ulcer Type: pressure and non-healing/non-surgical  Contributing Factors: chronic pressure, decreased mobility, and shear force, diabetes mellitus     Diabetic/Pressure/Non Pressure Ulcers only:  Ulcer: Patient does have diabetes mellitus 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     If patient has diabetic lower extremity wounds  Stearns Classification of diabetic lower extremity wounds:     Grade Description   []  0 No open wound   []  1 Superficial ulcer involving the full skin thickness   []  2 Deep ulcer involves ligament, tendon, joint capsule, or fascia  No bone involvement or abscess presence   []  3 Deep Ulcer with abcess formation and/or osteomyelitis   []  4 Localized gangrene   []  5 Extensive gangrene of the foot      Wound: Patient does have decubitus ulcers, right buttock more than the left buttock with some exudate     Other pertinent information:  Recent lab work was reviewed, does have elevated blood glucose levels, normal creatinine and BUN  Last ankle-brachial index was reviewed, as well as last angioplasty done by Dr. Josue Elena        10/3/2022  The patient was explained, the etiology, of pressure necrosis, the importance of offloading the area, informed her that she needs to sleep on the sides only and also avoid sitting for prolonged time and sit only for the minimal amount of time, all the importance of improved nutrition from protein supplements and multivitamin supplements were discussed                 PAST MEDICAL HISTORY      Diagnosis Date    Arthritis     Asthma     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 ([address] 5/18/2021    Bronchitis 5/23/2017    Bruising tendency     Buttock wound, left, initial encounter 10/3/2022    CAD (coronary artery disease)     Cough 5/23/2017    COVID     Dermatophytosis 6/25/2021    Diabetes mellitus ([address]     GERD (gastroesophageal reflux disease) 5/23/2017    History of GI bleed 10/3/2022    History of pulmonary embolus (PE) 5/29/2021    Hx of blood clots     Hyperlipidemia     Hypertension     Leg swelling 7/15/2021    Lung disease     Peripheral vascular angioplasty status 5/29/2021    Pseudoaneurysm of right femoral artery ([address] 5/29/2021    PVD (peripheral vascular disease) with claudication ([address] 4/10/2019    Restless legs syndrome     Rhinitis, allergic 5/23/2017    Sinusitis 5/23/2017    SOB (shortness of breath) 5/23/2017    Type 1 diabetes mellitus with left diabetic foot ulcer ([address] 5/18/2021    Ulcerated, foot, left, limited to breakdown of skin ([address] 6/25/2021    Urinary incontinence     Wound of left buttock 10/10/2022    Wound of right buttock 10/3/2022    Fat layer     Past Surgical History:   Procedure Laterality Date    ABDOMEN SURGERY      APPENDECTOMY      CARDIAC SURGERY      CHOLECYSTECTOMY      COLONOSCOPY      CORONARY ARTERY BYPASS GRAFT      8/28/10    ENDOSCOPY, COLON, DIAGNOSTIC      FOOT DEBRIDEMENT Left 5/16/2021    FOOT DEBRIDEMENT INCISION AND DRAINAGE.    performed by Jace Shoemaker DPM at [address] Left 5/21/2021    LEFT FOOT DEBRIDEMENT  WITH DELAYED PRIMARY CLOSURE performed by Maggie Simon DPM at [facility] (CERVIX STATUS UNKNOWN)      frankie and bso 1997    TONSILLECTOMY AND ADENOIDECTOMY      UPPER GASTROINTESTINAL ENDOSCOPY N/A 7/18/2022    EGD ESOPHAGOGASTRODUODENOSCOPY performed by Mary Taylor MD at [facility] ENDOSCOPY     Family History   Problem Relation Age of Onset    Hypertension Father     Heart Disease Brother      Social History     Tobacco Use    Smoking status: Never Smokeless tobacco: Never   Vaping Use    Vaping Use: Never used   Substance Use Topics    Alcohol use: Yes     Comment: occasional    Drug use: No     Allergies   Allergen Reactions    Ativan [Lorazepam] Hallucinations     Family reports adverse reaction to benzodiazepines. Hallucination, restlessness    Lisinopril Other (See Comments)     7/18/19 Pt states that she does not want to take LISINOPRIL     Current Outpatient Medications on File Prior to Encounter   Medication Sig Dispense Refill    ferrous sulfate (IRON 325) 325 (65 Fe) MG tablet Take 1 tablet by mouth daily 30 tablet 3    acetaminophen (TYLENOL) 650 MG suppository Place 650 mg rectally every 4 hours as needed for Fever      Baclofen (LIORESAL) 5 MG tablet Take 5 mg by mouth as needed (muscle spasms, hiccups)      docusate sodium (COLACE) 100 MG capsule Take 100 mg by mouth daily as needed for Constipation      dexamethasone (DECADRON) 2 MG tablet Take 2 mg by mouth daily as needed (itching)      melatonin 5 MG TABS tablet Take 5 mg by mouth nightly      HYDROcodone-acetaminophen (NORCO) 5-325 MG per tablet Take 1 tablet by mouth every 6 hours as needed for Pain.      pantoprazole sodium (PROTONIX) 40 MG PACK packet Take 40 mg by mouth every morning (before breakfast)      acetaminophen (TYLENOL) 325 MG tablet Take 650 mg by mouth every 4 hours as needed for Fever      Artificial Tear Solution (SOOTHE XP) SOLN Apply 1 drop to eye 2 times daily      amLODIPine (NORVASC) 10 MG tablet Take 1 tablet by mouth in the morning. 30 tablet 3    [DISCONTINUED] losartan (COZAAR) 25 MG tablet Take 1 tablet by mouth in the morning. (Patient taking differently: Take 25 mg by mouth daily Hold for SBP <100) 30 tablet 3    [DISCONTINUED] torsemide (DEMADEX) 10 MG tablet Take 1 tablet by mouth in the morning.  (Patient not taking: Reported on 8/27/2022) 30 tablet 3    [DISCONTINUED] apixaban (ELIQUIS) 5 MG TABS tablet Take 0.5 tablets by mouth in the morning and 0.5 tablets before bedtime. (Patient not taking: Reported on 8/27/2022) 60 tablet 0    polyethylene glycol (GLYCOLAX) 17 g packet Take 17 g by mouth daily as needed for Constipation      senna (SENOKOT) 8.6 MG tablet Take 1 tablet by mouth daily as needed for Constipation      polyvinyl alcohol (LIQUIFILM TEARS) 1.4 % ophthalmic solution Place 1 drop into both eyes 2 times daily as needed      [DISCONTINUED] metFORMIN (GLUCOPHAGE) 500 MG tablet Take 500 mg by mouth daily (Patient not taking: Reported on 8/27/2022)      [DISCONTINUED] famotidine (PEPCID) 10 MG tablet Take 10 mg by mouth daily  (Patient not taking: Reported on 7/18/2022)      [DISCONTINUED] Ginger, Zingiber officinalis, (GINGER ROOT PO) Take 750 mg by mouth Daily in am (Patient not taking: Reported on 8/27/2022)      [DISCONTINUED] b complex vitamins capsule Take 1 capsule by mouth daily In the morning for supplement (Patient not taking: Reported on 8/27/2022)      [DISCONTINUED] aspirin 81 MG EC tablet Take 1 tablet by mouth daily 30 tablet 0    aluminum & magnesium hydroxide-simethicone (MYLANTA) 400-400-40 MG/5ML SUSP Take 30 mLs by mouth every 6 hours as needed      lidocaine 4 % external patch Apply topically daily as needed for Pain Apply topically as needed to painful muscles      [DISCONTINUED] diclofenac sodium (VOLTAREN) 1 % GEL Apply topically 4 times daily as needed for Pain (Patient not taking: Reported on 8/27/2022)      bisacodyl (DULCOLAX) 10 MG suppository Place 10 mg rectally daily as needed for Constipation Indications: IF NO RESULTS FROM MOM  (Patient not taking: No sig reported)      gabapentin (NEURONTIN) 250 MG/5ML solution Take 300 mg by mouth every evening.        [DISCONTINUED] albuterol (PROVENTIL) 90 MCG/ACT inhaler Inhale 2 puffs into the lungs 4 times daily (Patient not taking: No sig reported)  0    [DISCONTINUED] pravastatin (PRAVACHOL) 20 MG tablet TAKE 1 TABLET BY MOUTH  NIGHTLY (Patient not taking: Reported on 8/27/2022) 90 tablet 1    [DISCONTINUED] fluticasone-vilanterol (BREO ELLIPTA) 100-25 MCG/INH AEPB inhaler Inhale 1 puff into the lungs daily (Patient not taking: Reported on 8/27/2022) 3 each 5    [DISCONTINUED] magnesium gluconate (MAGONATE) 500 MG tablet Take 200 mg by mouth 2 times daily  (Patient not taking: Reported on 8/27/2022)       No current facility-administered medications on file prior to encounter. REVIEW OF SYSTEMS See HPI    Objective:    /74   Pulse 60   Temp (!) 95.9 °F (35.5 °C) (Temporal)   Resp 18   Ht 5' 3\" (1.6 m)   Wt 124 lb (56.2 kg)   LMP 12/03/1997   BMI 21.97 kg/m²   Wt Readings from Last 3 Encounters:   10/31/22 124 lb (56.2 kg)   10/03/22 124 lb (56.2 kg)   08/26/22 116 lb (52.6 kg)     PHYSICAL EXAM  CONSTITUTIONAL:   Awake, alert, cooperative   EYES:  lids and lashes normal   ENT: external ears and nose without lesions   NECK:  supple, symmetrical, trachea midline   SKIN:  Open wound Present    Assessment:     Problem List Items Addressed This Visit          High    Wound of left buttock    Wound of right buttock    Relevant Orders    Initiate Outpatient Wound Care Protocol       Unprioritized    Buttock wound, left, initial encounter - Primary    Relevant Orders    Initiate Outpatient Wound Care Protocol       Pre Debridement Measurements:  Are located in the [place]  Documentation Flow Sheet  Post Debridement Measurements:  Wound/Ulcer Descriptions are Pre Debridement except measurements:    Wound 10/03/22 Buttocks Right #1 (Active)   Wound Image   10/31/22 1315   Dressing Status New dressing applied;Clean;Dry; Intact 10/24/22 1427   Wound Cleansed Cleansed with saline 10/24/22 1427   Dressing/Treatment Alginate with Ag;Dry dressing 10/24/22 1427   Offloading for Diabetic Foot Ulcers Other (comment) 10/17/22 1436   Wound Length (cm) 3.7 cm 10/31/22 1315   Wound Width (cm) 1.7 cm 10/31/22 1315   Wound Depth (cm) 0.4 cm 10/31/22 1315   Wound Surface Area (cm^2) 6.29 cm^2 10/31/22 1315   Change in Wound Size % (l*w) 55.07 10/31/22 1315   Wound Volume (cm^3) 2.516 cm^3 10/31/22 1315   Wound Healing % 40 10/31/22 1315   Post-Procedure Length (cm) 3.8 cm 10/31/22 1348   Post-Procedure Width (cm) 1.8 cm 10/31/22 1348   Post-Procedure Depth (cm) 0.5 cm 10/31/22 1348   Post-Procedure Surface Area (cm^2) 6.84 cm^2 10/31/22 1348   Post-Procedure Volume (cm^3) 3.42 cm^3 10/31/22 1348   Tunneling Position ___ O[employer]Clock [email or web address] 10/17/22 1419   Wound Assessment Pink/red;Fibrin 10/31/22 1315   Drainage Amount Moderate 10/31/22 1315   Drainage Description Brown 10/31/22 1315   Odor None 10/31/22 1315   Ira-wound Assessment Blanchable erythema 10/31/22 1315   Margins Attached edges 10/10/22 1401   Wound Thickness Description not for Pressure Injury Full thickness 10/10/22 1401   Number of days: 27       Wound 10/03/22 Buttocks Left #2 (Active)   Wound Image   10/31/22 1315   Dressing Status New dressing applied;Clean;Dry; Intact 10/24/22 1427   Wound Cleansed Cleansed with saline 10/24/22 1427   Dressing/Treatment Alginate with Ag 10/24/22 1427   Offloading for Diabetic Foot Ulcers Other (comment) 10/10/22 1505   Wound Length (cm) 0 cm 10/31/22 1315   Wound Width (cm) 0 cm 10/31/22 1315   Wound Depth (cm) 0 cm 10/31/22 1315   Wound Surface Area (cm^2) 0 cm^2 10/31/22 1315   Change in Wound Size % (l*w) 100 10/31/22 1315   Wound Volume (cm^3) 0 cm^3 10/31/22 1315   Wound Healing % 100 10/31/22 1315   Post-Procedure Length (cm) 0.5 cm 10/24/22 1405   Post-Procedure Width (cm) 0.3 cm 10/24/22 1405   Post-Procedure Depth (cm) 0.2 cm 10/24/22 1405   Post-Procedure Surface Area (cm^2) 0.15 cm^2 10/24/22 1405   Post-Procedure Volume (cm^3) 0.03 cm^3 10/24/22 1405   Wound Assessment Epithelialization 10/31/22 1315   Drainage Amount None 10/31/22 1315   Drainage Description Brown 10/24/22 1349   Odor None 10/31/22 1315   Ira-wound Assessment Fragile; Intact 10/31/22 1315   Margins Attached edges 10/10/22 1401 Wound Thickness Description not for Pressure Injury Full thickness 10/10/22 1401   Number of days: 27          Procedure Note  Indications:  Based on my examination of this patient's wound(s)/ulcer(s) today, debridement is required to promote healing and evaluate the wound base. Performed by: Jitendra Ohara MD    Consent obtained:  Yes    Time out taken:  Yes    Pain Control: Anesthetic  Anesthetic: 4% Lidocaine Liquid Topical     Debridement:Excisional Debridement    Using curette the wound(s)/ulcer(s) was/were sharply debrided down through and including the removal of epidermis, dermis, and subcutaneous tissue. Devitalized Tissue Debrided:  fibrin and slough to stimulate bleeding to promote healing, post debridement good bleeding base and wound edges noted    Wound/Ulcer #: 1    Percent of Wound/Ulcer Debrided: 100%    Total Surface Area Debrided:  6 sq cm     Estimated Blood Loss:  Minimal  Hemostasis Achieved:  by pressure    Procedural Pain:  3  / 10   Post Procedural Pain:  3 / 10     Response to treatment:  Well tolerated by patient. Plan:   Treatment Note please see attached Discharge Instructions    Written patient dismissal instructions given to patient and signed by patient or POA. Discharge Instructions         Visit Discharge/Physician Orders     Discharge condition: Stable     Assessment of pain at discharge: Minimal     Anesthetic used: 4% topical lidocaine     Discharge to: [facility]     Left via:Private automobile     Accompanied by: accompanied by son     FRANCESF/SAEIDA: Jason 97: [phone]     Dressing Orders: To left buttock wounds, cleanse with normal saline solution. Apply alginate Ag and cover with duoderm or mepilex dressing (do not cut mepilex). Please pack the right side tunnel @12 with alginate Ag rope. Change daily. Right Buttock wound healed 10/31/22.      Treatment Orders:     CBC and CMP ordered, to be done by home healthcare  Culture reviewed- antibiotic completed     KEEP PRESSURE OFF OF WOUNDS     FOLLOW NUTRITIOUS DIET. CHOOSE FOODS HIGH IN PROTEIN -CHICKEN- FISH-AND EGGS,  CHOOSE FOODS HIGH IN VITAMIN C.   MULTIVITAMIN DAILY. [address] [facility] followup visit _______1 week______________________  (Please note your next appointment above and if you are unable to keep, kindly give a 24 hour notice. Thank you.)     Physician signature:__________________________        If you experience any of the following, please call the [address] during business hours:     * Increase in Pain  * Temperature over 101  * Increase in drainage from your wound  * Drainage with a foul odor  * Bleeding  * Increase in swelling  * Need for compression bandage changes due to slippage, breakthrough drainage. If you need medical attention outside of the business hours of the [address] please contact your PCP or go to the nearest emergency room.                Electronically signed by Merline Birchwood, MD on 10/31/2022 at 1:55 PM

## 2022-11-02 NOTE — DISCHARGE INSTRUCTIONS
Visit Discharge/Physician Orders     Discharge condition: Stable     Assessment of pain at discharge: Minimal     Anesthetic used: 4% topical lidocaine     Discharge to: [facility]     Left via:Private automobile     Accompanied by: accompanied by son     JANET/AYANNA: Jason 97: [phone]     Dressing Orders: To left buttock wounds, cleanse with normal saline solution. Apply alginate Ag and cover with duoderm or mepilex dressing (do not cut mepilex). Please pack the right side tunnel @12 with alginate Ag rope. Change daily. Right Buttock wound healed 10/31/22. Treatment Orders:     CBC and CMP ordered, to be done by home healthcare  Culture reviewed- antibiotic completed     KEEP PRESSURE OFF OF WOUNDS     FOLLOW NUTRITIOUS DIET. CHOOSE FOODS HIGH IN PROTEIN -CHICKEN- FISH-AND EGGS,  CHOOSE FOODS HIGH IN VITAMIN C.   MULTIVITAMIN DAILY. [facility] followup visit _______1 week______________________  (Please note your next appointment above and if you are unable to keep, kindly give a 24 hour notice. Thank you.)     Physician signature:__________________________        If you experience any of the following, please call the [address] during business hours:     * Increase in Pain  * Temperature over 101  * Increase in drainage from your wound  * Drainage with a foul odor  * Bleeding  * Increase in swelling  * Need for compression bandage changes due to slippage, breakthrough drainage. If you need medical attention outside of the business hours of the [facility] [address] please contact your PCP or go to the nearest emergency room.

## 2022-11-07 ENCOUNTER — HOSPITAL ENCOUNTER (OUTPATIENT)
Dept: WOUND CARE | Age: 87
Discharge: HOME | End: 2022-11-07
Payer: MEDICARE

## 2022-11-07 ENCOUNTER — HOSPITAL ENCOUNTER (EMERGENCY)
Age: 87
Discharge: LEFT BEFORE DISCHARGE | End: 2022-11-07
Payer: MEDICARE

## 2022-11-07 VITALS
WEIGHT: 135 LBS | TEMPERATURE: 97.7 F | HEART RATE: 68 BPM | DIASTOLIC BLOOD PRESSURE: 134 MMHG | RESPIRATION RATE: 16 BRPM | OXYGEN SATURATION: 100 % | HEIGHT: 63 IN | SYSTOLIC BLOOD PRESSURE: 165 MMHG | BODY MASS INDEX: 23.92 KG/M2

## 2022-11-07 VITALS
TEMPERATURE: 96 F | SYSTOLIC BLOOD PRESSURE: 140 MMHG | RESPIRATION RATE: 18 BRPM | DIASTOLIC BLOOD PRESSURE: 70 MMHG | HEART RATE: 66 BPM

## 2022-11-07 DIAGNOSIS — S31.829D WOUND OF LEFT BUTTOCK, SUBSEQUENT ENCOUNTER: ICD-10-CM

## 2022-11-07 DIAGNOSIS — S31.819D WOUND OF RIGHT BUTTOCK, SUBSEQUENT ENCOUNTER: Primary | ICD-10-CM

## 2022-11-07 DIAGNOSIS — S31.819A WOUND OF RIGHT BUTTOCK, INITIAL ENCOUNTER: ICD-10-CM

## 2022-11-07 DIAGNOSIS — S31.829A BUTTOCK WOUND, LEFT, INITIAL ENCOUNTER: ICD-10-CM

## 2022-11-07 PROCEDURE — 11042 DBRDMT SUBQ TIS 1ST 20SQCM/<: CPT

## 2022-11-07 PROCEDURE — 11042 DBRDMT SUBQ TIS 1ST 20SQCM/<: CPT | Performed by: SURGERY

## 2022-11-07 PROCEDURE — 99281 EMR DPT VST MAYX REQ PHY/QHP: CPT

## 2022-11-07 RX ORDER — LIDOCAINE HYDROCHLORIDE 40 MG/ML
SOLUTION TOPICAL ONCE
Status: CANCELLED | OUTPATIENT
Start: 2022-11-07 | End: 2022-11-07

## 2022-11-07 RX ORDER — GENTAMICIN SULFATE 1 MG/G
OINTMENT TOPICAL ONCE
Status: CANCELLED | OUTPATIENT
Start: 2022-11-07 | End: 2022-11-07

## 2022-11-07 RX ORDER — CLOBETASOL PROPIONATE 0.5 MG/G
OINTMENT TOPICAL ONCE
Status: CANCELLED | OUTPATIENT
Start: 2022-11-07 | End: 2022-11-07

## 2022-11-07 RX ORDER — LIDOCAINE 50 MG/G
OINTMENT TOPICAL ONCE
Status: CANCELLED | OUTPATIENT
Start: 2022-11-07 | End: 2022-11-07

## 2022-11-07 RX ORDER — LIDOCAINE HYDROCHLORIDE 20 MG/ML
JELLY TOPICAL ONCE
Status: CANCELLED | OUTPATIENT
Start: 2022-11-07 | End: 2022-11-07

## 2022-11-07 RX ORDER — LIDOCAINE 40 MG/G
CREAM TOPICAL ONCE
Status: CANCELLED | OUTPATIENT
Start: 2022-11-07 | End: 2022-11-07

## 2022-11-07 RX ORDER — BETAMETHASONE DIPROPIONATE 0.05 %
OINTMENT (GRAM) TOPICAL ONCE
Status: CANCELLED | OUTPATIENT
Start: 2022-11-07 | End: 2022-11-07

## 2022-11-07 RX ORDER — BACITRACIN ZINC AND POLYMYXIN B SULFATE 500; 1000 [USP'U]/G; [USP'U]/G
OINTMENT TOPICAL ONCE
Status: CANCELLED | OUTPATIENT
Start: 2022-11-07 | End: 2022-11-07

## 2022-11-07 RX ORDER — GINSENG 100 MG
CAPSULE ORAL ONCE
Status: CANCELLED | OUTPATIENT
Start: 2022-11-07 | End: 2022-11-07

## 2022-11-07 RX ORDER — BACITRACIN, NEOMYCIN, POLYMYXIN B 400; 3.5; 5 [USP'U]/G; MG/G; [USP'U]/G
OINTMENT TOPICAL ONCE
Status: CANCELLED | OUTPATIENT
Start: 2022-11-07 | End: 2022-11-07

## 2022-11-07 RX ORDER — LIDOCAINE HYDROCHLORIDE 40 MG/ML
SOLUTION TOPICAL ONCE
Status: COMPLETED | OUTPATIENT
Start: 2022-11-07 | End: 2022-11-07

## 2022-11-07 RX ADMIN — LIDOCAINE HYDROCHLORIDE 6 ML: 40 SOLUTION TOPICAL at 13:51

## 2022-11-07 NOTE — DISCHARGE INSTRUCTIONS
Visit Discharge/Physician Orders     Discharge condition: Stable     Assessment of pain at discharge: Minimal     Anesthetic used: 4% topical lidocaine     Discharge to: [facility]     Left via:Private automobile     Accompanied by: accompanied by son     JANET/AYANNA: Jason 97: [phone]     Dressing Orders: To left buttock wounds, cleanse with normal saline solution. Apply alginate Ag and cover with duoderm or mepilex dressing (do not cut mepilex). Please pack any tunneling with alginate Ag rope. Change daily. Right Buttock wound healed 10/31/22. Treatment Orders:     Culture reviewed- antibiotic completed     KEEP PRESSURE OFF OF WOUNDS     FOLLOW NUTRITIOUS DIET. CHOOSE FOODS HIGH IN PROTEIN -CHICKEN- FISH-AND EGGS,  CHOOSE FOODS HIGH IN VITAMIN C.   MULTIVITAMIN DAILY. [address] [place] [address],[address] followup visit _______1 week______________________  (Please note your next appointment above and if you are unable to keep, kindly give a 24 hour notice. Thank you.)     Physician signature:__________________________        If you experience any of the following, please call the [address] during business hours:     * Increase in Pain  * Temperature over 101  * Increase in drainage from your wound  * Drainage with a foul odor  * Bleeding  * Increase in swelling  * Need for compression bandage changes due to slippage, breakthrough drainage. If you need medical attention outside of the business hours of the [address] please contact your PCP or go to the nearest emergency room.

## 2022-11-07 NOTE — ED NOTES
Department of Emergency Medicine    FIRST PROVIDER TRIAGE NOTE             Independent MLP           11/7/22  3:58 PM EST    Date of Encounter: 11/7/22   MRN: [id number]    Vitals:    11/07/22 1546   BP: (!) 165/134   Pulse: 68   Resp: 16   Temp: 97.7 °F (36.5 °C)   SpO2: 100%   Weight: 135 lb (61.2 kg)   Height: 5' 3\" (1.6 m)      HPI: Safia Ames is a 80 y.o. female who presents to the ED for Back Pain (Hx of compression fractures, not walking much for the last 3 days, has a bed sore forming on buttocks ) and Wound Check (New bed sore )     ROS: Negative for cp, sob, or fever. Physical Exam:   Gen Appearance/Constitutional: alert  CV: regular rate     Initial Plan of Care: All treatment areas with department are currently occupied. Plan to order/Initiate the following while awaiting opening in ED: labs.     Initial Plan of Care: Initiate Treatment-Testing, Proceed toTreatment Area When Bed Available for ED Attending/MLP to Continue Care    Electronically signed by Roderick Herbert PA-C   DD: 11/7/22       Roderick Herbert PA-C  11/07/22 1190

## 2022-11-07 NOTE — PROGRESS NOTES
[facility]aling Center Followup Visit Note    Referring Physician : Marni Abbasi DO  [address] RECORD NUMBER:  [id number]  AGE: 80 y.o. GENDER: female  : 1927  EPISODE DATE:  2022    Subjective:     Chief Complaint   Patient presents with    Wound Check     Wound buttock      HISTORY of PRESENT ILLNESS HPI   Zakia Emanuel is a 80 y.o. female who presents today in regards to follow up evaluation and treatment of wound/ulcer. That patient's past medical, family and social hx were reviewed and changes were made if present. History of Wound Context:  The patient has had a wounds of both buttocks, which was first noted approximately several weeks. This has been treated by the patient in the facility. On their initial visit to the wound healing center, 10/03/22, the patient has noted that the wound has not been improving.   The patient has not had similar previous wounds in the past.       It is noted, patient lives by herself at the assisted care area and does not appear to be ambulatory tells me that she can walk short distances slowly around the facility with the help of a walker with 4 wheels     She tells me she is trying to sleep on the sides and avoid sleeping on the back to avoid bedsores     Pt is currently not on abx.       10/10/2022  Wound stable, still has some exudate, wound cultures reviewed, light growth of multiple bacteria, mostly resistant to various antibiotics empirically treated for 7 days with Levaquin for now and if infection persists at the wound worsen, we will get a formal ID consult, discussed with the patient, again regarding offloading the ulcer area and to avoid sitting in a chair  10/17/2022  Wound looks visibly improved, patient, has tunneling of the right buttock wound, towards the 12 o'clock position by about 2 cm, rediscussed the patient on importance of not sleeping on the back, also not to sit for long prolonged times  10/24/2022  Wounds look improved, less tunneling today, rediscussed the patient regarding the importance of offloading  10/31/2022  Left buttock wound almost healed, right buttock wound improving  11/7/2022  Today, patient's son Keegan Evans came with her, telling me that patient have a lot of back pain sometimes crying, unable to ambulate, even last week she was walking short distances, he recommended her taken to the emergency room she which she declined initially, after discussing with them, patient seems to be agreeable because of inability to ambulate because of back pain to be evaluated in the emergency room, patient has seen Dr. 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     If patient has diabetic lower extremity wounds  Stearns Classification of diabetic lower extremity wounds:     Grade Description   []  0 No open wound   []  1 Superficial ulcer involving the full skin thickness   []  2 Deep ulcer involves ligament, tendon, joint capsule, or fascia  No bone involvement or abscess presence   []  3 Deep Ulcer with abcess formation and/or osteomyelitis   []  4 Localized gangrene   []  5 Extensive gangrene of the foot      Wound: Patient does have decubitus ulcers, right buttock more than the left buttock with some exudate     Other pertinent information:  Recent lab work was reviewed, does have elevated blood glucose levels, normal creatinine and BUN  Last ankle-brachial index was reviewed, as well as last angioplasty done by Dr. 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 ([address] 5/18/2021    Bronchitis 5/23/2017    Bruising tendency     Buttock wound, left, initial encounter 10/3/2022    CAD (coronary artery disease)     Cough 5/23/2017    COVID     Dermatophytosis 6/25/2021    Diabetes mellitus ([address]     GERD (gastroesophageal reflux disease) 5/23/2017    History of GI bleed 10/3/2022    History of pulmonary embolus (PE) 5/29/2021    Hx of blood clots     Hyperlipidemia     Hypertension     Leg swelling 7/15/2021    Lung disease     Peripheral vascular angioplasty status 5/29/2021    Pseudoaneurysm of right femoral artery ([address] 5/29/2021    PVD (peripheral vascular disease) with claudication ([address] 4/10/2019    Restless legs syndrome     Rhinitis, allergic 5/23/2017    Sinusitis 5/23/2017    SOB (shortness of breath) 5/23/2017    Type 1 diabetes mellitus with left diabetic foot ulcer ([address] 5/18/2021    Ulcerated, foot, left, limited to breakdown of skin ([address] 6/25/2021    Urinary incontinence     Wound of left buttock 10/10/2022    Wound of right buttock 10/3/2022    Fat layer     Past Surgical History:   Procedure Laterality Date    ABDOMEN SURGERY      APPENDECTOMY      CARDIAC SURGERY      CHOLECYSTECTOMY      COLONOSCOPY      CORONARY ARTERY BYPASS GRAFT      8/28/10    ENDOSCOPY, COLON, DIAGNOSTIC      FOOT DEBRIDEMENT Left 5/16/2021    FOOT DEBRIDEMENT INCISION AND DRAINAGE. Solution (SOOTHE XP) SOLN Apply 1 drop to eye 2 times daily      amLODIPine (NORVASC) 10 MG tablet Take 1 tablet by mouth in the morning. 30 tablet 3    [DISCONTINUED] losartan (COZAAR) 25 MG tablet Take 1 tablet by mouth in the morning. (Patient taking differently: Take 25 mg by mouth daily Hold for SBP <100) 30 tablet 3    [DISCONTINUED] torsemide (DEMADEX) 10 MG tablet Take 1 tablet by mouth in the morning. (Patient not taking: Reported on 8/27/2022) 30 tablet 3    [DISCONTINUED] apixaban (ELIQUIS) 5 MG TABS tablet Take 0.5 tablets by mouth in the morning and 0.5 tablets before bedtime.  (Patient not taking: Reported on 8/27/2022) 60 tablet 0    polyethylene glycol (GLYCOLAX) 17 g packet Take 17 g by mouth daily as needed for Constipation      senna (SENOKOT) 8.6 MG tablet Take 1 tablet by mouth daily as needed for Constipation      polyvinyl alcohol (LIQUIFILM TEARS) 1.4 % ophthalmic solution Place 1 drop into both eyes 2 times daily as needed      [DISCONTINUED] metFORMIN (GLUCOPHAGE) 500 MG tablet Take 500 mg by mouth daily (Patient not taking: Reported on 8/27/2022)      [DISCONTINUED] famotidine (PEPCID) 10 MG tablet Take 10 mg by mouth daily  (Patient not taking: Reported on 7/18/2022)      [DISCONTINUED] Ginger, Zingiber officinalis, (GINGER ROOT PO) Take 750 mg by mouth Daily in am (Patient not taking: Reported on 8/27/2022)      [DISCONTINUED] b complex vitamins capsule Take 1 capsule by mouth daily In the morning for supplement (Patient not taking: Reported on 8/27/2022)      [DISCONTINUED] aspirin 81 MG EC tablet Take 1 tablet by mouth daily 30 tablet 0    aluminum & magnesium hydroxide-simethicone (MYLANTA) 400-400-40 MG/5ML SUSP Take 30 mLs by mouth every 6 hours as needed      lidocaine 4 % external patch Apply topically daily as needed for Pain Apply topically as needed to painful muscles      [DISCONTINUED] diclofenac sodium (VOLTAREN) 1 % GEL Apply topically 4 times daily as needed for Pain (Patient not taking: Reported on 8/27/2022)      bisacodyl (DULCOLAX) 10 MG suppository Place 10 mg rectally daily as needed for Constipation Indications: IF NO RESULTS FROM MOM  (Patient not taking: No sig reported)      gabapentin (NEURONTIN) 250 MG/5ML solution Take 300 mg by mouth every evening. [DISCONTINUED] albuterol (PROVENTIL) 90 MCG/ACT inhaler Inhale 2 puffs into the lungs 4 times daily (Patient not taking: No sig reported)  0    [DISCONTINUED] pravastatin (PRAVACHOL) 20 MG tablet TAKE 1 TABLET BY MOUTH  NIGHTLY (Patient not taking: Reported on 8/27/2022) 90 tablet 1    [DISCONTINUED] fluticasone-vilanterol (BREO ELLIPTA) 100-25 MCG/INH AEPB inhaler Inhale 1 puff into the lungs daily (Patient not taking: Reported on 8/27/2022) 3 each 5    [DISCONTINUED] magnesium gluconate (MAGONATE) 500 MG tablet Take 200 mg by mouth 2 times daily  (Patient not taking: Reported on 8/27/2022)       No current facility-administered medications on file prior to encounter.        REVIEW OF SYSTEMS See HPI    Objective:    BP (!) 140/70   Pulse 66   Temp (!) 96 °F (35.6 °C) (Temporal)   Resp 18   LMP 12/03/1997   Wt Readings from Last 3 Encounters:   10/31/22 124 lb (56.2 kg)   10/03/22 124 lb (56.2 kg)   08/26/22 116 lb (52.6 kg)     PHYSICAL EXAM  CONSTITUTIONAL:   Awake, alert, cooperative   EYES:  lids and lashes normal   ENT: external ears and nose without lesions   NECK:  supple, symmetrical, trachea midline   SKIN:  Open wound Present    Assessment:     Problem List Items Addressed This Visit          High    Wound of left buttock    Wound of right buttock - Primary    Relevant Orders    Initiate Outpatient Wound Care Protocol       Unprioritized    Buttock wound, left, initial encounter    Relevant Orders    Initiate Outpatient Wound Care Protocol       Pre Debridement Measurements:  Are located in the [place]  Documentation Flow Sheet  Post Debridement Measurements:  Wound/Ulcer Descriptions are Pre Debridement except measurements:    Wound 10/03/22 Buttocks Right #1 (Active)   Wound Image   10/31/22 1315   Dressing Status New dressing applied;Clean;Dry; Intact 10/24/22 1427   Wound Cleansed Cleansed with saline 10/24/22 1427   Dressing/Treatment Alginate with Ag;Dry dressing 10/24/22 1427   Offloading for Diabetic Foot Ulcers Other (comment) 10/17/22 1436   Wound Length (cm) 3.3 cm 11/07/22 1349   Wound Width (cm) 2 cm 11/07/22 1349   Wound Depth (cm) 0.5 cm 11/07/22 1349   Wound Surface Area (cm^2) 6.6 cm^2 11/07/22 1349   Change in Wound Size % (l*w) 52.86 11/07/22 1349   Wound Volume (cm^3) 3.3 cm^3 11/07/22 1349   Wound Healing % 21 11/07/22 1349   Post-Procedure Length (cm) 3.4 cm 11/07/22 1355   Post-Procedure Width (cm) 2.1 cm 11/07/22 1355   Post-Procedure Depth (cm) 0.6 cm 11/07/22 1355   Post-Procedure Surface Area (cm^2) 7.14 cm^2 11/07/22 1355   Post-Procedure Volume (cm^3) 4.284 cm^3 11/07/22 1355   Tunneling Position ___ O'Clock [id number] 10/17/22 1419   Wound Assessment Fibrin;Pink/red 11/07/22 1349   Drainage Amount Moderate 11/07/22 1349   Drainage Description Yellow 11/07/22 1349   Odor None 11/07/22 1349   Ira-wound Assessment Fragile 11/07/22 1349   Margins Attached edges 10/10/22 1401   Wound Thickness Description not for Pressure Injury Full thickness 10/10/22 1401   Number of days: 35          Procedure Note  Indications:  Based on my examination of this patient's wound(s)/ulcer(s) today, debridement is required to promote healing and evaluate the wound base. Performed by: Yomaira Zafar MD    Consent obtained:  Yes    Time out taken:  Yes    Pain Control: Anesthetic  Anesthetic: 4% Lidocaine Liquid Topical     Debridement:Excisional Debridement    Using curette the wound(s)/ulcer(s) was/were sharply debrided down through and including the removal of epidermis, dermis, and subcutaneous tissue.         Devitalized Tissue Debrided:  fibrin and slough to stimulate bleeding to promote healing, post debridement good bleeding base and wound edges noted    Wound/Ulcer #: 1    Percent of Wound/Ulcer Debrided: 100%    Total Surface Area Debrided:  6 sq cm     Estimated Blood Loss:  Minimal  Hemostasis Achieved:  by pressure    Procedural Pain:  3  / 10   Post Procedural Pain:  3 / 10     Response to treatment:  Well tolerated by patient. Plan:   Treatment Note please see attached Discharge Instructions    Written patient dismissal instructions given to patient and signed by patient or POA. Discharge Instructions         Visit Discharge/Physician Orders     Discharge condition: Stable     Assessment of pain at discharge: Minimal     Anesthetic used: 4% topical lidocaine     Discharge to: [facility]     Left via:Private automobile     Accompanied by: accompanied by son     ECF/HHA: [address]: [phone]     Dressing Orders: To left buttock wounds, cleanse with normal saline solution. Apply alginate Ag and cover with duoderm or mepilex dressing (do not cut mepilex). Please pack the right side tunnel @12 with alginate Ag rope. Change daily. Right Buttock wound healed 10/31/22. Treatment Orders:     CBC and CMP ordered, to be done by home healthcare  Culture reviewed- antibiotic completed     KEEP PRESSURE OFF OF WOUNDS     FOLLOW NUTRITIOUS DIET. CHOOSE FOODS HIGH IN PROTEIN -CHICKEN- FISH-AND EGGS,  CHOOSE FOODS HIGH IN VITAMIN C.   MULTIVITAMIN DAILY. [facility] followup visit _______1 week______________________  (Please note your next appointment above and if you are unable to keep, kindly give a 24 hour notice.  Thank you.)     Physician signature:__________________________        If you experience any of the following, please call the [address] [place] [address] during business hours:     * Increase in Pain  * Temperature over 101  * Increase in drainage from your wound  * Drainage with a foul odor  * Bleeding  * Increase in swelling  * Need for compression bandage changes due to slippage, breakthrough drainage. If you need medical attention outside of the business hours of the [address] [place] [address] Road please contact your PCP or go to the nearest emergency room.             Electronically signed by Radha Moctezuma MD on 11/7/2022 at 2:05 PM

## 2022-11-07 NOTE — PLAN OF CARE
Problem: Chronic Conditions and Co-morbidities  Goal: Patient's chronic conditions and co-morbidity symptoms are monitored and maintained or improved  Outcome: Progressing     Problem: ABCDS Injury Assessment  Goal: Absence of physical injury  Outcome: Completed     Problem: Wound:  Goal: Will show signs of wound healing; wound closure and no evidence of infection  Description: Will show signs of wound healing; wound closure and no evidence of infection  Outcome: Progressing

## 2022-11-08 ENCOUNTER — APPOINTMENT (OUTPATIENT)
Dept: CT IMAGING | Age: 87
End: 2022-11-08
Payer: MEDICARE

## 2022-11-08 ENCOUNTER — HOSPITAL ENCOUNTER (EMERGENCY)
Age: 87
Discharge: HOME | End: 2022-11-08
Attending: EMERGENCY MEDICINE
Payer: MEDICARE

## 2022-11-08 VITALS
SYSTOLIC BLOOD PRESSURE: 146 MMHG | TEMPERATURE: 98.1 F | WEIGHT: 134 LBS | OXYGEN SATURATION: 99 % | BODY MASS INDEX: 22.88 KG/M2 | DIASTOLIC BLOOD PRESSURE: 68 MMHG | HEIGHT: 64 IN | RESPIRATION RATE: 16 BRPM | HEART RATE: 62 BPM

## 2022-11-08 DIAGNOSIS — M54.50 BILATERAL LOW BACK PAIN WITHOUT SCIATICA, UNSPECIFIED CHRONICITY: Primary | ICD-10-CM

## 2022-11-08 DIAGNOSIS — N39.0 URINARY TRACT INFECTION WITHOUT HEMATURIA, SITE UNSPECIFIED: ICD-10-CM

## 2022-11-08 LAB
ANION GAP SERPL CALCULATED.3IONS-SCNC: 8 MMOL/L (ref 7–16)
BACTERIA: ABNORMAL /HPF
BASOPHILS ABSOLUTE: 0.02 E9/L (ref 0–0.2)
BASOPHILS RELATIVE PERCENT: 0.5 % (ref 0–2)
BILIRUBIN URINE: NEGATIVE
BLOOD, URINE: ABNORMAL
BUN BLDV-MCNC: 19 MG/DL (ref 6–23)
CALCIUM SERPL-MCNC: 9.6 MG/DL (ref 8.6–10.2)
CHLORIDE BLD-SCNC: 96 MMOL/L (ref 98–107)
CLARITY: CLEAR
CO2: 25 MMOL/L (ref 22–29)
COLOR: YELLOW
CREAT SERPL-MCNC: 0.7 MG/DL (ref 0.5–1)
EOSINOPHILS ABSOLUTE: 0.05 E9/L (ref 0.05–0.5)
EOSINOPHILS RELATIVE PERCENT: 1.1 % (ref 0–6)
GFR SERPL CREATININE-BSD FRML MDRD: >60 ML/MIN/1.73
GLUCOSE BLD-MCNC: 211 MG/DL (ref 74–99)
GLUCOSE URINE: NEGATIVE MG/DL
HCT VFR BLD CALC: 31.9 % (ref 34–48)
HEMOGLOBIN: 10.4 G/DL (ref 11.5–15.5)
IMMATURE GRANULOCYTES #: 0.03 E9/L
IMMATURE GRANULOCYTES %: 0.7 % (ref 0–5)
KETONES, URINE: NEGATIVE MG/DL
LEUKOCYTE ESTERASE, URINE: ABNORMAL
LYMPHOCYTES ABSOLUTE: 0.64 E9/L (ref 1.5–4)
LYMPHOCYTES RELATIVE PERCENT: 14.5 % (ref 20–42)
MCH RBC QN AUTO: 32.9 PG (ref 26–35)
MCHC RBC AUTO-ENTMCNC: 32.6 % (ref 32–34.5)
MCV RBC AUTO: 100.9 FL (ref 80–99.9)
MONOCYTES ABSOLUTE: 0.48 E9/L (ref 0.1–0.95)
MONOCYTES RELATIVE PERCENT: 10.9 % (ref 2–12)
NEUTROPHILS ABSOLUTE: 3.2 E9/L (ref 1.8–7.3)
NEUTROPHILS RELATIVE PERCENT: 72.3 % (ref 43–80)
NITRITE, URINE: NEGATIVE
PDW BLD-RTO: 15.3 FL (ref 11.5–15)
PH UA: 6 (ref 5–9)
PLATELET # BLD: 335 E9/L (ref 130–450)
PMV BLD AUTO: 9.6 FL (ref 7–12)
POTASSIUM REFLEX MAGNESIUM: 4.5 MMOL/L (ref 3.5–5)
PROTEIN UA: 100 MG/DL
RBC # BLD: 3.16 E12/L (ref 3.5–5.5)
RBC UA: ABNORMAL /HPF (ref 0–2)
SODIUM BLD-SCNC: 129 MMOL/L (ref 132–146)
SPECIFIC GRAVITY UA: 1.01 (ref 1–1.03)
UROBILINOGEN, URINE: 0.2 E.U./DL
WBC # BLD: 4.4 E9/L (ref 4.5–11.5)
WBC UA: >20 /HPF (ref 0–5)

## 2022-11-08 PROCEDURE — 80048 BASIC METABOLIC PNL TOTAL CA: CPT

## 2022-11-08 PROCEDURE — 99284 EMERGENCY DEPT VISIT MOD MDM: CPT

## 2022-11-08 PROCEDURE — 85025 COMPLETE CBC W/AUTO DIFF WBC: CPT

## 2022-11-08 PROCEDURE — 6370000000 HC RX 637 (ALT 250 FOR IP)

## 2022-11-08 PROCEDURE — 6360000002 HC RX W HCPCS

## 2022-11-08 PROCEDURE — 72131 CT LUMBAR SPINE W/O DYE: CPT

## 2022-11-08 PROCEDURE — 96374 THER/PROPH/DIAG INJ IV PUSH: CPT

## 2022-11-08 PROCEDURE — 81001 URINALYSIS AUTO W/SCOPE: CPT

## 2022-11-08 RX ORDER — AMOXICILLIN AND CLAVULANATE POTASSIUM 500; 125 MG/1; MG/1
1 TABLET, FILM COATED ORAL 2 TIMES DAILY
Qty: 14 TABLET | Refills: 0 | Status: SHIPPED | OUTPATIENT
Start: 2022-11-08 | End: 2022-11-15

## 2022-11-08 RX ORDER — AMOXICILLIN AND CLAVULANATE POTASSIUM 875; 125 MG/1; MG/1
1 TABLET, FILM COATED ORAL ONCE
Status: COMPLETED | OUTPATIENT
Start: 2022-11-08 | End: 2022-11-08

## 2022-11-08 RX ORDER — FENTANYL CITRATE 50 UG/ML
25 INJECTION, SOLUTION INTRAMUSCULAR; INTRAVENOUS ONCE
Status: COMPLETED | OUTPATIENT
Start: 2022-11-08 | End: 2022-11-08

## 2022-11-08 RX ADMIN — AMOXICILLIN AND CLAVULANATE POTASSIUM 1 TABLET: 875; 125 TABLET, FILM COATED ORAL at 15:20

## 2022-11-08 RX ADMIN — FENTANYL CITRATE 25 MCG: 0.05 INJECTION, SOLUTION INTRAMUSCULAR; INTRAVENOUS at 12:24

## 2022-11-08 ASSESSMENT — PAIN SCALES - GENERAL: PAINLEVEL_OUTOF10: 9

## 2022-11-08 ASSESSMENT — ENCOUNTER SYMPTOMS
VOMITING: 0
SHORTNESS OF BREATH: 0
EYE PAIN: 0
BACK PAIN: 1
NAUSEA: 0
CONSTIPATION: 0
ABDOMINAL PAIN: 0
DIARRHEA: 0
COUGH: 0
RHINORRHEA: 0

## 2022-11-08 ASSESSMENT — PAIN - FUNCTIONAL ASSESSMENT: PAIN_FUNCTIONAL_ASSESSMENT: NONE - DENIES PAIN

## 2022-11-08 NOTE — ED PROVIDER NOTES
Sherri Carrizales is a 80 y.o. female    No chief complaint on file. HPI   Sherri Carrizales is a 80 y.o. female presenting to the ED for No chief complaint on file. History comes primarily from the patient. She is a 80-year-old female presenting for bilateral lumbar back pain starting 4-5 days ago. Severity is moderate. Movement makes her pain worse. Resting makes her pain better. She states her back pain is intermittent and sharp and seems to be worsening. Denies saddle anesthesia, numbness/pain radiating down her legs, fever, chills, abdominal pain, shortness of breath, chest pain. She states she has a history of urinary incontinence and wears diapers for this. Denies recent falls. She states she has still been able to walk with her walker over this time. EMS states patient has history of back fracture, however patient is unsure of this. Review of Systems   Constitutional:  Negative for chills and fever. HENT:  Negative for ear pain and rhinorrhea. Eyes:  Negative for pain. Respiratory:  Negative for cough and shortness of breath. Cardiovascular:  Negative for chest pain and palpitations. Gastrointestinal:  Negative for abdominal pain, constipation, diarrhea, nausea and vomiting. Genitourinary:  Negative for difficulty urinating and dysuria. Musculoskeletal:  Positive for back pain. Negative for arthralgias and myalgias. Skin:  Negative for rash. Neurological:  Negative for dizziness and headaches. All other systems reviewed and are negative. Physical Exam  Constitutional:       Appearance: Normal appearance. HENT:      Head: Normocephalic and atraumatic. Nose: Nose normal.   Eyes:      Extraocular Movements: Extraocular movements intact. Pupils: Pupils are equal, round, and reactive to light. Cardiovascular:      Rate and Rhythm: Normal rate and regular rhythm.    Pulmonary:      Effort: Pulmonary effort is normal.      Breath sounds: Normal breath sounds. Abdominal:      General: Abdomen is flat. Palpations: Abdomen is soft. Tenderness: There is no abdominal tenderness. Musculoskeletal:         General: Normal range of motion. Cervical back: Normal range of motion. Skin:     General: Skin is warm and dry. Neurological:      General: No focal deficit present. Mental Status: She is alert and oriented to person, place, and time. Comments: No saddle anesthesia    Psychiatric:         Behavior: Behavior normal.        Procedures     MDM     Patient presented to the Emergency Department for No chief complaint on file. She is a 24-year-old female presenting for bilateral lumbar back pain starting 4-5 days ago. Patient without saddle anesthesia, numbness/pain radiating down her legs, back pain is intermittent and worse with movement. CBC and BMP were unremarkable and stable with patient's baseline. UA shows leukocyte esterase positive with many bacteria and >20 WBC. Patient was given a dose of Augmentin here in the ED and prescribed 1 week course of Augmentin. CT lumbar spine shows remote compression deformity of L5 vertebral body, degenerative changes, and osteopenia without acute vertebral fracture or subluxation. Patient with significant improvement of pain after fentanyl. She was able to ambulate to the bathroom without significant pain. Labs and imaging were discussed with the patient and her son. The plan for discharge with follow up with their PCP was discussed with the patient and she agrees with the plan. Return precautions were given. ED Course as of 11/08/22 1530   Tue Nov 08, 2022   1345 Urinalysis [KM]   1426 Patient states her back pain is much improved after pain medication. I discussed lab and imaging findings with the patient and her son. They voiced concern about patient's constipation that was also seen on CT scan. They are requesting medication for that.   I discussed plan for discharge pending urinalysis and they agree with the plan. 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 ([address] Bronchitis, Bruising tendency, Buttock wound, left, initial encounter, CAD (coronary artery disease), Cough, COVID, Dermatophytosis, Diabetes mellitus ([address] GERD (gastroesophageal reflux disease), History of GI bleed, History of pulmonary embolus (PE), Hx of blood clots, Hyperlipidemia, Hypertension, Leg swelling, Lung disease, Peripheral vascular angioplasty status, Pseudoaneurysm of right femoral artery ([address] PVD (peripheral vascular disease) with claudication ([address] Restless legs syndrome, Rhinitis, allergic, Sinusitis, SOB (shortness of breath), Type 1 diabetes mellitus with left diabetic foot ulcer ([address] Ulcerated, foot, left, limited to breakdown of skin ([address] Urinary incontinence, Wound of left buttock, and Wound of right buttock. Past Surgical History:  has a past surgical history that includes Hysterectomy; Cholecystectomy; Tonsillectomy and adenoidectomy; Coronary artery bypass graft; Abdomen surgery; Appendectomy; Colonoscopy; Endoscopy, colon, diagnostic; Cardiac surgery; Foot Debridement (Left, 5/16/2021); Foot Debridement (Left, 5/21/2021); and Upper gastrointestinal endoscopy (N/A, 7/18/2022). Social History:  reports that she has never smoked. She has never used smokeless tobacco. She reports current alcohol use. She reports that she does not use drugs. Family History: family history includes Heart Disease in her brother; Hypertension in her father. The patients home medications have been reviewed.     Allergies: Ativan [lorazepam] and Lisinopril    -------------------------------------------------- RESULTS -------------------------------------------------  Labs:  Results for orders placed or performed during the hospital encounter of 11/08/22   CBC with Auto Differential   Result Value Ref Range    WBC 4.4 (L) 4.5 - 11.5 E9/L    RBC 3.16 (L) 3.50 - 5.50 E12/L    Hemoglobin 10.4 (L) 11.5 - 15.5 g/dL    Hematocrit 31.9 (L) 34.0 - 48.0 %    .9 (H) 80.0 - 99.9 fL    MCH 32.9 26.0 - 35.0 pg    MCHC 32.6 32.0 - 34.5 %    RDW 15.3 (H) 11.5 - 15.0 fL    Platelets 125 511 - 691 E9/L    MPV 9.6 7.0 - 12.0 fL    Neutrophils % 72.3 43.0 - 80.0 %    Immature Granulocytes % 0.7 0.0 - 5.0 %    Lymphocytes % 14.5 (L) 20.0 - 42.0 %    Monocytes % 10.9 2.0 - 12.0 %    Eosinophils % 1.1 0.0 - 6.0 %    Basophils % 0.5 0.0 - 2.0 %    Neutrophils Absolute 3.20 1.80 - 7.30 E9/L    Immature Granulocytes # 0.03 E9/L    Lymphocytes Absolute 0.64 (L) 1.50 - 4.00 E9/L    Monocytes Absolute 0.48 0.10 - 0.95 E9/L    Eosinophils Absolute 0.05 0.05 - 0.50 E9/L    Basophils Absolute 0.02 0.00 - 0.20 W4/J   Basic Metabolic Panel w/ Reflex to MG   Result Value Ref Range    Sodium 129 (L) 132 - 146 mmol/L    Potassium reflex Magnesium 4.5 3.5 - 5.0 mmol/L    Chloride 96 (L) 98 - 107 mmol/L    CO2 25 22 - 29 mmol/L    Anion Gap 8 7 - 16 mmol/L    Glucose 211 (H) 74 - 99 mg/dL    BUN 19 6 - 23 mg/dL    Creatinine 0.7 0.5 - 1.0 mg/dL    Est, Glom Filt Rate >60 >=60 mL/min/1.73    Calcium 9.6 8.6 - 10.2 mg/dL   Urinalysis   Result Value Ref Range    Color, UA Yellow Straw/Yellow    Clarity, UA Clear Clear    Glucose, Ur Negative Negative mg/dL    Bilirubin Urine Negative Negative    Ketones, Urine Negative Negative mg/dL    Specific Gravity, UA 1.015 1.005 - 1.030    Blood, Urine SMALL (A) Negative    pH, UA 6.0 5.0 - 9.0    Protein,  (A) Negative mg/dL    Urobilinogen, Urine 0.2 <2.0 E.U./dL    Nitrite, Urine Negative Negative    Leukocyte Esterase, Urine LARGE (A) Negative   Microscopic Urinalysis   Result Value Ref Range    WBC, UA >20 (A) 0 - 5 /HPF    RBC, UA 1-3 0 - 2 /HPF    Bacteria, UA MANY (A) None Seen /HPF       Radiology:  CT LUMBAR SPINE WO CONTRAST   Final Result   Osteopenia. Disc space narrowing, discovertebral degenerative changes, and facet   arthritis. Remote compression deformity of the L5 vertebral body. No acute vertebral fracture or subluxation.             ------------------------- NURSING NOTES AND VITALS REVIEWED ---------------------------  Date / Time Roomed:  11/8/2022 10:30 AM  ED Bed Assignment:  [facility] [place]    The nursing notes within the ED encounter and vital signs as below have been reviewed. BP (!) 156/77   Pulse 58   Temp 98.4 °F (36.9 °C)   Resp 16   Ht 5' 4\" (1.626 m)   Wt 134 lb (60.8 kg)   LMP 12/03/1997   SpO2 100%   BMI 23.00 kg/m²   Oxygen Saturation Interpretation: Normal      ------------------------------------------ PROGRESS NOTES ------------------------------------------  3:30 PM EST  I have spoken with the patient and discussed todays results, in addition to providing specific details for the plan of care and counseling regarding the diagnosis and prognosis. Their questions are answered at this time and they are agreeable with the plan. I discussed at length with them reasons for immediate return here for re evaluation. They will followup with their primary care physician by calling their office tomorrow. --------------------------------- ADDITIONAL PROVIDER NOTES ---------------------------------  At this time the patient is without objective evidence of an acute process requiring hospitalization or inpatient management. They have remained hemodynamically stable throughout their entire ED visit and are stable for discharge with outpatient follow-up.      The plan has been discussed in detail and they are aware of the specific conditions for emergent return, as well as the importance of follow-up. New Prescriptions    AMOXICILLIN-CLAVULANATE (AUGMENTIN) 500-125 MG PER TABLET    Take 1 tablet by mouth in the morning and at bedtime for 7 days       Diagnosis:  1. Bilateral low back pain without sciatica, unspecified chronicity    2. Urinary tract infection without hematuria, site unspecified        Disposition:  Patient's disposition: Discharge to home  Patient's condition is stable. Strict return precautions were discussed including but not limited too new or worsening symtpoms. They verbalized understanding and were agreeable with the plan. All questions were answered and patient was discharged.        Stan Silver MD  Resident  11/08/22 1359

## 2022-11-14 ENCOUNTER — HOSPITAL ENCOUNTER (OUTPATIENT)
Dept: WOUND CARE | Age: 87
Discharge: HOME | End: 2022-11-14
Payer: MEDICARE

## 2022-11-14 VITALS
HEART RATE: 68 BPM | SYSTOLIC BLOOD PRESSURE: 140 MMHG | DIASTOLIC BLOOD PRESSURE: 66 MMHG | RESPIRATION RATE: 18 BRPM | TEMPERATURE: 96 F

## 2022-11-14 DIAGNOSIS — S31.819A WOUND OF RIGHT BUTTOCK, INITIAL ENCOUNTER: ICD-10-CM

## 2022-11-14 DIAGNOSIS — S31.829A BUTTOCK WOUND, LEFT, INITIAL ENCOUNTER: Primary | ICD-10-CM

## 2022-11-14 PROCEDURE — 11042 DBRDMT SUBQ TIS 1ST 20SQCM/<: CPT | Performed by: SURGERY

## 2022-11-14 PROCEDURE — 11042 DBRDMT SUBQ TIS 1ST 20SQCM/<: CPT

## 2022-11-14 RX ORDER — BETAMETHASONE DIPROPIONATE 0.05 %
OINTMENT (GRAM) TOPICAL ONCE
Status: CANCELLED | OUTPATIENT
Start: 2022-11-14 | End: 2022-11-14

## 2022-11-14 RX ORDER — LIDOCAINE HYDROCHLORIDE 20 MG/ML
JELLY TOPICAL ONCE
Status: CANCELLED | OUTPATIENT
Start: 2022-11-14 | End: 2022-11-14

## 2022-11-14 RX ORDER — LIDOCAINE 40 MG/G
CREAM TOPICAL ONCE
Status: CANCELLED | OUTPATIENT
Start: 2022-11-14 | End: 2022-11-14

## 2022-11-14 RX ORDER — GENTAMICIN SULFATE 1 MG/G
OINTMENT TOPICAL ONCE
Status: CANCELLED | OUTPATIENT
Start: 2022-11-14 | End: 2022-11-14

## 2022-11-14 RX ORDER — LIDOCAINE 50 MG/G
OINTMENT TOPICAL ONCE
Status: CANCELLED | OUTPATIENT
Start: 2022-11-14 | End: 2022-11-14

## 2022-11-14 RX ORDER — GINSENG 100 MG
CAPSULE ORAL ONCE
Status: CANCELLED | OUTPATIENT
Start: 2022-11-14 | End: 2022-11-14

## 2022-11-14 RX ORDER — LIDOCAINE HYDROCHLORIDE 40 MG/ML
SOLUTION TOPICAL ONCE
Status: CANCELLED | OUTPATIENT
Start: 2022-11-14 | End: 2022-11-14

## 2022-11-14 RX ORDER — BACITRACIN ZINC AND POLYMYXIN B SULFATE 500; 1000 [USP'U]/G; [USP'U]/G
OINTMENT TOPICAL ONCE
Status: CANCELLED | OUTPATIENT
Start: 2022-11-14 | End: 2022-11-14

## 2022-11-14 RX ORDER — LIDOCAINE HYDROCHLORIDE 40 MG/ML
SOLUTION TOPICAL ONCE
Status: COMPLETED | OUTPATIENT
Start: 2022-11-14 | End: 2022-11-14

## 2022-11-14 RX ORDER — CLOBETASOL PROPIONATE 0.5 MG/G
OINTMENT TOPICAL ONCE
Status: CANCELLED | OUTPATIENT
Start: 2022-11-14 | End: 2022-11-14

## 2022-11-14 RX ORDER — BACITRACIN, NEOMYCIN, POLYMYXIN B 400; 3.5; 5 [USP'U]/G; MG/G; [USP'U]/G
OINTMENT TOPICAL ONCE
Status: CANCELLED | OUTPATIENT
Start: 2022-11-14 | End: 2022-11-14

## 2022-11-14 RX ADMIN — LIDOCAINE HYDROCHLORIDE 5 ML: 40 SOLUTION TOPICAL at 13:32

## 2022-11-14 NOTE — PROGRESS NOTES
Wound Healing Center Followup Visit Note    Referring Physician : Nereida Bill DO  [address] [facility] [place] RECORD NUMBER:  [id number]  AGE: 80 y.o. GENDER: female  : 1927  EPISODE DATE:  2022    Subjective:     Chief Complaint   Patient presents with    Wound Check     Buttocks        HISTORY of PRESENT ILLNESS HPI   Hiral Pringle is a 80 y.o. female who presents today in regards to follow up evaluation and treatment of wound/ulcer. That patient's past medical, family and social hx were reviewed and changes were made if present. History of Wound Context:  The patient has had a wounds of both buttocks, which was first noted approximately several weeks. This has been treated by the patient in the facility. On their initial visit to the wound healing center, 10/03/22, the patient has noted that the wound has not been improving.   The patient has not had similar previous wounds in the past.       It is noted, patient lives by herself at the assisted care area and does not appear to be ambulatory tells me that she can walk short distances slowly around the facility with the help of a walker with 4 wheels     She tells me she is trying to sleep on the sides and avoid sleeping on the back to avoid bedsores     Pt is currently not on abx.       10/10/2022  Wound stable, still has some exudate, wound cultures reviewed, light growth of multiple bacteria, mostly resistant to various antibiotics empirically treated for 7 days with Levaquin for now and if infection persists at the wound worsen, we will get a formal ID consult, discussed with the patient, again regarding offloading the ulcer area and to avoid sitting in a chair  10/17/2022  Wound looks visibly improved, patient, has tunneling of the right buttock wound, towards the 12 o'clock position by about 2 cm, rediscussed the patient on importance of not sleeping on the back, also not to sit for long prolonged times  10/24/2022  Wounds look improved, less tunneling today, rediscussed the patient regarding the importance of offloading  10/31/2022  Left buttock wound almost healed, right buttock wound improving  11/7/2022  Today, patient's son Tiffanie Pham came with her, telling me that patient have a lot of back pain sometimes crying, unable to ambulate, even last week she was walking short distances, he recommended her taken to the emergency room she which she declined initially, after discussing with them, patient seems to be agreeable because of inability to ambulate because of back pain to be evaluated in the emergency room, patient has seen Dr. Jeremy Sanchez in the past, has been wearing brace  Wounds, stable, right gluteal wound, improving left gluteal wound, almost healed  11/14/2022  Patient went to the emergency room, last week, for back pain, was found to have a UTI, being treated feels better now  Right buttock wound improving    Wound/Ulcer Pain Timing/Severity: constant  Quality of pain: dull, aching  Severity:  3 / 10   Modifying Factors: None  Associated Signs/Symptoms: drainage and pain     Ulcer Identification:  Ulcer Type: pressure and non-healing/non-surgical  Contributing Factors: chronic pressure, decreased mobility, and shear force, diabetes mellitus     Diabetic/Pressure/Non Pressure Ulcers only:  Ulcer: Patient does have diabetes mellitus 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     If patient has diabetic lower extremity wounds  Stearns Classification of diabetic lower extremity wounds:     Grade Description   []  0 No open wound   []  1 Superficial ulcer involving the full skin thickness   []  2 Deep ulcer involves ligament, tendon, joint capsule, or fascia  No bone involvement or abscess presence   []  3 Deep Ulcer with abcess formation and/or osteomyelitis   []  4 Localized gangrene   []  5 Extensive gangrene of the foot      Wound: Patient does have decubitus ulcers, right buttock more than the left buttock with some exudate     Other pertinent information:  Recent lab work was reviewed, does have elevated blood glucose levels, normal creatinine and BUN  Last ankle-brachial index was reviewed, as well as last angioplasty done by Dr. Liliana Maldonado        10/3/2022  The patient was explained, the etiology, of pressure necrosis, the importance of offloading the area, informed her that she needs to sleep on the sides only and also avoid sitting for prolonged time and sit only for the minimal amount of time, all the importance of improved nutrition from protein supplements and multivitamin supplements were discussed                 PAST MEDICAL HISTORY      Diagnosis Date    Arthritis     Asthma     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 ([address] 5/18/2021    Bronchitis 5/23/2017    Bruising tendency     Buttock wound, left, initial encounter 10/3/2022    CAD (coronary artery disease)     Cough 5/23/2017    COVID     Dermatophytosis 6/25/2021    Diabetes mellitus ([address]     GERD (gastroesophageal reflux disease) 5/23/2017    History of GI bleed 10/3/2022    History of pulmonary embolus (PE) 5/29/2021    Hx of blood clots     Hyperlipidemia     Hypertension     Leg swelling 7/15/2021    Lung disease     Peripheral vascular angioplasty status 5/29/2021    Pseudoaneurysm of right femoral artery ([address] 5/29/2021    PVD (peripheral vascular disease) with claudication ([address] 4/10/2019    Restless legs syndrome     Rhinitis, allergic 5/23/2017    Sinusitis 5/23/2017    SOB (shortness of breath) 5/23/2017    Type 1 diabetes mellitus with left diabetic foot ulcer ([address] 5/18/2021    Ulcerated, foot, left, limited to breakdown of skin ([address] 6/25/2021    Urinary incontinence     Wound of left buttock 10/10/2022    Wound of right buttock 10/3/2022    Fat layer     Past Surgical History:   Procedure Laterality Date    ABDOMEN SURGERY      APPENDECTOMY      CARDIAC SURGERY      CHOLECYSTECTOMY      COLONOSCOPY CORONARY ARTERY BYPASS GRAFT      8/28/10    ENDOSCOPY, COLON, DIAGNOSTIC      FOOT DEBRIDEMENT Left 5/16/2021    FOOT DEBRIDEMENT INCISION AND DRAINAGE. performed by Willian Cantu DPM at [address] Left 5/21/2021    LEFT FOOT DEBRIDEMENT  WITH DELAYED PRIMARY CLOSURE performed by Jovani Renteria DPM at [address] ([address] [facility])      frankie and bso 1997    TONSILLECTOMY AND ADENOIDECTOMY      UPPER GASTROINTESTINAL ENDOSCOPY N/A 7/18/2022    EGD ESOPHAGOGASTRODUODENOSCOPY performed by Elaine Melchor MD at [facility] ENDOSCOPY     Family History   Problem Relation Age of Onset    Hypertension Father     Heart Disease Brother      Social History     Tobacco Use    Smoking status: Never    Smokeless tobacco: Never   Vaping Use    Vaping Use: Never used   Substance Use Topics    Alcohol use: Yes     Comment: occasional    Drug use: No     Allergies   Allergen Reactions    Ativan [Lorazepam] Hallucinations     Family reports adverse reaction to benzodiazepines.  Hallucination, restlessness    Lisinopril Other (See Comments)     7/18/19 Pt states that she does not want to take LISINOPRIL     Current Outpatient Medications on File Prior to Encounter   Medication Sig Dispense Refill    amoxicillin-clavulanate (AUGMENTIN) 500-125 MG per tablet Take 1 tablet by mouth in the morning and at bedtime for 7 days 14 tablet 0    ferrous sulfate (IRON 325) 325 (65 Fe) MG tablet Take 1 tablet by mouth daily 30 tablet 3    acetaminophen (TYLENOL) 650 MG suppository Place 650 mg rectally every 4 hours as needed for Fever      Baclofen (LIORESAL) 5 MG tablet Take 5 mg by mouth as needed (muscle spasms, hiccups)      docusate sodium (COLACE) 100 MG capsule Take 100 mg by mouth daily as needed for Constipation      dexamethasone (DECADRON) 2 MG tablet Take 2 mg by mouth daily as needed (itching)      melatonin 5 MG TABS tablet Take 5 mg by mouth nightly      HYDROcodone-acetaminophen (NORCO) 5-325 MG per tablet Take 1 tablet by mouth every 6 hours as needed for Pain.      pantoprazole sodium (PROTONIX) 40 MG PACK packet Take 40 mg by mouth every morning (before breakfast)      acetaminophen (TYLENOL) 325 MG tablet Take 650 mg by mouth every 4 hours as needed for Fever      Artificial Tear Solution (SOOTHE XP) SOLN Apply 1 drop to eye 2 times daily      amLODIPine (NORVASC) 10 MG tablet Take 1 tablet by mouth in the morning. 30 tablet 3    [DISCONTINUED] losartan (COZAAR) 25 MG tablet Take 1 tablet by mouth in the morning. (Patient taking differently: Take 25 mg by mouth daily Hold for SBP <100) 30 tablet 3    [DISCONTINUED] torsemide (DEMADEX) 10 MG tablet Take 1 tablet by mouth in the morning. (Patient not taking: Reported on 8/27/2022) 30 tablet 3    [DISCONTINUED] apixaban (ELIQUIS) 5 MG TABS tablet Take 0.5 tablets by mouth in the morning and 0.5 tablets before bedtime.  (Patient not taking: Reported on 8/27/2022) 60 tablet 0    polyethylene glycol (GLYCOLAX) 17 g packet Take 17 g by mouth daily as needed for Constipation      senna (SENOKOT) 8.6 MG tablet Take 1 tablet by mouth daily as needed for Constipation      polyvinyl alcohol (LIQUIFILM TEARS) 1.4 % ophthalmic solution Place 1 drop into both eyes 2 times daily as needed      [DISCONTINUED] metFORMIN (GLUCOPHAGE) 500 MG tablet Take 500 mg by mouth daily (Patient not taking: Reported on 8/27/2022)      [DISCONTINUED] famotidine (PEPCID) 10 MG tablet Take 10 mg by mouth daily  (Patient not taking: Reported on 7/18/2022)      [DISCONTINUED] Ginger, Zingiber officinalis, (GINGER ROOT PO) Take 750 mg by mouth Daily in am (Patient not taking: Reported on 8/27/2022)      [DISCONTINUED] b complex vitamins capsule Take 1 capsule by mouth daily In the morning for supplement (Patient not taking: Reported on 8/27/2022)      [DISCONTINUED] aspirin 81 MG EC tablet Take 1 tablet by mouth daily 30 tablet 0    aluminum & magnesium hydroxide-simethicone (MYLANTA) 039-269-29 MG/5ML SUSP Take 30 mLs by mouth every 6 hours as needed      lidocaine 4 % external patch Apply topically daily as needed for Pain Apply topically as needed to painful muscles      [DISCONTINUED] diclofenac sodium (VOLTAREN) 1 % GEL Apply topically 4 times daily as needed for Pain (Patient not taking: Reported on 8/27/2022)      bisacodyl (DULCOLAX) 10 MG suppository Place 10 mg rectally daily as needed for Constipation Indications: IF NO RESULTS FROM MOM  (Patient not taking: No sig reported)      gabapentin (NEURONTIN) 250 MG/5ML solution Take 300 mg by mouth every evening. [DISCONTINUED] albuterol (PROVENTIL) 90 MCG/ACT inhaler Inhale 2 puffs into the lungs 4 times daily (Patient not taking: No sig reported)  0    [DISCONTINUED] pravastatin (PRAVACHOL) 20 MG tablet TAKE 1 TABLET BY MOUTH  NIGHTLY (Patient not taking: Reported on 8/27/2022) 90 tablet 1    [DISCONTINUED] fluticasone-vilanterol (BREO ELLIPTA) 100-25 MCG/INH AEPB inhaler Inhale 1 puff into the lungs daily (Patient not taking: Reported on 8/27/2022) 3 each 5    [DISCONTINUED] magnesium gluconate (MAGONATE) 500 MG tablet Take 200 mg by mouth 2 times daily  (Patient not taking: Reported on 8/27/2022)       No current facility-administered medications on file prior to encounter.        REVIEW OF SYSTEMS See HPI    Objective:    BP (!) 140/66   Pulse 68   Temp (!) 96 °F (35.6 °C) (Temporal)   Resp 18   LMP 12/03/1997   Wt Readings from Last 3 Encounters:   11/08/22 134 lb (60.8 kg)   10/31/22 124 lb (56.2 kg)   10/03/22 124 lb (56.2 kg)     PHYSICAL EXAM  CONSTITUTIONAL:   Awake, alert, cooperative   EYES:  lids and lashes normal   ENT: external ears and nose without lesions   NECK:  supple, symmetrical, trachea midline   SKIN:  Open wound Present    Assessment:     Problem List Items Addressed This Visit          High    Wound of right buttock    Relevant Orders    Initiate Outpatient Wound Care Protocol       Unprioritized    Buttock wound, left, initial encounter - Primary    Relevant Orders    Initiate Outpatient Wound Care Protocol       Pre Debridement Measurements:  Are located in the [place]  Documentation Flow Sheet  Post Debridement Measurements:  Wound/Ulcer Descriptions are Pre Debridement except measurements:    Wound 10/03/22 Buttocks Right #1 (Active)   Wound Image   10/31/22 1315   Dressing Status New dressing applied 11/07/22 1355   Wound Cleansed Cleansed with saline 11/07/22 1355   Dressing/Treatment Alginate with Ag;Silicone border 92/40/48 1355   Offloading for Diabetic Foot Ulcers Offloading not ordered 11/07/22 1355   Wound Length (cm) 3.4 cm 11/14/22 1338   Wound Width (cm) 1.5 cm 11/14/22 1338   Wound Depth (cm) 0.3 cm 11/14/22 1338   Wound Surface Area (cm^2) 5.1 cm^2 11/14/22 1338   Change in Wound Size % (l*w) 63.57 11/14/22 1338   Wound Volume (cm^3) 1.53 cm^3 11/14/22 1338   Wound Healing % 64 11/14/22 1338   Post-Procedure Length (cm) 3.5 cm 11/14/22 1343   Post-Procedure Width (cm) 1.6 cm 11/14/22 1343   Post-Procedure Depth (cm) 0.4 cm 11/14/22 1343   Post-Procedure Surface Area (cm^2) 5.6 cm^2 11/14/22 1343   Post-Procedure Volume (cm^3) 2.24 cm^3 11/14/22 1343   Tunneling Position ___ [employer] 10/17/22 1419   Wound Assessment Fibrin;Pink/red 11/14/22 1338   Drainage Amount Moderate 11/14/22 1338   Drainage Description Yellow;Brown 11/14/22 1338   Odor None 11/14/22 1338   Ira-wound Assessment Blanchable erythema 11/14/22 1338   Margins Attached edges 10/10/22 1401   Wound Thickness Description not for Pressure Injury Full thickness 10/10/22 1401   Number of days: 42          Procedure Note  Indications:  Based on my examination of this patient's wound(s)/ulcer(s) today, debridement is required to promote healing and evaluate the wound base.     Performed by: Radha Moctezuma MD    Consent obtained:  Yes    Time out taken:  Yes    Pain Control: Anesthetic  Anesthetic: 4% Lidocaine Liquid Topical Debridement:Excisional Debridement    Using curette the wound(s)/ulcer(s) was/were sharply debrided down through and including the removal of epidermis, dermis, and subcutaneous tissue. Devitalized Tissue Debrided:  fibrin and slough to stimulate bleeding to promote healing, post debridement good bleeding base and wound edges noted    Wound/Ulcer #: 1    Percent of Wound/Ulcer Debrided: 100%    Total Surface Area Debrided:  5.5 sq cm     Estimated Blood Loss:  Minimal  Hemostasis Achieved:  by pressure    Procedural Pain:  3  / 10   Post Procedural Pain:  3 / 10     Response to treatment:  Well tolerated by patient. Plan:   Treatment Note please see attached Discharge Instructions    Written patient dismissal instructions given to patient and signed by patient or POA. Discharge Instructions              Visit Discharge/Physician Orders     Discharge condition: Stable     Assessment of pain at discharge: Minimal     Anesthetic used: 4% topical lidocaine     Discharge to: [facility]     Left via:Private automobile     Accompanied by: accompanied by son     FRANCESF/SAEIDA: [address]: [phone]     Dressing Orders: To left buttock wounds, cleanse with normal saline solution. Apply alginate Ag and cover with duoderm or mepilex dressing (do not cut mepilex). Please pack any tunneling with alginate Ag rope. Change daily. Right Buttock wound healed 10/31/22. Treatment Orders:     Culture reviewed- antibiotic completed     KEEP PRESSURE OFF OF WOUNDS     FOLLOW NUTRITIOUS DIET. CHOOSE FOODS HIGH IN PROTEIN -CHICKEN- FISH-AND EGGS,  CHOOSE FOODS HIGH IN VITAMIN C.   MULTIVITAMIN DAILY. [address] [place] [address],[address] followup visit _______1 week______________________  (Please note your next appointment above and if you are unable to keep, kindly give a 24 hour notice.  Thank you.)     Physician signature:__________________________        If you experience any of the following, please call the [facility] during business hours:     * Increase in Pain  * Temperature over 101  * Increase in drainage from your wound  * Drainage with a foul odor  * Bleeding  * Increase in swelling  * Need for compression bandage changes due to slippage, breakthrough drainage. If you need medical attention outside of the business hours of the [facility] please contact your PCP or go to the nearest emergency room.                Electronically signed by Tee Bueno MD on 11/14/2022 at 2:06 PM

## 2022-11-16 NOTE — DISCHARGE INSTRUCTIONS
Visit Discharge/Physician Orders     Discharge condition: Stable     Assessment of pain at discharge: Minimal     Anesthetic used: 4% topical lidocaine     Discharge to: [facility]     Left via:Private automobile     Accompanied by: accompanied by son     JANET/AYANNA: [address]: [phone]     Dressing Orders: To Right buttock wounds, cleanse with normal saline solution. Apply alginate Ag and cover with duoderm or mepilex dressing (do not cut mepilex). Please pack any tunneling with alginate Ag rope. Change daily. Left Buttock wound healed 10/31/22. Treatment Orders:     Culture reviewed- antibiotic completed     KEEP PRESSURE OFF OF WOUNDS, turn and reposition     FOLLOW NUTRITIOUS DIET. CHOOSE FOODS HIGH IN PROTEIN -CHICKEN- FISH-AND EGGS,  CHOOSE FOODS HIGH IN VITAMIN C.   MULTIVITAMIN DAILY. [address] [place] [address],[address] followup visit _______1 week______________________  (Please note your next appointment above and if you are unable to keep, kindly give a 24 hour notice. Thank you.)     Physician signature:__________________________        If you experience any of the following, please call the [address] during business hours:     * Increase in Pain  * Temperature over 101  * Increase in drainage from your wound  * Drainage with a foul odor  * Bleeding  * Increase in swelling  * Need for compression bandage changes due to slippage, breakthrough drainage. If you need medical attention outside of the business hours of the [address] please contact your PCP or go to the nearest emergency room.

## 2022-11-21 ENCOUNTER — HOSPITAL ENCOUNTER (OUTPATIENT)
Dept: WOUND CARE | Age: 87
Discharge: HOME | End: 2022-11-21
Payer: MEDICARE

## 2022-11-21 VITALS
DIASTOLIC BLOOD PRESSURE: 64 MMHG | TEMPERATURE: 97.1 F | SYSTOLIC BLOOD PRESSURE: 142 MMHG | HEART RATE: 57 BPM | RESPIRATION RATE: 18 BRPM

## 2022-11-21 DIAGNOSIS — S31.819A WOUND OF RIGHT BUTTOCK, INITIAL ENCOUNTER: ICD-10-CM

## 2022-11-21 DIAGNOSIS — S31.829A BUTTOCK WOUND, LEFT, INITIAL ENCOUNTER: Primary | ICD-10-CM

## 2022-11-21 PROCEDURE — 11042 DBRDMT SUBQ TIS 1ST 20SQCM/<: CPT | Performed by: SURGERY

## 2022-11-21 PROCEDURE — 11042 DBRDMT SUBQ TIS 1ST 20SQCM/<: CPT

## 2022-11-21 RX ORDER — GENTAMICIN SULFATE 1 MG/G
OINTMENT TOPICAL ONCE
Status: CANCELLED | OUTPATIENT
Start: 2022-11-21 | End: 2022-11-21

## 2022-11-21 RX ORDER — CLOBETASOL PROPIONATE 0.5 MG/G
OINTMENT TOPICAL ONCE
Status: CANCELLED | OUTPATIENT
Start: 2022-11-21 | End: 2022-11-21

## 2022-11-21 RX ORDER — LIDOCAINE 40 MG/G
CREAM TOPICAL ONCE
Status: CANCELLED | OUTPATIENT
Start: 2022-11-21 | End: 2022-11-21

## 2022-11-21 RX ORDER — LIDOCAINE 50 MG/G
OINTMENT TOPICAL ONCE
Status: CANCELLED | OUTPATIENT
Start: 2022-11-21 | End: 2022-11-21

## 2022-11-21 RX ORDER — LIDOCAINE HYDROCHLORIDE 40 MG/ML
SOLUTION TOPICAL ONCE
Status: COMPLETED | OUTPATIENT
Start: 2022-11-21 | End: 2022-11-21

## 2022-11-21 RX ORDER — GINSENG 100 MG
CAPSULE ORAL ONCE
Status: CANCELLED | OUTPATIENT
Start: 2022-11-21 | End: 2022-11-21

## 2022-11-21 RX ORDER — LIDOCAINE HYDROCHLORIDE 20 MG/ML
JELLY TOPICAL ONCE
Status: CANCELLED | OUTPATIENT
Start: 2022-11-21 | End: 2022-11-21

## 2022-11-21 RX ORDER — BETAMETHASONE DIPROPIONATE 0.05 %
OINTMENT (GRAM) TOPICAL ONCE
Status: CANCELLED | OUTPATIENT
Start: 2022-11-21 | End: 2022-11-21

## 2022-11-21 RX ORDER — BACITRACIN ZINC AND POLYMYXIN B SULFATE 500; 1000 [USP'U]/G; [USP'U]/G
OINTMENT TOPICAL ONCE
Status: CANCELLED | OUTPATIENT
Start: 2022-11-21 | End: 2022-11-21

## 2022-11-21 RX ORDER — BACITRACIN, NEOMYCIN, POLYMYXIN B 400; 3.5; 5 [USP'U]/G; MG/G; [USP'U]/G
OINTMENT TOPICAL ONCE
Status: CANCELLED | OUTPATIENT
Start: 2022-11-21 | End: 2022-11-21

## 2022-11-21 RX ORDER — LIDOCAINE HYDROCHLORIDE 40 MG/ML
SOLUTION TOPICAL ONCE
Status: CANCELLED | OUTPATIENT
Start: 2022-11-21 | End: 2022-11-21

## 2022-11-21 RX ADMIN — LIDOCAINE HYDROCHLORIDE 10 ML: 40 SOLUTION TOPICAL at 13:34

## 2022-11-21 ASSESSMENT — PAIN DESCRIPTION - ORIENTATION: ORIENTATION: RIGHT

## 2022-11-21 ASSESSMENT — PAIN DESCRIPTION - LOCATION: LOCATION: BUTTOCKS

## 2022-11-21 NOTE — PROGRESS NOTES
[facility]aling Center Followup Visit Note    Referring Physician : Michelle Hinton DO  [address] RECORD NUMBER:  [id number]  AGE: 80 y.o. GENDER: female  : 1927  EPISODE DATE:  2022    Subjective:     No chief complaint on file. HISTORY of PRESENT ILLNESS EWA West is a 80 y.o. female who presents today in regards to follow up evaluation and treatment of wound/ulcer. That patient's past medical, family and social hx were reviewed and changes were made if present. History of Wound Context:  The patient has had a wounds of both buttocks, which was first noted approximately several weeks. This has been treated by the patient in the facility. On their initial visit to the wound healing center, 10/03/22, the patient has noted that the wound has not been improving.   The patient has not had similar previous wounds in the past.       It is noted, patient lives by herself at the assisted care area and does not appear to be ambulatory tells me that she can walk short distances slowly around the facility with the help of a walker with 4 wheels     She tells me she is trying to sleep on the sides and avoid sleeping on the back to avoid bedsores     Pt is currently not on abx.       10/10/2022  Wound stable, still has some exudate, wound cultures reviewed, light growth of multiple bacteria, mostly resistant to various antibiotics empirically treated for 7 days with Levaquin for now and if infection persists at the wound worsen, we will get a formal ID consult, discussed with the patient, again regarding offloading the ulcer area and to avoid sitting in a chair  10/17/2022  Wound looks visibly improved, patient, has tunneling of the right buttock wound, towards the 12 o'clock position by about 2 cm, rediscussed the patient on importance of not sleeping on the back, also not to sit for long prolonged times  10/24/2022  Wounds look improved, less tunneling today, rediscussed the patient regarding the importance of offloading  10/31/2022  Left buttock wound almost healed, right buttock wound improving  11/7/2022  Today, patient's son Carl Huber came with her, telling me that patient have a lot of back pain sometimes crying, unable to ambulate, even last week she was walking short distances, he recommended her taken to the emergency room she which she declined initially, after discussing with them, patient seems to be agreeable because of inability to ambulate because of back pain to be evaluated in the emergency room, patient has seen Dr. Jeremy Sanchez in the past, has been wearing brace  Wounds, stable, right gluteal wound, improving left gluteal wound, almost healed  11/14/2022  Patient went to the emergency room, last week, for back pain, was found to have a UTI, being treated feels better now  Right buttock wound improving  11/21/2022  Right buttock wound slowly improving        Wound/Ulcer Pain Timing/Severity: constant  Quality of pain: dull, aching  Severity:  3 / 10   Modifying Factors: None  Associated Signs/Symptoms: drainage and pain     Ulcer Identification:  Ulcer Type: pressure and non-healing/non-surgical  Contributing Factors: chronic pressure, decreased mobility, and shear force, diabetes mellitus     Diabetic/Pressure/Non Pressure Ulcers only:  Ulcer: Patient does have diabetes mellitus 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     If patient has diabetic lower extremity wounds  Stearns Classification of diabetic lower extremity wounds:     Grade Description   []  0 No open wound   []  1 Superficial ulcer involving the full skin thickness   []  2 Deep ulcer involves ligament, tendon, joint capsule, or fascia  No bone involvement or abscess presence   []  3 Deep Ulcer with abcess formation and/or osteomyelitis   []  4 Localized gangrene   []  5 Extensive gangrene of the foot      Wound: Patient does have decubitus ulcers, right buttock more than the left buttock with some exudate     Other pertinent information:  Recent lab work was reviewed, does have elevated blood glucose levels, normal creatinine and BUN  Last ankle-brachial index was reviewed, as well as last angioplasty done by Dr. 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 ([address] 5/18/2021    Bronchitis 5/23/2017    Bruising tendency     Buttock wound, left, initial encounter 10/3/2022    CAD (coronary artery disease)     Cough 5/23/2017    COVID     Dermatophytosis 6/25/2021    Diabetes mellitus ([address]     GERD (gastroesophageal reflux disease) 5/23/2017    History of GI bleed 10/3/2022    History of pulmonary embolus (PE) 5/29/2021    Hx of blood clots     Hyperlipidemia     Hypertension     Leg swelling 7/15/2021    Lung disease     Peripheral vascular angioplasty status 5/29/2021    Pseudoaneurysm of right femoral artery ([address] 5/29/2021    PVD (peripheral vascular disease) with claudication ([address] 4/10/2019    Restless legs syndrome     Rhinitis, allergic 5/23/2017    Sinusitis 5/23/2017    SOB (shortness of breath) 5/23/2017    Type 1 diabetes mellitus with left diabetic foot ulcer ([address] 5/18/2021    Ulcerated, foot, left, limited to breakdown of skin ([address] 6/25/2021    Urinary incontinence     Wound of left buttock 10/10/2022    Wound of right buttock 10/3/2022    Fat layer     Past Surgical History:   Procedure Laterality Date    ABDOMEN SURGERY      APPENDECTOMY      CARDIAC SURGERY      CHOLECYSTECTOMY COLONOSCOPY      CORONARY ARTERY BYPASS GRAFT      8/28/10    ENDOSCOPY, COLON, DIAGNOSTIC      FOOT DEBRIDEMENT Left 5/16/2021    FOOT DEBRIDEMENT INCISION AND DRAINAGE. performed by Karl Lin DPM at [address] Left 5/21/2021    LEFT FOOT DEBRIDEMENT  WITH DELAYED PRIMARY CLOSURE performed by Mozelle Duverney, DPM at [address] [place] [address] ([address])      frankie and bso 1997    TONSILLECTOMY AND ADENOIDECTOMY      UPPER GASTROINTESTINAL ENDOSCOPY N/A 7/18/2022    EGD ESOPHAGOGASTRODUODENOSCOPY performed by Saint Marking, MD at [facility] ENDOSCOPY     Family History   Problem Relation Age of Onset    Hypertension Father     Heart Disease Brother      Social History     Tobacco Use    Smoking status: Never    Smokeless tobacco: Never   Vaping Use    Vaping Use: Never used   Substance Use Topics    Alcohol use: Yes     Comment: occasional    Drug use: No     Allergies   Allergen Reactions    Ativan [Lorazepam] Hallucinations     Family reports adverse reaction to benzodiazepines.  Hallucination, restlessness    Lisinopril Other (See Comments)     7/18/19 Pt states that she does not want to take LISINOPRIL     Current Outpatient Medications on File Prior to Encounter   Medication Sig Dispense Refill    ferrous sulfate (IRON 325) 325 (65 Fe) MG tablet Take 1 tablet by mouth daily 30 tablet 3    acetaminophen (TYLENOL) 650 MG suppository Place 650 mg rectally every 4 hours as needed for Fever      Baclofen (LIORESAL) 5 MG tablet Take 5 mg by mouth as needed (muscle spasms, hiccups)      docusate sodium (COLACE) 100 MG capsule Take 100 mg by mouth daily as needed for Constipation      dexamethasone (DECADRON) 2 MG tablet Take 2 mg by mouth daily as needed (itching)      melatonin 5 MG TABS tablet Take 5 mg by mouth nightly      HYDROcodone-acetaminophen (NORCO) 5-325 MG per tablet Take 1 tablet by mouth every 6 hours as needed for Pain.      pantoprazole sodium (PROTONIX) 40 MG PACK packet Take 40 mg by mouth every morning (before breakfast)      acetaminophen (TYLENOL) 325 MG tablet Take 650 mg by mouth every 4 hours as needed for Fever      Artificial Tear Solution (SOOTHE XP) SOLN Apply 1 drop to eye 2 times daily      amLODIPine (NORVASC) 10 MG tablet Take 1 tablet by mouth in the morning. 30 tablet 3    [DISCONTINUED] losartan (COZAAR) 25 MG tablet Take 1 tablet by mouth in the morning. (Patient taking differently: Take 25 mg by mouth daily Hold for SBP <100) 30 tablet 3    [DISCONTINUED] torsemide (DEMADEX) 10 MG tablet Take 1 tablet by mouth in the morning. (Patient not taking: Reported on 8/27/2022) 30 tablet 3    [DISCONTINUED] apixaban (ELIQUIS) 5 MG TABS tablet Take 0.5 tablets by mouth in the morning and 0.5 tablets before bedtime.  (Patient not taking: Reported on 8/27/2022) 60 tablet 0    polyethylene glycol (GLYCOLAX) 17 g packet Take 17 g by mouth daily as needed for Constipation      senna (SENOKOT) 8.6 MG tablet Take 1 tablet by mouth daily as needed for Constipation      polyvinyl alcohol (LIQUIFILM TEARS) 1.4 % ophthalmic solution Place 1 drop into both eyes 2 times daily as needed      [DISCONTINUED] metFORMIN (GLUCOPHAGE) 500 MG tablet Take 500 mg by mouth daily (Patient not taking: Reported on 8/27/2022)      [DISCONTINUED] famotidine (PEPCID) 10 MG tablet Take 10 mg by mouth daily  (Patient not taking: Reported on 7/18/2022)      [DISCONTINUED] Ginger, Zingiber officinalis, (GINGER ROOT PO) Take 750 mg by mouth Daily in am (Patient not taking: Reported on 8/27/2022)      [DISCONTINUED] b complex vitamins capsule Take 1 capsule by mouth daily In the morning for supplement (Patient not taking: Reported on 8/27/2022)      [DISCONTINUED] aspirin 81 MG EC tablet Take 1 tablet by mouth daily 30 tablet 0    aluminum & magnesium hydroxide-simethicone (MYLANTA) 400-400-40 MG/5ML SUSP Take 30 mLs by mouth every 6 hours as needed      lidocaine 4 % external patch Apply topically daily as needed for Pain Apply topically as needed to painful muscles      [DISCONTINUED] diclofenac sodium (VOLTAREN) 1 % GEL Apply topically 4 times daily as needed for Pain (Patient not taking: Reported on 8/27/2022)      bisacodyl (DULCOLAX) 10 MG suppository Place 10 mg rectally daily as needed for Constipation Indications: IF NO RESULTS FROM MOM  (Patient not taking: No sig reported)      gabapentin (NEURONTIN) 250 MG/5ML solution Take 300 mg by mouth every evening. [DISCONTINUED] albuterol (PROVENTIL) 90 MCG/ACT inhaler Inhale 2 puffs into the lungs 4 times daily (Patient not taking: No sig reported)  0    [DISCONTINUED] pravastatin (PRAVACHOL) 20 MG tablet TAKE 1 TABLET BY MOUTH  NIGHTLY (Patient not taking: Reported on 8/27/2022) 90 tablet 1    [DISCONTINUED] fluticasone-vilanterol (BREO ELLIPTA) 100-25 MCG/INH AEPB inhaler Inhale 1 puff into the lungs daily (Patient not taking: Reported on 8/27/2022) 3 each 5    [DISCONTINUED] magnesium gluconate (MAGONATE) 500 MG tablet Take 200 mg by mouth 2 times daily  (Patient not taking: Reported on 8/27/2022)       No current facility-administered medications on file prior to encounter.        REVIEW OF SYSTEMS See HPI    Objective:    BP (!) 142/64   Pulse 57   Temp 97.1 °F (36.2 °C)   Resp 18   LMP 12/03/1997   Wt Readings from Last 3 Encounters:   11/08/22 134 lb (60.8 kg)   10/31/22 124 lb (56.2 kg)   10/03/22 124 lb (56.2 kg)     PHYSICAL EXAM  CONSTITUTIONAL:   Awake, alert, cooperative   EYES:  lids and lashes normal   ENT: external ears and nose without lesions   NECK:  supple, symmetrical, trachea midline   SKIN:  Open wound Present    Assessment:     Problem List Items Addressed This Visit          High    Wound of right buttock    Relevant Orders    Initiate Outpatient Wound Care Protocol       Unprioritized    Buttock wound, left, initial encounter - Primary    Relevant Orders    Initiate Outpatient Wound Care Protocol       Pre Debridement Measurements:  Are located in the [place]  Documentation Flow Sheet  Post Debridement Measurements:  Wound/Ulcer Descriptions are Pre Debridement except measurements:    Wound 10/03/22 Buttocks Right #1 (Active)   Wound Image   10/31/22 1315   Dressing Status New dressing applied 11/07/22 1355   Wound Cleansed Cleansed with saline 11/07/22 1355   Dressing/Treatment Alginate with Ag;Silicone border 97/16/48 1355   Offloading for Diabetic Foot Ulcers Offloading not ordered 11/07/22 1355   Wound Length (cm) 3 cm 11/21/22 1332   Wound Width (cm) 1.3 cm 11/21/22 1332   Wound Depth (cm) 0.2 cm 11/21/22 1332   Wound Surface Area (cm^2) 3.9 cm^2 11/21/22 1332   Change in Wound Size % (l*w) 72.14 11/21/22 1332   Wound Volume (cm^3) 0.78 cm^3 11/21/22 1332   Wound Healing % 81 11/21/22 1332   Post-Procedure Length (cm) 3.1 cm 11/21/22 1406   Post-Procedure Width (cm) 1.4 cm 11/21/22 1406   Post-Procedure Depth (cm) 0.3 cm 11/21/22 1406   Post-Procedure Surface Area (cm^2) 4.34 cm^2 11/21/22 1406   Post-Procedure Volume (cm^3) 1.302 cm^3 11/21/22 1406   Tunneling Position ___ O'Clock 0 11/21/22 1332   Wound Assessment Fibrin;Pink/red 11/21/22 1332   Drainage Amount Moderate 11/21/22 1332   Drainage Description Serosanguinous 11/21/22 1332   Odor None 11/21/22 1332   Ira-wound Assessment Blanchable erythema 11/21/22 1332   Margins Attached edges 11/21/22 1332   Wound Thickness Description not for Pressure Injury Full thickness 11/21/22 1332   Number of days: 49          Procedure Note  Indications:  Based on my examination of this patient's wound(s)/ulcer(s) today, debridement is required to promote healing and evaluate the wound base.     Performed by: Ortiz Meyers MD    Consent obtained:  Yes    Time out taken:  Yes    Pain Control: Anesthetic  Anesthetic: 4% Lidocaine Liquid Topical     Debridement:Excisional Debridement    Using curette the wound(s)/ulcer(s) was/were sharply debrided down through and including the removal of epidermis, dermis, and subcutaneous tissue. Devitalized Tissue Debrided:  fibrin and slough to stimulate bleeding to promote healing, post debridement good bleeding base and wound edges noted    Wound/Ulcer #: 1    Percent of Wound/Ulcer Debrided: 100%    Total Surface Area Debrided:  4.3 sq cm     Estimated Blood Loss:  Minimal  Hemostasis Achieved:  by pressure    Procedural Pain:  3  / 10   Post Procedural Pain:  3 / 10     Response to treatment:  Well tolerated by patient. Plan:   Treatment Note please see attached Discharge Instructions    Written patient dismissal instructions given to patient and signed by patient or POA. Discharge Instructions         Visit Discharge/Physician Orders     Discharge condition: Stable     Assessment of pain at discharge: Minimal     Anesthetic used: 4% topical lidocaine     Discharge to: [facility]     Left via:Private automobile     Accompanied by: accompanied by son     JANET/AYANNA: Jason 97: [phone]     Dressing Orders: To left buttock wounds, cleanse with normal saline solution. Apply alginate Ag and cover with duoderm or mepilex dressing (do not cut mepilex). Please pack any tunneling with alginate Ag rope. Change daily. Right Buttock wound healed 10/31/22. Treatment Orders:     Culture reviewed- antibiotic completed     KEEP PRESSURE OFF OF WOUNDS, turn and reposition     FOLLOW NUTRITIOUS DIET. CHOOSE FOODS HIGH IN PROTEIN -CHICKEN- FISH-AND EGGS,  CHOOSE FOODS HIGH IN VITAMIN C.   MULTIVITAMIN DAILY. [address] [place] [address],[address] followup visit _______1 week______________________  (Please note your next appointment above and if you are unable to keep, kindly give a 24 hour notice.  Thank you.)     Physician signature:__________________________        If you experience any of the following, please call the [address] during business hours:     * Increase in Pain  * Temperature over 101  * Increase in drainage from your wound  * Drainage with a foul odor  * Bleeding  * Increase in swelling  * Need for compression bandage changes due to slippage, breakthrough drainage. If you need medical attention outside of the business hours of the [address] [place] [address] Road please contact your PCP or go to the nearest emergency room.          Electronically signed by Roney Rosas MD on 11/21/2022 at 2:13 PM

## 2022-11-21 NOTE — DISCHARGE INSTRUCTIONS
Visit Discharge/Physician Orders     Discharge condition: Stable     Assessment of pain at discharge: Minimal     Anesthetic used: 4% topical lidocaine     Discharge to: [facility]     Left via:Private automobile     Accompanied by: accompanied by son     JANET/AYANNA: Jason 97: [phone]     Dressing Orders: To right buttock wounds, cleanse with normal saline solution. Apply alginate Ag and cover with duoderm or mepilex dressing (do not cut mepilex). Please pack any tunneling with alginate Ag rope. Change daily. Left  Buttock wound healed 10/31/22. Treatment Orders:     Culture reviewed- antibiotic completed     KEEP PRESSURE OFF OF WOUNDS, turn and reposition     FOLLOW NUTRITIOUS DIET. CHOOSE FOODS HIGH IN PROTEIN -CHICKEN- FISH-AND EGGS,  CHOOSE FOODS HIGH IN VITAMIN C.   MULTIVITAMIN DAILY. [address] [place] [address],[address] followup visit _______1 week______________________  (Please note your next appointment above and if you are unable to keep, kindly give a 24 hour notice. Thank you.)     Physician signature:__________________________        If you experience any of the following, please call the [facility] during business hours:     * Increase in Pain  * Temperature over 101  * Increase in drainage from your wound  * Drainage with a foul odor  * Bleeding  * Increase in swelling  * Need for compression bandage changes due to slippage, breakthrough drainage. If you need medical attention outside of the business hours of the [facility] please contact your PCP or go to the nearest emergency room.

## 2022-11-28 ENCOUNTER — HOSPITAL ENCOUNTER (OUTPATIENT)
Dept: WOUND CARE | Age: 87
Discharge: HOME | End: 2022-11-28
Payer: MEDICARE

## 2022-11-28 VITALS
HEART RATE: 58 BPM | BODY MASS INDEX: 23.05 KG/M2 | TEMPERATURE: 98.5 F | HEIGHT: 64 IN | SYSTOLIC BLOOD PRESSURE: 158 MMHG | DIASTOLIC BLOOD PRESSURE: 56 MMHG | RESPIRATION RATE: 18 BRPM | WEIGHT: 135 LBS

## 2022-11-28 DIAGNOSIS — S31.829A BUTTOCK WOUND, LEFT, INITIAL ENCOUNTER: Primary | ICD-10-CM

## 2022-11-28 DIAGNOSIS — S31.819A WOUND OF RIGHT BUTTOCK, INITIAL ENCOUNTER: ICD-10-CM

## 2022-11-28 PROCEDURE — 11042 DBRDMT SUBQ TIS 1ST 20SQCM/<: CPT

## 2022-11-28 RX ORDER — LIDOCAINE 40 MG/G
CREAM TOPICAL ONCE
OUTPATIENT
Start: 2022-11-28 | End: 2022-11-28

## 2022-11-28 RX ORDER — LIDOCAINE HYDROCHLORIDE 40 MG/ML
SOLUTION TOPICAL ONCE
Status: DISCONTINUED | OUTPATIENT
Start: 2022-11-28 | End: 2022-11-29 | Stop reason: HOSPADM

## 2022-11-28 RX ORDER — BETAMETHASONE DIPROPIONATE 0.05 %
OINTMENT (GRAM) TOPICAL ONCE
OUTPATIENT
Start: 2022-11-28 | End: 2022-11-28

## 2022-11-28 RX ORDER — LIDOCAINE HYDROCHLORIDE 20 MG/ML
JELLY TOPICAL ONCE
OUTPATIENT
Start: 2022-11-28 | End: 2022-11-28

## 2022-11-28 RX ORDER — GINSENG 100 MG
CAPSULE ORAL ONCE
OUTPATIENT
Start: 2022-11-28 | End: 2022-11-28

## 2022-11-28 RX ORDER — LIDOCAINE 50 MG/G
OINTMENT TOPICAL ONCE
OUTPATIENT
Start: 2022-11-28 | End: 2022-11-28

## 2022-11-28 RX ORDER — BACITRACIN ZINC AND POLYMYXIN B SULFATE 500; 1000 [USP'U]/G; [USP'U]/G
OINTMENT TOPICAL ONCE
OUTPATIENT
Start: 2022-11-28 | End: 2022-11-28

## 2022-11-28 RX ORDER — LIDOCAINE HYDROCHLORIDE 40 MG/ML
SOLUTION TOPICAL ONCE
OUTPATIENT
Start: 2022-11-28 | End: 2022-11-28

## 2022-11-28 RX ORDER — GENTAMICIN SULFATE 1 MG/G
OINTMENT TOPICAL ONCE
OUTPATIENT
Start: 2022-11-28 | End: 2022-11-28

## 2022-11-28 RX ORDER — CLOBETASOL PROPIONATE 0.5 MG/G
OINTMENT TOPICAL ONCE
OUTPATIENT
Start: 2022-11-28 | End: 2022-11-28

## 2022-11-28 RX ORDER — BACITRACIN, NEOMYCIN, POLYMYXIN B 400; 3.5; 5 [USP'U]/G; MG/G; [USP'U]/G
OINTMENT TOPICAL ONCE
OUTPATIENT
Start: 2022-11-28 | End: 2022-11-28

## 2022-11-28 NOTE — PROGRESS NOTES
[facility] Healing Center Followup Visit Note    Referring Physician : Alexandra Yusuf DO  [address] RECORD NUMBER:  [id number]  AGE: 80 y.o. GENDER: female  : 1927  EPISODE DATE:  2022    Subjective:     Chief Complaint   Patient presents with    Wound Check     Right buttock        HISTORY of PRESENT ILLNESS HPI   Rosa Elena Barton is a 80 y.o. female who presents today in regards to follow up evaluation and treatment of wound/ulcer. That patient's past medical, family and social hx were reviewed and changes were made if present. History of Wound Context:  The patient has had a wounds of both buttocks, which was first noted approximately several weeks. This has been treated by the patient in the facility. On their initial visit to the wound healing center, 10/03/22, the patient has noted that the wound has not been improving.   The patient has not had similar previous wounds in the past.       It is noted, patient lives by herself at the assisted care area and does not appear to be ambulatory tells me that she can walk short distances slowly around the facility with the help of a walker with 4 wheels     She tells me she is trying to sleep on the sides and avoid sleeping on the back to avoid bedsores     Pt is currently not on abx.       10/10/2022  Wound stable, still has some exudate, wound cultures reviewed, light growth of multiple bacteria, mostly resistant to various antibiotics empirically treated for 7 days with Levaquin for now and if infection persists at the wound worsen, we will get a formal ID consult, discussed with the patient, again regarding offloading the ulcer area and to avoid sitting in a chair  10/17/2022  Wound looks visibly improved, patient, has tunneling of the right buttock wound, towards the 12 o'clock position by about 2 cm, rediscussed the patient on importance of not sleeping on the back, also not to sit for long prolonged times  10/24/2022  Wounds look improved, less tunneling today, rediscussed the patient regarding the importance of offloading  10/31/2022  Left buttock wound almost healed, right buttock wound improving  11/7/2022  Today, patient's son Franki Yeung came with her, telling me that patient have a lot of back pain sometimes crying, unable to ambulate, even last week she was walking short distances, he recommended her taken to the emergency room she which she declined initially, after discussing with them, patient seems to be agreeable because of inability to ambulate because of back pain to be evaluated in the emergency room, patient has seen Dr. Moni Chavira in the past, has been wearing brace  Wounds, stable, right gluteal wound, improving left gluteal wound, almost healed  11/14/2022  Patient went to the emergency room, last week, for back pain, was found to have a UTI, being treated feels better now  Right buttock wound improving  11/21/2022  Right buttock wound slowly improving  11/28/2022  Right buttock wound improving, rediscussed the patient regarding offloading that area      Wound/Ulcer Pain Timing/Severity: constant  Quality of pain: dull, aching  Severity:  3 / 10   Modifying Factors: None  Associated Signs/Symptoms: drainage and pain     Ulcer Identification:  Ulcer Type: pressure and non-healing/non-surgical  Contributing Factors: chronic pressure, decreased mobility, and shear force, diabetes mellitus     Diabetic/Pressure/Non Pressure Ulcers only:  Ulcer: Patient does have diabetes mellitus 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     If patient has diabetic lower extremity wounds  Stearns Classification of diabetic lower extremity wounds:     Grade Description   []  0 No open wound   []  1 Superficial ulcer involving the full skin thickness   []  2 Deep ulcer involves ligament, tendon, joint capsule, or fascia  No bone involvement or abscess presence   []  3 Deep Ulcer with abcess formation and/or osteomyelitis   []  4 Localized gangrene   []  5 Extensive gangrene of the foot      Wound: Patient does have decubitus ulcers, right buttock more than the left buttock with some exudate     Other pertinent information:  Recent lab work was reviewed, does have elevated blood glucose levels, normal creatinine and BUN  Last ankle-brachial index was reviewed, as well as last angioplasty done by Dr. Quinton Myrick        10/3/2022  The patient was explained, the etiology, of pressure necrosis, the importance of offloading the area, informed her that she needs to sleep on the sides only and also avoid sitting for prolonged time and sit only for the minimal amount of time, all the importance of improved nutrition from protein supplements and multivitamin supplements were discussed                 PAST MEDICAL HISTORY      Diagnosis Date    Arthritis     Asthma     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 ([address] 5/18/2021    Bronchitis 5/23/2017    Bruising tendency     Buttock wound, left, initial encounter 10/3/2022    CAD (coronary artery disease)     Cough 5/23/2017    COVID     Dermatophytosis 6/25/2021    Diabetes mellitus ([address]     GERD (gastroesophageal reflux disease) 5/23/2017    History of GI bleed 10/3/2022    History of pulmonary embolus (PE) 5/29/2021    Hx of blood clots     Hyperlipidemia     Hypertension     Leg swelling 7/15/2021    Lung disease     Peripheral vascular angioplasty status 5/29/2021    Pseudoaneurysm of right femoral artery ([address] 5/29/2021    PVD (peripheral vascular disease) with claudication ([address] 4/10/2019    Restless legs syndrome     Rhinitis, allergic 5/23/2017    Sinusitis 5/23/2017    SOB (shortness of breath) 5/23/2017    Type 1 diabetes mellitus with left diabetic foot ulcer ([address] 5/18/2021    Ulcerated, foot, left, limited to breakdown of skin ([address] 6/25/2021    Urinary incontinence     Wound of left buttock 10/10/2022    Wound of right buttock 10/3/2022    Fat layer     Past Surgical History:   Procedure Laterality Date    ABDOMEN SURGERY      APPENDECTOMY      CARDIAC SURGERY      CHOLECYSTECTOMY      COLONOSCOPY      CORONARY ARTERY BYPASS GRAFT      8/28/10    ENDOSCOPY, COLON, DIAGNOSTIC      FOOT DEBRIDEMENT Left 5/16/2021    FOOT DEBRIDEMENT INCISION AND DRAINAGE. performed by Mayra Strange DPM at [address] Left 5/21/2021    LEFT FOOT DEBRIDEMENT  WITH DELAYED PRIMARY CLOSURE performed by Tiarra Hastings DPM at . [facility] [address] [facility])      frankie and bso 1997    TONSILLECTOMY AND ADENOIDECTOMY      UPPER GASTROINTESTINAL ENDOSCOPY N/A 7/18/2022    EGD ESOPHAGOGASTRODUODENOSCOPY performed by Melani Porras MD at [facility] ENDOSCOPY     Family History   Problem Relation Age of Onset    Hypertension Father     Heart Disease Brother      Social History     Tobacco Use    Smoking status: Never    Smokeless tobacco: Never   Vaping Use    Vaping Use: Never used   Substance Use Topics    Alcohol use: Yes     Comment: occasional    Drug use: No     Allergies   Allergen Reactions    Ativan [Lorazepam] Hallucinations     Family reports adverse reaction to benzodiazepines.  Hallucination, restlessness    Lisinopril Other (See Comments)     7/18/19 Pt states that she does not want to take LISINOPRIL     Current Outpatient Medications on File Prior to Encounter   Medication Sig Dispense Refill    ferrous sulfate (IRON 325) 325 (65 Fe) MG tablet Take 1 tablet by mouth daily 30 tablet 3    acetaminophen (TYLENOL) 650 MG suppository Place 650 mg rectally every 4 hours as needed for Fever      Baclofen (LIORESAL) 5 MG tablet Take 5 mg by mouth as needed (muscle spasms, hiccups)      docusate sodium (COLACE) 100 MG capsule Take 100 mg by mouth daily as needed for Constipation      dexamethasone (DECADRON) 2 MG tablet Take 2 mg by mouth daily as needed (itching)      melatonin 5 MG TABS tablet Take 5 mg by mouth nightly HYDROcodone-acetaminophen (NORCO) 5-325 MG per tablet Take 1 tablet by mouth every 6 hours as needed for Pain.      pantoprazole sodium (PROTONIX) 40 MG PACK packet Take 40 mg by mouth every morning (before breakfast)      acetaminophen (TYLENOL) 325 MG tablet Take 650 mg by mouth every 4 hours as needed for Fever      Artificial Tear Solution (SOOTHE XP) SOLN Apply 1 drop to eye 2 times daily      amLODIPine (NORVASC) 10 MG tablet Take 1 tablet by mouth in the morning. 30 tablet 3    [DISCONTINUED] losartan (COZAAR) 25 MG tablet Take 1 tablet by mouth in the morning. (Patient taking differently: Take 25 mg by mouth daily Hold for SBP <100) 30 tablet 3    [DISCONTINUED] torsemide (DEMADEX) 10 MG tablet Take 1 tablet by mouth in the morning. (Patient not taking: Reported on 8/27/2022) 30 tablet 3    [DISCONTINUED] apixaban (ELIQUIS) 5 MG TABS tablet Take 0.5 tablets by mouth in the morning and 0.5 tablets before bedtime.  (Patient not taking: Reported on 8/27/2022) 60 tablet 0    polyethylene glycol (GLYCOLAX) 17 g packet Take 17 g by mouth daily as needed for Constipation      senna (SENOKOT) 8.6 MG tablet Take 1 tablet by mouth daily as needed for Constipation      polyvinyl alcohol (LIQUIFILM TEARS) 1.4 % ophthalmic solution Place 1 drop into both eyes 2 times daily as needed      [DISCONTINUED] metFORMIN (GLUCOPHAGE) 500 MG tablet Take 500 mg by mouth daily (Patient not taking: Reported on 8/27/2022)      [DISCONTINUED] famotidine (PEPCID) 10 MG tablet Take 10 mg by mouth daily  (Patient not taking: Reported on 7/18/2022)      [DISCONTINUED] Ginger, Zingiber officinalis, (GINGER ROOT PO) Take 750 mg by mouth Daily in am (Patient not taking: Reported on 8/27/2022)      [DISCONTINUED] b complex vitamins capsule Take 1 capsule by mouth daily In the morning for supplement (Patient not taking: Reported on 8/27/2022)      [DISCONTINUED] aspirin 81 MG EC tablet Take 1 tablet by mouth daily 30 tablet 0    aluminum & magnesium hydroxide-simethicone (MYLANTA) 400-400-40 MG/5ML SUSP Take 30 mLs by mouth every 6 hours as needed      lidocaine 4 % external patch Apply topically daily as needed for Pain Apply topically as needed to painful muscles      [DISCONTINUED] diclofenac sodium (VOLTAREN) 1 % GEL Apply topically 4 times daily as needed for Pain (Patient not taking: Reported on 8/27/2022)      bisacodyl (DULCOLAX) 10 MG suppository Place 10 mg rectally daily as needed for Constipation Indications: IF NO RESULTS FROM MOM  (Patient not taking: No sig reported)      gabapentin (NEURONTIN) 250 MG/5ML solution Take 300 mg by mouth every evening. [DISCONTINUED] albuterol (PROVENTIL) 90 MCG/ACT inhaler Inhale 2 puffs into the lungs 4 times daily (Patient not taking: No sig reported)  0    [DISCONTINUED] pravastatin (PRAVACHOL) 20 MG tablet TAKE 1 TABLET BY MOUTH  NIGHTLY (Patient not taking: Reported on 8/27/2022) 90 tablet 1    [DISCONTINUED] fluticasone-vilanterol (BREO ELLIPTA) 100-25 MCG/INH AEPB inhaler Inhale 1 puff into the lungs daily (Patient not taking: Reported on 8/27/2022) 3 each 5    [DISCONTINUED] magnesium gluconate (MAGONATE) 500 MG tablet Take 200 mg by mouth 2 times daily  (Patient not taking: Reported on 8/27/2022)       No current facility-administered medications on file prior to encounter.        REVIEW OF SYSTEMS See HPI    Objective:    BP (!) 158/56   Pulse 58   Temp 98.5 °F (36.9 °C) (Temporal)   Resp 18   Ht 5' 4\" (1.626 m)   Wt 135 lb (61.2 kg)   LMP 12/03/1997   BMI 23.17 kg/m²   Wt Readings from Last 3 Encounters:   11/28/22 135 lb (61.2 kg)   11/08/22 134 lb (60.8 kg)   10/31/22 124 lb (56.2 kg)     PHYSICAL EXAM  CONSTITUTIONAL:   Awake, alert, cooperative   EYES:  lids and lashes normal   ENT: external ears and nose without lesions   NECK:  supple, symmetrical, trachea midline   SKIN:  Open wound Present    Assessment:     Problem List Items Addressed This Visit          High    Wound of right buttock    Relevant Medications    lidocaine (XYLOCAINE) 4 % external solution    Other Relevant Orders    Initiate Outpatient Wound Care Protocol       Unprioritized    Buttock wound, left, initial encounter - Primary    Relevant Medications    lidocaine (XYLOCAINE) 4 % external solution    Other Relevant Orders    Initiate Outpatient Wound Care Protocol       Pre Debridement Measurements:  Are located in the [place]  Documentation Flow Sheet  Post Debridement Measurements:  Wound/Ulcer Descriptions are Pre Debridement except measurements:    Wound 10/03/22 Buttocks Right #1 (Active)   Wound Image   11/28/22 1327   Wound Etiology Pressure Stage 3 11/28/22 1327   Dressing Status New dressing applied;Clean;Dry; Intact 11/28/22 1336   Wound Cleansed Cleansed with saline 11/28/22 1336   Dressing/Treatment Alginate with Ag;Silicone pad 66/66/19 7828   Offloading for Diabetic Foot Ulcers Offloading ordered 11/28/22 1336   Wound Length (cm) 2.8 cm 11/28/22 1327   Wound Width (cm) 1.6 cm 11/28/22 1327   Wound Depth (cm) 0.2 cm 11/28/22 1327   Wound Surface Area (cm^2) 4.48 cm^2 11/28/22 1327   Change in Wound Size % (l*w) 68 11/28/22 1327   Wound Volume (cm^3) 0.896 cm^3 11/28/22 1327   Wound Healing % 79 11/28/22 1327   Post-Procedure Length (cm) 2.9 cm 11/28/22 1336   Post-Procedure Width (cm) 1.7 cm 11/28/22 1336   Post-Procedure Depth (cm) 0.3 cm 11/28/22 1336   Post-Procedure Surface Area (cm^2) 4.93 cm^2 11/28/22 1336   Post-Procedure Volume (cm^3) 1.479 cm^3 11/28/22 1336   Tunneling Position ___ O'Clock 0 11/21/22 1332   Wound Assessment Fibrin;Pink/red 11/28/22 1327   Drainage Amount Moderate 11/28/22 1327   Drainage Description Serosanguinous 11/28/22 1327   Odor None 11/28/22 1327   Ira-wound Assessment Blanchable erythema; Maceration 11/28/22 1327   Margins Attached edges 11/28/22 1327   Wound Thickness Description not for Pressure Injury Full thickness 11/28/22 1327   Number of days: 56 Procedure Note  Indications:  Based on my examination of this patient's wound(s)/ulcer(s) today, debridement is required to promote healing and evaluate the wound base. Performed by: Wilver Storm MD    Consent obtained:  Yes    Time out taken:  Yes    Pain Control: Anesthetic  Anesthetic: 4% Lidocaine Liquid Topical     Debridement:Excisional Debridement    Using curette the wound(s)/ulcer(s) was/were sharply debrided down through and including the removal of epidermis, dermis, and subcutaneous tissue. Devitalized Tissue Debrided:  fibrin and slough to stimulate bleeding to promote healing, post debridement good bleeding base and wound edges noted    Wound/Ulcer #: 1    Percent of Wound/Ulcer Debrided: 100%    Total Surface Area Debrided:  4.3 sq cm     Estimated Blood Loss:  Minimal  Hemostasis Achieved:  by pressure    Procedural Pain:  3  / 10   Post Procedural Pain:  3 / 10     Response to treatment:  Well tolerated by patient. Plan:   Treatment Note please see attached Discharge Instructions    Written patient dismissal instructions given to patient and signed by patient or POA. Discharge Instructions         Visit Discharge/Physician Orders     Discharge condition: Stable     Assessment of pain at discharge: Minimal     Anesthetic used: 4% topical lidocaine     [facility]charge to: [facility]     Left via:Private automobile     Accompanied by: accompanied by son     ECF/HHA: [address]: [phone]     Dressing Orders: To right buttock wounds, cleanse with normal saline solution. Apply alginate Ag and cover with duoderm or mepilex dressing (do not cut mepilex). Please pack any tunneling with alginate Ag rope. Change daily. Left  Buttock wound healed 10/31/22. Treatment Orders:     Culture reviewed- antibiotic completed     KEEP PRESSURE OFF OF WOUNDS, turn and reposition     FOLLOW NUTRITIOUS DIET.  CHOOSE FOODS HIGH IN PROTEIN -CHICKEN- FISH-AND EGGS,  CHOOSE FOODS HIGH IN VITAMIN C.   MULTIVITAMIN DAILY. [facility] followup visit _______1 week______________________  (Please note your next appointment above and if you are unable to keep, kindly give a 24 hour notice. Thank you.)     Physician signature:__________________________        If you experience any of the following, please call the [address] [place] [address] during business hours:     * Increase in Pain  * Temperature over 101  * Increase in drainage from your wound  * Drainage with a foul odor  * Bleeding  * Increase in swelling  * Need for compression bandage changes due to slippage, breakthrough drainage. If you need medical attention outside of the business hours of the [address] [place] [address] [employer] please contact your PCP or go to the nearest emergency room.       Electronically signed by Tee Bueno MD on 11/28/2022 at 2:22 PM

## 2022-12-01 ENCOUNTER — APPOINTMENT (OUTPATIENT)
Dept: GENERAL RADIOLOGY | Age: 87
DRG: 481 | End: 2022-12-01
Payer: MEDICARE

## 2022-12-01 ENCOUNTER — APPOINTMENT (OUTPATIENT)
Dept: CT IMAGING | Age: 87
DRG: 481 | End: 2022-12-01
Payer: MEDICARE

## 2022-12-01 ENCOUNTER — HOSPITAL ENCOUNTER (INPATIENT)
Age: 87
LOS: 4 days | Discharge: NURSING, REHAB OR LONG-TERM CARE | DRG: 481 | End: 2022-12-05
Attending: EMERGENCY MEDICINE | Admitting: INTERNAL MEDICINE
Payer: MEDICARE

## 2022-12-01 DIAGNOSIS — S72.002A CLOSED FRACTURE OF LEFT HIP, INITIAL ENCOUNTER (HCC): Primary | ICD-10-CM

## 2022-12-01 LAB
ABO/RH: NORMAL
ALBUMIN SERPL-MCNC: 3.2 G/DL (ref 3.5–5.2)
ALP BLD-CCNC: 94 U/L (ref 35–104)
ALT SERPL-CCNC: 14 U/L (ref 0–32)
ANION GAP SERPL CALCULATED.3IONS-SCNC: 10 MMOL/L (ref 7–16)
ANTIBODY SCREEN: NORMAL
AST SERPL-CCNC: 20 U/L (ref 0–31)
BASOPHILS ABSOLUTE: 0.02 E9/L (ref 0–0.2)
BASOPHILS RELATIVE PERCENT: 0.2 % (ref 0–2)
BILIRUB SERPL-MCNC: 0.3 MG/DL (ref 0–1.2)
BUN BLDV-MCNC: 14 MG/DL (ref 6–23)
CALCIUM SERPL-MCNC: 8.1 MG/DL (ref 8.6–10.2)
CHLORIDE BLD-SCNC: 103 MMOL/L (ref 98–107)
CO2: 19 MMOL/L (ref 22–29)
CREAT SERPL-MCNC: 0.5 MG/DL (ref 0.5–1)
EKG ATRIAL RATE: 79 BPM
EKG P AXIS: 53 DEGREES
EKG P-R INTERVAL: 200 MS
EKG Q-T INTERVAL: 362 MS
EKG QRS DURATION: 102 MS
EKG QTC CALCULATION (BAZETT): 415 MS
EKG R AXIS: -9 DEGREES
EKG T AXIS: 89 DEGREES
EKG VENTRICULAR RATE: 79 BPM
EOSINOPHILS ABSOLUTE: 0.03 E9/L (ref 0.05–0.5)
EOSINOPHILS RELATIVE PERCENT: 0.3 % (ref 0–6)
GFR SERPL CREATININE-BSD FRML MDRD: >60 ML/MIN/1.73
GLUCOSE BLD-MCNC: 178 MG/DL (ref 74–99)
HCT VFR BLD CALC: 28.2 % (ref 34–48)
HEMOGLOBIN: 9.6 G/DL (ref 11.5–15.5)
IMMATURE GRANULOCYTES #: 0.05 E9/L
IMMATURE GRANULOCYTES %: 0.6 % (ref 0–5)
LACTIC ACID: 0.7 MMOL/L (ref 0.5–2.2)
LYMPHOCYTES ABSOLUTE: 0.63 E9/L (ref 1.5–4)
LYMPHOCYTES RELATIVE PERCENT: 7.3 % (ref 20–42)
MCH RBC QN AUTO: 34.5 PG (ref 26–35)
MCHC RBC AUTO-ENTMCNC: 34 % (ref 32–34.5)
MCV RBC AUTO: 101.4 FL (ref 80–99.9)
METER GLUCOSE: 189 MG/DL (ref 74–99)
MONOCYTES ABSOLUTE: 0.44 E9/L (ref 0.1–0.95)
MONOCYTES RELATIVE PERCENT: 5.1 % (ref 2–12)
NEUTROPHILS ABSOLUTE: 7.5 E9/L (ref 1.8–7.3)
NEUTROPHILS RELATIVE PERCENT: 86.5 % (ref 43–80)
PDW BLD-RTO: 13.4 FL (ref 11.5–15)
PLATELET # BLD: 297 E9/L (ref 130–450)
PMV BLD AUTO: 9.7 FL (ref 7–12)
POTASSIUM SERPL-SCNC: 3.8 MMOL/L (ref 3.5–5)
RBC # BLD: 2.78 E12/L (ref 3.5–5.5)
REASON FOR REJECTION: NORMAL
REJECTED TEST: NORMAL
SODIUM BLD-SCNC: 132 MMOL/L (ref 132–146)
TOTAL PROTEIN: 5.9 G/DL (ref 6.4–8.3)
TROPONIN, HIGH SENSITIVITY: 46 NG/L (ref 0–9)
TROPONIN, HIGH SENSITIVITY: 49 NG/L (ref 0–9)
WBC # BLD: 8.7 E9/L (ref 4.5–11.5)

## 2022-12-01 PROCEDURE — 73564 X-RAY EXAM KNEE 4 OR MORE: CPT

## 2022-12-01 PROCEDURE — 96374 THER/PROPH/DIAG INJ IV PUSH: CPT

## 2022-12-01 PROCEDURE — 86923 COMPATIBILITY TEST ELECTRIC: CPT

## 2022-12-01 PROCEDURE — 93010 ELECTROCARDIOGRAM REPORT: CPT | Performed by: INTERNAL MEDICINE

## 2022-12-01 PROCEDURE — 80053 COMPREHEN METABOLIC PANEL: CPT

## 2022-12-01 PROCEDURE — 73552 X-RAY EXAM OF FEMUR 2/>: CPT

## 2022-12-01 PROCEDURE — 72125 CT NECK SPINE W/O DYE: CPT

## 2022-12-01 PROCEDURE — 86850 RBC ANTIBODY SCREEN: CPT

## 2022-12-01 PROCEDURE — 70450 CT HEAD/BRAIN W/O DYE: CPT

## 2022-12-01 PROCEDURE — 86900 BLOOD TYPING SEROLOGIC ABO: CPT

## 2022-12-01 PROCEDURE — 82962 GLUCOSE BLOOD TEST: CPT

## 2022-12-01 PROCEDURE — 6360000002 HC RX W HCPCS: Performed by: EMERGENCY MEDICINE

## 2022-12-01 PROCEDURE — 84484 ASSAY OF TROPONIN QUANT: CPT

## 2022-12-01 PROCEDURE — 99221 1ST HOSP IP/OBS SF/LOW 40: CPT | Performed by: ORTHOPAEDIC SURGERY

## 2022-12-01 PROCEDURE — 2140000000 HC CCU INTERMEDIATE R&B

## 2022-12-01 PROCEDURE — 6370000000 HC RX 637 (ALT 250 FOR IP)

## 2022-12-01 PROCEDURE — 99285 EMERGENCY DEPT VISIT HI MDM: CPT

## 2022-12-01 PROCEDURE — 6360000002 HC RX W HCPCS: Performed by: FAMILY MEDICINE

## 2022-12-01 PROCEDURE — 83605 ASSAY OF LACTIC ACID: CPT

## 2022-12-01 PROCEDURE — 93005 ELECTROCARDIOGRAM TRACING: CPT | Performed by: EMERGENCY MEDICINE

## 2022-12-01 PROCEDURE — 73560 X-RAY EXAM OF KNEE 1 OR 2: CPT

## 2022-12-01 PROCEDURE — 71045 X-RAY EXAM CHEST 1 VIEW: CPT

## 2022-12-01 PROCEDURE — 73502 X-RAY EXAM HIP UNI 2-3 VIEWS: CPT

## 2022-12-01 PROCEDURE — 6370000000 HC RX 637 (ALT 250 FOR IP): Performed by: FAMILY MEDICINE

## 2022-12-01 PROCEDURE — 86901 BLOOD TYPING SEROLOGIC RH(D): CPT

## 2022-12-01 PROCEDURE — 85025 COMPLETE CBC W/AUTO DIFF WBC: CPT

## 2022-12-01 RX ORDER — POLYVINYL ALCOHOL 14 MG/ML
1 SOLUTION/ DROPS OPHTHALMIC 2 TIMES DAILY PRN
Status: DISCONTINUED | OUTPATIENT
Start: 2022-12-01 | End: 2022-12-05 | Stop reason: HOSPADM

## 2022-12-01 RX ORDER — POLYETHYLENE GLYCOL 3350 17 G/17G
17 POWDER, FOR SOLUTION ORAL DAILY PRN
Status: DISCONTINUED | OUTPATIENT
Start: 2022-12-01 | End: 2022-12-05 | Stop reason: HOSPADM

## 2022-12-01 RX ORDER — HYDRALAZINE HYDROCHLORIDE 20 MG/ML
10 INJECTION INTRAMUSCULAR; INTRAVENOUS EVERY 6 HOURS PRN
Status: DISCONTINUED | OUTPATIENT
Start: 2022-12-01 | End: 2022-12-05 | Stop reason: HOSPADM

## 2022-12-01 RX ORDER — SODIUM CHLORIDE 0.9 % (FLUSH) 0.9 %
5-40 SYRINGE (ML) INJECTION EVERY 12 HOURS SCHEDULED
Status: DISCONTINUED | OUTPATIENT
Start: 2022-12-01 | End: 2022-12-05 | Stop reason: HOSPADM

## 2022-12-01 RX ORDER — SODIUM CHLORIDE 9 MG/ML
INJECTION, SOLUTION INTRAVENOUS CONTINUOUS
Status: DISCONTINUED | OUTPATIENT
Start: 2022-12-01 | End: 2022-12-05 | Stop reason: HOSPADM

## 2022-12-01 RX ORDER — AMLODIPINE BESYLATE 10 MG/1
10 TABLET ORAL DAILY
Status: DISCONTINUED | OUTPATIENT
Start: 2022-12-02 | End: 2022-12-05 | Stop reason: HOSPADM

## 2022-12-01 RX ORDER — BISACODYL 10 MG
10 SUPPOSITORY, RECTAL RECTAL DAILY PRN
Status: ON HOLD | COMMUNITY
End: 2022-12-05 | Stop reason: HOSPADM

## 2022-12-01 RX ORDER — ACETAMINOPHEN 325 MG/1
650 TABLET ORAL EVERY 6 HOURS PRN
Status: DISCONTINUED | OUTPATIENT
Start: 2022-12-01 | End: 2022-12-05 | Stop reason: HOSPADM

## 2022-12-01 RX ORDER — MECOBALAMIN 5000 MCG
5 TABLET,DISINTEGRATING ORAL NIGHTLY
Status: DISCONTINUED | OUTPATIENT
Start: 2022-12-01 | End: 2022-12-05 | Stop reason: HOSPADM

## 2022-12-01 RX ORDER — GABAPENTIN 300 MG/1
300 CAPSULE ORAL EVERY EVENING
Status: DISCONTINUED | OUTPATIENT
Start: 2022-12-01 | End: 2022-12-05 | Stop reason: HOSPADM

## 2022-12-01 RX ORDER — METHOCARBAMOL 750 MG/1
750 TABLET, FILM COATED ORAL 4 TIMES DAILY
Status: DISCONTINUED | OUTPATIENT
Start: 2022-12-01 | End: 2022-12-05 | Stop reason: HOSPADM

## 2022-12-01 RX ORDER — FERROUS SULFATE 325(65) MG
325 TABLET ORAL
Status: DISCONTINUED | OUTPATIENT
Start: 2022-12-02 | End: 2022-12-05 | Stop reason: HOSPADM

## 2022-12-01 RX ORDER — LIDOCAINE 4 G/G
1 PATCH TOPICAL DAILY PRN
Status: DISCONTINUED | OUTPATIENT
Start: 2022-12-01 | End: 2022-12-05 | Stop reason: HOSPADM

## 2022-12-01 RX ORDER — SODIUM CHLORIDE 9 MG/ML
INJECTION, SOLUTION INTRAVENOUS CONTINUOUS
Status: DISCONTINUED | OUTPATIENT
Start: 2022-12-01 | End: 2022-12-01 | Stop reason: SDUPTHER

## 2022-12-01 RX ORDER — IBUPROFEN 400 MG/1
400 TABLET ORAL EVERY 6 HOURS PRN
COMMUNITY

## 2022-12-01 RX ORDER — BACLOFEN 10 MG/1
5 TABLET ORAL 3 TIMES DAILY PRN
Status: DISCONTINUED | OUTPATIENT
Start: 2022-12-01 | End: 2022-12-05 | Stop reason: HOSPADM

## 2022-12-01 RX ORDER — IPRATROPIUM BROMIDE AND ALBUTEROL SULFATE 2.5; .5 MG/3ML; MG/3ML
1 SOLUTION RESPIRATORY (INHALATION) EVERY 6 HOURS PRN
COMMUNITY

## 2022-12-01 RX ORDER — ONDANSETRON 2 MG/ML
4 INJECTION INTRAMUSCULAR; INTRAVENOUS EVERY 6 HOURS PRN
Status: DISCONTINUED | OUTPATIENT
Start: 2022-12-01 | End: 2022-12-05 | Stop reason: HOSPADM

## 2022-12-01 RX ORDER — SODIUM CHLORIDE 9 MG/ML
INJECTION, SOLUTION INTRAVENOUS PRN
Status: DISCONTINUED | OUTPATIENT
Start: 2022-12-01 | End: 2022-12-05 | Stop reason: HOSPADM

## 2022-12-01 RX ORDER — MORPHINE SULFATE 2 MG/ML
2 INJECTION, SOLUTION INTRAMUSCULAR; INTRAVENOUS EVERY 4 HOURS PRN
Status: DISCONTINUED | OUTPATIENT
Start: 2022-12-01 | End: 2022-12-05 | Stop reason: HOSPADM

## 2022-12-01 RX ORDER — POTASSIUM CHLORIDE 7.45 MG/ML
10 INJECTION INTRAVENOUS PRN
Status: DISCONTINUED | OUTPATIENT
Start: 2022-12-01 | End: 2022-12-05 | Stop reason: HOSPADM

## 2022-12-01 RX ORDER — ACETAMINOPHEN 650 MG/1
650 SUPPOSITORY RECTAL EVERY 6 HOURS PRN
Status: DISCONTINUED | OUTPATIENT
Start: 2022-12-01 | End: 2022-12-05 | Stop reason: HOSPADM

## 2022-12-01 RX ORDER — POTASSIUM CHLORIDE 20 MEQ/1
40 TABLET, EXTENDED RELEASE ORAL PRN
Status: DISCONTINUED | OUTPATIENT
Start: 2022-12-01 | End: 2022-12-05 | Stop reason: HOSPADM

## 2022-12-01 RX ORDER — GABAPENTIN 300 MG/1
300 CAPSULE ORAL NIGHTLY
COMMUNITY

## 2022-12-01 RX ORDER — POLYETHYLENE GLYCOL 3350 17 G/17G
17 POWDER, FOR SOLUTION ORAL DAILY
COMMUNITY

## 2022-12-01 RX ORDER — MORPHINE SULFATE 4 MG/ML
4 INJECTION, SOLUTION INTRAMUSCULAR; INTRAVENOUS ONCE
Status: COMPLETED | OUTPATIENT
Start: 2022-12-01 | End: 2022-12-01

## 2022-12-01 RX ORDER — ONDANSETRON 4 MG/1
4 TABLET, ORALLY DISINTEGRATING ORAL EVERY 8 HOURS PRN
Status: DISCONTINUED | OUTPATIENT
Start: 2022-12-01 | End: 2022-12-05 | Stop reason: HOSPADM

## 2022-12-01 RX ORDER — PANTOPRAZOLE SODIUM 40 MG/1
40 TABLET, DELAYED RELEASE ORAL
Status: DISCONTINUED | OUTPATIENT
Start: 2022-12-02 | End: 2022-12-05 | Stop reason: HOSPADM

## 2022-12-01 RX ORDER — HYDROCODONE BITARTRATE AND ACETAMINOPHEN 5; 325 MG/1; MG/1
1 TABLET ORAL EVERY 6 HOURS PRN
Status: DISCONTINUED | OUTPATIENT
Start: 2022-12-01 | End: 2022-12-05 | Stop reason: HOSPADM

## 2022-12-01 RX ORDER — ACETAMINOPHEN 325 MG/1
650 TABLET ORAL EVERY 4 HOURS PRN
Status: DISCONTINUED | OUTPATIENT
Start: 2022-12-01 | End: 2022-12-01 | Stop reason: SDUPTHER

## 2022-12-01 RX ORDER — MORPHINE SULFATE 2 MG/ML
2 INJECTION, SOLUTION INTRAMUSCULAR; INTRAVENOUS ONCE
Status: COMPLETED | OUTPATIENT
Start: 2022-12-01 | End: 2022-12-01

## 2022-12-01 RX ORDER — SODIUM CHLORIDE 0.9 % (FLUSH) 0.9 %
10 SYRINGE (ML) INJECTION PRN
Status: DISCONTINUED | OUTPATIENT
Start: 2022-12-01 | End: 2022-12-05 | Stop reason: HOSPADM

## 2022-12-01 RX ADMIN — GABAPENTIN 300 MG: 300 CAPSULE ORAL at 22:13

## 2022-12-01 RX ADMIN — BACLOFEN 5 MG: 10 TABLET ORAL at 22:13

## 2022-12-01 RX ADMIN — HYDRALAZINE HYDROCHLORIDE 10 MG: 20 INJECTION INTRAMUSCULAR; INTRAVENOUS at 23:31

## 2022-12-01 RX ADMIN — MORPHINE SULFATE 4 MG: 4 INJECTION, SOLUTION INTRAMUSCULAR; INTRAVENOUS at 13:07

## 2022-12-01 RX ADMIN — MORPHINE SULFATE 2 MG: 2 INJECTION, SOLUTION INTRAMUSCULAR; INTRAVENOUS at 21:45

## 2022-12-01 RX ADMIN — MORPHINE SULFATE 2 MG: 2 INJECTION, SOLUTION INTRAMUSCULAR; INTRAVENOUS at 09:56

## 2022-12-01 RX ADMIN — ACETAMINOPHEN 650 MG: 325 TABLET, FILM COATED ORAL at 22:13

## 2022-12-01 RX ADMIN — Medication 5 MG: at 22:13

## 2022-12-01 RX ADMIN — METHOCARBAMOL 750 MG: 750 TABLET ORAL at 20:33

## 2022-12-01 ASSESSMENT — PAIN SCALES - GENERAL
PAINLEVEL_OUTOF10: 7
PAINLEVEL_OUTOF10: 8
PAINLEVEL_OUTOF10: 7

## 2022-12-01 NOTE — ED NOTES
Patient return from x-ray  . Pt resting in position of comfort on cot presenting in no acute/ apparent distress (NAD). Respirations are noted even and unlabored with good rise and fall of the chest observed. Family updated with current plan of care (POC) and all questions/ concerns addressed. Patient/ family voices no needs at this time. Cot noted in lowest position, locked, with side rails X 2 up for patient safety. Will continue to monitor patient for acute changes.         [x] Side rails up    [x] Cart in lowest position    [x] Family at bedside    [x] Call light within reach                  [facility], RN  12/01/22 9541

## 2022-12-01 NOTE — CONSULTS
Department of Orthopedic Surgery  Resident Consult Note          Reason for Consult: Left Hip Pain    HISTORY OF PRESENT ILLNESS:       Patient is a 80 y.o. female who presents with hip pain after a fall. Patient reported that her knees gave out causing her to fall earlier this morning. Previous Hip pain -  yes - reports intermittent hip pain but she believe this is contributed from her LBP. Anticoagulation - formerly on Eliquis for DVT however, was stopped due to uncontrolled bleeding. (Per patient's daughter). The patient is community Ambulator with assist  - walker. Pt lives at assisted living. Patient has a significant history of recently hospitalization for COVID infection, peripheral angioplasty, PVD, CAD, DVT,SOB, DM, sacral decubitus wound. Previous Orthopedic Surgeon - no  Denies numbness/tingling/paresthesias. Denies any other orthopedic complaints at this time.      Tobacco use: none  Alcohol use: none  Illicit drug use: no history of illicit drug use    Past Medical History:        Diagnosis Date    Arthritis     Asthma     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 ([address] 5/18/2021    Bronchitis 5/23/2017    Bruising tendency     Buttock wound, left, initial encounter 10/3/2022    CAD (coronary artery disease)     Cough 5/23/2017    COVID     Dermatophytosis 6/25/2021    Diabetes mellitus ([address]     GERD (gastroesophageal reflux disease) 5/23/2017    History of GI bleed 10/3/2022    History of pulmonary embolus (PE) 5/29/2021    Hx of blood clots     Hyperlipidemia     Hypertension     Leg swelling 7/15/2021    Lung disease     Peripheral vascular angioplasty status 5/29/2021    Pseudoaneurysm of right femoral artery ([address] 5/29/2021    PVD (peripheral vascular disease) with claudication ([address] 4/10/2019    Restless legs syndrome     Rhinitis, allergic 5/23/2017    Sinusitis 5/23/2017    SOB (shortness of breath) 5/23/2017    Type 1 diabetes mellitus with left diabetic foot ulcer ([facility] 5/18/2021    Ulcerated, foot, left, limited to breakdown of skin ([facility] 6/25/2021    Urinary incontinence     Wound of left buttock 10/10/2022    Wound of right buttock 10/3/2022    Fat layer     Past Surgical History:        Procedure Laterality Date    ABDOMEN SURGERY      APPENDECTOMY      CARDIAC SURGERY      CHOLECYSTECTOMY      COLONOSCOPY      CORONARY ARTERY BYPASS GRAFT      8/28/10    ENDOSCOPY, COLON, DIAGNOSTIC      FOOT DEBRIDEMENT Left 5/16/2021    FOOT DEBRIDEMENT INCISION AND DRAINAGE. performed by Marylene Riis, DPM at [facility] Left 5/21/2021    LEFT FOOT DEBRIDEMENT  WITH DELAYED PRIMARY CLOSURE performed by Elsi Winters DPM at [facility] ([facility])      frankie and bso 1997    TONSILLECTOMY AND ADENOIDECTOMY      UPPER GASTROINTESTINAL ENDOSCOPY N/A 7/18/2022    EGD ESOPHAGOGASTRODUODENOSCOPY performed by Jeremiah Smith MD at [facility] ENDOSCOPY     Current Medications:   Current Facility-Administered Medications: 0.9 % sodium chloride infusion, , IntraVENous, Continuous  Allergies:  Ativan [lorazepam] and Lisinopril    Social History:   TOBACCO:   reports that she has never smoked. She has never used smokeless tobacco.  ETOH:   reports current alcohol use. DRUGS:   reports no history of drug use.   ACTIVITIES OF DAILY LIVING:    OCCUPATION:    Family History:       Problem Relation Age of Onset    Hypertension Father     Heart Disease Brother        REVIEW OF SYSTEMS:  CONSTITUTIONAL:  negative for  fevers, chills  EYES:  negative for blurred vision, visual disturbance  HEENT:  negative for  hearing loss, voice change  RESPIRATORY:  negative for  dyspnea, wheezing  CARDIOVASCULAR:  negative for  chest pain, palpitations  GASTROINTESTINAL:  negative for nausea, vomiting  GENITOURINARY:  negative for frequency, urinary incontinence  HEMATOLOGIC/LYMPHATIC:  negative for bleeding and petechiae  MUSCULOSKELETAL:  positive for  pain, decreased range of motion, and bone pain  NEUROLOGICAL:  negative for headaches, dizziness  BEHAVIOR/PSYCH:  negative for increased agitation and anxiety    PHYSICAL EXAM:    VITALS:  BP (!) 187/62   Pulse 62   Temp 98.4 °F (36.9 °C)   Resp 15   LMP 12/03/1997   SpO2 98%   CONSTITUTIONAL:  awake, alert, cooperative, no apparent distress, and appears stated age  General appearance: alert, well appearing, and in no distress,  normal appearing weight  Mental status: alert, oriented to person, place, and time, normal mood, behavior, speech, dress, motor activity, and thought processes  Abdomen: soft, nondistended   Resp:   resp easy and unlabored, no audible wheezes note  Cardiac: distal pulses palpable, skin well perfused  Neurological: alert, oriented X3, normal speech, no focal findings or movement disorder noted, motor and sensory grossly normal bilaterally, normal muscle tone, no tremors, strength 5/5, normal gait and station  HEENT: normochephalic atraumatic, external ears and eyes normal, sclera normal, neck supple  Extremities:   peripheral pulses normal, no edema, redness or tenderness in the calves   Skin: normal coloration, no rashes or open wounds, no suspicious skin lesions noted  Psych: Affect euthymic   MUSCULOSKELETAL:  Left lower Extremity:  Shortened and externally rotated  +Log roll  + Heel Strike  -TTP over Knee and Ankle  Skin intact with no signs of degloving  Compartments soft and compressible, calf non-tender  +DP & PT pulses, Brisk Cap refill, Toes warm and perfused  Sensation grossly intact superficial/deep peroneal,saphenous,sural,tibial n. distributions  +GS/TA/EHL. Secondary Exam:   bilateralUE: No obvious signs of trauma. -TTP to fingers, hand, wrist, forearm, elbow, humerus, shoulder or clavicle. -- Patient able to flex/extend fingers, wrist, elbow and shoulder with active and passive ROM without pain, +2/4 Radial pulse, cap refill <3sec, +AIN/PIN/Radial/Ulnar/Median N, distal sensation grossly intact to C4-T1 dermatomes, compartments soft and compressible. rightLE: No obvious signs of trauma. -TTP to foot, ankle, leg, knee, thigh, hip.-- Patient able to flex/extend toes, ankle, knee and hip with active and passive ROM without pain,+2/4 DP & PT pulses, cap refill <3sec, +5/5 PF/DF/EHL, distal sensation grossly intact to L4-S1 dermatomes, compartments soft and compressible. Pelvis: -TTP, -Log roll, -Heel strike     DATA:    CBC:   Lab Results   Component Value Date/Time    WBC 8.7 12/01/2022 09:46 AM    RBC 2.78 12/01/2022 09:46 AM    HGB 9.6 12/01/2022 09:46 AM    HCT 28.2 12/01/2022 09:46 AM    .4 12/01/2022 09:46 AM    MCH 34.5 12/01/2022 09:46 AM    MCHC 34.0 12/01/2022 09:46 AM    RDW 13.4 12/01/2022 09:46 AM     12/01/2022 09:46 AM    MPV 9.7 12/01/2022 09:46 AM     PT/INR:    Lab Results   Component Value Date/Time    PROTIME 16.6 07/30/2022 10:14 AM    INR 1.5 07/30/2022 10:14 AM     Lactic Acid :   Lab Results   Component Value Date/Time    LACTA 0.7 12/01/2022 09:46 AM       Radiology Review:  12/01/22 - XR pelvis left hip demonstrating a stable 2 part intertrochanteric hip fracture. Varus and shortened. Lesser trochanter appears detached from shaft. XR left knee demonstrates no acute fractures. No hardware    IMPRESSION:   Closed, Right Intertrochanteric Fracture    PLAN:  Plan for surgery with Dr. Darling Crespo on 12/02/2022  Risk, benefits and treatment options were discussed and has verbalized understanding of options. The possibility of complications were also discussed to include but not limited to nerve damage, infection, problems with wound healing, vascular injury, chronic pain, stiffness, dysfunction, nonhealing of the bone, symptomatic hardware and/or its failure, need for subsequent surgery, dislocation, and blood clots as well as medical related problems and other problems not specifically discussed.  Risk of anesthesia also discussed to include

## 2022-12-01 NOTE — LETTER
[place] Department Medicaid  CERTIFICATION OF NECESSITY  FOR NON-EMERGENCY TRANSPORTATION   BY GROUND AMBULANCE      Individual Information   1. Name: Moy Kaba 2. [place] Medicaid Billing Number:    3. Address: [place] [address]  [facility]      Transportation Provider Information   4. Provider Name:    5. [place] Medicaid Provider Number:  National Provider Identifier (NPI):      Certification  7. Criteria:  During transport, this individual requires:  [x] Medical treatment or continuous     supervision by an EMT. [] The administration or regulation of oxygen by another person. [] Supervised protective restraint. 8. Period Beginning Date:    5. Length  [x] Not more than 1 day(s)  [] One Year     Additional Information Relevant to Certification   10. Comments or Explanations, If Necessary or Appropriate   L HIP FX, HIGH FALL RISK, AMS(A+OX1), DEPENDENT      Certifying Practitioner Information   11. Name of Practitioner: DR. DAWIT Benitez MD   12. [place] Medicaid Provider Number, If Applicable:  [address] Provider Identifier (NPI):      Signature Information   14. Date of Signature:  13. Name of Person Signin. Signature and Professional Designation:      [facility] [id number]  Rev. 2015       [address] Encounter Date/Time: 2022 [address] Account: [de-identified]    MRN: [id number]    Patient: Moy aKba    Contact Serial #: [id number]      ENCOUNTER          Patient Class: I Private Enc?   No Unit RM BD: SEYZ [id number]   Hospital Service: MED   Encounter DX: Closed fracture of left *   ADM Provider: Jayda Cohen MD   Procedure:     ATT Provider: Jayda Cohen MD   REF Provider:        Admission DX: Closed fracture of left hip, initial encounter [facility]), Closed hip fracture, left, initial encounter [facility]) and DX codes: S72.002A, S72.002A      PATIENT                 Name: Moy Kaba : 1927 (89 yrs)   Address: [address] Sex: Female City: [facility] [address]         Marital Status:    Employer: RETIRED         Lutheran: Anglican   Primary Care Provider: Shmuel Miller DO         Primary Phone: [phone]   EMERGENCY CONTACT   Contact Name Legal Guardian? Relationship to Patient Home Phone Work Phone   1. Carey Melvin  2. Arian Melvin      Child  Child [phone]              GUARANTOR            Guarantor: Dharmeshkingsley Englevanesa     : 1927   Address: [address] [place] [address]; Room * Sex: Female     [place] [address]     Relation to Patient: Self       Home Phone: [phone]   Guarantor ID: [id number]       Work Phone:     Guarantor Employer: RETIRED         Status: RETIRED      COVERAGE        PRIMARY INSURANCE   Payor: MEDICARE Plan: [place] MEDICARE   Payor Address: [facility] [address],  [facility] [address], [facility] [address]       Group Number:   Insurance Type: INDEMNITY   Subscriber Name: Carter Mcmillan : 1927   Subscriber ID: [id number] Jamee [place]. Rel. to Sub: Self   SECONDARY INSURANCE   Payor: MEDICAID [place] Plan: [facility] DEPT OF*   Payor Address:  [facility] [address], [place], [address]          Group Number:   Insurance Type: INDEMNITY   Subscriber Name: Carter Mcmillan : 1927   Subscriber ID: [id number] Jamee [place].  Rel. to Sub: SELF         CSN: [id number]

## 2022-12-01 NOTE — ED TRIAGE NOTES
79 y/o female to the ed with a c/c of left hip and leg pain that began after a mechanical fall this a.m. Patient reports that her legs \"just gave out\". Patient denies hitting her head or LOC on scene. Patient noted with point tenderness to the left hip upon arrival, with LLE noted shortened and externally rotated. Patient denies chest pain, sob or difficulty breathing. Patient denies ABD pain or n/v/d. Patient noted with + msp x 4, pupils PERRLA @ 3 and lung sounds that are clear and equil bilaterally. Patient placed on telemetry, BP and pulse ox. 12-lead EKG performed. Vitals noted as recorded. PIV placed with labs drawn and sent. Call light placed within patient's reach, and bed in lowest position with side rails up x 2 for safety. Provider at the bedside for assessment.

## 2022-12-01 NOTE — ED NOTES
Pt resting in position of comfort on cot presenting in no acute/ apparent distress (NAD). Respirations are noted even and unlabored with good rise and fall of the chest observed. Pt updated with current plan of care (POC) and all questions/ concerns addressed. Patient voices no needs at this time. Cot noted in lowest position, locked, with side rails X 2 up for patient safety. Will continue to monitor patient for acute changes.       [x] Side rails up    [x] Cart in lowest position    [x] Family at bedside    [x] Call light within reach           Mercy Don RN  12/01/22 8155

## 2022-12-01 NOTE — ED PROVIDER NOTES
HPI:  12/1/22,   Time: 12:19 PM GRACIELA Singh is a 80 y.o. female presenting to the ED for pain in her left hip and left knee after a fall that occurred when her left knee gave out while she was walking with her walker, beginning a few hours ago. The complaint has been persistent, severe in severity, and worsened by changing position, movement of her left. Patient denies neck pain chest pain shortness of breath or abdominal pain. ROS:   Pertinent positives and negatives are stated within HPI, all other systems reviewed and are negative.  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 ([address] Bronchitis, Bruising tendency, Buttock wound, left, initial encounter, CAD (coronary artery disease), Cough, COVID, Dermatophytosis, Diabetes mellitus ([address] GERD (gastroesophageal reflux disease), History of GI bleed, History of pulmonary embolus (PE), Hx of blood clots, Hyperlipidemia, Hypertension, Leg swelling, Lung disease, Peripheral vascular angioplasty status, Pseudoaneurysm of right femoral artery ([address] PVD (peripheral vascular disease) with claudication ([address] Restless legs syndrome, Rhinitis, allergic, Sinusitis, SOB (shortness of breath), Type 1 diabetes mellitus with left diabetic foot ulcer ([address] Ulcerated, foot, left, limited to breakdown of skin ([address] Urinary incontinence, Wound of left buttock, and Wound of right buttock. Past Surgical History:  has a past surgical history that includes Hysterectomy; Cholecystectomy; Tonsillectomy and adenoidectomy; Coronary artery bypass graft; Abdomen surgery; Appendectomy; Colonoscopy; Endoscopy, colon, diagnostic; Cardiac surgery; Foot Debridement (Left, 5/16/2021);  Foot Debridement (Left, 5/21/2021); and Upper gastrointestinal endoscopy (N/A, 7/18/2022). Social History:  reports that she has never smoked. She has never used smokeless tobacco. She reports current alcohol use. She reports that she does not use drugs. Family History: family history includes Heart Disease in her brother; Hypertension in her father. The patients home medications have been reviewed.     Allergies: Ativan [lorazepam] and Lisinopril    -------------------------------------------------- RESULTS -------------------------------------------------  All laboratory and radiology results have been personally reviewed by myself   LABS:  Results for orders placed or performed during the hospital encounter of 12/01/22   CBC with Auto Differential   Result Value Ref Range    WBC 8.7 4.5 - 11.5 E9/L    RBC 2.78 (L) 3.50 - 5.50 E12/L    Hemoglobin 9.6 (L) 11.5 - 15.5 g/dL    Hematocrit 28.2 (L) 34.0 - 48.0 %    .4 (H) 80.0 - 99.9 fL    MCH 34.5 26.0 - 35.0 pg    MCHC 34.0 32.0 - 34.5 %    RDW 13.4 11.5 - 15.0 fL    Platelets 305 656 - 315 E9/L    MPV 9.7 7.0 - 12.0 fL    Neutrophils % 86.5 (H) 43.0 - 80.0 %    Immature Granulocytes % 0.6 0.0 - 5.0 %    Lymphocytes % 7.3 (L) 20.0 - 42.0 %    Monocytes % 5.1 2.0 - 12.0 %    Eosinophils % 0.3 0.0 - 6.0 %    Basophils % 0.2 0.0 - 2.0 %    Neutrophils Absolute 7.50 (H) 1.80 - 7.30 E9/L    Immature Granulocytes # 0.05 E9/L    Lymphocytes Absolute 0.63 (L) 1.50 - 4.00 E9/L    Monocytes Absolute 0.44 0.10 - 0.95 E9/L    Eosinophils Absolute 0.03 (L) 0.05 - 0.50 E9/L    Basophils Absolute 0.02 0.00 - 0.20 E9/L   CMP   Result Value Ref Range    Sodium 132 132 - 146 mmol/L    Potassium 3.8 3.5 - 5.0 mmol/L    Chloride 103 98 - 107 mmol/L    CO2 19 (L) 22 - 29 mmol/L    Anion Gap 10 7 - 16 mmol/L    Glucose 178 (H) 74 - 99 mg/dL    BUN 14 6 - 23 mg/dL    Creatinine 0.5 0.5 - 1.0 mg/dL    Est, Glom Filt Rate >60 >=60 mL/min/1.73    Calcium 8.1 (L) 8.6 - 10.2 mg/dL    Total Protein 5.9 (L) 6.4 - 8.3 g/dL    Albumin 3.2 (L) 3.5 - 5.2 g/dL    Total Bilirubin 0.3 0.0 - 1.2 mg/dL    Alkaline Phosphatase 94 35 - 104 U/L    ALT 14 0 - 32 U/L    AST 20 0 - 31 U/L   Lactic Acid   Result Value Ref Range    Lactic Acid 0.7 0.5 - 2.2 mmol/L   Troponin   Result Value Ref Range    Troponin, High Sensitivity 46 (H) 0 - 9 ng/L   EKG 12 Lead   Result Value Ref Range    Ventricular Rate 79 BPM    Atrial Rate 79 BPM    P-R Interval 200 ms    QRS Duration 102 ms    Q-T Interval 362 ms    QTc Calculation (Bazett) 415 ms    P Axis 53 degrees    R Axis -9 degrees    T Axis 89 degrees     EKG: This EKG is signed and interpreted by me. Rate: 79  Rhythm: Sinus  Interpretation: non-specific EKG  Comparison: changes compared to previous EKG    RADIOLOGY:  Interpreted by Radiologist.  [address] [place] [address]   Final Result   1. There is no acute intracranial abnormality. Specifically, there is no   intracranial hemorrhage. 2. Atrophy and periventricular leukomalacia,   . CT CERVICAL SPINE WO CONTRAST   Final Result   1. There is no acute compression fracture or subluxation of the cervical   spine. 2. Multilevel degenerative disc and degenerative joint disease. [place] XR FEMUR LEFT (MIN 2 VIEWS)   Final Result   1. Intratrochanteric fracture of the left hip. Please note the left hip is   poorly visualized. If clinically warranted CT of the hip can be obtained for   further evaluation. XR CHEST 1 VIEW   Final Result   No acute process. XR KNEE LEFT (1-2 VIEWS)   Final Result   1. There is no fracture dislocation of the left knee   2. Degenerative changes of the left knee   3. Joint effusion         XR HIP 2-3 VW W PELVIS LEFT   Final Result   1. Limited imaging of the left hip due to the patient's body habitus enlarged   pannus overlying the left hip   2.  Intratrochanteric fracture of the left hip.             ------------------------- NURSING NOTES AND VITALS REVIEWED ---------------------------   The nursing notes within the ED encounter and vital signs as below have been reviewed. BP (!) 187/62   Pulse 62   Temp 98.4 °F (36.9 °C)   Resp 15   LMP 12/03/1997   SpO2 98%   Oxygen Saturation Interpretation: Normal      ---------------------------------------------------PHYSICAL EXAM--------------------------------------      Constitutional/General: Alert and oriented x3, well appearing, non toxic in NAD  Head: NC/AT  Eyes: PERRL, EOMI  Mouth: Oropharynx clear, handling secretions, no trismus  Neck: Supple, full ROM, no meningeal signs  Pulmonary: Lungs clear to auscultation bilaterally, no wheezes, rales, or rhonchi. Not in respiratory distress  Cardiovascular:  Regular rate and rhythm, no murmurs, gallops, or rubs. 2+ distal pulses  Abdomen: Soft, non tender, non distended,   Extremities: Moves all extremities x 4. Warm and well perfused left lower extremity is. Is externally rotated and shortened. Left knee is edematous and tender. There is intact posterior tibial pulse. Patient has ecchymosis and an abrasion on the anterior aspect of her knee. Skin: warm and dry without rash  Neurologic: GCS 15,  Psych: Normal Affect      ------------------------------ ED COURSE/MEDICAL DECISION MAKING----------------------  Medications   0.9 % sodium chloride infusion (has no administration in time range)   methocarbamol (ROBAXIN) tablet 750 mg (has no administration in time range)   morphine (PF) injection 2 mg (2 mg IntraVENous Given 12/1/22 1560)   morphine sulfate (PF) injection 4 mg (4 mg IntraVENous Given 12/1/22 1307)         Medical Decision Making:    Patient was medicated with morphine. Patient had a consult placed to [address] [place] [address] medical consultants for admission. Consult was placed to orthopedics for evaluation and treatment of the hip fracture. Patient was made comfortable. Patient was noted to have an elevated troponin and repeat troponin was ordered. Counseling:    The emergency provider has spoken with the patient and family member patient and son and discussed todays results, in addition to providing specific details for the plan of care and counseling regarding the diagnosis and prognosis. Questions are answered at this time and they are agreeable with the plan.      --------------------------------- IMPRESSION AND DISPOSITION ---------------------------------    IMPRESSION  1.  Closed fracture of left hip, initial encounter ([facility]        DISPOSITION  Disposition: Admit to med/surg floor  Patient condition is serious                  Rolf Escobar MD  12/01/22 4609

## 2022-12-01 NOTE — H&P
[facility]  History and Physical      CHIEF COMPLAINT:    Chief Complaint   Patient presents with    Leg Injury        Patient of Macie Morillo DO presents with:  Closed hip fracture, left, initial encounter ([facility] [address]    History of Present Illness:   Patient is a 49-year-old female with a past medical history of arthritis, asthma, buttock wound, CAD, diabetes mellitus, GERD, hyperlipidemia, hypertension, PVD, and restless leg syndrome, presents to the ED for pain in her left hip and left knee after fall occurring just a few hours ago. Patient states that the pain worsens with moving her left lower extremity. Patient denies any dizziness or lightheadedness prior to falling and states she just tripped. Orthopedic surgery consulted for evaluation of intertrochanteric fracture of the left hip. Labs were revealing of a Troponin 46, Hemoglobin 9.6 which appears to be about baseline. Patient denies any CP, or SOB, abdominal pain, fever, chills, N/V/D. Patient is admitted to intermediate telemetry for further work-up and treatment. Upon initial assessment patient alert and oriented, no acute distress. Patient does complain of pain in the left lower extremity with movement. REVIEW OF SYSTEMS:  Pertinent negatives are above in HPI. 10 point ROS otherwise negative.       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 ([facility] [address] 5/18/2021    Bronchitis 5/23/2017    Bruising tendency     Buttock wound, left, initial encounter 10/3/2022    CAD (coronary artery disease)     Cough 5/23/2017    COVID     Dermatophytosis 6/25/2021    Diabetes mellitus ([facility] [address]     GERD (gastroesophageal reflux disease) 5/23/2017    History of GI bleed 10/3/2022    History of pulmonary embolus (PE) 5/29/2021    Hx of blood clots     Hyperlipidemia     Hypertension     Leg swelling 7/15/2021    Lung disease     Peripheral vascular angioplasty status 5/29/2021    Pseudoaneurysm of right femoral artery ([facility] [address] 5/29/2021    PVD (peripheral vascular disease) with claudication ([address] 4/10/2019    Restless legs syndrome     Rhinitis, allergic 5/23/2017    Sinusitis 5/23/2017    SOB (shortness of breath) 5/23/2017    Type 1 diabetes mellitus with left diabetic foot ulcer ([address] 5/18/2021    Ulcerated, foot, left, limited to breakdown of skin ([address] 6/25/2021    Urinary incontinence     Wound of left buttock 10/10/2022    Wound of right buttock 10/3/2022    Fat layer         Past Surgical History:   Procedure Laterality Date    ABDOMEN SURGERY      APPENDECTOMY      CARDIAC SURGERY      CHOLECYSTECTOMY      COLONOSCOPY      CORONARY ARTERY BYPASS GRAFT      8/28/10    ENDOSCOPY, COLON, DIAGNOSTIC      FOOT DEBRIDEMENT Left 5/16/2021    FOOT DEBRIDEMENT INCISION AND DRAINAGE. performed by Vaishali Mckeon DPM at [address] Left 5/21/2021    LEFT FOOT DEBRIDEMENT  WITH DELAYED PRIMARY CLOSURE performed by Kaushik Carbone DPM at [address] [facility] ([address] [facility])      frankie and bso 1997    TONSILLECTOMY AND ADENOIDECTOMY      UPPER GASTROINTESTINAL ENDOSCOPY N/A 7/18/2022    EGD ESOPHAGOGASTRODUODENOSCOPY performed by Jaja Pérez MD at [facility] ENDOSCOPY       Medications Prior to Admission:    Not in a hospital admission. Note that the patient's home medications were reviewed and the above list is accurate to the best of my knowledge at the time of the exam.    Allergies:    Ativan [lorazepam] and Lisinopril    Social History:    reports that she has never smoked. She has never used smokeless tobacco. She reports current alcohol use. She reports that she does not use drugs. Family History:   family history includes Heart Disease in her brother; Hypertension in her father.       PHYSICAL EXAM:    Vitals:  BP (!) 187/62   Pulse 62   Temp 98.4 °F (36.9 °C)   Resp 15   LMP 12/03/1997   SpO2 98%       General appearance: NAD, conversant  Eyes: Sclerae anicteric, PERRLA  HEENT: AT/NC, MMM  Neck: FROM, supple, no thyromegaly  Lymph: No cervical / supraclavicular lymphadenopathy  Lungs: Clear to auscultation, WOB normal  CV: RRR, no MRGs, no lower extremity edema  Abdomen: Soft, non-tender; no masses or HSM, +BS  Extremities: FROM without synovitis. No clubbing or cyanosis of the hands. LLE externally rotated   Skin: abrasions from the fall   Psych: Calm and cooperative. Normal judgement and insight. Normal mood and affect. Neuro: Alert and interactive, face symmetric, speech fluent. LABS:  All labs reviewed.   Of note:  CBC with Differential:    Lab Results   Component Value Date/Time    WBC 8.7 12/01/2022 09:46 AM    RBC 2.78 12/01/2022 09:46 AM    HGB 9.6 12/01/2022 09:46 AM    HCT 28.2 12/01/2022 09:46 AM     12/01/2022 09:46 AM    .4 12/01/2022 09:46 AM    MCH 34.5 12/01/2022 09:46 AM    MCHC 34.0 12/01/2022 09:46 AM    RDW 13.4 12/01/2022 09:46 AM    NRBC 3.5 07/17/2022 09:32 PM    LYMPHOPCT 7.3 12/01/2022 09:46 AM    MONOPCT 5.1 12/01/2022 09:46 AM    BASOPCT 0.2 12/01/2022 09:46 AM    MONOSABS 0.44 12/01/2022 09:46 AM    LYMPHSABS 0.63 12/01/2022 09:46 AM    EOSABS 0.03 12/01/2022 09:46 AM    BASOSABS 0.02 12/01/2022 09:46 AM     BMP:    Lab Results   Component Value Date/Time     12/01/2022 09:46 AM    K 3.8 12/01/2022 09:46 AM    K 4.5 11/08/2022 12:15 PM     12/01/2022 09:46 AM    CO2 19 12/01/2022 09:46 AM    BUN 14 12/01/2022 09:46 AM    LABALBU 3.2 12/01/2022 09:46 AM    CREATININE 0.5 12/01/2022 09:46 AM    CALCIUM 8.1 12/01/2022 09:46 AM    GFRAA >60 08/27/2022 04:55 AM    LABGLOM >60 12/01/2022 09:46 AM    GLUCOSE 178 12/01/2022 09:46 AM       Imaging:  XR Lt Hip- Intratrochanteric fracture of the left hip  XR Lt Knee- Negative  CXR- Negative  XR Lt Femur- Intratrochanteric fracture of the left hip  CT Head- Negative  CT C Spine- Negative    EKG:  I've personally reviewed the patient's EKG:      Telemetry:  I've personally reviewed the patient's telemetry:      ASSESSMENT/PLAN:  Principal Problem:    Closed hip fracture, left, initial encounter [facility])  Resolved Problems:    * No resolved hospital problems. *    Patient is a 77-year-old female with a past medical history of arthritis, asthma, buttock wound, CAD, diabetes mellitus, GERD, hyperlipidemia, hypertension, PVD, and restless leg syndrome, presents to the ED for  Lt Hip Fracture  -Monitor labs  -PRN medication  -IVF NS @ 100  -Orthopedic surgery following  -Plan for surgery with Dr. Whitney Peters on 12/02/2022  -NPO effective at midnight  -Hold anticoagulation meds  -PT/OT following surgery for therapy needs    Hypertension  -Continue home medications      Medication for other comorbidities continue as appropriate dose adjustment as necessary. DVT prophylaxis PCDs  PT/OT  Discharge planning Home    Case discussed with attending and agreed upon plan of care. Code status: Full  Requires Inpatient level of care    VIK Nick CNP    12:42 PM  12/1/2022     Above note edited to reflect my thoughts   The patient is cleared from medicine standpoint to proceed with orthopedic surgery    I personally saw, examined and provided care for the patient. Radiographs, labs and medication list were reviewed by me independently. The case was discussed in detail and plans for care were established. Review of MILA Nick   , documentation was conducted and revisions were made as appropriate directly by me. I agree with the above documented exam, problem list, and plan of care.      La Ngo MD  7:47 PM  12/1/2022

## 2022-12-02 ENCOUNTER — APPOINTMENT (OUTPATIENT)
Dept: GENERAL RADIOLOGY | Age: 87
DRG: 481 | End: 2022-12-02
Payer: MEDICARE

## 2022-12-02 ENCOUNTER — ANESTHESIA (OUTPATIENT)
Dept: OPERATING ROOM | Age: 87
End: 2022-12-02
Payer: MEDICARE

## 2022-12-02 ENCOUNTER — ANESTHESIA EVENT (OUTPATIENT)
Dept: OPERATING ROOM | Age: 87
End: 2022-12-02
Payer: MEDICARE

## 2022-12-02 LAB
ANION GAP SERPL CALCULATED.3IONS-SCNC: 9 MMOL/L (ref 7–16)
BASOPHILS ABSOLUTE: 0.01 E9/L (ref 0–0.2)
BASOPHILS RELATIVE PERCENT: 0.1 % (ref 0–2)
BUN BLDV-MCNC: 14 MG/DL (ref 6–23)
CALCIUM SERPL-MCNC: 9.3 MG/DL (ref 8.6–10.2)
CHLORIDE BLD-SCNC: 99 MMOL/L (ref 98–107)
CO2: 24 MMOL/L (ref 22–29)
CREAT SERPL-MCNC: 0.7 MG/DL (ref 0.5–1)
EOSINOPHILS ABSOLUTE: 0.04 E9/L (ref 0.05–0.5)
EOSINOPHILS RELATIVE PERCENT: 0.5 % (ref 0–6)
GFR SERPL CREATININE-BSD FRML MDRD: >60 ML/MIN/1.73
GLUCOSE BLD-MCNC: 164 MG/DL (ref 74–99)
HCT VFR BLD CALC: 30.8 % (ref 34–48)
HEMOGLOBIN: 10.4 G/DL (ref 11.5–15.5)
IMMATURE GRANULOCYTES #: 0.03 E9/L
IMMATURE GRANULOCYTES %: 0.4 % (ref 0–5)
LYMPHOCYTES ABSOLUTE: 0.99 E9/L (ref 1.5–4)
LYMPHOCYTES RELATIVE PERCENT: 13.4 % (ref 20–42)
MCH RBC QN AUTO: 34.4 PG (ref 26–35)
MCHC RBC AUTO-ENTMCNC: 33.8 % (ref 32–34.5)
MCV RBC AUTO: 102 FL (ref 80–99.9)
MONOCYTES ABSOLUTE: 0.67 E9/L (ref 0.1–0.95)
MONOCYTES RELATIVE PERCENT: 9.1 % (ref 2–12)
NEUTROPHILS ABSOLUTE: 5.63 E9/L (ref 1.8–7.3)
NEUTROPHILS RELATIVE PERCENT: 76.5 % (ref 43–80)
PDW BLD-RTO: 13.4 FL (ref 11.5–15)
PLATELET # BLD: 314 E9/L (ref 130–450)
PMV BLD AUTO: 9.7 FL (ref 7–12)
POTASSIUM REFLEX MAGNESIUM: 4.2 MMOL/L (ref 3.5–5)
RBC # BLD: 3.02 E12/L (ref 3.5–5.5)
SODIUM BLD-SCNC: 132 MMOL/L (ref 132–146)
WBC # BLD: 7.4 E9/L (ref 4.5–11.5)

## 2022-12-02 PROCEDURE — 6360000002 HC RX W HCPCS

## 2022-12-02 PROCEDURE — 2580000003 HC RX 258: Performed by: ORTHOPAEDIC SURGERY

## 2022-12-02 PROCEDURE — 6360000002 HC RX W HCPCS: Performed by: ORTHOPAEDIC SURGERY

## 2022-12-02 PROCEDURE — 3700000001 HC ADD 15 MINUTES (ANESTHESIA): Performed by: ORTHOPAEDIC SURGERY

## 2022-12-02 PROCEDURE — 6370000000 HC RX 637 (ALT 250 FOR IP): Performed by: ORTHOPAEDIC SURGERY

## 2022-12-02 PROCEDURE — 2709999900 HC NON-CHARGEABLE SUPPLY: Performed by: ORTHOPAEDIC SURGERY

## 2022-12-02 PROCEDURE — 3600000006 HC SURGERY LEVEL 6 BASE: Performed by: ORTHOPAEDIC SURGERY

## 2022-12-02 PROCEDURE — 7100000000 HC PACU RECOVERY - FIRST 15 MIN: Performed by: ORTHOPAEDIC SURGERY

## 2022-12-02 PROCEDURE — 2580000003 HC RX 258: Performed by: FAMILY MEDICINE

## 2022-12-02 PROCEDURE — 2720000010 HC SURG SUPPLY STERILE: Performed by: ORTHOPAEDIC SURGERY

## 2022-12-02 PROCEDURE — 3209999900 FLUORO FOR SURGICAL PROCEDURES

## 2022-12-02 PROCEDURE — C1713 ANCHOR/SCREW BN/BN,TIS/BN: HCPCS | Performed by: ORTHOPAEDIC SURGERY

## 2022-12-02 PROCEDURE — 80048 BASIC METABOLIC PNL TOTAL CA: CPT

## 2022-12-02 PROCEDURE — C1769 GUIDE WIRE: HCPCS | Performed by: ORTHOPAEDIC SURGERY

## 2022-12-02 PROCEDURE — 27245 TREAT THIGH FRACTURE: CPT | Performed by: ORTHOPAEDIC SURGERY

## 2022-12-02 PROCEDURE — 6370000000 HC RX 637 (ALT 250 FOR IP): Performed by: FAMILY MEDICINE

## 2022-12-02 PROCEDURE — 3600000016 HC SURGERY LEVEL 6 ADDTL 15MIN: Performed by: ORTHOPAEDIC SURGERY

## 2022-12-02 PROCEDURE — 0QS704Z REPOSITION LEFT UPPER FEMUR WITH INTERNAL FIXATION DEVICE, OPEN APPROACH: ICD-10-PCS | Performed by: ORTHOPAEDIC SURGERY

## 2022-12-02 PROCEDURE — 2140000000 HC CCU INTERMEDIATE R&B

## 2022-12-02 PROCEDURE — 36415 COLL VENOUS BLD VENIPUNCTURE: CPT

## 2022-12-02 PROCEDURE — 85025 COMPLETE CBC W/AUTO DIFF WBC: CPT

## 2022-12-02 PROCEDURE — 2580000003 HC RX 258

## 2022-12-02 PROCEDURE — 2700000000 HC OXYGEN THERAPY PER DAY

## 2022-12-02 PROCEDURE — 73502 X-RAY EXAM HIP UNI 2-3 VIEWS: CPT

## 2022-12-02 PROCEDURE — 7100000001 HC PACU RECOVERY - ADDTL 15 MIN: Performed by: ORTHOPAEDIC SURGERY

## 2022-12-02 PROCEDURE — 3700000000 HC ANESTHESIA ATTENDED CARE: Performed by: ORTHOPAEDIC SURGERY

## 2022-12-02 DEVICE — SCREW BNE L34MM DIA4.5MM PROX CORT TIB S STL ST LOK FULL: Type: IMPLANTABLE DEVICE | Site: HIP | Status: FUNCTIONAL

## 2022-12-02 DEVICE — PLATE BNE L62MM BLDE W5.8XL38MM 130DEG 3 H ST BILAT PELV: Type: IMPLANTABLE DEVICE | Site: HIP | Status: FUNCTIONAL

## 2022-12-02 DEVICE — SCREW BNE L42MM DIA4.5MM PROX CORT TIB S STL ST LOK FULL: Type: IMPLANTABLE DEVICE | Site: HIP | Status: FUNCTIONAL

## 2022-12-02 DEVICE — IMPLANTABLE DEVICE: Type: IMPLANTABLE DEVICE | Site: HIP | Status: FUNCTIONAL

## 2022-12-02 DEVICE — SCREW BNE L38MM DIA4.5MM PROX CORT TIB S STL ST LOK FULL: Type: IMPLANTABLE DEVICE | Site: HIP | Status: FUNCTIONAL

## 2022-12-02 RX ORDER — DIMETHICONE, OXYBENZONE, AND PADIMATE O 2; 2.5; 6.6 G/100G; G/100G; G/100G
STICK TOPICAL PRN
Status: DISCONTINUED | OUTPATIENT
Start: 2022-12-02 | End: 2022-12-05 | Stop reason: HOSPADM

## 2022-12-02 RX ORDER — DEXAMETHASONE SODIUM PHOSPHATE 10 MG/ML
INJECTION INTRAMUSCULAR; INTRAVENOUS PRN
Status: DISCONTINUED | OUTPATIENT
Start: 2022-12-02 | End: 2022-12-02 | Stop reason: SDUPTHER

## 2022-12-02 RX ORDER — LABETALOL HYDROCHLORIDE 5 MG/ML
5 INJECTION, SOLUTION INTRAVENOUS
Status: DISCONTINUED | OUTPATIENT
Start: 2022-12-02 | End: 2022-12-02 | Stop reason: HOSPADM

## 2022-12-02 RX ORDER — PHENYLEPHRINE HCL IN 0.9% NACL 1 MG/10 ML
SYRINGE (ML) INTRAVENOUS PRN
Status: DISCONTINUED | OUTPATIENT
Start: 2022-12-02 | End: 2022-12-02 | Stop reason: SDUPTHER

## 2022-12-02 RX ORDER — HYDRALAZINE HYDROCHLORIDE 20 MG/ML
5 INJECTION INTRAMUSCULAR; INTRAVENOUS
Status: DISCONTINUED | OUTPATIENT
Start: 2022-12-02 | End: 2022-12-02 | Stop reason: HOSPADM

## 2022-12-02 RX ORDER — EPHEDRINE SULFATE/0.9% NACL/PF 50 MG/5 ML
SYRINGE (ML) INTRAVENOUS PRN
Status: DISCONTINUED | OUTPATIENT
Start: 2022-12-02 | End: 2022-12-02 | Stop reason: SDUPTHER

## 2022-12-02 RX ORDER — TRAMADOL HYDROCHLORIDE 50 MG/1
50 TABLET ORAL
Status: DISCONTINUED | OUTPATIENT
Start: 2022-12-02 | End: 2022-12-02 | Stop reason: HOSPADM

## 2022-12-02 RX ORDER — ACETAMINOPHEN 325 MG/1
650 TABLET ORAL
Status: DISCONTINUED | OUTPATIENT
Start: 2022-12-02 | End: 2022-12-02 | Stop reason: HOSPADM

## 2022-12-02 RX ORDER — OXYCODONE HYDROCHLORIDE AND ACETAMINOPHEN 5; 325 MG/1; MG/1
1 TABLET ORAL EVERY 6 HOURS PRN
Qty: 28 TABLET | Refills: 0 | Status: SHIPPED | OUTPATIENT
Start: 2022-12-02 | End: 2022-12-09

## 2022-12-02 RX ORDER — NEOSTIGMINE METHYLSULFATE 1 MG/ML
INJECTION, SOLUTION INTRAVENOUS PRN
Status: DISCONTINUED | OUTPATIENT
Start: 2022-12-02 | End: 2022-12-02 | Stop reason: SDUPTHER

## 2022-12-02 RX ORDER — PROPOFOL 10 MG/ML
INJECTION, EMULSION INTRAVENOUS PRN
Status: DISCONTINUED | OUTPATIENT
Start: 2022-12-02 | End: 2022-12-02 | Stop reason: SDUPTHER

## 2022-12-02 RX ORDER — SODIUM CHLORIDE 0.9 % (FLUSH) 0.9 %
5-40 SYRINGE (ML) INJECTION PRN
Status: DISCONTINUED | OUTPATIENT
Start: 2022-12-02 | End: 2022-12-02 | Stop reason: HOSPADM

## 2022-12-02 RX ORDER — FENTANYL CITRATE 50 UG/ML
INJECTION, SOLUTION INTRAMUSCULAR; INTRAVENOUS PRN
Status: DISCONTINUED | OUTPATIENT
Start: 2022-12-02 | End: 2022-12-02 | Stop reason: SDUPTHER

## 2022-12-02 RX ORDER — DROPERIDOL 2.5 MG/ML
0.62 INJECTION, SOLUTION INTRAMUSCULAR; INTRAVENOUS
Status: DISCONTINUED | OUTPATIENT
Start: 2022-12-02 | End: 2022-12-02 | Stop reason: HOSPADM

## 2022-12-02 RX ORDER — ONDANSETRON 2 MG/ML
INJECTION INTRAMUSCULAR; INTRAVENOUS PRN
Status: DISCONTINUED | OUTPATIENT
Start: 2022-12-02 | End: 2022-12-02 | Stop reason: SDUPTHER

## 2022-12-02 RX ORDER — ONDANSETRON 2 MG/ML
4 INJECTION INTRAMUSCULAR; INTRAVENOUS
Status: DISCONTINUED | OUTPATIENT
Start: 2022-12-02 | End: 2022-12-02 | Stop reason: HOSPADM

## 2022-12-02 RX ORDER — SODIUM CHLORIDE 9 MG/ML
25 INJECTION, SOLUTION INTRAVENOUS PRN
Status: DISCONTINUED | OUTPATIENT
Start: 2022-12-02 | End: 2022-12-02 | Stop reason: HOSPADM

## 2022-12-02 RX ORDER — GLYCOPYRROLATE 0.2 MG/ML
INJECTION INTRAMUSCULAR; INTRAVENOUS PRN
Status: DISCONTINUED | OUTPATIENT
Start: 2022-12-02 | End: 2022-12-02 | Stop reason: SDUPTHER

## 2022-12-02 RX ORDER — IPRATROPIUM BROMIDE AND ALBUTEROL SULFATE 2.5; .5 MG/3ML; MG/3ML
1 SOLUTION RESPIRATORY (INHALATION)
Status: DISCONTINUED | OUTPATIENT
Start: 2022-12-02 | End: 2022-12-02 | Stop reason: HOSPADM

## 2022-12-02 RX ORDER — LIDOCAINE HYDROCHLORIDE 20 MG/ML
INJECTION, SOLUTION INTRAVENOUS PRN
Status: DISCONTINUED | OUTPATIENT
Start: 2022-12-02 | End: 2022-12-02 | Stop reason: SDUPTHER

## 2022-12-02 RX ORDER — DIPHENHYDRAMINE HYDROCHLORIDE 50 MG/ML
12.5 INJECTION INTRAMUSCULAR; INTRAVENOUS
Status: DISCONTINUED | OUTPATIENT
Start: 2022-12-02 | End: 2022-12-02 | Stop reason: HOSPADM

## 2022-12-02 RX ORDER — ROCURONIUM BROMIDE 10 MG/ML
INJECTION, SOLUTION INTRAVENOUS PRN
Status: DISCONTINUED | OUTPATIENT
Start: 2022-12-02 | End: 2022-12-02 | Stop reason: SDUPTHER

## 2022-12-02 RX ORDER — SODIUM CHLORIDE 0.9 % (FLUSH) 0.9 %
5-40 SYRINGE (ML) INJECTION EVERY 12 HOURS SCHEDULED
Status: DISCONTINUED | OUTPATIENT
Start: 2022-12-02 | End: 2022-12-02 | Stop reason: HOSPADM

## 2022-12-02 RX ADMIN — NEOSTIGMINE METHYLSULFATE 3 MG: 1 INJECTION, SOLUTION INTRAVENOUS at 13:35

## 2022-12-02 RX ADMIN — METHOCARBAMOL 750 MG: 750 TABLET ORAL at 17:03

## 2022-12-02 RX ADMIN — Medication 5 MG: at 12:49

## 2022-12-02 RX ADMIN — SODIUM CHLORIDE: 9 INJECTION, SOLUTION INTRAVENOUS at 12:51

## 2022-12-02 RX ADMIN — SODIUM CHLORIDE: 9 INJECTION, SOLUTION INTRAVENOUS at 00:20

## 2022-12-02 RX ADMIN — SODIUM CHLORIDE: 9 INJECTION, SOLUTION INTRAVENOUS at 15:49

## 2022-12-02 RX ADMIN — SODIUM CHLORIDE, PRESERVATIVE FREE 10 ML: 5 INJECTION INTRAVENOUS at 23:35

## 2022-12-02 RX ADMIN — HYDROCODONE BITARTRATE AND ACETAMINOPHEN 1 TABLET: 5; 325 TABLET ORAL at 00:56

## 2022-12-02 RX ADMIN — MORPHINE SULFATE 2 MG: 2 INJECTION, SOLUTION INTRAMUSCULAR; INTRAVENOUS at 17:03

## 2022-12-02 RX ADMIN — DEXAMETHASONE SODIUM PHOSPHATE 4 MG: 10 INJECTION INTRAMUSCULAR; INTRAVENOUS at 12:38

## 2022-12-02 RX ADMIN — ONDANSETRON 4 MG: 2 INJECTION INTRAMUSCULAR; INTRAVENOUS at 13:22

## 2022-12-02 RX ADMIN — Medication 50 MCG: at 13:07

## 2022-12-02 RX ADMIN — Medication 5 MG: at 13:06

## 2022-12-02 RX ADMIN — GLYCOPYRROLATE 0.4 MG: 0.2 INJECTION INTRAMUSCULAR; INTRAVENOUS at 13:35

## 2022-12-02 RX ADMIN — METHOCARBAMOL 750 MG: 750 TABLET ORAL at 23:24

## 2022-12-02 RX ADMIN — PANTOPRAZOLE SODIUM 40 MG: 40 TABLET, DELAYED RELEASE ORAL at 09:55

## 2022-12-02 RX ADMIN — CEFAZOLIN 2000 MG: 2 INJECTION, POWDER, FOR SOLUTION INTRAMUSCULAR; INTRAVENOUS at 12:42

## 2022-12-02 RX ADMIN — HYDROCODONE BITARTRATE AND ACETAMINOPHEN 1 TABLET: 5; 325 TABLET ORAL at 09:55

## 2022-12-02 RX ADMIN — HYDROCODONE BITARTRATE AND ACETAMINOPHEN 1 TABLET: 5; 325 TABLET ORAL at 23:24

## 2022-12-02 RX ADMIN — LIDOCAINE HYDROCHLORIDE 100 MG: 20 INJECTION, SOLUTION INTRAVENOUS at 12:30

## 2022-12-02 RX ADMIN — CEFAZOLIN 1000 MG: 1 INJECTION, POWDER, FOR SOLUTION INTRAMUSCULAR; INTRAVENOUS at 23:28

## 2022-12-02 RX ADMIN — ROCURONIUM BROMIDE 35 MG: 10 INJECTION, SOLUTION INTRAVENOUS at 12:31

## 2022-12-02 RX ADMIN — Medication 50 MCG: at 13:21

## 2022-12-02 RX ADMIN — PROPOFOL 80 MG: 10 INJECTION, EMULSION INTRAVENOUS at 12:30

## 2022-12-02 RX ADMIN — Medication 5 MG: at 12:35

## 2022-12-02 RX ADMIN — Medication 5 MG: at 23:24

## 2022-12-02 RX ADMIN — AMLODIPINE BESYLATE 10 MG: 10 TABLET ORAL at 09:55

## 2022-12-02 RX ADMIN — GABAPENTIN 300 MG: 300 CAPSULE ORAL at 17:04

## 2022-12-02 RX ADMIN — Medication 5 MG: at 13:03

## 2022-12-02 RX ADMIN — FENTANYL CITRATE 100 MCG: 50 INJECTION, SOLUTION INTRAMUSCULAR; INTRAVENOUS at 12:30

## 2022-12-02 RX ADMIN — SODIUM CHLORIDE, PRESERVATIVE FREE 10 ML: 5 INJECTION INTRAVENOUS at 09:56

## 2022-12-02 RX ADMIN — Medication 5 MG: at 13:05

## 2022-12-02 ASSESSMENT — PAIN DESCRIPTION - ORIENTATION
ORIENTATION: LEFT
ORIENTATION: LEFT

## 2022-12-02 ASSESSMENT — PAIN - FUNCTIONAL ASSESSMENT: PAIN_FUNCTIONAL_ASSESSMENT: ACTIVITIES ARE NOT PREVENTED

## 2022-12-02 ASSESSMENT — PAIN DESCRIPTION - LOCATION
LOCATION: HIP
LOCATION: HIP
LOCATION: HIP;LEG
LOCATION: HIP

## 2022-12-02 ASSESSMENT — PAIN SCALES - GENERAL
PAINLEVEL_OUTOF10: 0
PAINLEVEL_OUTOF10: 9
PAINLEVEL_OUTOF10: 5
PAINLEVEL_OUTOF10: 0
PAINLEVEL_OUTOF10: 7
PAINLEVEL_OUTOF10: 0
PAINLEVEL_OUTOF10: 0
PAINLEVEL_OUTOF10: 10
PAINLEVEL_OUTOF10: 6
PAINLEVEL_OUTOF10: 0

## 2022-12-02 ASSESSMENT — PAIN DESCRIPTION - DESCRIPTORS
DESCRIPTORS: ACHING;CRUSHING;DISCOMFORT
DESCRIPTORS: ACHING;DISCOMFORT;NAGGING
DESCRIPTORS: ACHING;DISCOMFORT

## 2022-12-02 ASSESSMENT — ENCOUNTER SYMPTOMS: SHORTNESS OF BREATH: 1

## 2022-12-02 NOTE — ANESTHESIA POSTPROCEDURE EVALUATION
Department of Anesthesiology  Postprocedure Note    Patient: Queen Melva  MRN: [id number]  YOB: 1927  Date of evaluation: 12/2/2022      Procedure Summary     Date: 12/02/22 Room / Location: [facility] / [facility]    Anesthesia Start: 1219 Anesthesia Stop: 3927    Procedure: LEFT HIP OPEN REDUCTION INTERNAL FIXATION (Left: Hip) Diagnosis:       Closed fracture of hip, unspecified laterality, initial encounter ([facility]      (/)    Surgeons: Adolph Nix MD Responsible Provider: Diogenes Myles MD    Anesthesia Type: general ASA Status: 4          Anesthesia Type: No value filed.     Jayjay Phase I:      Jayjay Phase II:        Anesthesia Post Evaluation    Patient location during evaluation: PACU  Patient participation: complete - patient participated  Level of consciousness: awake and alert  Airway patency: patent  Nausea & Vomiting: no nausea and no vomiting  Complications: no  Cardiovascular status: blood pressure returned to baseline and hemodynamically stable  Respiratory status: acceptable and spontaneous ventilation  Hydration status: euvolemic  Multimodal analgesia pain management approach

## 2022-12-02 NOTE — PROGRESS NOTES
Physical Therapy    Date: 2022       Patient Name: Sheryle Sly  : 1927      MRN: [id number]    PT order received. Chart has been reviewed. PT evaluation will be on hold due to OR planned 22. Will continue to follow and complete evaluation at later time.      Michaela Gil, PT

## 2022-12-02 NOTE — BRIEF OP NOTE
Brief Postoperative Note      Patient: Moy Kaba  YOB: 1927  MRN: [id number]    Date of Procedure: 12/2/2022    Pre-Op Diagnosis: left intertrochanteric femur fracture    Post-Op Diagnosis: Same       Procedure(s):  LEFT HIP OPEN REDUCTION INTERNAL FIXATION    Surgeon(s):  Kristy Chisholm MD    Assistant:  Resident: Marquita Schirmer, DO    Anesthesia: General    Estimated Blood Loss (mL): 41SW    Complications: None    Specimens:   * No specimens in log *    Implants:  Implant Name Type Inv. Item Serial No.  Lot No. LRB No. Used Action   SCREW BNE L95MM [id number] STD ST CANC S STL 1 STP LAG RECESS - [id number]  SCREW BNE L95MM [id number] STD ST CANC S STL 1 STP LAG RECESS  DEPU[employer] [place] [id number] Left 1 Implanted   PLATE BNE N84XO BLDE [id number] 130DEG 3 H ST BILAT PELV - [id number]  PLATE BNE D37ZQ BLDE [id number] 130DEG 3 H ST BILAT PELV  DEPU[employer] [place] [id number] Left 1 Implanted   PLATE BNE P80CU BLDE [id number] 130DEG 3 H ST BILAT PELV - [id number]  PLATE BNE M88SN BLDE [id number] 130DEG 3 H ST BILAT PELV  DEPUY [employer] [place] [id number] Left 1 Implanted   SCREW BNE L95MM [id number] STD ST CANC S STL 1 STP LAG RECESS - [id number]  SCREW BNE L95MM [id number] STD ST CANC S STL 1 STP LAG RECESS  DEPU[employer] [place] [id number] Left 1 Implanted   SCREW BNE L42MM DIA4. 5MM PROX MASOUD TIB S STL ST PAUL FULL - [id number]  SCREW BNE L42MM DIA4. 5MM PROX MASOUD TIB S STL ST PAUL FULL  DEPUY [employer] [place]  Left 1 Implanted   SCREW BNE L38MM DIA4. 5MM PROX MASOUD TIB S STL ST PAUL FULL - [id number]  SCREW BNE L38MM DIA4. 5MM PROX MASOUD TIB S STL ST PAUL FULL  DEPUY [employer] [place]  Left 1 Implanted   SCREW BNE L34MM DIA4. 5MM PROX MASOUD TIB S STL ST PAUL FULL - [id number]  SCREW BNE L34MM DIA4. 5MM PROX MASOUD TIB S STL ST PAUL FULL  DEPUY SYN[employer] [place]  Left 1 Implanted         Drains: * No LDAs found *    Findings: see op note    Electronically signed by Diogo Carlisle DO on 12/2/2022 at 1:43 PM

## 2022-12-02 NOTE — PROGRESS NOTES
1345 admit to PACU. HOB elevated 20 degrees. Hip dressing dry. Pedal pulse weak. Appropriate color. Slightly cool. Warm blankets applied. Ice to incision site. Xrays called for. Mouth care given.

## 2022-12-02 NOTE — CARE COORDINATION
12/02/22 Case Management Note: Received a call from this patients POA, son Sher Wiggins, who states the patient is already set to go to [address] [place] [address] rehab at discharge. Bed is available. He understands she will need a discharge order and states he spoke with Mr Albaro Mishra and bed will be available at discharge.  Electronically signed by Colonel Pineda RN CM on 12/2/2022 at 3:10 PM

## 2022-12-02 NOTE — FLOWSHEET NOTE
Inpatient Wound Care(initial evaluation) 6419    Admit Date: 12/1/2022  7:58 AM    Reason for consult:  wounds    Significant history:    presents with: Closed hip fracture, left, initial encounter [facility])  Patient is a 70-year-old female with a past medical history of arthritis, asthma, buttock wound, CAD, diabetes mellitus, GERD, hyperlipidemia, hypertension, PVD, and restless leg syndrome, presents to the ED for pain in her left hip and left knee after fall occurring just a few hours ago. Patient states that the pain worsens with moving her left lower extremity. Patient denies any dizziness or lightheadedness prior to falling and states she just tripped. Orthopedic surgery consulted for evaluation of intertrochanteric fracture of the left hip. Labs were revealing of a Troponin 46, Hemoglobin 9.6 which appears to be about baseline. Patient denies any CP, or SOB, abdominal pain, fever, chills, N/V/D. Patient is admitted to intermediate telemetry for further work-up and treatment. For surgery today     Wound history:  admitted with wound  Previous wound care center patient- face sheet faxed to [phone]    Findings:    12/02/22 0940   Skin Integumentary    Skin Integumentary (WDL) X   Skin Condition/Temp Poor turgor  (arms)   Skin Integrity Ecchymosis  (dried scabs)   Skin Integrity Site 2   Skin Integrity Location 2 Abrasion;Ecchymosis   Location 2 left knee   Skin Integrity Site 3   Skin Integrity Location 3 Ecchymosis; Vascular discoloration    Location 3 BLE- right greater than left   Skin Integrity Site 4   Skin Integrity Location 4   (old healed area)   Location 4 left buttocks   Wound 12/01/22 Buttocks Right   Date First Assessed/Time First Assessed: 12/01/22 2134   Present on Hospital Admission: Yes  Primary Wound Type: Pressure Injury  Location: Buttocks  Wound Location Orientation: Right   Wound Image    Wound Etiology Pressure Stage 2   Dressing/Treatment Alginate  (stratsorb ordered= nursing to apply) Wound Length (cm) 2.6 cm   Wound Width (cm) 1.6 cm   Wound Depth (cm) 0.1 cm   Wound Surface Area (cm^2) 4.16 cm^2   Change in Wound Size % (l*w) -38.67   Wound Volume (cm^3) 0.416 cm^3   Wound Healing % -39   Wound Assessment Pink/red   Drainage Amount None   Ira-wound Assessment   (chronic discoloration)   Wound 12/01/22 Elbow Anterior; Left   Date First Assessed/Time First Assessed: 12/01/22 2300   Present on Hospital Admission: Yes  Location: Elbow  Wound Location Orientation: Anterior; Left   Wound Image    Wound Etiology Skin Tear   Dressing Status Intact   Dressing/Treatment Non adherent  (difficult to assess through the dressing)   Drainage Amount None   Ira-wound Assessment Ecchymosis     **Informed Consent**    photos taken of wounds and inserted into their chart as part of their permanent medical record for purposes of documentation, treatment management and/or medical review. All Images taken on 12/2/22 of patient name: Merlin Royals were transmitted and stored on secured Padcom located within [facility] by a registered [employer] Mobile Application Device.       Impression:  left buttocks stage 2  Partial thickness left elbow    Plan:  Opticell left buttocks  Versatel left elbow  Low air loss module  Dietary consult  Will need continued preventative care    Nelsy Hobbs RN 12/2/2022 10:34 AM

## 2022-12-02 NOTE — PROGRESS NOTES
Occupational Therapy  Date:2022  Patient Name: Dorian Pérez  MRN: [id number]  : 1927  Room: [address] [place] [address]     OT order received and appreciated. Chart reviewed. Hold OT evaluation, pt scheduled for OR today. Will follow.     [facility] OTR/L #[id number]

## 2022-12-02 NOTE — ED NOTES
Margarito care provided. All linens changed. Open area noted to RT buttocks. Dressing came off during margarito care.       Riki Solis RN  12/01/22 6571

## 2022-12-02 NOTE — OP NOTE
Operative Note      Patient: Shane Singh  YOB: 1927  MRN: [id number]    Date of Procedure: 12/2/2022    Pre-Op Diagnosis: left intertrochanteric fracture    Post-Op Diagnosis: Same       Procedure(s):  LEFT HIP OPEN REDUCTION INTERNAL FIXATION    Surgeon(s):  Nakul Conte MD    Assistant:   Resident: Alyssia Adams DO    Anesthesia: General    Estimated Blood Loss (mL): less than 50     Complications: None    Specimens:   * No specimens in log *    Implants:  Implant Name Type Inv. Item Serial No.  Lot No. LRB No. Used Action   SCREW BNE L95MM [id number] STD ST CANC S STL 1 STP LAG RECESS - [id number]  SCREW BNE L95MM [id number] STD ST CANC S STL 1 STP LAG RECESS  [employer] [place] [id number] Left 1 Implanted   PLATE BNE U65VO BLDE W5.5JB66LD 130DEG 3 H ST BILAT PELV - [id number]  PLATE BNE Z87MR BLDE W5.0ZW97SN 130DEG 3 H ST BILAT PELV  DEPU[employer] [place] [id number] Left 1 Implanted   PLATE BNE Q78NL BLDE W5.1HO46FK 130DEG 3 H ST BILAT PELV - [id number]  PLATE BNE R85ZW BLDE W5.6AR20FW 130DEG 3 H ST BILAT PELV  DEPU[employer] [place] [id number] Left 1 Implanted   SCREW BNE L95MM [id number] STD ST CANC S STL 1 STP LAG RECESS - [id number]  SCREW BNE L95MM [id number] STD ST CANC S STL 1 STP LAG RECESS  DEPU[employer] [place] [id number] Left 1 Implanted   SCREW BNE L42MM DIA4. 5MM PROX MASOUD TIB S STL ST PAUL FULL - [id number]  SCREW BNE L42MM DIA4. 5MM PROX MASOUD TIB S STL ST PAUL FULL  DEPUY [employer] [place]  Left 1 Implanted   SCREW BNE L38MM DIA4. 5MM PROX MASOUD TIB S STL ST PAUL FULL - [id number]  SCREW BNE L38MM DIA4. 5MM PROX MASOUD TIB S STL ST PAUL FULL  DEPUY [employer] [place]  Left 1 Implanted   SCREW BNE L34MM DIA4. 5MM PROX MASOUD TIB S STL ST PAUL FULL - [id number]  SCREW BNE L34MM DIA4. 5MM PROX MASOUD TIB S STL ST PAUL FULL  DEPUY [employer] [place]  Left 1 Implanted         Drains: * No LDAs found *      Detailed Description of Procedure:     HISTORY: The patient is a 80y.o. year old female with history of above preop diagnosis. I explained the risk, benefits, expected outcome, and alternatives to the procedure. Patient understands and is in agreement. DESCRIPTION OF PROCEDURE: The patient was identified in the preoperative holding area and the surgical plans were again reviewed. All questions were answered. The appropriate site and procedure were again confirmed with the patient and checked with the surgical consent as well as the preoperative radiographs. The operative site was marked. The patient was then transferred to the operating room. After general anesthesia was induced, the patient was placed on the fracture table with all extremities well padded and protected. Reduction was performed which was deemed appropriate by both AP and lateral fluoroscopic images. A 5 cm incision was made starting at the level of the base of the greater trochanter and continuing distally. Dissection was then continued down through subcutaneous fascia and IT band. Vastus lateralis was identified and bluntly dissected posteriorly and then retracted anteriorly. A guide pin was then placed at a 130 degree angle with the plate guide. After appropriate starting point was achieved the guidewire was then advanced into the femoral neck, into the femoral head and was deemed to be appropriate on both AP and lateral fluoroscopic views. A depth gauge was then used to determine the length of the guidepin which was determined to be appropriately 95 mm. The reamer was then placed over the top of the guidepin and reamed to the appropriate depth. An 95mm lag screw was then inserted to the lateral cortex, the femoral neck and into the femoral head. The hip plate was then placed over the top of the lag screw with use of guide. The all three holes on the plate were then drilled and measured and filled with appropriate screws from proximal hole to distal.  These were deemed appropriate under fluoroscopic guidance.   The fracture was then compressed with the compression screw, after which the compression screw was removed. AP, lateral fluoroscopic views were then taken and the DHS was determined to be in appropriate alignment. Incision site was thoroughly irrigated and also at every layer closure. 0 Vicryl was used to close the muscle fascia and IT band. 2-0 Vicryl was used for the subcutaneous layer and staples were used fot the skin. A sterile aquacel dressing was then placed over the wound. DISPOSITION: The patient was taken to PACU in stable condition. Once stable, the patient will be transferred to the floor. Orders have been provided to begin physical therapy, weight bear as tolerated Left lower extremity. Patient received a dose of ancef preoperatively. We will continue this for 24 hours postoperatively for infection prophylaxis. The patient will also be started on chemical DVT prophylaxis. We have consulted  and case management for discharge planning and consulted the PCP for medical management. It should be noted that Dr. Krysta Wei was present for the entirety of the procedure. Electronically signed by Michael Coley DO on 12/2/2022 at 1:55 PM    As attending surgeon, I was personally present through the entire procedure, or all critical or key components of the procedure.

## 2022-12-02 NOTE — PROGRESS NOTES
[facility]                                Progress note    Subjective: The patient is somnolent, just got back from the OR on my evaluation  Still able to answer questions  S/p L hip  ORIF     Objective:    /62   Pulse 67   Temp 97.6 °F (36.4 °C) (Oral)   Resp 16   LMP 12/03/1997   SpO2 98%     In: 700 [I.V.:700]  Out: 100   In: 700   Out: 100     General appearance: NAD, conversant  HEENT: AT/NC, MMM  Neck: FROM, supple  Lungs: Clear to auscultation  CV: RRR, no MRGs  Vasc: Radial pulses 2+  Abdomen: Soft, non-tender; no masses or HSM  Extremities: Postop hip left  Skin: no rash, lesions or ulcers  Psych: Alert and oriented to person, place and time  Neuro: Alert and interactive     Recent Labs     12/01/22  0946 12/02/22  0559   WBC 8.7 7.4   HGB 9.6* 10.4*   HCT 28.2* 30.8*    314       Recent Labs     12/01/22  0946 12/02/22  0559    132   K 3.8 4.2    99   CO2 19* 24   BUN 14 14   CREATININE 0.5 0.7   CALCIUM 8.1* 9.3       Assessment:    Principal Problem:    Closed fracture of left hip (HCC)  Resolved Problems:    * No resolved hospital problems.  *      Plan:     Patient is a 26-year-old female with a past medical history of arthritis, asthma, buttock wound, CAD, diabetes mellitus, GERD, hyperlipidemia, hypertension, PVD, and restless leg syndrome, presents to the ED for  Lt Hip Fracture  -Monitor labs  -PRN medication  -IVF NS @ 100  -Orthopedic surgery following  -Plan for surgery with Dr. Otoole School on 12/02/2022  -NPO effective at midnight  -Hold anticoagulation meds  -PT/OT following surgery for therapy needs     Hypertension  -Continue home medications     12/2/22:  -For OR today for left ORIF-tolerated well  -Somnolent on evaluation today post OR  -Monitor h/h following surgery-check a.m.'s  -PT/OT for discharge needs     Code status: Full  Consultants: Ortho   DVT Prophylaxis PCD's  PT/OT  Discharge planning      Susan Vasquez MD  5:03 PM  12/2/2022 Letter mailed today

## 2022-12-02 NOTE — ANESTHESIA PRE PROCEDURE
Department of Anesthesiology  Preprocedure Note       Name:  Ethel Hart   Age:  80 y.o.  :  1927                                          MRN:  [id number]         Date:  2022      Surgeon: Stephania Perla):  Sailaja Solis MD    Procedure: Procedure(s):  LEFT HIP OPEN REDUCTION INTERNAL FIXATION    Medications prior to admission:   Prior to Admission medications    Medication Sig Start Date End Date Taking? Authorizing Provider   ibuprofen (ADVIL;MOTRIN) 400 MG tablet Take 400 mg by mouth every 6 hours as needed for Pain    Historical Provider, MD   bisacodyl (DULCOLAX) 10 MG suppository Place 10 mg rectally daily as needed for Constipation    Historical Provider, MD   ipratropium-albuterol (DUONEB) 0.5-2.5 (3) MG/3ML SOLN nebulizer solution Take 1 vial by nebulization every 6 hours as needed for Shortness of Breath    Historical Provider, MD   gabapentin (NEURONTIN) 300 MG capsule Take 300 mg by mouth at bedtime.     Historical Provider, MD   magnesium hydroxide (MILK OF MAGNESIA) 400 MG/5ML suspension Take 30 mLs by mouth daily as needed for Constipation    Historical Provider, MD   polyethylene glycol (GLYCOLAX) 17 g packet Take 17 g by mouth daily    Historical Provider, MD   diclofenac sodium (VOLTAREN) 1 % GEL Apply 2 g topically in the morning, at noon, and at bedtime Apply to back    Historical Provider, MD   ferrous sulfate (IRON 325) 325 (65 Fe) MG tablet Take 1 tablet by mouth daily 22   Casimiro Ruth, APRN - CNP   Baclofen (LIORESAL) 5 MG tablet Take 5 mg by mouth daily as needed (muscle spasms, hiccups)    Historical Provider, MD   docusate sodium (COLACE) 100 MG capsule Take 100 mg by mouth daily    Historical Provider, MD   dexamethasone (DECADRON) 2 MG tablet Take 2 mg by mouth daily as needed (itching)    Historical Provider, MD   melatonin 5 MG TABS tablet Take 5 mg by mouth nightly    Historical Provider, MD   HYDROcodone-acetaminophen (NORCO) 5-325 MG per tablet Take 1 tablet by mouth every 6 hours as needed for Pain. Historical Provider, MD   pantoprazole (PROTONIX) 40 MG tablet Take 40 mg by mouth every morning (before breakfast)    Historical Provider, MD   acetaminophen (TYLENOL) 325 MG tablet Take 650 mg by mouth every 4 hours as needed for Fever    Historical Provider, MD   amLODIPine (NORVASC) 10 MG tablet Take 1 tablet by mouth in the morning. 7/26/22   VIK Avalos CNP   losartan (COZAAR) 25 MG tablet Take 1 tablet by mouth in the morning. Patient taking differently: Take 25 mg by mouth daily Hold for SBP <100 7/26/22 8/28/22  VIK Avalos CNP   torsemide (DEMADEX) 10 MG tablet Take 1 tablet by mouth in the morning. Patient not taking: Reported on 8/27/2022 7/26/22 8/28/22  VIK Avalos CNP   apixaban (ELIQUIS) 5 MG TABS tablet Take 0.5 tablets by mouth in the morning and 0.5 tablets before bedtime.   Patient not taking: Reported on 8/27/2022 7/25/22 8/28/22  VIK Avalos CNP   senna (SENOKOT) 8.6 MG tablet Take 1 tablet by mouth daily as needed for Constipation    Historical Provider, MD   polyvinyl alcohol (LIQUIFILM TEARS) 1.4 % ophthalmic solution Place 1 drop into both eyes 2 times daily    Historical Provider, MD   metFORMIN (GLUCOPHAGE) 500 MG tablet Take 500 mg by mouth daily  Patient not taking: Reported on 8/27/2022 8/28/22  Historical Provider, MD   famotidine (PEPCID) 10 MG tablet Take 10 mg by mouth daily   Patient not taking: Reported on 7/18/2022 7/25/22  Historical Provider, MD Ahumada Zingiber officinalis, (GINGER ROOT PO) Take 750 mg by mouth Daily in am  Patient not taking: Reported on 8/27/2022 8/28/22  Historical Provider, MD   b complex vitamins capsule Take 1 capsule by mouth daily In the morning for supplement  Patient not taking: Reported on 8/27/2022 8/28/22  Historical Provider, MD   aspirin 81 MG EC tablet Take 1 tablet by mouth daily 6/2/21 7/25/22  Madina Cervantes MD   aluminum & magnesium hydroxide-simethicone (MYLANTA) 400-400-40 MG/5ML SUSP Take 30 mLs by mouth every 6 hours as needed    Historical Provider, MD   lidocaine 4 % external patch Apply 1 patch topically daily as needed for Pain (apply to back)    Historical Provider, MD   albuterol (PROVENTIL) 90 MCG/ACT inhaler Inhale 2 puffs into the lungs 4 times daily  Patient not taking: No sig reported 7/22/19 8/28/22  Beatris Mcwilliams MD   pravastatin (PRAVACHOL) 20 MG tablet TAKE 1 TABLET BY MOUTH  NIGHTLY  Patient not taking: Reported on 8/27/2022 5/6/19 8/28/22  Beatris Mcwilliams MD   fluticasone-vilanterol (BREO ELLIPTA) 100-25 MCG/INH AEPB inhaler Inhale 1 puff into the lungs daily  Patient not taking: Reported on 8/27/2022 1/28/19 8/28/22  Beatris Mcwilliams MD   magnesium gluconate (MAGONATE) 500 MG tablet Take 200 mg by mouth 2 times daily   Patient not taking: Reported on 8/27/2022 8/28/22  Historical Provider, MD       Current medications:    No current facility-administered medications for this visit. No current outpatient medications on file.      Facility-Administered Medications Ordered in Other Visits   Medication Dose Route Frequency Provider Last Rate Last Admin    ceFAZolin (ANCEF) 2,000 mg in sterile water 20 mL IV syringe  2,000 mg IntraVENous See Admin Instructions Paul Cheema DO        amLODIPine (NORVASC) tablet 10 mg  10 mg Oral Daily Ankush Goodpasture Learn, APRN - CNP   10 mg at 12/02/22 0955    baclofen (LIORESAL) tablet 5 mg  5 mg Oral TID PRN Ankush Goodpasture Learn, APRN - CNP   5 mg at 12/01/22 2213    ferrous sulfate (IRON 325) tablet 325 mg  325 mg Oral Daily with breakfast Ankush Goodpasture Learn, APRN - CNP        gabapentin (NEURONTIN) capsule 300 mg  300 mg Oral QPM Ankush Goodpasture Learn, APRN - CNP   300 mg at 12/01/22 2213    HYDROcodone-acetaminophen (NORCO) 5-325 MG per tablet 1 tablet  1 tablet Oral Q6H PRN Ankush Goodpasture Learn, APRN - CNP   1 tablet at 12/02/22 0955    lidocaine 4 % external patch 1 patch  1 patch Topical Daily PRN Aurther Honor Learn, APRN - CNP   1 patch at 12/01/22 2213    melatonin disintegrating tablet 5 mg  5 mg Oral Nightly Aurther Honor Learn, APRN - CNP   5 mg at 12/01/22 2213    pantoprazole (PROTONIX) tablet 40 mg  40 mg Oral QAM AC Aurther Honor Learn, APRN - CNP   40 mg at 12/02/22 0955    polyvinyl alcohol (LIQUIFILM TEARS) 1.4 % ophthalmic solution 1 drop  1 drop Both Eyes BID PRN Aurther Honor Learn, APRN - CNP        0.9 % sodium chloride infusion   IntraVENous Continuous Aurther Honor Learn, APRN - CNP 75 mL/hr at 12/02/22 0819 NoRateChange at 12/02/22 0819    sodium chloride flush 0.9 % injection 5-40 mL  5-40 mL IntraVENous 2 times per day Aurther Honor Learn, APRN - CNP   10 mL at 12/02/22 0956    sodium chloride flush 0.9 % injection 10 mL  10 mL IntraVENous PRN Aurther Honor Learn, APRN - CNP        0.9 % sodium chloride infusion   IntraVENous PRN Aurther Honor Learn, APRN - CNP        ondansetron (ZOFRAN-ODT) disintegrating tablet 4 mg  4 mg Oral Q8H PRN Aurther Honor Learn, APRN - CNP        Or    ondansetron (ZOFRAN) injection 4 mg  4 mg IntraVENous Q6H PRN Aurther Honor Learn, APRN - CNP        polyethylene glycol (GLYCOLAX) packet 17 g  17 g Oral Daily PRN Aurther Honor Learn, APRN - CNP        acetaminophen (TYLENOL) tablet 650 mg  650 mg Oral Q6H PRN Aurther Honor Learn, APRN - CNP   650 mg at 12/01/22 2213    Or    acetaminophen (TYLENOL) suppository 650 mg  650 mg Rectal Q6H PRN Aurther Honor Learn, APRN - CNP        potassium chloride (KLOR-CON M) extended release tablet 40 mEq  40 mEq Oral PRN Aurther Honor Learn, APRN - CNP        Or    potassium bicarb-citric acid (EFFER-K) effervescent tablet 40 mEq  40 mEq Oral PRN Aurther Honor Learn, APRN - CNP        Or    potassium chloride 10 mEq/100 mL IVPB (Peripheral Line)  10 mEq IntraVENous PRN Aurther Honor Learn, APRN - CNP        morphine (PF) injection 2 mg  2 mg IntraVENous Q4H PRN VIK Lopes - NEEMA   2 mg at 12/01/22 9041    hydrALAZINE (APRESOLINE) injection 10 mg  10 mg IntraVENous Q6H PRN Nba Ruth, APRN - CNP   10 mg at 12/01/22 2331    methocarbamol (ROBAXIN) tablet 750 mg  750 mg Oral 4x Daily Burns Flatkavitha Vickers DO   750 mg at 12/01/22 2033       Allergies: Allergies   Allergen Reactions    Ativan [Lorazepam] Hallucinations     Family reports adverse reaction to benzodiazepines.  Hallucination, restlessness    Lisinopril Other (See Comments)     7/18/19 Pt states that she does not want to take LISINOPRIL       Problem List:    Patient Active Problem List   Diagnosis Code    Asthma J45.909    CAD (coronary artery disease) I25.10    Vitamin D deficiency E55.9    HTN (hypertension) I10    Idiopathic peripheral neuropathy G60.9    Rhinitis, allergic J30.9    GERD (gastroesophageal reflux disease) K21.9    PVD (peripheral vascular disease) with claudication ([facility]) I73.9    Gait instability R26.81    Hyponatremia E87.1    DM (diabetes mellitus), type 2 ([address] E11.9    History of pulmonary embolus (PE) Z86.711    Peripheral vascular angioplasty status Z98.62    Non-proliferative diabetic retinopathy, mild, both eyes ([facility]) D79.6149    Leg swelling M79.89    Compression fracture of L5 lumbar vertebra, closed, initial encounter ([address] S32.050A    HLD (hyperlipidemia) E78.5    Compression fracture of thoracic vertebra ([facility]) S22.000A    Acute anemia D64.9    Spinal stenosis M48.00    Wound of right buttock S31.819A    Buttock wound, left, initial encounter S31.829A    History of GI bleed Z87.19    Wound of left buttock S31.829A    Closed fracture of left hip ([address] S72.002A       Past Medical History:        Diagnosis Date    Arthritis     Asthma     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 ([address] 5/18/2021    Bronchitis 5/23/2017    Bruising tendency     Buttock wound, left, initial encounter 10/3/2022    CAD (coronary artery disease)     Cough 5/23/2017    COVID  Dermatophytosis 6/25/2021    Diabetes mellitus (HCC)     GERD (gastroesophageal reflux disease) 5/23/2017    History of GI bleed 10/3/2022    History of pulmonary embolus (PE) 5/29/2021    Hx of blood clots     Hyperlipidemia     Hypertension     Leg swelling 7/15/2021    Lung disease     Peripheral vascular angioplasty status 5/29/2021    Pseudoaneurysm of right femoral artery ([address] 5/29/2021    PVD (peripheral vascular disease) with claudication ([address] 4/10/2019    Restless legs syndrome     Rhinitis, allergic 5/23/2017    Sinusitis 5/23/2017    SOB (shortness of breath) 5/23/2017    Type 1 diabetes mellitus with left diabetic foot ulcer ([address] 5/18/2021    Ulcerated, foot, left, limited to breakdown of skin ([address] 6/25/2021    Urinary incontinence     Wound of left buttock 10/10/2022    Wound of right buttock 10/3/2022    Fat layer       Past Surgical History:        Procedure Laterality Date    ABDOMEN SURGERY      APPENDECTOMY      CARDIAC SURGERY      CHOLECYSTECTOMY      COLONOSCOPY      CORONARY ARTERY BYPASS GRAFT      8/28/10    ENDOSCOPY, COLON, DIAGNOSTIC      FOOT DEBRIDEMENT Left 5/16/2021    FOOT DEBRIDEMENT INCISION AND DRAINAGE. performed by Garret Inman DPM at [address] [facility] Left 5/21/2021    LEFT FOOT DEBRIDEMENT  WITH DELAYED PRIMARY CLOSURE performed by Chaparrita Parker DPM at [address] [place] [address] ([address] [place] [address])      frankie and bso 1997    TONSILLECTOMY AND ADENOIDECTOMY      UPPER GASTROINTESTINAL ENDOSCOPY N/A 7/18/2022    EGD ESOPHAGOGASTRODUODENOSCOPY performed by Rachna Baker MD at [facility] ENDOSCOPY       Social History:    Social History     Tobacco Use    Smoking status: Never    Smokeless tobacco: Never   Substance Use Topics    Alcohol use: Yes     Comment: occasional                                Counseling given: Not Answered      Vital Signs (Current): There were no vitals filed for this visit. BP Readings from Last 3 Encounters:   12/02/22 (!) 169/70   11/28/22 (!) 158/56   11/21/22 (!) 142/64       NPO Status:                                                                                 BMI:   Wt Readings from Last 3 Encounters:   11/28/22 135 lb (61.2 kg)   11/08/22 134 lb (60.8 kg)   10/31/22 124 lb (56.2 kg)     There is no height or weight on file to calculate BMI.    CBC:   Lab Results   Component Value Date/Time    WBC 7.4 12/02/2022 05:59 AM    RBC 3.02 12/02/2022 05:59 AM    HGB 10.4 12/02/2022 05:59 AM    HCT 30.8 12/02/2022 05:59 AM    .0 12/02/2022 05:59 AM    RDW 13.4 12/02/2022 05:59 AM     12/02/2022 05:59 AM       CMP:   Lab Results   Component Value Date/Time     12/02/2022 05:59 AM    K 4.2 12/02/2022 05:59 AM    CL 99 12/02/2022 05:59 AM    CO2 24 12/02/2022 05:59 AM    BUN 14 12/02/2022 05:59 AM    CREATININE 0.7 12/02/2022 05:59 AM    GFRAA >60 08/27/2022 04:55 AM    LABGLOM >60 12/02/2022 05:59 AM    GLUCOSE 164 12/02/2022 05:59 AM    PROT 5.9 12/01/2022 09:46 AM    CALCIUM 9.3 12/02/2022 05:59 AM    BILITOT 0.3 12/01/2022 09:46 AM    ALKPHOS 94 12/01/2022 09:46 AM    AST 20 12/01/2022 09:46 AM    ALT 14 12/01/2022 09:46 AM       POC Tests: No results for input(s): POCGLU, POCNA, POCK, POCCL, POCBUN, POCHEMO, POCHCT in the last 72 hours.     Coags:   Lab Results   Component Value Date/Time    PROTIME 16.6 07/30/2022 10:14 AM    INR 1.5 07/30/2022 10:14 AM    APTT 29.0 07/30/2022 10:14 AM       HCG (If Applicable): No results found for: PREGTESTUR, PREGSERUM, HCG, HCGQUANT     ABGs: No results found for: PHART, PO2ART, OMI3LXU, ARE8QVV, BEART, T8BEODBA     Type & Screen (If Applicable):  No results found for: LABABO, LABRH    Drug/Infectious Status (If Applicable):  No results found for: HIV, HEPCAB    COVID-19 Screening (If Applicable):   Lab Results   Component Value Date/Time    COVID19 Not Detected 08/28/2022 12:52 PM    COVID19 Not Detected 08/05/2022 02:45 PM           Anesthesia Evaluation  Patient summary reviewed no history of anesthetic complications:   Airway: Mallampati: II       Mouth opening: > = 3 FB   Dental:    (+) edentulous      Pulmonary:   (+) shortness of breath: chronic,  decreased breath sounds asthma:                            Cardiovascular:    (+) hypertension:, CAD: obstructive, CABG/stent: no interval change,         Rhythm: regular  Rate: normal                    Neuro/Psych:   (+) neuromuscular disease:,              ROS comment: Spinal stenosis GI/Hepatic/Renal:   (+) GERD:,          ROS comment: History of GIB without definite site. No recurrence. Endo/Other:    (+) DiabetesType II DM, , .                 Abdominal:       Abdomen: soft. Vascular:   + PVD, aortic or cerebral, PE. Other Findings:      Summary 2022   Left ventricle grossly normal in size. Mild proximal septal thickening. Mid-distal septal hypokinesis severe. Mid-distal lateral wall dyskinesis, mild. Estimated left ventricular ejection fraction is 45±5%. The LAESV Index is >34 ml/m2. The left atrium is borderline dilated. Mildly dilated right ventricle. TAPSE is low normal.   Mild mitral annular calcification. Physiologic and/or trace mitral regurgitation is present. Physiologic and/or trace tricuspid regurgitation. RVSP is 36 mmHg. No evidence to suggest pulmonary hypertension. Technically good quality study. Compared to prior echo, changes noted. Suggest clinical correlation. 2019  1. No reversible perfusion defect   2. Ejection fraction is 58 %. 3. No significant wall motion abnormality                Anesthesia Plan      general     ASA 4       Induction: intravenous. Anesthetic plan and risks discussed with patient and child/children. Plan discussed with CRNA. DOS STAFF ADDENDUM:    Patient seen and chart reviewed. Physical exam and history updated as indicated.   NPO status confirmed. Anesthesia options and plan discussed including risks benefits with patient/legal guardian and family as available. Concerns and questions addressed. Consent verbalized to proceed. Anesthesia plan, options and intraoperative/postoperative concerns discussed with care team.    Discussed GA with family and limited DNR status. All in agreement to proceed.     Rene Romero MD, MD  12/2/2022  11:53 AM        Rene Romero MD   12/2/2022

## 2022-12-03 LAB
ANION GAP SERPL CALCULATED.3IONS-SCNC: 13 MMOL/L (ref 7–16)
BASOPHILS ABSOLUTE: 0.01 E9/L (ref 0–0.2)
BASOPHILS RELATIVE PERCENT: 0.1 % (ref 0–2)
BUN BLDV-MCNC: 29 MG/DL (ref 6–23)
CALCIUM SERPL-MCNC: 8.6 MG/DL (ref 8.6–10.2)
CHLORIDE BLD-SCNC: 105 MMOL/L (ref 98–107)
CO2: 19 MMOL/L (ref 22–29)
CREAT SERPL-MCNC: 1.1 MG/DL (ref 0.5–1)
EOSINOPHILS ABSOLUTE: 0 E9/L (ref 0.05–0.5)
EOSINOPHILS RELATIVE PERCENT: 0 % (ref 0–6)
GFR SERPL CREATININE-BSD FRML MDRD: 46 ML/MIN/1.73
GLUCOSE BLD-MCNC: 125 MG/DL (ref 74–99)
HCT VFR BLD CALC: 22.5 % (ref 34–48)
HEMOGLOBIN: 7.5 G/DL (ref 11.5–15.5)
IMMATURE GRANULOCYTES #: 0.01 E9/L
IMMATURE GRANULOCYTES %: 0.1 % (ref 0–5)
LYMPHOCYTES ABSOLUTE: 1.11 E9/L (ref 1.5–4)
LYMPHOCYTES RELATIVE PERCENT: 16.5 % (ref 20–42)
MCH RBC QN AUTO: 34.2 PG (ref 26–35)
MCHC RBC AUTO-ENTMCNC: 33.3 % (ref 32–34.5)
MCV RBC AUTO: 102.7 FL (ref 80–99.9)
MONOCYTES ABSOLUTE: 0.69 E9/L (ref 0.1–0.95)
MONOCYTES RELATIVE PERCENT: 10.3 % (ref 2–12)
NEUTROPHILS ABSOLUTE: 4.89 E9/L (ref 1.8–7.3)
NEUTROPHILS RELATIVE PERCENT: 73 % (ref 43–80)
PDW BLD-RTO: 13.5 FL (ref 11.5–15)
PLATELET # BLD: 273 E9/L (ref 130–450)
PMV BLD AUTO: 10.4 FL (ref 7–12)
POTASSIUM REFLEX MAGNESIUM: 4.2 MMOL/L (ref 3.5–5)
RBC # BLD: 2.19 E12/L (ref 3.5–5.5)
SODIUM BLD-SCNC: 137 MMOL/L (ref 132–146)
WBC # BLD: 6.7 E9/L (ref 4.5–11.5)

## 2022-12-03 PROCEDURE — 6370000000 HC RX 637 (ALT 250 FOR IP): Performed by: ORTHOPAEDIC SURGERY

## 2022-12-03 PROCEDURE — 97530 THERAPEUTIC ACTIVITIES: CPT

## 2022-12-03 PROCEDURE — 85025 COMPLETE CBC W/AUTO DIFF WBC: CPT

## 2022-12-03 PROCEDURE — 2580000003 HC RX 258: Performed by: ORTHOPAEDIC SURGERY

## 2022-12-03 PROCEDURE — 2140000000 HC CCU INTERMEDIATE R&B

## 2022-12-03 PROCEDURE — 97165 OT EVAL LOW COMPLEX 30 MIN: CPT

## 2022-12-03 PROCEDURE — 97162 PT EVAL MOD COMPLEX 30 MIN: CPT

## 2022-12-03 PROCEDURE — 97535 SELF CARE MNGMENT TRAINING: CPT

## 2022-12-03 PROCEDURE — 6360000002 HC RX W HCPCS: Performed by: ORTHOPAEDIC SURGERY

## 2022-12-03 PROCEDURE — 36415 COLL VENOUS BLD VENIPUNCTURE: CPT

## 2022-12-03 PROCEDURE — 80048 BASIC METABOLIC PNL TOTAL CA: CPT

## 2022-12-03 PROCEDURE — 2700000000 HC OXYGEN THERAPY PER DAY

## 2022-12-03 RX ORDER — ASPIRIN 81 MG/1
81 TABLET ORAL 2 TIMES DAILY
Status: DISCONTINUED | OUTPATIENT
Start: 2022-12-03 | End: 2022-12-05 | Stop reason: HOSPADM

## 2022-12-03 RX ADMIN — CEFAZOLIN 1000 MG: 1 INJECTION, POWDER, FOR SOLUTION INTRAMUSCULAR; INTRAVENOUS at 05:39

## 2022-12-03 RX ADMIN — Medication 5 MG: at 20:12

## 2022-12-03 RX ADMIN — HYDROCODONE BITARTRATE AND ACETAMINOPHEN 1 TABLET: 5; 325 TABLET ORAL at 12:21

## 2022-12-03 RX ADMIN — ASPIRIN 81 MG: 81 TABLET, COATED ORAL at 08:22

## 2022-12-03 RX ADMIN — AMLODIPINE BESYLATE 10 MG: 10 TABLET ORAL at 08:22

## 2022-12-03 RX ADMIN — SODIUM CHLORIDE, PRESERVATIVE FREE 10 ML: 5 INJECTION INTRAVENOUS at 08:23

## 2022-12-03 RX ADMIN — METHOCARBAMOL 750 MG: 750 TABLET ORAL at 20:15

## 2022-12-03 RX ADMIN — FERROUS SULFATE TAB 325 MG (65 MG ELEMENTAL FE) 325 MG: 325 (65 FE) TAB at 08:23

## 2022-12-03 RX ADMIN — HYDROCODONE BITARTRATE AND ACETAMINOPHEN 1 TABLET: 5; 325 TABLET ORAL at 20:15

## 2022-12-03 RX ADMIN — METHOCARBAMOL 750 MG: 750 TABLET ORAL at 08:23

## 2022-12-03 RX ADMIN — PANTOPRAZOLE SODIUM 40 MG: 40 TABLET, DELAYED RELEASE ORAL at 05:39

## 2022-12-03 RX ADMIN — METHOCARBAMOL 750 MG: 750 TABLET ORAL at 17:44

## 2022-12-03 RX ADMIN — METHOCARBAMOL 750 MG: 750 TABLET ORAL at 12:17

## 2022-12-03 RX ADMIN — SODIUM CHLORIDE: 9 INJECTION, SOLUTION INTRAVENOUS at 05:37

## 2022-12-03 RX ADMIN — ASPIRIN 81 MG: 81 TABLET, COATED ORAL at 20:12

## 2022-12-03 RX ADMIN — GABAPENTIN 300 MG: 300 CAPSULE ORAL at 17:44

## 2022-12-03 ASSESSMENT — PAIN SCALES - GENERAL
PAINLEVEL_OUTOF10: 6
PAINLEVEL_OUTOF10: 6
PAINLEVEL_OUTOF10: 2

## 2022-12-03 ASSESSMENT — PAIN DESCRIPTION - LOCATION
LOCATION: HIP
LOCATION: HIP

## 2022-12-03 ASSESSMENT — PAIN DESCRIPTION - ORIENTATION: ORIENTATION: LEFT

## 2022-12-03 ASSESSMENT — PAIN DESCRIPTION - DESCRIPTORS
DESCRIPTORS: ACHING;DISCOMFORT;SORE
DESCRIPTORS: ACHING;CRUSHING;DISCOMFORT

## 2022-12-03 ASSESSMENT — PAIN - FUNCTIONAL ASSESSMENT: PAIN_FUNCTIONAL_ASSESSMENT: ACTIVITIES ARE NOT PREVENTED

## 2022-12-03 NOTE — PROGRESS NOTES
[facility]                                Progress note    Subjective: The patient is somnolent, just got back from the OR on my evaluation  Still able to answer questions  S/p L hip  ORIF     Objective:    BP (!) 112/43   Pulse 80   Temp 99.1 °F (37.3 °C) (Oral)   Resp 16   Wt 132 lb (59.9 kg)   LMP 12/03/1997   SpO2 98%   BMI 22.66 kg/m²     In: 700 [I.V.:700]  Out: 300   In: 700   Out: 300 [Urine:200]    General appearance: NAD, conversant  HEENT: AT/NC, MMM  Neck: FROM, supple  Lungs: Clear to auscultation  CV: RRR, no MRGs  Vasc: Radial pulses 2+  Abdomen: Soft, non-tender; no masses or HSM  Extremities: Postop hip left  Skin: no rash, lesions or ulcers  Psych: Alert and oriented to person, place and time  Neuro: Alert and interactive     Recent Labs     12/01/22  0946 12/02/22  0559 12/03/22  0805   WBC 8.7 7.4 6.7   HGB 9.6* 10.4* 7.5*   HCT 28.2* 30.8* 22.5*    314 273       Recent Labs     12/01/22  0946 12/02/22  0559 12/03/22  0805    132 137   K 3.8 4.2 4.2    99 105   CO2 19* 24 19*   BUN 14 14 29*   CREATININE 0.5 0.7 1.1*   CALCIUM 8.1* 9.3 8.6       Assessment:    Principal Problem:    Closed fracture of left hip (HCC)  Resolved Problems:    * No resolved hospital problems.  *      Plan:     Patient is a 70-year-old female with a past medical history of arthritis, asthma, buttock wound, CAD, diabetes mellitus, GERD, hyperlipidemia, hypertension, PVD, and restless leg syndrome, presents to the ED for  Lt Hip Fracture  -Monitor labs  -PRN medication  -IVF NS @ 100  -Orthopedic surgery following  -Plan for surgery with Dr. Zahra Gonsalez on 12/02/2022  -NPO effective at midnight  -Hold anticoagulation meds  -PT/OT following surgery for therapy needs     Hypertension  -Continue home medications     12/2/22:  -For OR today for left ORIF-tolerated well  -Somnolent on evaluation today post OR  -Monitor h/h following surgery-check a.m.'s  -PT/OT for discharge needs    12/3/22:  Pain to be expected postop  Family at bedside  No acute events or issues  H&H 7.5/22. 5-transfuse if drops below 7/ overnight     Code status: Full  Consultants: Ortho   DVT Prophylaxis PCD's  PT/OT      Discharge planning-to skilled nursing facility/subacute rehab      Owen Her MD  7:00 PM  12/3/2022

## 2022-12-03 NOTE — PROGRESS NOTES
OCCUPATIONAL THERAPY INITIAL EVALUATION    [facility] [place] [address] [address] [address]        NNPC:3765                                                  Patient Name: Sheryle Sly    MRN: [id number]    : 1927    Room: [facility]8429X      Evaluating OT: Kitty Yang, 82 IsaacRia Mejía OTR/L; [id number]      Referring Provider: Malik Frazier DO    Specific Provider Orders/Date: OT Eval and Treat 22      Diagnosis: Closed fracture of left hip, initial encounter  Closed hip fracture, left, initial encounter     Surgery: 22 Left hip open reduction internal fixation       Pertinent Medical History:  has a past medical history of Arthritis, Asthma, 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 ([address] Bronchitis, Bruising tendency, Buttock wound, left, initial encounter, CAD (coronary artery disease), Cough, COVID, Dermatophytosis, Diabetes mellitus ([address] GERD (gastroesophageal reflux disease), History of GI bleed, History of pulmonary embolus (PE), Hx of blood clots, Hyperlipidemia, Hypertension, Leg swelling, Lung disease, Peripheral vascular angioplasty status, Pseudoaneurysm of right femoral artery ([address] PVD (peripheral vascular disease) with claudication (HCC), Restless legs syndrome, Rhinitis, allergic, Sinusitis, SOB (shortness of breath), Type 1 diabetes mellitus with left diabetic foot ulcer ([address] Ulcerated, foot, left, limited to breakdown of skin ([address] Urinary incontinence, Wound of left buttock, and Wound of right buttock.       Reason for Admission: left hip and knee pain post fall d/t left knee giving out while ambulating with walker    Recommended Adaptive Equipment:  TBD pending progression/discharge plan     Precautions:  Fall Risk, Cognition, Bed Alarm, Contact Isolation, O2, [place], WBAT LLE     Assessment of current deficits:    [x] Functional mobility  [x]ADLs  [x] Strength [x]Cognition    [x] Functional transfers   [x] IADLs         [x] Safety Awareness   [x]Endurance    [x] Fine Coordination              [x] Balance      [] Vision/perception   []Sensation     []Gross Motor Coordination  [] ROM  [] Delirium                   [] Motor Control     OT PLAN OF CARE   OT POC based on physician orders, patient diagnosis and results of clinical assessment    Frequency/Duration: 3-5 days/wk for 2 weeks PRN   Specific OT Treatment Interventions to include:   * Instruction/training on adapted ADL techniques and AE recommendations to increase functional independence within precautions       * Training on energy conservation strategies, correct breathing pattern and techniques to improve independence/tolerance for self-care routine  * Functional transfer/mobility training/DME recommendations for increased independence, safety, and fall prevention  * Patient/Family education to increase follow through with safety techniques and functional independence  * Recommendation of environmental modifications for increased safety with functional transfers/mobility and ADLs  * Cognitive retraining/development of therapeutic activities to improve problem solving, judgement, memory, and attention for increased safety/participation in ADL/IADL tasks  * Therapeutic exercise to improve motor endurance, ROM, and functional strength for ADLs/functional transfers  * Therapeutic activities to facilitate/challenge dynamic balance, stand tolerance for increased safety and independence with ADLs    Home Living: Pt poor historian d/t cognition. Per chart pt admitted from [facility].   Bathroom setup: unknown  Equipment owned: rollator     Prior Level of Function: assist from staff  with ADLs and IADLs unknown   ambulated with rollator prior    Pain Level: no pain at rest, increased LLE pain with functional mobility (unable to rate)  Cognition: A&O: 1/4 - oriented name only, stated she was in her 62s corrected to [address] single 1 step directions - increased time for processing, delayed responses, pt perseverating on the stated \"I'm dying\" redirected and provided reorientation to situation for attention to task at hand   Memory:  poor   Sequencing:  poor   Problem solving:  poor   Judgement/safety:  poor     Functional Assessment:  AM-PAC Daily Activity Raw Score: 10/24   Initial Eval Status  Date: 12/3/22 Treatment Status  Date: STGs = LTGs  Time frame: 10-14 days   Feeding Moderate Assist   Tray set up and proper initiation and sequencing task  Assist with cup to mouth for drinking  Moderate [place]    Grooming Moderate Assist   Brushing hair and washing face seated semi supine  Stand by Assist    UB Dressing Moderate Assist   Donning gown over shoulders seated EOB  Stand by Assist    LB Dressing Dependent   Jamie/doff socks lying supine  Limited EOB d/t pain  Minimal Assist    Bathing Maximal Assist  Limited functional each for LB bathing tasks an decreased insight/problem solving/sequencing  Minimal Assist    Toileting Dependent   Pure wick  Minimal Assist    Bed Mobility  Log Roll: Dep  Supine to sit: Dep   Sit to supine: Dep x 2  Supine to sit: Minimal Assist   Sit to supine: Minimal Assist    Functional Transfers Sit to stand: NT  Stand to sit:NT  Stand pivot: NT  Commode: NT  Sit to stand:Min A   Stand to sit:Min A  Stand pivot: Min A with ww  Commode: Min A    Functional Mobility NT   within household distance  Min A with ww    Balance Sitting:     Static - Max <> Dep  R lateral lean     Dynamic - Dep  Standing: NT  Sitting:  Static: SBA  Dynamic: SBA  Standing: Min A with ww   Activity Tolerance FAIR  GOOD   Visual/  Perceptual Glasses: yes    Poor perception - required repeated verbal cues for scanning and attention to task         Vitals   SpO2 on 2L NC: 94%    HR: 80s         BUE  ROM/Strength/  Fine motor Coordination Hand dominance: Right     RUE: ROM WFL     Strength: 3+/5 proximally      Strength: FAIR Coordination:  FAIR     LUE: ROM WFL     Strength: 3+/5 proximally      Strength: FAIR     Coordination:  FAIR  increase BUE muscle strength for improved indep with functional transfers         Hearing: [place]  Sensation:  None reported  Tone: WFL   Edema: unremarkable    Patient/Family Goal: pt family goal is discharge to SNF   Based on patient's functional performance as stated below and level of assistance needed prior to admission, this therapist believes that the patient would benefit from further skilled OT following hospital stay in an effort to increase safety, functional independence, and quality of life. Comment: Cleared by RN to see pt. Upon arrival patient seated EOB with Physical Therapist and agreeable to OT session. At end of session, patient lying semi supine with call light and phone within reach, all lines and tubes intact. Overall patient demonstrated  decreased independence and safety during completion of ADL/functional transfer/mobility tasks. Pt would benefit from continued skilled OT to increase safety and independence with completion of ADL/IADL tasks for functional independence and quality of life. Treatment: Required re-orientation to year/month/place. Pt required vc's for proper technique/safety with hand placement/body mechanics/posture for bed mobility/ADLs/functional tranfers/mobility/ww management. Pt required vc's for sequencing/initiation of ADLs/functional transfers. Pt able to  sit EOB ~8 mins to increase core strength/balance/activity tolerance for ease with ADLs. Pt reported increased pain seated EOB and required return to supine for all functional task, presenting with R lateral lean requiring dependent assist for EOB sitting. Pt required increased time to complete ADLs/functional transfers due to increased pain with functional mobility and poor insight/sequencing/problem solving for functional tasks.  Pt completed face washing, brushing hair lying semi supine in chair position. Pt required frequent redirecting and verbal cues for scanning environment while completing tasks. Pt required skilled monitoring of SpO2 during session and education on energy conservation techniques. Pt required rest breaks during session and educated on pursed lip breathing. Pt appeared to have tolerated session well and appears motivated/cooperative/pleasant . Pt instructed on use of call light for assistance and fall prevention. Pt demo'ing fair understanding of education provided. Continue to educate. Rehab Potential: Good  for established goals       LTG: maximize independence with ADLs to return to PLOF    Patient and/or family were instructed on functional diagnosis, prognosis/goals and OT plan of care. Demonstrated FAIR understanding. [] Malnutrition indicators have been identified and nursing has been notified to ensure a dietitian consult is ordered. Eval Complexity: MOD  History: Expanded chart review of medical records and additional review of physical, cognitive, or psychosocial history related to current functional performance  Exam: 3+ performance deficits  Assistance/Modification: MOD assistance or modifications required to perform tasks. May have comorbidities that affect occupational performance. Evaluation time includes thorough review of current medical information, gathering information on past medical & social history & PLOF, completion of standardized testing, informal observation of tasks, consultation with other medical professions/disciplines, assessment of data & development of POC/goals.      Time In: 1015  Time Out: 1040  Total Treatment Time: 10    Min Units   OT Eval Low 99734  x     OT Eval Medium 05430      OT Eval High 65484      OT Re-Eval [id number]       Therapeutic Ex [id number]       Therapeutic Activities 44994       ADL/Self Care 46100  10  1   Orthotic Management 36217       Manual 94531     Neuro Re-Ed 51971       Non-Billable Time          Evaluation Time additionally includes thorough review of current medical information, gathering information on past medical history/social history and prior level of function, interpretation of standardized testing/informal observation of tasks, assessment of data and development of plan of care and goals.           DANAY Randall OTR/L; [id number]

## 2022-12-03 NOTE — PROGRESS NOTES
Department of Orthopedic Surgery  Resident Progress Note    Patient seen and examined, resting in bed. Pain controlled. No new complaints. VITALS:  /67   Pulse 72   Temp 97.3 °F (36.3 °C) (Oral)   Resp 16   Wt 132 lb (59.9 kg)   LMP 12/03/1997   SpO2 99%   BMI 22.66 kg/m²     General: Confused but conversational. In no acute distress.     MUSCULOSKELETAL:   left lower extremity:  Dressing C/D/I over the lateral left hip  Compartments soft and compressible  +PF/DF/EHL  +2/4 DP & PT pulses, Brisk Cap refill, Toes warm and perfused  Distal sensation grossly intact to Peroneals, Sural, Saphenous, and tibial nrs    CBC:   Lab Results   Component Value Date/Time    WBC 7.4 12/02/2022 05:59 AM    HGB 10.4 12/02/2022 05:59 AM    HCT 30.8 12/02/2022 05:59 AM     12/02/2022 05:59 AM     PT/INR:    Lab Results   Component Value Date/Time    PROTIME 16.6 07/30/2022 10:14 AM    INR 1.5 07/30/2022 10:14 AM         ASSESSMENT  S/P  Left hip open reduction internal fixation on 12/2/22    PLAN      Continue physical therapy and protocol: WBAT - LLE  24 hour abx coverage  Deep venous thrombosis prophylaxis - aspirin, early mobilization  PT/OT as able  Pain Control: PO  Monitor H&H  D/C Planning  Please call with questions or concerns

## 2022-12-03 NOTE — PROGRESS NOTES
Physical Therapy  Physical Therapy Initial Assessment     Name: Sheryle Sly  : 1927  MRN: [id number]      Date of Service: 12/3/2022    Evaluating PT:  Janette Craig, PT, DPT [id number]    Room #:  9351/5209-V  Diagnosis:  Closed fracture of left hip, initial encounter 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 ([address] 2021    Bronchitis 2017    Bruising tendency     Buttock wound, left, initial encounter 10/3/2022    CAD (coronary artery disease)     Cough 2017    COVID     Dermatophytosis 2021    Diabetes mellitus ([address]     GERD (gastroesophageal reflux disease) 2017    History of GI bleed 10/3/2022    History of pulmonary embolus (PE) 2021    Hx of blood clots     Hyperlipidemia     Hypertension     Leg swelling 7/15/2021    Lung disease     Peripheral vascular angioplasty status 2021    Pseudoaneurysm of right femoral artery ([address] 2021    PVD (peripheral vascular disease) with claudication ([address] 4/10/2019    Restless legs syndrome     Rhinitis, allergic 2017    Sinusitis 2017    SOB (shortness of breath) 2017    Type 1 diabetes mellitus with left diabetic foot ulcer ([address] 2021    Ulcerated, foot, left, limited to breakdown of skin ([address] 2021    Urinary incontinence     Wound of left buttock 10/10/2022    Wound of right buttock 10/3/2022    Fat layer     Procedure/Surgery:   L hip ORIF  Precautions:  Falls, WBAT LLE, Contact isolation, cognition, O2, [place]  Equipment Needs:  TBD    SUBJECTIVE:    Pt was admitted from halfway. Pt ambulated with rollator PTA. OBJECTIVE:   Initial Evaluation  Date: 12/3/22 Treatment Short Term/ Long Term   Goals   AM-PAC 6 Clicks 9/35     Was pt agreeable to Eval/treatment? Yes     Does pt have pain?  No c/o pain at rest; LLE pain with mobility - unable to rate     Bed Mobility  Rolling: NT  Supine to sit: Dep with HOB elevated  Sit to supine: Dep x 2  Scooting: Dep  [place]   Transfers Sit to stand: NT  Stand to sit: NT  Stand pivot: NT  [place] with [address]   Ambulation   NT  >25 feet with [place] with [address]   Stair negotiation: ascended and descended NT     ROM BUE:  WFL  BLE:  Unable to assess LLE due to pain     Strength BUE:  WFL  RLE:  3+/5; LLE NT due to pain  Increase by 1/3 MMT grade   Balance Sitting EOB:  Dep  Dynamic Standing:  NT  Sitting EOB:  Independent  Dynamic Standing:  [place] with [address]     Pt is A & O x 1 self  Sensation:  No reported parsthesias  Edema:  None    Therapeutic Exercises:  NA    Patient education  Pt educated on safety    Patient response to education:   Pt verbalized understanding Pt demonstrated skill Pt requires further education in this area   partial partial yes     ASSESSMENT:    Conditions Requiring Skilled Therapeutic Intervention:    [x]Decreased strength     [x]Decreased ROM  [x]Decreased functional mobility  [x]Decreased balance   [x]Decreased endurance   [x]Decreased posture  []Decreased sensation  []Decreased coordination   []Decreased vision  [x]Decreased safety awareness   [x]Increased pain       Comments:  Pt was in bed upon arrival, agreeable to initial evaluation. Pt's son was present for session. Reviewed WB status prior to activity. Pt was confused and frequently perseverated on death, stating \"I'm dead\" or \"I\"m dying\" throughout session. Increased time required for bed mobility due to pain. Eventually total assistance was given for pt to reach EOB. Pt demonstrated a significant R lateral lean in sitting. Extended time given at EOB. Pt was left in bed with all needs met and call light in reach. OT present. PT POC discussed with pt's son.     Treatment:  Patient practiced and was instructed in the following treatment:    Bed mobility training - pt given verbal and tactile cues to facilitate proper sequencing and safety during supine>sit as well as provided with physical assistance. Sitting EOB for >8 minutes for upright tolerance, postural awareness and BLE ROM  Skilled positioning - Pt placed in the chair position with pillows utilized to facilitate upright posture, joint and skin integrity, and interaction with environment. Pt's/ family goals   1. Improve mobility    Prognosis is fair for reaching above PT goals. Patient and or family understand(s) diagnosis, prognosis, and plan of care. yes    PHYSICAL THERAPY PLAN OF CARE:    PT POC is established based on physician order and patient diagnosis     Referring provider/PT Order:  Aura Valles,  /12/01/22 2145 PT eval and treat  Diagnosis:  Closed fracture of left hip, initial encounter ([facility] [S72.002A]  [facility], left[facility] [S72.002A]  Specific instructions for next treatment:  Progress activity    Current Treatment Recommendations:     [x] Strengthening to improve independence with functional mobility   [x] ROM to improve independence with functional mobility   [x] Balance Training to improve static/dynamic balance and to reduce fall risk  [x] Endurance Training to improve activity tolerance during functional mobility   [x] Transfer Training to improve safety and independence with all functional transfers   [x] Gait Training to improve gait mechanics, endurance and asses need for appropriate assistive device  [] Stair Training in preparation for safe discharge home and/or into the community   [x] Positioning to prevent skin breakdown and contractures  [x] Safety and Education Training   [x] Patient/Caregiver Education   [] HEP  [] Other     PT long term treatment goals are located in above grid    Frequency of treatments: 2-5x/week x 1-2 weeks.     Time in  0953  Time out  1031    Total Treatment Time  23 minutes     Evaluation Time includes thorough review of current medical information, gathering information on past medical history/social history and prior level of function, completion of standardized testing/informal observation of tasks, assessment of data and education on plan of care and goals.     CPT codes:  [] Low Complexity PT evaluation 91783  [x] Moderate Complexity PT evaluation 22330  [] High Complexity PT evaluation 54811  [] PT Re-evaluation 53795  [] Gait training 87142 - minutes  [] Manual therapy 97341 - minutes  [x] Therapeutic activities 57511 23 minutes  [] Therapeutic exercises 37959 - minutes  [] Neuromuscular reeducation 47742 - minutes     Tj Salazar, PT, DPT  [id number]

## 2022-12-04 LAB
ABO/RH: NORMAL
ANION GAP SERPL CALCULATED.3IONS-SCNC: 8 MMOL/L (ref 7–16)
ANTIBODY SCREEN: NORMAL
BASOPHILS ABSOLUTE: 0.01 E9/L (ref 0–0.2)
BASOPHILS RELATIVE PERCENT: 0.2 % (ref 0–2)
BLOOD BANK DISPENSE STATUS: NORMAL
BLOOD BANK PRODUCT CODE: NORMAL
BPU ID: NORMAL
BUN BLDV-MCNC: 32 MG/DL (ref 6–23)
CALCIUM SERPL-MCNC: 8.6 MG/DL (ref 8.6–10.2)
CHLORIDE BLD-SCNC: 103 MMOL/L (ref 98–107)
CO2: 19 MMOL/L (ref 22–29)
CREAT SERPL-MCNC: 1 MG/DL (ref 0.5–1)
DESCRIPTION BLOOD BANK: NORMAL
EOSINOPHILS ABSOLUTE: 0.31 E9/L (ref 0.05–0.5)
EOSINOPHILS RELATIVE PERCENT: 5.1 % (ref 0–6)
GFR SERPL CREATININE-BSD FRML MDRD: 52 ML/MIN/1.73
GLUCOSE BLD-MCNC: 126 MG/DL (ref 74–99)
HCT VFR BLD CALC: 20 % (ref 34–48)
HEMOGLOBIN: 6.8 G/DL (ref 11.5–15.5)
IMMATURE GRANULOCYTES #: 0.03 E9/L
IMMATURE GRANULOCYTES %: 0.5 % (ref 0–5)
LYMPHOCYTES ABSOLUTE: 0.71 E9/L (ref 1.5–4)
LYMPHOCYTES RELATIVE PERCENT: 11.7 % (ref 20–42)
MCH RBC QN AUTO: 35.1 PG (ref 26–35)
MCHC RBC AUTO-ENTMCNC: 34 % (ref 32–34.5)
MCV RBC AUTO: 103.1 FL (ref 80–99.9)
MONOCYTES ABSOLUTE: 0.59 E9/L (ref 0.1–0.95)
MONOCYTES RELATIVE PERCENT: 9.7 % (ref 2–12)
NEUTROPHILS ABSOLUTE: 4.44 E9/L (ref 1.8–7.3)
NEUTROPHILS RELATIVE PERCENT: 72.8 % (ref 43–80)
PDW BLD-RTO: 13.6 FL (ref 11.5–15)
PLATELET # BLD: 232 E9/L (ref 130–450)
PMV BLD AUTO: 10.5 FL (ref 7–12)
POTASSIUM REFLEX MAGNESIUM: 4.1 MMOL/L (ref 3.5–5)
RBC # BLD: 1.94 E12/L (ref 3.5–5.5)
SODIUM BLD-SCNC: 130 MMOL/L (ref 132–146)
WBC # BLD: 6.1 E9/L (ref 4.5–11.5)

## 2022-12-04 PROCEDURE — 86901 BLOOD TYPING SEROLOGIC RH(D): CPT

## 2022-12-04 PROCEDURE — 2580000003 HC RX 258: Performed by: ORTHOPAEDIC SURGERY

## 2022-12-04 PROCEDURE — 36415 COLL VENOUS BLD VENIPUNCTURE: CPT

## 2022-12-04 PROCEDURE — 36430 TRANSFUSION BLD/BLD COMPNT: CPT

## 2022-12-04 PROCEDURE — 6370000000 HC RX 637 (ALT 250 FOR IP): Performed by: ORTHOPAEDIC SURGERY

## 2022-12-04 PROCEDURE — 80048 BASIC METABOLIC PNL TOTAL CA: CPT

## 2022-12-04 PROCEDURE — 1200000000 HC SEMI PRIVATE

## 2022-12-04 PROCEDURE — 85025 COMPLETE CBC W/AUTO DIFF WBC: CPT

## 2022-12-04 PROCEDURE — 86850 RBC ANTIBODY SCREEN: CPT

## 2022-12-04 PROCEDURE — P9016 RBC LEUKOCYTES REDUCED: HCPCS

## 2022-12-04 PROCEDURE — 2700000000 HC OXYGEN THERAPY PER DAY

## 2022-12-04 PROCEDURE — 86900 BLOOD TYPING SEROLOGIC ABO: CPT

## 2022-12-04 PROCEDURE — 86923 COMPATIBILITY TEST ELECTRIC: CPT

## 2022-12-04 RX ORDER — SODIUM CHLORIDE 9 MG/ML
INJECTION, SOLUTION INTRAVENOUS PRN
Status: DISCONTINUED | OUTPATIENT
Start: 2022-12-04 | End: 2022-12-05 | Stop reason: HOSPADM

## 2022-12-04 RX ADMIN — GABAPENTIN 300 MG: 300 CAPSULE ORAL at 21:00

## 2022-12-04 RX ADMIN — BACLOFEN 5 MG: 10 TABLET ORAL at 21:00

## 2022-12-04 RX ADMIN — METHOCARBAMOL 750 MG: 750 TABLET ORAL at 08:52

## 2022-12-04 RX ADMIN — Medication 5 MG: at 21:00

## 2022-12-04 RX ADMIN — HYDROCODONE BITARTRATE AND ACETAMINOPHEN 1 TABLET: 5; 325 TABLET ORAL at 10:58

## 2022-12-04 RX ADMIN — BACLOFEN 5 MG: 10 TABLET ORAL at 16:18

## 2022-12-04 RX ADMIN — SODIUM CHLORIDE, PRESERVATIVE FREE 10 ML: 5 INJECTION INTRAVENOUS at 08:53

## 2022-12-04 RX ADMIN — PANTOPRAZOLE SODIUM 40 MG: 40 TABLET, DELAYED RELEASE ORAL at 05:33

## 2022-12-04 RX ADMIN — ASPIRIN 81 MG: 81 TABLET, COATED ORAL at 08:52

## 2022-12-04 RX ADMIN — FERROUS SULFATE TAB 325 MG (65 MG ELEMENTAL FE) 325 MG: 325 (65 FE) TAB at 08:52

## 2022-12-04 RX ADMIN — SODIUM CHLORIDE, PRESERVATIVE FREE 10 ML: 5 INJECTION INTRAVENOUS at 21:05

## 2022-12-04 RX ADMIN — AMLODIPINE BESYLATE 10 MG: 10 TABLET ORAL at 08:52

## 2022-12-04 RX ADMIN — ASPIRIN 81 MG: 81 TABLET, COATED ORAL at 21:00

## 2022-12-04 RX ADMIN — METHOCARBAMOL 750 MG: 750 TABLET ORAL at 13:25

## 2022-12-04 RX ADMIN — METHOCARBAMOL 750 MG: 750 TABLET ORAL at 21:00

## 2022-12-04 ASSESSMENT — PAIN SCALES - GENERAL
PAINLEVEL_OUTOF10: 6
PAINLEVEL_OUTOF10: 8

## 2022-12-04 ASSESSMENT — PAIN - FUNCTIONAL ASSESSMENT: PAIN_FUNCTIONAL_ASSESSMENT: PREVENTS OR INTERFERES WITH MANY ACTIVE NOT PASSIVE ACTIVITIES

## 2022-12-04 ASSESSMENT — PAIN DESCRIPTION - DESCRIPTORS: DESCRIPTORS: ACHING;DISCOMFORT;SORE

## 2022-12-04 ASSESSMENT — PAIN DESCRIPTION - LOCATION: LOCATION: HIP

## 2022-12-04 ASSESSMENT — PAIN DESCRIPTION - ORIENTATION: ORIENTATION: LEFT

## 2022-12-04 NOTE — PROGRESS NOTES
Comprehensive Nutrition Assessment    Type and Reason for Visit:  Initial, Consult, Wound    Nutrition Recommendations/Plan:   Continue current diet. Recommend and start Glucerna BID and Kwan BID to optimize intake and promote wound healing. Will continue to monitor. Malnutrition Assessment:  Malnutrition Status: At risk for malnutrition (Comment) (12/04/22 1447)    Context:  Acute Illness     Findings of the 6 clinical characteristics of malnutrition:  Energy Intake:  Unable to assess  Weight Loss:  Unable to assess (2/2 hx of CHF and possible fluid shifts)     Body Fat Loss:  Unable to assess (multifactorial ; ISO and true loss difficult to assess 2/2 advanced age)     Muscle Mass Loss:  Unable to assess (multifactorial ; ISO and true loss difficult to assess 2/2 advanced age)    Fluid Accumulation:  Unable to assess (multifactorial ; ISO and true loss difficult to assess 2/2 advanced age)     Strength:  Not Performed    Nutrition Assessment:    Pt. admit w/ L Hip Fracture s/p fall now s/p L hip ORIF on 12/2/22. Hx of CAD,DM, CHF. No PO intake data to assess at this time. Noted multiple wounds-Pt. is w/ increased nutrient needs for wound healing. Will start ONS and monitor. Nutrition Related Findings:    Oriented x2, +I/O, GI WDL, no edema, Wound Type: Skin Tears, Stage II, Pressure Injury, Multiple (left buttocks stage 2  Partial thickness left elbow)       Current Nutrition Intake & Therapies:    Average Meal Intake: Unable to assess (no intakes to assess at this time)  Average Supplements Intake: None Ordered  ADULT DIET; Regular  ADULT ORAL NUTRITION SUPPLEMENT; Breakfast, Lunch; Diabetic Oral Supplement  ADULT ORAL NUTRITION SUPPLEMENT; Breakfast, Dinner;  Wound Healing Oral Supplement    Anthropometric Measures:  Height: 5' 4\" (162.6 cm)  Ideal Body Weight (IBW): 120 lbs (55 kg)    Admission Body Weight: 132 lb (59.9 kg) (12/03 BS first measured)  Current Body Weight: 132 lb 8 oz (60.1 kg) (12/04 no method), 110.4 % IBW. Weight Source: Not Specified  Current BMI (kg/m2): 22.7  Usual Body Weight:  (NEAL d/t lack of wt hx on file to assess)     Weight Adjustment For: No Adjustment                 BMI Categories: Normal Weight (BMI 22.0 to 24.9) age over 72    Estimated Daily Nutrient Needs:  Energy Requirements Based On: Formula  Weight Used for Energy Requirements: Current  Energy (kcal/day): 1100-1200kcal (MSJ x1. 2SF)  Weight Used for Protein Requirements: Current  Protein (g/day): 84-96g (1.4-1.6g/kg)  Method Used for Fluid Requirements: 1 ml/kcal  Fluid (ml/day):     Nutrition Diagnosis:   Increased nutrient needs related to increase demand for energy/nutrients as evidenced by wounds    Nutrition Interventions:   Food and/or Nutrient Delivery: Continue Current Diet, Start Oral Nutrition Supplement (Kwan BID, Glucerna BID)  Nutrition Education/Counseling: No recommendation at this time  Coordination of Nutrition Care: Continue to monitor while inpatient       Goals:     Goals: PO intake 50% or greater, by next RD assessment       Nutrition Monitoring and Evaluation:   Behavioral-Environmental Outcomes: None Identified  Food/Nutrient Intake Outcomes: Food and Nutrient Intake, Supplement Intake  Physical Signs/Symptoms Outcomes: Biochemical Data, GI Status, Fluid Status or Edema, Nutrition Focused Physical Findings, Skin, Weight    Discharge Planning:    Continue Oral Nutrition Supplement     Rene Xiong RD  Contact: ext [phone]

## 2022-12-04 NOTE — PLAN OF CARE
Problem: Chronic Conditions and Co-morbidities  Goal: Patient's chronic conditions and co-morbidity symptoms are monitored and maintained or improved  12/4/2022 1303 by Chikis Swain RN  Outcome: Progressing  12/3/2022 2359 by Daisy Nathan RN  Outcome: Progressing     Problem: Discharge Planning  Goal: Discharge to home or other facility with appropriate resources  12/4/2022 1303 by Chikis Swain RN  Outcome: Progressing  12/3/2022 2359 by Daisy Nathan RN  Outcome: Progressing     Problem: Pain  Goal: Verbalizes/displays adequate comfort level or baseline comfort level  12/4/2022 1303 by Chikis Swain RN  Outcome: Progressing  12/3/2022 2359 by Daisy Nathan RN  Outcome: Progressing     Problem: Skin/Tissue Integrity  Goal: Absence of new skin breakdown  Description: 1. Monitor for areas of redness and/or skin breakdown  2. Assess vascular access sites hourly  3. Every 4-6 hours minimum:  Change oxygen saturation probe site  4. Every 4-6 hours:  If on nasal continuous positive airway pressure, respiratory therapy assess nares and determine need for appliance change or resting period.   12/4/2022 1303 by Chikis Swain RN  Outcome: Progressing  12/3/2022 2359 by Daisy Nathan RN  Outcome: Progressing     Problem: ABCDS Injury Assessment  Goal: Absence of physical injury  12/4/2022 1303 by Chikis Swain RN  Outcome: Progressing  12/3/2022 2359 by Daisy Nathan RN  Outcome: Progressing     Problem: Safety - Adult  Goal: Free from fall injury  12/4/2022 1303 by Chikis Swain RN  Outcome: Progressing  12/3/2022 2359 by Daisy Nathan RN  Outcome: Progressing

## 2022-12-04 NOTE — PROGRESS NOTES
[facility]                                Progress note    Subjective:    Drop in h/h today, requiring a unit of RBC     Objective:    BP (!) 139/55   Pulse 70   Temp 98.1 °F (36.7 °C) (Oral)   Resp 18   Wt 132 lb 8 oz (60.1 kg)   LMP 12/03/1997   SpO2 98%   BMI 22.74 kg/m²     No intake/output data recorded. No intake/output data recorded. General appearance: NAD, conversant  HEENT: AT/NC, MMM  Neck: FROM, supple  Lungs: Clear to auscultation  CV: RRR, no MRGs  Vasc: Radial pulses 2+  Abdomen: Soft, non-tender; no masses or HSM  Extremities: Postop hip left  Skin: no rash, lesions or ulcers  Psych: Alert and oriented to person, place and time  Neuro: Alert and interactive     Recent Labs     12/02/22  0559 12/03/22  0805 12/04/22  0619   WBC 7.4 6.7 6.1   HGB 10.4* 7.5* 6.8*   HCT 30.8* 22.5* 20.0*    273 232         Recent Labs     12/02/22  0559 12/03/22  0805 12/04/22  0618    137 130*   K 4.2 4.2 4.1   CL 99 105 103   CO2 24 19* 19*   BUN 14 29* 32*   CREATININE 0.7 1.1* 1.0   CALCIUM 9.3 8.6 8.6         Assessment:    Principal Problem:    Closed fracture of left hip (HCC)  Resolved Problems:    * No resolved hospital problems.  *      Plan:     Patient is a 42-year-old female with a past medical history of arthritis, asthma, buttock wound, CAD, diabetes mellitus, GERD, hyperlipidemia, hypertension, PVD, and restless leg syndrome, presents to the ED for  Lt Hip Fracture  -Monitor labs  -PRN medication  -IVF NS @ 100  -Orthopedic surgery following  -Plan for surgery with Dr. Jamie Valladares on 12/02/2022  -NPO effective at midnight  -Hold anticoagulation meds  -PT/OT following surgery for therapy needs     Hypertension  -Continue home medications     12/2/22:  -For OR today for left ORIF-tolerated well  -Somnolent on evaluation today post OR  -Monitor h/h following surgery-check a.m.'s  -PT/OT for discharge needs    12/3/22:  Pain to be expected postop  Family at bedside  No acute events or issues  H&H 7.5/22. 5-transfuse if drops below 7/ overnight    12/4/22  -H/H 6.8/20.0 > 1 unit of RBC ordered, recheck h/h after blood transfusion   -ASA 81 mg BID being given, may need to hold if patient continues to bleed  -PT/OT      Code status: Full  Consultants: Ortho   DVT Prophylaxis PCD's  PT/OT      Discharge planning-to skilled nursing facility/subacute rehab      April MD Mariah  11:47 AM  12/4/2022

## 2022-12-04 NOTE — PROGRESS NOTES
Department of Orthopedic Surgery  Resident Progress Note    Patient seen and examined. Pain controlled. Reports overnight 5/10. No new complaints. Denies acute fever, chills, nausea, vomiting , chest pain and shortness of breath. VITALS:  BP (!) 131/45   Pulse 66   Temp 98.2 °F (36.8 °C) (Temporal)   Resp 17   Wt 132 lb 8 oz (60.1 kg)   LMP 12/03/1997   SpO2 93%   BMI 22.74 kg/m²     General: Confused but conversational. In no acute distress. MUSCULOSKELETAL:   left lower extremity:  Dressing C/D/I over the lateral left hip  Compartments soft and compressible  +PF/DF/EHL  +2/4 DP & PT pulses, Brisk Cap refill, Toes warm and perfused  Distal sensation grossly intact to Peroneals, Sural, Saphenous, and tibial nrs    CBC:   Lab Results   Component Value Date/Time    WBC 6.7 12/03/2022 08:05 AM    HGB 7.5 12/03/2022 08:05 AM    HCT 22.5 12/03/2022 08:05 AM     12/03/2022 08:05 AM     PT/INR:    Lab Results   Component Value Date/Time    PROTIME 16.6 07/30/2022 10:14 AM    INR 1.5 07/30/2022 10:14 AM         ASSESSMENT  S/P  Left hip open reduction internal fixation on 12/2/22    PLAN      Continue physical therapy and protocol: WBAT - LLE  24 hour abx coverage  Deep venous thrombosis prophylaxis - aspirin, early mobilization  PT/OT as able  Pain Control: PO  Monitor H&H  D/C Patient can be discharged when medically stable and have made the appropriate physical therapy gains. Patient will follow up in office in 2 weeks for repeat Xrs and evaluation.

## 2022-12-04 NOTE — PROGRESS NOTES
Received a critical lab call that patient's hemoglobin is 6.8. Attempted to call Dr. Jessica Lakhani and notify her, did not get an answer. Left a voicemail.   Gonzales Santoyo RN

## 2022-12-05 ENCOUNTER — HOSPITAL ENCOUNTER (OUTPATIENT)
Dept: WOUND CARE | Age: 87
Discharge: HOME | End: 2022-12-05

## 2022-12-05 VITALS
TEMPERATURE: 98.4 F | WEIGHT: 132.5 LBS | HEIGHT: 64 IN | RESPIRATION RATE: 14 BRPM | DIASTOLIC BLOOD PRESSURE: 64 MMHG | HEART RATE: 76 BPM | OXYGEN SATURATION: 95 % | SYSTOLIC BLOOD PRESSURE: 163 MMHG | BODY MASS INDEX: 22.62 KG/M2

## 2022-12-05 LAB
HCT VFR BLD CALC: 28.6 % (ref 34–48)
HEMOGLOBIN: 9.7 G/DL (ref 11.5–15.5)

## 2022-12-05 PROCEDURE — 6370000000 HC RX 637 (ALT 250 FOR IP): Performed by: ORTHOPAEDIC SURGERY

## 2022-12-05 PROCEDURE — 97530 THERAPEUTIC ACTIVITIES: CPT

## 2022-12-05 PROCEDURE — 36415 COLL VENOUS BLD VENIPUNCTURE: CPT

## 2022-12-05 PROCEDURE — 2700000000 HC OXYGEN THERAPY PER DAY

## 2022-12-05 PROCEDURE — 85018 HEMOGLOBIN: CPT

## 2022-12-05 PROCEDURE — 85014 HEMATOCRIT: CPT

## 2022-12-05 RX ORDER — ASPIRIN 81 MG/1
81 TABLET ORAL 2 TIMES DAILY
Qty: 30 TABLET | Refills: 3 | Status: SHIPPED | OUTPATIENT
Start: 2022-12-05

## 2022-12-05 RX ORDER — METHOCARBAMOL 750 MG/1
750 TABLET, FILM COATED ORAL 4 TIMES DAILY
Qty: 40 TABLET | Refills: 0 | Status: SHIPPED | OUTPATIENT
Start: 2022-12-05 | End: 2022-12-15

## 2022-12-05 RX ADMIN — METHOCARBAMOL 750 MG: 750 TABLET ORAL at 11:01

## 2022-12-05 RX ADMIN — AMLODIPINE BESYLATE 10 MG: 10 TABLET ORAL at 11:01

## 2022-12-05 RX ADMIN — FERROUS SULFATE TAB 325 MG (65 MG ELEMENTAL FE) 325 MG: 325 (65 FE) TAB at 11:02

## 2022-12-05 RX ADMIN — ACETAMINOPHEN 650 MG: 325 TABLET, FILM COATED ORAL at 05:13

## 2022-12-05 RX ADMIN — ASPIRIN 81 MG: 81 TABLET, COATED ORAL at 11:01

## 2022-12-05 RX ADMIN — PANTOPRAZOLE SODIUM 40 MG: 40 TABLET, DELAYED RELEASE ORAL at 05:13

## 2022-12-05 RX ADMIN — BACLOFEN 5 MG: 10 TABLET ORAL at 05:13

## 2022-12-05 RX ADMIN — HYDROCODONE BITARTRATE AND ACETAMINOPHEN 1 TABLET: 5; 325 TABLET ORAL at 11:02

## 2022-12-05 ASSESSMENT — PAIN SCALES - GENERAL: PAINLEVEL_OUTOF10: 6

## 2022-12-05 ASSESSMENT — PAIN - FUNCTIONAL ASSESSMENT: PAIN_FUNCTIONAL_ASSESSMENT: PREVENTS OR INTERFERES SOME ACTIVE ACTIVITIES AND ADLS

## 2022-12-05 ASSESSMENT — PAIN DESCRIPTION - ORIENTATION: ORIENTATION: LEFT

## 2022-12-05 ASSESSMENT — PAIN DESCRIPTION - LOCATION: LOCATION: LEG;HIP

## 2022-12-05 ASSESSMENT — PAIN DESCRIPTION - DESCRIPTORS: DESCRIPTORS: PATIENT UNABLE TO DESCRIBE

## 2022-12-05 NOTE — PROGRESS NOTES
Nurse to nurse called to [address],  Erendira Canales. All questions / concerns answered.

## 2022-12-05 NOTE — CARE COORDINATION
12/5/2022 social work transition of care planning  Pt plan is to [facility], once medically stable. Sw will complete pas and envelope. Electronically signed by GLORIA Bedoya on 12/5/2022 at 11:06 AM    Addendum; Sw set up physician's ambulance for 1 pm to [facility]. Sw notified pt's son, facility liaison, and charge nurse.   Electronically signed by GLORIA Bedoya on 12/5/2022 at 11:38 AM

## 2022-12-05 NOTE — PROGRESS NOTES
Occupational Therapy  OT BEDSIDE TREATMENT NOTE   [address] [facility] [address] [facility]  [address] [facility], [address] [facility] [address]      VUTT:  Patient Name: Amena Victoria  MRN: [id number]  : 1927  Room: [address] [place] [address]     Evaluating OT: Sania Rowan, [address] OTR/L; [id number]       Referring Provider: Roma Cavazos DO    Specific Provider Orders/Date: OT Eval and Treat 22       Diagnosis: Closed fracture of left hip, initial encounter  Closed hip fracture, left, initial encounter     Surgery: 22 Left hip open reduction internal fixation       Pertinent Medical History:  has a past medical history of Arthritis, Asthma, 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 ([address] Bronchitis, Bruising tendency, Buttock wound, left, initial encounter, CAD (coronary artery disease), Cough, COVID, Dermatophytosis, Diabetes mellitus ([address] GERD (gastroesophageal reflux disease), History of GI bleed, History of pulmonary embolus (PE), Hx of blood clots, Hyperlipidemia, Hypertension, Leg swelling, Lung disease, Peripheral vascular angioplasty status, Pseudoaneurysm of right femoral artery ([address] PVD (peripheral vascular disease) with claudication (HCC), Restless legs syndrome, Rhinitis, allergic, Sinusitis, SOB (shortness of breath), Type 1 diabetes mellitus with left diabetic foot ulcer ([address] Ulcerated, foot, left, limited to breakdown of skin ([address] Urinary incontinence, Wound of left buttock, and Wound of right buttock.        Reason for Admission: left hip and knee pain post fall d/t left knee giving out while ambulating with walker     Recommended Adaptive Equipment:  TBD pending progression/discharge plan      Precautions:  Fall Risk, Cognition, Bed Alarm, Contact Isolation, O2, [place], WBAT LLE      Assessment of current deficits:    [x] Functional mobility            [x]ADLs           [x] Strength                  [x]Cognition    [x] Functional transfers          [x] IADLs         [x] Safety Awareness   [x]Endurance    [x] Fine Coordination                         [x] Balance      [] Vision/perception   []Sensation      []Gross Motor Coordination             [] ROM           [] Delirium                   [] Motor Control      OT PLAN OF CARE   OT POC based on physician orders, patient diagnosis and results of clinical assessment     Frequency/Duration: 3-5 days/wk for 2 weeks PRN   Specific OT Treatment Interventions to include:   * Instruction/training on adapted ADL techniques and AE recommendations to increase functional independence within precautions       * Training on energy conservation strategies, correct breathing pattern and techniques to improve independence/tolerance for self-care routine  * Functional transfer/mobility training/DME recommendations for increased independence, safety, and fall prevention  * Patient/Family education to increase follow through with safety techniques and functional independence  * Recommendation of environmental modifications for increased safety with functional transfers/mobility and ADLs  * Cognitive retraining/development of therapeutic activities to improve problem solving, judgement, memory, and attention for increased safety/participation in ADL/IADL tasks  * Therapeutic exercise to improve motor endurance, ROM, and functional strength for ADLs/functional transfers  * Therapeutic activities to facilitate/challenge dynamic balance, stand tolerance for increased safety and independence with ADLs     Home Living: Pt poor historian d/t cognition. Per chart pt admitted from [facility]. Bathroom setup: unknown  Equipment owned: rollator      Prior Level of Function: assist from staff  with ADLs and IADLs unknown   ambulated with rollator prior     Pain Level: no pain at rest, increased LLE pain with movement  Cognition: A&O: 1/4 - oriented name only.  Does not follow 1 step directions              Memory: to task           Vitals    SpO2 on 2L NC: 94%     HR: 80s             BUE  ROM/Strength/  Fine motor Coordination Hand dominance: Right     RUE: ROM WFL     Strength: 3+/5 proximally      Strength: FAIR     Coordination:  FAIR     LUE: ROM WFL     Strength: 3+/5 proximally      Strength: FAIR     Coordination:  FAIR   increase BUE muscle strength for improved indep with functional transfers       Comments: Upon arrival pt supine in bed. Pt educated on techniques to increase independence and safety during ADL's and bed mobility. At end of session pt left seated upright in bed, call light within reach. Pt has made limited progress towards set goals.      Continue with current plan of care    Treatment Time In: 8:50            Treatment Time Out: 9:13             Treatment Charges: Mins Units   Ther Ex  [id number]     Manual Therapy [id number]     Thera Activities [id number] 23 1   ADL/Home Mgt [id number]     Neuro Re-ed [id number]     Group Therapy      Orthotic manage/training  [id number]     Non-Billable Time     Total Timed Treatment 23 [address]

## 2022-12-05 NOTE — DISCHARGE SUMMARY
[facility]   Discharge summary   Patient ID:  Safia Ames  [id number]  19 y.o.  9/20/1927    Admit date: 12/1/2022    Discharge date and time: 12/5/2022    Admission Diagnoses:   Patient Active Problem List   Diagnosis    Asthma    CAD (coronary artery disease)    Vitamin D deficiency    HTN (hypertension)    Idiopathic peripheral neuropathy    Rhinitis, allergic    GERD (gastroesophageal reflux disease)    PVD (peripheral vascular disease) with claudication (HCC)    Gait instability    Hyponatremia    DM (diabetes mellitus), type 2 ([facility] [address]    History of pulmonary embolus (PE)    Peripheral vascular angioplasty status    Non-proliferative diabetic retinopathy, mild, both eyes (HCC)    Leg swelling    Compression fracture of L5 lumbar vertebra, closed, initial encounter ([facility] [address]    HLD (hyperlipidemia)    Compression fracture of thoracic vertebra (HCC)    Acute anemia    Spinal stenosis    Wound of right buttock    Buttock wound, left, initial encounter    History of GI bleed    Wound of left buttock    Closed fracture of left hip [facility])       Discharge Diagnoses: Closed fracture of left hip    Consults: orthopedic surgery    Procedures: Left hip ORIF     Hospital Course:  The patient is a 80 y.o. female of [facility],         Patient is a 49-year-old female with a past medical history of arthritis, asthma, buttock wound, CAD, diabetes mellitus, GERD, hyperlipidemia, hypertension, PVD, and restless leg syndrome, presents to the ED for  Lt Hip Fracture  -Monitor labs  -PRN medication  -IVF NS @ 100  -Orthopedic surgery following  -Plan for surgery with Dr. Thalia Garcia on 12/02/2022  -NPO effective at midnight  -Hold anticoagulation meds  -PT/OT following surgery for therapy needs     Hypertension  -Continue home medications      12/2/22:  -For OR today for left ORIF-tolerated well  -Somnolent on evaluation today post OR  -Monitor h/h following surgery-check a.m.'s  -PT/OT for discharge needs 12/3/22:  Pain to be expected postop  Family at bedside  No acute events or issues  H&H 7.5/22. 5-transfuse if drops below 7/ overnight     12/4/22  -H/H 6.8/20.0 > 1 unit of RBC ordered, recheck h/h after blood transfusion   -ASA 81 mg BID being given, may need to hold if patient continues to bleed  -PT/OT     12/5/2022  Patient's hemoglobin remained stable at 9.7. Patient is medically cleared for discharge to facility with medications listed below. Patient is to have a CBC and BMP in 3 days to monitor renal function and H&H. Patient is to follow-up with orthopedic surgery within 3 weeks of discharge. Recent Labs     12/03/22  0805 12/04/22 0619 12/05/22  0050   WBC 6.7 6.1  --    HGB 7.5* 6.8* 9.7*   HCT 22.5* 20.0* 28.6*    232  --        Recent Labs     12/03/22  0805 12/04/22  0618    130*   K 4.2 4.1    103   CO2 19* 19*   BUN 29* 32*   CREATININE 1.1* 1.0   CALCIUM 8.6 8.6       XR HIP LEFT (2-3 VIEWS)    Result Date: 12/2/2022  EXAMINATION: TWO XRAY VIEWS OF THE LEFT HIP 12/2/2022 2:29 pm COMPARISON: Hip series from December 1, 2022 HISTORY: ORDERING SYSTEM PROVIDED HISTORY: post op for pacu TECHNOLOGIST PROVIDED HISTORY: Reason for exam:->post op for pacu What reading provider will be dictating this exam?->CRC FINDINGS: There are postsurgical changes related to left hip ORIF. Overall alignment appears near anatomic. Osseous mineralization is decreased. Atherosclerotic disease. Interval postsurgical changes to the left hip. No complicating features. XR FEMUR LEFT (MIN 2 VIEWS)    Result Date: 12/1/2022  EXAMINATION: XRAY VIEWS OF THE LEFT FEMUR 12/1/2022 10:49 am COMPARISON: None. HISTORY: ORDERING SYSTEM PROVIDED HISTORY: fall TECHNOLOGIST PROVIDED HISTORY: Reason for exam:->fall What reading provider will be dictating this exam?->CRC FINDINGS: Two views of the left femur were obtained. There is no appreciable fracture of the distal left femur.   There is an cisterns and sulci are prominent consistent with atrophy. There is decreased attenuation within the periventricular white matter consistent with periventricular leukomalacia. ORBITS: The visualized portion of the orbits demonstrate no acute abnormality. SINUSES: The visualized paranasal sinuses and mastoid air cells demonstrate no acute abnormality. SOFT TISSUES/SKULL:  No acute abnormality of the visualized skull or soft tissues. 1.  There is no acute intracranial abnormality. Specifically, there is no intracranial hemorrhage. 2. Atrophy and periventricular leukomalacia, . CT CERVICAL SPINE WO CONTRAST    Result Date: 12/1/2022  EXAMINATION: CT OF THE CERVICAL SPINE WITHOUT CONTRAST 12/1/2022 11:20 am TECHNIQUE: CT of the cervical spine was performed without the administration of intravenous contrast. Multiplanar reformatted images are provided for review. Automated exposure control, iterative reconstruction, and/or weight based adjustment of the mA/kV was utilized to reduce the radiation dose to as low as reasonably achievable. COMPARISON: None. HISTORY: ORDERING SYSTEM PROVIDED HISTORY: trauma TECHNOLOGIST PROVIDED HISTORY: Reason for exam:->trauma Decision Support Exception - unselect if not a suspected or confirmed emergency medical condition->Emergency Medical Condition (MA) What reading provider will be dictating this exam?->CRC FINDINGS: The ring of C1 is intact as is the dense. There is no compression fracture of the cervical spine. No jumped or perched facet is noted. Multilevel degenerative disc and degenerative joint disease is noted. The prevertebral soft tissues are unremarkable. The airway is widely patent. Images through the lung apices are negative for a pneumothorax. 1. There is no acute compression fracture or subluxation of the cervical spine. 2. Multilevel degenerative disc and degenerative joint disease.  .     XR CHEST 1 VIEW    Result Date: 12/1/2022  EXAMINATION: ONE XRAY VIEW OF THE CHEST 12/1/2022 10:49 am COMPARISON: None. HISTORY: ORDERING SYSTEM PROVIDED HISTORY: fall TECHNOLOGIST PROVIDED HISTORY: Reason for exam:->fall What reading provider will be dictating this exam?->CRC FINDINGS: The lungs are without acute focal process. There is no effusion or pneumothorax. The cardiomediastinal silhouette is without acute process. The osseous structures are without acute process. Postoperative sternotomy changes are noted. No acute process. FLUORO FOR SURGICAL PROCEDURES    Result Date: 12/2/2022  EXAMINATION: SPOT FLUOROSCOPIC IMAGES 12/2/2022 3:14 pm TECHNIQUE: Fluoroscopy was provided by the radiology department for procedure. Radiologist was not present during examination. FLUOROSCOPY DOSE AND TYPE OR TIME AND EXPOSURES: 7 images FLUOROSCOPY TIME: 67.3 seconds COMPARISON: Hip series from December 1, 2022 HISTORY: ORDERING SYSTEM PROVIDED HISTORY: ORIF lt.hip TECHNOLOGIST PROVIDED HISTORY: Reason for exam:->ORIF lt.hip What reading provider will be dictating this exam?->CRC Intraprocedural imaging. FINDINGS: 7 spot images of the hip were obtained. Fluoroscopic support provided to subspecialty service for ORIF of the left hip. No obvious complication on the images provided. Please see subspecialty report for full details and interpretation of real time imaging. Intraprocedural fluoroscopic spot images as above. See separate procedure report for more information. XR HIP 2-3 VW W PELVIS LEFT    Result Date: 12/1/2022  EXAMINATION: ONE XRAY VIEW OF THE PELVIS AND TWO XRAY VIEWS LEFT HIP 12/1/2022 10:42 am COMPARISON: None. HISTORY: ORDERING SYSTEM PROVIDED HISTORY: fall TECHNOLOGIST PROVIDED HISTORY: Reason for exam:->fall What reading provider will be dictating this exam?->CRC FINDINGS: Evaluation of the pelvis and left hip are limited due to the patient's body habitus and large pannus overlying the left hip. There is an intratrochanteric fracture of the left hip. Degenerative changes of the right and left hips. The pubic rami are intact. 1. Limited imaging of the left hip due to the patient's body habitus enlarged pannus overlying the left hip 2. Intratrochanteric fracture of the left hip. Discharge Exam:    HEENT: NCAT,  PERRLA, No JVD  Heart:  RRR, no murmurs, gallops, or rubs. Lungs:  CTA bilaterally, no wheeze, rales or rhonchi  Abd: bowel sounds present, nontender, nondistended, no masses  Extrem:  No clubbing, cyanosis, or edema, limited rom in left lower ext. Disposition: [facility]     Patient Condition at Discharge: Stable    Patient Instructions:      Medication List        START taking these medications      methocarbamol 750 MG tablet  Commonly known as: ROBAXIN  Take 1 tablet by mouth 4 times daily for 10 days     oxyCODONE-acetaminophen 5-325 MG per tablet  Commonly known as: Percocet  Take 1 tablet by mouth every 6 hours as needed for Pain for up to 7 days. Intended supply: 7 days. Take lowest dose possible to manage pain            CHANGE how you take these medications      aspirin 81 MG EC tablet  Take 1 tablet by mouth 2 times daily  What changed: when to take this            CONTINUE taking these medications      acetaminophen 325 MG tablet  Commonly known as: TYLENOL     aluminum & magnesium hydroxide-simethicone 400-400-40 MG/5ML Susp  Commonly known as: MYLANTA     amLODIPine 10 MG tablet  Commonly known as: NORVASC  Take 1 tablet by mouth in the morning.      Baclofen 5 MG tablet  Commonly known as: LIORESAL     docusate sodium 100 MG capsule  Commonly known as: COLACE     ferrous sulfate 325 (65 Fe) MG tablet  Commonly known as: IRON 325  Take 1 tablet by mouth daily     gabapentin 300 MG capsule  Commonly known as: NEURONTIN     ibuprofen 400 MG tablet  Commonly known as: ADVIL;MOTRIN     ipratropium-albuterol 0.5-2.5 (3) MG/3ML Soln nebulizer solution  Commonly known as: DUONEB     lidocaine 4 % external patch     magnesium hydroxide 400 MG/5ML suspension  Commonly known as: MILK OF MAGNESIA     melatonin 5 MG Tabs tablet     pantoprazole 40 MG tablet  Commonly known as: PROTONIX     polyethylene glycol 17 g packet  Commonly known as: GLYCOLAX     polyvinyl alcohol 1.4 % ophthalmic solution  Commonly known as: LIQUIFILM TEARS     senna 8.6 MG tablet  Commonly known as: SENOKOT            STOP taking these medications      apixaban 5 MG Tabs tablet  Commonly known as: ELIQUIS     b complex vitamins capsule     bisacodyl 10 MG suppository  Commonly known as: DULCOLAX     dexamethasone 2 MG tablet  Commonly known as: DECADRON     diclofenac sodium 1 % Gel  Commonly known as: VOLTAREN     famotidine 10 MG tablet  Commonly known as: PEPCID     fluticasone-vilanterol 100-25 MCG/INH Aepb inhaler  Commonly known as: Breo Ellipta     JUAN MIGUEL ROOT PO     HYDROcodone-acetaminophen 5-325 MG per tablet  Commonly known as: NORCO     losartan 25 MG tablet  Commonly known as: COZAAR     magnesium gluconate 500 MG tablet  Commonly known as: MAGONATE     metFORMIN 500 MG tablet  Commonly known as: GLUCOPHAGE     pravastatin 20 MG tablet  Commonly known as: PRAVACHOL     PROVENTIL 90 MCG/ACT inhaler  Generic drug: albuterol     torsemide 10 MG tablet  Commonly known as: DEMADEX               Where to Get Your Medications        You can get these medications from any pharmacy    Bring a paper prescription for each of these medications  aspirin 81 MG EC tablet  methocarbamol 750 MG tablet  oxyCODONE-acetaminophen 5-325 MG per tablet       Activity: activity as tolerated  Diet: cardiac diet    Pt has been advised to: Follow-up with Ishan Ponce DO in 1 week.   Follow-up with consultants as recommended by them    Note that over 30 minutes was spent in preparing discharge papers, discussing discharge with patient, medication review, etc.    Signed:  VIK Jacob CNP  12/5/2022  11:18 AM     Above note edited to reflect my thoughts     I personally saw, examined and provided care for the patient. Radiographs, labs and medication list were reviewed by me independently. The case was discussed in detail and plans for care were established. Review of Sheryl FERNANDEZ CNP, documentation was conducted and revisions were made as appropriate directly by me. I agree with the above documented exam, problem list, and plan of care.      Baby Seip, MD  12/5/2022

## 2022-12-05 NOTE — PROGRESS NOTES
Physical Therapy  Physical Therapy   Name: Milagro Reyes  : 1927  MRN: [id number]      Date of Service: 2022    Evaluating PT:  Dylan Shelton PT, DPT [id number]    Room #:  0529/8178-R  Diagnosis:  Closed fracture of left hip, initial encounter [facility]) [S72.002A]  Closed hip fracture, left, initial encounter ([facility] [S72.002A]  PMHx/PSHx:    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 ([address] 2021    Bronchitis 2017    Bruising tendency     Buttock wound, left, initial encounter 10/3/2022    CAD (coronary artery disease)     Cough 2017    COVID     Dermatophytosis 2021    Diabetes mellitus ([address]     GERD (gastroesophageal reflux disease) 2017    History of GI bleed 10/3/2022    History of pulmonary embolus (PE) 2021    Hx of blood clots     Hyperlipidemia     Hypertension     Leg swelling 7/15/2021    Lung disease     Peripheral vascular angioplasty status 2021    Pseudoaneurysm of right femoral artery ([address] 2021    PVD (peripheral vascular disease) with claudication ([address] 4/10/2019    Restless legs syndrome     Rhinitis, allergic 2017    Sinusitis 2017    SOB (shortness of breath) 2017    Type 1 diabetes mellitus with left diabetic foot ulcer ([address] 2021    Ulcerated, foot, left, limited to breakdown of skin ([address] 2021    Urinary incontinence     Wound of left buttock 10/10/2022    Wound of right buttock 10/3/2022    Fat layer     Procedure/Surgery:   L hip ORIF  Precautions:  Falls, WBAT LLE, , cognition, O2, [place]  Equipment Needs:  TBD    SUBJECTIVE:    Pt was admitted from skilled nursing. Pt ambulated with rollator PTA. OBJECTIVE:   Initial Evaluation  Date: 12/3/22 Treatment 22 Short Term/ Long Term   Goals   AM-PAC 6 Clicks     Was pt agreeable to Eval/treatment? Yes yes    Does pt have pain?  No c/o pain at rest; LLE pain with mobility - unable to rate No c/o    Bed Mobility  Rolling: NT  Supine to sit: Dep with HOB elevated  Sit to supine: Dep x 2  Scooting: Dep Rolling N/T  Supine to sit Dep  Sit to supine Dep  Scooting Dep Aydee   Transfers Sit to stand: NT  Stand to sit: NT  Stand pivot: NT N/T Aydee with [employer]   Ambulation   NT N/T >25 feet with Aydee with [employer]   Stair negotiation: ascended and descended NT N/T    ROM BUE:  WFL  BLE:  Unable to assess LLE due to pain     Strength BUE:  WFL  RLE:  3+/5; LLE NT due to pain  Increase by 1/3 MMT grade   Balance Sitting EOB:  Dep  Dynamic Standing:  NT Sitting EOB Dep Sitting EOB:  Independent  Dynamic Standing:  Aydee with [employer]     Pt is A & O x 1 self  Sensation:  No reported parsthesias  Edema:  None    Therapeutic Exercises:  NA    Patient education  Pt educated on safety    Patient response to education:   Pt verbalized understanding Pt demonstrated skill Pt requires further education in this area   partial partial yes     ASSESSMENT:    Conditions Requiring Skilled Therapeutic Intervention:    [x]Decreased strength     [x]Decreased ROM  [x]Decreased functional mobility  [x]Decreased balance   [x]Decreased endurance   [x]Decreased posture  []Decreased sensation  []Decreased coordination   []Decreased vision  [x]Decreased safety awareness   [x]Increased pain       Comments:  Pt was in bed upon arrival, agreeable to tx session. Pt with eyes closed and minimal engagement throughout. Pt did not assist for bed mobility and pt pushed posterior when sitting EOB even with cues and to correct requiring Dep assist. Pt unable to follow directions and confused throughout. Pt returned to bed and skillfully repositioned. Pt given call light and bed alarm applied.    Treatment:  Patient practiced and was instructed in the following treatment:    Bed mobility training - pt given verbal and tactile cues to facilitate proper sequencing and safety during supine>sit as well as provided with physical assistance. Sitting EOB for >10 minutes for upright tolerance, postural awareness and BLE ROM  Skilled positioning - Pt placed in the chair position with pillows utilized to facilitate upright posture, joint and skin integrity, and interaction with environment. Pt's/ family goals   1. Improve mobility    Prognosis is fair for reaching above PT goals. Patient and or family understand(s) diagnosis, prognosis, and plan of care. yes    PHYSICAL THERAPY PLAN OF CARE:    PT POC is established based on physician order and patient diagnosis     Referring provider/PT Order:  Александр Valiente, DO /12/01/22 2145 PT eval and treat  Diagnosis:  Closed fracture of left hip, initial encounter ([facility] [address] [S72.002A]  Closed hip fracture, left, initial encounter ([facility] [address] [S72.002A]  Specific instructions for next treatment:  Progress activity    Current Treatment Recommendations:     [x] Strengthening to improve independence with functional mobility   [x] ROM to improve independence with functional mobility   [x] Balance Training to improve static/dynamic balance and to reduce fall risk  [x] Endurance Training to improve activity tolerance during functional mobility   [x] Transfer Training to improve safety and independence with all functional transfers   [x] Gait Training to improve gait mechanics, endurance and asses need for appropriate assistive device  [] Stair Training in preparation for safe discharge home and/or into the community   [x] Positioning to prevent skin breakdown and contractures  [x] Safety and Education Training   [x] Patient/Caregiver Education   [] HEP  [] Other     PT long term treatment goals are located in above grid    Frequency of treatments: 2-5x/week x 1-2 weeks.     Time in  8:50  Time out  9:13    Total Treatment Time  23 minutes     Evaluation Time includes thorough review of current medical information, gathering information on past medical history/social history and prior level of function, completion of standardized testing/informal observation of tasks, assessment of data and education on plan of care and goals.     CPT codes:  [] Low Complexity PT evaluation 89728  [] Moderate Complexity PT evaluation 19539  [] High Complexity PT evaluation 19846  [] PT Re-evaluation 40488  [] Gait training 94763 - minutes  [] Manual therapy 32948 - minutes  [x] Therapeutic activities 82204 23 minutes  [] Therapeutic exercises 10032 - minutes  [] Neuromuscular reeducation 71807 - minutes     Manny Blanco [id number]

## 2022-12-05 NOTE — DISCHARGE INSTR - COC
Continuity of Care Form    Patient Name: Elio Fontaine   :  1927  MRN:  [id number]    Admit date:  2022  Discharge date:  ***    Code Status Order: DNR-CCA   Advance Directives:     Admitting Physician:  Amado Lopez MD  PCP: Ishan Ponce DO    Discharging Nurse: [facility] Unit/Room#: 3553/9338-M  Discharging Unit Phone Number: ***    Emergency Contact:   Extended Emergency Contact Information  Primary Emergency Contact: [facility] Phone: [phone]  Mobile Phone: [phone]  Relation: Child   needed? No  Secondary Emergency Contact: Acworth,Arian  Address: [address] [place] [address], [address] [place] [address] Phone: [phone]  Relation: Child    Past Surgical History:  Past Surgical History:   Procedure Laterality Date    ABDOMEN SURGERY      APPENDECTOMY      CARDIAC SURGERY      CHOLECYSTECTOMY      COLONOSCOPY      CORONARY ARTERY BYPASS GRAFT      8/28/10    ENDOSCOPY, COLON, DIAGNOSTIC      FOOT DEBRIDEMENT Left 2021    FOOT DEBRIDEMENT INCISION AND DRAINAGE.    performed by Bertrand Goldmann, DPM at [address] [place] [address] Left 2021    LEFT FOOT DEBRIDEMENT  WITH DELAYED PRIMARY CLOSURE performed by Fidel Brooks DPM at [facility] Left 2022    LEFT HIP OPEN REDUCTION INTERNAL FIXATION performed by Renetta Doshi MD at [facility] ([address] [place] [address])      [facility] and bso     TONSILLECTOMY AND ADENOIDECTOMY      UPPER GASTROINTESTINAL ENDOSCOPY N/A 2022    EGD ESOPHAGOGASTRODUODENOSCOPY performed by Ynes Handy MD at [address] [place] [address] History:   Immunization History   Administered Date(s) Administered    COVID-19, PFIZER PURPLE top, DILUTE for use, (age 15 y+), 30mcg/0.3mL 2021, 10/07/2021    Influenza, High Dose (Fluzone 65 yrs and older) 2015, 2016, 10/24/2017, 10/03/2018    Pneumococcal Conjugate 13-valent (Azell Cancel) 11/17/2015    Pneumococcal Polysaccharide ([id number]) 12/02/2014    Tdap (Boostrix, Adacel) 07/05/2019       Active Problems:  Patient Active Problem List   Diagnosis Code    Asthma J45.909    CAD (coronary artery disease) I25.10    Vitamin D deficiency E55.9    HTN (hypertension) I10    Idiopathic peripheral neuropathy G60.9    Rhinitis, allergic J30.9    GERD (gastroesophageal reflux disease) K21.9    PVD (peripheral vascular disease) with claudication ([facility]) I73.9    Gait instability R26.81    Hyponatremia E87.1    DM (diabetes mellitus), type 2 ([facility]) E11.9    History of pulmonary embolus (PE) Z86.711    Peripheral vascular angioplasty status Z98.62    Non-proliferative diabetic retinopathy, mild, both eyes ([facility]) V51.9338    Leg swelling M79.89    Compression fracture of L5 lumbar vertebra, closed, initial encounter ([facility] S32.050A    HLD (hyperlipidemia) E78.5    Compression fracture of thoracic vertebra ([facility]) S22.000A    Acute anemia D64.9    Spinal stenosis M48.00    Wound of right buttock S31.819A    Buttock wound, left, initial encounter O82.675B    History of GI bleed Z87.19    Wound of left buttock S31.829A    Closed fracture of left hip ([facility]) S72.002A       Isolation/Infection:   Isolation            Contact  Contact          Patient Infection Status       Infection Onset Added Last Indicated Last Indicated By Review Planned Expiration Resolved Resolved By    MRSA 10/03/22 10/06/22 10/03/22 Culture, Wound        Buttock 10/3/22    Resolved    COVID-19 (Rule Out) 07/17/22 07/17/22 07/17/22 COVID-19, Rapid (Ordered)   07/17/22 Rule-Out Test Resulted            Nurse Assessment:  Last Vital Signs: BP (!) 163/64   Pulse 76   Temp 98.4 °F (36.9 °C) (Temporal)   Resp 14   Ht 5' 4\" (1.626 m)   Wt 132 lb 8 oz (60.1 kg)   LMP 12/03/1997   SpO2 95%   BMI 22.74 kg/m²     Last documented pain score (0-10 scale): Pain Level:  (rubbing leg and grimacing)  Last Weight:   Wt Readings from Last 1 Encounters: 22 132 lb 8 oz (60.1 kg)     Mental Status:  {IP PT MENTAL STATUS:99212}    IV Access:  { KRISTEN IV ACCESS:750814837}    Nursing Mobility/ADLs:  Walking   {CHP DME GFUM:063231973}  Transfer  {CHP DME BRZ}  Bathing  {CHP DME DEUH:038459254}  Dressing  {CHP DME QKIE:409752336}  Toileting  {CHP DME PERN:726166842}  Feeding  {CHP DME VLNS:299559514}  Med Admin  {CHP DME LEDD:674269847}  Med Delivery   { KRISTEN MED Delivery:879653187}    Wound Care Documentation and Therapy:  Wound 22 Buttocks Right (Active)   Wound Image   22 0940   Wound Etiology Pressure Stage 2 22   Dressing Status Other (Comment) 22 153   Wound Cleansed Cleansed with saline 22   Dressing/Treatment Open to air 22 1530   Wound Length (cm) 2.6 cm 22 0940   Wound Width (cm) 1.6 cm 22 0940   Wound Depth (cm) 0.1 cm 2240   Wound Surface Area (cm^2) 4.16 cm^2 22 0940   Change in Wound Size % (l*w) -38.67 22 0940   Wound Volume (cm^3) 0.416 cm^3 22 0940   Wound Healing % -39 2240   Wound Assessment Pink/red 22 0940   Drainage Amount None 22 0940   Number of days: 3       Wound 22 Elbow Anterior; Left (Active)   Wound Image   22 0940   Wound Etiology Skin Tear 22   Dressing Status Intact 22   Wound Cleansed Cleansed with saline 22   Dressing/Treatment Non adherent 22   Wound Length (cm) 3.5 cm 22   Wound Width (cm) 2 cm 22   Wound Depth (cm) 0.1 cm 22   Wound Surface Area (cm^2) 7 cm^2 22   Wound Volume (cm^3) 0.7 cm^3 22   Drainage Amount None 22   Ira-wound Assessment Ecchymosis 22   Number of days: 3       Incision 22 Hip Left;Lateral (Active)   Dressing Status Clean;Dry; Intact 22   Dressing/Treatment Alginate with Ag; Other (comment) 22   Closure Sutures 22 Drainage Amount Small 22 0439   Number of days: 2        Elimination:  Continence: Bowel: {YES / PY:49654}  Bladder: {YES / IV:61477}  Urinary Catheter: {Urinary Catheter:078714452}   Colostomy/Ileostomy/Ileal Conduit: {YES / LN:03238}       Date of Last BM: ***    Intake/Output Summary (Last 24 hours) at 2022 1104  Last data filed at 2022 2311  Gross per 24 hour   Intake 0 ml   Output --   Net 0 ml     No intake/output data recorded.     Safety Concerns:     [employer] Safety Concerns:176837427}    Impairments/Disabilities:      [employer] Impairments/Disabilities:485467180}    Nutrition Therapy:  Current Nutrition Therapy:   [employer] Diet List:709079749}    Routes of Feeding: {CHP DME Other Feedings:051800729}  Liquids: {Slp liquid thickness:23235}  Daily Fluid Restriction: {CHP DME Yes amt example:604780320}  Last Modified Barium Swallow with Video (Video Swallowing Test): {Done Not Done RAUB:607356212}    Treatments at the Time of Hospital Discharge:   Respiratory Treatments: ***  Oxygen Therapy:  {Therapy; copd oxygen:64968}  Ventilator:    { CC Vent LDFE:637816142}    Rehab Therapies: {THERAPEUTIC INTERVENTION:7163809526}  Weight Bearing Status/Restrictions: [employer]  Weight Bearin}  Other Medical Equipment (for information only, NOT a DME order):  {EQUIPMENT:283015374}  Other Treatments: ***    Patient's personal belongings (please select all that are sent with patient):  {CHP DME Belongings:468255832}    RN SIGNATURE:  {Esignature:224362270}    CASE MANAGEMENT/SOCIAL WORK SECTION    Inpatient Status Date: ***    Readmission Risk Assessment Score:  Readmission Risk              Risk of Unplanned Readmission:  27           Discharging to Facility/ Agency   Name: [facility]  Address:  Phone:  Fax:    Dialysis Facility (if applicable)   Name:  Address:  Dialysis Schedule:  Phone:  Fax:    / signature: Electronically signed by GLORIA Bazan on 22 at 11:04 AM EST    PHYSICIAN SECTION    Prognosis: Good    Condition at Discharge: Stable    Rehab Potential (if transferring to Rehab): Good    Recommended Labs or Other Treatments After Discharge: CBC and BMP in 3 days    Physician Certification: I certify the above information and transfer of Dorian Pérez  is necessary for the continuing treatment of the diagnosis listed and that she requires [place] for less 30 days.      Update Admission H&P: No change in H&P    PHYSICIAN SIGNATURE:  Electronically signed by VIK Fox CNP on 12/5/22 at 11:14 AM EST

## 2022-12-08 LAB
BLOOD BANK DISPENSE STATUS: NORMAL
BLOOD BANK PRODUCT CODE: NORMAL
BPU ID: NORMAL
DESCRIPTION BLOOD BANK: NORMAL

## 2022-12-13 DIAGNOSIS — R52 PAIN: Primary | ICD-10-CM

## 2022-12-13 NOTE — PROGRESS NOTES
Physician Progress Note      Alek Muller  CSN #:                  [id number]  :                       1927  ADMIT DATE:       2022 7:58 AM  DISCH DATE:        2022 1:34 PM  RESPONDING  PROVIDER #:        Shell OLIVEROS          QUERY TEXT:    Pt s/p ORIF. Pt noted to have H&H 6.8/20 given 1 unit RBC. If possible, please   document in the progress notes and discharge summary if you are evaluating   and/or treating any of the following: The medical record reflects the following:  Risk Factors: fracture, ORIF  Clinical Indicators: Per DC summary: \"H&H 7.5/22. 5-transfuse if drops below 7/   overnight\",  \"-H/H 6.8/20.0 > 1 unit of RBC ordered, recheck h/h after   blood transfusion\". Treatment: RBC transfusion, labs    Thank you,  Peggy Jeronimo, RN, BSN, CCDS, Clinical Documentation Improvement  Options provided:  -- Acute blood loss anemia  -- Acute on chronic blood loss anemia  -- Postoperative acute blood loss anemia  -- Precipitous drop in Hemoglobin and Hematocrit  -- Other - I will add my own diagnosis  -- Disagree - Not applicable / Not valid  -- Disagree - Clinically unable to determine / Unknown  -- Refer to Clinical Documentation Reviewer    PROVIDER RESPONSE TEXT:    This patient has acute blood loss anemia.     Query created by: James Elizabeth on 2022 12:30 PM      Electronically signed by:  Shell OLIVEROS 2022 10:12 AM

## 2022-12-16 ENCOUNTER — OFFICE VISIT (OUTPATIENT)
Dept: ORTHOPEDIC SURGERY | Age: 87
End: 2022-12-16
Payer: MEDICARE

## 2022-12-16 ENCOUNTER — HOSPITAL ENCOUNTER (OUTPATIENT)
Dept: GENERAL RADIOLOGY | Age: 87
End: 2022-12-16
Payer: MEDICARE

## 2022-12-16 VITALS — HEIGHT: 64 IN | BODY MASS INDEX: 22.2 KG/M2 | WEIGHT: 130 LBS

## 2022-12-16 DIAGNOSIS — R52 PAIN: ICD-10-CM

## 2022-12-16 DIAGNOSIS — Z87.81 S/P LEFT HIP FRACTURE: Primary | ICD-10-CM

## 2022-12-16 PROCEDURE — 73502 X-RAY EXAM HIP UNI 2-3 VIEWS: CPT

## 2022-12-16 PROCEDURE — 99212 OFFICE O/P EST SF 10 MIN: CPT | Performed by: ORTHOPAEDIC SURGERY

## 2022-12-16 PROCEDURE — 99024 POSTOP FOLLOW-UP VISIT: CPT | Performed by: ORTHOPAEDIC SURGERY

## 2022-12-16 RX ORDER — BISACODYL 10 MG
10 SUPPOSITORY, RECTAL RECTAL DAILY
COMMUNITY

## 2022-12-16 RX ORDER — OXYCODONE HYDROCHLORIDE AND ACETAMINOPHEN 5; 325 MG/1; MG/1
1 TABLET ORAL EVERY 4 HOURS PRN
COMMUNITY

## 2022-12-16 NOTE — PATIENT INSTRUCTIONS
Weightbearing as tolerated left lower extremity  Physical therapy to continue range of motion strengthening exercises  Steri-Strips were placed these will fall off by themselves patient may begin taking showers but please do not soak in a bath  Continue antibiotics for 1 more week  Will see in 2 weeks to check on the incision  Dressing over Steri-Strips does not need to be replaced once it falls off for after 2 days.

## 2022-12-16 NOTE — PROGRESS NOTES
Chief Complaint   Patient presents with    Post-Op Check     Patient here for a 2 week postop check L hip ORIF. Patient resides at [facility]. SUBJECTIVE: 77-year-old female presents to clinic today 2-week follow-up after a left hip open reduction internal fixation on 12-2-2022. Erika Abreu Patient states she is still having some pain in the left hip, patient is accompanied by her daughter. Patient is hard of hearing so she has some issues replying to my questions. She states the majority of her pain seems to be located over the incision site. Patient has been taking antibiotics for the last week per her nursing care facility due to them noticing that her incision site looked red. Patient denies any blood thinners. Review of Systems -   General ROS: negative for - chills, fatigue, fever or night sweats  Respiratory ROS: no cough, shortness of breath, or wheezing  Cardiovascular ROS: no chest pain or dyspnea on exertion  Gastrointestinal ROS: no abdominal pain, change in bowel habits, or black or bloody stools  Genitourinary: no hematuria, dysuria, or incontinence   Musculoskeletal ROS:see above  Neurological ROS: no TIA or stroke symptoms       OBJECTIVE:   Alert and oriented X 3, no acute distress, respirations easy and unlabored with no audible wheezes, skin warm and dry, speech and dress appropriate for noted age, affect euthymic.     Extremity:  left Lower Extremity  Skin incision appears to be healing appropriately, some erythema noted around the incision site staples in place, no discharge or drainage noted  +TTP around incision site   compartments soft and compressible  Palpable dorsalis pedis and posterior tibialis pulse, brisk cap refill to toes, foot warm and perfused  Sensation intact to light touch in sural/deep peroneal/superficial peroneal/saphenous/posterior tibial nerve distributions to foot/ankle  Demonstrates active ankle plantar/dorsiflexion/great toe extension   Patient still does report some pain with left leg logroll    XR: 12/16/22   X-ray of the left hip demonstrate no acute fracture or dislocation. No screw pullout or hardware migration noted    Impression: Status post left hip open reduction internal fixation    Ht 5' 4\" (1.626 m)   Wt 130 lb (59 kg)   LMP 12/03/1997   BMI 22.31 kg/m²     ASSESSMENT:     Diagnosis Orders   1. S/p left hip fracture            PLAN:  Weightbearing as tolerated left lower extremity  Continue antibiotics for 1 more week that were prescribed from nursing facility  Will see patient in 2 weeks for recheck of incision  Staples removed at today's visit, Steri-Strips applied, ABD applied  Patient may begin taking showers however do not soak the leg    All questions were sought and answered today's visit    Electronically signed by Jackeline Nguyen DO on 12/16/2022 at 9:39 AM    Orthopaedic Attending    I have seen and evaluated the patient with the resident and agree with the above assessments on today's visit. I have performed the key components of the history and physical examination and concur completely with the findings and plans as documented above. Electronically signed by   Crow Huang DO  12/16/2022     Note: This report was completed using [employer] voiced recognition software. Every effort has been made to ensure accuracy; however, inadvertent computerized transcription errors may be present.

## 2022-12-19 PROBLEM — Z87.81 S/P LEFT HIP FRACTURE: Status: ACTIVE | Noted: 2022-12-19

## 2022-12-23 LAB
SARS-COV-2: NOT DETECTED
SOURCE: NORMAL

## 2022-12-24 LAB
SARS-COV-2: NOT DETECTED
SOURCE: NORMAL

## 2023-01-06 ENCOUNTER — HOSPITAL ENCOUNTER (OUTPATIENT)
Dept: GENERAL RADIOLOGY | Age: 88
End: 2023-01-06
Payer: MEDICARE

## 2023-01-06 ENCOUNTER — OFFICE VISIT (OUTPATIENT)
Dept: ORTHOPEDIC SURGERY | Age: 88
End: 2023-01-06
Payer: MEDICARE

## 2023-01-06 DIAGNOSIS — Z87.81 S/P LEFT HIP FRACTURE: Primary | ICD-10-CM

## 2023-01-06 DIAGNOSIS — Z87.81 S/P LEFT HIP FRACTURE: ICD-10-CM

## 2023-01-06 PROCEDURE — 73502 X-RAY EXAM HIP UNI 2-3 VIEWS: CPT

## 2023-01-06 PROCEDURE — 99024 POSTOP FOLLOW-UP VISIT: CPT | Performed by: ORTHOPAEDIC SURGERY

## 2023-01-06 PROCEDURE — 99212 OFFICE O/P EST SF 10 MIN: CPT | Performed by: ORTHOPAEDIC SURGERY

## 2023-01-06 RX ORDER — ASCORBIC ACID 500 MG
500 TABLET ORAL DAILY
COMMUNITY

## 2023-01-06 RX ORDER — GUAIFENESIN AND DEXTROMETHORPHAN HBR 20; 400 MG/1; MG/1
1 TABLET ORAL EVERY 4 HOURS PRN
COMMUNITY

## 2023-01-06 NOTE — PATIENT INSTRUCTIONS
-Continue weightbearing as tolerated left lower extremity  -Continue therapy focusing on range of motion and strengthening to the left lower extremity  -No dressing needed over the incision site as long as there is no drainage.  If drainage starts or you develop fever or chills or redness around the incision site, please notify the office right away  -Please follow up in 4 weeks for repeat XR and evaluation

## 2023-01-06 NOTE — PROGRESS NOTES
Chief Complaint   Patient presents with    Post-Op Check     12/2/2022 L hip ORIF. Patient residing at [facility]. Patient states that she feels no pain at rest. Patient states that her pain increases with physical therapy. SUBJECTIVE: 80-year-old female presents to clinic today 4 weeks status post left hip open reduction internal fixation. She was seen 2 weeks prior where she was having some continued pain in the left hip. There is concern of her surgical site erythema and drainage. She was initiated on a course of antibiotics which she has since completed. Daughter is in the room and she reports patient has not had any fevers or chills since her last appointment. Patient reports she is not having any pain in her left hip. She is hesitant to weight-bear due to fear. She has had help with standing with a wheeled walker at her facility but she has not ambulated much. She is currently living in a nursing facility while she rehabilitates from her injury. She denies any numbness or paresthesias in the leg. No other complaints today. Review of Systems -   General ROS: negative for - chills, fatigue, fever or night sweats  Respiratory ROS: no cough, shortness of breath, or wheezing  Cardiovascular ROS: no chest pain or dyspnea on exertion  Gastrointestinal ROS: no abdominal pain, change in bowel habits, or black or bloody stools  Genitourinary: no hematuria, dysuria, or incontinence   Musculoskeletal ROS:see above  Neurological ROS: no TIA or stroke symptoms       OBJECTIVE:   Alert and oriented X 3, no acute distress, respirations easy and unlabored with no audible wheezes, skin warm and dry, speech and dress appropriate for noted age, affect euthymic. Extremity:  left Lower Extremity  Skin incision well-healed about the left hip. No erythema or drainage noted.   Nontender to palpation around the incision site  Compartments soft and compressible  Palpable dorsalis pedis and posterior tibialis pulse, brisk cap refill to toes, foot warm and perfused  Sensation intact distally about the left lower extremity  Demonstrates active ankle plantar/dorsiflexion/great toe extension       XR: 1/6/2023  X-ray of the left hip demonstrate no acute fracture or dislocation. No screw pullout or hardware migration noted of the hardware    Impression: Status post left hip open reduction internal fixation      ASSESSMENT:  4 weeks status post left hip open reduction internal fixation-no concerns over surgical site infection today      PLAN:  Continue weightbearing as tolerated left lower extremity  Incision site well-healed without concern for infection-dressing no longer required over the incision site  Patient reassured that her radiographs demonstrate stable fixation of her left hip fracture. Continue therapy at facility working on confidence with balance, ambulation  Discussed with daughter in room today that if patient develops new erythema, increased pain or drainage from the incision site, she will notify the office sooner  Patient will follow-up in 4 weeks for repeat imaging and evaluation    All questions were sought and answered today's visit    Electronically signed by Ioana Garcia DO on 1/6/2023 at 10:26 AM      Note: This report was completed using [employer] voiced recognition software. Every effort has been made to ensure accuracy; however, inadvertent computerized transcription errors may be present.

## 2023-01-11 LAB
SARS-COV-2: NOT DETECTED
SOURCE: NORMAL

## 2023-01-27 DIAGNOSIS — Z87.81 S/P LEFT HIP FRACTURE: Primary | ICD-10-CM

## 2023-02-03 ENCOUNTER — HOSPITAL ENCOUNTER (OUTPATIENT)
Dept: GENERAL RADIOLOGY | Age: 88
End: 2023-02-03
Payer: MEDICARE

## 2023-02-03 ENCOUNTER — OFFICE VISIT (OUTPATIENT)
Dept: ORTHOPEDIC SURGERY | Age: 88
End: 2023-02-03
Payer: MEDICARE

## 2023-02-03 VITALS — HEIGHT: 64 IN | BODY MASS INDEX: 20.49 KG/M2 | WEIGHT: 120 LBS

## 2023-02-03 DIAGNOSIS — Z87.81 S/P LEFT HIP FRACTURE: ICD-10-CM

## 2023-02-03 DIAGNOSIS — S72.002D CLOSED FRACTURE OF LEFT HIP WITH ROUTINE HEALING, SUBSEQUENT ENCOUNTER: Primary | ICD-10-CM

## 2023-02-03 PROCEDURE — 99212 OFFICE O/P EST SF 10 MIN: CPT | Performed by: ORTHOPAEDIC SURGERY

## 2023-02-03 PROCEDURE — 73502 X-RAY EXAM HIP UNI 2-3 VIEWS: CPT

## 2023-02-03 RX ORDER — NEOMYCIN SULFATE, POLYMYXIN B SULFATE AND DEXAMETHASONE 3.5; 10000; 1 MG/ML; [USP'U]/ML; MG/ML
1 SUSPENSION/ DROPS OPHTHALMIC
COMMUNITY

## 2023-02-03 RX ORDER — NITROFURANTOIN 25; 75 MG/1; MG/1
100 CAPSULE ORAL 2 TIMES DAILY
COMMUNITY

## 2023-02-03 NOTE — PROGRESS NOTES
Chief Complaint   Patient presents with    Post-Op Check     Left hip ORIF 12/2/2022. Patient residing at [facility].  Remains in therapy 6-7 days.  One assist for transfers.  Walker is used in therapy, walking 30 steps. Had a fall from bed a few days ago and is c/o rt flank discomfort.        OP:SURGEON: Dr. Haroon Whalen DO  DATE OF PROCEDURE: 12/02/2022  PROCEDURE: Left hip ORIF    POD: 2 months    Subjective:  Swati Melvin is following up from the above surgery. She is WBAT on left lower extremity. She ambulates with assistive device, walker.  Pain to extremity is none and mild and is  taking prescribed pain medication, prn. Reports a recent fall from bed 5 days ago but denies any pain. They denies numbness, tingling to the right lower extremity. Denies calf pain, chest pain, or shortness of breath. Patient is  participating in therapy, home health care .     Review of Systems -  All pertinent positives/negative in HPI     Objective:    General: Alert and oriented X 3, normocephalic atraumatic, external ears and eye normal, sclera clear, no acute distress, respirations easy and unlabored with no audible wheezes, skin warm and dry, speech and dress appropriate for noted age, affect euthymic.    Extremity:  Left Lower Extremity  Skin clean dry and intact, without signs of infection   Incision healing well   mild edema noted  Compartments supple throughout thigh and leg  Calf supple and not tender  negative Homans  Demonstrates active plantar/dorsiflexion   States sensation intact to touch in sural, deep peroneal, superficial peroneal nerve distributions to foot/ankle.  Palpable dorsalis pedis and posterior tibialis pulses, cap refill brisk in toes, foot warm/perfused.      Ht 5' 4\" (1.626 m)   Wt 120 lb (54.4 kg)   LMP 12/03/1997   BMI 20.60 kg/m²     XR:     Previous imaging reviewed     X-ray of the left hip demonstrates no acute fracture or dislocation.  No screw pullout or hardware  migration noted of the hardware. Some callus formation noted around fracture site    Assessment:  S/p left hip open reduction internal fixation- 12/2/2022    Plan:  Weight bearing as tolerated left lower extremity  Continue Physical therapy as tolerated  Pain control as needed  DVT prophylaxis-  Recommend primary care management with patient prior GI bleed history  Follow up in 6 weeks for repeat imaging and re-evaluation       Electronically signed by Tete Zuniga DO on 2/3/2023 at 10:40 AM  Note: This report was completed using [employer] voiced recognition software. Every effort has been made to ensure accuracy; however, inadvertent computerized transcription errors may be present. Orthopaedic Attending     I have seen and evaluated the patient with the resident and agree with the above assessments on today's visit. I have performed the key components of the history and physical examination and concur completely with the findings and plans as documented above.      Electronically signed by   Kyle Felix DO  2/3/2023

## 2023-02-03 NOTE — PATIENT INSTRUCTIONS
Weight bearing as tolerated left lower extremity  Continue Physical therapy as tolerated  Pain control as needed  DVT prophylaxis-  Recommend primary care management with patient prior GI bleed history  Follow up in 6 weeks for repeat imaging and re-evaluation

## 2023-03-30 ENCOUNTER — HOSPITAL ENCOUNTER (OUTPATIENT)
Dept: GENERAL RADIOLOGY | Age: 88
Discharge: HOME | End: 2023-04-01
Payer: MEDICARE

## 2023-03-30 ENCOUNTER — OFFICE VISIT (OUTPATIENT)
Dept: ORTHOPEDIC SURGERY | Age: 88
End: 2023-03-30
Payer: MEDICARE

## 2023-03-30 DIAGNOSIS — S72.002D CLOSED FRACTURE OF LEFT HIP WITH ROUTINE HEALING, SUBSEQUENT ENCOUNTER: ICD-10-CM

## 2023-03-30 DIAGNOSIS — S72.002D CLOSED FRACTURE OF LEFT HIP WITH ROUTINE HEALING, SUBSEQUENT ENCOUNTER: Primary | ICD-10-CM

## 2023-03-30 DIAGNOSIS — Z87.81 S/P LEFT HIP FRACTURE: Primary | ICD-10-CM

## 2023-03-30 DIAGNOSIS — M19.91 PRIMARY OSTEOARTHRITIS, UNSPECIFIED SITE: ICD-10-CM

## 2023-03-30 PROCEDURE — 99212 OFFICE O/P EST SF 10 MIN: CPT | Performed by: PHYSICIAN ASSISTANT

## 2023-03-30 PROCEDURE — 73502 X-RAY EXAM HIP UNI 2-3 VIEWS: CPT

## 2023-03-30 RX ORDER — FOLIC ACID 1 MG/1
1 TABLET ORAL DAILY
COMMUNITY

## 2023-03-30 RX ORDER — ALBUTEROL SULFATE 0.63 MG/3ML
1 SOLUTION RESPIRATORY (INHALATION) EVERY 6 HOURS PRN
COMMUNITY

## 2023-03-30 NOTE — PROGRESS NOTES
Chief Complaint   Patient presents with    Follow-up     Lt Hip f/u has pain at times. Is doing therapy 3 x a week and is now using a wheeled walker. OP:SURGEON: Dr. Ibeth Andrew MD  DATE OF PROCEDURE: 12/2/2022  PROCEDURE:LEFT HIP OPEN REDUCTION INTERNAL FIXATION WITH [facility]    POD: 17 weeks    Subjective: Merline Hess is following up from the above surgery. She presents with her daughter today for follow up. She has been discharged from SNF to [facility], has been getting up and walking with walker. Has more pain across low back than at her hip. She is WBAT on left lower extremity. She ambulates with assistive device, walker/wheelchair. Pain to extremity is moderate and is  taking prescribed pain medication, for her low back she is taking Tylenol, Ibuprofen, Gabapentin and Topical lidocaine patches. Patient has significant increased confusion with narcotic pain medications. Patient has healing sacral pressure wounds. They {denies/complains:71223} {Numbness:54805} to the {Anatomy; location extremity:78767} extremity. Denies calf pain, chest pain, or shortness of breath. Patient {Continues/Finished:03928} DVT prophylaxis, {PostOAC:87847}. Patient {IS/IS HFK:02170} participating in therapy, {postop therapy\:42503}. ***     Review of Systems -  All pertinent positives/negative in HPI     Objective:    General: Alert and oriented X 3, normocephalic atraumatic, external ears and eye normal, sclera clear, no acute distress, respirations easy and unlabored with no audible wheezes, skin warm and dry, speech and dress appropriate for noted age, affect euthymic.     Extremity:  {RIGHT LEFT BILATERAL CAPS KSAWF:93854} Lower Extremity  Skin clean dry and intact, {WITH/WITHOUT:42787} signs of infection ***  Incision {Exam; incision:74761} ***  {MILD:82848} edema noted  Compartments supple throughout thigh and leg  Calf supple and {TENDER NOT TAWTVI:80029}  {Desc; negative/positive:30415} Homans  Demonstrates active

## 2023-04-28 ENCOUNTER — FOLLOWUP TELEPHONE ENCOUNTER (OUTPATIENT)
Dept: AUDIOLOGY | Age: 88
End: 2023-04-28

## 2023-04-28 NOTE — PROGRESS NOTES
Returned call to dtr Shayy Alishakayla (phone [phone]) re hearing test and new hearing aids for her mom . Informed her get script then we can sched appt. She asked about [facility] so I gave her the number there and to [facility] ([facility] audiology. She will call once has script for appt. She states she is not sure about insurance coverage, I told her to [employer] She also noted her mom has money to pay \"self pay\" if needed.    Electronically signed by Van Tabor on 4/28/2023 at 10:48 AM

## 2023-05-02 ENCOUNTER — TELEPHONE (OUTPATIENT)
Dept: AUDIOLOGY | Age: 88
End: 2023-05-02

## 2023-05-02 NOTE — TELEPHONE ENCOUNTER
Received voicemail from [facility] in [place] wishing to schedule patient for a hearing test. Called back, nurse was unavailable, left phone number. Noted no referral in chart.

## 2023-05-09 ENCOUNTER — TELEPHONE (OUTPATIENT)
Dept: AUDIOLOGY | Age: 88
End: 2023-05-09

## 2023-05-09 NOTE — TELEPHONE ENCOUNTER
Received order for hearing test. [facility] to schedule, was told they can only provide transportation for patients on Mondays or Fridays, and patient's family would like patient to have the appointment Monday or Friday. Told nurse that we do not have appointments on Mondays and a different audiologist is here on Fridays, so I will send a message for her to call back to schedule. Told her appointment will probably be in late June/July. Nurse said this is fine.    Message sent to audiologist.

## 2023-06-02 ENCOUNTER — APPOINTMENT (OUTPATIENT)
Dept: GENERAL RADIOLOGY | Age: 88
DRG: 481 | End: 2023-06-02
Payer: MEDICARE

## 2023-06-02 ENCOUNTER — HOSPITAL ENCOUNTER (INPATIENT)
Age: 88
LOS: 5 days | Discharge: NURSING, REHAB OR LONG-TERM CARE | DRG: 481 | End: 2023-06-07
Attending: EMERGENCY MEDICINE | Admitting: INTERNAL MEDICINE
Payer: MEDICARE

## 2023-06-02 ENCOUNTER — APPOINTMENT (OUTPATIENT)
Dept: CT IMAGING | Age: 88
DRG: 481 | End: 2023-06-02
Payer: MEDICARE

## 2023-06-02 DIAGNOSIS — D64.9 ANEMIA, UNSPECIFIED TYPE: ICD-10-CM

## 2023-06-02 DIAGNOSIS — S72.141A CLOSED FRACTURE OF FEMUR, INTERTROCHANTERIC, RIGHT, INITIAL ENCOUNTER (HCC): Primary | ICD-10-CM
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
## 2023-06-02 DIAGNOSIS — S00.03XA SCALP HEMATOMA, INITIAL ENCOUNTER: ICD-10-CM

## 2023-06-02 DIAGNOSIS — S09.90XA CLOSED HEAD INJURY, INITIAL ENCOUNTER: ICD-10-CM

## 2023-06-02 DIAGNOSIS — E87.1 HYPONATREMIA: ICD-10-CM

## 2023-06-02 PROBLEM — S72.001A CLOSED RIGHT HIP FRACTURE, INITIAL ENCOUNTER (HCC): Status: ACTIVE | Noted: 2023-06-02

## 2023-06-02 PROBLEM — S72.002A CLOSED FRACTURE OF LEFT HIP (HCC): Status: RESOLVED | Noted: 2022-12-01 | Resolved: 2023-06-02

## 2023-06-02 LAB
ABO + RH BLD: NORMAL
ALBUMIN SERPL-MCNC: 4.1 G/DL (ref 3.5–5.2)
ALP SERPL-CCNC: 81 U/L (ref 35–104)
ALT SERPL-CCNC: 15 U/L (ref 0–32)
ANION GAP SERPL CALCULATED.3IONS-SCNC: 6 MMOL/L (ref 7–16)
APTT BLD: 27.4 SEC (ref 24.5–35.1)
AST SERPL-CCNC: 19 U/L (ref 0–31)
BASOPHILS # BLD: 0.02 E9/L (ref 0–0.2)
BASOPHILS NFR BLD: 0.2 % (ref 0–2)
BILIRUB SERPL-MCNC: 0.2 MG/DL (ref 0–1.2)
BLD GP AB SCN SERPL QL: NORMAL
BUN SERPL-MCNC: 21 MG/DL (ref 6–23)
CALCIUM SERPL-MCNC: 9.9 MG/DL (ref 8.6–10.2)
CHLORIDE SERPL-SCNC: 93 MMOL/L (ref 98–107)
CO2 SERPL-SCNC: 25 MMOL/L (ref 22–29)
CREAT SERPL-MCNC: 0.6 MG/DL (ref 0.5–1)
EOSINOPHIL # BLD: 0.12 E9/L (ref 0.05–0.5)
EOSINOPHIL NFR BLD: 1.1 % (ref 0–6)
ERYTHROCYTE [DISTWIDTH] IN BLOOD BY AUTOMATED COUNT: 12.8 FL (ref 11.5–15)
GLUCOSE SERPL-MCNC: 183 MG/DL (ref 74–99)
HCT VFR BLD AUTO: 27.6 % (ref 34–48)
HGB BLD-MCNC: 9.7 G/DL (ref 11.5–15.5)
IMM GRANULOCYTES # BLD: 0.07 E9/L
IMM GRANULOCYTES NFR BLD: 0.7 % (ref 0–5)
INR BLD: 1
LIPASE: 55 U/L (ref 13–60)
LYMPHOCYTES # BLD: 1.02 E9/L (ref 1.5–4)
LYMPHOCYTES NFR BLD: 9.6 % (ref 20–42)
MAGNESIUM SERPL-MCNC: 1.8 MG/DL (ref 1.6–2.6)
MCH RBC QN AUTO: 36.1 PG (ref 26–35)
MCHC RBC AUTO-ENTMCNC: 35.1 % (ref 32–34.5)
MCV RBC AUTO: 102.6 FL (ref 80–99.9)
MONOCYTES # BLD: 0.62 E9/L (ref 0.1–0.95)
MONOCYTES NFR BLD: 5.8 % (ref 2–12)
NEUTROPHILS # BLD: 8.81 E9/L (ref 1.8–7.3)
NEUTS SEG NFR BLD: 82.6 % (ref 43–80)
PLATELET # BLD AUTO: 380 E9/L (ref 130–450)
PMV BLD AUTO: 9.6 FL (ref 7–12)
POTASSIUM SERPL-SCNC: 4.9 MMOL/L (ref 3.5–5)
PROT SERPL-MCNC: 7.6 G/DL (ref 6.4–8.3)
PROTHROMBIN TIME: 11.6 SEC (ref 9.3–12.4)
RBC # BLD AUTO: 2.69 E12/L (ref 3.5–5.5)
SODIUM SERPL-SCNC: 124 MMOL/L (ref 132–146)
WBC # BLD: 10.7 E9/L (ref 4.5–11.5)

## 2023-06-02 PROCEDURE — 30233N1 TRANSFUSION OF NONAUTOLOGOUS RED BLOOD CELLS INTO PERIPHERAL VEIN, PERCUTANEOUS APPROACH: ICD-10-PCS | Performed by: INTERNAL MEDICINE

## 2023-06-02 PROCEDURE — 36415 COLL VENOUS BLD VENIPUNCTURE: CPT

## 2023-06-02 PROCEDURE — 86923 COMPATIBILITY TEST ELECTRIC: CPT

## 2023-06-02 PROCEDURE — 93005 ELECTROCARDIOGRAM TRACING: CPT | Performed by: STUDENT IN AN ORGANIZED HEALTH CARE EDUCATION/TRAINING PROGRAM

## 2023-06-02 PROCEDURE — 73502 X-RAY EXAM HIP UNI 2-3 VIEWS: CPT

## 2023-06-02 PROCEDURE — 6360000002 HC RX W HCPCS: Performed by: STUDENT IN AN ORGANIZED HEALTH CARE EDUCATION/TRAINING PROGRAM

## 2023-06-02 PROCEDURE — 85730 THROMBOPLASTIN TIME PARTIAL: CPT

## 2023-06-02 PROCEDURE — P9016 RBC LEUKOCYTES REDUCED: HCPCS

## 2023-06-02 PROCEDURE — 99285 EMERGENCY DEPT VISIT HI MDM: CPT

## 2023-06-02 PROCEDURE — 86901 BLOOD TYPING SEROLOGIC RH(D): CPT

## 2023-06-02 PROCEDURE — 80053 COMPREHEN METABOLIC PANEL: CPT

## 2023-06-02 PROCEDURE — 72125 CT NECK SPINE W/O DYE: CPT

## 2023-06-02 PROCEDURE — 70450 CT HEAD/BRAIN W/O DYE: CPT

## 2023-06-02 PROCEDURE — 71045 X-RAY EXAM CHEST 1 VIEW: CPT

## 2023-06-02 PROCEDURE — 1200000000 HC SEMI PRIVATE

## 2023-06-02 PROCEDURE — 86900 BLOOD TYPING SEROLOGIC ABO: CPT

## 2023-06-02 PROCEDURE — 85025 COMPLETE CBC W/AUTO DIFF WBC: CPT

## 2023-06-02 PROCEDURE — 86850 RBC ANTIBODY SCREEN: CPT

## 2023-06-02 PROCEDURE — 6370000000 HC RX 637 (ALT 250 FOR IP): Performed by: NURSE PRACTITIONER

## 2023-06-02 PROCEDURE — 96374 THER/PROPH/DIAG INJ IV PUSH: CPT

## 2023-06-02 PROCEDURE — 83690 ASSAY OF LIPASE: CPT

## 2023-06-02 PROCEDURE — 85610 PROTHROMBIN TIME: CPT

## 2023-06-02 PROCEDURE — 83735 ASSAY OF MAGNESIUM: CPT

## 2023-06-02 RX ORDER — SODIUM CHLORIDE 9 MG/ML
INJECTION, SOLUTION INTRAVENOUS PRN
Status: DISCONTINUED | OUTPATIENT
Start: 2023-06-02 | End: 2023-06-03 | Stop reason: SDUPTHER

## 2023-06-02 RX ORDER — INSULIN LISPRO 100 [IU]/ML
0-8 INJECTION, SOLUTION INTRAVENOUS; SUBCUTANEOUS
Status: DISCONTINUED | OUTPATIENT
Start: 2023-06-03 | End: 2023-06-07 | Stop reason: HOSPADM

## 2023-06-02 RX ORDER — SODIUM CHLORIDE 9 MG/ML
INJECTION, SOLUTION INTRAVENOUS CONTINUOUS
Status: DISCONTINUED | OUTPATIENT
Start: 2023-06-02 | End: 2023-06-05

## 2023-06-02 RX ORDER — MORPHINE SULFATE 2 MG/ML
2 INJECTION, SOLUTION INTRAMUSCULAR; INTRAVENOUS ONCE
Status: COMPLETED | OUTPATIENT
Start: 2023-06-02 | End: 2023-06-02

## 2023-06-02 RX ORDER — AMLODIPINE BESYLATE 10 MG/1
10 TABLET ORAL DAILY
Status: DISCONTINUED | OUTPATIENT
Start: 2023-06-03 | End: 2023-06-07 | Stop reason: HOSPADM

## 2023-06-02 RX ORDER — MORPHINE SULFATE 2 MG/ML
2 INJECTION, SOLUTION INTRAMUSCULAR; INTRAVENOUS
Status: DISCONTINUED | OUTPATIENT
Start: 2023-06-02 | End: 2023-06-07 | Stop reason: HOSPADM

## 2023-06-02 RX ORDER — POLYETHYLENE GLYCOL 3350 17 G/17G
17 POWDER, FOR SOLUTION ORAL DAILY
Status: DISCONTINUED | OUTPATIENT
Start: 2023-06-03 | End: 2023-06-07 | Stop reason: HOSPADM

## 2023-06-02 RX ORDER — DOCUSATE SODIUM 100 MG/1
100 CAPSULE, LIQUID FILLED ORAL DAILY
Status: DISCONTINUED | OUTPATIENT
Start: 2023-06-03 | End: 2023-06-07 | Stop reason: HOSPADM

## 2023-06-02 RX ORDER — PANTOPRAZOLE SODIUM 40 MG/1
40 TABLET, DELAYED RELEASE ORAL
Status: DISCONTINUED | OUTPATIENT
Start: 2023-06-03 | End: 2023-06-07 | Stop reason: HOSPADM

## 2023-06-02 RX ORDER — ENOXAPARIN SODIUM 100 MG/ML
40 INJECTION SUBCUTANEOUS NIGHTLY
Status: DISCONTINUED | OUTPATIENT
Start: 2023-06-03 | End: 2023-06-05

## 2023-06-02 RX ORDER — ONDANSETRON 4 MG/1
4 TABLET, ORALLY DISINTEGRATING ORAL EVERY 8 HOURS PRN
Status: DISCONTINUED | OUTPATIENT
Start: 2023-06-02 | End: 2023-06-03 | Stop reason: SDUPTHER

## 2023-06-02 RX ORDER — ONDANSETRON 2 MG/ML
4 INJECTION INTRAMUSCULAR; INTRAVENOUS EVERY 6 HOURS PRN
Status: DISCONTINUED | OUTPATIENT
Start: 2023-06-02 | End: 2023-06-03 | Stop reason: SDUPTHER

## 2023-06-02 RX ORDER — ASPIRIN 81 MG/1
81 TABLET ORAL 2 TIMES DAILY
Status: DISCONTINUED | OUTPATIENT
Start: 2023-06-03 | End: 2023-06-07 | Stop reason: HOSPADM

## 2023-06-02 RX ORDER — GABAPENTIN 300 MG/1
300 CAPSULE ORAL NIGHTLY
Status: DISCONTINUED | OUTPATIENT
Start: 2023-06-02 | End: 2023-06-07 | Stop reason: HOSPADM

## 2023-06-02 RX ORDER — SODIUM CHLORIDE 0.9 % (FLUSH) 0.9 %
10 SYRINGE (ML) INJECTION EVERY 12 HOURS SCHEDULED
Status: DISCONTINUED | OUTPATIENT
Start: 2023-06-02 | End: 2023-06-03 | Stop reason: SDUPTHER

## 2023-06-02 RX ORDER — INSULIN LISPRO 100 [IU]/ML
0-4 INJECTION, SOLUTION INTRAVENOUS; SUBCUTANEOUS NIGHTLY
Status: DISCONTINUED | OUTPATIENT
Start: 2023-06-02 | End: 2023-06-07 | Stop reason: HOSPADM

## 2023-06-02 RX ORDER — SENNA PLUS 8.6 MG/1
1 TABLET ORAL DAILY PRN
Status: DISCONTINUED | OUTPATIENT
Start: 2023-06-02 | End: 2023-06-07 | Stop reason: HOSPADM

## 2023-06-02 RX ORDER — POTASSIUM CHLORIDE 20 MEQ/1
40 TABLET, EXTENDED RELEASE ORAL PRN
Status: DISCONTINUED | OUTPATIENT
Start: 2023-06-02 | End: 2023-06-07 | Stop reason: HOSPADM

## 2023-06-02 RX ORDER — SODIUM CHLORIDE 0.9 % (FLUSH) 0.9 %
10 SYRINGE (ML) INJECTION PRN
Status: DISCONTINUED | OUTPATIENT
Start: 2023-06-02 | End: 2023-06-03 | Stop reason: SDUPTHER

## 2023-06-02 RX ORDER — LANOLIN ALCOHOL/MO/W.PET/CERES
5 CREAM (GRAM) TOPICAL NIGHTLY
Status: DISCONTINUED | OUTPATIENT
Start: 2023-06-02 | End: 2023-06-07 | Stop reason: HOSPADM

## 2023-06-02 RX ORDER — ACETAMINOPHEN 650 MG/1
650 SUPPOSITORY RECTAL EVERY 6 HOURS PRN
Status: DISCONTINUED | OUTPATIENT
Start: 2023-06-02 | End: 2023-06-07 | Stop reason: HOSPADM

## 2023-06-02 RX ORDER — POTASSIUM CHLORIDE 7.45 MG/ML
10 INJECTION INTRAVENOUS PRN
Status: DISCONTINUED | OUTPATIENT
Start: 2023-06-02 | End: 2023-06-07 | Stop reason: HOSPADM

## 2023-06-02 RX ORDER — POLYVINYL ALCOHOL 14 MG/ML
1 SOLUTION/ DROPS OPHTHALMIC 2 TIMES DAILY
Status: DISCONTINUED | OUTPATIENT
Start: 2023-06-03 | End: 2023-06-07 | Stop reason: HOSPADM

## 2023-06-02 RX ORDER — OXYCODONE HYDROCHLORIDE AND ACETAMINOPHEN 5; 325 MG/1; MG/1
1 TABLET ORAL EVERY 4 HOURS PRN
Status: DISCONTINUED | OUTPATIENT
Start: 2023-06-02 | End: 2023-06-07 | Stop reason: HOSPADM

## 2023-06-02 RX ORDER — ACETAMINOPHEN 325 MG/1
650 TABLET ORAL EVERY 6 HOURS PRN
Status: DISCONTINUED | OUTPATIENT
Start: 2023-06-02 | End: 2023-06-07 | Stop reason: HOSPADM

## 2023-06-02 RX ORDER — ALBUTEROL SULFATE 0.63 MG/3ML
1 SOLUTION RESPIRATORY (INHALATION) EVERY 6 HOURS PRN
Status: DISCONTINUED | OUTPATIENT
Start: 2023-06-02 | End: 2023-06-07 | Stop reason: HOSPADM

## 2023-06-02 RX ORDER — DEXTROSE MONOHYDRATE 100 MG/ML
INJECTION, SOLUTION INTRAVENOUS CONTINUOUS PRN
Status: DISCONTINUED | OUTPATIENT
Start: 2023-06-02 | End: 2023-06-07 | Stop reason: HOSPADM

## 2023-06-02 RX ADMIN — MORPHINE SULFATE 2 MG: 2 INJECTION, SOLUTION INTRAMUSCULAR; INTRAVENOUS at 21:32

## 2023-06-02 RX ADMIN — OXYCODONE AND ACETAMINOPHEN 1 TABLET: 5; 325 TABLET ORAL at 23:33

## 2023-06-02 ASSESSMENT — PAIN DESCRIPTION - LOCATION: LOCATION: HIP

## 2023-06-02 ASSESSMENT — PAIN DESCRIPTION - ORIENTATION: ORIENTATION: RIGHT

## 2023-06-02 ASSESSMENT — PAIN - FUNCTIONAL ASSESSMENT: PAIN_FUNCTIONAL_ASSESSMENT: 0-10

## 2023-06-02 ASSESSMENT — PAIN SCALES - GENERAL: PAINLEVEL_OUTOF10: 7

## 2023-06-02 ASSESSMENT — LIFESTYLE VARIABLES
HOW MANY STANDARD DRINKS CONTAINING ALCOHOL DO YOU HAVE ON A TYPICAL DAY: PATIENT DOES NOT DRINK
HOW OFTEN DO YOU HAVE A DRINK CONTAINING ALCOHOL: NEVER

## 2023-06-03 ENCOUNTER — APPOINTMENT (OUTPATIENT)
Dept: GENERAL RADIOLOGY | Age: 88
DRG: 481 | End: 2023-06-03
Payer: MEDICARE

## 2023-06-03 ENCOUNTER — ANESTHESIA (OUTPATIENT)
Dept: OPERATING ROOM | Age: 88
End: 2023-06-03
Payer: MEDICARE

## 2023-06-03 ENCOUNTER — ANESTHESIA EVENT (OUTPATIENT)
Dept: OPERATING ROOM | Age: 88
End: 2023-06-03
Payer: MEDICARE

## 2023-06-03 LAB
ALBUMIN SERPL-MCNC: 3.7 G/DL (ref 3.5–5.2)
ALP SERPL-CCNC: 77 U/L (ref 35–104)
ALT SERPL-CCNC: 14 U/L (ref 0–32)
ANION GAP SERPL CALCULATED.3IONS-SCNC: 8 MMOL/L (ref 7–16)
AST SERPL-CCNC: 22 U/L (ref 0–31)
BACTERIA URNS QL MICRO: ABNORMAL /HPF
BASOPHILS # BLD: 0.02 E9/L (ref 0–0.2)
BASOPHILS NFR BLD: 0.2 % (ref 0–2)
BILIRUB SERPL-MCNC: 0.3 MG/DL (ref 0–1.2)
BILIRUB UR QL STRIP: NEGATIVE
BUN SERPL-MCNC: 18 MG/DL (ref 6–23)
CALCIUM SERPL-MCNC: 9.7 MG/DL (ref 8.6–10.2)
CHLORIDE SERPL-SCNC: 97 MMOL/L (ref 98–107)
CHLORIDE UR-SCNC: 58 MMOL/L
CLARITY UR: ABNORMAL
CO2 SERPL-SCNC: 24 MMOL/L (ref 22–29)
COLOR UR: YELLOW
CREAT SERPL-MCNC: 0.6 MG/DL (ref 0.5–1)
CREAT UR-MCNC: 52 MG/DL (ref 29–226)
EKG ATRIAL RATE: 61 BPM
EKG P AXIS: 104 DEGREES
EKG P-R INTERVAL: 186 MS
EKG Q-T INTERVAL: 412 MS
EKG QRS DURATION: 102 MS
EKG QTC CALCULATION (BAZETT): 414 MS
EKG R AXIS: -2 DEGREES
EKG T AXIS: 47 DEGREES
EKG VENTRICULAR RATE: 61 BPM
EOSINOPHIL # BLD: 0.01 E9/L (ref 0.05–0.5)
EOSINOPHIL NFR BLD: 0.1 % (ref 0–6)
ERYTHROCYTE [DISTWIDTH] IN BLOOD BY AUTOMATED COUNT: 12.8 FL (ref 11.5–15)
GLUCOSE SERPL-MCNC: 178 MG/DL (ref 74–99)
GLUCOSE UR STRIP-MCNC: NEGATIVE MG/DL
HBA1C MFR BLD: 6.1 % (ref 4–5.6)
HCT VFR BLD AUTO: 23.1 % (ref 34–48)
HGB BLD-MCNC: 8.1 G/DL (ref 11.5–15.5)
HGB UR QL STRIP: NEGATIVE
IMM GRANULOCYTES # BLD: 0.06 E9/L
IMM GRANULOCYTES NFR BLD: 0.5 % (ref 0–5)
KETONES UR STRIP-MCNC: NEGATIVE MG/DL
LEUKOCYTE ESTERASE UR QL STRIP: NEGATIVE
LYMPHOCYTES # BLD: 0.84 E9/L (ref 1.5–4)
LYMPHOCYTES NFR BLD: 6.8 % (ref 20–42)
MCH RBC QN AUTO: 36.2 PG (ref 26–35)
MCHC RBC AUTO-ENTMCNC: 35.1 % (ref 32–34.5)
MCV RBC AUTO: 103.1 FL (ref 80–99.9)
METER GLUCOSE: 199 MG/DL (ref 74–99)
METER GLUCOSE: 218 MG/DL (ref 74–99)
METER GLUCOSE: 276 MG/DL (ref 74–99)
MONOCYTES # BLD: 0.55 E9/L (ref 0.1–0.95)
MONOCYTES NFR BLD: 4.4 % (ref 2–12)
NEUTROPHILS # BLD: 10.96 E9/L (ref 1.8–7.3)
NEUTS SEG NFR BLD: 88 % (ref 43–80)
NITRITE UR QL STRIP: NEGATIVE
OSMOLALITY SERPL CALC.SUM OF ELEC: 287 MOSM/KG (ref 285–310)
PH UR STRIP: 6.5 [PH] (ref 5–9)
PLATELET # BLD AUTO: 347 E9/L (ref 130–450)
PMV BLD AUTO: 9.7 FL (ref 7–12)
POTASSIUM SERPL-SCNC: 4.7 MMOL/L (ref 3.5–5)
POTASSIUM UR-SCNC: 72.9 MMOL/L
PROT SERPL-MCNC: 6.9 G/DL (ref 6.4–8.3)
PROT UR STRIP-MCNC: 100 MG/DL
RBC # BLD AUTO: 2.24 E12/L (ref 3.5–5.5)
RBC #/AREA URNS HPF: ABNORMAL /HPF (ref 0–2)
SODIUM SERPL-SCNC: 129 MMOL/L (ref 132–146)
SODIUM UR-SCNC: 47 MMOL/L
SP GR UR STRIP: 1.01 (ref 1–1.03)
UROBILINOGEN UR STRIP-ACNC: 0.2 E.U./DL
WBC # BLD: 12.4 E9/L (ref 4.5–11.5)
WBC #/AREA URNS HPF: ABNORMAL /HPF (ref 0–5)

## 2023-06-03 PROCEDURE — 6360000002 HC RX W HCPCS: Performed by: FAMILY MEDICINE

## 2023-06-03 PROCEDURE — 2500000003 HC RX 250 WO HCPCS: Performed by: NURSE ANESTHETIST, CERTIFIED REGISTERED

## 2023-06-03 PROCEDURE — 2580000003 HC RX 258: Performed by: STUDENT IN AN ORGANIZED HEALTH CARE EDUCATION/TRAINING PROGRAM

## 2023-06-03 PROCEDURE — 3700000000 HC ANESTHESIA ATTENDED CARE: Performed by: STUDENT IN AN ORGANIZED HEALTH CARE EDUCATION/TRAINING PROGRAM

## 2023-06-03 PROCEDURE — 3700000001 HC ADD 15 MINUTES (ANESTHESIA): Performed by: STUDENT IN AN ORGANIZED HEALTH CARE EDUCATION/TRAINING PROGRAM

## 2023-06-03 PROCEDURE — 73502 X-RAY EXAM HIP UNI 2-3 VIEWS: CPT

## 2023-06-03 PROCEDURE — 6370000000 HC RX 637 (ALT 250 FOR IP): Performed by: NURSE PRACTITIONER

## 2023-06-03 PROCEDURE — 82962 GLUCOSE BLOOD TEST: CPT

## 2023-06-03 PROCEDURE — 87081 CULTURE SCREEN ONLY: CPT

## 2023-06-03 PROCEDURE — 73552 X-RAY EXAM OF FEMUR 2/>: CPT

## 2023-06-03 PROCEDURE — 84133 ASSAY OF URINE POTASSIUM: CPT

## 2023-06-03 PROCEDURE — 6370000000 HC RX 637 (ALT 250 FOR IP): Performed by: FAMILY MEDICINE

## 2023-06-03 PROCEDURE — 3209999900 FLUORO FOR SURGICAL PROCEDURES

## 2023-06-03 PROCEDURE — 83930 ASSAY OF BLOOD OSMOLALITY: CPT

## 2023-06-03 PROCEDURE — 27245 TREAT THIGH FRACTURE: CPT | Performed by: STUDENT IN AN ORGANIZED HEALTH CARE EDUCATION/TRAINING PROGRAM

## 2023-06-03 PROCEDURE — 6360000002 HC RX W HCPCS: Performed by: STUDENT IN AN ORGANIZED HEALTH CARE EDUCATION/TRAINING PROGRAM

## 2023-06-03 PROCEDURE — C1713 ANCHOR/SCREW BN/BN,TIS/BN: HCPCS | Performed by: STUDENT IN AN ORGANIZED HEALTH CARE EDUCATION/TRAINING PROGRAM

## 2023-06-03 PROCEDURE — 6360000002 HC RX W HCPCS: Performed by: NURSE PRACTITIONER

## 2023-06-03 PROCEDURE — 2580000003 HC RX 258: Performed by: NURSE PRACTITIONER

## 2023-06-03 PROCEDURE — 84300 ASSAY OF URINE SODIUM: CPT

## 2023-06-03 PROCEDURE — C1769 GUIDE WIRE: HCPCS | Performed by: STUDENT IN AN ORGANIZED HEALTH CARE EDUCATION/TRAINING PROGRAM

## 2023-06-03 PROCEDURE — 36415 COLL VENOUS BLD VENIPUNCTURE: CPT

## 2023-06-03 PROCEDURE — 6360000002 HC RX W HCPCS: Performed by: NURSE ANESTHETIST, CERTIFIED REGISTERED

## 2023-06-03 PROCEDURE — 0QS606Z REPOSITION RIGHT UPPER FEMUR WITH INTRAMEDULLARY INTERNAL FIXATION DEVICE, OPEN APPROACH: ICD-10-PCS | Performed by: STUDENT IN AN ORGANIZED HEALTH CARE EDUCATION/TRAINING PROGRAM

## 2023-06-03 PROCEDURE — 85025 COMPLETE CBC W/AUTO DIFF WBC: CPT

## 2023-06-03 PROCEDURE — 2580000003 HC RX 258: Performed by: FAMILY MEDICINE

## 2023-06-03 PROCEDURE — 3600000004 HC SURGERY LEVEL 4 BASE: Performed by: STUDENT IN AN ORGANIZED HEALTH CARE EDUCATION/TRAINING PROGRAM

## 2023-06-03 PROCEDURE — 93010 ELECTROCARDIOGRAM REPORT: CPT | Performed by: INTERNAL MEDICINE

## 2023-06-03 PROCEDURE — 2780000010 HC IMPLANT OTHER: Performed by: STUDENT IN AN ORGANIZED HEALTH CARE EDUCATION/TRAINING PROGRAM

## 2023-06-03 PROCEDURE — 81001 URINALYSIS AUTO W/SCOPE: CPT

## 2023-06-03 PROCEDURE — 7100000000 HC PACU RECOVERY - FIRST 15 MIN: Performed by: STUDENT IN AN ORGANIZED HEALTH CARE EDUCATION/TRAINING PROGRAM

## 2023-06-03 PROCEDURE — 82570 ASSAY OF URINE CREATININE: CPT

## 2023-06-03 PROCEDURE — 2720000010 HC SURG SUPPLY STERILE: Performed by: STUDENT IN AN ORGANIZED HEALTH CARE EDUCATION/TRAINING PROGRAM

## 2023-06-03 PROCEDURE — 80053 COMPREHEN METABOLIC PANEL: CPT

## 2023-06-03 PROCEDURE — 2580000003 HC RX 258: Performed by: NURSE ANESTHETIST, CERTIFIED REGISTERED

## 2023-06-03 PROCEDURE — 83036 HEMOGLOBIN GLYCOSYLATED A1C: CPT

## 2023-06-03 PROCEDURE — 2500000003 HC RX 250 WO HCPCS: Performed by: STUDENT IN AN ORGANIZED HEALTH CARE EDUCATION/TRAINING PROGRAM

## 2023-06-03 PROCEDURE — 7100000001 HC PACU RECOVERY - ADDTL 15 MIN: Performed by: STUDENT IN AN ORGANIZED HEALTH CARE EDUCATION/TRAINING PROGRAM

## 2023-06-03 PROCEDURE — 3600000014 HC SURGERY LEVEL 4 ADDTL 15MIN: Performed by: STUDENT IN AN ORGANIZED HEALTH CARE EDUCATION/TRAINING PROGRAM

## 2023-06-03 PROCEDURE — 82436 ASSAY OF URINE CHLORIDE: CPT

## 2023-06-03 PROCEDURE — 2709999900 HC NON-CHARGEABLE SUPPLY: Performed by: STUDENT IN AN ORGANIZED HEALTH CARE EDUCATION/TRAINING PROGRAM

## 2023-06-03 PROCEDURE — 6360000002 HC RX W HCPCS: Performed by: ANESTHESIOLOGY

## 2023-06-03 PROCEDURE — 1200000000 HC SEMI PRIVATE

## 2023-06-03 DEVICE — TFNA FENESTRATED HELICAL BLADE 85MM - STERILE
Type: IMPLANTABLE DEVICE | Site: HIP | Status: FUNCTIONAL
Brand: TFN-ADVANCE

## 2023-06-03 DEVICE — 11MM/125 DEG TI CANN TFNA 360MM/RIGHT - STERILE
Type: IMPLANTABLE DEVICE | Site: HIP | Status: FUNCTIONAL
Brand: TFN-ADVANCE

## 2023-06-03 DEVICE — SCREW BNE L38MM DIA5MM TIB LT GRN TI ST CANN LOK FULL THRD: Type: IMPLANTABLE DEVICE | Site: HIP | Status: FUNCTIONAL

## 2023-06-03 RX ORDER — SODIUM CHLORIDE 0.9 % (FLUSH) 0.9 %
5-40 SYRINGE (ML) INJECTION EVERY 12 HOURS SCHEDULED
Status: DISCONTINUED | OUTPATIENT
Start: 2023-06-03 | End: 2023-06-03 | Stop reason: HOSPADM

## 2023-06-03 RX ORDER — MV-MIN/FA/VIT K/LUTEIN/ZEAXANT 200MCG-5MG
1 CAPSULE ORAL DAILY
COMMUNITY

## 2023-06-03 RX ORDER — PROPOFOL 10 MG/ML
INJECTION, EMULSION INTRAVENOUS PRN
Status: DISCONTINUED | OUTPATIENT
Start: 2023-06-03 | End: 2023-06-03 | Stop reason: SDUPTHER

## 2023-06-03 RX ORDER — SODIUM CHLORIDE 0.9 % (FLUSH) 0.9 %
5-40 SYRINGE (ML) INJECTION PRN
Status: DISCONTINUED | OUTPATIENT
Start: 2023-06-03 | End: 2023-06-07 | Stop reason: HOSPADM

## 2023-06-03 RX ORDER — SODIUM CHLORIDE 9 MG/ML
INJECTION, SOLUTION INTRAVENOUS PRN
Status: DISCONTINUED | OUTPATIENT
Start: 2023-06-03 | End: 2023-06-07 | Stop reason: HOSPADM

## 2023-06-03 RX ORDER — FENTANYL CITRATE 50 UG/ML
25 INJECTION, SOLUTION INTRAMUSCULAR; INTRAVENOUS EVERY 5 MIN PRN
Status: DISCONTINUED | OUTPATIENT
Start: 2023-06-03 | End: 2023-06-03 | Stop reason: HOSPADM

## 2023-06-03 RX ORDER — DIPHENHYDRAMINE HCL 25 MG
25 CAPSULE ORAL EVERY 6 HOURS PRN
Status: ON HOLD | COMMUNITY
End: 2023-06-06 | Stop reason: HOSPADM

## 2023-06-03 RX ORDER — ONDANSETRON 4 MG/1
4 TABLET, ORALLY DISINTEGRATING ORAL EVERY 8 HOURS PRN
Status: DISCONTINUED | OUTPATIENT
Start: 2023-06-03 | End: 2023-06-07 | Stop reason: HOSPADM

## 2023-06-03 RX ORDER — FENTANYL CITRATE 50 UG/ML
INJECTION, SOLUTION INTRAMUSCULAR; INTRAVENOUS PRN
Status: DISCONTINUED | OUTPATIENT
Start: 2023-06-03 | End: 2023-06-03 | Stop reason: SDUPTHER

## 2023-06-03 RX ORDER — UBIDECARENONE 100 MG
200 CAPSULE ORAL DAILY
COMMUNITY

## 2023-06-03 RX ORDER — ROCURONIUM BROMIDE 10 MG/ML
INJECTION, SOLUTION INTRAVENOUS PRN
Status: DISCONTINUED | OUTPATIENT
Start: 2023-06-03 | End: 2023-06-03 | Stop reason: SDUPTHER

## 2023-06-03 RX ORDER — TRAMADOL HYDROCHLORIDE 50 MG/1
50 TABLET ORAL EVERY 6 HOURS PRN
COMMUNITY

## 2023-06-03 RX ORDER — FENTANYL CITRATE 50 UG/ML
50 INJECTION, SOLUTION INTRAMUSCULAR; INTRAVENOUS EVERY 5 MIN PRN
Status: DISCONTINUED | OUTPATIENT
Start: 2023-06-03 | End: 2023-06-03 | Stop reason: HOSPADM

## 2023-06-03 RX ORDER — ONDANSETRON 2 MG/ML
4 INJECTION INTRAMUSCULAR; INTRAVENOUS EVERY 6 HOURS PRN
Status: DISCONTINUED | OUTPATIENT
Start: 2023-06-03 | End: 2023-06-07 | Stop reason: HOSPADM

## 2023-06-03 RX ORDER — SACCHAROMYCES BOULARDII 250 MG
250 CAPSULE ORAL 2 TIMES DAILY
COMMUNITY

## 2023-06-03 RX ORDER — SODIUM CHLORIDE 9 MG/ML
INJECTION, SOLUTION INTRAVENOUS PRN
Status: DISCONTINUED | OUTPATIENT
Start: 2023-06-03 | End: 2023-06-03 | Stop reason: HOSPADM

## 2023-06-03 RX ORDER — SODIUM CHLORIDE 0.9 % (FLUSH) 0.9 %
5-40 SYRINGE (ML) INJECTION PRN
Status: DISCONTINUED | OUTPATIENT
Start: 2023-06-03 | End: 2023-06-03 | Stop reason: HOSPADM

## 2023-06-03 RX ORDER — SODIUM CHLORIDE 0.9 % (FLUSH) 0.9 %
5-40 SYRINGE (ML) INJECTION EVERY 12 HOURS SCHEDULED
Status: DISCONTINUED | OUTPATIENT
Start: 2023-06-03 | End: 2023-06-07 | Stop reason: HOSPADM

## 2023-06-03 RX ORDER — SODIUM CHLORIDE 9 MG/ML
INJECTION, SOLUTION INTRAVENOUS CONTINUOUS PRN
Status: DISCONTINUED | OUTPATIENT
Start: 2023-06-03 | End: 2023-06-03 | Stop reason: SDUPTHER

## 2023-06-03 RX ORDER — LIDOCAINE HYDROCHLORIDE 20 MG/ML
INJECTION, SOLUTION EPIDURAL; INFILTRATION; INTRACAUDAL; PERINEURAL PRN
Status: DISCONTINUED | OUTPATIENT
Start: 2023-06-03 | End: 2023-06-03 | Stop reason: SDUPTHER

## 2023-06-03 RX ORDER — ESMOLOL HYDROCHLORIDE 10 MG/ML
INJECTION INTRAVENOUS PRN
Status: DISCONTINUED | OUTPATIENT
Start: 2023-06-03 | End: 2023-06-03 | Stop reason: SDUPTHER

## 2023-06-03 RX ADMIN — AMLODIPINE BESYLATE 10 MG: 10 TABLET ORAL at 10:24

## 2023-06-03 RX ADMIN — FENTANYL CITRATE 25 MCG: 50 INJECTION, SOLUTION INTRAMUSCULAR; INTRAVENOUS at 16:40

## 2023-06-03 RX ADMIN — OXYCODONE AND ACETAMINOPHEN 1 TABLET: 5; 325 TABLET ORAL at 21:21

## 2023-06-03 RX ADMIN — ROCURONIUM BROMIDE 30 MG: 50 INJECTION, SOLUTION INTRAVENOUS at 16:42

## 2023-06-03 RX ADMIN — SODIUM CHLORIDE: 9 INJECTION, SOLUTION INTRAVENOUS at 16:35

## 2023-06-03 RX ADMIN — ESMOLOL HYDROCHLORIDE 5 MG: 10 INJECTION, SOLUTION INTRAVENOUS at 16:56

## 2023-06-03 RX ADMIN — LIDOCAINE HYDROCHLORIDE 20 MG: 20 INJECTION, SOLUTION EPIDURAL; INFILTRATION; INTRACAUDAL; PERINEURAL at 16:41

## 2023-06-03 RX ADMIN — PROPOFOL 50 MG: 10 INJECTION, EMULSION INTRAVENOUS at 16:41

## 2023-06-03 RX ADMIN — FENTANYL CITRATE 50 MCG: 50 INJECTION, SOLUTION INTRAMUSCULAR; INTRAVENOUS at 18:02

## 2023-06-03 RX ADMIN — TRANEXAMIC ACID 1000 MG: 100 INJECTION, SOLUTION INTRAVENOUS at 16:47

## 2023-06-03 RX ADMIN — ONDANSETRON 4 MG: 2 INJECTION INTRAMUSCULAR; INTRAVENOUS at 15:29

## 2023-06-03 RX ADMIN — ONDANSETRON 4 MG: 2 INJECTION INTRAMUSCULAR; INTRAVENOUS at 21:20

## 2023-06-03 RX ADMIN — ESMOLOL HYDROCHLORIDE 5 MG: 10 INJECTION, SOLUTION INTRAVENOUS at 17:00

## 2023-06-03 RX ADMIN — MORPHINE SULFATE 2 MG: 2 INJECTION, SOLUTION INTRAMUSCULAR; INTRAVENOUS at 08:24

## 2023-06-03 RX ADMIN — Medication 4.5 MG: at 21:20

## 2023-06-03 RX ADMIN — MORPHINE SULFATE 2 MG: 2 INJECTION, SOLUTION INTRAMUSCULAR; INTRAVENOUS at 01:25

## 2023-06-03 RX ADMIN — SODIUM CHLORIDE: 9 INJECTION, SOLUTION INTRAVENOUS at 01:38

## 2023-06-03 RX ADMIN — WATER 2000 MG: 1 INJECTION INTRAMUSCULAR; INTRAVENOUS; SUBCUTANEOUS at 16:40

## 2023-06-03 RX ADMIN — ONDANSETRON 4 MG: 2 INJECTION INTRAMUSCULAR; INTRAVENOUS at 02:45

## 2023-06-03 RX ADMIN — SUGAMMADEX 200 MG: 100 INJECTION, SOLUTION INTRAVENOUS at 17:36

## 2023-06-03 RX ADMIN — GABAPENTIN 300 MG: 300 CAPSULE ORAL at 21:20

## 2023-06-03 RX ADMIN — SODIUM CHLORIDE, PRESERVATIVE FREE 10 ML: 5 INJECTION INTRAVENOUS at 21:19

## 2023-06-03 RX ADMIN — ASPIRIN 81 MG: 81 TABLET, COATED ORAL at 21:20

## 2023-06-03 RX ADMIN — Medication 10 ML: at 01:25

## 2023-06-03 RX ADMIN — ENOXAPARIN SODIUM 40 MG: 100 INJECTION SUBCUTANEOUS at 21:19

## 2023-06-03 RX ADMIN — MORPHINE SULFATE 2 MG: 2 INJECTION, SOLUTION INTRAMUSCULAR; INTRAVENOUS at 05:33

## 2023-06-03 RX ADMIN — FENTANYL CITRATE 25 MCG: 50 INJECTION, SOLUTION INTRAMUSCULAR; INTRAVENOUS at 16:55

## 2023-06-03 ASSESSMENT — PAIN DESCRIPTION - DESCRIPTORS
DESCRIPTORS: ACHING;DISCOMFORT
DESCRIPTORS: DISCOMFORT;SHARP
DESCRIPTORS: ACHING
DESCRIPTORS: DISCOMFORT;SHARP
DESCRIPTORS: THROBBING

## 2023-06-03 ASSESSMENT — PAIN SCALES - PAIN ASSESSMENT IN ADVANCED DEMENTIA (PAINAD)
CONSOLABILITY: 2
NEGVOCALIZATION: 1
TOTALSCORE: 7
NEGVOCALIZATION: 1
CONSOLABILITY: 2
TOTALSCORE: 7
BREATHING: 1
BODYLANGUAGE: 1
NEGVOCALIZATION: 1
TOTALSCORE: 7
FACIALEXPRESSION: 2
BODYLANGUAGE: 1
BREATHING: 1
FACIALEXPRESSION: 2
CONSOLABILITY: 2
BREATHING: 1
FACIALEXPRESSION: 2
BODYLANGUAGE: 1

## 2023-06-03 ASSESSMENT — PAIN DESCRIPTION - FREQUENCY: FREQUENCY: INTERMITTENT

## 2023-06-03 ASSESSMENT — PAIN SCALES - GENERAL
PAINLEVEL_OUTOF10: 8
PAINLEVEL_OUTOF10: 2
PAINLEVEL_OUTOF10: 1
PAINLEVEL_OUTOF10: 7
PAINLEVEL_OUTOF10: 9
PAINLEVEL_OUTOF10: 7
PAINLEVEL_OUTOF10: 6

## 2023-06-03 ASSESSMENT — PAIN DESCRIPTION - ORIENTATION
ORIENTATION: RIGHT

## 2023-06-03 ASSESSMENT — PAIN DESCRIPTION - LOCATION
LOCATION: HIP

## 2023-06-03 ASSESSMENT — ENCOUNTER SYMPTOMS: SHORTNESS OF BREATH: 1

## 2023-06-03 ASSESSMENT — PAIN DESCRIPTION - PAIN TYPE
TYPE: SURGICAL PAIN
TYPE: ACUTE PAIN

## 2023-06-03 ASSESSMENT — PAIN DESCRIPTION - ONSET: ONSET: ON-GOING

## 2023-06-03 NOTE — ANESTHESIA POSTPROCEDURE EVALUATION
Department of Anesthesiology  Postprocedure Note    Patient: Ofe Cummings  MRN: [id number]  YOB: 1927  Date of evaluation: 6/3/2023      Procedure Summary     Date: 06/03/23 Room / Location: [place] [address] / [facility]    Anesthesia Start: 8247 Anesthesia Stop: 1749    Procedure: RIGHT HIP OPEN REDUCTION INTERNAL FIXATION (Right: Hip) Diagnosis:       Closed fracture of right hip, initial encounter ([facility]      (Closed fracture of right hip, initial encounter ([facility] [address] [address] [S72.001A])    Surgeons: Fermin Wray DO Responsible Provider: Jim Vázquez MD    Anesthesia Type: general ASA Status: 3          Anesthesia Type: No value filed.     Jayjay Phase I:      Jayjay Phase II:        Anesthesia Post Evaluation    Patient location during evaluation: PACU  Patient participation: complete - patient participated  Level of consciousness: awake and alert  Pain score: 1  Airway patency: patent  Nausea & Vomiting: no nausea and no vomiting  Complications: no  Cardiovascular status: hemodynamically stable  Respiratory status: acceptable, nonlabored ventilation and spontaneous ventilation  Hydration status: euvolemic

## 2023-06-03 NOTE — ANESTHESIA PRE PROCEDURE
CORONARY ARTERY BYPASS GRAFT      8/28/10    ENDOSCOPY, COLON, DIAGNOSTIC      FOOT DEBRIDEMENT Left 5/16/2021    FOOT DEBRIDEMENT INCISION AND DRAINAGE. performed by Rosalba Negrete DPM at [address] [facility] Left 5/21/2021    LEFT FOOT DEBRIDEMENT  WITH DELAYED PRIMARY CLOSURE performed by Jaguar Medeiros DPM at [address] [facility] Left 12/2/2022    LEFT HIP OPEN REDUCTION INTERNAL FIXATION performed by Zina Painter MD at [address] [facility] ([address] [facility])      frankie and bso 1997    TONSILLECTOMY AND ADENOIDECTOMY      UPPER GASTROINTESTINAL ENDOSCOPY N/A 7/18/2022    EGD ESOPHAGOGASTRODUODENOSCOPY performed by Freddie Bahena MD at [address] History:    Social History     Tobacco Use    Smoking status: Never    Smokeless tobacco: Never   Substance Use Topics    Alcohol use: Yes     Comment: occasional                                Counseling given: Not Answered      Vital Signs (Current):   Vitals:    06/03/23 0255 06/03/23 0300 06/03/23 0854 06/03/23 0930   BP:    (!) 148/68   Pulse:    67   Resp:   18 16   Temp:    98.1 °F (36.7 °C)   TempSrc:    Oral   SpO2: (!) 87% 95%  94%   Weight:       Height:                                                  BP Readings from Last 3 Encounters:   06/03/23 (!) 148/68   12/05/22 (!) 163/64   11/28/22 (!) 158/56       NPO Status: Time of last liquid consumption: 2000                        Time of last solid consumption: 1700                        Date of last liquid consumption: 06/02/23                        Date of last solid food consumption: 06/02/23    BMI:   Wt Readings from Last 3 Encounters:   06/02/23 123 lb (55.8 kg)   02/03/23 120 lb (54.4 kg)   12/16/22 130 lb (59 kg)     Body mass index is 21.11 kg/m².     CBC:   Lab Results   Component Value Date/Time    WBC 12.4 06/03/2023 05:50 AM    RBC 2.24 06/03/2023 05:50 AM    HGB 8.1 06/03/2023 05:50 AM    HCT 23.1 06/03/2023 05:50 AM    .1

## 2023-06-04 LAB
ANION GAP SERPL CALCULATED.3IONS-SCNC: 7 MMOL/L (ref 7–16)
BUN SERPL-MCNC: 20 MG/DL (ref 6–23)
CALCIUM SERPL-MCNC: 8.1 MG/DL (ref 8.6–10.2)
CHLORIDE SERPL-SCNC: 103 MMOL/L (ref 98–107)
CO2 SERPL-SCNC: 22 MMOL/L (ref 22–29)
CREAT SERPL-MCNC: 0.9 MG/DL (ref 0.5–1)
GLUCOSE SERPL-MCNC: 191 MG/DL (ref 74–99)
METER GLUCOSE: 153 MG/DL (ref 74–99)
METER GLUCOSE: 168 MG/DL (ref 74–99)
METER GLUCOSE: 174 MG/DL (ref 74–99)
METER GLUCOSE: 175 MG/DL (ref 74–99)
POTASSIUM SERPL-SCNC: 4 MMOL/L (ref 3.5–5)
SODIUM SERPL-SCNC: 132 MMOL/L (ref 132–146)

## 2023-06-04 PROCEDURE — 80048 BASIC METABOLIC PNL TOTAL CA: CPT

## 2023-06-04 PROCEDURE — 82962 GLUCOSE BLOOD TEST: CPT

## 2023-06-04 PROCEDURE — 2580000003 HC RX 258: Performed by: FAMILY MEDICINE

## 2023-06-04 PROCEDURE — 6370000000 HC RX 637 (ALT 250 FOR IP): Performed by: FAMILY MEDICINE

## 2023-06-04 PROCEDURE — 36415 COLL VENOUS BLD VENIPUNCTURE: CPT

## 2023-06-04 PROCEDURE — 2700000000 HC OXYGEN THERAPY PER DAY

## 2023-06-04 PROCEDURE — 1200000000 HC SEMI PRIVATE

## 2023-06-04 PROCEDURE — 6360000002 HC RX W HCPCS: Performed by: FAMILY MEDICINE

## 2023-06-04 RX ADMIN — ASPIRIN 81 MG: 81 TABLET, COATED ORAL at 08:11

## 2023-06-04 RX ADMIN — CEFAZOLIN 2000 MG: 2 INJECTION, POWDER, FOR SOLUTION INTRAMUSCULAR; INTRAVENOUS at 08:11

## 2023-06-04 RX ADMIN — GABAPENTIN 300 MG: 300 CAPSULE ORAL at 22:25

## 2023-06-04 RX ADMIN — PANTOPRAZOLE SODIUM 40 MG: 40 TABLET, DELAYED RELEASE ORAL at 06:09

## 2023-06-04 RX ADMIN — OXYCODONE AND ACETAMINOPHEN 1 TABLET: 5; 325 TABLET ORAL at 10:46

## 2023-06-04 RX ADMIN — ASPIRIN 81 MG: 81 TABLET, COATED ORAL at 22:25

## 2023-06-04 RX ADMIN — OXYCODONE AND ACETAMINOPHEN 1 TABLET: 5; 325 TABLET ORAL at 18:19

## 2023-06-04 RX ADMIN — DOCUSATE SODIUM 100 MG: 100 CAPSULE, LIQUID FILLED ORAL at 08:11

## 2023-06-04 RX ADMIN — AMLODIPINE BESYLATE 10 MG: 10 TABLET ORAL at 08:11

## 2023-06-04 RX ADMIN — OXYCODONE AND ACETAMINOPHEN 1 TABLET: 5; 325 TABLET ORAL at 14:22

## 2023-06-04 RX ADMIN — POLYETHYLENE GLYCOL 3350 17 G: 17 POWDER, FOR SOLUTION ORAL at 08:11

## 2023-06-04 RX ADMIN — SODIUM CHLORIDE: 9 INJECTION, SOLUTION INTRAVENOUS at 00:35

## 2023-06-04 RX ADMIN — CEFAZOLIN 2000 MG: 2 INJECTION, POWDER, FOR SOLUTION INTRAMUSCULAR; INTRAVENOUS at 00:36

## 2023-06-04 RX ADMIN — Medication 4.5 MG: at 22:25

## 2023-06-04 RX ADMIN — ENOXAPARIN SODIUM 40 MG: 100 INJECTION SUBCUTANEOUS at 22:25

## 2023-06-04 ASSESSMENT — ENCOUNTER SYMPTOMS
NAUSEA: 0
DIARRHEA: 0
VOMITING: 0
SHORTNESS OF BREATH: 0
COUGH: 0

## 2023-06-04 ASSESSMENT — PAIN SCALES - GENERAL
PAINLEVEL_OUTOF10: 7

## 2023-06-05 PROBLEM — E87.1 HYPONATREMIA: Status: RESOLVED | Noted: 2021-05-18 | Resolved: 2023-06-05

## 2023-06-05 LAB
ALBUMIN SERPL-MCNC: 2.6 G/DL (ref 3.5–5.2)
ALP SERPL-CCNC: 59 U/L (ref 35–104)
ALT SERPL-CCNC: 7 U/L (ref 0–32)
ANION GAP SERPL CALCULATED.3IONS-SCNC: 7 MMOL/L (ref 7–16)
AST SERPL-CCNC: 28 U/L (ref 0–31)
BASOPHILIC STIPPLING: ABNORMAL
BASOPHILS # BLD: 0.01 E9/L (ref 0–0.2)
BASOPHILS NFR BLD: 0.1 % (ref 0–2)
BILIRUB SERPL-MCNC: 0.2 MG/DL (ref 0–1.2)
BLOOD BANK DISPENSE STATUS: NORMAL
BLOOD BANK DISPENSE STATUS: NORMAL
BLOOD BANK PRODUCT CODE: NORMAL
BLOOD BANK PRODUCT CODE: NORMAL
BPU ID: NORMAL
BPU ID: NORMAL
BUN SERPL-MCNC: 19 MG/DL (ref 6–23)
CALCIUM SERPL-MCNC: 8.4 MG/DL (ref 8.6–10.2)
CHLORIDE SERPL-SCNC: 104 MMOL/L (ref 98–107)
CO2 SERPL-SCNC: 22 MMOL/L (ref 22–29)
CREAT SERPL-MCNC: 0.7 MG/DL (ref 0.5–1)
DESCRIPTION BLOOD BANK: NORMAL
DESCRIPTION BLOOD BANK: NORMAL
EOSINOPHIL # BLD: 0.39 E9/L (ref 0.05–0.5)
EOSINOPHIL NFR BLD: 5.1 % (ref 0–6)
ERYTHROCYTE [DISTWIDTH] IN BLOOD BY AUTOMATED COUNT: 13.5 FL (ref 11.5–15)
GLUCOSE SERPL-MCNC: 140 MG/DL (ref 74–99)
HCT VFR BLD AUTO: 14.7 % (ref 34–48)
HCT VFR BLD AUTO: 24.2 % (ref 34–48)
HGB BLD-MCNC: 4.8 G/DL (ref 11.5–15.5)
HGB BLD-MCNC: 8.4 G/DL (ref 11.5–15.5)
IMM GRANULOCYTES # BLD: 0.05 E9/L
IMM GRANULOCYTES NFR BLD: 0.7 % (ref 0–5)
LYMPHOCYTES # BLD: 0.65 E9/L (ref 1.5–4)
LYMPHOCYTES NFR BLD: 8.5 % (ref 20–42)
MCH RBC QN AUTO: 36.1 PG (ref 26–35)
MCHC RBC AUTO-ENTMCNC: 32.7 % (ref 32–34.5)
MCV RBC AUTO: 110.5 FL (ref 80–99.9)
METER GLUCOSE: 141 MG/DL (ref 74–99)
METER GLUCOSE: 209 MG/DL (ref 74–99)
METER GLUCOSE: 209 MG/DL (ref 74–99)
METER GLUCOSE: 291 MG/DL (ref 74–99)
MONOCYTES # BLD: 0.63 E9/L (ref 0.1–0.95)
MONOCYTES NFR BLD: 8.3 % (ref 2–12)
MRSA SPEC QL CULT: NORMAL
NEUTROPHILS # BLD: 5.88 E9/L (ref 1.8–7.3)
NEUTS SEG NFR BLD: 77.3 % (ref 43–80)
PLATELET # BLD AUTO: 217 E9/L (ref 130–450)
PMV BLD AUTO: 9.9 FL (ref 7–12)
POLYCHROMASIA: ABNORMAL
POTASSIUM SERPL-SCNC: 3.8 MMOL/L (ref 3.5–5)
PROT SERPL-MCNC: 5.1 G/DL (ref 6.4–8.3)
RBC # BLD AUTO: 1.33 E12/L (ref 3.5–5.5)
REASON FOR REJECTION: NORMAL
REJECTED TEST: NORMAL
ROULEAUX: ABNORMAL
SODIUM SERPL-SCNC: 133 MMOL/L (ref 132–146)
WBC # BLD: 7.6 E9/L (ref 4.5–11.5)

## 2023-06-05 PROCEDURE — 2580000003 HC RX 258: Performed by: FAMILY MEDICINE

## 2023-06-05 PROCEDURE — 6370000000 HC RX 637 (ALT 250 FOR IP): Performed by: FAMILY MEDICINE

## 2023-06-05 PROCEDURE — 85018 HEMOGLOBIN: CPT

## 2023-06-05 PROCEDURE — 2700000000 HC OXYGEN THERAPY PER DAY

## 2023-06-05 PROCEDURE — 36430 TRANSFUSION BLD/BLD COMPNT: CPT

## 2023-06-05 PROCEDURE — 1200000000 HC SEMI PRIVATE

## 2023-06-05 PROCEDURE — 82962 GLUCOSE BLOOD TEST: CPT

## 2023-06-05 PROCEDURE — 85025 COMPLETE CBC W/AUTO DIFF WBC: CPT

## 2023-06-05 PROCEDURE — 85014 HEMATOCRIT: CPT

## 2023-06-05 PROCEDURE — 80053 COMPREHEN METABOLIC PANEL: CPT

## 2023-06-05 PROCEDURE — 36415 COLL VENOUS BLD VENIPUNCTURE: CPT

## 2023-06-05 RX ORDER — SODIUM CHLORIDE 9 MG/ML
INJECTION, SOLUTION INTRAVENOUS PRN
Status: DISCONTINUED | OUTPATIENT
Start: 2023-06-05 | End: 2023-06-07 | Stop reason: HOSPADM

## 2023-06-05 RX ADMIN — Medication 4.5 MG: at 21:08

## 2023-06-05 RX ADMIN — OXYCODONE AND ACETAMINOPHEN 1 TABLET: 5; 325 TABLET ORAL at 22:23

## 2023-06-05 RX ADMIN — ASPIRIN 81 MG: 81 TABLET, COATED ORAL at 08:49

## 2023-06-05 RX ADMIN — OXYCODONE AND ACETAMINOPHEN 1 TABLET: 5; 325 TABLET ORAL at 12:39

## 2023-06-05 RX ADMIN — PANTOPRAZOLE SODIUM 40 MG: 40 TABLET, DELAYED RELEASE ORAL at 05:46

## 2023-06-05 RX ADMIN — AMLODIPINE BESYLATE 10 MG: 10 TABLET ORAL at 08:49

## 2023-06-05 RX ADMIN — POLYVINYL ALCOHOL 1 DROP: 14 SOLUTION/ DROPS OPHTHALMIC at 21:09

## 2023-06-05 RX ADMIN — DOCUSATE SODIUM 100 MG: 100 CAPSULE, LIQUID FILLED ORAL at 08:49

## 2023-06-05 RX ADMIN — POLYETHYLENE GLYCOL 3350 17 G: 17 POWDER, FOR SOLUTION ORAL at 08:49

## 2023-06-05 RX ADMIN — OXYCODONE AND ACETAMINOPHEN 1 TABLET: 5; 325 TABLET ORAL at 06:05

## 2023-06-05 RX ADMIN — INSULIN LISPRO 2 UNITS: 100 INJECTION, SOLUTION INTRAVENOUS; SUBCUTANEOUS at 16:29

## 2023-06-05 RX ADMIN — POLYVINYL ALCOHOL 1 DROP: 14 SOLUTION/ DROPS OPHTHALMIC at 08:48

## 2023-06-05 RX ADMIN — INSULIN LISPRO 2 UNITS: 100 INJECTION, SOLUTION INTRAVENOUS; SUBCUTANEOUS at 11:38

## 2023-06-05 RX ADMIN — ASPIRIN 81 MG: 81 TABLET, COATED ORAL at 21:08

## 2023-06-05 RX ADMIN — SODIUM CHLORIDE, PRESERVATIVE FREE 10 ML: 5 INJECTION INTRAVENOUS at 21:09

## 2023-06-05 RX ADMIN — SODIUM CHLORIDE: 9 INJECTION, SOLUTION INTRAVENOUS at 05:45

## 2023-06-05 RX ADMIN — GABAPENTIN 300 MG: 300 CAPSULE ORAL at 21:08

## 2023-06-05 ASSESSMENT — PAIN DESCRIPTION - DESCRIPTORS
DESCRIPTORS: THROBBING
DESCRIPTORS: THROBBING;ACHING
DESCRIPTORS: ACHING;DISCOMFORT

## 2023-06-05 ASSESSMENT — PAIN DESCRIPTION - ORIENTATION
ORIENTATION: RIGHT

## 2023-06-05 ASSESSMENT — PAIN SCALES - GENERAL
PAINLEVEL_OUTOF10: 6
PAINLEVEL_OUTOF10: 3
PAINLEVEL_OUTOF10: 0
PAINLEVEL_OUTOF10: 7
PAINLEVEL_OUTOF10: 6

## 2023-06-05 ASSESSMENT — PAIN DESCRIPTION - LOCATION
LOCATION: FOOT
LOCATION: HIP;LEG
LOCATION: HIP

## 2023-06-05 NOTE — DISCHARGE INSTR - COC
Resolved By    MRSA 10/03/22 10/06/22 10/03/22 Culture, Wound        Buttock 10/3/22    Resolved    COVID-19 (Rule Out) 07/17/22 07/17/22 07/17/22 COVID-19, Rapid (Ordered)   07/17/22 Rule-Out Test Resulted            Nurse Assessment:  Last Vital Signs: /60   Pulse 61   Temp 99 °F (37.2 °C) (Oral)   Resp 16   Ht 5' 4\" (1.626 m)   Wt 136 lb 4.8 oz (61.8 kg)   LMP 12/03/1997   SpO2 97%   BMI 23.40 kg/m²     Last documented pain score (0-10 scale): Pain Level: 0  Last Weight:   Wt Readings from Last 1 Encounters:   06/05/23 136 lb 4.8 oz (61.8 kg)     Mental Status:  alert & oriented, disoriented at times    IV Access:  - None    Nursing Mobility/ADLs:  Walking   Dependent  Transfer  Dependent  Bathing  Dependent  Dressing  Dependent  Toileting  Dependent  Feeding  Independent  Med Admin  Assisted  Med Delivery   none    Wound Care Documentation and Therapy:  Wound 12/01/22 Buttocks Right (Active)   Number of days: 185       Wound 12/01/22 Elbow Anterior; Left (Active)   Number of days: 185       Incision 12/02/22 Hip Left;Lateral (Active)   Number of days: 185       Incision 06/03/23 Hip Right;Lateral (Active)   Dressing Status Old drainage noted; Intact 06/05/23 0800   Incision Cleansed Cleansed with saline 06/03/23 1733   Dressing/Treatment Alginate with Ag 06/04/23 0759   Closure Sutures; Staples 06/03/23 1733   Margins Approximated 06/03/23 1733   Drainage Amount Scant 06/05/23 0800   Drainage Description Sanguinous 06/03/23 2120   Odor None 06/03/23 2120   Ira-incision Assessment Other (Comment) 06/03/23 2120   Number of days: 1        Elimination:  Continence: Bowel: No  Bladder: Purewick  Urinary Catheter:  Purewick    Colostomy/Ileostomy/Ileal Conduit: No       Date of Last BM: ***    Intake/Output Summary (Last 24 hours) at 6/5/2023 1520  Last data filed at 6/5/2023 1450  Gross per 24 hour   Intake 3415.78 ml   Output 500 ml   Net 2915.78 ml     I/O last 3 completed shifts:   In: 4370.2

## 2023-06-05 NOTE — PATIENT CARE CONFERENCE
[facility] Quality Flow/Interdisciplinary Rounds Progress Note        Quality Flow Rounds held on June 5, 2023    Disciplines Attending:  Bedside Nurse, , , and Nursing Unit Leadership    Zakia Emanuel was admitted on 6/2/2023  8:38 PM    Anticipated Discharge Date:       Disposition:    Scott Score:  Scott Scale Score: 15    Readmission Risk              Risk of Unplanned Readmission:  23           Discussed patient goal for the day, patient clinical progression, and barriers to discharge.   The following Goal(s) of the Day/Commitment(s) have been identified:  Labs - Report Results      Lashae Campos RN  June 5, 2023

## 2023-06-05 NOTE — PLAN OF CARE
Problem: Pain  Goal: Verbalizes/displays adequate comfort level or baseline comfort level  Outcome: Progressing     Problem: Skin/Tissue Integrity  Goal: Absence of new skin breakdown  Description: 1. Monitor for areas of redness and/or skin breakdown  2. Assess vascular access sites hourly  3. Every 4-6 hours minimum:  Change oxygen saturation probe site  4. Every 4-6 hours:  If on nasal continuous positive airway pressure, respiratory therapy assess nares and determine need for appliance change or resting period.   Outcome: Progressing     Problem: ABCDS Injury Assessment  Goal: Absence of physical injury  Outcome: Progressing     Problem: Safety - Adult  Goal: Free from fall injury  Outcome: Progressing     Problem: Chronic Conditions and Co-morbidities  Goal: Patient's chronic conditions and co-morbidity symptoms are monitored and maintained or improved  Outcome: Progressing     Problem: Discharge Planning  Goal: Discharge to home or other facility with appropriate resources  Outcome: Progressing

## 2023-06-05 NOTE — CARE COORDINATION
Social Work / Discharge Planning : SW followed up with patient and sons. Plan SNF. Choices discussed and they want [facility] on [address]. REferral made to [facility]. Await acceptance. Will need pre-cert. SW to follow.n Electronically signed by GLORIA Grayson on 6/5/23 at 2:49 PM EDT       Addendum: [facility] accepted and will no pre-cert needed. Therapy on hold today due to nursing . Patient receiving second  unit of blood. N 17 generated, 700 and transport forms completed. SW to follow.  Electronically signed by GLORIA Grayson on 6/5/23 at 3:17 PM EDT

## 2023-06-06 LAB
ALBUMIN SERPL-MCNC: 2.7 G/DL (ref 3.5–5.2)
ALP SERPL-CCNC: 64 U/L (ref 35–104)
ALT SERPL-CCNC: 6 U/L (ref 0–32)
ANION GAP SERPL CALCULATED.3IONS-SCNC: 7 MMOL/L (ref 7–16)
AST SERPL-CCNC: 24 U/L (ref 0–31)
BASOPHILS # BLD: 0.03 E9/L (ref 0–0.2)
BASOPHILS NFR BLD: 0.4 % (ref 0–2)
BILIRUB SERPL-MCNC: 0.7 MG/DL (ref 0–1.2)
BUN SERPL-MCNC: 13 MG/DL (ref 6–23)
CALCIUM SERPL-MCNC: 8.7 MG/DL (ref 8.6–10.2)
CHLORIDE SERPL-SCNC: 102 MMOL/L (ref 98–107)
CO2 SERPL-SCNC: 22 MMOL/L (ref 22–29)
CREAT SERPL-MCNC: 0.6 MG/DL (ref 0.5–1)
EOSINOPHIL # BLD: 0.53 E9/L (ref 0.05–0.5)
EOSINOPHIL NFR BLD: 6.2 % (ref 0–6)
ERYTHROCYTE [DISTWIDTH] IN BLOOD BY AUTOMATED COUNT: 17.3 FL (ref 11.5–15)
GLUCOSE SERPL-MCNC: 177 MG/DL (ref 74–99)
HCT VFR BLD AUTO: 23.2 % (ref 34–48)
HGB BLD-MCNC: 8 G/DL (ref 11.5–15.5)
IMM GRANULOCYTES # BLD: 0.13 E9/L
IMM GRANULOCYTES NFR BLD: 1.5 % (ref 0–5)
LYMPHOCYTES # BLD: 1.28 E9/L (ref 1.5–4)
LYMPHOCYTES NFR BLD: 15 % (ref 20–42)
MCH RBC QN AUTO: 33.9 PG (ref 26–35)
MCHC RBC AUTO-ENTMCNC: 34.5 % (ref 32–34.5)
MCV RBC AUTO: 98.3 FL (ref 80–99.9)
METER GLUCOSE: 158 MG/DL (ref 74–99)
METER GLUCOSE: 226 MG/DL (ref 74–99)
METER GLUCOSE: 236 MG/DL (ref 74–99)
METER GLUCOSE: 241 MG/DL (ref 74–99)
MONOCYTES # BLD: 0.62 E9/L (ref 0.1–0.95)
MONOCYTES NFR BLD: 7.3 % (ref 2–12)
NEUTROPHILS # BLD: 5.93 E9/L (ref 1.8–7.3)
NEUTS SEG NFR BLD: 69.6 % (ref 43–80)
PLATELET # BLD AUTO: 232 E9/L (ref 130–450)
PMV BLD AUTO: 10.4 FL (ref 7–12)
POTASSIUM SERPL-SCNC: 3.9 MMOL/L (ref 3.5–5)
PROT SERPL-MCNC: 5.7 G/DL (ref 6.4–8.3)
RBC # BLD AUTO: 2.36 E12/L (ref 3.5–5.5)
SODIUM SERPL-SCNC: 131 MMOL/L (ref 132–146)
WBC # BLD: 8.5 E9/L (ref 4.5–11.5)

## 2023-06-06 PROCEDURE — 97530 THERAPEUTIC ACTIVITIES: CPT

## 2023-06-06 PROCEDURE — 6370000000 HC RX 637 (ALT 250 FOR IP): Performed by: FAMILY MEDICINE

## 2023-06-06 PROCEDURE — 85025 COMPLETE CBC W/AUTO DIFF WBC: CPT

## 2023-06-06 PROCEDURE — 1200000000 HC SEMI PRIVATE

## 2023-06-06 PROCEDURE — 82962 GLUCOSE BLOOD TEST: CPT

## 2023-06-06 PROCEDURE — 2580000003 HC RX 258: Performed by: FAMILY MEDICINE

## 2023-06-06 PROCEDURE — 2700000000 HC OXYGEN THERAPY PER DAY

## 2023-06-06 PROCEDURE — 36415 COLL VENOUS BLD VENIPUNCTURE: CPT

## 2023-06-06 PROCEDURE — 97165 OT EVAL LOW COMPLEX 30 MIN: CPT

## 2023-06-06 PROCEDURE — 80053 COMPREHEN METABOLIC PANEL: CPT

## 2023-06-06 RX ORDER — HYDRALAZINE HYDROCHLORIDE 20 MG/ML
10 INJECTION INTRAMUSCULAR; INTRAVENOUS EVERY 4 HOURS PRN
Status: DISCONTINUED | OUTPATIENT
Start: 2023-06-06 | End: 2023-06-07 | Stop reason: HOSPADM

## 2023-06-06 RX ADMIN — ASPIRIN 81 MG: 81 TABLET, COATED ORAL at 21:48

## 2023-06-06 RX ADMIN — SENNOSIDES 8.6 MG: 8.6 TABLET, FILM COATED ORAL at 16:24

## 2023-06-06 RX ADMIN — ASPIRIN 81 MG: 81 TABLET, COATED ORAL at 08:17

## 2023-06-06 RX ADMIN — POLYETHYLENE GLYCOL 3350 17 G: 17 POWDER, FOR SOLUTION ORAL at 08:17

## 2023-06-06 RX ADMIN — GABAPENTIN 300 MG: 300 CAPSULE ORAL at 21:48

## 2023-06-06 RX ADMIN — AMLODIPINE BESYLATE 10 MG: 10 TABLET ORAL at 08:17

## 2023-06-06 RX ADMIN — SODIUM CHLORIDE, PRESERVATIVE FREE 10 ML: 5 INJECTION INTRAVENOUS at 21:49

## 2023-06-06 RX ADMIN — OXYCODONE AND ACETAMINOPHEN 1 TABLET: 5; 325 TABLET ORAL at 08:19

## 2023-06-06 RX ADMIN — Medication 4.5 MG: at 21:48

## 2023-06-06 RX ADMIN — POLYVINYL ALCOHOL 1 DROP: 14 SOLUTION/ DROPS OPHTHALMIC at 17:59

## 2023-06-06 RX ADMIN — DOCUSATE SODIUM 100 MG: 100 CAPSULE, LIQUID FILLED ORAL at 08:19

## 2023-06-06 RX ADMIN — INSULIN LISPRO 2 UNITS: 100 INJECTION, SOLUTION INTRAVENOUS; SUBCUTANEOUS at 16:17

## 2023-06-06 RX ADMIN — INSULIN LISPRO 2 UNITS: 100 INJECTION, SOLUTION INTRAVENOUS; SUBCUTANEOUS at 11:19

## 2023-06-06 RX ADMIN — SODIUM CHLORIDE, PRESERVATIVE FREE 10 ML: 5 INJECTION INTRAVENOUS at 08:17

## 2023-06-06 RX ADMIN — POLYVINYL ALCOHOL 1 DROP: 14 SOLUTION/ DROPS OPHTHALMIC at 08:18

## 2023-06-06 RX ADMIN — PANTOPRAZOLE SODIUM 40 MG: 40 TABLET, DELAYED RELEASE ORAL at 06:39

## 2023-06-06 RX ADMIN — OXYCODONE AND ACETAMINOPHEN 1 TABLET: 5; 325 TABLET ORAL at 21:48

## 2023-06-06 ASSESSMENT — PAIN DESCRIPTION - LOCATION
LOCATION: LEG;HIP
LOCATION: LEG;ARM

## 2023-06-06 ASSESSMENT — PAIN SCALES - GENERAL
PAINLEVEL_OUTOF10: 9
PAINLEVEL_OUTOF10: 6

## 2023-06-06 ASSESSMENT — PAIN DESCRIPTION - ORIENTATION: ORIENTATION: RIGHT

## 2023-06-06 ASSESSMENT — PAIN - FUNCTIONAL ASSESSMENT: PAIN_FUNCTIONAL_ASSESSMENT: ACTIVITIES ARE NOT PREVENTED

## 2023-06-06 ASSESSMENT — PAIN DESCRIPTION - DESCRIPTORS: DESCRIPTORS: ACHING

## 2023-06-06 NOTE — PATIENT CARE CONFERENCE
[facility] Quality Flow/Interdisciplinary Rounds Progress Note        Quality Flow Rounds held on June 6, 2023    Disciplines Attending:  Bedside Nurse, , , and Nursing Unit Leadership    Elio Fontaine was admitted on 6/2/2023  8:38 PM    Anticipated Discharge Date:  Expected Discharge Date: 06/08/23    Disposition:    Scott Score:  Scott Scale Score: 16    Readmission Risk              Risk of Unplanned Readmission:  19           Discussed patient goal for the day, patient clinical progression, and barriers to discharge.   The following Goal(s) of the Day/Commitment(s) have been identified:  Occupational Therapy - Obtain Consult Order and Physical Therapy - Obtain Consult Order      Louie Tim RN  June 6, 2023

## 2023-06-06 NOTE — PLAN OF CARE
Problem: Skin/Tissue Integrity  Goal: Absence of new skin breakdown  Description: 1. Monitor for areas of redness and/or skin breakdown  2. Assess vascular access sites hourly  3. Every 4-6 hours minimum:  Change oxygen saturation probe site  4. Every 4-6 hours:  If on nasal continuous positive airway pressure, respiratory therapy assess nares and determine need for appliance change or resting period.   6/5/2023 2157 by Heriberto Baez RN  Outcome: Progressing     Problem: ABCDS Injury Assessment  Goal: Absence of physical injury  6/5/2023 2157 by Heriberto Baez RN  Outcome: Progressing     Problem: Safety - Adult  Goal: Free from fall injury  Outcome: Progressing     Problem: Chronic Conditions and Co-morbidities  Goal: Patient's chronic conditions and co-morbidity symptoms are monitored and maintained or improved  Outcome: Progressing     Problem: Discharge Planning  Goal: Discharge to home or other facility with appropriate resources  Outcome: Progressing

## 2023-06-07 VITALS
DIASTOLIC BLOOD PRESSURE: 68 MMHG | TEMPERATURE: 98.2 F | RESPIRATION RATE: 18 BRPM | HEART RATE: 65 BPM | SYSTOLIC BLOOD PRESSURE: 164 MMHG | OXYGEN SATURATION: 95 % | BODY MASS INDEX: 24.41 KG/M2 | WEIGHT: 143 LBS | HEIGHT: 64 IN

## 2023-06-07 LAB
ALBUMIN SERPL-MCNC: 2.9 G/DL (ref 3.5–5.2)
ALP SERPL-CCNC: 76 U/L (ref 35–104)
ALT SERPL-CCNC: 8 U/L (ref 0–32)
ANION GAP SERPL CALCULATED.3IONS-SCNC: 5 MMOL/L (ref 7–16)
AST SERPL-CCNC: 19 U/L (ref 0–31)
BASOPHILS # BLD: 0.02 E9/L (ref 0–0.2)
BASOPHILS NFR BLD: 0.3 % (ref 0–2)
BILIRUB SERPL-MCNC: 0.7 MG/DL (ref 0–1.2)
BUN SERPL-MCNC: 9 MG/DL (ref 6–23)
CALCIUM SERPL-MCNC: 8.8 MG/DL (ref 8.6–10.2)
CHLORIDE SERPL-SCNC: 101 MMOL/L (ref 98–107)
CO2 SERPL-SCNC: 25 MMOL/L (ref 22–29)
CREAT SERPL-MCNC: 0.6 MG/DL (ref 0.5–1)
EOSINOPHIL # BLD: 0.53 E9/L (ref 0.05–0.5)
EOSINOPHIL NFR BLD: 7.8 % (ref 0–6)
ERYTHROCYTE [DISTWIDTH] IN BLOOD BY AUTOMATED COUNT: 16 FL (ref 11.5–15)
GLUCOSE SERPL-MCNC: 171 MG/DL (ref 74–99)
HCT VFR BLD AUTO: 23.2 % (ref 34–48)
HGB BLD-MCNC: 8 G/DL (ref 11.5–15.5)
IMM GRANULOCYTES # BLD: 0.05 E9/L
IMM GRANULOCYTES NFR BLD: 0.7 % (ref 0–5)
LYMPHOCYTES # BLD: 1.07 E9/L (ref 1.5–4)
LYMPHOCYTES NFR BLD: 15.8 % (ref 20–42)
MCH RBC QN AUTO: 34.5 PG (ref 26–35)
MCHC RBC AUTO-ENTMCNC: 34.5 % (ref 32–34.5)
MCV RBC AUTO: 100 FL (ref 80–99.9)
METER GLUCOSE: 142 MG/DL (ref 74–99)
METER GLUCOSE: 244 MG/DL (ref 74–99)
MONOCYTES # BLD: 0.59 E9/L (ref 0.1–0.95)
MONOCYTES NFR BLD: 8.7 % (ref 2–12)
NEUTROPHILS # BLD: 4.52 E9/L (ref 1.8–7.3)
NEUTS SEG NFR BLD: 66.7 % (ref 43–80)
PLATELET # BLD AUTO: 277 E9/L (ref 130–450)
PMV BLD AUTO: 9.9 FL (ref 7–12)
POTASSIUM SERPL-SCNC: 4.5 MMOL/L (ref 3.5–5)
PROT SERPL-MCNC: 5.9 G/DL (ref 6.4–8.3)
RBC # BLD AUTO: 2.32 E12/L (ref 3.5–5.5)
SODIUM SERPL-SCNC: 131 MMOL/L (ref 132–146)
WBC # BLD: 6.8 E9/L (ref 4.5–11.5)

## 2023-06-07 PROCEDURE — 80053 COMPREHEN METABOLIC PANEL: CPT

## 2023-06-07 PROCEDURE — 85025 COMPLETE CBC W/AUTO DIFF WBC: CPT

## 2023-06-07 PROCEDURE — 97110 THERAPEUTIC EXERCISES: CPT

## 2023-06-07 PROCEDURE — 6370000000 HC RX 637 (ALT 250 FOR IP): Performed by: FAMILY MEDICINE

## 2023-06-07 PROCEDURE — 82962 GLUCOSE BLOOD TEST: CPT

## 2023-06-07 PROCEDURE — 2580000003 HC RX 258: Performed by: FAMILY MEDICINE

## 2023-06-07 PROCEDURE — 97161 PT EVAL LOW COMPLEX 20 MIN: CPT

## 2023-06-07 PROCEDURE — 2700000000 HC OXYGEN THERAPY PER DAY

## 2023-06-07 PROCEDURE — 36415 COLL VENOUS BLD VENIPUNCTURE: CPT

## 2023-06-07 RX ADMIN — ASPIRIN 81 MG: 81 TABLET, COATED ORAL at 08:20

## 2023-06-07 RX ADMIN — POLYVINYL ALCOHOL 1 DROP: 14 SOLUTION/ DROPS OPHTHALMIC at 08:21

## 2023-06-07 RX ADMIN — AMLODIPINE BESYLATE 10 MG: 10 TABLET ORAL at 08:20

## 2023-06-07 RX ADMIN — SENNOSIDES 8.6 MG: 8.6 TABLET, FILM COATED ORAL at 14:00

## 2023-06-07 RX ADMIN — POLYETHYLENE GLYCOL 3350 17 G: 17 POWDER, FOR SOLUTION ORAL at 08:20

## 2023-06-07 RX ADMIN — OXYCODONE AND ACETAMINOPHEN 1 TABLET: 5; 325 TABLET ORAL at 06:38

## 2023-06-07 RX ADMIN — SODIUM CHLORIDE, PRESERVATIVE FREE 10 ML: 5 INJECTION INTRAVENOUS at 08:20

## 2023-06-07 RX ADMIN — INSULIN LISPRO 2 UNITS: 100 INJECTION, SOLUTION INTRAVENOUS; SUBCUTANEOUS at 11:30

## 2023-06-07 RX ADMIN — DOCUSATE SODIUM 100 MG: 100 CAPSULE, LIQUID FILLED ORAL at 08:20

## 2023-06-07 RX ADMIN — PANTOPRAZOLE SODIUM 40 MG: 40 TABLET, DELAYED RELEASE ORAL at 06:38

## 2023-06-07 RX ADMIN — OXYCODONE AND ACETAMINOPHEN 1 TABLET: 5; 325 TABLET ORAL at 14:00

## 2023-06-07 ASSESSMENT — PAIN DESCRIPTION - ORIENTATION
ORIENTATION: RIGHT
ORIENTATION: LEFT

## 2023-06-07 ASSESSMENT — PAIN DESCRIPTION - LOCATION
LOCATION: LEG
LOCATION: HIP;LEG

## 2023-06-07 ASSESSMENT — PAIN - FUNCTIONAL ASSESSMENT: PAIN_FUNCTIONAL_ASSESSMENT: ACTIVITIES ARE NOT PREVENTED

## 2023-06-07 ASSESSMENT — PAIN SCALES - GENERAL
PAINLEVEL_OUTOF10: 6
PAINLEVEL_OUTOF10: 5

## 2023-06-07 ASSESSMENT — PAIN DESCRIPTION - DESCRIPTORS
DESCRIPTORS: ACHING
DESCRIPTORS: ACHING

## 2023-06-07 NOTE — DISCHARGE INSTR - DIET

## 2023-06-07 NOTE — CARE COORDINATION
Social Work / Discharge Planning : Patient will be discharged to [facility] at [facility]. Patient and son updated as well  as Dixie from [email or web address] and nursing. SW to follow.  Electronically signed by GLORIA Grayson on 6/7/23 at 11:08 AM EDT

## 2023-06-07 NOTE — PROGRESS NOTES
Date:2023  Patient Name: Patrice Allen  MRN: [id number]  : 1927  ROOM #: [id number]    Occupational Therapy order received, chart reviewed and evaluation attempted this date. Patient is unavailable for OT evaluation due to receiving blood transfusion. Will re-attempt OT evaluation at a later time. Thank you.      Hodan Lundy OTR/L #[id number]
Department of Internal Medicine  Nephrology Attending Progress Note        SUBJECTIVE:  We are following this patient for Hyponatremia. The patient Is table. Post surgery    Physical Exam:    VITALS:  BP (!) 158/68   Pulse 72   Temp 99 °F (37.2 °C) (Axillary)   Resp 16   Ht 5' 4\" (1.626 m)   Wt 136 lb 4.8 oz (61.8 kg)   LMP 12/03/1997   SpO2 99%   BMI 23.40 kg/m²     Constitutional:  well built. Well nourished. No distress. Access Exam:  None   Cardiovascular/Edema:  Heart sounds normal. No murmur. Respiratory:  Breath sounds normal. No rales. Gastrointestinal:  Bowel sounds normal. No organomegaly  Other:  No edema. DATA:    Na 134. Back to normal.    IMPRESSION/RECOMMENDATIONS:      Hyponatremia resolved. Renal sign off. D/C IV fluid.
Dr Frandy Adams said it was ok to continue lovenox (hgb 4.8, tx 2 units)
I have seen and evaluated the patient and agree with the above assessment on today's visit. I have performed the key components of the history and physical examination and concur completely with the findings and plans as documented. Agree with ROS, examination, FMH, PMH, PSH, SocHx, and allergies as above. Patient seen and examined at bedside with the resident. She is resting comfortably. She has no new complaint this morning. We did note hemoglobin of 4.8 this morning and she is receiving blood at bedside. Her right thigh is soft and compressible. She has minimal drainage on her bandage. No evidence of hematoma formation. Foot is warm well perfused and she has active ankle plantar dorsiflexion. She is going to be slow to progress with therapy. She is basically wheelchair-bound at baseline. She will need to discharge to skilled nursing facility. Orthopedic surgery will be following peripherally at this point. Please not hesitate to call with any questions or concerns      Alysa Maradiaga DO   Orthopaedic Surgery   6/5/2023  7:23 AM        Department of Orthopedic Surgery  Progress Note    Patient seen and examined. Pain controlled. No new complaints. Denies chest pain, shortness of breath, dizziness/lightheadedness. Hemoglobin of 4.8 noted this morning, 2 units have already been ordered. VITALS:  BP (!) 147/59   Pulse 73   Temp 98.9 °F (37.2 °C) (Oral)   Resp 16   Ht 5' 4\" (1.626 m)   Wt 136 lb 4.8 oz (61.8 kg)   LMP 12/03/1997   SpO2 94%   BMI 23.40 kg/m²     General:awake, in no acute distress, resting in bed this morning. MUSCULOSKELETAL:   right lower extremity:  Dressing C/D/I small amount of strike through noted on the proximal dressing.  Well contained within the dressing  Compartments soft and compressible  +PF/DF/EHL  +2/4 DP & PT pulses, Brisk Cap refill, Toes warm and perfused  Distal sensation grossly intact to Peroneals, Sural, Saphenous, and tibial nrs    CBC:   Lab
Internal Medicine Progress Note    Patient's name: Reji West  : 1927  Chief complaints (on day of admission): Fall (Fall in shower, right hip pain)  Admission date: 2023  Date of service: 2023   Room: [address] MED SURG  Primary care physician: Rhoda Yarbrough MD  Reason for visit: Follow-up for hip fracture     Subjective  Larnell Boast was seen and examined.   Doing ok today   I think she may be starting to have some hospital acquired confusion   She told me that everyone is ignoring her today and when I asked her who she said her family, the MD force and every one else. She was able to tell me the time place and president however   DW nursing staff   PT still needs to see the patient   This should happen soon the longer she sits in the hospital when it is not necessary the higher risk for sun downing and other complications becomes       Doing well overall   No new issues per patient   She is slightly somnolent on exam   Nursing staff to remove shi today   Otherwise chart reviewed  Planning for [facility]   No family at bedside during exam     Review of Systems  There are no new complaints of chest pain, shortness of breath, abdominal pain, nausea, vomiting, diarrhea, constipation.     Hospital Medications  Current Facility-Administered Medications   Medication Dose Route Frequency Provider Last Rate Last Admin    hydrALAZINE (APRESOLINE) injection 10 mg  10 mg IntraVENous Q4H PRN Tobi Dixon MD        0.9 % sodium chloride infusion   IntraVENous PRN VIK Jarvis - CNP        sodium chloride flush 0.9 % injection 5-40 mL  5-40 mL IntraVENous 2 times per day Tomas Cargo, DO   10 mL at 23    sodium chloride flush 0.9 % injection 5-40 mL  5-40 mL IntraVENous PRN Tomas Cargo, DO        0.9 % sodium chloride infusion   IntraVENous PRN Tomas Cargo, DO        ondansetron (ZOFRAN-ODT) disintegrating tablet 4 mg  4 mg Oral Q8H PRN Tomas Cargo, DO        Or    ondansetron [facility] Quality Flow/Interdisciplinary Rounds Progress Note        Quality Flow Rounds held on June 7, 2023    Disciplines Attending:  Bedside Nurse, , , and Nursing Unit Leadership    Safia Ames was admitted on 6/2/2023  8:38 PM    Anticipated Discharge Date:  Expected Discharge Date: 06/08/23    Disposition:    Scott Score:  Scott Scale Score: 16    Readmission Risk              Risk of Unplanned Readmission:  23           Discussed patient goal for the day, patient clinical progression, and barriers to discharge.   The following Goal(s) of the Day/Commitment(s) have been identified:  Diagnostics - Report Results and Labs - Report Results      Shantel Russo RN  June 7, 2023
Nurse to nurse called to [facility].
Patient's hemoglobin critical at 4.8, hematocrit 14.7, platelets 446. Notified VIK Woody with new order received for two units PRBC and recheck H&H one hour after second unit.
Physical Therapy  Facility/Department: [facility] SURG  Physical Therapy Initial Assessment    Name: Manjinder Saldivar  : 1927  MRN: [id number]  Date of Service: 2023    Attending Provider:  Marcelino Carbajal MD    Evaluating PT:  Lm Montalvo P.T. Room #:  6595/3318-Z  Diagnosis:  Hyponatremia [E87.1]  Closed head injury, initial encounter [S09.90XA]  Scalp hematoma, initial encounter [S00.03XA]  Closed right hip fracture, initial encounter ([facility] [S72.001A]  Closed fracture of femur, intertrochanteric, right, initial encounter ([facility] [address] [S72.141A]  Anemia, unspecified type [D64.9]  Fall in shower [W18. 2XXA]  Procedure/Surgery:  6/3/23 R hip ORIF  Precautions:  WBAT RLE    SUBJECTIVE:    Pt [facility] at [facility]. She performed transfers on her own with ww and used a WC for mobility. OBJECTIVE:   Initial Evaluation  Date: 23 Treatment Short Term/ Long Term   Goals   Was pt agreeable to Eval/treatment? yes     Does pt have pain? C/o R hip pain mild at rest, but increased with movement and WB     Bed Mobility  Rolling: MAX A  Supine to sit: MAX A  Sit to supine: MAX A  Scooting: MAX A  supervision   Transfers Sit to stand: MAX A x2  Stand to sit: MAX A x2  Stand pivot: NA  SBA   Ambulation   NA, pt became light headed and nauseated with standing. 3 feet with ww SBA   WC propulsion NA  100 feet using BUE SBA   AM-PAC 6 Clicks        BLE ROM is WFL, except R hip and knee were limited due to pain. LLE strength is grossly 4-/5 to 4/5 and RLE is grossly 2-/5 to 3-/5. Balance: sitting is MIN A/SBA and standing with ww is MAX A x2  Endurance: fair-    Therapeutic Exercises:  Pt was instructed in/performed supine exercises with the RLE: ankle PF/DF with AAROM 2x15 reps, heel slides and quad sets AAROM 2x10 reps, hip ABD/ADD AAROM 2x10 reps. Patient education  Pt educated on above exs, bed mobility, and transfers.      Patient response to education:   Pt verbalized understanding Pt demonstrated skill Pt
Physician Progress Note      James Klein  CSN #:                  [id number]  :                       1927  ADMIT DATE:       2023 8:38 PM  DISCH DATE:  RESPONDING  PROVIDER #:        Dang Winters DO        QUERY TEXT:    Type of Anemia: Please provide further specificity, if known. Clinical indicators include: bleeding, ferrous sulfate, iron, fe, hematoma,   rbc, hemoglobin, hematocrit, platelets, gi bleed, clots, 8 units, 4 units,   hemorrhage, anemia, gastrointestinal bleeding, hemoptysis, blood in stools,   hematemesis, hematuria, hgb, acute anemia  Options provided:  -- Anemia due to acute blood loss  -- Anemia due to chronic blood loss  -- Anemia due to iron deficiency  -- Anemia due to postoperative blood loss  -- Anemia due to chronic disease  -- Other - I will add my own diagnosis  -- Disagree - Not applicable / Not valid  -- Disagree - Clinically Unable to determine / Unknown        PROVIDER RESPONSE TEXT:    Provider was unable to determine a response for this query. I saw patient in ED, unable to determine cause of anemia from just a CBC.    Suspect multifactorial.      Electronically signed by:  Dang Winters DO 2023 12:25 PM
stroke symptoms otherwise no significant change in symptoms or problems since yesterday as documented in previous progress notes. SCHEDULED MEDICATIONS:   sodium chloride flush  5-40 mL IntraVENous 2 times per day    amLODIPine  10 mg Oral Daily    docusate sodium  100 mg Oral Daily    gabapentin  300 mg Oral Nightly    melatonin  4.5 mg Oral Nightly    pantoprazole  40 mg Oral QAM AC    polyethylene glycol  17 g Oral Daily    polyvinyl alcohol  1 drop Both Eyes BID    aspirin  81 mg Oral BID    insulin lispro  0-8 Units SubCUTAneous TID WC    insulin lispro  0-4 Units SubCUTAneous Nightly       VITAL SIGNS:                                                                                                                          BP (!) 150/68   Pulse 61   Temp 99.1 °F (37.3 °C) (Oral)   Resp 16   Ht 5' 4\" (1.626 m)   Wt 143 lb (64.9 kg)   LMP 12/03/1997   SpO2 94%   BMI 24.55 kg/m²   Patient Vitals for the past 96 hrs (Last 3 readings):   Weight   06/06/23 0038 143 lb (64.9 kg)   06/05/23 0029 136 lb 4.8 oz (61.8 kg)   06/04/23 0000 135 lb (61.2 kg)     OBJECTIVE:    HEENT: PERRL, EOM  Intact; sclera non-icteric, conjunctiva pink. Carotids are brisk in upstroke with normal contour. No carotid bruits. Normal jugular venous pulsation at 45°. No palpable cervical nor supraclavicular nodes. Thyroid not palpable. Trachea midline. Chest: Even excursion  Lungs: CTA B, no expiratory wheezes or rhonchi, no decreased tactile fremitus without inspiratory rales. Heart: Regular  rhythm; S1 > S2, no gallop or murmur. No clicks, rub, palpable thrills   or heaves. PMI nondisplaced, 5th intercostal space MCL. Abdomen: Soft, nontender, nondistended,  {Blank single:19197::\"scaphoid\",\"mildly protuberant\",\"moderately protuberant\",\"grossly protuberant\"}, no masses or organomegaly. Bowel sounds active. Extremities: Without clubbing, cyanosis or edema.  Pulses present 3+ upper extermities bilaterally; {POSITIVE-NEGATIVE
ADL tasks. Pt would benefit from continued skilled OT to increase safety and independence with completion of ADL tasks and functional mobility for improved quality of life. Treatment: OT treatment provided this date includes:   Facilitated bed mobility with cues for proper body mechanics and sequencing to prepare for functional transfers  Instruction/training on safe functional mobility/transfer techniques including hand and feet placement   Facilitated proper positioning/alignment to improve interaction with environment, breathing, overall functioning   Sitting EOB x 5-8 minutes to improve sitting balance and activity tolerance during ADLs    Rehab Potential: Good for established goals. Patient / Family Goal: Pt/son in agreement with POC      Patient and/or family were instructed on functional diagnosis, prognosis/goals and OT plan of care. Demonstrated fair understanding. Eval Complexity: Low    Time In: 1:50 PM   Time Out: 2:17 PM    Total Treatment Time: 12      Min Units   OT Eval Low 97165  X  1    OT Eval Medium 64102      OT Eval High 65750      OT Re-Eval [id number]            ADL/Self Care 57393     Therapeutic Activities 56318  12 2   Therapeutic Ex 98572       Orthotic Management 03047       Manual 33121     Neuro Re-Ed 74898       Non-Billable Time        Evaluation Time additionally includes thorough review of current medical information, gathering information on past medical history/social history and prior level of function, interpretation of standardized testing/informal observation of tasks, assessment of data and development of plan of care and goals.         Evaluating OT: Lynn Enriquez OTR/L #[id number]
Hyponatremia, resolved:  Electrolytes and fluid balance recs per nephro appreciated   Follow BMP  Renal following    Follow labs   DVT prophylaxis  Please see orders for further management and care. Discharge plan: [facility] is the plan, PT OT eval today, needs Pre cert     Electronically signed by Vonnie Negrete MD on 6/6/2023 at 8:03 AM    I can be reached through [facility].
noted)-continue aspirin despite anemia-DC prophylactic Lovenox  Post-op pain control per ortho  PT/OT eventually  Cardio input appreciated for margarito-op risks assessment/management     Acute (post-op) on chronic anemia:  Follow H&H  Transfuse to keep Hb > 7.0-- for 2 units 6/5    Hyponatremia, resolved:  IVF per renal   Follow BMP  Renal following    Follow labs   DVT prophylaxis  Please see orders for further management and care. Discharge plan: [facility] likely needed when medically stable-- not today     Electronically signed by Abilio Nash DO on 6/5/2023 at 10:50 AM    I can be reached through [employer].

## 2023-06-07 NOTE — PLAN OF CARE
Problem: Pain  Goal: Verbalizes/displays adequate comfort level or baseline comfort level  6/6/2023 2239 by Adelaida Wilcox RN  Outcome: Progressing     Problem: Skin/Tissue Integrity  Goal: Absence of new skin breakdown  Description: 1. Monitor for areas of redness and/or skin breakdown  2. Assess vascular access sites hourly  3. Every 4-6 hours minimum:  Change oxygen saturation probe site  4. Every 4-6 hours:  If on nasal continuous positive airway pressure, respiratory therapy assess nares and determine need for appliance change or resting period.   6/6/2023 2239 by Adelaida Wilcox RN  Outcome: Progressing     Problem: ABCDS Injury Assessment  Goal: Absence of physical injury  6/6/2023 2239 by Adelaida Wilcox RN  Outcome: Progressing     Problem: Safety - Adult  Goal: Free from fall injury  6/6/2023 2239 by Adelaida Wilcox RN  Outcome: Progressing     Problem: Chronic Conditions and Co-morbidities  Goal: Patient's chronic conditions and co-morbidity symptoms are monitored and maintained or improved  Outcome: Progressing     Problem: Discharge Planning  Goal: Discharge to home or other facility with appropriate resources  6/6/2023 2239 by Adelaida Wilcox RN  Outcome: Progressing

## 2023-06-08 NOTE — DISCHARGE SUMMARY
Internal Medicine Discharge Summary    NAME: Milagro Reyes :  1927  MRN:  [id number] Faviola Nielsen MD    ADMITTED: 2023   DISCHARGED: 2023  2:19 PM    ADMITTING PHYSICIAN: Ledy att. providers found    PCP: Rich Jimenes MD    CONSULTANT(S):   IP CONSULT TO ORTHOPEDIC SURGERY  IP CONSULT TO INTERNAL MEDICINE  IP CONSULT TO ORTHOPEDIC SURGERY  IP CONSULT TO SOCIAL WORK  IP CONSULT TO CARDIOLOGY  IP CONSULT TO NEPHROLOGY     ADMITTING DIAGNOSIS:   Hyponatremia [E87.1]  Closed head injury, initial encounter [S09.90XA]  Scalp hematoma, initial encounter [S00.03XA]  Closed right hip fracture, [facility] encounter ([facility] [S72.001A]  Closed fracture of femur, intertrochanteric, right, initial encounter ([facility] [S72.141A]  Anemia, unspecified type [D64.9]  Fall in shower [W18. 2XXA]     Please see H&P for further details    DISCHARGE DIAGNOSES:   Active Hospital Problems    Diagnosis     History of GI bleed [Z87.19]      Priority: Medium    Closed right hip fracture, initial encounter ([facility] [S72.001A]     Spinal stenosis [M48.00]     HLD (hyperlipidemia) [E78.5]     Peripheral vascular angioplasty status [Z98.62]     History of pulmonary embolus (PE) [Z86.711]     DM (diabetes mellitus), type 2 (HCC) [E11.9]     Gait instability [R26.81]     PVD (peripheral vascular disease) with claudication (HCC) [I73.9]     GERD (gastroesophageal reflux disease) [K21.9]     Asthma [J45.909]     CAD (coronary artery disease) [I25.10]     HTN (hypertension) [I10]     Idiopathic peripheral neuropathy [G60.9]        BRIEF HISTORY OF PRESENT ILLNESS: Milagro Reyes is a 80 y.o. 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

## 2023-06-30 ENCOUNTER — HOSPITAL ENCOUNTER (OUTPATIENT)
Dept: AUDIOLOGY | Age: 88
Discharge: HOME | End: 2023-06-30
Payer: MEDICARE

## 2023-06-30 PROCEDURE — 92557 COMPREHENSIVE HEARING TEST: CPT | Performed by: AUDIOLOGIST

## 2023-06-30 PROCEDURE — 92567 TYMPANOMETRY: CPT | Performed by: AUDIOLOGIST

## 2023-07-19 ENCOUNTER — APPOINTMENT (OUTPATIENT)
Dept: GENERAL RADIOLOGY | Age: 88
End: 2023-07-19
Payer: MEDICARE

## 2023-07-19 ENCOUNTER — HOSPITAL ENCOUNTER (OUTPATIENT)
Dept: AUDIOLOGY | Age: 88
Discharge: HOME | End: 2023-07-19

## 2023-07-19 ENCOUNTER — HOSPITAL ENCOUNTER (EMERGENCY)
Age: 88
Discharge: HOME | End: 2023-07-19
Attending: EMERGENCY MEDICINE
Payer: MEDICARE

## 2023-07-19 VITALS
DIASTOLIC BLOOD PRESSURE: 77 MMHG | RESPIRATION RATE: 16 BRPM | BODY MASS INDEX: 24.55 KG/M2 | OXYGEN SATURATION: 100 % | HEART RATE: 68 BPM | TEMPERATURE: 98.4 F | SYSTOLIC BLOOD PRESSURE: 108 MMHG | WEIGHT: 143 LBS

## 2023-07-19 DIAGNOSIS — S42.492A OTHER CLOSED DISPLACED FRACTURE OF DISTAL END OF LEFT HUMERUS, INITIAL ENCOUNTER: Primary | ICD-10-CM

## 2023-07-19 PROCEDURE — 73030 X-RAY EXAM OF SHOULDER: CPT

## 2023-07-19 PROCEDURE — 6370000000 HC RX 637 (ALT 250 FOR IP): Performed by: EMERGENCY MEDICINE

## 2023-07-19 PROCEDURE — 9990000010 HC NO CHARGE VISIT: Performed by: AUDIOLOGIST

## 2023-07-19 PROCEDURE — 99283 EMERGENCY DEPT VISIT LOW MDM: CPT

## 2023-07-19 PROCEDURE — 73060 X-RAY EXAM OF HUMERUS: CPT

## 2023-07-19 PROCEDURE — 29105 APPLICATION LONG ARM SPLINT: CPT

## 2023-07-19 RX ORDER — OXYCODONE HYDROCHLORIDE AND ACETAMINOPHEN 5; 325 MG/1; MG/1
1 TABLET ORAL ONCE
Status: COMPLETED | OUTPATIENT
Start: 2023-07-19 | End: 2023-07-19

## 2023-07-19 RX ADMIN — OXYCODONE HYDROCHLORIDE AND ACETAMINOPHEN 1 TABLET: 5; 325 TABLET ORAL at 12:02

## 2023-07-19 ASSESSMENT — PAIN SCALES - GENERAL: PAINLEVEL_OUTOF10: 9

## 2023-07-19 ASSESSMENT — PAIN - FUNCTIONAL ASSESSMENT: PAIN_FUNCTIONAL_ASSESSMENT: 0-10

## 2023-07-19 NOTE — PLAN OF CARE
Orthopedic surgery quick note    Consulted for left distal humerus fracture. Images were reviewed and discussed with emergency room physician. Recommended posterior slab splint and sling immobilization. I will see her in my office for definitive surgical versus nonoperative treatment. She can discharge back to her skilled nursing facility nonweightbearing left lower extremity.     Jadiel Lee DO   Orthopaedic Surgery   7/19/2023  3:02 PM

## 2023-07-19 NOTE — CARE COORDINATION
Social Work/Transition of Care:     Pt in need of transportation back to [address] [place] [address] contacted [facility] [facility] for the facility in order to request facility transport if appropriate. Dixie JOE, has spoken to the facility and pt is able to be transported by facility [facility]. Pt and son updated.     Electronically signed by Fabien Officer on 4/86/3543 at 6:26 PM

## 2023-07-19 NOTE — ED NOTES
Spoke to staff at facility. Pt is there for rehab due to previous hip fractures. Aox1 baseline. Pt was being transferred by physical therapy this morning, grabbed onto the wheelchair arm rest and began yelling in pain. Staff denies fall. Physician at facility was on site, assessed patient and requested pt be sent to ED to r/o pathological fracture to left upper arm.       John Orourke RN  07/19/23 7724

## 2023-07-19 NOTE — ED PROVIDER NOTES
[address] ENCOUNTER        Pt Name: Michelle Huerta  MRN: [id number]  [address] [facility] 9/20/1927  Date of evaluation: 7/19/2023  Provider: Justo Kunz MD  PCP: Levi Gregorio MD  Note Started: 11:23 AM EDT 7/19/23    CHIEF COMPLAINT       Chief Complaint   Patient presents with    Arm Injury     Left arm        HISTORY OF PRESENT ILLNESS: 1 or more Elements   History From:  patient    Limitations to history : None    Michelle Huerta is a 80 y.o. female who presents to the emergency department with pain to her left shoulder and upper arm area. Patient has chronic shoulder pain. She is currently in a rehab facility due to recent hip fractures. She had surgery to hip 6 weeks ago. Patient ambulates only with assistance and physical therapy and rehab. She states staff was trying to help her up and into her wheelchair today when they lifted her she felt pain to the left mid arm area. She did not fall or hit her head no loss of consciousness. Nursing Notes were all reviewed and agreed with or any disagreements were addressed in the HPI. REVIEW OF EXTERNAL NOTE :       Reviewed op note from orthopedic surgery Dr. Ramón Lawrence from 6/3/2023. Patient had a closed right intertrochanteric femur fractur; she had open reduction internal fixation    REVIEW OF SYSTEMS :           Positives and Pertinent negatives as per HPI. SURGICAL HISTORY     Past Surgical History:   Procedure Laterality Date    ABDOMEN SURGERY      APPENDECTOMY      CARDIAC SURGERY      CHOLECYSTECTOMY      COLONOSCOPY      CORONARY ARTERY BYPASS GRAFT      8/28/10    ENDOSCOPY, COLON, DIAGNOSTIC      FOOT DEBRIDEMENT Left 5/16/2021    FOOT DEBRIDEMENT INCISION AND DRAINAGE.    performed by Joselyn Sewell DPM at [address] [place] [address] Left 5/21/2021    LEFT FOOT DEBRIDEMENT  WITH DELAYED PRIMARY CLOSURE performed by Christen Bhandari DPM at [facility] bilaterally, no wheezes, rales, or rhonchi. Not in respiratory distress  Cardiovascular:  Regular rate. Regular rhythm. No murmurs, no gallops, no rubs. 2+ distal pulses. Equal extremity pulses. Chest: No chest wall tenderness  GI:  Abdomen Soft, Non tender, Non distended. No rebound, guarding, or rigidity. No pulsatile masses. Musculoskeletal: Moves all extremities x 4. Warm and well perfused, no clubbing, no cyanosis, no edema. Capillary refill <3 seconds; pain to left mid humerus area mild deformity and swelling no break in skin normal distal pulses to left upper extremity. Normal sensation to entire left upper arm. Integument: skin warm and dry. No rashes. Neurologic: GCS 15, no focal deficits, symmetric strength 5/5 in the upper and lower extremities bilaterally  Psychiatric: Normal Affect            DIAGNOSTIC RESULTS   LABS:      I have personally reviewed and interpreted all laboratory and imaging results for this patient. Results are listed below. LABS:  No results found for this visit on 07/19/23. RADIOLOGY:   Non-plain film images such as CT, Ultrasound and MRI are read by the radiologist. Plain radiographic images are visualized and preliminarily interpreted by the ED Provider with the below findings:    Fracture to left distal humerus    Interpretation per the Radiologist below, if available at the time of this note:    XR HUMERUS LEFT (MIN 2 VIEWS)   Final Result   Fracture seen of the distal humerus with slight angulation medially of the   distal fracture fragment. Osteopenia the bony structures with minimal   degenerative changes seen within the left shoulder. XR SHOULDER LEFT (MIN 2 VIEWS)   Final Result   Fracture seen of the distal humerus with slight angulation medially of the   distal fracture fragment. Osteopenia the bony structures with minimal   degenerative changes seen within the left shoulder. No results found. No results found.     PROCEDURES

## 2023-07-19 NOTE — PROGRESS NOTES
No charge hearing aid fitting/pickup. Son called and Nicole Gaytan broke her arm and is in the ER. She is having a lot of difficulty hearing and they wanted to still  her hearing aids. (She  has worn hearing aids for many years)   Ok'd  by Isaiah Rizo with Hans Marte, Audiology Manager. Son Mata Whitfield was instructed on use and care. He was familiar ,the new hearing aids are similar to her old ones. He will take all to her and update Rehab facility where she is staying. Follow up scheduled for 8/11/23 . For ck up and billing.   Electronically signed by Van Jack on 7/19/2023 at 3:23 PM

## 2023-07-27 ENCOUNTER — OFFICE VISIT (OUTPATIENT)
Dept: ORTHOPEDIC SURGERY | Age: 88
End: 2023-07-27

## 2023-07-27 VITALS — HEIGHT: 64 IN | BODY MASS INDEX: 24.41 KG/M2 | WEIGHT: 143 LBS

## 2023-07-27 DIAGNOSIS — S42.492A OTHER CLOSED DISPLACED FRACTURE OF DISTAL END OF LEFT HUMERUS, INITIAL ENCOUNTER: ICD-10-CM

## 2023-07-27 DIAGNOSIS — Z87.81 S/P RIGHT HIP FRACTURE: Primary | ICD-10-CM

## 2023-07-27 PROCEDURE — 99024 POSTOP FOLLOW-UP VISIT: CPT | Performed by: STUDENT IN AN ORGANIZED HEALTH CARE EDUCATION/TRAINING PROGRAM

## 2023-07-27 NOTE — PROGRESS NOTES
fracture on the left    We a long discussion about her new injury. I think her hip fracture will heal appropriately. We did have a long discussion about the metabolic total hip fracture and surgery takes on 80year-old how she may not recover full independent ambulation and may require wheelchair for long-term distances. They understand this and are happy if she can stand and pivot. As far as her distal humerus goes we did take down her splint and examine her arm. Skin is intact and her fracture appears to be consolidating and stable. Alignment looks better on x-ray. We had a long discussion about operative versus nonoperative management. At her age I think fixing this fracture would be very risky. Given the fact that it was caused just by lifting her on the friend she would fracture around the plate and create a hole another set of problems. We have settled on nonoperative management. She was placed back in her splint today. We will consult orthotics company to fit her for fracture brace. After she is fitted for the fracture brace we will bring her back to the office to repeat x-rays to check the alignment and follow this along conservatively. If her skin becomes a risk of the fracture significantly shift we may have to change gears however I think the best course of action is nonoperative management. The patient and the family are agreeable to this plan. We will see her back after she gets her brace.     Carmenza Shelton, DO   Orthopaedic Surgery   7/27/23   10:54 AM EDT

## 2023-07-31 LAB
ANION GAP SERPL CALCULATED.3IONS-SCNC: 10 MMOL/L (ref 7–16)
BASOPHILS # BLD: 0.01 K/UL (ref 0–0.2)
BASOPHILS NFR BLD: 0 % (ref 0–2)
BUN SERPL-MCNC: 31 MG/DL (ref 6–23)
CALCIUM SERPL-MCNC: 9.7 MG/DL (ref 8.6–10.2)
CHLORIDE SERPL-SCNC: 94 MMOL/L (ref 98–107)
CO2 SERPL-SCNC: 23 MMOL/L (ref 22–29)
CREAT SERPL-MCNC: 0.7 MG/DL (ref 0.5–1)
EOSINOPHIL # BLD: 0 K/UL (ref 0.05–0.5)
EOSINOPHILS RELATIVE PERCENT: 0 % (ref 0–6)
ERYTHROCYTE [DISTWIDTH] IN BLOOD BY AUTOMATED COUNT: 14.7 % (ref 11.5–15)
GFR SERPL CREATININE-BSD FRML MDRD: >60 ML/MIN/1.73M2
GLUCOSE SERPL-MCNC: 216 MG/DL (ref 74–99)
HCT VFR BLD AUTO: 25 % (ref 34–48)
HGB BLD-MCNC: 8.4 G/DL (ref 11.5–15.5)
IMM GRANULOCYTES # BLD AUTO: 0.07 K/UL (ref 0–0.58)
IMM GRANULOCYTES NFR BLD: 1 % (ref 0–5)
LYMPHOCYTES NFR BLD: 0.61 K/UL (ref 1.5–4)
LYMPHOCYTES RELATIVE PERCENT: 5 % (ref 20–42)
MCH RBC QN AUTO: 35.6 PG (ref 26–35)
MCHC RBC AUTO-ENTMCNC: 33.6 G/DL (ref 32–34.5)
MCV RBC AUTO: 105.9 FL (ref 80–99.9)
MONOCYTES NFR BLD: 0.51 K/UL (ref 0.1–0.95)
MONOCYTES NFR BLD: 4 % (ref 2–12)
NEUTROPHILS NFR BLD: 91 % (ref 43–80)
NEUTS SEG NFR BLD: 12.03 K/UL (ref 1.8–7.3)
PLATELET # BLD AUTO: 342 K/UL (ref 130–450)
PMV BLD AUTO: 10.2 FL (ref 7–12)
POTASSIUM SERPL-SCNC: 3.8 MMOL/L (ref 3.5–5)
RBC # BLD AUTO: 2.36 M/UL (ref 3.5–5.5)
SODIUM SERPL-SCNC: 127 MMOL/L (ref 132–146)
WBC OTHER # BLD: 13.2 K/UL (ref 4.5–11.5)

## 2023-08-02 ENCOUNTER — APPOINTMENT (OUTPATIENT)
Dept: GENERAL RADIOLOGY | Age: 88
End: 2023-08-02
Payer: MEDICARE

## 2023-08-02 ENCOUNTER — APPOINTMENT (OUTPATIENT)
Dept: CT IMAGING | Age: 88
End: 2023-08-02
Payer: MEDICARE

## 2023-08-02 ENCOUNTER — HOSPITAL ENCOUNTER (INPATIENT)
Age: 88
LOS: 3 days | Discharge: HOSPICE | End: 2023-08-05
Attending: STUDENT IN AN ORGANIZED HEALTH CARE EDUCATION/TRAINING PROGRAM | Admitting: INTERNAL MEDICINE
Payer: MEDICARE

## 2023-08-02 DIAGNOSIS — R41.82 ALTERED MENTAL STATUS, UNSPECIFIED ALTERED MENTAL STATUS TYPE: ICD-10-CM

## 2023-08-02 DIAGNOSIS — R79.89 ELEVATED TROPONIN: ICD-10-CM

## 2023-08-02 DIAGNOSIS — E86.0 DEHYDRATION: ICD-10-CM

## 2023-08-02 DIAGNOSIS — A41.9 SEPSIS, DUE TO UNSPECIFIED ORGANISM, UNSPECIFIED WHETHER ACUTE ORGAN DYSFUNCTION PRESENT (HCC): Primary | ICD-10-CM

## 2023-08-02 DIAGNOSIS — E87.1 HYPONATREMIA: ICD-10-CM

## 2023-08-02 DIAGNOSIS — H66.011 ACUTE SUPPURATIVE OTITIS MEDIA OF RIGHT EAR WITH SPONTANEOUS RUPTURE OF TYMPANIC MEMBRANE, RECURRENCE NOT SPECIFIED: ICD-10-CM

## 2023-08-02 LAB
ALBUMIN SERPL-MCNC: 3.2 G/DL (ref 3.5–5.2)
ALP SERPL-CCNC: 93 U/L (ref 35–104)
ALT SERPL-CCNC: 20 U/L (ref 0–32)
AMMONIA PLAS-SCNC: 17 UMOL/L (ref 11–51)
ANION GAP SERPL CALCULATED.3IONS-SCNC: 12 MMOL/L (ref 7–16)
AST SERPL-CCNC: 28 U/L (ref 0–31)
B PARAP IS1001 DNA NPH QL NAA+NON-PROBE: NOT DETECTED
B PERT DNA SPEC QL NAA+PROBE: NOT DETECTED
B-OH-BUTYR SERPL-MCNC: 0.18 MMOL/L (ref 0.02–0.27)
B.E.: -1.9 MMOL/L (ref -3–3)
BACTERIA URNS QL MICRO: ABNORMAL
BASOPHILS # BLD: 0.01 K/UL (ref 0–0.2)
BASOPHILS NFR BLD: 0 % (ref 0–2)
BILIRUB SERPL-MCNC: 0.4 MG/DL (ref 0–1.2)
BILIRUB UR QL STRIP: NEGATIVE
BUN SERPL-MCNC: 54 MG/DL (ref 6–23)
C PNEUM DNA NPH QL NAA+NON-PROBE: NOT DETECTED
CALCIUM SERPL-MCNC: 10.8 MG/DL (ref 8.6–10.2)
CHLORIDE SERPL-SCNC: 90 MMOL/L (ref 98–107)
CHP ED QC CHECK: NORMAL
CHP ED QC CHECK: NORMAL
CLARITY UR: ABNORMAL
CO2 SERPL-SCNC: 22 MMOL/L (ref 22–29)
COHB: 0.3 % (ref 0–1.5)
COLOR UR: YELLOW
CREAT SERPL-MCNC: 0.8 MG/DL (ref 0.5–1)
CRITICAL: ABNORMAL
DATE ANALYZED: ABNORMAL
DATE OF COLLECTION: ABNORMAL
EOSINOPHIL # BLD: 0 K/UL (ref 0.05–0.5)
EOSINOPHILS RELATIVE PERCENT: 0 % (ref 0–6)
EPI CELLS #/AREA URNS HPF: ABNORMAL /HPF
ERYTHROCYTE [DISTWIDTH] IN BLOOD BY AUTOMATED COUNT: 14.8 % (ref 11.5–15)
FLUAV RNA NPH QL NAA+NON-PROBE: NOT DETECTED
FLUBV RNA NPH QL NAA+NON-PROBE: NOT DETECTED
GFR SERPL CREATININE-BSD FRML MDRD: >60 ML/MIN/1.73M2
GLUCOSE BLD-MCNC: 417 MG/DL
GLUCOSE BLD-MCNC: 417 MG/DL
GLUCOSE SERPL-MCNC: 396 MG/DL (ref 74–99)
GLUCOSE UR STRIP-MCNC: 100 MG/DL
HADV DNA NPH QL NAA+NON-PROBE: NOT DETECTED
HCO3: 20.5 MMOL/L (ref 22–26)
HCOV 229E RNA NPH QL NAA+NON-PROBE: NOT DETECTED
HCOV HKU1 RNA NPH QL NAA+NON-PROBE: NOT DETECTED
HCOV NL63 RNA NPH QL NAA+NON-PROBE: NOT DETECTED
HCOV OC43 RNA NPH QL NAA+NON-PROBE: NOT DETECTED
HCT VFR BLD AUTO: 25.9 % (ref 34–48)
HGB BLD-MCNC: 8.7 G/DL (ref 11.5–15.5)
HGB UR QL STRIP.AUTO: NEGATIVE
HHB: 8.8 % (ref 0–5)
HMPV RNA NPH QL NAA+NON-PROBE: NOT DETECTED
HPIV1 RNA NPH QL NAA+NON-PROBE: NOT DETECTED
HPIV2 RNA NPH QL NAA+NON-PROBE: NOT DETECTED
HPIV3 RNA NPH QL NAA+NON-PROBE: NOT DETECTED
HPIV4 RNA NPH QL NAA+NON-PROBE: NOT DETECTED
IMM GRANULOCYTES # BLD AUTO: 0.09 K/UL (ref 0–0.58)
IMM GRANULOCYTES NFR BLD: 1 % (ref 0–5)
INR PPP: 1.4
KETONES UR STRIP-MCNC: NEGATIVE MG/DL
LAB: ABNORMAL
LACTATE BLDV-SCNC: 2.3 MMOL/L (ref 0.5–2.2)
LEUKOCYTE ESTERASE UR QL STRIP: NEGATIVE
LIPASE SERPL-CCNC: 48 U/L (ref 13–60)
LYMPHOCYTES NFR BLD: 0.42 K/UL (ref 1.5–4)
LYMPHOCYTES RELATIVE PERCENT: 4 % (ref 20–42)
Lab: ABNORMAL
M PNEUMO DNA NPH QL NAA+NON-PROBE: NOT DETECTED
MAGNESIUM SERPL-MCNC: 1.8 MG/DL (ref 1.6–2.6)
MCH RBC QN AUTO: 36.1 PG (ref 26–35)
MCHC RBC AUTO-ENTMCNC: 33.6 G/DL (ref 32–34.5)
MCV RBC AUTO: 107.5 FL (ref 80–99.9)
METER GLUCOSE: 417 MG/DL (ref 74–99)
METHB: 0.2 % (ref 0–1.5)
MODE: ABNORMAL
MONOCYTES NFR BLD: 0.24 K/UL (ref 0.1–0.95)
MONOCYTES NFR BLD: 2 % (ref 2–12)
NEUTROPHILS NFR BLD: 94 % (ref 43–80)
NEUTS SEG NFR BLD: 10.99 K/UL (ref 1.8–7.3)
NITRITE UR QL STRIP: NEGATIVE
O2 CONTENT: 13.5 ML/DL
O2 SATURATION: 91.2 % (ref 92–98.5)
O2HB: 90.7 % (ref 94–97)
OPERATOR ID: 467
PARTIAL THROMBOPLASTIN TIME: 30.3 SEC (ref 24.5–35.1)
PATIENT TEMP: 37 C
PCO2: 27.2 MMHG (ref 35–45)
PH BLOOD GAS: 7.49 (ref 7.35–7.45)
PH UR STRIP: 6 [PH] (ref 5–9)
PLATELET # BLD AUTO: 343 K/UL (ref 130–450)
PMV BLD AUTO: 10.6 FL (ref 7–12)
PO2: 56.2 MMHG (ref 75–100)
POTASSIUM SERPL-SCNC: 3.4 MMOL/L (ref 3.5–5)
PROT SERPL-MCNC: 8.4 G/DL (ref 6.4–8.3)
PROT UR STRIP-MCNC: 100 MG/DL
PROTHROMBIN TIME: 15.1 SEC (ref 9.3–12.4)
RBC # BLD AUTO: 2.41 M/UL (ref 3.5–5.5)
RBC # BLD: ABNORMAL 10*6/UL
RBC #/AREA URNS HPF: ABNORMAL /HPF
RSV RNA NPH QL NAA+NON-PROBE: NOT DETECTED
RV+EV RNA NPH QL NAA+NON-PROBE: NOT DETECTED
SARS-COV-2 RNA NPH QL NAA+NON-PROBE: NOT DETECTED
SODIUM SERPL-SCNC: 124 MMOL/L (ref 132–146)
SOURCE, BLOOD GAS: ABNORMAL
SP GR UR STRIP: 1.02 (ref 1–1.03)
SPECIMEN DESCRIPTION: NORMAL
T4 SERPL-MCNC: 5 UG/DL (ref 4.5–11.7)
THB: 10.6 G/DL (ref 11.5–16.5)
TIME ANALYZED: 1526
TROPONIN I SERPL HS-MCNC: 107 NG/L (ref 0–9)
TROPONIN I SERPL HS-MCNC: 117 NG/L (ref 0–9)
TSH SERPL DL<=0.05 MIU/L-ACNC: 0.42 UIU/ML (ref 0.27–4.2)
UROBILINOGEN UR STRIP-ACNC: 0.2 EU/DL (ref 0–1)
WBC #/AREA URNS HPF: ABNORMAL /HPF
WBC OTHER # BLD: 11.8 K/UL (ref 4.5–11.5)

## 2023-08-02 PROCEDURE — 84484 ASSAY OF TROPONIN QUANT: CPT

## 2023-08-02 PROCEDURE — 2580000003 HC RX 258: Performed by: STUDENT IN AN ORGANIZED HEALTH CARE EDUCATION/TRAINING PROGRAM

## 2023-08-02 PROCEDURE — 93005 ELECTROCARDIOGRAM TRACING: CPT | Performed by: STUDENT IN AN ORGANIZED HEALTH CARE EDUCATION/TRAINING PROGRAM

## 2023-08-02 PROCEDURE — 82010 KETONE BODYS QUAN: CPT

## 2023-08-02 PROCEDURE — 81001 URINALYSIS AUTO W/SCOPE: CPT

## 2023-08-02 PROCEDURE — 84436 ASSAY OF TOTAL THYROXINE: CPT

## 2023-08-02 PROCEDURE — 82140 ASSAY OF AMMONIA: CPT

## 2023-08-02 PROCEDURE — 0202U NFCT DS 22 TRGT SARS-COV-2: CPT

## 2023-08-02 PROCEDURE — 6370000000 HC RX 637 (ALT 250 FOR IP): Performed by: NURSE PRACTITIONER

## 2023-08-02 PROCEDURE — 74177 CT ABD & PELVIS W/CONTRAST: CPT

## 2023-08-02 PROCEDURE — 71275 CT ANGIOGRAPHY CHEST: CPT

## 2023-08-02 PROCEDURE — 87205 SMEAR GRAM STAIN: CPT

## 2023-08-02 PROCEDURE — 86403 PARTICLE AGGLUT ANTBDY SCRN: CPT

## 2023-08-02 PROCEDURE — 99285 EMERGENCY DEPT VISIT HI MDM: CPT

## 2023-08-02 PROCEDURE — 2060000000 HC ICU INTERMEDIATE R&B

## 2023-08-02 PROCEDURE — 87040 BLOOD CULTURE FOR BACTERIA: CPT

## 2023-08-02 PROCEDURE — 85025 COMPLETE CBC W/AUTO DIFF WBC: CPT

## 2023-08-02 PROCEDURE — 2580000003 HC RX 258

## 2023-08-02 PROCEDURE — 85730 THROMBOPLASTIN TIME PARTIAL: CPT

## 2023-08-02 PROCEDURE — 85610 PROTHROMBIN TIME: CPT

## 2023-08-02 PROCEDURE — 83605 ASSAY OF LACTIC ACID: CPT

## 2023-08-02 PROCEDURE — 70450 CT HEAD/BRAIN W/O DYE: CPT

## 2023-08-02 PROCEDURE — 82805 BLOOD GASES W/O2 SATURATION: CPT

## 2023-08-02 PROCEDURE — 83690 ASSAY OF LIPASE: CPT

## 2023-08-02 PROCEDURE — 6360000002 HC RX W HCPCS

## 2023-08-02 PROCEDURE — 82947 ASSAY GLUCOSE BLOOD QUANT: CPT

## 2023-08-02 PROCEDURE — 80053 COMPREHEN METABOLIC PANEL: CPT

## 2023-08-02 PROCEDURE — 84443 ASSAY THYROID STIM HORMONE: CPT

## 2023-08-02 PROCEDURE — 87070 CULTURE OTHR SPECIMN AEROBIC: CPT

## 2023-08-02 PROCEDURE — 83735 ASSAY OF MAGNESIUM: CPT

## 2023-08-02 PROCEDURE — 71045 X-RAY EXAM CHEST 1 VIEW: CPT

## 2023-08-02 PROCEDURE — 6360000004 HC RX CONTRAST MEDICATION: Performed by: RADIOLOGY

## 2023-08-02 RX ORDER — ACETAMINOPHEN 325 MG/1
650 TABLET ORAL EVERY 6 HOURS PRN
Status: DISCONTINUED | OUTPATIENT
Start: 2023-08-02 | End: 2023-08-05 | Stop reason: HOSPADM

## 2023-08-02 RX ORDER — 0.9 % SODIUM CHLORIDE 0.9 %
500 INTRAVENOUS SOLUTION INTRAVENOUS ONCE
Status: COMPLETED | OUTPATIENT
Start: 2023-08-02 | End: 2023-08-02

## 2023-08-02 RX ORDER — ACETAMINOPHEN 650 MG/1
650 SUPPOSITORY RECTAL EVERY 6 HOURS PRN
Status: DISCONTINUED | OUTPATIENT
Start: 2023-08-02 | End: 2023-08-05 | Stop reason: HOSPADM

## 2023-08-02 RX ADMIN — CEFEPIME 2000 MG: 2 INJECTION, POWDER, FOR SOLUTION INTRAVENOUS at 21:09

## 2023-08-02 RX ADMIN — ACETAMINOPHEN 650 MG: 650 SUPPOSITORY RECTAL at 20:49

## 2023-08-02 RX ADMIN — SODIUM CHLORIDE 500 ML: 9 INJECTION, SOLUTION INTRAVENOUS at 17:42

## 2023-08-02 RX ADMIN — IOPAMIDOL 70 ML: 755 INJECTION, SOLUTION INTRAVENOUS at 16:55

## 2023-08-02 RX ADMIN — SODIUM CHLORIDE 500 ML: 9 INJECTION, SOLUTION INTRAVENOUS at 16:20

## 2023-08-02 NOTE — ED PROVIDER NOTES
Name: John Pruitt    MRN: [id number]     Date / Time Roomed:  8/2/2023  1:26 PM  ED Bed Assignment:  3267/4102-C    ------------------ History of Present Illness --------------------  8/2/23, Time: 1:44 PM EDT   Chief Complaint   Patient presents with    Altered Mental Status     Ams and sob      HPI    John Pruitt is a 80 y.o. female, with hx of asthma, CAD, diabetes, previous PE, recent right hip surgery approximately 2 months ago after a femur fracture, left distal humerus fracture 7/19, who presents to the ED today for altered mental status. Patient coming from [facility]. Per EMS, BGL was 500. She had reported shortness of breath earlier, she was placed on 3 L nasal cannula however she has had normal pulse ox with this. She is alert, no distress, however confused and not following commands, unable to obtain review of systems. Patient has been on antibiotic, azithromycin, for right ear infection.      PCP: Marjorie Santana DO.    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 ([address]) (5/18/2021), Bronchitis (5/23/2017), Bruising tendency, Buttock wound, left, initial encounter (10/3/2022), CAD (coronary artery disease), Cough (5/23/2017), COVID, Dermatophytosis (6/25/2021), Diabetes mellitus ([address]), GERD (gastroesophageal reflux disease) (5/23/2017), History of GI bleed (10/3/2022), History of pulmonary embolus (PE) (5/29/2021), blood clots, Hyperlipidemia, Hypertension, Leg swelling (7/15/2021), Lung disease, Peripheral vascular angioplasty status (5/29/2021), Pseudoaneurysm of right femoral artery ([address]) (5/29/2021), PVD (peripheral vascular disease) with claudication ([address]) (4/10/2019), Restless legs syndrome, Rhinitis, allergic (5/23/2017), Sinusitis (5/23/2017), SOB (shortness of breath) (5/23/2017), Type 1 diabetes mellitus other components within normal limits   LACTIC ACID - Abnormal; Notable for the following components:    Lactic Acid 2.3 (*)     All other components within normal limits   URINALYSIS - Abnormal; Notable for the following components:    Turbidity UA SLIGHTLY CLOUDY (*)     Glucose, Ur 100 (*)     Protein,  (*)     All other components within normal limits   PROTIME-INR - Abnormal; Notable for the following components:    Protime 15.1 (*)     All other components within normal limits   BLOOD GAS, ARTERIAL - Abnormal; Notable for the following components:    pH, Blood Gas 7.494 (*)     PCO2 27.2 (*)     PO2 56.2 (*)     HCO3 20.5 (*)     O2 Sat 91.2 (*)     O2Hb 90.7 (*)     HHb 8.8 (*)     tHb (est) 10.6 (*)     All other components within normal limits   TROPONIN - Abnormal; Notable for the following components:    Troponin, High Sensitivity 107 (*)     All other components within normal limits   MICROSCOPIC URINALYSIS - Abnormal; Notable for the following components:    Bacteria, UA 1+ (*)     All other components within normal limits   BASIC METABOLIC PANEL W/ REFLEX TO MG FOR LOW K - Abnormal; Notable for the following components:    Glucose 418 (*)     BUN 62 (*)     Calcium 11.1 (*)     Sodium 125 (*)     Potassium 3.4 (*)     Chloride 94 (*)     CO2 19 (*)     All other components within normal limits   CBC WITH AUTO DIFFERENTIAL - Abnormal; Notable for the following components:    RBC 2.31 (*)     Hemoglobin 8.2 (*)     Hematocrit 25.4 (*)     .0 (*)     MCH 35.5 (*)     RDW 15.2 (*)     Neutrophils % 90 (*)     Lymphocytes % 6 (*)     Neutrophils Absolute 8.91 (*)     Lymphocytes Absolute 0.56 (*)     Eosinophils Absolute 0.00 (*)     All other components within normal limits   TROPONIN - Abnormal; Notable for the following components:    Troponin, High Sensitivity 195 (*)     All other components within normal limits   GLUCOSE, RANDOM - Abnormal; Notable for the following components:    Glucose recent    -------------------- Consults --------------------  (Unless stated prior)  IP CONSULT TO INTERNAL MEDICINE  IP CONSULT TO CARDIOLOGY  IP CONSULT TO SPIRITUAL SERVICES  IP CONSULT TO DIETITIAN  IP CONSULT TO PALLIATIVE CARE  IP CONSULT TO HOSPICE    -------------------- RESULTS --------------------    Labs:  Results for orders placed or performed during the hospital encounter of 08/02/23   Culture, Blood 1    Specimen: Blood   Result Value Ref Range    Specimen Description . BLOOD . ARM     Special Requests          Culture NO GROWTH 2 DAYS    Culture, Blood 2    Specimen: Blood   Result Value Ref Range    Specimen Description . BLOOD . ARM     Special Requests          Culture NO GROWTH 2 DAYS    Culture, Wound Aerobic Only    Specimen: Nasopharyngeal Swab   Result Value Ref Range    Specimen Description . NASOPHARYNGEAL SWAB     Direct Exam       Swab collections are low-yield and rarely indicated. Generally, the specimen volume when collected by swab is small, reducing the probability of isolating organisms: many organisms adhere to the fibers of the swab, which reduces the opportunity or recovering organisms. Interpret results with caution. Direct Exam NO Polymorphonuclear leukocytes     Direct Exam RARE EPITHELIAL CELLS (A)     Direct Exam NO ORGANISMS SEEN     Culture (A)      Mixed agatha isolated. Further workup and sensitivity testing not routinely indicated and will not be perfomed. Mixed agatha isolated includes:      Culture STREPTOCOCCI, ALPHA HEMOLYTIC (A)     Culture STAPHYLOCOCCUS SPECIES, COAGULASE NEGATIVE (A)    Respiratory Panel, Molecular, with COVID-19 (Restricted: peds pts or suitable admitted adults)    Specimen: Nasopharyngeal Swab   Result Value Ref Range    Specimen Description . NASOPHARYNGEAL SWAB     Adenovirus PCR Not Detected Not Detected    Coronavirus 229E PCR Not Detected Not Detected    Coronavirus HKU1 PCR Not Detected Not Detected    Coronavirus NL63 PCR Not Detected Not intracranial hemorrhage. 2. Atrophy and periventricular leukomalacia,         CTA PULMONARY W CONTRAST   Final Result      1. No evidence of acute pulmonary emboli. 2.  Intrahepatic and extrahepatic bile duct prominence, likely physiologic   due to the patient's age and absent gallbladder unless there is a suspicious   elevation of the bilirubin level. 3.  Rectal and to a lesser degree sigmoid constipation with some possible   thickening of the rectal wall which could suggest a degree of impaction,   correlate clinically. Diverticulosis without evidence of definite acute   diverticulitis. Otherwise nonspecific bowel gas pattern. 4.  Multilevel compression fracture deformities within the thoracic and   lumbar spine of indeterminate age, correlate clinically. These involve the   T8, T9, T12, L3 and L5 vertebral bodies. 5.  Otherwise no acute pathology within the chest, abdomen or pelvis. CT ABDOMEN PELVIS W IV CONTRAST Additional Contrast? None   Final Result      1. No evidence of acute pulmonary emboli. 2.  Intrahepatic and extrahepatic bile duct prominence, likely physiologic   due to the patient's age and absent gallbladder unless there is a suspicious   elevation of the bilirubin level. 3.  Rectal and to a lesser degree sigmoid constipation with some possible   thickening of the rectal wall which could suggest a degree of impaction,   correlate clinically. Diverticulosis without evidence of definite acute   diverticulitis. Otherwise nonspecific bowel gas pattern. 4.  Multilevel compression fracture deformities within the thoracic and   lumbar spine of indeterminate age, correlate clinically. These involve the   T8, T9, T12, L3 and L5 vertebral bodies. 5.  Otherwise no acute pathology within the chest, abdomen or pelvis.          XR CHEST PORTABLE   Final Result        XR ELBOW LEFT (2 VIEWS)    Result Date: 7/27/2023  EXAMINATION: TWO XRAY VIEWS OF THE LEFT

## 2023-08-02 NOTE — CARE COORDINATION
08/02/23 Transition of care: Patient is seen in the ER after having altered mental status at [facility] where she is receiving skilled nursing for a fractured hip and shoulder. She was found to have a /K 3.1/CO2 19 with bld sugar 417. She was seen and ordered CT scans/urine/abgs/and resp panel. She was found to have ear drainage and has been on antibiotics for an ear infection per the daughter. Blood cultures were sent. She is from [facility] and contact was made with [place] regarding returning. Lorena states patient is at [facility] skilled and can return. CM. Currently patient remains in the ER. SW/CM will follow on the unit.  Electronically signed by Bhaskar Romero RN CM on 8/2/2023 at 2:30 PM

## 2023-08-03 ENCOUNTER — APPOINTMENT (OUTPATIENT)
Dept: ULTRASOUND IMAGING | Age: 88
End: 2023-08-03
Payer: MEDICARE

## 2023-08-03 LAB
ANION GAP SERPL CALCULATED.3IONS-SCNC: 12 MMOL/L (ref 7–16)
BASOPHILS # BLD: 0.01 K/UL (ref 0–0.2)
BASOPHILS NFR BLD: 0 % (ref 0–2)
BUN SERPL-MCNC: 62 MG/DL (ref 6–23)
CALCIUM SERPL-MCNC: 11.1 MG/DL (ref 8.6–10.2)
CHLORIDE SERPL-SCNC: 94 MMOL/L (ref 98–107)
CO2 SERPL-SCNC: 19 MMOL/L (ref 22–29)
CREAT SERPL-MCNC: 0.8 MG/DL (ref 0.5–1)
EKG ATRIAL RATE: 105 BPM
EKG P AXIS: 22 DEGREES
EKG P-R INTERVAL: 212 MS
EKG Q-T INTERVAL: 304 MS
EKG QRS DURATION: 100 MS
EKG QTC CALCULATION (BAZETT): 401 MS
EKG R AXIS: -16 DEGREES
EKG T AXIS: 59 DEGREES
EKG VENTRICULAR RATE: 105 BPM
EOSINOPHIL # BLD: 0 K/UL (ref 0.05–0.5)
EOSINOPHILS RELATIVE PERCENT: 0 % (ref 0–6)
ERYTHROCYTE [DISTWIDTH] IN BLOOD BY AUTOMATED COUNT: 15.2 % (ref 11.5–15)
GFR SERPL CREATININE-BSD FRML MDRD: >60 ML/MIN/1.73M2
GLUCOSE SERPL-MCNC: 418 MG/DL (ref 74–99)
GLUCOSE SERPL-MCNC: 485 MG/DL (ref 74–99)
HBA1C MFR BLD: 6.7 % (ref 4–5.6)
HCT VFR BLD AUTO: 25.4 % (ref 34–48)
HGB BLD-MCNC: 8.2 G/DL (ref 11.5–15.5)
IMM GRANULOCYTES # BLD AUTO: 0.08 K/UL (ref 0–0.58)
IMM GRANULOCYTES NFR BLD: 1 % (ref 0–5)
LACTATE BLDV-SCNC: 2 MMOL/L (ref 0.5–2.2)
LYMPHOCYTES NFR BLD: 0.56 K/UL (ref 1.5–4)
LYMPHOCYTES RELATIVE PERCENT: 6 % (ref 20–42)
MAGNESIUM SERPL-MCNC: 2 MG/DL (ref 1.6–2.6)
MCH RBC QN AUTO: 35.5 PG (ref 26–35)
MCHC RBC AUTO-ENTMCNC: 32.3 G/DL (ref 32–34.5)
MCV RBC AUTO: 110 FL (ref 80–99.9)
METER GLUCOSE: 167 MG/DL (ref 74–99)
METER GLUCOSE: 395 MG/DL (ref 74–99)
METER GLUCOSE: 438 MG/DL (ref 74–99)
METER GLUCOSE: 442 MG/DL (ref 74–99)
METER GLUCOSE: 445 MG/DL (ref 74–99)
METER GLUCOSE: >500 MG/DL (ref 74–99)
MONOCYTES NFR BLD: 0.4 K/UL (ref 0.1–0.95)
MONOCYTES NFR BLD: 4 % (ref 2–12)
NEUTROPHILS NFR BLD: 90 % (ref 43–80)
NEUTS SEG NFR BLD: 8.91 K/UL (ref 1.8–7.3)
PLATELET # BLD AUTO: 328 K/UL (ref 130–450)
PMV BLD AUTO: 11.1 FL (ref 7–12)
POTASSIUM SERPL-SCNC: 3.4 MMOL/L (ref 3.5–5)
RBC # BLD AUTO: 2.31 M/UL (ref 3.5–5.5)
RBC # BLD: ABNORMAL 10*6/UL
SODIUM SERPL-SCNC: 125 MMOL/L (ref 132–146)
TROPONIN I SERPL HS-MCNC: 195 NG/L (ref 0–9)
WBC # BLD: ABNORMAL 10*3/UL
WBC OTHER # BLD: 10 K/UL (ref 4.5–11.5)

## 2023-08-03 PROCEDURE — 85025 COMPLETE CBC W/AUTO DIFF WBC: CPT

## 2023-08-03 PROCEDURE — 6360000002 HC RX W HCPCS: Performed by: NURSE PRACTITIONER

## 2023-08-03 PROCEDURE — 93971 EXTREMITY STUDY: CPT

## 2023-08-03 PROCEDURE — 82947 ASSAY GLUCOSE BLOOD QUANT: CPT

## 2023-08-03 PROCEDURE — 36415 COLL VENOUS BLD VENIPUNCTURE: CPT

## 2023-08-03 PROCEDURE — 83036 HEMOGLOBIN GLYCOSYLATED A1C: CPT

## 2023-08-03 PROCEDURE — 2060000000 HC ICU INTERMEDIATE R&B

## 2023-08-03 PROCEDURE — 93010 ELECTROCARDIOGRAM REPORT: CPT | Performed by: INTERNAL MEDICINE

## 2023-08-03 PROCEDURE — 2580000003 HC RX 258: Performed by: NURSE PRACTITIONER

## 2023-08-03 PROCEDURE — 80048 BASIC METABOLIC PNL TOTAL CA: CPT

## 2023-08-03 PROCEDURE — 83735 ASSAY OF MAGNESIUM: CPT

## 2023-08-03 PROCEDURE — 6370000000 HC RX 637 (ALT 250 FOR IP): Performed by: NURSE PRACTITIONER

## 2023-08-03 PROCEDURE — 83605 ASSAY OF LACTIC ACID: CPT

## 2023-08-03 PROCEDURE — 6370000000 HC RX 637 (ALT 250 FOR IP): Performed by: INTERNAL MEDICINE

## 2023-08-03 PROCEDURE — 84484 ASSAY OF TROPONIN QUANT: CPT

## 2023-08-03 RX ORDER — ONDANSETRON 2 MG/ML
4 INJECTION INTRAMUSCULAR; INTRAVENOUS EVERY 6 HOURS PRN
Status: DISCONTINUED | OUTPATIENT
Start: 2023-08-03 | End: 2023-08-05 | Stop reason: HOSPADM

## 2023-08-03 RX ORDER — LIDOCAINE 4 G/G
1 PATCH TOPICAL DAILY PRN
Status: DISCONTINUED | OUTPATIENT
Start: 2023-08-03 | End: 2023-08-05 | Stop reason: HOSPADM

## 2023-08-03 RX ORDER — ALBUTEROL SULFATE 0.63 MG/3ML
1 SOLUTION RESPIRATORY (INHALATION) EVERY 6 HOURS PRN
Status: DISCONTINUED | OUTPATIENT
Start: 2023-08-03 | End: 2023-08-05 | Stop reason: HOSPADM

## 2023-08-03 RX ORDER — PANTOPRAZOLE SODIUM 40 MG/1
40 TABLET, DELAYED RELEASE ORAL
Status: DISCONTINUED | OUTPATIENT
Start: 2023-08-03 | End: 2023-08-05 | Stop reason: HOSPADM

## 2023-08-03 RX ORDER — POLYETHYLENE GLYCOL 3350 17 G/17G
17 POWDER, FOR SOLUTION ORAL DAILY
Status: DISCONTINUED | OUTPATIENT
Start: 2023-08-03 | End: 2023-08-05 | Stop reason: HOSPADM

## 2023-08-03 RX ORDER — INSULIN LISPRO 100 [IU]/ML
0-16 INJECTION, SOLUTION INTRAVENOUS; SUBCUTANEOUS
Status: DISCONTINUED | OUTPATIENT
Start: 2023-08-03 | End: 2023-08-05 | Stop reason: HOSPADM

## 2023-08-03 RX ORDER — SODIUM CHLORIDE 9 MG/ML
INJECTION, SOLUTION INTRAVENOUS CONTINUOUS
Status: DISCONTINUED | OUTPATIENT
Start: 2023-08-03 | End: 2023-08-04 | Stop reason: SDUPTHER

## 2023-08-03 RX ORDER — MULTIVITAMIN WITH IRON
1 TABLET ORAL DAILY
Status: DISCONTINUED | OUTPATIENT
Start: 2023-08-03 | End: 2023-08-05 | Stop reason: HOSPADM

## 2023-08-03 RX ORDER — ENOXAPARIN SODIUM 100 MG/ML
40 INJECTION SUBCUTANEOUS DAILY
Status: DISCONTINUED | OUTPATIENT
Start: 2023-08-03 | End: 2023-08-05 | Stop reason: HOSPADM

## 2023-08-03 RX ORDER — GABAPENTIN 300 MG/1
300 CAPSULE ORAL NIGHTLY
Status: DISCONTINUED | OUTPATIENT
Start: 2023-08-03 | End: 2023-08-05 | Stop reason: HOSPADM

## 2023-08-03 RX ORDER — SACCHAROMYCES BOULARDII 250 MG
250 CAPSULE ORAL 2 TIMES DAILY
Status: DISCONTINUED | OUTPATIENT
Start: 2023-08-03 | End: 2023-08-03 | Stop reason: CLARIF

## 2023-08-03 RX ORDER — SODIUM CHLORIDE 0.9 % (FLUSH) 0.9 %
5-40 SYRINGE (ML) INJECTION PRN
Status: DISCONTINUED | OUTPATIENT
Start: 2023-08-03 | End: 2023-08-05 | Stop reason: HOSPADM

## 2023-08-03 RX ORDER — POLYVINYL ALCOHOL 14 MG/ML
1 SOLUTION/ DROPS OPHTHALMIC 2 TIMES DAILY
Status: DISCONTINUED | OUTPATIENT
Start: 2023-08-03 | End: 2023-08-05 | Stop reason: HOSPADM

## 2023-08-03 RX ORDER — POTASSIUM CHLORIDE 750 MG/1
10 TABLET, EXTENDED RELEASE ORAL ONCE
Status: DISCONTINUED | OUTPATIENT
Start: 2023-08-03 | End: 2023-08-05 | Stop reason: HOSPADM

## 2023-08-03 RX ORDER — VIT C/E/ZN/COPPR/LUTEIN/ZEAXAN 60 MG-6 MG
1 CAPSULE ORAL DAILY
Status: DISCONTINUED | OUTPATIENT
Start: 2023-08-03 | End: 2023-08-05 | Stop reason: HOSPADM

## 2023-08-03 RX ORDER — SENNOSIDES A AND B 8.6 MG/1
1 TABLET, FILM COATED ORAL DAILY PRN
Status: DISCONTINUED | OUTPATIENT
Start: 2023-08-03 | End: 2023-08-05 | Stop reason: HOSPADM

## 2023-08-03 RX ORDER — INSULIN LISPRO 100 [IU]/ML
0-8 INJECTION, SOLUTION INTRAVENOUS; SUBCUTANEOUS
Status: DISCONTINUED | OUTPATIENT
Start: 2023-08-03 | End: 2023-08-03

## 2023-08-03 RX ORDER — INSULIN LISPRO 100 [IU]/ML
0-4 INJECTION, SOLUTION INTRAVENOUS; SUBCUTANEOUS NIGHTLY
Status: DISCONTINUED | OUTPATIENT
Start: 2023-08-03 | End: 2023-08-05 | Stop reason: HOSPADM

## 2023-08-03 RX ORDER — ASPIRIN 81 MG/1
81 TABLET ORAL DAILY
Status: DISCONTINUED | OUTPATIENT
Start: 2023-08-03 | End: 2023-08-05 | Stop reason: HOSPADM

## 2023-08-03 RX ORDER — FOLIC ACID 1 MG/1
1 TABLET ORAL DAILY
Status: DISCONTINUED | OUTPATIENT
Start: 2023-08-03 | End: 2023-08-05 | Stop reason: HOSPADM

## 2023-08-03 RX ORDER — INSULIN LISPRO 100 [IU]/ML
5 INJECTION, SOLUTION INTRAVENOUS; SUBCUTANEOUS ONCE
Status: COMPLETED | OUTPATIENT
Start: 2023-08-03 | End: 2023-08-03

## 2023-08-03 RX ORDER — ASCORBIC ACID 500 MG
500 TABLET ORAL DAILY
Status: DISCONTINUED | OUTPATIENT
Start: 2023-08-03 | End: 2023-08-05 | Stop reason: HOSPADM

## 2023-08-03 RX ORDER — SODIUM CHLORIDE 9 MG/ML
INJECTION, SOLUTION INTRAVENOUS PRN
Status: DISCONTINUED | OUTPATIENT
Start: 2023-08-03 | End: 2023-08-05 | Stop reason: HOSPADM

## 2023-08-03 RX ORDER — SODIUM CHLORIDE 0.9 % (FLUSH) 0.9 %
5-40 SYRINGE (ML) INJECTION EVERY 12 HOURS SCHEDULED
Status: DISCONTINUED | OUTPATIENT
Start: 2023-08-03 | End: 2023-08-05 | Stop reason: HOSPADM

## 2023-08-03 RX ORDER — ONDANSETRON 4 MG/1
4 TABLET, ORALLY DISINTEGRATING ORAL EVERY 8 HOURS PRN
Status: DISCONTINUED | OUTPATIENT
Start: 2023-08-03 | End: 2023-08-05 | Stop reason: HOSPADM

## 2023-08-03 RX ORDER — DEXTROSE MONOHYDRATE 100 MG/ML
INJECTION, SOLUTION INTRAVENOUS CONTINUOUS PRN
Status: DISCONTINUED | OUTPATIENT
Start: 2023-08-03 | End: 2023-08-05 | Stop reason: HOSPADM

## 2023-08-03 RX ORDER — FERROUS SULFATE 325(65) MG
325 TABLET ORAL DAILY
Status: DISCONTINUED | OUTPATIENT
Start: 2023-08-03 | End: 2023-08-05 | Stop reason: HOSPADM

## 2023-08-03 RX ORDER — BISACODYL 10 MG
10 SUPPOSITORY, RECTAL RECTAL DAILY PRN
Status: DISCONTINUED | OUTPATIENT
Start: 2023-08-03 | End: 2023-08-05 | Stop reason: HOSPADM

## 2023-08-03 RX ORDER — DOCUSATE SODIUM 100 MG/1
100 CAPSULE, LIQUID FILLED ORAL DAILY
Status: DISCONTINUED | OUTPATIENT
Start: 2023-08-03 | End: 2023-08-05 | Stop reason: HOSPADM

## 2023-08-03 RX ORDER — AMLODIPINE BESYLATE 10 MG/1
10 TABLET ORAL DAILY
Status: DISCONTINUED | OUTPATIENT
Start: 2023-08-03 | End: 2023-08-05 | Stop reason: HOSPADM

## 2023-08-03 RX ORDER — INSULIN LISPRO 100 [IU]/ML
0-4 INJECTION, SOLUTION INTRAVENOUS; SUBCUTANEOUS NIGHTLY
Status: DISCONTINUED | OUTPATIENT
Start: 2023-08-03 | End: 2023-08-03

## 2023-08-03 RX ORDER — LACTOBACILLUS RHAMNOSUS GG 10B CELL
1 CAPSULE ORAL DAILY
Status: DISCONTINUED | OUTPATIENT
Start: 2023-08-03 | End: 2023-08-05 | Stop reason: HOSPADM

## 2023-08-03 RX ADMIN — ENOXAPARIN SODIUM 40 MG: 100 INJECTION SUBCUTANEOUS at 09:56

## 2023-08-03 RX ADMIN — INSULIN LISPRO 16 UNITS: 100 INJECTION, SOLUTION INTRAVENOUS; SUBCUTANEOUS at 15:05

## 2023-08-03 RX ADMIN — ACETAMINOPHEN 650 MG: 650 SUPPOSITORY RECTAL at 17:41

## 2023-08-03 RX ADMIN — CEFEPIME 2000 MG: 2 INJECTION, POWDER, FOR SOLUTION INTRAVENOUS at 09:54

## 2023-08-03 RX ADMIN — INSULIN LISPRO 16 UNITS: 100 INJECTION, SOLUTION INTRAVENOUS; SUBCUTANEOUS at 17:32

## 2023-08-03 RX ADMIN — SODIUM CHLORIDE: 9 INJECTION, SOLUTION INTRAVENOUS at 02:27

## 2023-08-03 RX ADMIN — SODIUM CHLORIDE, PRESERVATIVE FREE 10 ML: 5 INJECTION INTRAVENOUS at 22:07

## 2023-08-03 RX ADMIN — PETROLATUM: 420 OINTMENT TOPICAL at 22:07

## 2023-08-03 RX ADMIN — INSULIN LISPRO 5 UNITS: 100 INJECTION, SOLUTION INTRAVENOUS; SUBCUTANEOUS at 03:32

## 2023-08-03 RX ADMIN — PETROLATUM: 420 OINTMENT TOPICAL at 17:32

## 2023-08-03 RX ADMIN — CEFEPIME 2000 MG: 2 INJECTION, POWDER, FOR SOLUTION INTRAVENOUS at 21:58

## 2023-08-03 ASSESSMENT — PAIN - FUNCTIONAL ASSESSMENT: PAIN_FUNCTIONAL_ASSESSMENT: ACTIVITIES ARE NOT PREVENTED

## 2023-08-03 ASSESSMENT — PAIN SCALES - GENERAL: PAINLEVEL_OUTOF10: 2

## 2023-08-03 NOTE — PROGRESS NOTES
Speech Language Pathology      NAME:  Diana Dhillon  :  1927  DATE: 8/3/2023  ROOM:  65/200-A    Spoke to RN, and upon chart review- Pt not follow commands appropriately at this time. Not safe for PO intake. Will hold and re-attempt tomorrow.     Elevated troponin [R77.8]  Acute suppurative otitis media of right ear with spontaneous rupture of tympanic membrane, recurrence not specified [H66.011]  Altered mental status, unspecified altered mental status type [R41.82]  AMS (altered mental status) [R41.82]

## 2023-08-03 NOTE — FLOWSHEET NOTE
Inpatient Wound Care (Initial consult) 7410    Admit Date: 8/2/2023  1:26 PM    Reason for consult:  Right buttock, heel    Significant history: Admitted with wounds    Findings:     08/03/23 1349   Skin Integumentary    Skin Integrity   (old healed areas)   Location bilateral buttocks   Skin Integrity Site 2   Skin Integrity Location 2 Ecchymosis   Location 2 BUE   Skin Integrity Site 3   Skin Integrity Location 3   (dry flaky)    Location 3 BLE   Skin Integrity Site 4   Skin Integrity Location 4   (old healed areas, dry flaky)   Location 4 bilateral heels   Wound 08/03/23 Heel Right   Date First Assessed/Time First Assessed: 08/03/23 0215   Present on Hospital Admission: Yes  Location: Heel  Wound Location Orientation: Right   Wound Image    Wound Etiology Pressure Stage 2   Dressing Status New dressing applied   Wound Cleansed Cleansed with saline   Dressing/Treatment ABD; Alginate;Dry dressing;Roll gauze   Wound Length (cm) 2.2 cm   Wound Width (cm) 1 cm   Wound Depth (cm) 0.1 cm   Wound Surface Area (cm^2) 2.2 cm^2   Change in Wound Size % (l*w) -52.78   Wound Volume (cm^3) 0.22 cm^3   Wound Healing % -53   Wound Assessment Pink/red   Drainage Amount Scant   Drainage Description Serosanguinous   Odor None   Ira-wound Assessment Dry/flaky   Wound 12/01/22 Buttocks Right   Date First Assessed/Time First Assessed: 12/01/22 2134   Present on Hospital Admission: Yes  Primary Wound Type: Pressure Injury  Location: Buttocks  Wound Location Orientation: Right   Wound Image    Wound Etiology Deep tissue/Injury   Dressing/Treatment Pharmaceutical agent (see MAR)   Wound Length (cm) 1 cm   Wound Width (cm) 0.5 cm   Wound Depth (cm)   (unable to determine)   Wound Surface Area (cm^2) 0.5 cm^2   Change in Wound Size % (l*w) 83.33   Wound Assessment Purple/maroon   Drainage Amount None   Odor None   Ira-wound Assessment Dry/flaky       **Informed Consent**     photos taken of wounds and inserted into their chart as part of

## 2023-08-03 NOTE — PROGRESS NOTES
Occupational Therapy    Date:8/3/2023  Patient Name: Zamzam Casper  MRN: [id number]  : 1927  Room: [address] [place], [place] [address]A     OT orders received and chart reviewed. OT eval on hold at this time as pt not following commands at this time. OT will follow and re-attempt eval as appropriate at a later time/date.     Conor Barrera OTR/L; [id number]

## 2023-08-03 NOTE — PROGRESS NOTES
Pharmacy Note    Baudilio Gilmore was ordered Co-enzyme Q-10. As per the [facility], herbals and certain dietary supplements will be discontinued.   The herbal or dietary supplement may be continued after discharge from the hospital.

## 2023-08-03 NOTE — CARE COORDINATION
Patient is seen in the ER after having altered mental status at [address] [place] [address] where she is receiving skilled nursing for a fractured hip and shoulder. She was found to have a /K 3.1/CO2 19 with bld sugar 417. Hgb 8.2. She was seen and ordered CT scans/urine/abgs/and resp panel. She was found to have ear drainage and has been on antibiotics for an ear infection per the daughter. Blood cultures were sent. She is from [address] [place] [address] and contact was made with Aristides [facility] by [employer] regarding returning. Aristides Rodríguez states patient is at [facility] skilled and can return. Skye Blake ST/OT/PT and wound care  ordered. On IV maxipime. Met with patient and she was alert to self only. Her daughter Garret Howard is at bedside and she stated her mother is usually alert and oriented x4 with some forgetfulness. Patient's son Hiral Lawrence is DPOA ,per Garret Howard. This CM left message on Antwan's VM to confirm transition of care plans. Patient  was at [address] prior to going to [facility] for therapy. She was w/c bound at facility but used walker at [place]. Her PCP is Dr. Karla Mccarty. Ambulance form in emvelope in soft chart  Hiral Lawrence, patient's son returned call and is in agreement with discharge plan  Case Management Assessment  Initial Evaluation    Date/Time of Evaluation: 8/3/2023 10:09 AM  Assessment Completed by: Jessica Poole RN    If patient is discharged prior to next notation, then this note serves as note for discharge by case management.     Patient Name: Sundar Sotomayor                   YOB: 1927  Diagnosis: Elevated troponin [R77.8]  Acute suppurative otitis media of right ear with spontaneous rupture of tympanic membrane, recurrence not specified [H66.011]  Altered mental status, unspecified altered mental status type [R41.82]  AMS (altered mental status) [R41.82]                   Date / Time: 8/2/2023  1:26 PM    Patient Admission Status: Inpatient   Readmission Risk (Low < 19, Mod (19-27), High > 27): Readmission Risk Score: 23.4    Current PCP: Jeet Lopez, DO  PCP verified by CM? Yes    Chart Reviewed: Yes      History Provided by: Child/Family  Patient Orientation: Alert and Oriented, Person (altered mental status)    Patient Cognition: At time patient alert and oriented x1    Hospitalization in the last 30 days (Readmission): If yes, Readmission Assessment in CM Navigator will be completed. Advance Directives:      Code Status: DNR-CC   Patient's Primary Decision Maker is:      Primary Decision MakerRussell Moseley - [phone]    Discharge Planning:    Patient lives with: Other (Comment) Type of Home: [address] [facility] [facility]  Primary Care Giver: Other (Comment) (snf  pTA)  Patient Support Systems include: Children   Current Financial resources:    Current community resources:    Current services prior to admission: [facility]            Current DME:              Type of Home Care services:  Skilled Therapy    ADLS  Prior functional level: Assistance with the following:, Bathing, Dressing, Toileting, Feeding, Cooking, Housework, Shopping, Mobility  Current functional level: Assistance with the following:, Bathing, Dressing, Toileting, Feeding, Cooking, Housework, Shopping, Mobility    PT AM-PAC:   /24  OT AM-PAC:   /24    Family can provide assistance at DC: No  Would you like Case Management to discuss the discharge plan with any other family members/significant others, and if so, who?  Yes (unable to reach [address] but spoke with daughter Violetta Taylor at bedside)  Plans to Return to Present Housing: Yes  Other Identified Issues/Barriers to RETURNING to current housing:   Potential Assistance needed at discharge: [facility]            Potential DME:    Patient expects to discharge to: [address] [place] [address] for transportation at discharge:      Financial    Payor: [address] / Plan: [address] / Product Type: *No Product type* /     Does insurance require precert for SNF: No    Potential assistance Purchasing Medications:    Meds-to-Beds request: Yes      [address] [id number] August Cabezas, [address] [address] [facility]   Victoria Ortiz [address]  Phone: [phone] Fax: [phone]      Notes:    Factors facilitating achievement of predicted outcomes: Family support    Barriers to discharge: Confusion and Communication deficit    Additional Case Management Notes:   Sky Ferguson, RN    Case Management  Care Coordination      Incomplete  Date of Service:  8/3/2023  8:32 AM    Incomplete          Patient is seen in the ER after having altered mental status at [address] [place] [address] where she is receiving skilled nursing for a fractured hip and shoulder. She was found to have a /K 3.1/CO2 19 with bld sugar 417. Hgb 8.2. She was seen and ordered CT scans/urine/abgs/and resp panel. She was found to have ear drainage and has been on antibiotics for an ear infection per the daughter. Blood cultures were sent. She is from [address] [place] [address] and contact was made with Naheed [facility]ahona by [employer] regarding returning. Naheed Barahona states patient is at [facility] skilled and can return. Elisabeth Riggs ST/OT/PT and wound care  ordered. On IV maxipime. Met with patient and she was alert to self only. Her daughter Zelalem Lozada is at bedside and she stated her mother is usually alert and oriented x4 with some forgetfulness. Patient's son Fito Joseph is DPOA ,per Zelalem Lozada. This CM left message on Antwan's VM to confirm transition of care plans. Patient  was at [address] prior to going to [facility] for therapy. She was w/c bound at facility but used walker at [place]. Her PCP is Dr. Pepito Diaz.   Ambulance form in emvelope in soft chart    The Plan for Transition of Care is related to the following treatment goals of Elevated troponin [R77.8]  Acute suppurative otitis media of right ear with spontaneous rupture of tympanic membrane, recurrence not specified [H66.011]  Altered mental status, unspecified altered mental status type [R41.82]  AMS (altered mental status) [N67.55]    IF APPLICABLE: The Patient and/or patient representative Richmond Davis and her family were provided with a choice of provider and agrees with the discharge plan. Freedom of choice list with basic dialogue that supports the patient's individualized plan of care/goals and shares the quality data associated with the providers was provided to:     Patient Representative Name:       The Patient and/or Patient Representative Agree with the Discharge Plan?       Rena Hu RN  Case Management Department  Ph: [phone] Fax: [phone]

## 2023-08-03 NOTE — PROGRESS NOTES
4 Eyes Skin Assessment     NAME:  Nikki Hawkins  YOB: 1927  MEDICAL RECORD NUMBER:  [id number]    The patient is being assessed for  Admission    I agree that at least one RN has performed a thorough Head to Toe Skin Assessment on the patient. ALL assessment sites listed below have been assessed. Areas assessed by both nurses:    Head, Face, Ears, Shoulders, Back, Chest, Arms, Elbows, Hands, Sacrum. Buttock, Coccyx, Ischium, Legs. Feet and Heels, and Under Medical Devices         Does the Patient have a Wound? Yes wound(s) were present on assessment. LDA wound assessment was Initiated and completed by RN     Bruising and abrasion on BLE, BUE, chin. Bilateral buttock red, blanchable. Right buttock 0.1x0.1 open area. Rihgt heel wound 1.2x1.2x0.1.    Scott Prevention initiated by RN: Yes  Wound Care Orders initiated by RN: Yes    Pressure Injury (Stage 3,4, Unstageable, DTI, NWPT, and Complex wounds) if present, place Wound referral order by RN under : No    New Ostomies, if present place, Ostomy referral order under : No     Nurse 1 eSignature: Electronically signed by Lucas Perez RN on 8/3/23 at 5:46 AM EDT    **SHARE this note so that the co-signing nurse can place an eSignature**    Nurse 2 eSignature: {Esignature:[id number]}

## 2023-08-03 NOTE — ED NOTES
Nonblanchable erythema/dry skin bilateral buttocks. Wound right heel healing. Hematoma right forearm.       Marcell Crane RN  08/02/23 6231

## 2023-08-03 NOTE — ED NOTES
Ice packs applied to pts axillary and neck. Pt appears comfortable at this time. NAD noted.      Yesenia Godoy RN  08/02/23 0324

## 2023-08-03 NOTE — ACP (ADVANCE CARE PLANNING)
Advance Care Planning   Healthcare Decision Maker:    Primary Decision Maker: Deisi Martin Child - [phone]    Patient unable to communicate at tuis time but per her daughter Kathryn Cavazos patient;s son Ashleigh Leyva is Ruel Dolan. Requesting document.  Per Ashleigh Leyva he gave permission to speak with patient's daughter Kathryn Cavazos

## 2023-08-03 NOTE — H&P
[facility]  History and Physical      CHIEF COMPLAINT:    Chief Complaint   Patient presents with    Altered Mental Status     Ams and sob        Patient of Christopher Zayas DO presents with:  AMS (altered mental status)    History of Present Illness:   Patient is a 26-year-old female with past medical history of asthma, CAD, DM, GERD, DVTs, HLD, HTN, PVD, RLS who presents to the emergency room for altered mental status. Patient recently underwent a right hip surgery approximately 2 months ago and was admitted to [facility]  6/3 and presented to the emergency room on 7/19 following a fall resulting in a closed displaced fracture of the distal end of the left humerus and was sent back to her nursing home. She followed up with orthopedic surgery on 7/27 for her surgery completed at the beginning of June. Patient was sent in from her nursing home due to hyperglycemia and shortness of breath. Patient was placed on a few liters supplemental O2 at the facility to maintain adequate oxygenation. Patient has been on azithromycin for a right ear infection. Labs on arrival revealed hyponatremia of 124, hypokalemia of 3.1, hyperglycemia of 309, troponin of 117-195, leukocytosis of 12.4. She underwent a CT head which was negative for anything acute. A CTA pulmonary was obtained which was negative for PE however revealed multilevel age-indeterminate compression fractures involving the T and L-spine likely related to the recent fall on 19 July. Patient is currently admitted to [facility] telemetry to for further work-up and treatment and evaluation with cardiology. On evaluation, daughter is at bedside and able to answer questions for patient. Patient herself appears chronically ill although she was living at an assisted living facility and was able to do her own activities of until 2 days ago.   Daughter is not interested in aggressive care, patient's CODE STATUS is comfort care 08/03/2023 02:40 AM     08/03/2023 02:40 AM    MPV 11.1 08/03/2023 02:40 AM     CMP:    Lab Results   Component Value Date/Time     08/03/2023 02:40 AM    K 3.4 08/03/2023 02:40 AM    K 4.5 06/07/2023 06:27 AM    CL 94 08/03/2023 02:40 AM    CO2 19 08/03/2023 02:40 AM    BUN 62 08/03/2023 02:40 AM    CREATININE 0.8 08/03/2023 02:40 AM    GFRAA >60 08/27/2022 04:55 AM    LABGLOM >60 08/03/2023 02:40 AM    GLUCOSE 485 08/03/2023 11:34 AM    PROT 8.4 08/02/2023 01:44 PM    LABALBU 3.2 08/02/2023 01:44 PM    CALCIUM 11.1 08/03/2023 02:40 AM    BILITOT 0.4 08/02/2023 01:44 PM    ALKPHOS 93 08/02/2023 01:44 PM    AST 28 08/02/2023 01:44 PM    ALT 20 08/02/2023 01:44 PM       Imaging:  CT head negative  CTA pulmonary negative for PE. Intrahepatic and extrahepatic bile duct prominence likely physiologic. Constipation. Multilevel compression fractures of the thoracic lumbar spine of indeterminate age including T8-T9 T12 L3 and L5    EKG:  Sinus tachycardia with 1st degree AV block with premature supraventricular complexes and with occasional premature ventricular complexes    Telemetry:  I've personally reviewed the patient's telemetry:  Sinus rhythm    ASSESSMENT/PLAN:  Principal Problem:    AMS (altered mental status)  Active Problems:    Sepsis ([address])  Resolved Problems:    * No resolved hospital problems.  *    Patient is a 77-year-old female admitted to [facility] for  Altered mental status  -Monitor labs  -CT head negative  -Check UA > negative  -WBC 12.4 > 11.8  -Lactic acid 2.3 continue IVF NS at 50  -IV cefepime pending pancultures, narrowed to p.o. as able  -SLP evaluation  -Palliative medicine consult for goals of care given patient is already a DNR-CC     Multilevel compression fractures possibly secondary to fall 7/19 which patient was found to have a closed L distal end humerus fracture  -Age-indeterminate fractures involving T8, T9, T12, L3, L5  -Pain management, no role for surgical

## 2023-08-03 NOTE — PROGRESS NOTES
Date: 8/3/2023       Patient Name: Baudilio Gilmore  : 1927      MRN: [id number]    PT order received. Chart has been reviewed. PT evaluation will be on hold as to is not following commands to safely participate in evaluation. Will continue to follow and complete evaluation at later time.      Para Nathalie, PT

## 2023-08-03 NOTE — PROGRESS NOTES
Pt arrived to floor POCT 438, family states pt left arm is more swollen then it has been, pt failed bedside swallow. Notified April NP via perfect serve. See MAR/ orders. Admission filled out to best ability with pt son Thi Malloy at bedside. He stated his sister would be able to answer the rest of questions better in the morning. Med rec unable to be completed at this time. April NP notified.

## 2023-08-04 PROBLEM — E43 SEVERE PROTEIN-CALORIE MALNUTRITION (HCC): Chronic | Status: ACTIVE | Noted: 2023-01-01

## 2023-08-04 LAB
ANION GAP SERPL CALCULATED.3IONS-SCNC: 16 MMOL/L (ref 7–16)
BASOPHILS # BLD: 0 K/UL (ref 0–0.2)
BASOPHILS NFR BLD: 0 % (ref 0–2)
BUN SERPL-MCNC: 69 MG/DL (ref 6–23)
CALCIUM SERPL-MCNC: 10.8 MG/DL (ref 8.6–10.2)
CHLORIDE SERPL-SCNC: 109 MMOL/L (ref 98–107)
CO2 SERPL-SCNC: 16 MMOL/L (ref 22–29)
CREAT SERPL-MCNC: 0.8 MG/DL (ref 0.5–1)
EOSINOPHIL # BLD: 0 K/UL (ref 0.05–0.5)
EOSINOPHILS RELATIVE PERCENT: 0 % (ref 0–6)
ERYTHROCYTE [DISTWIDTH] IN BLOOD BY AUTOMATED COUNT: 15.6 % (ref 11.5–15)
GFR SERPL CREATININE-BSD FRML MDRD: >60 ML/MIN/1.73M2
GLUCOSE SERPL-MCNC: 324 MG/DL (ref 74–99)
HCT VFR BLD AUTO: 24.4 % (ref 34–48)
HGB BLD-MCNC: 8 G/DL (ref 11.5–15.5)
LYMPHOCYTES NFR BLD: 0.45 K/UL (ref 1.5–4)
LYMPHOCYTES RELATIVE PERCENT: 6 % (ref 20–42)
MAGNESIUM SERPL-MCNC: 2.1 MG/DL (ref 1.6–2.6)
MCH RBC QN AUTO: 35.4 PG (ref 26–35)
MCHC RBC AUTO-ENTMCNC: 32.8 G/DL (ref 32–34.5)
MCV RBC AUTO: 108 FL (ref 80–99.9)
METER GLUCOSE: 145 MG/DL (ref 74–99)
METER GLUCOSE: 201 MG/DL (ref 74–99)
METER GLUCOSE: 253 MG/DL (ref 74–99)
METER GLUCOSE: 344 MG/DL (ref 74–99)
MICROORGANISM SPEC CULT: ABNORMAL
MICROORGANISM/AGENT SPEC: ABNORMAL
MONOCYTES NFR BLD: 0.19 K/UL (ref 0.1–0.95)
MONOCYTES NFR BLD: 3 % (ref 2–12)
NEUTROPHILS NFR BLD: 91 % (ref 43–80)
NEUTS SEG NFR BLD: 6.76 K/UL (ref 1.8–7.3)
NUCLEATED RED BLOOD CELLS: 1 PER 100 WBC
PLATELET # BLD AUTO: 369 K/UL (ref 130–450)
PMV BLD AUTO: 10.8 FL (ref 7–12)
POTASSIUM SERPL-SCNC: 2.5 MMOL/L (ref 3.5–5)
RBC # BLD AUTO: 2.26 M/UL (ref 3.5–5.5)
RBC # BLD: ABNORMAL 10*6/UL
SODIUM SERPL-SCNC: 141 MMOL/L (ref 132–146)
SPECIMEN DESCRIPTION: ABNORMAL
WBC OTHER # BLD: 7.4 K/UL (ref 4.5–11.5)

## 2023-08-04 PROCEDURE — 83735 ASSAY OF MAGNESIUM: CPT

## 2023-08-04 PROCEDURE — 2580000003 HC RX 258: Performed by: NURSE PRACTITIONER

## 2023-08-04 PROCEDURE — 80048 BASIC METABOLIC PNL TOTAL CA: CPT

## 2023-08-04 PROCEDURE — 6360000002 HC RX W HCPCS: Performed by: INTERNAL MEDICINE

## 2023-08-04 PROCEDURE — 6370000000 HC RX 637 (ALT 250 FOR IP): Performed by: INTERNAL MEDICINE

## 2023-08-04 PROCEDURE — 99222 1ST HOSP IP/OBS MODERATE 55: CPT | Performed by: NURSE PRACTITIONER

## 2023-08-04 PROCEDURE — 36415 COLL VENOUS BLD VENIPUNCTURE: CPT

## 2023-08-04 PROCEDURE — 92610 EVALUATE SWALLOWING FUNCTION: CPT

## 2023-08-04 PROCEDURE — 85025 COMPLETE CBC W/AUTO DIFF WBC: CPT

## 2023-08-04 PROCEDURE — 2060000000 HC ICU INTERMEDIATE R&B

## 2023-08-04 PROCEDURE — 6370000000 HC RX 637 (ALT 250 FOR IP): Performed by: NURSE PRACTITIONER

## 2023-08-04 PROCEDURE — 6360000002 HC RX W HCPCS: Performed by: NURSE PRACTITIONER

## 2023-08-04 PROCEDURE — 82947 ASSAY GLUCOSE BLOOD QUANT: CPT

## 2023-08-04 RX ORDER — MORPHINE SULFATE 2 MG/ML
2 INJECTION, SOLUTION INTRAMUSCULAR; INTRAVENOUS EVERY 4 HOURS PRN
Status: DISCONTINUED | OUTPATIENT
Start: 2023-08-04 | End: 2023-08-05 | Stop reason: HOSPADM

## 2023-08-04 RX ORDER — POTASSIUM CHLORIDE 7.45 MG/ML
10 INJECTION INTRAVENOUS ONCE
Status: COMPLETED | OUTPATIENT
Start: 2023-08-04 | End: 2023-08-04

## 2023-08-04 RX ORDER — SODIUM CHLORIDE AND POTASSIUM CHLORIDE 150; 900 MG/100ML; MG/100ML
INJECTION, SOLUTION INTRAVENOUS CONTINUOUS
Status: DISCONTINUED | OUTPATIENT
Start: 2023-08-04 | End: 2023-08-05

## 2023-08-04 RX ADMIN — ENOXAPARIN SODIUM 40 MG: 100 INJECTION SUBCUTANEOUS at 09:35

## 2023-08-04 RX ADMIN — POTASSIUM CHLORIDE AND SODIUM CHLORIDE: 900; 150 INJECTION, SOLUTION INTRAVENOUS at 18:04

## 2023-08-04 RX ADMIN — SODIUM CHLORIDE, PRESERVATIVE FREE 10 ML: 5 INJECTION INTRAVENOUS at 21:38

## 2023-08-04 RX ADMIN — CEFEPIME 2000 MG: 2 INJECTION, POWDER, FOR SOLUTION INTRAVENOUS at 08:54

## 2023-08-04 RX ADMIN — POTASSIUM CHLORIDE 10 MEQ: 7.46 INJECTION, SOLUTION INTRAVENOUS at 18:06

## 2023-08-04 RX ADMIN — INSULIN LISPRO 12 UNITS: 100 INJECTION, SOLUTION INTRAVENOUS; SUBCUTANEOUS at 06:49

## 2023-08-04 RX ADMIN — CEFEPIME 2000 MG: 2 INJECTION, POWDER, FOR SOLUTION INTRAVENOUS at 20:57

## 2023-08-04 RX ADMIN — ACETAMINOPHEN 650 MG: 650 SUPPOSITORY RECTAL at 00:37

## 2023-08-04 RX ADMIN — INSULIN LISPRO 8 UNITS: 100 INJECTION, SOLUTION INTRAVENOUS; SUBCUTANEOUS at 16:54

## 2023-08-04 RX ADMIN — POLYVINYL ALCOHOL 1 DROP: 14 SOLUTION/ DROPS OPHTHALMIC at 09:31

## 2023-08-04 RX ADMIN — MORPHINE SULFATE 2 MG: 2 INJECTION, SOLUTION INTRAMUSCULAR; INTRAVENOUS at 21:37

## 2023-08-04 RX ADMIN — PETROLATUM: 420 OINTMENT TOPICAL at 08:56

## 2023-08-04 RX ADMIN — MORPHINE SULFATE 2 MG: 2 INJECTION, SOLUTION INTRAMUSCULAR; INTRAVENOUS at 16:56

## 2023-08-04 RX ADMIN — INSULIN LISPRO 4 UNITS: 100 INJECTION, SOLUTION INTRAVENOUS; SUBCUTANEOUS at 11:39

## 2023-08-04 RX ADMIN — PETROLATUM: 420 OINTMENT TOPICAL at 21:38

## 2023-08-04 ASSESSMENT — PAIN DESCRIPTION - DESCRIPTORS: DESCRIPTORS: PATIENT UNABLE TO DESCRIBE

## 2023-08-04 ASSESSMENT — PAIN DESCRIPTION - ORIENTATION: ORIENTATION: OTHER (COMMENT)

## 2023-08-04 ASSESSMENT — PAIN DESCRIPTION - LOCATION: LOCATION: GENERALIZED

## 2023-08-04 ASSESSMENT — PAIN - FUNCTIONAL ASSESSMENT
PAIN_FUNCTIONAL_ASSESSMENT: ACTIVITIES ARE NOT PREVENTED

## 2023-08-04 ASSESSMENT — PAIN SCALES - GENERAL: PAINLEVEL_OUTOF10: 10

## 2023-08-04 NOTE — PROGRESS NOTES
Comprehensive Nutrition Assessment    Type and Reason for Visit:  Initial, Consult, Wound    Nutrition Recommendations/Plan:   Monitor pending swallow study. If tube feedings are initiated, then recommend Diabetic (Glucerna 1.5) @35ml/hr to provide 840ml, 1260 calories, 69g protein, 638ml water. Malnutrition Assessment:  Malnutrition Status:  Severe malnutrition (08/04/23 1250)    Context:  Chronic Illness     Findings of the 6 clinical characteristics of malnutrition:  Energy Intake:  75% or less estimated energy requirements for 1 month or longer  Weight Loss:  Unable to assess (d/t lack of long term actual weight history)     Body Fat Loss:  Severe body fat loss Orbital, Triceps   Muscle Mass Loss:  Severe muscle mass loss Temples (temporalis), Clavicles (pectoralis & deltoids), Calf (gastrocnemius)  Fluid Accumulation:  No significant fluid accumulation     Strength:  Not Performed    Nutrition Assessment:    Patient is currently NPO ; pt failed bedside swallow per nursing ; speech/swallow consult pending ; adm w/ AMS ; adm from [address] [place] [address] where she is receiving skilled nursing for a fractured hip and shoulder ;  noted multilevel compression fractures possibly secondary to fall 7/19 which patient was found to have a closed L distal end humerus fracture ; s/p R hip fx surgery on 6/3 ; s/p fall on 7/19 ; hx of DM/CAD/PVD/CHF ; wounds noted ; pt also meets criteria for severe malnutrition ; ammonia level 17 ; will provide recommendations    Nutrition Related Findings:    I&Os WNL, 2+ edema, A&O x 1, active BS, muscle/fat wasting, possible dysphagia ; Wound Type: Multiple, Open Wounds, Stage II, Deep Tissue Injury (wounds x 2)       Current Nutrition Intake & Therapies:    Average Meal Intake: NPO     Diet NPO    Anthropometric Measures:  Height: 5' 4\" (162.6 cm)  Ideal Body Weight (IBW): 120 lbs (55 kg)       Current Body Weight: 129 lb (58.5 kg) (8/3, bedscale), 107.5 % IBW.  Weight Source: Bed Scale  Current BMI (kg/m2): 22.1  Usual Body Weight:  (UTO ; EMR shows past weight of 135# actual on 6/4/23)                       BMI Categories: Underweight (BMI less than 22) age over 72    Estimated Daily Nutrient Needs:  Energy Requirements Based On: Formula  Weight Used for Energy Requirements: Current  Energy (kcal/day): 3038-8440 ( x 1.3 SF)  Weight Used for Protein Requirements: Current  Protein (g/day): 65-85 (1.2-1.4g/kg CBW)  Method Used for Fluid Requirements: 1 ml/kcal  Fluid (ml/day): 3306-8409    Nutrition Diagnosis:   Severe malnutrition, In context of chronic illness related to inadequate protein-energy intake (2/2 advanced age) as evidenced by poor intake prior to admission, severe loss of subcutaneous fat, severe muscle loss    Nutrition Interventions:   Food and/or Nutrient Delivery: Continue NPO  Nutrition Education/Counseling: Education not indicated  Coordination of Nutrition Care: Continue to monitor while inpatient, Speech Therapy, Swallow Evaluation       Goals:  Previous Goal Met: Progressing toward Goal(s)  Goals: Meet at least 75% of estimated needs, by next RD assessment       Nutrition Monitoring and Evaluation:   Behavioral-Environmental Outcomes: None Identified  Food/Nutrient Intake Outcomes: Diet Advancement/Tolerance, Enteral Nutrition Intake/Tolerance  Physical Signs/Symptoms Outcomes: Biochemical Data, GI Status, Fluid Status or Edema, Hemodynamic Status, Nutrition Focused Physical Findings, Skin, Weight, Chewing or Swallowing    Discharge Planning:     Too soon to determine     Atilio Martinez RD, LD  Contact: [phone]

## 2023-08-04 NOTE — PROGRESS NOTES
Dgtr Serena Welch educated on s/s of \"decline\" versus\" actively dying. \" Morphine was offered to be ordered but declined at this time per patients' dgtr because \"she needs to have a conversation about that with her brother\"

## 2023-08-04 NOTE — PROGRESS NOTES
SPEECH/LANGUAGE PATHOLOGY  CLINICAL ASSESSMENT OF SWALLOWING FUNCTION   and PLAN OF CARE    PATIENT NAME:  Ashley Cates  (female)     MRN:  [id number]    :  1927  (89 y.o.)  STATUS:  Inpatient: Room 7410/7410-A    TODAY'S DATE:  2023  REFERRING PROVIDER:    SPECIFIC PROVIDER ORDER: SLP swallowing-dysphagia evaluation and treatment Date of order:  8-3-23  REASON FOR REFERRAL: dysphagia   EVALUATING THERAPIST: TOÑO Mejia                 RESULTS:    DYSPHAGIA DIAGNOSIS:   Clinical indicators of severe  oropharyngeal phase dysphagia       DIET RECOMMENDATIONS:  NPO -- including medications to be given via alternate method      . FEEDING RECOMMENDATIONS:     Assistance level:  Not applicable      Compensatory strategies recommended: Not applicable      Discussed recommendations with nursing and/or faxed report to referring provider: Yes    SPEECH THERAPY  PLAN OF CARE   The dysphagia POC is established based on physician order, dysphagia diagnosis and results of clinical assessment     Skilled SLP intervention for dysphagia management up to 6 x per week until goals met, pt plateaus in function and/or discharged from hospital    Conditions Requiring Skilled Therapeutic Intervention for dysphagia:    Patient is performing below functional baseline d/t  current acute condition, Multiple diagnoses, multiple medications, and increased dependency upon caregivers.   Reduced oral control of bolus   Wet vocal quality during PO intake    Specific dysphagia interventions to include:     ongoing skilled PO analysis to determine if PO diet can be initiated     Specific instructions for next treatment:  ongoing skilled PO analysis to determine if PO diet can be initiated   Patient Treatment Goals:    Short Term Goals:  Pt will participate in ongoing evaluation of swallow function to determine when PO diet can be safely initiated    Long Term Goals:   Pt will improve oropharyngeal swallow function to

## 2023-08-04 NOTE — PLAN OF CARE
Problem: Pain  Goal: Verbalizes/displays adequate comfort level or baseline comfort level  Flowsheets (Taken 8/4/2023 0318)  Verbalizes/displays adequate comfort level or baseline comfort level:   Encourage patient to monitor pain and request assistance   Implement non-pharmacological measures as appropriate and evaluate response   Notify Licensed Independent Practitioner if interventions unsuccessful or patient reports new pain   Administer analgesics based on type and severity of pain and evaluate response   Assess pain using appropriate pain scale   Consider cultural and social influences on pain and pain management

## 2023-08-04 NOTE — CONSULTS
CARDIOLOGY CONSULTATION    Patient Name:  Davon Beltran    :  1927    Reason for Consultation:   History of heart failure    History of Present Illness:   Davon Beltran presents to [address] [place] [address] following history of sudden fall which she fractured her left arm while residing during rehabilitation at the [address] [place] [address]. This being 1 of multiple complications during her short stay there. Nonetheless she does have a history of recurrent atrial flutter fibrillation and previous history of pulmonary embolism. After arriving at [facility]) it was decided by her orthopedic surgeon, Dr. Irma Gaytan to take a more conservative route and placed her left arm in a brace for conservative healing measures. In addition, she was thought to have perhaps a urinary tract infection and/or possible pneumonia and/or possible inner ear infection which she has been placed on Intravenous antibiotics as she has failed her swallow study. Unfortunately, her cognitive status has rapidly declined as well.     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 ([address]), Bronchitis, Bruising tendency, Buttock wound, left, initial encounter, CAD (coronary artery disease), Cough, COVID, Dermatophytosis, Diabetes mellitus ([address]), GERD (gastroesophageal reflux disease), History of GI bleed, History of pulmonary embolus (PE), Hx of blood clots, Hyperlipidemia, Hypertension, Leg swelling, Lung disease, Peripheral vascular angioplasty status, Pseudoaneurysm of right femoral artery ([address]), PVD (peripheral vascular disease) with claudication ([facility]), Restless legs syndrome, Rhinitis, allergic, Sinusitis, SOB (shortness of breath), Type 1 diabetes mellitus with left diabetic foot ulcer ([address]), Ulcerated, foot, left, limited to breakdown of skin ([address]), Urinary incontinence, Wound of left buttock, and Wound of right there is no   intracranial hemorrhage. 2. Atrophy and periventricular leukomalacia,         CTA PULMONARY W CONTRAST   Final Result      1. No evidence of acute pulmonary emboli. 2.  Intrahepatic and extrahepatic bile duct prominence, likely physiologic   due to the patient's age and absent gallbladder unless there is a suspicious   elevation of the bilirubin level. 3.  Rectal and to a lesser degree sigmoid constipation with some possible   thickening of the rectal wall which could suggest a degree of impaction,   correlate clinically. Diverticulosis without evidence of definite acute   diverticulitis. Otherwise nonspecific bowel gas pattern. 4.  Multilevel compression fracture deformities within the thoracic and   lumbar spine of indeterminate age, correlate clinically. These involve the   T8, T9, T12, L3 and L5 vertebral bodies. 5.  Otherwise no acute pathology within the chest, abdomen or pelvis. CT ABDOMEN PELVIS W IV CONTRAST Additional Contrast? None   Final Result      1. No evidence of acute pulmonary emboli. 2.  Intrahepatic and extrahepatic bile duct prominence, likely physiologic   due to the patient's age and absent gallbladder unless there is a suspicious   elevation of the bilirubin level. 3.  Rectal and to a lesser degree sigmoid constipation with some possible   thickening of the rectal wall which could suggest a degree of impaction,   correlate clinically. Diverticulosis without evidence of definite acute   diverticulitis. Otherwise nonspecific bowel gas pattern. 4.  Multilevel compression fracture deformities within the thoracic and   lumbar spine of indeterminate age, correlate clinically. These involve the   T8, T9, T12, L3 and L5 vertebral bodies. 5.  Otherwise no acute pathology within the chest, abdomen or pelvis.          XR CHEST PORTABLE   Final Result        Assessment:    Patient Active Problem List   Diagnosis Code    Asthma J45.909 regarding my findings and recommendations and I have answered all questions as posed to me by Ms. Melvin's daughter Thank you, Liana Andujar DO for allowing me to consult in the care of this patient. Dre Arrington DO , FACP, [facility], [facility]    NOTE:  This report was transcribed using voice recognition software. Every effort was made to ensure accuracy; however, inadvertent computerized transcription errors may be present.

## 2023-08-04 NOTE — CARE COORDINATION
Care Coordination: Following discharge planning. Chart reviewed, discussed in IDR  and met with 2 sons in room. Palliative consult completed this date and Hospice was discussed. Speech therapy  unable to do assessment for swallow as patient not responsive . Clinical notes refer to multi organ involvement and poor prognosis. Hospice consult recommended. .  Met with 2 sons . They want to proceed with Hospice referral.  Choice for [facility]. Order obtained and referral called. Son Kameron Nieves at bedside is DPOA. There are 7 children. He will act as spokesperson and facilitate communication with other siblings. Will follow. The Plan for Transition of Care is related to the following treatment goals: Hospice care     patient representative Kameron Nieves  was provided with a choice of provider and agrees   with the discharge plan. [x] Yes [] No    Freedom of choice list was provided with basic dialogue that supports the patient's individualized plan of care/goals, treatment preferences and shares the quality data associated with the providers.  [x] Yes [] No

## 2023-08-04 NOTE — PROGRESS NOTES
Date:2023  Patient Name: Jenna Garcia  MRN: [id number]  : 1927  ROOM #: [id number]    Occupational Therapy Screen    OT orders received and chart reviewed. OT will discontinue orders at this time as pt not medically appropriate for therapy services. Please re-order OT if there is a change in physical status and OT is needed.      Thank you,    Luz Cesar OTR/L; [id number]

## 2023-08-04 NOTE — PROGRESS NOTES
Consult received and chart reviewed. Visit made to the bedside. 3 sons at the bedside. Hospice philosophy and services discussed. All questions answered. Family is familiar with hospice as pt was a hospice patient approximately a year ago. Family discussed they would like her to have at least one more dose of antibiotics before they would like to make a decision. Pain medication discussed as pt is thrashing head back and forth in the bed, smacking the bed with right hand, and moaning \"Oh\" in the bed. Son Glen [facility]) would like the pt to have some pain medication due to the symptoms described above. Updated the bedside nurse about POA asking for pain medication. Updated CM about family's wishes. Pt will be reassessed on 8/5 for appropriate level of care. Family is in agreement with this plan.      Electronically signed by Evelyn Acuña RN on 8/4/2023 at 5:11 PM  [phone]

## 2023-08-04 NOTE — CARE COORDINATION
08/04/23 Update CM Note: Received a call requesting hospice per patients son Regine Greene. Perfect serve sent to Louie Knott CNP and Bela from Palliative care requesting a Hospice order.  Electronically signed by Lesley Pradhan RN CM on 8/4/2023 at 2:01 PM

## 2023-08-04 NOTE — PROGRESS NOTES
PROGRESS NOTE       PATIENT PROBLEM LIST:  Patient Active Problem List   Diagnosis Code    Asthma J45.909    CAD (coronary artery disease) I25.10    Vitamin D deficiency E55.9    HTN (hypertension) I10    Idiopathic peripheral neuropathy G60.9    Rhinitis, allergic J30.9    GERD (gastroesophageal reflux disease) K21.9    PVD (peripheral vascular disease) with claudication ([facility]) I73.9    Gait instability R26.81    DM (diabetes mellitus), type 2 ([facility]) E11.9    History of pulmonary embolus (PE) Z86.711    Peripheral vascular angioplasty status Z98.62    Non-proliferative diabetic retinopathy, mild, both eyes ([facility]) U58.4646    Leg swelling M79.89    Compression fracture of L5 lumbar vertebra, closed, initial encounter ([address]) S32.050A    HLD (hyperlipidemia) E78.5    Compression fracture of thoracic vertebra ([facility]) S22.000A    Acute anemia D64.9    Spinal stenosis M48.00    Wound of right buttock S31.819A    Buttock wound, left, initial encounter S31.829A    History of GI bleed Z87.19    Wound of left buttock S31.829A    S/p left hip fracture Z87.81    Closed right hip fracture, initial encounter ([address]) S72.001A    AMS (altered mental status) R41.82    Sepsis ([address]) A41.9    Severe protein-calorie malnutrition ([address]) E43       SUBJECTIVE:  John Pruitt remains grossly confused not interacting with those around her. REVIEW OF SYSTEMS:  General ROS: negative for - fatigue, malaise,  weight gain or weight loss  Psychological ROS: negative for - anxiety , depression  Ophthalmic ROS: negative for - decreased vision or visual distortion. ENT ROS: negative  Allergy and Immunology ROS: negative  Hematological and Lymphatic ROS: negative  Endocrine: no heat or cold intolerance and no polyphagia, polydipsia, or polyuria  Respiratory ROS: negative for - hemoptysis, pleuritic pain, and wheezing  Cardiovascular ROS: positive for - irregular heartbeat.   Gastrointestinal ROS: no abdominal pain, change in bowel habits, or black or the patient's son face to face regarding my findings and recommendations and I have answered all questions as posed to me by Ms. Melvin's son. Jose Enrique Bravo, DO FACP,FACC,FSCAI      NOTE:  This report was transcribed using voice recognition software.   Every effort was made to ensure accuracy; however, inadvertent computerized transcription errors may be present

## 2023-08-04 NOTE — PROGRESS NOTES
Date: 2023       Patient Name: Nikki Hawkins  : 1927      MRN: [id number]    PT order received. Chart has been reviewed. PT evaluation will be on hold as pt remains unable to follow commands and not medically appropriate for therapy at this time. Will DC from PT services at this time. Please re-consult if changes in mentation of physical status occur and evaluation is appropriate.       Ac Sánchez, PT

## 2023-08-05 VITALS
HEART RATE: 100 BPM | TEMPERATURE: 98.8 F | BODY MASS INDEX: 22.17 KG/M2 | DIASTOLIC BLOOD PRESSURE: 62 MMHG | HEIGHT: 64 IN | RESPIRATION RATE: 16 BRPM | OXYGEN SATURATION: 100 % | SYSTOLIC BLOOD PRESSURE: 146 MMHG | WEIGHT: 129.85 LBS

## 2023-08-05 PROBLEM — R41.82 ALTERED MENTAL STATUS: Status: ACTIVE | Noted: 2023-01-01

## 2023-08-05 LAB
ANION GAP SERPL CALCULATED.3IONS-SCNC: 11 MMOL/L (ref 7–16)
BASOPHILS # BLD: 0.01 K/UL (ref 0–0.2)
BASOPHILS NFR BLD: 0 % (ref 0–2)
BUN SERPL-MCNC: 50 MG/DL (ref 6–23)
CALCIUM SERPL-MCNC: 9.9 MG/DL (ref 8.6–10.2)
CHLORIDE SERPL-SCNC: 122 MMOL/L (ref 98–107)
CO2 SERPL-SCNC: 20 MMOL/L (ref 22–29)
CREAT SERPL-MCNC: 0.7 MG/DL (ref 0.5–1)
EOSINOPHIL # BLD: 0 K/UL (ref 0.05–0.5)
EOSINOPHILS RELATIVE PERCENT: 0 % (ref 0–6)
ERYTHROCYTE [DISTWIDTH] IN BLOOD BY AUTOMATED COUNT: 15.7 % (ref 11.5–15)
GFR SERPL CREATININE-BSD FRML MDRD: >60 ML/MIN/1.73M2
GLUCOSE SERPL-MCNC: 220 MG/DL (ref 74–99)
HCT VFR BLD AUTO: 24.7 % (ref 34–48)
HGB BLD-MCNC: 8.1 G/DL (ref 11.5–15.5)
IMM GRANULOCYTES # BLD AUTO: 0.1 K/UL (ref 0–0.58)
IMM GRANULOCYTES NFR BLD: 1 % (ref 0–5)
LYMPHOCYTES NFR BLD: 0.59 K/UL (ref 1.5–4)
LYMPHOCYTES RELATIVE PERCENT: 9 % (ref 20–42)
MAGNESIUM SERPL-MCNC: 2.1 MG/DL (ref 1.6–2.6)
MCH RBC QN AUTO: 35.1 PG (ref 26–35)
MCHC RBC AUTO-ENTMCNC: 32.8 G/DL (ref 32–34.5)
MCV RBC AUTO: 106.9 FL (ref 80–99.9)
METER GLUCOSE: 250 MG/DL (ref 74–99)
MONOCYTES NFR BLD: 0.7 K/UL (ref 0.1–0.95)
MONOCYTES NFR BLD: 10 % (ref 2–12)
NEUTROPHILS NFR BLD: 80 % (ref 43–80)
NEUTS SEG NFR BLD: 5.5 K/UL (ref 1.8–7.3)
PLATELET # BLD AUTO: 329 K/UL (ref 130–450)
PMV BLD AUTO: 11.7 FL (ref 7–12)
POTASSIUM SERPL-SCNC: 2.9 MMOL/L (ref 3.5–5)
RBC # BLD AUTO: 2.31 M/UL (ref 3.5–5.5)
RBC # BLD: ABNORMAL 10*6/UL
SODIUM SERPL-SCNC: 153 MMOL/L (ref 132–146)
WBC OTHER # BLD: 6.9 K/UL (ref 4.5–11.5)

## 2023-08-05 PROCEDURE — 85025 COMPLETE CBC W/AUTO DIFF WBC: CPT

## 2023-08-05 PROCEDURE — 82947 ASSAY GLUCOSE BLOOD QUANT: CPT

## 2023-08-05 PROCEDURE — 83735 ASSAY OF MAGNESIUM: CPT

## 2023-08-05 PROCEDURE — 6370000000 HC RX 637 (ALT 250 FOR IP): Performed by: INTERNAL MEDICINE

## 2023-08-05 PROCEDURE — 80048 BASIC METABOLIC PNL TOTAL CA: CPT

## 2023-08-05 PROCEDURE — 6360000002 HC RX W HCPCS: Performed by: INTERNAL MEDICINE

## 2023-08-05 RX ORDER — LORAZEPAM 2 MG/ML
1 INJECTION INTRAMUSCULAR EVERY 6 HOURS PRN
Status: DISCONTINUED | OUTPATIENT
Start: 2023-08-05 | End: 2023-08-05 | Stop reason: HOSPADM

## 2023-08-05 RX ADMIN — MORPHINE SULFATE 2 MG: 2 INJECTION, SOLUTION INTRAMUSCULAR; INTRAVENOUS at 10:18

## 2023-08-05 RX ADMIN — INSULIN LISPRO 4 UNITS: 100 INJECTION, SOLUTION INTRAVENOUS; SUBCUTANEOUS at 06:59

## 2023-08-05 RX ADMIN — MORPHINE SULFATE 2 MG: 2 INJECTION, SOLUTION INTRAMUSCULAR; INTRAVENOUS at 01:47

## 2023-08-05 RX ADMIN — MORPHINE SULFATE 2 MG: 2 INJECTION, SOLUTION INTRAMUSCULAR; INTRAVENOUS at 13:11

## 2023-08-05 RX ADMIN — MORPHINE SULFATE 2 MG: 2 INJECTION, SOLUTION INTRAMUSCULAR; INTRAVENOUS at 06:03

## 2023-08-05 ASSESSMENT — PAIN SCALES - GENERAL
PAINLEVEL_OUTOF10: 7
PAINLEVEL_OUTOF10: 10
PAINLEVEL_OUTOF10: 7
PAINLEVEL_OUTOF10: 10

## 2023-08-05 ASSESSMENT — PAIN DESCRIPTION - ORIENTATION
ORIENTATION: OTHER (COMMENT)
ORIENTATION: OTHER (COMMENT)

## 2023-08-05 ASSESSMENT — PAIN DESCRIPTION - LOCATION
LOCATION: GENERALIZED
LOCATION: GENERALIZED

## 2023-08-05 ASSESSMENT — PAIN DESCRIPTION - DESCRIPTORS: DESCRIPTORS: SORE;PRESSURE;PENETRATING

## 2023-08-05 ASSESSMENT — PAIN - FUNCTIONAL ASSESSMENT
PAIN_FUNCTIONAL_ASSESSMENT: ACTIVITIES ARE NOT PREVENTED
PAIN_FUNCTIONAL_ASSESSMENT: ACTIVITIES ARE NOT PREVENTED

## 2023-08-05 NOTE — PROGRESS NOTES
HOSPICE [facility]    Met with son Ras Bojorquez at bedside. Patient is lethargic, moaning and mumbling. She does not open eyes and verbalize anything coherent. Spoke with Dr. Jonathan Hannon who accepted patient for inpatient level of care hospice. Received bed 102 with a requested transport of asap. [facility] able to transport at 1300. Ambulance forms placed in soft chart. Updated CM, charge and bedside nurse. Requested discharge order be obtained. Please leave IV. Gave nurse to nurse report to hospice house. Signed consents with son Latesha Faith [place].     Electronically signed by Chuyita Trammell RN on 8/5/2023 at 11:21 AM  [phone]; [phone]

## 2023-08-05 NOTE — CARE COORDINATION
8/5/2023discharge order noted. Per hot note pt to hospice house this afternoon.   Electronically signed by GLORIA Hernandez on 8/5/2023 at 12:49 PM

## 2023-08-06 LAB
MICROORGANISM SPEC CULT: NORMAL
MICROORGANISM SPEC CULT: NORMAL
SERVICE CMNT-IMP: NORMAL
SERVICE CMNT-IMP: NORMAL
SPECIMEN DESCRIPTION: NORMAL
SPECIMEN DESCRIPTION: NORMAL

## 2023-08-15 ENCOUNTER — FOLLOWUP TELEPHONE ENCOUNTER (OUTPATIENT)
Dept: AUDIOLOGY | Age: 88
End: 2023-08-15

## 2023-08-15 NOTE — DISCHARGE SUMMARY
[facility]   Discharge summary   Patient ID:  Mat Gal  [id number]  68 y.o.  9/20/1927    Admit date: 8/2/2023    Discharge date and time: 8/14/2023    Admission Diagnoses:   Patient Active Problem List   Diagnosis    Asthma    CAD (coronary artery disease)    Vitamin D deficiency    HTN (hypertension)    Idiopathic peripheral neuropathy    Rhinitis, allergic    GERD (gastroesophageal reflux disease)    PVD (peripheral vascular disease) with claudication (HCC)    Gait instability    DM (diabetes mellitus), type 2 ([address])    History of pulmonary embolus (PE)    Peripheral vascular angioplasty status    Non-proliferative diabetic retinopathy, mild, both eyes (HCC)    Leg swelling    Compression fracture of L5 lumbar vertebra, closed, initial encounter ([address])    HLD (hyperlipidemia)    Compression fracture of thoracic vertebra (HCC)    Acute anemia    Spinal stenosis    Wound of right buttock    Buttock wound, left, initial encounter    History of GI bleed    Wound of left buttock    S/p left hip fracture    Closed right hip fracture, initial encounter ([address])    AMS (altered mental status)    Sepsis ([address])    Severe protein-calorie malnutrition ([address])    Altered mental status       Discharge Diagnoses: AMS    Consults: cardiology    Procedures: none    Hospital Course: The patient is a 80 y.o. female of Teresa Hutson DO with significant past medical history of Patient is a 80-year-old female with past medical history of asthma, CAD, DM, GERD, DVTs, HLD, HTN, PVD, RLS who presents to the emergency room for altered mental status. Patient recently underwent a right hip surgery approximately 2 months ago and was admitted to [facility]  6/3 and presented to the emergency room on 7/19 following a fall resulting in a closed displaced fracture of the distal end of the left humerus and was sent back to her nursing home.   She followed up with orthopedic surgery on 7/27 for her surgery completed at

## 2023-08-15 NOTE — PROGRESS NOTES
Family of patient returned recently fit hearing aids for credit. Hearing aids will be returned to [facility] for credit.   Electronically signed by Van Ronquillo on 8/15/2023 at 12:05 PM

## 2023-09-28 NOTE — PROGRESS NOTES
Physician Progress Note      Angeles Brantley  [facility] #:                  [id number]  :                       1927  ADMIT DATE:       2023 1:26 PM  [facility] DATE:        2023 1:18 PM  RESPONDING  PROVIDER #:        Marquez Garrido MD          QUERY TEXT:    Patient admitted with AMS, Acute suppurative otitis media of right ear with   spontaneous rupture of tympanic membrane. Noted documentation of sepsis in HP. In order to support the diagnosis of sepsis, please include additional   clinical indicators in your documentation. Or please document if the   diagnosis of sepsis has been ruled out after further study    The medical record reflects the following:  Risk Factors: ***  Clinical Indicators: ED PN- Patient did have drainage from the right ear that   was purulent and foul-smelling. No tenderness or erythema over the mastoid. Cultures were taken of the drainage and are pending. Blood cx pending. Labwork   remarkable for mild leukocytosis 11.8 as well as elev lactic 2.3. Hgb 8.7,   near baseline. Glucose 417 however b-hydroxy wnl, no signs of dka at this   time. Acute suppurative otitis media of right ear with spontaneous rupture of   tympanic membrane, recurrence not specified. HP- Sepsis. CT head negative, UA   > negative, WBC 12.4 > 11.8, -Lactic aci  Treatment: IVF, IV antibiotics, monitoring, labs, cultures    Thank you,  Mello Roberts RN, CRCR  Clinical   Options provided:  -- Sepsis present as evidenced by, Please document evidence. -- Sepsis was ruled out after study  -- Other - I will add my own diagnosis  -- Disagree - Not applicable / Not valid  -- Disagree - Clinically unable to determine / Unknown  -- Refer to Clinical Documentation Reviewer    PROVIDER RESPONSE TEXT:    Sepsis was ruled out after study.     Query created by: Mello Roberts on 2023 3:43 PM      Electronically signed by:  Marquez Garrido MD 2023 10:33 AM

## 2023-10-03 NOTE — PROGRESS NOTES
Physician Progress Note      Natan Leiva  CSN #:                  [id number]  :                       1927  ADMIT DATE:       2023 1:26 PM  [facility] DATE:        2023 1:18 PM  RESPONDING  PROVIDER #:        Roark Canavan MD          QUERY TEXT:    Patient admitted with AMS, SOB. Per 8/3 wound care PN, noted to also have   pressure ulcer. If possible, please document in progress notes and discharge   summary the location, present on admission status and stage of the pressure   ulcer: The medical record reflects the following:  Risk Factors: AMS, SOB  Clinical Indicators: 8/3 Wound care PN- right heel, would etiology: Pressure   Stage 2  Treatment: dressing changed, consults, monitoring, Cleansed with saline, ABD,   Alginate, Dry dressing, Roll gauze    Stage 1:  Non-blanchable erythema of intact skin  Stage 2:  Abrasion, Blister, Partial-thickness skin loss, with exposed dermis  Stage 3:  Full-thickness skin loss with damage or necrosis of subcutaneous   tissue  Stage 4:  Full-thickness skin & soft tissue loss through to underlying muscle,   tendon or bone  Unstageable: Obscured full-thickness skin & tissue loss    Thank you,  Fadi Arredondo RN, CRCR  Clinical Documentation Improvement  Options provided:  -- Stage 2 Pressure Ulcer of right heel present on admission  -- Other - I will add my own diagnosis  -- Disagree - Not applicable / Not valid  -- Disagree - Clinically unable to determine / Unknown  -- Refer to Clinical Documentation Reviewer    PROVIDER RESPONSE TEXT:    This patient has a Stage 2 pressure ulcer of the right heel which was present   on admission.     Query created by: Fadi Arredondo on 2023 1:42 PM      Electronically signed by:  Roark Canavan MD 10/3/2023 8:42 AM

## 2023-10-28 NOTE — PATIENT INSTRUCTIONS
Orders for [address] [place] [address]  Ms. Francia Allen was seen today for follow-up on her left hip fracture. She is doing well from this standpoint and fracture appears to be well-healed with hardware in anatomic alignment.   She does continue to complain of lumbosacral pain that limits her ability to be upright and ambulatory  We recommend multimodal pain control, currently on Tylenol, ibuprofen, gabapentin and lidocaine patches    Recommend addition of topical NSAID Voltaren gel, prescription written today as well as capsaicin topical treatment for her arthritic pain, discussed this in length with the daughter today about appropriate dosing as well    Recommend continuation of therapy to keep patient mobile and ambulatory is much as we are able  Pressure reducing measures recommended given history of sacral wounds    At this time patient will follow-up with orthopedic trauma on an as-needed basis
Yes

## 2024-01-01 NOTE — PROGRESS NOTES
Patient admitted to unit, hooked onto heart monitor, vitals and assessment complete, admission complete, and skin chart verified with second RN. Patient resting in bed at this time. No

## 2024-08-25 NOTE — DISCHARGE INSTRUCTIONS
[facility]) Department of Orthopedic Surgery  [address]    DO Dr. Thomas Gaines MD Dr. Mickel Lowe, MD Orien Neas, PA-C Liliane Kettle PA-C Suzzane Page PA-C      Orthopaedics Discharge Instructions   Weight bearing Status - Weight bearing as tolerated - on left lower Extremity  Pain medication Per Prescriptions  Contact Office for Medication Refill- [phone]  Office can refill pain med every 7 days  If patient discharging to facility then pain control will be continued per facility physician  Ice to operative/injured site for 15-30 minutes of each hour for next 5 days    Recommend that you continue to ice the area 2-3 times per day after this   Elevate operative/injured limb on 2 pillows at home  Goal is to have limb above the heart if able  Continue DVT Prophylaxis (blood clot prevention) as Prescribed: home eliquis   Wound care - ok to remove dressing on post operative day 7. Cover with a clean dry dressing as needed for drainage. Follow Up in Office in 2 weeks. Your first post op appointment is often with one of our PAs. Call the office at [phone] or directions or with any questions. Watch for these significant complications. Call physician if they or any other problems occur:  Fever over 101°, redness, swelling or warmth at the operative site  Unrelieved nausea    Foul smelling or cloudy drainage at the operative site   Unrelieved pain    Blood soaked dressing. (Some oozing may be normal)     Numb, pale, blue, cold or tingling extremity    Future Appointments   Date Time Provider Ramona Bell   12/5/2022  1:15 PM Erlin Richardson MD SE[facility] WOUND [facility]   12/16/2022  8:45 AM SCHEDULE, SE ORTHO RES SE [facility]         It is the Department of Orthopaedic Trauma's standard of practice that providers will de-escalate(wean) all patients from narcotic(opioid) medications during the post-operative period. We provide multimodal pain control but opioid medications are tapered in all of our patients. If patient requires referral to pain management for prolonged taper off of opioid pain medication we will facilitate this process. Yes

## (undated) DEVICE — BIT DRL L145MM DIA4.2MM ST 3 FLUT NDL PNT QUIK CPL FOR FEM

## (undated) DEVICE — GUIDEWIRE ORTH L230MM DIA2.5MM S STL SPADE PNT TIP

## (undated) DEVICE — BANDAGE,GAUZE,CONFORMING,4"X75",STRL,LF: Brand: MEDLINE

## (undated) DEVICE — GAUZE,SPONGE,AVANT,4"X4",4PLY,STRL,10/TR: Brand: MEDLINE

## (undated) DEVICE — PAD,ABDOMINAL,5"X9",ST,LF,25/BX: Brand: MEDLINE INDUSTRIES, INC.

## (undated) DEVICE — DRAPE,EXTREMITY,89X128,STERILE: Brand: MEDLINE

## (undated) DEVICE — Device

## (undated) DEVICE — DRESSING HYDROFIBER AQUACEL AG ADVANTAGE 3.5X6 IN

## (undated) DEVICE — SYRINGE IRRIG 60ML SFT PLIABLE BLB EZ TO GRP 1 HND USE W/

## (undated) DEVICE — BANDAGE,GAUZE,CONFORMING,3"X75",STRL,LF: Brand: MEDLINE

## (undated) DEVICE — Device: Brand: COVER, PERINEAL POST, 12 PK

## (undated) DEVICE — ELECTRODE PT RET AD L9FT HI MOIST COND ADH HYDRGEL CORDED

## (undated) DEVICE — APPLICATOR MEDICATED 26 CC SOLUTION HI LT ORNG CHLORAPREP

## (undated) DEVICE — GLOVE ORTHO 8   MSG9480

## (undated) DEVICE — GLOVE ORANGE PI 7 1/2   MSG9075

## (undated) DEVICE — BANDAGE,GAUZE,4.5"X4.1YD,STERILE,LF: Brand: MEDLINE

## (undated) DEVICE — 3M™ IOBAN™ 2 ANTIMICROBIAL INCISE DRAPE 6640EZ: Brand: IOBAN™ 2

## (undated) DEVICE — PACK PROCEDURE SURG GEN CUST

## (undated) DEVICE — PAD PERINL POST FOAM DISPOSABLE FOR AMSCO SURG TBL

## (undated) DEVICE — Z DISCONTINUED GLOVE SURG SZ 7 L12IN FNGR THK13MIL WHT ISOLEX POLYISOPRENE

## (undated) DEVICE — GAUZE,SPONGE,4"X4",16PLY,XRAY,STRL,LF: Brand: MEDLINE

## (undated) DEVICE — APPLICATOR PREP 26ML 0.7% IOD POVACRYLEX 74% ISO ALC ST

## (undated) DEVICE — 4-PORT MANIFOLD: Brand: NEPTUNE 2

## (undated) DEVICE — GLOVE ORANGE PI 8   MSG9080

## (undated) DEVICE — C-ARM: Brand: UNBRANDED

## (undated) DEVICE — 3M™ COBAN™ NL STERILE NON-LATEX SELF-ADHERENT WRAP, 2084S, 4 IN X 5 YD (10 CM X 4,5 M), 18 ROLLS/CASE: Brand: 3M™ COBAN™

## (undated) DEVICE — FRACTURE TABLE: Brand: MEDLINE INDUSTRIES, INC.

## (undated) DEVICE — Device: Brand: PROTECTORS, LEG SPAR BALL JOINT, 12/PR

## (undated) DEVICE — HANDPIECE SET WITH BONE CLEANING TIP AND SUCTION TUBE: Brand: INTERPULSE

## (undated) DEVICE — INTENDED FOR TISSUE SEPARATION, AND OTHER PROCEDURES THAT REQUIRE A SHARP SURGICAL BLADE TO PUNCTURE OR CUT.: Brand: BARD-PARKER ® STAINLESS STEEL BLADES

## (undated) DEVICE — BIT DRL L500MM DIA6X9MM CANN STP L QUIK CPL FOR DH DC TFN

## (undated) DEVICE — GLOVE SURG SZ 85 L12IN FNGR ORTHO 126MIL CRM LTX FREE

## (undated) DEVICE — PATIENT RETURN ELECTRODE, SINGLE-USE, CONTACT QUALITY MONITORING, ADULT, WITH 9FT CORD, FOR PATIENTS WEIGING OVER 33LBS. (15KG): Brand: MEGADYNE

## (undated) DEVICE — PADDING,UNDERCAST,COTTON, 4"X4YD STERILE: Brand: MEDLINE

## (undated) DEVICE — TOTAL KNEE PK

## (undated) DEVICE — NEEDLE HYPO 25GA L1.5IN BLU POLYPR HUB S STL REG BVL STR

## (undated) DEVICE — GRADUATE TRIANG MEASURE 1000ML BLK PRNT

## (undated) DEVICE — BLADE,STAINLESS-STEEL,10,STRL,DISPOSABLE: Brand: MEDLINE

## (undated) DEVICE — SPONGE LAP W18XL18IN WHT COT 4 PLY FLD STRUNG RADPQ DISP ST 2 PER PACK

## (undated) DEVICE — TOWEL,OR,DSP,ST,BLUE,STD,6/PK,12PK/CS: Brand: MEDLINE

## (undated) DEVICE — GUIDEWIRE ORTH L400MM DIA3.2MM FOR TFN

## (undated) DEVICE — SWAB SPEC COLL SHFT L5.25IN POLYUR FOAM TIP SFT DBL MEDIA

## (undated) DEVICE — SET ORTHO STD STORTSTD2

## (undated) DEVICE — READY WET SKIN SCRUB TRAY-LF: Brand: MEDLINE INDUSTRIES, INC.

## (undated) DEVICE — GOWN,SIRUS,FABRNF,XL,20/CS: Brand: MEDLINE

## (undated) DEVICE — GLOVE ORANGE PI 8 1/2   MSG9085

## (undated) DEVICE — NEEDLE SYR 18GA L1.5IN RED PLAS HUB S STL BLNT FILL W/O

## (undated) DEVICE — TRAP,MUCUS SPECIMEN,40CC: Brand: MEDLINE

## (undated) DEVICE — BLOCK BITE 60FR RUBBER ADLT DENTAL

## (undated) DEVICE — DRESSING HYDROFIBER AQUACEL AG ADVANTAGE 3.5X10 IN

## (undated) DEVICE — BIT DRL L L266MM DIA16MM FEM FLX CANN QUIK CPL

## (undated) DEVICE — PADDING UNDERCAST W6INXL4YD WYTEX 6 PER BG

## (undated) DEVICE — STOCKINETTE,DOUBLE PLY,6X48,STERILE: Brand: MEDLINE

## (undated) DEVICE — SYRINGE MED 10ML LUERLOCK TIP W/O SFTY DISP

## (undated) DEVICE — DRESSING,GAUZE,XEROFORM,CURAD,5"X9",ST: Brand: CURAD

## (undated) DEVICE — SET ORTHO MINI STD

## (undated) DEVICE — ZIMMER® STERILE DISPOSABLE TOURNIQUET CUFF WITH PROTECTIVE SLEEVE AND PLC, DUAL PORT, SINGLE BLADDER, 34 IN. (86 CM)

## (undated) DEVICE — SURGICAL PROCEDURE PACK BASIC

## (undated) DEVICE — DRAPE ISOLATN PT ST W/POCKET

## (undated) DEVICE — Z INACTIVE USE 2660664 SOLUTION IRRIG 3000ML 0.9% SOD CHL USP UROMATIC PLAS CONT

## (undated) DEVICE — GOWN,SIRUS,POLYRNF,BRTHSLV,XL,30/CS: Brand: MEDLINE

## (undated) DEVICE — ROD RMR L950MM DIA2.5MM W/ EXTN BALL TIP

## (undated) DEVICE — CONTAINER VACUTAINER ANAER CULTURE SWAB

## (undated) DEVICE — SET ORTHO STD STORTSTD1

## (undated) DEVICE — SYRINGE MED 10ML TRNSLUC BRL PLUNG BLK MRK POLYPR CTRL

## (undated) DEVICE — TUBING, SUCTION, 9/32" X 10', STRAIGHT: Brand: MEDLINE

## (undated) DEVICE — SPONGE GZ W4XL4IN RAYON POLY FILL CVR W/ NONWOVEN FAB

## (undated) DEVICE — GAUZE,PACKING STRIP,PLAIN,1/2"X5YD,STRL: Brand: CURAD

## (undated) DEVICE — C-ARMOR C-ARM EQUIPMENT COVERS CLEAR STERILE UNIVERSAL FIT 12 PER CASE: Brand: C-ARMOR

## (undated) DEVICE — BIT DRL L100MM DIA2MM QUIK CPL W/O STP REUSE

## (undated) DEVICE — SOLUTION IRRIG 1000ML 0.9% SOD CHL USP POUR PLAS BTL

## (undated) DEVICE — GOWN,SIRUS,FABRNF,L,20/CS: Brand: MEDLINE

## (undated) DEVICE — COVER HNDL LT DISP

## (undated) DEVICE — DHS®/DCS® COMPRESSION SCREW 36MM-STERILE
Type: IMPLANTABLE DEVICE | Site: HIP | Status: NON-FUNCTIONAL
Brand: DHS
Removed: 2022-12-02

## (undated) DEVICE — BASIC DOUBLE BASIN 2-LF: Brand: MEDLINE INDUSTRIES, INC.